# Patient Record
Sex: MALE | Race: WHITE | NOT HISPANIC OR LATINO | Employment: OTHER | URBAN - METROPOLITAN AREA
[De-identification: names, ages, dates, MRNs, and addresses within clinical notes are randomized per-mention and may not be internally consistent; named-entity substitution may affect disease eponyms.]

---

## 2019-02-26 ENCOUNTER — OFFICE VISIT (OUTPATIENT)
Dept: FAMILY MEDICINE CLINIC | Facility: CLINIC | Age: 69
End: 2019-02-26
Payer: MEDICARE

## 2019-02-26 VITALS
DIASTOLIC BLOOD PRESSURE: 82 MMHG | BODY MASS INDEX: 32.35 KG/M2 | HEIGHT: 70 IN | WEIGHT: 226 LBS | HEART RATE: 87 BPM | TEMPERATURE: 98 F | SYSTOLIC BLOOD PRESSURE: 130 MMHG | OXYGEN SATURATION: 97 %

## 2019-02-26 DIAGNOSIS — Z12.5 SCREENING FOR PROSTATE CANCER: ICD-10-CM

## 2019-02-26 DIAGNOSIS — Z12.11 SCREENING FOR COLON CANCER: ICD-10-CM

## 2019-02-26 DIAGNOSIS — Z00.00 WELL ADULT EXAM: Primary | ICD-10-CM

## 2019-02-26 DIAGNOSIS — Z13.220 ENCOUNTER FOR SCREENING FOR LIPID DISORDER: ICD-10-CM

## 2019-02-26 PROCEDURE — 99203 OFFICE O/P NEW LOW 30 MIN: CPT | Performed by: FAMILY MEDICINE

## 2019-02-26 NOTE — PROGRESS NOTES
Assessment/Plan:         Diagnoses and all orders for this visit:    Well adult exam  -     CBC and differential; Future  -     Comprehensive metabolic panel; Future    Screening for prostate cancer  -     PSA, Total Screen; Future    Screening for colon cancer  -     Cologuard; Future    Encounter for screening for lipid disorder  -     Lipid panel; Future      Wellness Visit for Adults   AMBULATORY CARE:   A wellness visit  is when you see your healthcare provider to get screened for health problems  You can also get advice on how to stay healthy  Write down your questions so you remember to ask them  Ask your healthcare provider how often you should have a wellness visit  What happens at a wellness visit:  Your healthcare provider will ask about your health, and your family history of health problems  This includes high blood pressure, heart disease, and cancer  He or she will ask if you have symptoms that concern you, if you smoke, and about your mood  You may also be asked about your intake of medicines, supplements, food, and alcohol  Any of the following may be done:  · Your weight  will be checked  Your height may also be checked so your body mass index (BMI) can be calculated  Your BMI shows if you are at a healthy weight  · Your blood pressure  and heart rate will be checked  Your temperature may also be checked  · Blood and urine tests  may be done  Blood tests may be done to check your cholesterol levels  Abnormal cholesterol levels increase your risk for heart disease and stroke  You may also need a blood or urine test to check for diabetes if you are at increased risk  Urine tests may be done to look for signs of an infection or kidney disease  · A physical exam  includes checking your heartbeat and lungs with a stethoscope  Your healthcare provider may also check your skin to look for sun damage  · Screening tests  may be recommended   A screening test is done to check for diseases that may not cause symptoms  The screening tests you may need depend on your age, gender, family history, and lifestyle habits  For example, colorectal screening may be recommended if you are 48years old or older  Vaccines you may need:   · Get an influenza vaccine  every year  The influenza vaccine protects you from the flu  Several types of viruses cause the flu  The viruses change over time, so new vaccines are made each year  · Get a tetanus-diphtheria (Td) booster vaccine  every 10 years  This vaccine protects you against tetanus and diphtheria  Tetanus is a severe infection that may cause painful muscle spasms and lockjaw  Diphtheria is a severe bacterial infection that causes a thick covering in the back of your mouth and throat  · Get a human papillomavirus (HPV) vaccine  if you are female and aged 23 to 32 or male 23 to 24 and never received it  This vaccine protects you from HPV infection  HPV is the most common infection spread by sexual contact  HPV may also cause vaginal, penile, and anal cancers  · Get a pneumococcal vaccine  if you are aged 72 years or older  The pneumococcal vaccine is an injection given to protect you from pneumococcal disease  Pneumococcal disease is an infection caused by pneumococcal bacteria  The infection may cause pneumonia, meningitis, or an ear infection  · Get a shingles vaccine  if you are aged 61 or older, even if you have had shingles before  The shingles vaccine is an injection to protect you from the varicella-zoster virus  This is the same virus that causes chickenpox  Shingles is a painful rash that develops in people who had chickenpox or have been exposed to the virus  How to eat healthy:  My Plate is a model for planning healthy meals  It shows the types and amounts of foods that should go on your plate  Fruits and vegetables make up about half of your plate, and grains and protein make up the other half   A serving of dairy is included on the side of your plate  The amount of calories and serving sizes you need depends on your age, gender, weight, and height  Examples of healthy foods are listed below:  · Eat a variety of vegetables  such as dark green, red, and orange vegetables  You can also include canned vegetables low in sodium (salt) and frozen vegetables without added butter or sauces  · Eat a variety of fresh fruits , canned fruit in 100% juice, frozen fruit, and dried fruit  · Include whole grains  At least half of the grains you eat should be whole grains  Examples include whole-wheat bread, wheat pasta, brown rice, and whole-grain cereals such as oatmeal     · Eat a variety of protein foods such as seafood (fish and shellfish), lean meat, and poultry without skin (turkey and chicken)  Examples of lean meats include pork leg, shoulder, or tenderloin, and beef round, sirloin, tenderloin, and extra lean ground beef  Other protein foods include eggs and egg substitutes, beans, peas, soy products, nuts, and seeds  · Choose low-fat dairy products such as skim or 1% milk or low-fat yogurt, cheese, and cottage cheese  · Limit unhealthy fats  such as butter, hard margarine, and shortening  Exercise:  Exercise at least 30 minutes per day on most days of the week  Some examples of exercise include walking, biking, dancing, and swimming  You can also fit in more physical activity by taking the stairs instead of the elevator or parking farther away from stores  Include muscle strengthening activities 2 days each week  Regular exercise provides many health benefits  It helps you manage your weight, and decreases your risk for type 2 diabetes, heart disease, stroke, and high blood pressure  Exercise can also help improve your mood  Ask your healthcare provider about the best exercise plan for you  General health and safety guidelines:   · Do not smoke  Nicotine and other chemicals in cigarettes and cigars can cause lung damage   Ask your healthcare provider for information if you currently smoke and need help to quit  E-cigarettes or smokeless tobacco still contain nicotine  Talk to your healthcare provider before you use these products  ·   · Lose weight, if needed  Being overweight increases your risk of certain health conditions  These include heart disease, high blood pressure, type 2 diabetes, and certain types of cancer  · Protect your skin  Do not sunbathe or use tanning beds  Use sunscreen with a SPF 15 or higher  Apply sunscreen at least 15 minutes before you go outside  Reapply sunscreen every 2 hours  Wear protective clothing, hats, and sunglasses when you are outside  Subjective:      Patient ID: Luana Bowman is a 80 y o  male  Establish   Has not had any primary , ever   Belongs to [employer] , [employer] ,   Retired school ,  , x 2yr   Last pe about 15 yrs   Has not had anything done    would have been [id number] [facility] , general , garden   Neg smoker   Neg etoh   Negative chest pain palpitations shortness of breath difficulty breathing cough, was told in past that had a murmur sometimes heart rate gives the buttocks had that for a good 30 years, has had various tests in the past all told normal, negative murmur lesions rash         The following portions of the patient's history were reviewed and updated as appropriate:   He has no past medical history on file  ,  does not have a problem list on file  ,   has no past surgical history on file  ,  family history is not on file  ,   reports that he quit smoking about 14 years ago  He has never used smokeless tobacco  His alcohol and drug histories are not on file  ,  is allergic to penicillin v       Review of Systems   Constitutional: Negative for appetite change, chills, fever and unexpected weight change     HENT: Negative for congestion, dental problem, ear pain, hearing loss, postnasal drip, rhinorrhea, sinus pressure, sinus pain, sneezing, sore throat, tinnitus and voice change  Eyes: Negative for visual disturbance  Respiratory: Negative for apnea, cough, chest tightness and shortness of breath  Cardiovascular: Negative for chest pain, palpitations and leg swelling  Gastrointestinal: Negative for abdominal pain, blood in stool, constipation, diarrhea, nausea and vomiting  Endocrine: Negative for cold intolerance, heat intolerance, polydipsia, polyphagia and polyuria  Genitourinary: Negative for decreased urine volume, difficulty urinating, dysuria, frequency and hematuria  Musculoskeletal: Negative for arthralgias, back pain, gait problem, joint swelling and myalgias  Skin: Negative for color change, rash and wound  Allergic/Immunologic: Negative for environmental allergies and food allergies  Neurological: Negative for dizziness, syncope, weakness, light-headedness, numbness and headaches  Hematological: Negative for adenopathy  Does not bruise/bleed easily  Psychiatric/Behavioral: Negative for sleep disturbance and suicidal ideas  The patient is not nervous/anxious  Objective:  Vitals:    02/26/19 1456   BP: 130/82   Pulse: 87   Temp: 98 °F (36 7 °C)   SpO2: 97%      Physical Exam   Constitutional: He is oriented to person, place, and time  He appears well-developed and well-nourished  No distress  HENT:   Head: Normocephalic and atraumatic  Right Ear: Tympanic membrane and external ear normal    Left Ear: Tympanic membrane and external ear normal    Nose: No mucosal edema or rhinorrhea  Right sinus exhibits no maxillary sinus tenderness  Left sinus exhibits no maxillary sinus tenderness and no frontal sinus tenderness  Mouth/Throat: Uvula is midline, oropharynx is clear and moist and mucous membranes are normal  No oropharyngeal exudate (Slightly erythematous with heavy postnasal drip)  Eyes: Conjunctivae and EOM are normal  No scleral icterus  Neck: Normal range of motion  Neck supple  Cardiovascular: Normal rate, regular rhythm and normal heart sounds  Pulmonary/Chest: Effort normal and breath sounds normal  He has no wheezes ( negative forced expiratory wheeze)  Abdominal: Soft  Bowel sounds are normal    Musculoskeletal: Normal range of motion  Lymphadenopathy:     He has no cervical adenopathy  Neurological: He is alert and oriented to person, place, and time  He has normal reflexes  Skin: Skin is warm and dry  No rash noted  Psychiatric: He has a normal mood and affect  His behavior is normal  Judgment and thought content normal          BMI Counseling: Body mass index is 32 43 kg/m²  Discussed the patient's BMI with him  The BMI is above average  BMI counseling and education was provided to the patient  Nutrition recommendations include reducing portion sizes, decreasing overall calorie intake, 3-5 servings of fruits/vegetables daily, reducing fast food intake, consuming healthier snacks, decreasing soda and/or juice intake, moderation in carbohydrate intake, increasing intake of lean protein, reducing intake of saturated fat and trans fat and reducing intake of cholesterol

## 2019-02-26 NOTE — PATIENT INSTRUCTIONS
Weight Management   AMBULATORY CARE:   Why it is important to manage your weight:  Being overweight increases your risk of health conditions such as heart disease, high blood pressure, type 2 diabetes, and certain types of cancer  It can also increase your risk for osteoarthritis, sleep apnea, and other respiratory problems  Aim for a slow, steady weight loss  Even a small amount of weight loss can lower your risk of health problems  How to lose weight safely:  A safe and healthy way to lose weight is to eat fewer calories and get regular exercise  You can lose up about 1 pound a week by decreasing the number of calories you eat by 500 calories each day  You can decrease calories by eating smaller portion sizes or by cutting out high-calorie foods  Read labels to find out how many calories are in the foods you eat  You can also burn calories with exercise such as walking, swimming, or biking  You will be more likely to keep weight off if you make these changes part of your lifestyle  Healthy meal plan for weight management:  A healthy meal plan includes a variety of foods, contains fewer calories, and helps you stay healthy  A healthy meal plan includes the following:  · Eat whole-grain foods more often  A healthy meal plan should contain fiber  Fiber is the part of grains, fruits, and vegetables that is not broken down by your body  Whole-grain foods are healthy and provide extra fiber in your diet  Some examples of whole-grain foods are whole-wheat breads and pastas, oatmeal, brown rice, and bulgur  · Eat a variety of vegetables every day  Include dark, leafy greens such as spinach, kale, verna greens, and mustard greens  Eat yellow and orange vegetables such as carrots, sweet potatoes, and winter squash  · Eat a variety of fruits every day  Choose fresh or canned fruit (canned in its own juice or light syrup) instead of juice  Fruit juice has very little or no fiber  · Eat low-fat dairy foods  Drink fat-free (skim) milk or 1% milk  Eat fat-free yogurt and low-fat cottage cheese  Try low-fat cheeses such as mozzarella and other reduced-fat cheeses  · Choose meat and other protein foods that are low in fat  Choose beans or other legumes such as split peas or lentils  Choose fish, skinless poultry (chicken or turkey), or lean cuts of red meat (beef or pork)  Before you cook meat or poultry, cut off any visible fat  · Use less fat and oil  Try baking foods instead of frying them  Add less fat, such as margarine, sour cream, regular salad dressing and mayonnaise to foods  Eat fewer high-fat foods  Some examples of high-fat foods include french fries, doughnuts, ice cream, and cakes  · Eat fewer sweets  Limit foods and drinks that are high in sugar  This includes candy, cookies, regular soda, and sweetened drinks  Ways to decrease calories:   · Eat smaller portions  ¨ Use a small plate with smaller servings  ¨ Do not eat second helpings  ¨ When you eat at a restaurant, ask for a box and place half of your meal in the box before you eat  ¨ Share an entrée with someone else  · Replace high-calorie snacks with healthy, low-calorie snacks  ¨ Choose fresh fruit, vegetables, fat-free rice cakes, or air-popped popcorn instead of potato chips, nuts, or chocolate  ¨ Choose water or calorie-free drinks instead of soda or sweetened drinks  · Eat regular meals  Skipping meals can lead to overeating later in the day  Eat a healthy snack in place of a meal if you do not have time to eat a regular meal      · Do not shop for groceries when you are hungry  You may be more likely to make unhealthy food choices  Take a grocery list of healthy foods and shop after you have eaten  Exercise:  Exercise at least 30 minutes per day on most days of the week  Some examples of exercise include walking, biking, dancing, and swimming   You can also fit in more physical activity by taking the stairs instead of the elevator or parking farther away from stores  Ask your healthcare provider about the best exercise plan for you  Other things to consider as you try to lose weight:   · Be aware of situations that may give you the urge to overeat, such as eating while watching television  Find ways to avoid these situations  For example, read a book, go for a walk, or do crafts  · Meet with a weight loss support group or friends who are also trying to lose weight  This may help you stay motivated to continue working on your weight loss goals  © 2017 [facility] [facility] Information is for End User's use only and may not be sold, redistributed or otherwise used for commercial purposes  All illustrations and images included in CareNotes® are the copyrighted property of [employer] , [employer]  or [employer]  The above information is an  only  It is not intended as medical advice for individual conditions or treatments  Talk to your doctor, nurse or pharmacist before following any medical regimen to see if it is safe and effective for you  Low Fat Diet   AMBULATORY CARE:   A low-fat diet  is an eating plan that is low in total fat, unhealthy fat, and cholesterol  You may need to follow a low-fat diet if you have trouble digesting or absorbing fat  You may also need to follow this diet if you have high cholesterol  You can also lower your cholesterol by increasing the amount of fiber in your diet  Soluble fiber is a type of fiber that helps to decrease cholesterol levels  Different types of fat in food:   · Limit unhealthy fats  A diet that is high in cholesterol, saturated fat, and trans fat may cause unhealthy cholesterol levels  Unhealthy cholesterol levels increase your risk of heart disease  ¨ Cholesterol:  Limit intake of cholesterol to less than 200 mg per day  Cholesterol is found in meat, eggs, and dairy      ¨ Saturated fat:  Limit saturated fat to less than 7% of your total daily calories  Ask your dietitian how many calories you need each day  Saturated fat is found in butter, cheese, ice cream, whole milk, and palm oil  Saturated fat is also found in meat, such as beef, pork, chicken skin, and processed meats  Processed meats include sausage, hot dogs, and bologna  ¨ Trans fat:  Avoid trans fat as much as possible  Trans fat is used in fried and baked foods  Foods that say trans fat free on the label may still have up to 0 5 grams of trans fat per serving  · Include healthy fats  Replace foods that are high in saturated and trans fat with foods high in healthy fats  This may help to decrease high cholesterol levels  ¨ Monounsaturated fats: These are found in avocados, nuts, and vegetable oils, such as olive, canola, and sunflower oil  ¨ Polyunsaturated fats: These can be found in vegetable oils, such as soybean or corn oil  Omega-3 fats can help to decrease the risk of heart disease  Omega-3 fats are found in fish, such as salmon, herring, trout, and tuna  Omega-3 fats can also be found in plant foods, such as walnuts, flaxseed, soybeans, and canola oil    Foods to limit or avoid:   · Grains:      ¨ Snacks that are made with partially hydrogenated oils, such as chips, regular crackers, and butter-flavored popcorn    ¨ High-fat baked goods, such as biscuits, croissants, doughnuts, pies, cookies, and pastries    · Dairy:      ¨ Whole milk, 2% milk, and yogurt and ice cream made with whole milk    ¨ Half and half creamer, heavy cream, and whipping cream    ¨ Cheese, cream cheese, and sour cream    · Meats and proteins:      ¨ High-fat cuts of meat (T-bone steak, regular hamburger, and ribs)    ¨ Fried meat, poultry (turkey and chicken), and fish    ¨ Poultry (chicken and turkey) with skin    ¨ Cold cuts (salami or bologna), hot dogs, oh, and sausage    ¨ Whole eggs and egg yolks    · Vegetables and fruits with added fat:      ¨ Fried vegetables or vegetables in butter or high-fat sauces, such as cream or cheese sauces    ¨ Fried fruit or fruit served with butter or cream    · Fats:      ¨ Butter, stick margarine, and shortening    ¨ Coconut, palm oil, and palm kernel oil  Foods to include:   · Grains:      ¨ Whole-grain breads, cereals, pasta, and brown rice    ¨ Low-fat crackers and pretzels    · Vegetables and fruits:      ¨ Fresh, frozen, or canned vegetables (no salt or low-sodium)    ¨ Fresh, frozen, dried, or canned fruit (canned in light syrup or fruit juice)    ¨ Avocado    · Low-fat dairy products:      ¨ Nonfat (skim) or 1% milk    ¨ Nonfat or low-fat cheese, yogurt, and cottage cheese    · Meats and proteins:      ¨ Chicken or turkey with no skin    ¨ Baked or broiled fish    ¨ Lean beef and pork (loin, round, extra lean hamburger)    ¨ Beans and peas, unsalted nuts, soy products    ¨ Egg whites and substitutes    ¨ Seeds and nuts    · Fats:      ¨ Unsaturated oil, such as canola, olive, peanut, soybean, or sunflower oil    ¨ Soft or liquid margarine and vegetable oil spread    ¨ Low-fat salad dressing  Other ways to decrease fat:   · Read food labels before you buy foods  Choose foods that have less than 30% of calories from fat  Choose low-fat or fat-free dairy products  Remember that fat free does not mean calorie free  These foods still contain calories, and too many calories can lead to weight gain  · Trim fat from meat and avoid fried food  Trim all visible fat from meat before you cook it  Remove the skin from poultry  Do not calloawy meat, fish, or poultry  Bake, roast, boil, or broil these foods instead  Avoid fried foods  Eat a baked potato instead of [place] fries  Steam vegetables instead of sautéing them in butter  · Add less fat to foods  Use imitation oh bits on salads and baked potatoes instead of regular oh bits  Use fat-free or low-fat salad dressings instead of regular dressings   Use low-fat or nonfat butter-flavored topping instead of regular butter or margarine on popcorn and other foods  Ways to decrease fat in recipes:  Replace high-fat ingredients with low-fat or nonfat ones  This may cause baked goods to be drier than usual  You may need to use nonfat cooking spray on pans to prevent food from sticking  You also may need to change the amount of other ingredients, such as water, in the recipe  Try the following:  · Use low-fat or light margarine instead of regular margarine or shortening  · Use lean ground turkey breast or chicken, or lean ground beef (less than 5% fat) instead of hamburger  · Add 1 teaspoon of canola oil to 8 ounces of skim milk instead of using cream or half and half  · Use grated zucchini, carrots, or apples in breads instead of coconut  · Use blenderized, low-fat cottage cheese, plain tofu, or low-fat ricotta cheese instead of cream cheese  · Use 1 egg white and 1 teaspoon of canola oil, or use ¼ cup (2 ounces) of fat-free egg substitute instead of a whole egg  · Replace half of the oil that is called for in a recipe with applesauce when you bake  Use 3 tablespoons of cocoa powder and 1 tablespoon of canola oil instead of a square of baking chocolate  How to increase fiber:  Eat enough high-fiber foods to get 20 to 30 grams of fiber every day  Slowly increase your fiber intake to avoid stomach cramps, gas, and other problems  · Eat 3 ounces of whole-grain foods each day  An ounce is about 1 slice of bread  Eat whole-grain breads, such as whole-wheat bread  Whole wheat, whole-wheat flour, or other whole grains should be listed as the first ingredient on the food label  Replace white flour with whole-grain flour or use half of each in recipes  Whole-grain flour is heavier than white flour, so you may have to add more yeast or baking powder  · Eat a high-fiber cereal for breakfast   Oatmeal is a good source of soluble fiber  Look for cereals that have bran or fiber in the name   Choose whole-grain products, such as brown rice, barley, and whole-wheat pasta  · Eat more beans, peas, and lentils  For example, add beans to soups or salads  Eat at least 5 cups of fruits and vegetables each day  Eat fruits and vegetables with the peel because the peel is high in fiber  © 2017 [address] [place]  Information is for End User's use only and may not be sold, redistributed or otherwise used for commercial purposes  All illustrations and images included in CareNotes® are the copyrighted property of [employer]  or [employer]  The above information is an  only  It is not intended as medical advice for individual conditions or treatments  Talk to your doctor, nurse or pharmacist before following any medical regimen to see if it is safe and effective for you  Heart Healthy Diet   AMBULATORY CARE:   A heart healthy diet  is an eating plan low in total fat, unhealthy fats, and sodium (salt)  A heart healthy diet helps decrease your risk for heart disease and stroke  Limit the amount of fat you eat to 25% to 35% of your total daily calories  Limit sodium to less than 2,300 mg each day  Healthy fats:  Healthy fats can help improve cholesterol levels  The risk for heart disease is decreased when cholesterol levels are normal  Choose healthy fats, such as the following:  · Unsaturated fat  is found in foods such as soybean, canola, olive, corn, and safflower oils  It is also found in soft tub margarine that is made with liquid vegetable oil  · Omega-3 fat  is found in certain fish, such as salmon, tuna, and trout, and in walnuts and flaxseed  Unhealthy fats:  Unhealthy fats can cause unhealthy cholesterol levels in your blood and increase your risk of heart disease  Limit unhealthy fats, such as the following:  · Cholesterol  is found in animal foods, such as eggs and lobster, and in dairy products made from whole milk   Limit cholesterol to less than 300 milligrams (mg) each day  You may need to limit cholesterol to 200 mg each day if you have heart disease  · Saturated fat  is found in meats, such as oh and hamburger  It is also found in chicken or turkey skin, whole milk, and butter  Limit saturated fat to less than 7% of your total daily calories  Limit saturated fat to less than 6% if you have heart disease or are at increased risk for it  · Trans fat  is found in packaged foods, such as potato chips and cookies  It is also in hard margarine, some fried foods, and shortening  Avoid trans fats as much as possible    Heart healthy foods and drinks to include:  Ask your dietitian or healthcare provider how many servings to have from each of the following food groups:  · Grains:      ¨ Whole-wheat breads, cereals, and pastas, and brown rice    ¨ Low-fat, low-sodium crackers and chips    · Vegetables:      ¨ Broccoli, green beans, green peas, and spinach    ¨ Collards, kale, and lima beans    ¨ Carrots, sweet potatoes, tomatoes, and peppers    ¨ Canned vegetables with no salt added    · Fruits:      ¨ Bananas, peaches, pears, and pineapple    ¨ Grapes, raisins, and dates    ¨ Oranges, tangerines, grapefruit, orange juice, and grapefruit juice    ¨ Apricots, mangoes, melons, and papaya    ¨ Raspberries and strawberries    ¨ Canned fruit with no added sugar    · Low-fat dairy products:      ¨ Nonfat (skim) milk, 1% milk, and low-fat almond, cashew, or soy milks fortified with calcium    ¨ Low-fat cheese, regular or frozen yogurt, and cottage cheese    · Meats and proteins , such as lean cuts of beef and pork (loin, leg, round), skinless chicken and turkey, legumes, soy products, egg whites, and nuts  Foods and drinks to limit or avoid:  Ask your dietitian or healthcare provider about these and other foods that are high in unhealthy fat, sodium, and sugar:  · Snack or packaged foods , such as frozen dinners, cookies, macaroni and cheese, and cereals with more than 300 mg of sodium per serving    · Canned or dry mixes  for cakes, soups, sauces, or gravies    · Vegetables with added sodium , such as instant potatoes, vegetables with added sauces, or regular canned vegetables    · Other foods high in sodium , such as ketchup, barbecue sauce, salad dressing, pickles, olives, soy sauce, and miso    · High-fat dairy foods  such as whole or 2% milk, cream cheese, or sour cream, and cheeses     · High-fat protein foods  such as high-fat cuts of beef (T-bone steaks, ribs), chicken or turkey with skin, and organ meats, such as liver    · Cured or smoked meats , such as hot dogs, oh, and sausage    · Unhealthy fats and oils , such as butter, stick margarine, shortening, and cooking oils such as coconut or palm oil    · Food and drinks high in sugar , such as soft drinks (soda), sports drinks, sweetened tea, candy, cake, cookies, pies, and doughnuts  Other diet guidelines to follow:   · Eat more foods containing omega-3 fats  Eat fish high in omega-3 fats at least 2 times a week  · Limit alcohol  Too much alcohol can damage your heart and raise your blood pressure  Women should limit alcohol to 1 drink a day  Men should limit alcohol to 2 drinks a day  A drink of alcohol is 12 ounces of beer, 5 ounces of wine, or 1½ ounces of liquor  · Choose low-sodium foods  High-sodium foods can lead to high blood pressure  Add little or no salt to food you prepare  Use herbs and spices in place of salt  · Eat more fiber  to help lower cholesterol levels  Eat at least 5 servings of fruits and vegetables each day  Eat 3 ounces of whole-grain foods each day  Legumes (beans) are also a good source of fiber  Lifestyle guidelines:   · Do not smoke  Nicotine and other chemicals in cigarettes and cigars can cause lung and heart damage  Ask your healthcare provider for information if you currently smoke and need help to quit  E-cigarettes or smokeless tobacco still contain nicotine  Talk to your healthcare provider before you use these products  · Exercise regularly  to help you maintain a healthy weight and improve your blood pressure and cholesterol levels  Ask your healthcare provider about the best exercise plan for you  Do not start an exercise program without asking your healthcare provider  Follow up with your healthcare provider as directed:  Write down your questions so you remember to ask them during your visits  © 2017 [address] [employer]  Information is for End User's use only and may not be sold, redistributed or otherwise used for commercial purposes  All illustrations and images included in CareNotes® are the copyrighted property of [employer]  or [employer]  The above information is an  only  It is not intended as medical advice for individual conditions or treatments  Talk to your doctor, nurse or pharmacist before following any medical regimen to see if it is safe and effective for you

## 2019-02-27 ENCOUNTER — APPOINTMENT (OUTPATIENT)
Dept: LAB | Facility: HOSPITAL | Age: 69
End: 2019-02-27
Payer: MEDICARE

## 2019-02-27 DIAGNOSIS — Z13.220 ENCOUNTER FOR SCREENING FOR LIPID DISORDER: ICD-10-CM

## 2019-02-27 DIAGNOSIS — Z00.00 WELL ADULT EXAM: ICD-10-CM

## 2019-02-27 DIAGNOSIS — Z12.5 SCREENING FOR PROSTATE CANCER: ICD-10-CM

## 2019-02-27 DIAGNOSIS — Z12.11 SCREENING FOR COLON CANCER: ICD-10-CM

## 2019-02-27 LAB
ALBUMIN SERPL BCP-MCNC: 3.8 G/DL (ref 3.5–5)
ALP SERPL-CCNC: 104 U/L (ref 46–116)
ALT SERPL W P-5'-P-CCNC: 47 U/L (ref 12–78)
ANION GAP SERPL CALCULATED.3IONS-SCNC: 11 MMOL/L (ref 4–13)
AST SERPL W P-5'-P-CCNC: 23 U/L (ref 5–45)
BASOPHILS # BLD MANUAL: 0.15 THOUSAND/UL (ref 0–0.1)
BASOPHILS NFR MAR MANUAL: 2 % (ref 0–1)
BILIRUB SERPL-MCNC: 0.5 MG/DL (ref 0.2–1)
BUN SERPL-MCNC: 22 MG/DL (ref 5–25)
CALCIUM SERPL-MCNC: 9.3 MG/DL (ref 8.3–10.1)
CHLORIDE SERPL-SCNC: 101 MMOL/L (ref 100–108)
CHOLEST SERPL-MCNC: 303 MG/DL (ref 50–200)
CO2 SERPL-SCNC: 25 MMOL/L (ref 21–32)
CREAT SERPL-MCNC: 0.99 MG/DL (ref 0.6–1.3)
EOSINOPHIL # BLD MANUAL: 0.23 THOUSAND/UL (ref 0–0.4)
EOSINOPHIL NFR BLD MANUAL: 3 % (ref 0–6)
ERYTHROCYTE [DISTWIDTH] IN BLOOD BY AUTOMATED COUNT: 12.7 % (ref 11.6–15.1)
GFR SERPL CREATININE-BSD FRML MDRD: 78 ML/MIN/1.73SQ M
GLUCOSE P FAST SERPL-MCNC: 158 MG/DL (ref 65–99)
HCT VFR BLD AUTO: 43.4 % (ref 36.5–49.3)
HDLC SERPL-MCNC: 37 MG/DL (ref 40–60)
HGB BLD-MCNC: 14.5 G/DL (ref 12–17)
LDLC SERPL CALC-MCNC: 223 MG/DL (ref 0–100)
LYMPHOCYTES # BLD AUTO: 2.95 THOUSAND/UL (ref 0.6–4.47)
LYMPHOCYTES # BLD AUTO: 39 % (ref 14–44)
MCH RBC QN AUTO: 28.8 PG (ref 26.8–34.3)
MCHC RBC AUTO-ENTMCNC: 33.4 G/DL (ref 31.4–37.4)
MCV RBC AUTO: 86 FL (ref 82–98)
MONOCYTES # BLD AUTO: 0.15 THOUSAND/UL (ref 0–1.22)
MONOCYTES NFR BLD: 2 % (ref 4–12)
MYELOCYTES NFR BLD MANUAL: 2 % (ref 0–1)
NEUTROPHILS # BLD MANUAL: 3.7 THOUSAND/UL (ref 1.85–7.62)
NEUTS BAND NFR BLD MANUAL: 1 % (ref 0–8)
NEUTS SEG NFR BLD AUTO: 48 % (ref 43–75)
NONHDLC SERPL-MCNC: 266 MG/DL
NRBC BLD AUTO-RTO: 0 /100 WBCS
NRBC BLD AUTO-RTO: 1 /100 WBC (ref 0–2)
PLATELET # BLD AUTO: 234 THOUSANDS/UL (ref 149–390)
PLATELET BLD QL SMEAR: ADEQUATE
PMV BLD AUTO: 9.8 FL (ref 8.9–12.7)
POTASSIUM SERPL-SCNC: 4.2 MMOL/L (ref 3.5–5.3)
PROT SERPL-MCNC: 7.9 G/DL (ref 6.4–8.2)
PSA SERPL-MCNC: 16.9 NG/ML (ref 0–4)
RBC # BLD AUTO: 5.04 MILLION/UL (ref 3.88–5.62)
SODIUM SERPL-SCNC: 137 MMOL/L (ref 136–145)
TOTAL CELLS COUNTED SPEC: 100
TRIGL SERPL-MCNC: 216 MG/DL
VARIANT LYMPHS # BLD AUTO: 3 %
WBC # BLD AUTO: 7.56 THOUSAND/UL (ref 4.31–10.16)

## 2019-02-27 PROCEDURE — 80061 LIPID PANEL: CPT

## 2019-02-27 PROCEDURE — 85007 BL SMEAR W/DIFF WBC COUNT: CPT

## 2019-02-27 PROCEDURE — G0103 PSA SCREENING: HCPCS

## 2019-02-27 PROCEDURE — 85027 COMPLETE CBC AUTOMATED: CPT

## 2019-02-27 PROCEDURE — 80053 COMPREHEN METABOLIC PANEL: CPT

## 2019-02-27 PROCEDURE — 36415 COLL VENOUS BLD VENIPUNCTURE: CPT

## 2019-03-12 ENCOUNTER — OFFICE VISIT (OUTPATIENT)
Dept: FAMILY MEDICINE CLINIC | Facility: CLINIC | Age: 69
End: 2019-03-12
Payer: MEDICARE

## 2019-03-12 VITALS
DIASTOLIC BLOOD PRESSURE: 82 MMHG | HEIGHT: 70 IN | OXYGEN SATURATION: 96 % | WEIGHT: 227 LBS | SYSTOLIC BLOOD PRESSURE: 140 MMHG | HEART RATE: 83 BPM | BODY MASS INDEX: 32.5 KG/M2 | TEMPERATURE: 98.6 F

## 2019-03-12 DIAGNOSIS — J06.9 UPPER RESPIRATORY TRACT INFECTION, UNSPECIFIED TYPE: ICD-10-CM

## 2019-03-12 DIAGNOSIS — R73.01 IFG (IMPAIRED FASTING GLUCOSE): ICD-10-CM

## 2019-03-12 DIAGNOSIS — R97.20 ELEVATED PSA: ICD-10-CM

## 2019-03-12 DIAGNOSIS — E78.2 MIXED HYPERLIPIDEMIA: Primary | ICD-10-CM

## 2019-03-12 DIAGNOSIS — Z00.00 MEDICARE ANNUAL WELLNESS VISIT, SUBSEQUENT: ICD-10-CM

## 2019-03-12 PROCEDURE — 93000 ELECTROCARDIOGRAM COMPLETE: CPT | Performed by: FAMILY MEDICINE

## 2019-03-12 PROCEDURE — 90732 PPSV23 VACC 2 YRS+ SUBQ/IM: CPT | Performed by: FAMILY MEDICINE

## 2019-03-12 PROCEDURE — G0438 PPPS, INITIAL VISIT: HCPCS | Performed by: FAMILY MEDICINE

## 2019-03-12 PROCEDURE — 99214 OFFICE O/P EST MOD 30 MIN: CPT | Performed by: FAMILY MEDICINE

## 2019-03-12 PROCEDURE — G0009 ADMIN PNEUMOCOCCAL VACCINE: HCPCS | Performed by: FAMILY MEDICINE

## 2019-03-12 RX ORDER — AZITHROMYCIN 250 MG/1
TABLET, FILM COATED ORAL
Qty: 6 TABLET | Refills: 0 | Status: SHIPPED | OUTPATIENT
Start: 2019-03-12 | End: 2019-03-16

## 2019-03-12 RX ORDER — ROSUVASTATIN CALCIUM 5 MG/1
5 TABLET, COATED ORAL DAILY
Qty: 30 TABLET | Refills: 5 | Status: SHIPPED | OUTPATIENT
Start: 2019-03-12 | End: 2019-07-15 | Stop reason: SDUPTHER

## 2019-03-12 NOTE — PROGRESS NOTES
Assessment and Plan:    Problem List Items Addressed This Visit     None        Health Maintenance Due   Topic Date Due    Hepatitis C Screening  1950    Medicare Annual Wellness Visit (AWV)  1950    DTaP,Tdap,and Td Vaccines (1 - Tdap) 1972    CRC Screening: Cologuard  2001    Pneumococcal PPSV23/PCV13 65+ Years / Low and Medium Risk (1 of 2 - PCV13) 2016         HPI:  Karlo Mares is a 80 y o  male here for his Subsequent Wellness Visit  There is no problem list on file for this patient  No past medical history on file  No past surgical history on file  No family history on file  Social History     Tobacco Use   Smoking Status Former Smoker    Last attempt to quit: 2005    Years since quittin 0   Smokeless Tobacco Never Used     Social History     Substance and Sexual Activity   Alcohol Use Not on file      Social History     Substance and Sexual Activity   Drug Use Not on file       No current outpatient medications on file  No current facility-administered medications for this visit  Allergies   Allergen Reactions    Penicillin V      Immunization History   Administered Date(s) Administered    INFLUENZA 10/01/2018       Patient Care Team:  Vivian Montero as PCP - General (Family Medicine)    Medicare Screening Tests and Risk Assessments:  Gonzalo Nunez is here for his Subsequent Wellness visit  Health Risk Assessment:  Patient rates overall health as very good  Patient feels that their physical health rating is Same  Eyesight was rated as Same  Hearing was rated as Same  Patient feels that their emotional and mental health rating is Same  Pain experienced by patient in the last 7 days has been None  Patient states that he has experienced no weight loss or gain in last 6 months  Emotional/Mental Health:  Patient has been feeling nervous/anxious      PHQ-9 Depression Screening:    Frequency of the following problems over the past two weeks: 1  Little interest or pleasure in doing things: 0 - not at all      2  Feeling down, depressed, or hopeless: 0 - not at all  PHQ-2 Score: 0          Broken Bones/Falls: Fall Risk Assessment:    In the past year, patient has experienced: No history of falling in past year          Bladder/Bowel:  Patient has not leaked urine accidently in the last six months  Patient reports no loss of bowel control  Immunizations:  Patient has not had a flu vaccination within the last year  Patient has not received a pneumonia shot  Patient has received a shingles shot  Patient has not received tetanus/diphtheria shot  Home Safety:  Patient does not have trouble with stairs inside or outside of their home  Patient currently reports that there are no safety hazards present in home, working smoke alarms, no working carbon monoxide detectors  Preventative Screenings:   prostate cancer screen performed, colon cancer screen completed, cholesterol screen completed, glaucoma eye exam completed,     Nutrition:  Current diet: Regular with servings of the following:    Medications:  Patient is currently taking over-the-counter supplements  Patient is not able to manage medications  Lifestyle Choices:  Patient reports no tobacco use  Patient has smoked or used tobacco in the past   Patient has stopped his tobacco use  Tobacco use quit date: 14 years ago  Patient reports no alcohol use  Patient drives a vehicle  Patient wears seat belt  Activities of Daily Living:  Can get out of bed by his or her self, able to dress self, able to make own meals, able to do own shopping, able to bathe self, can do own laundry/housekeeping, can manage own money, pay bills and track expenses    Previous Hospitalizations:  No hospitalization or ED visit in past 12 months        Advanced Directives:  Patient has decided on a power of   Patient has spoken to designated power of     Patient has completed advanced directive  Preventative Screening/Counseling:      Cardiovascular:      General: Risks and Benefits Discussed and Screening Current     Due for Labs/Analytes/Optional EKG: echocardiogram          Diabetes:      General: Risks and Benefits Discussed and Screening Current          Colorectal Cancer:      General: Risks and Benefits Discussed and Screening Current          Prostate Cancer:      General: Risks and Benefits Discussed and Screening Current          Osteoporosis:      General: Screening Not Indicated          AAA:      General: Screening Not Indicated          Glaucoma:      General: Risks and Benefits Discussed and Screening Current      Comments: md mathur        HIV:      General: Screening Not Indicated          Hepatitis C:      General: Screening Not Indicated        Advanced Directives:   Patient has living will for healthcare, has durable POA for healthcare, patient has an advanced directive  Information on ACP and/or AD provided  5 wishes given  End of life assessment reviewed with patient  Provider agrees with end of life decisions

## 2019-03-12 NOTE — PROGRESS NOTES
Assessment/Plan:         Diagnoses and all orders for this visit:    Mixed hyperlipidemia  -     Comprehensive metabolic panel; Future  -     TSH, 3rd generation; Future  -     rosuvastatin (CRESTOR) 5 mg tablet; Take 1 tablet (5 mg total) by mouth daily    Elevated PSA  -     PSA, total and free; Future    IFG (impaired fasting glucose)  -     Hemoglobin A1C; Future  -     TSH, 3rd generation; Future  -     PNEUMOCOCCAL POLYSACCHARIDE VACCINE 23-VALENT =>3YO SQ IM    Upper respiratory tract infection, unspecified type  -     azithromycin (ZITHROMAX) 250 mg tablet; Take 2 tablets today then 1 tablet daily x 4 days    Medicare annual wellness visit, subsequent        Hyperlipidemia  Will initiate care Crestor 5 mg p o  Daily stressed importance of diet and weight loss program, discussed side effect profile medications recheck CMP lipid profile  Elevated PSA  Denies any current neurological issues denies any change in urinary pattern flow no previous PSA to evaluate with will recheck along with, did discuss significance of levels with patient will most likely need evaluation with Urology patient understands  URi  discussed plan of care , rec preventative measures , if past issues with seasonal allergens rec aggressive treatment , tony pot , nasal rinse , if no history of htn / cad  consider otc  decongestant for short term treatment , muccinex otc  consider the use of nasal steroid short term  Impaired fasting glucose  No previous history of elevations in glucose, no previous history of diabetes, discussed diagnosis diabetes will recheck fasting blood sugar along with hemoglobin A1c      Subjective:      Patient ID: Anitha Melgoza is a 80 y o  male  Here to f/u on the labs   Has a acold , started approx 4 days ago ,   Has been takign otc meds therflu, nywuil   Cough , non productive , fatigue , neg fever , chill , flu-like symptoms has been improving over the past 2 days   elevated psa ?  Never had any issue with urinary pattern or flow , neg nocturia , neg split stream , dysuria   Negative chest pain palpitations shortness of breath difficulty breathing cough lesions rash    Denies any change in weight appetite good, denies any change in bowel or bladder pattern, denies any abdominal pain pressure      The following portions of the patient's history were reviewed and updated as appropriate:   He has no past medical history on file  ,  does not have any pertinent problems on file  ,   has no past surgical history on file  ,  family history is not on file  ,   reports that he quit smoking about 14 years ago  He has never used smokeless tobacco  His alcohol and drug histories are not on file  ,  is allergic to penicillin v       Review of Systems   Constitutional: Negative for appetite change, chills, fever and unexpected weight change  HENT: Positive for congestion  Negative for dental problem, ear pain, hearing loss, postnasal drip, rhinorrhea, sinus pressure, sinus pain, sneezing, sore throat, tinnitus and voice change  Eyes: Negative for visual disturbance  Respiratory: Positive for cough  Negative for chest tightness and wheezing  Cardiovascular: Negative for chest pain, palpitations and leg swelling  Gastrointestinal: Negative for abdominal pain, blood in stool, constipation, diarrhea, nausea and vomiting  Endocrine: Negative for cold intolerance, heat intolerance, polydipsia, polyphagia and polyuria  Genitourinary: Negative for decreased urine volume, difficulty urinating, dysuria, enuresis, flank pain, frequency and hematuria  Musculoskeletal: Negative for arthralgias, back pain, gait problem, joint swelling and myalgias  Skin: Negative for color change, rash and wound  Allergic/Immunologic: Negative for environmental allergies and food allergies  Neurological: Negative for dizziness, syncope, weakness, light-headedness, numbness and headaches  Hematological: Negative for adenopathy   Does not bruise/bleed easily  Psychiatric/Behavioral: Negative for sleep disturbance and suicidal ideas  The patient is not nervous/anxious  Objective:  Vitals:    03/12/19 0858   BP: 140/82   Pulse: 83   Temp: 98 6 °F (37 °C)   SpO2: 96%      Physical Exam   Constitutional: He is oriented to person, place, and time  He appears well-developed and well-nourished  No distress  HENT:   Head: Normocephalic and atraumatic  Right Ear: External ear normal    Left Ear: External ear normal    Nose: Nose normal    Mouth/Throat: Oropharynx is clear and moist  No oropharyngeal exudate  Eyes: Conjunctivae are normal  No scleral icterus  Neck: Normal range of motion  Neck supple  Cardiovascular: Normal rate, regular rhythm and normal heart sounds  Pulmonary/Chest: Effort normal and breath sounds normal  He has no wheezes  Musculoskeletal: Normal range of motion  Lymphadenopathy:     He has no cervical adenopathy  Neurological: He is alert and oriented to person, place, and time  He has normal reflexes  Skin: Skin is warm and dry  Psychiatric: He has a normal mood and affect   His behavior is normal  Judgment and thought content normal

## 2019-03-14 ENCOUNTER — TELEPHONE (OUTPATIENT)
Dept: FAMILY MEDICINE CLINIC | Facility: CLINIC | Age: 69
End: 2019-03-14

## 2019-03-18 ENCOUNTER — TELEPHONE (OUTPATIENT)
Dept: GASTROENTEROLOGY | Facility: CLINIC | Age: 69
End: 2019-03-18

## 2019-03-18 DIAGNOSIS — Z12.11 SCREENING FOR COLON CANCER: Primary | ICD-10-CM

## 2019-03-18 PROBLEM — R19.5 POSITIVE COLORECTAL CANCER SCREENING USING COLOGUARD TEST: Status: ACTIVE | Noted: 2019-03-18

## 2019-03-18 NOTE — TELEPHONE ENCOUNTER
Dr Chas Jennings - Patient had positive Cologuard per Andreia Vasquez  Needs to schedule colonoscopy    Please call [phone] ty

## 2019-03-26 ENCOUNTER — APPOINTMENT (OUTPATIENT)
Dept: LAB | Facility: HOSPITAL | Age: 69
End: 2019-03-26
Payer: MEDICARE

## 2019-03-26 DIAGNOSIS — R97.20 ELEVATED PSA: ICD-10-CM

## 2019-03-26 DIAGNOSIS — R73.01 IFG (IMPAIRED FASTING GLUCOSE): ICD-10-CM

## 2019-03-26 DIAGNOSIS — E78.2 MIXED HYPERLIPIDEMIA: ICD-10-CM

## 2019-03-26 LAB
ALBUMIN SERPL BCP-MCNC: 3.6 G/DL (ref 3.5–5)
ALP SERPL-CCNC: 106 U/L (ref 46–116)
ALT SERPL W P-5'-P-CCNC: 45 U/L (ref 12–78)
ANION GAP SERPL CALCULATED.3IONS-SCNC: 11 MMOL/L (ref 4–13)
AST SERPL W P-5'-P-CCNC: 22 U/L (ref 5–45)
BILIRUB SERPL-MCNC: 0.6 MG/DL (ref 0.2–1)
BUN SERPL-MCNC: 14 MG/DL (ref 5–25)
CALCIUM SERPL-MCNC: 9.3 MG/DL (ref 8.3–10.1)
CHLORIDE SERPL-SCNC: 103 MMOL/L (ref 100–108)
CO2 SERPL-SCNC: 26 MMOL/L (ref 21–32)
CREAT SERPL-MCNC: 0.93 MG/DL (ref 0.6–1.3)
EST. AVERAGE GLUCOSE BLD GHB EST-MCNC: 183 MG/DL
GFR SERPL CREATININE-BSD FRML MDRD: 85 ML/MIN/1.73SQ M
GLUCOSE P FAST SERPL-MCNC: 147 MG/DL (ref 65–99)
HBA1C MFR BLD: 8 % (ref 4.2–6.3)
POTASSIUM SERPL-SCNC: 3.9 MMOL/L (ref 3.5–5.3)
PROT SERPL-MCNC: 7.8 G/DL (ref 6.4–8.2)
SODIUM SERPL-SCNC: 140 MMOL/L (ref 136–145)
TSH SERPL DL<=0.05 MIU/L-ACNC: 2.31 UIU/ML (ref 0.36–3.74)

## 2019-03-26 PROCEDURE — 80053 COMPREHEN METABOLIC PANEL: CPT

## 2019-03-26 PROCEDURE — 84153 ASSAY OF PSA TOTAL: CPT

## 2019-03-26 PROCEDURE — 84154 ASSAY OF PSA FREE: CPT

## 2019-03-26 PROCEDURE — 36415 COLL VENOUS BLD VENIPUNCTURE: CPT

## 2019-03-26 PROCEDURE — 84443 ASSAY THYROID STIM HORMONE: CPT

## 2019-03-26 PROCEDURE — 83036 HEMOGLOBIN GLYCOSYLATED A1C: CPT

## 2019-03-27 LAB
PSA FREE MFR SERPL: 13.8 %
PSA FREE SERPL-MCNC: 3.08 NG/ML
PSA SERPL-MCNC: 22.4 NG/ML (ref 0–4)

## 2019-04-02 ENCOUNTER — OFFICE VISIT (OUTPATIENT)
Dept: FAMILY MEDICINE CLINIC | Facility: CLINIC | Age: 69
End: 2019-04-02
Payer: MEDICARE

## 2019-04-02 VITALS
SYSTOLIC BLOOD PRESSURE: 130 MMHG | TEMPERATURE: 98.5 F | OXYGEN SATURATION: 94 % | DIASTOLIC BLOOD PRESSURE: 80 MMHG | RESPIRATION RATE: 16 BRPM | HEART RATE: 96 BPM | WEIGHT: 226 LBS | HEIGHT: 70 IN | BODY MASS INDEX: 32.35 KG/M2

## 2019-04-02 DIAGNOSIS — Z00.00 MEDICARE ANNUAL WELLNESS VISIT, SUBSEQUENT: ICD-10-CM

## 2019-04-02 DIAGNOSIS — E78.2 MIXED HYPERLIPIDEMIA: ICD-10-CM

## 2019-04-02 DIAGNOSIS — E11.8 TYPE 2 DIABETES MELLITUS WITH COMPLICATION, WITHOUT LONG-TERM CURRENT USE OF INSULIN (HCC): Primary | ICD-10-CM

## 2019-04-02 DIAGNOSIS — R97.20 ELEVATED PSA: ICD-10-CM

## 2019-04-02 PROCEDURE — G0439 PPPS, SUBSEQ VISIT: HCPCS | Performed by: FAMILY MEDICINE

## 2019-04-02 PROCEDURE — 99214 OFFICE O/P EST MOD 30 MIN: CPT | Performed by: FAMILY MEDICINE

## 2019-04-02 RX ORDER — BLOOD-GLUCOSE METER
EACH MISCELLANEOUS ONCE
Qty: 1 KIT | Refills: 0 | Status: SHIPPED | OUTPATIENT
Start: 2019-04-02 | End: 2020-08-07

## 2019-04-26 ENCOUNTER — OFFICE VISIT (OUTPATIENT)
Dept: UROLOGY | Facility: CLINIC | Age: 69
End: 2019-04-26
Payer: MEDICARE

## 2019-04-26 VITALS
BODY MASS INDEX: 31.78 KG/M2 | WEIGHT: 222 LBS | HEART RATE: 65 BPM | DIASTOLIC BLOOD PRESSURE: 90 MMHG | HEIGHT: 70 IN | SYSTOLIC BLOOD PRESSURE: 158 MMHG

## 2019-04-26 DIAGNOSIS — R97.20 ELEVATED PSA: Primary | ICD-10-CM

## 2019-04-26 LAB — POST-VOID RESIDUAL VOLUME, ML POC: 54 ML

## 2019-04-26 PROCEDURE — 51798 US URINE CAPACITY MEASURE: CPT | Performed by: PHYSICIAN ASSISTANT

## 2019-04-26 PROCEDURE — 99204 OFFICE O/P NEW MOD 45 MIN: CPT | Performed by: PHYSICIAN ASSISTANT

## 2019-04-26 RX ORDER — CIPROFLOXACIN 500 MG/1
500 TABLET, FILM COATED ORAL EVERY 12 HOURS SCHEDULED
Qty: 6 TABLET | Refills: 0 | Status: SHIPPED | OUTPATIENT
Start: 2019-04-26 | End: 2019-04-29

## 2019-04-26 RX ORDER — MAGNESIUM CARB/ALUMINUM HYDROX 105-160MG
296 TABLET,CHEWABLE ORAL ONCE
Qty: 296 ML | Refills: 0 | Status: ON HOLD | OUTPATIENT
Start: 2019-04-26 | End: 2019-05-06 | Stop reason: ALTCHOICE

## 2019-05-05 ENCOUNTER — ANESTHESIA EVENT (OUTPATIENT)
Dept: PERIOP | Facility: HOSPITAL | Age: 69
End: 2019-05-05
Payer: MEDICARE

## 2019-05-06 ENCOUNTER — ANESTHESIA (OUTPATIENT)
Dept: PERIOP | Facility: HOSPITAL | Age: 69
End: 2019-05-06
Payer: MEDICARE

## 2019-05-06 ENCOUNTER — HOSPITAL ENCOUNTER (OUTPATIENT)
Facility: HOSPITAL | Age: 69
Setting detail: OUTPATIENT SURGERY
Discharge: HOME | End: 2019-05-06
Attending: INTERNAL MEDICINE | Admitting: INTERNAL MEDICINE
Payer: MEDICARE

## 2019-05-06 VITALS
OXYGEN SATURATION: 92 % | WEIGHT: 214.51 LBS | DIASTOLIC BLOOD PRESSURE: 73 MMHG | HEIGHT: 71 IN | HEART RATE: 64 BPM | SYSTOLIC BLOOD PRESSURE: 153 MMHG | RESPIRATION RATE: 22 BRPM | TEMPERATURE: 97.4 F | BODY MASS INDEX: 30.03 KG/M2

## 2019-05-06 DIAGNOSIS — R19.5 POSITIVE COLORECTAL CANCER SCREENING USING COLOGUARD TEST: ICD-10-CM

## 2019-05-06 LAB — GLUCOSE SERPL-MCNC: 130 MG/DL (ref 65–140)

## 2019-05-06 PROCEDURE — 82948 REAGENT STRIP/BLOOD GLUCOSE: CPT

## 2019-05-06 PROCEDURE — 88305 TISSUE EXAM BY PATHOLOGIST: CPT | Performed by: PATHOLOGY

## 2019-05-06 PROCEDURE — 45380 COLONOSCOPY AND BIOPSY: CPT | Performed by: INTERNAL MEDICINE

## 2019-05-06 RX ORDER — SODIUM CHLORIDE, SODIUM LACTATE, POTASSIUM CHLORIDE, CALCIUM CHLORIDE 600; 310; 30; 20 MG/100ML; MG/100ML; MG/100ML; MG/100ML
125 INJECTION, SOLUTION INTRAVENOUS CONTINUOUS
Status: DISCONTINUED | OUTPATIENT
Start: 2019-05-06 | End: 2019-05-06 | Stop reason: HOSPADM

## 2019-05-06 RX ORDER — PROPOFOL 10 MG/ML
INJECTION, EMULSION INTRAVENOUS AS NEEDED
Status: DISCONTINUED | OUTPATIENT
Start: 2019-05-06 | End: 2019-05-06 | Stop reason: SURG

## 2019-05-06 RX ADMIN — PROPOFOL 20 MG: 10 INJECTION, EMULSION INTRAVENOUS at 08:04

## 2019-05-06 RX ADMIN — PROPOFOL 20 MG: 10 INJECTION, EMULSION INTRAVENOUS at 08:01

## 2019-05-06 RX ADMIN — SODIUM CHLORIDE, SODIUM LACTATE, POTASSIUM CHLORIDE, AND CALCIUM CHLORIDE 125 ML/HR: .6; .31; .03; .02 INJECTION, SOLUTION INTRAVENOUS at 07:23

## 2019-05-06 RX ADMIN — PROPOFOL 80 MG: 10 INJECTION, EMULSION INTRAVENOUS at 07:56

## 2019-05-06 RX ADMIN — PROPOFOL 50 MG: 10 INJECTION, EMULSION INTRAVENOUS at 07:58

## 2019-05-06 RX ADMIN — PROPOFOL 20 MG: 10 INJECTION, EMULSION INTRAVENOUS at 08:06

## 2019-05-06 RX ADMIN — PROPOFOL 20 MG: 10 INJECTION, EMULSION INTRAVENOUS at 08:10

## 2019-05-06 RX ADMIN — PROPOFOL 20 MG: 10 INJECTION, EMULSION INTRAVENOUS at 08:08

## 2019-05-07 ENCOUNTER — TELEPHONE (OUTPATIENT)
Dept: UROLOGY | Facility: CLINIC | Age: 69
End: 2019-05-07

## 2019-05-08 ENCOUNTER — TELEPHONE (OUTPATIENT)
Dept: UROLOGY | Facility: CLINIC | Age: 69
End: 2019-05-08

## 2019-05-08 ENCOUNTER — TELEPHONE (OUTPATIENT)
Dept: GASTROENTEROLOGY | Facility: CLINIC | Age: 69
End: 2019-05-08

## 2019-05-08 DIAGNOSIS — R97.20 ELEVATED PSA: Primary | ICD-10-CM

## 2019-05-08 RX ORDER — MAGNESIUM CARB/ALUMINUM HYDROX 105-160MG
296 TABLET,CHEWABLE ORAL ONCE
Qty: 296 ML | Refills: 0 | Status: SHIPPED | OUTPATIENT
Start: 2019-05-08 | End: 2019-06-06

## 2019-05-15 ENCOUNTER — PROCEDURE VISIT (OUTPATIENT)
Dept: UROLOGY | Facility: CLINIC | Age: 69
End: 2019-05-15
Payer: MEDICARE

## 2019-05-15 VITALS
HEART RATE: 92 BPM | BODY MASS INDEX: 30.66 KG/M2 | SYSTOLIC BLOOD PRESSURE: 138 MMHG | DIASTOLIC BLOOD PRESSURE: 82 MMHG | HEIGHT: 71 IN | WEIGHT: 219 LBS

## 2019-05-15 DIAGNOSIS — R97.20 ELEVATED PSA: Primary | ICD-10-CM

## 2019-05-15 PROCEDURE — 1160F RVW MEDS BY RX/DR IN RCRD: CPT | Performed by: UROLOGY

## 2019-05-15 PROCEDURE — 76942 ECHO GUIDE FOR BIOPSY: CPT | Performed by: UROLOGY

## 2019-05-15 PROCEDURE — 3045F PR MOST RECENT HEMOGLOBIN A1C LEVEL 7.0-9.0%: CPT | Performed by: UROLOGY

## 2019-05-15 PROCEDURE — 76872 US TRANSRECTAL: CPT | Performed by: UROLOGY

## 2019-05-15 PROCEDURE — 55700 PR BIOPSY OF PROSTATE,NEEDLE/PUNCH: CPT | Performed by: UROLOGY

## 2019-05-15 PROCEDURE — G0416 PROSTATE BIOPSY, ANY MTHD: HCPCS | Performed by: PATHOLOGY

## 2019-06-06 ENCOUNTER — OFFICE VISIT (OUTPATIENT)
Dept: UROLOGY | Facility: CLINIC | Age: 69
End: 2019-06-06
Payer: MEDICARE

## 2019-06-06 VITALS
BODY MASS INDEX: 30.32 KG/M2 | WEIGHT: 216.6 LBS | DIASTOLIC BLOOD PRESSURE: 78 MMHG | SYSTOLIC BLOOD PRESSURE: 140 MMHG | HEART RATE: 64 BPM | HEIGHT: 71 IN

## 2019-06-06 DIAGNOSIS — C61 PROSTATE CANCER (HCC): ICD-10-CM

## 2019-06-06 DIAGNOSIS — R97.20 ELEVATED PSA: Primary | ICD-10-CM

## 2019-06-06 PROCEDURE — 99215 OFFICE O/P EST HI 40 MIN: CPT | Performed by: UROLOGY

## 2019-06-07 ENCOUNTER — APPOINTMENT (OUTPATIENT)
Dept: LAB | Facility: HOSPITAL | Age: 69
End: 2019-06-07
Payer: MEDICARE

## 2019-06-07 ENCOUNTER — TELEPHONE (OUTPATIENT)
Dept: UROLOGY | Facility: CLINIC | Age: 69
End: 2019-06-07

## 2019-06-07 ENCOUNTER — HOSPITAL ENCOUNTER (OUTPATIENT)
Dept: RADIOLOGY | Facility: HOSPITAL | Age: 69
Discharge: HOME | End: 2019-06-07
Attending: UROLOGY
Payer: MEDICARE

## 2019-06-07 DIAGNOSIS — E11.8 TYPE 2 DIABETES MELLITUS WITH COMPLICATION, WITHOUT LONG-TERM CURRENT USE OF INSULIN (HCC): ICD-10-CM

## 2019-06-07 DIAGNOSIS — E78.2 MIXED HYPERLIPIDEMIA: ICD-10-CM

## 2019-06-07 DIAGNOSIS — C61 PROSTATE CANCER (HCC): ICD-10-CM

## 2019-06-07 LAB
ALBUMIN SERPL BCP-MCNC: 4 G/DL (ref 3.5–5)
ALP SERPL-CCNC: 90 U/L (ref 46–116)
ALT SERPL W P-5'-P-CCNC: 33 U/L (ref 12–78)
ANION GAP SERPL CALCULATED.3IONS-SCNC: 11 MMOL/L (ref 4–13)
AST SERPL W P-5'-P-CCNC: 18 U/L (ref 5–45)
BASOPHILS # BLD AUTO: 0.06 THOUSANDS/ΜL (ref 0–0.1)
BASOPHILS NFR BLD AUTO: 1 % (ref 0–1)
BILIRUB SERPL-MCNC: 0.5 MG/DL (ref 0.2–1)
BUN SERPL-MCNC: 25 MG/DL (ref 5–25)
CALCIUM SERPL-MCNC: 9.6 MG/DL (ref 8.3–10.1)
CHLORIDE SERPL-SCNC: 104 MMOL/L (ref 100–108)
CHOLEST SERPL-MCNC: 186 MG/DL (ref 50–200)
CO2 SERPL-SCNC: 26 MMOL/L (ref 21–32)
CREAT SERPL-MCNC: 0.94 MG/DL (ref 0.6–1.3)
CREAT UR-MCNC: 73.5 MG/DL
EOSINOPHIL # BLD AUTO: 0.12 THOUSAND/ΜL (ref 0–0.61)
EOSINOPHIL NFR BLD AUTO: 2 % (ref 0–6)
ERYTHROCYTE [DISTWIDTH] IN BLOOD BY AUTOMATED COUNT: 12.5 % (ref 11.6–15.1)
EST. AVERAGE GLUCOSE BLD GHB EST-MCNC: 140 MG/DL
GFR SERPL CREATININE-BSD FRML MDRD: 84 ML/MIN/1.73SQ M
GLUCOSE P FAST SERPL-MCNC: 123 MG/DL (ref 65–99)
HBA1C MFR BLD: 6.5 % (ref 4.2–6.3)
HCT VFR BLD AUTO: 42.7 % (ref 36.5–49.3)
HDLC SERPL-MCNC: 42 MG/DL (ref 40–60)
HGB BLD-MCNC: 14.2 G/DL (ref 12–17)
IMM GRANULOCYTES # BLD AUTO: 0.08 THOUSAND/UL (ref 0–0.2)
IMM GRANULOCYTES NFR BLD AUTO: 1 % (ref 0–2)
LDLC SERPL CALC-MCNC: 113 MG/DL (ref 0–100)
LYMPHOCYTES # BLD AUTO: 2.19 THOUSANDS/ΜL (ref 0.6–4.47)
LYMPHOCYTES NFR BLD AUTO: 32 % (ref 14–44)
MCH RBC QN AUTO: 28.8 PG (ref 26.8–34.3)
MCHC RBC AUTO-ENTMCNC: 33.3 G/DL (ref 31.4–37.4)
MCV RBC AUTO: 87 FL (ref 82–98)
MICROALBUMIN UR-MCNC: 46.4 MG/L (ref 0–20)
MICROALBUMIN/CREAT 24H UR: 63 MG/G CREATININE (ref 0–30)
MONOCYTES # BLD AUTO: 0.56 THOUSAND/ΜL (ref 0.17–1.22)
MONOCYTES NFR BLD AUTO: 8 % (ref 4–12)
NEUTROPHILS # BLD AUTO: 3.8 THOUSANDS/ΜL (ref 1.85–7.62)
NEUTS SEG NFR BLD AUTO: 56 % (ref 43–75)
NONHDLC SERPL-MCNC: 144 MG/DL
NRBC BLD AUTO-RTO: 0 /100 WBCS
PLATELET # BLD AUTO: 249 THOUSANDS/UL (ref 149–390)
PMV BLD AUTO: 9.8 FL (ref 8.9–12.7)
POTASSIUM SERPL-SCNC: 4.2 MMOL/L (ref 3.5–5.3)
PROT SERPL-MCNC: 8.1 G/DL (ref 6.4–8.2)
RBC # BLD AUTO: 4.93 MILLION/UL (ref 3.88–5.62)
SODIUM SERPL-SCNC: 141 MMOL/L (ref 136–145)
TRIGL SERPL-MCNC: 153 MG/DL
WBC # BLD AUTO: 6.81 THOUSAND/UL (ref 4.31–10.16)

## 2019-06-07 PROCEDURE — 82570 ASSAY OF URINE CREATININE: CPT

## 2019-06-07 PROCEDURE — 36415 COLL VENOUS BLD VENIPUNCTURE: CPT

## 2019-06-07 PROCEDURE — 80053 COMPREHEN METABOLIC PANEL: CPT

## 2019-06-07 PROCEDURE — 83036 HEMOGLOBIN GLYCOSYLATED A1C: CPT

## 2019-06-07 PROCEDURE — 71046 X-RAY EXAM CHEST 2 VIEWS: CPT

## 2019-06-07 PROCEDURE — 80061 LIPID PANEL: CPT

## 2019-06-07 PROCEDURE — 85025 COMPLETE CBC W/AUTO DIFF WBC: CPT

## 2019-06-07 PROCEDURE — 82043 UR ALBUMIN QUANTITATIVE: CPT

## 2019-06-13 ENCOUNTER — HOSPITAL ENCOUNTER (OUTPATIENT)
Dept: CT IMAGING | Facility: HOSPITAL | Age: 69
Discharge: HOME | End: 2019-06-13
Attending: UROLOGY
Payer: MEDICARE

## 2019-06-13 DIAGNOSIS — C61 PROSTATE CANCER (HCC): ICD-10-CM

## 2019-06-13 PROCEDURE — 74177 CT ABD & PELVIS W/CONTRAST: CPT

## 2019-06-13 RX ADMIN — IOHEXOL 100 ML: 350 INJECTION, SOLUTION INTRAVENOUS at 07:53

## 2019-06-18 ENCOUNTER — DOCUMENTATION (OUTPATIENT)
Dept: UROLOGY | Facility: AMBULATORY SURGERY CENTER | Age: 69
End: 2019-06-18

## 2019-06-19 ENCOUNTER — HOSPITAL ENCOUNTER (OUTPATIENT)
Dept: NUCLEAR MEDICINE | Facility: HOSPITAL | Age: 69
Discharge: HOME | End: 2019-06-19
Attending: UROLOGY
Payer: MEDICARE

## 2019-06-19 DIAGNOSIS — C61 PROSTATE CANCER (HCC): ICD-10-CM

## 2019-06-19 PROCEDURE — 78306 BONE IMAGING WHOLE BODY: CPT

## 2019-06-19 PROCEDURE — A9503 TC99M MEDRONATE: HCPCS

## 2019-06-20 ENCOUNTER — RADIATION ONCOLOGY CONSULT (OUTPATIENT)
Dept: RADIATION ONCOLOGY | Facility: CLINIC | Age: 69
End: 2019-06-20
Attending: RADIOLOGY
Payer: MEDICARE

## 2019-06-20 ENCOUNTER — TELEPHONE (OUTPATIENT)
Dept: UROLOGY | Facility: CLINIC | Age: 69
End: 2019-06-20

## 2019-06-20 VITALS
HEIGHT: 71 IN | WEIGHT: 216 LBS | RESPIRATION RATE: 14 BRPM | DIASTOLIC BLOOD PRESSURE: 80 MMHG | HEART RATE: 63 BPM | SYSTOLIC BLOOD PRESSURE: 140 MMHG | BODY MASS INDEX: 30.24 KG/M2

## 2019-06-20 DIAGNOSIS — C61 PROSTATE CANCER (HCC): Primary | ICD-10-CM

## 2019-06-20 PROCEDURE — 99211 OFF/OP EST MAY X REQ PHY/QHP: CPT | Performed by: RADIOLOGY

## 2019-06-20 RX ORDER — CIPROFLOXACIN 500 MG/1
500 TABLET, FILM COATED ORAL EVERY 12 HOURS SCHEDULED
Qty: 2 TABLET | Refills: 0 | Status: SHIPPED | OUTPATIENT
Start: 2019-06-20 | End: 2019-06-21

## 2019-06-28 ENCOUNTER — OFFICE VISIT (OUTPATIENT)
Dept: UROLOGY | Facility: CLINIC | Age: 69
End: 2019-06-28
Payer: MEDICARE

## 2019-06-28 VITALS
HEIGHT: 71 IN | HEART RATE: 64 BPM | BODY MASS INDEX: 29.96 KG/M2 | DIASTOLIC BLOOD PRESSURE: 82 MMHG | SYSTOLIC BLOOD PRESSURE: 144 MMHG | WEIGHT: 214 LBS

## 2019-06-28 DIAGNOSIS — R97.20 ELEVATED PSA: ICD-10-CM

## 2019-06-28 DIAGNOSIS — C61 PROSTATE CANCER (HCC): Primary | ICD-10-CM

## 2019-06-28 DIAGNOSIS — Z79.818 ANDROGEN DEPRIVATION THERAPY: ICD-10-CM

## 2019-06-28 PROCEDURE — 96402 CHEMO HORMON ANTINEOPL SQ/IM: CPT

## 2019-06-28 PROCEDURE — 99214 OFFICE O/P EST MOD 30 MIN: CPT | Performed by: UROLOGY

## 2019-06-28 RX ORDER — B-COMPLEX WITH VITAMIN C
1 TABLET ORAL 2 TIMES DAILY WITH MEALS
Qty: 180 TABLET | Refills: 3 | Status: SHIPPED | OUTPATIENT
Start: 2019-06-28 | End: 2022-04-20 | Stop reason: ALTCHOICE

## 2019-07-15 ENCOUNTER — OFFICE VISIT (OUTPATIENT)
Dept: FAMILY MEDICINE CLINIC | Facility: CLINIC | Age: 69
End: 2019-07-15
Payer: MEDICARE

## 2019-07-15 VITALS
DIASTOLIC BLOOD PRESSURE: 70 MMHG | SYSTOLIC BLOOD PRESSURE: 128 MMHG | TEMPERATURE: 98.9 F | OXYGEN SATURATION: 95 % | BODY MASS INDEX: 29.68 KG/M2 | WEIGHT: 212 LBS | HEART RATE: 60 BPM | HEIGHT: 71 IN

## 2019-07-15 DIAGNOSIS — R73.01 IFG (IMPAIRED FASTING GLUCOSE): ICD-10-CM

## 2019-07-15 DIAGNOSIS — Z11.59 ENCOUNTER FOR HEPATITIS C SCREENING TEST FOR LOW RISK PATIENT: ICD-10-CM

## 2019-07-15 DIAGNOSIS — E78.2 MIXED HYPERLIPIDEMIA: ICD-10-CM

## 2019-07-15 DIAGNOSIS — I10 ESSENTIAL HYPERTENSION: Primary | ICD-10-CM

## 2019-07-15 DIAGNOSIS — E11.8 TYPE 2 DIABETES MELLITUS WITH COMPLICATION, WITHOUT LONG-TERM CURRENT USE OF INSULIN (HCC): ICD-10-CM

## 2019-07-15 DIAGNOSIS — C61 PROSTATE CANCER (HCC): ICD-10-CM

## 2019-07-15 PROCEDURE — 99214 OFFICE O/P EST MOD 30 MIN: CPT | Performed by: FAMILY MEDICINE

## 2019-07-15 RX ORDER — LISINOPRIL 2.5 MG/1
2.5 TABLET ORAL DAILY
Qty: 90 TABLET | Refills: 3 | Status: SHIPPED | OUTPATIENT
Start: 2019-07-15 | End: 2020-03-16 | Stop reason: SDUPTHER

## 2019-07-15 RX ORDER — ROSUVASTATIN CALCIUM 5 MG/1
5 TABLET, COATED ORAL DAILY
Qty: 90 TABLET | Refills: 2 | Status: SHIPPED | OUTPATIENT
Start: 2019-07-15 | End: 2020-03-17 | Stop reason: SDUPTHER

## 2019-07-15 NOTE — PROGRESS NOTES
Assessment/Plan:       Diagnoses and all orders for this visit:    Essential hypertension  -     lisinopril (ZESTRIL) 2 5 mg tablet; Take 1 tablet (2 5 mg total) by mouth daily  -     Basic metabolic panel; Future    Mixed hyperlipidemia  -     rosuvastatin (CRESTOR) 5 mg tablet; Take 1 tablet (5 mg total) by mouth daily    [facility] ([facility] )    Encounter for hepatitis C screening test for low risk patient  -     Hepatitis C antibody; Future    Type 2 diabetes mellitus with complication, without long-term current use of insulin (HCC)  -     metFORMIN (GLUCOPHAGE) 500 mg tablet; Take 1 tablet (500 mg total) by mouth 2 (two) times a day with meals    IFG (impaired fasting glucose)          htn  Discussed plan of care will initiate with lisinopril 2 5 mg p o  Q day recheck BMP 1 week  Impaired fasting glucose  Stable, hemoglobin A1c 6 4 to continue metformin as directed encouraged diet modifications to continued  Hyperlipidemia  Stable, dramatic decrease in cholesterol values after start Crestor to continue same  Prostate cancer  Stable, currently under care Urology      Subjective:      Patient ID: Scarlet Hess is a 77 y o  male  here to f/u   Seen by urology , md an , very thorough , has scheduled follow-up appointments in regard to injections and scheduled radiation therapies  bp , has been running a bit high at the appts ,   ifg , working at the diet , checking the bs ,  120's , medication metformin 1 tablet twice a day tolerating well  Cholesterol working on diet continues with Crestor, very epi with results seen on line not happy with the computer system  Negative chest pain palpitations shortness of breath difficulty breathing cough lesions rash          The following portions of the patient's history were reviewed and updated as appropriate:   He has a past medical history of Diabetes mellitus ([facility] ) and Hyperlipidemia ,  does not have any pertinent problems on file  ,   has a past surgical history that includes Hernia repair; EAR SURGERY; and pr colonoscopy flx dx w/collj spec when pfrmd (N/A, 5/6/2019)  ,  family history includes Prostate cancer in his brother  ,   reports that he quit smoking about 14 years ago  He has never used smokeless tobacco  He reports that he drank alcohol  He reports that he has current or past drug history  ,  is allergic to penicillin v       Review of Systems   Constitutional: Negative for appetite change, chills, fever and unexpected weight change  HENT: Negative for congestion, dental problem, ear pain, hearing loss, postnasal drip, rhinorrhea, sinus pressure, sinus pain, sneezing, sore throat, tinnitus and voice change  Eyes: Negative for visual disturbance  Respiratory: Negative for apnea, cough, chest tightness and shortness of breath  Cardiovascular: Negative for chest pain, palpitations and leg swelling  Gastrointestinal: Negative for abdominal pain, blood in stool, constipation, diarrhea, nausea and vomiting  Endocrine: Negative for cold intolerance, heat intolerance, polydipsia, polyphagia and polyuria  Genitourinary: Negative for decreased urine volume, difficulty urinating, dysuria, frequency and hematuria  Musculoskeletal: Negative for arthralgias, back pain, gait problem, joint swelling and myalgias  Skin: Negative for color change, rash and wound  Allergic/Immunologic: Negative for environmental allergies and food allergies  Neurological: Negative for dizziness, syncope, weakness, light-headedness, numbness and headaches  Hematological: Negative for adenopathy  Does not bruise/bleed easily  Psychiatric/Behavioral: Negative for sleep disturbance and suicidal ideas  The patient is not nervous/anxious  Objective:  Vitals:    07/15/19 1137   BP: 128/70   Pulse: 60   Temp:    SpO2:       Physical Exam   Constitutional: He is oriented to person, place, and time  He appears well-developed and well-nourished  No distress     HENT:   Head: Normocephalic and atraumatic  Right Ear: External ear normal    Left Ear: External ear normal    Eyes: Conjunctivae are normal  No scleral icterus  Neck: Normal range of motion  Neck supple  Cardiovascular: Normal rate, regular rhythm and normal heart sounds  Pulmonary/Chest: Effort normal and breath sounds normal    Abdominal: Soft  Bowel sounds are normal    Musculoskeletal: Normal range of motion  He exhibits no edema  Neurological: He is alert and oriented to person, place, and time  He has normal reflexes  Skin: Skin is warm and dry  Psychiatric: He has a normal mood and affect   His behavior is normal  Judgment and thought content normal

## 2019-07-29 NOTE — PROGRESS NOTES
Problem List Items Addressed This Visit        Cardiovascular and Mediastinum    Hot flashes     Explained to the patient the pathophysiology of hot flashes due to Vasa motor instability caused by androgen deprivation therapy  Told him that these sometimes resolve with the passing of time  If these do not resolve and the patient calls in for therapy for this in the future or if he still has these to a significant degree at his next visit, we will initiate venlafaxine 37 5 milligrams daily  We will also have the option of adding Megace 20 milligrams p o  B i d  If necessary if hot flashes remain bothersome going forward            Genitourinary    Prostate cancer [facility]) - Primary     Patient with high risk prostate cancer, currently on androgen deprivation therapy, for definitive radiation therapy upcoming  Status post fiducial marker placement today as well as Lupron injection  Plan:  Continue hormone deprivation therapy at this time, plan for definitive radiation therapy  Consider weaning off androgen deprivation therapy after 2 years         Relevant Medications    leuprolide (ELIGARD 6 MONTH KIT) subcutaneous injection 45 mg    gentamicin (GARAMYCIN) 40 mg/mL injection 80 mg    Other Relevant Orders    PSA Total, Diagnostic    PSA Total, Diagnostic       Other    Elevated PSA     Due to high risk prostate cancer         Encounter for monitoring androgen deprivation therapy     Patient has noticed some mood changes and being slightly more irritable since being on Firmagon, also has noticed some hot flashes which are bothersome to him  He is status post Lupron administration today, next due for Lupron in 6 months  We will plan for a total of 2 years of androgen deprivation therapy after which we will consider discontinuing this with a plan to follow his PSA response after androgen deprivation therapy discontinuance      He is taking calcium and vitamin-D, he has been doing some physical activity although no true resistance training with weights  I again encouraged him to do so                       Assessment and plan:       Please see problem oriented charting for the assessment plan of today's urological complaints    Skylar Ch MD      Chief Complaint     Chief Complaint   Patient presents with    Prostate Cancer    Elevated PSA         History of Present Illness     Lore Ravi is a 77 y o  gentleman that I have seen previously for high risk prostate cancer  He was given [place] 1 month ago, he is here today for fiducial marker placement as well as Lupron administration  Today his ECOG performance status is 0    In terms of new complaints today he hot flashes, no aggravating or alleviating factors, these are bothersome to him  Otherwise voiding well, eating well, good mood, accomplishing all activities of daily living  The following portions of the patient's history were reviewed and updated as appropriate: allergies, current medications, past family history, past medical history, past social history, past surgical history and problem list     Detailed Urologic History     - please refer to HPI    Review of Systems     Review of Systems   Constitutional: Negative  HENT: Negative  Eyes: Negative  Respiratory: Negative  Cardiovascular: Negative  Gastrointestinal: Negative  Endocrine: Negative  Genitourinary:        As per HPI   Musculoskeletal: Negative  Skin: Negative  Allergic/Immunologic: Negative  Neurological: Negative  Hematological: Negative  Psychiatric/Behavioral: Negative  Allergies     Allergies   Allergen Reactions    Penicillin V        Physical Exam     Physical Exam   Constitutional: He is oriented to person, place, and time  He appears well-developed and well-nourished  No distress  HENT:   Head: Normocephalic and atraumatic     Right Ear: External ear normal    Left Ear: External ear normal    Nose: Nose normal    Eyes: Conjunctivae are normal  Right eye exhibits no discharge  Left eye exhibits no discharge  No scleral icterus  Neck: No tracheal deviation present  Cardiovascular: Intact distal pulses  Pulmonary/Chest: Effort normal  No stridor  No respiratory distress  Abdominal: Soft  He exhibits no distension  There is no tenderness  Genitourinary:   Genitourinary Comments: Normal sphincter tone   Musculoskeletal: He exhibits no edema, tenderness or deformity  Neurological: He is alert and oriented to person, place, and time  No cranial nerve deficit  Coordination normal    Skin: Skin is warm and dry  No rash noted  He is not diaphoretic  No erythema  No pallor  Psychiatric: He has a normal mood and affect  His behavior is normal  Judgment and thought content normal    Nursing note and vitals reviewed            Vital Signs  Vitals:    08/01/19 1344   BP: 120/78   Pulse: 73   Weight: 96 9 kg (213 lb 9 6 oz)   Height: 5' 11" (1 803 m)         Current Medications       Current Outpatient Medications:     calcium carbonate-vitamin D (OSCAL-D) 500 mg-200 units per tablet, Take 1 tablet by mouth 2 (two) times a day with meals for 360 days, Disp: 180 tablet, Rfl: 3    lisinopril (ZESTRIL) 2 5 mg tablet, Take 1 tablet (2 5 mg total) by mouth daily, Disp: 90 tablet, Rfl: 3    metFORMIN (GLUCOPHAGE) 500 mg tablet, Take 1 tablet (500 mg total) by mouth 2 (two) times a day with meals, Disp: 180 tablet, Rfl: 2    rosuvastatin (CRESTOR) 5 mg tablet, Take 1 tablet (5 mg total) by mouth daily, Disp: 90 tablet, Rfl: 2    bisacodyl (FLEET) 10 MG/30ML ENEM, Insert 30 mL (10 mg total) into the rectum once for 1 dose Use this enema the morning of your marker placement, Disp: 1 enema, Rfl: 0    Blood Glucose Monitoring Suppl (ONETOUCH VERIO) w/Device KIT, by Does not apply route once for 1 dose Test sugar in the morning, Disp: 1 kit, Rfl: 0    Current Facility-Administered Medications:     gentamicin (GARAMYCIN) 40 mg/mL injection 80 mg, 80 mg, Intramuscular, Once, Lynne Brasher MD    leuprolide (ELIGARD 6 MONTH KIT) subcutaneous injection 45 mg, 45 mg, Subcutaneous, Once, Lynne Brasher MD      Active Problems     Patient Active Problem List   Diagnosis    Mixed hyperlipidemia    Elevated PSA    IFG (impaired fasting glucose)    Upper respiratory tract infection    Positive colorectal cancer screening using Cologuard test    Prostate cancer ([facility] )    Encounter for monitoring androgen deprivation therapy    Hot flashes         Past Medical History     Past Medical History:   Diagnosis Date    Diabetes mellitus ([facility] )     Hyperlipidemia          Surgical History     Past Surgical History:   Procedure Laterality Date    EAR SURGERY      HERNIA REPAIR      ID COLONOSCOPY FLX DX W/COLLJ SPEC WHEN PFRMD N/A 2019    Procedure: COLONOSCOPY;  Surgeon: Cally Salcedo MD;  Location: [facility];   Service: Gastroenterology         Family History     Family History   Problem Relation Age of Onset    Prostate cancer Brother          Social History     Social History     Social History     Tobacco Use   Smoking Status Former Smoker    Last attempt to quit: 2005    Years since quittin 4   Smokeless Tobacco Never Used         Pertinent Lab Values     Lab Results   Component Value Date    CREATININE 0 94 2019       Lab Results   Component Value Date    PSA 22 4 (H) 2019    PSA 16 9 (H) 2019             Pertinent Imaging      No new imaging for my review

## 2019-07-29 NOTE — PATIENT INSTRUCTIONS
Leuprolide (By injection)   Leuprolide ([id number])  Treats endometriosis, uterine fibroids, and premature puberty  Also treats symptoms of prostate cancer  Brand Name(s): Eligard, Lupaneta Pack, Lupron Depot, Lupron Depot-Ped   There may be other brand names for this medicine  When This Medicine Should Not Be Used: This medicine is not right for everyone  You should not receive it if you had an allergic reaction to leuprolide or similar medicines, or if you are pregnant or breastfeeding  How to Use This Medicine:   Injectable  · Your doctor will prescribe your exact dose and schedule  This medicine is given as a shot into a muscle or under the skin  Leuprolide injection is given on different schedules for different conditions  It might be given every day, once a month, or every few months  · A nurse or other health provider will give you this medicine  · You may be taught how to give your medicine at home  Make sure you understand all instructions before giving yourself an injection  Do not use more medicine or use it more often than your doctor tells you to  · You will be shown the body areas where this shot can be given  Use a different body area each time you give yourself a shot  Keep track of where you give each shot to make sure you rotate body areas  · Use a new needle and syringe each time you inject your medicine  · Read and follow the patient instructions that come with this medicine  Talk to your doctor or pharmacist if you have any questions  · Missed dose: This medicine needs to be given on a fixed schedule  If you miss a dose, call your doctor, home health caregiver, or treatment clinic for instructions  · If you store this medicine at home, keep it at room temperature, away from heat, moisture, and direct light  Drugs and Foods to Avoid:   Ask your doctor or pharmacist before using any other medicine, including over-the-counter medicines, vitamins, and herbal products    · Some foods and medicines can affect how leuprolide works  Tell your doctor if you are using the following:  ¨ Medicine to treat heart rhythm problems  ¨ Medicine to treat depression (such as bupropion)  ¨ Medicine to treat seizures  ¨ Steroid medicine (such as hydrocortisone, methylprednisolone, prednisone, prednisolone, or dexamethasone)  Warnings While Using This Medicine:   · It is not safe to take this medicine during pregnancy  It could harm an unborn baby  Tell your doctor right away if you become pregnant  Women who are using this medicine should use birth control that does not contain hormones  · Tell your doctor if you have kidney disease, heart failure, diabetes, heart or blood vessel disease, heart rhythm problems (such as long QT syndrome), problems with your nervous system, or a history of depression, seizures, or stroke  · Women: Your menstrual periods should stop, but you might have light bleeding or spotting  If you continue to have heavy bleeding or regular periods, call your doctor  · This medicine may cause the following problems:  ¨ Heart rhythm changes  ¨ Weaker bones, which may lead to osteoporosis  ¨ Problems with the urinary tract or spinal cord (in men)  ¨ Changes in blood sugar levels (in men)  ¨ Higher risk of heart attack or stroke (in men)  · Your symptoms might get worse when you first start using this medicine, but then they should get better as the medicine starts to work  If your condition does not begin to improve after 2 weeks, call your doctor  · Tell any doctor or dentist who treats you that you are using this medicine  This medicine may affect certain medical test results  · Your doctor will check your progress and the effects of this medicine at regular visits  Keep all appointments  · Keep all medicine out of the reach of children  Never share your medicine with anyone    Possible Side Effects While Using This Medicine:   Call your doctor right away if you notice any of these side effects:  · Allergic reaction: Itching or hives, swelling in your face or hands, swelling or tingling in your mouth or throat, chest tightness, trouble breathing  · Change in how much or how often you urinate  · Depression or severe moodiness or emotional problems  · Fast, pounding, or uneven heart beat  · Heavy vaginal bleeding  · Seizures  · Unusual or severe bone or back pain  If you notice these less serious side effects, talk with your doctor:   · Headache  · Hot flashes and sweating, warmth or redness in your face, neck, arms, or upper chest  · Loss of interest in sex, sexual problems  · Moodiness  · Pain, itching, burning, bruises, or swelling where the shot was given  If you notice other side effects that you think are caused by this medicine, tell your doctor  Call your doctor for medical advice about side effects  You may report side effects to FDA at 1-988-FDA-1796  © 2017 [employer] Information is for End User's use only and may not be sold, redistributed or otherwise used for commercial purposes  The above information is an  only  It is not intended as medical advice for individual conditions or treatments  Talk to your doctor, nurse or pharmacist before following any medical regimen to see if it is safe and effective for you

## 2019-08-01 ENCOUNTER — CLINICAL SUPPORT (OUTPATIENT)
Dept: FAMILY MEDICINE CLINIC | Facility: CLINIC | Age: 69
End: 2019-08-01
Payer: MEDICARE

## 2019-08-01 ENCOUNTER — PROCEDURE VISIT (OUTPATIENT)
Dept: UROLOGY | Facility: CLINIC | Age: 69
End: 2019-08-01
Payer: MEDICARE

## 2019-08-01 VITALS — SYSTOLIC BLOOD PRESSURE: 138 MMHG | DIASTOLIC BLOOD PRESSURE: 74 MMHG

## 2019-08-01 VITALS
HEART RATE: 73 BPM | SYSTOLIC BLOOD PRESSURE: 120 MMHG | BODY MASS INDEX: 29.9 KG/M2 | WEIGHT: 213.6 LBS | HEIGHT: 71 IN | DIASTOLIC BLOOD PRESSURE: 78 MMHG

## 2019-08-01 DIAGNOSIS — Z01.30 BLOOD PRESSURE CHECK: Primary | ICD-10-CM

## 2019-08-01 DIAGNOSIS — C61 PROSTATE CANCER (HCC): Primary | ICD-10-CM

## 2019-08-01 DIAGNOSIS — R97.20 ELEVATED PSA: ICD-10-CM

## 2019-08-01 DIAGNOSIS — Z79.818 ENCOUNTER FOR MONITORING ANDROGEN DEPRIVATION THERAPY: ICD-10-CM

## 2019-08-01 DIAGNOSIS — R23.2 HOT FLASHES: ICD-10-CM

## 2019-08-01 PROCEDURE — 96402 CHEMO HORMON ANTINEOPL SQ/IM: CPT | Performed by: UROLOGY

## 2019-08-01 PROCEDURE — 99211 OFF/OP EST MAY X REQ PHY/QHP: CPT

## 2019-08-01 PROCEDURE — 76872 US TRANSRECTAL: CPT | Performed by: UROLOGY

## 2019-08-01 PROCEDURE — A4648 IMPLANTABLE TISSUE MARKER: HCPCS | Performed by: UROLOGY

## 2019-08-01 PROCEDURE — 96372 THER/PROPH/DIAG INJ SC/IM: CPT | Performed by: UROLOGY

## 2019-08-01 PROCEDURE — 55876 PLACE RT DEVICE/MARKER PROS: CPT | Performed by: UROLOGY

## 2019-08-01 PROCEDURE — 99214 OFFICE O/P EST MOD 30 MIN: CPT | Performed by: UROLOGY

## 2019-08-01 RX ORDER — GENTAMICIN SULFATE 40 MG/ML
80 INJECTION, SOLUTION INTRAMUSCULAR; INTRAVENOUS ONCE
Status: COMPLETED | OUTPATIENT
Start: 2019-08-01 | End: 2019-08-01

## 2019-08-01 RX ADMIN — GENTAMICIN SULFATE 80 MG: 40 INJECTION, SOLUTION INTRAMUSCULAR; INTRAVENOUS at 14:17

## 2019-08-01 NOTE — ASSESSMENT & PLAN NOTE
Explained to the patient the pathophysiology of hot flashes due to Vasa motor instability caused by androgen deprivation therapy  Told him that these sometimes resolve with the passing of time  If these do not resolve and the patient calls in for therapy for this in the future or if he still has these to a significant degree at his next visit, we will initiate venlafaxine 37 5 milligrams daily  We will also have the option of adding Megace 20 milligrams p o  B i d   If necessary if hot flashes remain bothersome going forward

## 2019-08-01 NOTE — PROGRESS NOTES
Pt came in for a bp check  Pt came in during lunch was a little upset because of the wait  He has another appt at 1;30 elsewhere and does want to miss it or be late  I expressed I was sorry for the wait I would check his bp and have him on his way   First BP reading was 142/80      Rechecked at 138/74

## 2019-08-01 NOTE — ASSESSMENT & PLAN NOTE
Patient has noticed some mood changes and being slightly more irritable since being on Firmagon, also has noticed some hot flashes which are bothersome to him  He is status post Lupron administration today, next due for Lupron in 6 months  We will plan for a total of 2 years of androgen deprivation therapy after which we will consider discontinuing this with a plan to follow his PSA response after androgen deprivation therapy discontinuance  He is taking calcium and vitamin-D, he has been doing some physical activity although no true resistance training with weights    I again encouraged him to do so

## 2019-08-01 NOTE — PROGRESS NOTES
Office TRUS-guided fiducial marker placement    Indication    Prostate cancer    Informed consent   The risks, benefits and alternatives to TRUS-guided fiducial marker placement were discussed with the patient  A discussion of he risks of the procedure included, but was not limited to: pain, hematuria, hematochezia, hematospermia, infection, and the possibility of a non-diagnostic biopsy  The patient was given the opportunity to have his questions answered and there was no perceived barrier to education  Antibiotic prophylaxis   The patient received the following antibiotics at least 30 minutes prior to undergoing biopsy: Cipro and gentamicin  The patient was instructed to continue taking the antibiotics as prescribed for a total of 3 days, including the day of biopsy  Rectal cleansing  The patient was instructed to perform an evacuating rectal enema 1-2 hours prior to biopsy  Local anesthesia  Topical 2% lidocaine jelly was applied liberally to the anus and rectum and allowed to dwell for at least 5 min prior to starting the procedure  After insertion of the TRUS probe, 10 mL of 2% lidocaine solution was injected with ultrasound guidance at the  junction of the prostate and seminal vesicles  The anesthetic was allowed to dwell for at least 2 minutes prior to biopsy  Transrectal ultrasonography  The patient was placed in the left lateral decubitus position  After an attentive digital rectal examination, a 7 5 mHz sidefire ultrasound probe was gently inserted into the rectum and biplanar imaging of the prostate was done with the findings noted below  Images were taken of any abnormal findings and also to document prostate size      Bladder  The bladder base appeared normal     Prostate      Ultrasound size measurements:  -Volume:  28 9 cm3    Ultrasound findings:  -Cysts: None  -Masses: None  -Median lobe: absent    Fiducial marker placement  Three, gold fiducial markers were placed, 1 at the right lateral base of the prostate, 1 at the left lateral mid gland of the prostate, and 1 at the right lateral apex of prostate  Ultrasound photos were taken of each of these locations and the deployment device function well and without issue deploying the marker entirely  Complications  There were no procedural complications  Disposition  The patient was dismissed to home  Post-procedure instructions: Today he underwent an uncomplicated transrectal ultrasound-guided fiducial marker placement, following a periprosthetic nerve block  I reviewed the normal postprocedure a course including bleeding per rectum, hematuria, and hematospermia  I instructed him to complete his course of antibiotics as prescribed  Instructed him to call with fever greater than 101, chills, nausea, vomiting, and poorly controlled pain  Placement of interstitial device     Date/Time 8/1/2019 2:17 PM     Performed by  Reema Topete MD     Authorized by Reema Topete MD      [place] Protocol Consent: Verbal consent obtained  Written consent obtained  Risks and benefits: risks, benefits and alternatives were discussed  Consent given by: patient  Patient understanding: patient states understanding of the procedure being performed  Patient consent: the patient's understanding of the procedure matches consent given  Procedure consent: procedure consent matches procedure scheduled  Relevant documents: relevant documents present and verified  Test results: test results available and properly labeled  Site marked: the operative site was not marked  Radiology Images displayed and confirmed   If images not available, report reviewed: imaging studies available  Required items: required blood products, implants, devices, and special equipment available  Patient identity confirmed: verbally with patient and provided demographic data        Local anesthesia used: yes     Anesthesia   Local anesthesia used: yes  Local Anesthetic: lidocaine 2% without epinephrine     Sedation   Patient sedated: no        Specimen: no    Culture: no   Procedure Details   Procedure Notes: Uncomplicated placement of fiducial markers, prostate volume measures at 28 95 grams, a marker was placed at the right lateral base the prostate, the left lateral mid gland of the prostate, and the right lateral apex the prostate

## 2019-08-01 NOTE — ASSESSMENT & PLAN NOTE
Patient with high risk prostate cancer, currently on androgen deprivation therapy, for definitive radiation therapy upcoming  Status post fiducial marker placement today as well as Lupron injection  Plan:  Continue hormone deprivation therapy at this time, plan for definitive radiation therapy    Consider weaning off androgen deprivation therapy after 2 years

## 2019-08-02 ENCOUNTER — APPOINTMENT (OUTPATIENT)
Dept: LAB | Facility: HOSPITAL | Age: 69
End: 2019-08-02
Payer: MEDICARE

## 2019-08-02 DIAGNOSIS — C61 PROSTATE CANCER (HCC): ICD-10-CM

## 2019-08-02 DIAGNOSIS — I10 ESSENTIAL HYPERTENSION: ICD-10-CM

## 2019-08-02 DIAGNOSIS — Z11.59 ENCOUNTER FOR HEPATITIS C SCREENING TEST FOR LOW RISK PATIENT: ICD-10-CM

## 2019-08-02 LAB
ANION GAP SERPL CALCULATED.3IONS-SCNC: 13 MMOL/L (ref 4–13)
BUN SERPL-MCNC: 27 MG/DL (ref 5–25)
CALCIUM SERPL-MCNC: 9.9 MG/DL (ref 8.3–10.1)
CHLORIDE SERPL-SCNC: 103 MMOL/L (ref 100–108)
CO2 SERPL-SCNC: 24 MMOL/L (ref 21–32)
CREAT SERPL-MCNC: 0.9 MG/DL (ref 0.6–1.3)
GFR SERPL CREATININE-BSD FRML MDRD: 87 ML/MIN/1.73SQ M
GLUCOSE P FAST SERPL-MCNC: 119 MG/DL (ref 65–99)
POTASSIUM SERPL-SCNC: 5 MMOL/L (ref 3.5–5.3)
PSA SERPL-MCNC: 5.3 NG/ML (ref 0–4)
SODIUM SERPL-SCNC: 140 MMOL/L (ref 136–145)

## 2019-08-02 PROCEDURE — 84153 ASSAY OF PSA TOTAL: CPT

## 2019-08-02 PROCEDURE — 86803 HEPATITIS C AB TEST: CPT

## 2019-08-02 PROCEDURE — 36415 COLL VENOUS BLD VENIPUNCTURE: CPT

## 2019-08-02 PROCEDURE — 80048 BASIC METABOLIC PNL TOTAL CA: CPT

## 2019-08-03 LAB — HCV AB SER QL: NORMAL

## 2019-08-08 ENCOUNTER — APPOINTMENT (OUTPATIENT)
Dept: RADIATION ONCOLOGY | Facility: CLINIC | Age: 69
End: 2019-08-08
Attending: RADIOLOGY
Payer: MEDICARE

## 2019-08-08 PROCEDURE — 77334 RADIATION TREATMENT AID(S): CPT | Performed by: RADIOLOGY

## 2019-08-13 ENCOUNTER — DOCUMENTATION (OUTPATIENT)
Dept: INFUSION CENTER | Facility: CLINIC | Age: 69
End: 2019-08-13

## 2019-08-13 NOTE — SOCIAL WORK
MSW reviewed pt's DT, completed in [address] [place] [address], which he self scored at a 0  He answered "no" to all practical, family, emotional, spiritual, and physical problems  Due to pt's low self assessment, no MSW interventions are warranted at this time  MSW will remain available to pt should his needs change in the future  No concerns at this time

## 2019-08-15 PROCEDURE — 77300 RADIATION THERAPY DOSE PLAN: CPT | Performed by: RADIOLOGY

## 2019-08-15 PROCEDURE — 77301 RADIOTHERAPY DOSE PLAN IMRT: CPT | Performed by: RADIOLOGY

## 2019-08-15 PROCEDURE — 77338 DESIGN MLC DEVICE FOR IMRT: CPT | Performed by: RADIOLOGY

## 2019-08-16 ENCOUNTER — DOCUMENTATION (OUTPATIENT)
Dept: INFUSION CENTER | Facility: CLINIC | Age: 69
End: 2019-08-16

## 2019-08-16 NOTE — SOCIAL WORK
Pt had left a message in the Rad onc office asking MSW to give him a call  MSW called pt today and he was asking about being reimbursed for gas with gift cards through the [employer]  He says that he has friends who have been treated at [facility] and they have been given gas gift cards  His friend was there with him during the call  MSW explained that this program is for patients on Medicaid, and pt's friend confirmed that he is on Medicaid  Pt initally said "ok, that's fine, just thought I would ask " but then followed it up with "let me give you something to ponder  If were a drug addict or an alcoholic I would get help, but because I have cancer I'm screwed "  MSW apologized to pt that he may feel that way, and offered to meet with him when he is here next to discuss what he may be eligible for  He declined and stated "I'll handle it on my own don't worry about it "  MSW will attempt to meet pt in person anyway to discuss  No other needs at this time

## 2019-08-19 ENCOUNTER — APPOINTMENT (OUTPATIENT)
Dept: RADIATION ONCOLOGY | Facility: CLINIC | Age: 69
End: 2019-08-19
Attending: RADIOLOGY
Payer: MEDICARE

## 2019-08-19 PROCEDURE — 77385 HB NTSTY MODUL RAD TX DLVR SMPL: CPT | Performed by: RADIOLOGY

## 2019-08-20 ENCOUNTER — APPOINTMENT (OUTPATIENT)
Dept: RADIATION ONCOLOGY | Facility: CLINIC | Age: 69
End: 2019-08-20
Attending: RADIOLOGY
Payer: MEDICARE

## 2019-08-20 DIAGNOSIS — C61 PROSTATE CANCER (HCC): Primary | ICD-10-CM

## 2019-08-20 PROCEDURE — 77385 HB NTSTY MODUL RAD TX DLVR SMPL: CPT | Performed by: RADIOLOGY

## 2019-08-21 ENCOUNTER — APPOINTMENT (OUTPATIENT)
Dept: RADIATION ONCOLOGY | Facility: CLINIC | Age: 69
End: 2019-08-21
Attending: RADIOLOGY
Payer: MEDICARE

## 2019-08-21 PROCEDURE — 77385 HB NTSTY MODUL RAD TX DLVR SMPL: CPT | Performed by: RADIOLOGY

## 2019-08-22 ENCOUNTER — APPOINTMENT (OUTPATIENT)
Dept: RADIATION ONCOLOGY | Facility: CLINIC | Age: 69
End: 2019-08-22
Attending: RADIOLOGY
Payer: MEDICARE

## 2019-08-22 PROCEDURE — 77385 HB NTSTY MODUL RAD TX DLVR SMPL: CPT | Performed by: RADIOLOGY

## 2019-08-23 ENCOUNTER — APPOINTMENT (OUTPATIENT)
Dept: RADIATION ONCOLOGY | Facility: CLINIC | Age: 69
End: 2019-08-23
Attending: RADIOLOGY
Payer: MEDICARE

## 2019-08-23 PROCEDURE — 77385 HB NTSTY MODUL RAD TX DLVR SMPL: CPT | Performed by: RADIOLOGY

## 2019-08-23 PROCEDURE — 77336 RADIATION PHYSICS CONSULT: CPT | Performed by: RADIOLOGY

## 2019-08-26 ENCOUNTER — APPOINTMENT (OUTPATIENT)
Dept: RADIATION ONCOLOGY | Facility: CLINIC | Age: 69
End: 2019-08-26
Attending: RADIOLOGY
Payer: MEDICARE

## 2019-08-26 PROCEDURE — 77385 HB NTSTY MODUL RAD TX DLVR SMPL: CPT | Performed by: RADIOLOGY

## 2019-08-27 ENCOUNTER — APPOINTMENT (OUTPATIENT)
Dept: RADIATION ONCOLOGY | Facility: CLINIC | Age: 69
End: 2019-08-27
Attending: RADIOLOGY
Payer: MEDICARE

## 2019-08-27 ENCOUNTER — APPOINTMENT (OUTPATIENT)
Dept: LAB | Facility: HOSPITAL | Age: 69
End: 2019-08-27
Attending: RADIOLOGY
Payer: MEDICARE

## 2019-08-27 LAB
BASOPHILS # BLD AUTO: 0.03 THOUSANDS/ΜL (ref 0–0.1)
BASOPHILS NFR BLD AUTO: 1 % (ref 0–1)
EOSINOPHIL # BLD AUTO: 0.15 THOUSAND/ΜL (ref 0–0.61)
EOSINOPHIL NFR BLD AUTO: 3 % (ref 0–6)
ERYTHROCYTE [DISTWIDTH] IN BLOOD BY AUTOMATED COUNT: 12.4 % (ref 11.6–15.1)
HCT VFR BLD AUTO: 37.3 % (ref 36.5–49.3)
HGB BLD-MCNC: 12.6 G/DL (ref 12–17)
IMM GRANULOCYTES # BLD AUTO: 0.06 THOUSAND/UL (ref 0–0.2)
IMM GRANULOCYTES NFR BLD AUTO: 1 % (ref 0–2)
LYMPHOCYTES # BLD AUTO: 1.33 THOUSANDS/ΜL (ref 0.6–4.47)
LYMPHOCYTES NFR BLD AUTO: 27 % (ref 14–44)
MCH RBC QN AUTO: 29.1 PG (ref 26.8–34.3)
MCHC RBC AUTO-ENTMCNC: 33.8 G/DL (ref 31.4–37.4)
MCV RBC AUTO: 86 FL (ref 82–98)
MONOCYTES # BLD AUTO: 0.49 THOUSAND/ΜL (ref 0.17–1.22)
MONOCYTES NFR BLD AUTO: 10 % (ref 4–12)
NEUTROPHILS # BLD AUTO: 2.96 THOUSANDS/ΜL (ref 1.85–7.62)
NEUTS SEG NFR BLD AUTO: 58 % (ref 43–75)
NRBC BLD AUTO-RTO: 0 /100 WBCS
PLATELET # BLD AUTO: 210 THOUSANDS/UL (ref 149–390)
PMV BLD AUTO: 9.4 FL (ref 8.9–12.7)
RBC # BLD AUTO: 4.33 MILLION/UL (ref 3.88–5.62)
WBC # BLD AUTO: 5.02 THOUSAND/UL (ref 4.31–10.16)

## 2019-08-27 PROCEDURE — 85025 COMPLETE CBC W/AUTO DIFF WBC: CPT

## 2019-08-27 PROCEDURE — 77385 HB NTSTY MODUL RAD TX DLVR SMPL: CPT | Performed by: RADIOLOGY

## 2019-08-27 PROCEDURE — 36415 COLL VENOUS BLD VENIPUNCTURE: CPT

## 2019-08-28 ENCOUNTER — APPOINTMENT (OUTPATIENT)
Dept: RADIATION ONCOLOGY | Facility: CLINIC | Age: 69
End: 2019-08-28
Attending: RADIOLOGY
Payer: MEDICARE

## 2019-08-28 PROCEDURE — 77385 HB NTSTY MODUL RAD TX DLVR SMPL: CPT | Performed by: RADIOLOGY

## 2019-08-29 ENCOUNTER — APPOINTMENT (OUTPATIENT)
Dept: RADIATION ONCOLOGY | Facility: CLINIC | Age: 69
End: 2019-08-29
Attending: RADIOLOGY
Payer: MEDICARE

## 2019-08-29 PROCEDURE — 77385 HB NTSTY MODUL RAD TX DLVR SMPL: CPT | Performed by: RADIOLOGY

## 2019-08-30 ENCOUNTER — APPOINTMENT (OUTPATIENT)
Dept: RADIATION ONCOLOGY | Facility: CLINIC | Age: 69
End: 2019-08-30
Attending: RADIOLOGY
Payer: MEDICARE

## 2019-08-30 PROCEDURE — 77336 RADIATION PHYSICS CONSULT: CPT | Performed by: RADIOLOGY

## 2019-08-30 PROCEDURE — 77385 HB NTSTY MODUL RAD TX DLVR SMPL: CPT | Performed by: RADIOLOGY

## 2019-09-03 ENCOUNTER — APPOINTMENT (OUTPATIENT)
Dept: RADIATION ONCOLOGY | Facility: CLINIC | Age: 69
End: 2019-09-03
Attending: RADIOLOGY
Payer: MEDICARE

## 2019-09-03 PROCEDURE — 77385 HB NTSTY MODUL RAD TX DLVR SMPL: CPT | Performed by: RADIOLOGY

## 2019-09-04 ENCOUNTER — APPOINTMENT (OUTPATIENT)
Dept: RADIATION ONCOLOGY | Facility: CLINIC | Age: 69
End: 2019-09-04
Attending: RADIOLOGY
Payer: MEDICARE

## 2019-09-04 PROCEDURE — 77385 HB NTSTY MODUL RAD TX DLVR SMPL: CPT | Performed by: RADIOLOGY

## 2019-09-05 ENCOUNTER — APPOINTMENT (OUTPATIENT)
Dept: RADIATION ONCOLOGY | Facility: CLINIC | Age: 69
End: 2019-09-05
Attending: RADIOLOGY
Payer: MEDICARE

## 2019-09-05 PROCEDURE — 77385 HB NTSTY MODUL RAD TX DLVR SMPL: CPT | Performed by: RADIOLOGY

## 2019-09-06 ENCOUNTER — APPOINTMENT (OUTPATIENT)
Dept: RADIATION ONCOLOGY | Facility: CLINIC | Age: 69
End: 2019-09-06
Attending: RADIOLOGY
Payer: MEDICARE

## 2019-09-06 PROCEDURE — 77385 HB NTSTY MODUL RAD TX DLVR SMPL: CPT | Performed by: RADIOLOGY

## 2019-09-09 ENCOUNTER — APPOINTMENT (OUTPATIENT)
Dept: RADIATION ONCOLOGY | Facility: CLINIC | Age: 69
End: 2019-09-09
Attending: RADIOLOGY
Payer: MEDICARE

## 2019-09-09 PROCEDURE — 77336 RADIATION PHYSICS CONSULT: CPT | Performed by: RADIOLOGY

## 2019-09-09 PROCEDURE — 77385 HB NTSTY MODUL RAD TX DLVR SMPL: CPT | Performed by: RADIOLOGY

## 2019-09-10 ENCOUNTER — APPOINTMENT (OUTPATIENT)
Dept: RADIATION ONCOLOGY | Facility: CLINIC | Age: 69
End: 2019-09-10
Attending: RADIOLOGY
Payer: MEDICARE

## 2019-09-10 ENCOUNTER — APPOINTMENT (OUTPATIENT)
Dept: LAB | Facility: HOSPITAL | Age: 69
End: 2019-09-10
Attending: RADIOLOGY
Payer: MEDICARE

## 2019-09-10 PROCEDURE — 77385 HB NTSTY MODUL RAD TX DLVR SMPL: CPT | Performed by: RADIOLOGY

## 2019-09-11 ENCOUNTER — APPOINTMENT (OUTPATIENT)
Dept: RADIATION ONCOLOGY | Facility: CLINIC | Age: 69
End: 2019-09-11
Attending: RADIOLOGY
Payer: MEDICARE

## 2019-09-11 PROCEDURE — 77385 HB NTSTY MODUL RAD TX DLVR SMPL: CPT | Performed by: RADIOLOGY

## 2019-09-12 ENCOUNTER — APPOINTMENT (OUTPATIENT)
Dept: RADIATION ONCOLOGY | Facility: CLINIC | Age: 69
End: 2019-09-12
Attending: RADIOLOGY
Payer: MEDICARE

## 2019-09-12 PROCEDURE — 77385 HB NTSTY MODUL RAD TX DLVR SMPL: CPT | Performed by: RADIOLOGY

## 2019-09-13 ENCOUNTER — APPOINTMENT (OUTPATIENT)
Dept: RADIATION ONCOLOGY | Facility: CLINIC | Age: 69
End: 2019-09-13
Attending: RADIOLOGY
Payer: MEDICARE

## 2019-09-13 PROCEDURE — 77385 HB NTSTY MODUL RAD TX DLVR SMPL: CPT | Performed by: RADIOLOGY

## 2019-09-13 PROCEDURE — 77338 DESIGN MLC DEVICE FOR IMRT: CPT | Performed by: RADIOLOGY

## 2019-09-13 PROCEDURE — 77300 RADIATION THERAPY DOSE PLAN: CPT | Performed by: RADIOLOGY

## 2019-09-16 ENCOUNTER — APPOINTMENT (OUTPATIENT)
Dept: RADIATION ONCOLOGY | Facility: CLINIC | Age: 69
End: 2019-09-16
Attending: RADIOLOGY
Payer: MEDICARE

## 2019-09-16 PROCEDURE — 77336 RADIATION PHYSICS CONSULT: CPT | Performed by: RADIOLOGY

## 2019-09-16 PROCEDURE — 77385 HB NTSTY MODUL RAD TX DLVR SMPL: CPT | Performed by: RADIOLOGY

## 2019-09-17 ENCOUNTER — APPOINTMENT (OUTPATIENT)
Dept: RADIATION ONCOLOGY | Facility: CLINIC | Age: 69
End: 2019-09-17
Attending: RADIOLOGY
Payer: MEDICARE

## 2019-09-17 PROCEDURE — 77385 HB NTSTY MODUL RAD TX DLVR SMPL: CPT | Performed by: RADIOLOGY

## 2019-09-18 ENCOUNTER — APPOINTMENT (OUTPATIENT)
Dept: RADIATION ONCOLOGY | Facility: CLINIC | Age: 69
End: 2019-09-18
Attending: RADIOLOGY
Payer: MEDICARE

## 2019-09-18 PROCEDURE — 77385 HB NTSTY MODUL RAD TX DLVR SMPL: CPT | Performed by: RADIOLOGY

## 2019-09-19 ENCOUNTER — APPOINTMENT (OUTPATIENT)
Dept: RADIATION ONCOLOGY | Facility: CLINIC | Age: 69
End: 2019-09-19
Attending: RADIOLOGY
Payer: MEDICARE

## 2019-09-19 PROCEDURE — 77385 HB NTSTY MODUL RAD TX DLVR SMPL: CPT | Performed by: RADIOLOGY

## 2019-09-20 ENCOUNTER — APPOINTMENT (OUTPATIENT)
Dept: RADIATION ONCOLOGY | Facility: CLINIC | Age: 69
End: 2019-09-20
Attending: RADIOLOGY
Payer: MEDICARE

## 2019-09-20 PROCEDURE — 77385 HB NTSTY MODUL RAD TX DLVR SMPL: CPT | Performed by: RADIOLOGY

## 2019-09-23 ENCOUNTER — APPOINTMENT (OUTPATIENT)
Dept: RADIATION ONCOLOGY | Facility: CLINIC | Age: 69
End: 2019-09-23
Attending: RADIOLOGY
Payer: MEDICARE

## 2019-09-23 PROCEDURE — 77385 HB NTSTY MODUL RAD TX DLVR SMPL: CPT | Performed by: RADIOLOGY

## 2019-09-23 PROCEDURE — 77336 RADIATION PHYSICS CONSULT: CPT | Performed by: RADIOLOGY

## 2019-09-24 ENCOUNTER — APPOINTMENT (OUTPATIENT)
Dept: LAB | Facility: HOSPITAL | Age: 69
End: 2019-09-24
Attending: RADIOLOGY
Payer: MEDICARE

## 2019-09-24 ENCOUNTER — APPOINTMENT (OUTPATIENT)
Dept: RADIATION ONCOLOGY | Facility: CLINIC | Age: 69
End: 2019-09-24
Attending: RADIOLOGY
Payer: MEDICARE

## 2019-09-24 PROCEDURE — 77385 HB NTSTY MODUL RAD TX DLVR SMPL: CPT | Performed by: RADIOLOGY

## 2019-09-25 ENCOUNTER — APPOINTMENT (OUTPATIENT)
Dept: RADIATION ONCOLOGY | Facility: CLINIC | Age: 69
End: 2019-09-25
Attending: RADIOLOGY
Payer: MEDICARE

## 2019-09-25 PROCEDURE — 77385 HB NTSTY MODUL RAD TX DLVR SMPL: CPT | Performed by: RADIOLOGY

## 2019-09-26 ENCOUNTER — APPOINTMENT (OUTPATIENT)
Dept: RADIATION ONCOLOGY | Facility: CLINIC | Age: 69
End: 2019-09-26
Attending: RADIOLOGY
Payer: MEDICARE

## 2019-09-26 PROCEDURE — 77385 HB NTSTY MODUL RAD TX DLVR SMPL: CPT | Performed by: RADIOLOGY

## 2019-09-27 ENCOUNTER — APPOINTMENT (OUTPATIENT)
Dept: RADIATION ONCOLOGY | Facility: CLINIC | Age: 69
End: 2019-09-27
Attending: RADIOLOGY
Payer: MEDICARE

## 2019-09-27 PROCEDURE — 77385 HB NTSTY MODUL RAD TX DLVR SMPL: CPT | Performed by: RADIOLOGY

## 2019-09-30 ENCOUNTER — APPOINTMENT (OUTPATIENT)
Dept: RADIATION ONCOLOGY | Facility: CLINIC | Age: 69
End: 2019-09-30
Attending: RADIOLOGY
Payer: MEDICARE

## 2019-09-30 PROCEDURE — 77385 HB NTSTY MODUL RAD TX DLVR SMPL: CPT | Performed by: RADIOLOGY

## 2019-09-30 PROCEDURE — 77336 RADIATION PHYSICS CONSULT: CPT | Performed by: RADIOLOGY

## 2019-10-01 ENCOUNTER — APPOINTMENT (OUTPATIENT)
Dept: RADIATION ONCOLOGY | Facility: CLINIC | Age: 69
End: 2019-10-01
Attending: RADIOLOGY
Payer: MEDICARE

## 2019-10-01 PROCEDURE — 77300 RADIATION THERAPY DOSE PLAN: CPT | Performed by: RADIOLOGY

## 2019-10-01 PROCEDURE — 77338 DESIGN MLC DEVICE FOR IMRT: CPT | Performed by: RADIOLOGY

## 2019-10-01 PROCEDURE — 77385 HB NTSTY MODUL RAD TX DLVR SMPL: CPT | Performed by: RADIOLOGY

## 2019-10-02 ENCOUNTER — APPOINTMENT (OUTPATIENT)
Dept: RADIATION ONCOLOGY | Facility: CLINIC | Age: 69
End: 2019-10-02
Attending: RADIOLOGY
Payer: MEDICARE

## 2019-10-02 PROCEDURE — 77385 HB NTSTY MODUL RAD TX DLVR SMPL: CPT | Performed by: RADIOLOGY

## 2019-10-03 ENCOUNTER — APPOINTMENT (OUTPATIENT)
Dept: RADIATION ONCOLOGY | Facility: CLINIC | Age: 69
End: 2019-10-03
Attending: RADIOLOGY
Payer: MEDICARE

## 2019-10-03 PROCEDURE — 77385 HB NTSTY MODUL RAD TX DLVR SMPL: CPT | Performed by: RADIOLOGY

## 2019-10-04 ENCOUNTER — APPOINTMENT (OUTPATIENT)
Dept: RADIATION ONCOLOGY | Facility: CLINIC | Age: 69
End: 2019-10-04
Attending: RADIOLOGY
Payer: MEDICARE

## 2019-10-04 PROCEDURE — 77385 HB NTSTY MODUL RAD TX DLVR SMPL: CPT | Performed by: RADIOLOGY

## 2019-10-07 ENCOUNTER — APPOINTMENT (OUTPATIENT)
Dept: RADIATION ONCOLOGY | Facility: CLINIC | Age: 69
End: 2019-10-07
Attending: RADIOLOGY
Payer: MEDICARE

## 2019-10-07 PROCEDURE — 77385 HB NTSTY MODUL RAD TX DLVR SMPL: CPT | Performed by: RADIOLOGY

## 2019-10-07 PROCEDURE — 77336 RADIATION PHYSICS CONSULT: CPT | Performed by: RADIOLOGY

## 2019-10-08 ENCOUNTER — APPOINTMENT (OUTPATIENT)
Dept: RADIATION ONCOLOGY | Facility: CLINIC | Age: 69
End: 2019-10-08
Attending: RADIOLOGY
Payer: MEDICARE

## 2019-10-08 ENCOUNTER — APPOINTMENT (OUTPATIENT)
Dept: LAB | Facility: HOSPITAL | Age: 69
End: 2019-10-08
Attending: RADIOLOGY
Payer: MEDICARE

## 2019-10-08 PROCEDURE — 77385 HB NTSTY MODUL RAD TX DLVR SMPL: CPT | Performed by: RADIOLOGY

## 2019-10-09 ENCOUNTER — APPOINTMENT (OUTPATIENT)
Dept: RADIATION ONCOLOGY | Facility: CLINIC | Age: 69
End: 2019-10-09
Attending: RADIOLOGY
Payer: MEDICARE

## 2019-10-09 PROCEDURE — 77385 HB NTSTY MODUL RAD TX DLVR SMPL: CPT | Performed by: RADIOLOGY

## 2019-10-10 ENCOUNTER — APPOINTMENT (OUTPATIENT)
Dept: RADIATION ONCOLOGY | Facility: CLINIC | Age: 69
End: 2019-10-10
Attending: RADIOLOGY
Payer: MEDICARE

## 2019-10-10 PROCEDURE — 77385 HB NTSTY MODUL RAD TX DLVR SMPL: CPT | Performed by: RADIOLOGY

## 2019-10-11 ENCOUNTER — APPOINTMENT (OUTPATIENT)
Dept: RADIATION ONCOLOGY | Facility: CLINIC | Age: 69
End: 2019-10-11
Attending: RADIOLOGY
Payer: MEDICARE

## 2019-10-11 PROCEDURE — 77385 HB NTSTY MODUL RAD TX DLVR SMPL: CPT | Performed by: RADIOLOGY

## 2019-10-14 ENCOUNTER — APPOINTMENT (OUTPATIENT)
Dept: RADIATION ONCOLOGY | Facility: CLINIC | Age: 69
End: 2019-10-14
Attending: RADIOLOGY
Payer: MEDICARE

## 2019-10-14 PROCEDURE — 77336 RADIATION PHYSICS CONSULT: CPT | Performed by: RADIOLOGY

## 2019-10-14 PROCEDURE — 77385 HB NTSTY MODUL RAD TX DLVR SMPL: CPT | Performed by: RADIOLOGY

## 2019-10-15 ENCOUNTER — APPOINTMENT (OUTPATIENT)
Dept: RADIATION ONCOLOGY | Facility: CLINIC | Age: 69
End: 2019-10-15
Attending: RADIOLOGY
Payer: MEDICARE

## 2019-10-15 PROCEDURE — 77385 HB NTSTY MODUL RAD TX DLVR SMPL: CPT | Performed by: RADIOLOGY

## 2019-10-16 ENCOUNTER — APPOINTMENT (OUTPATIENT)
Dept: RADIATION ONCOLOGY | Facility: CLINIC | Age: 69
End: 2019-10-16
Attending: RADIOLOGY
Payer: MEDICARE

## 2019-10-16 PROCEDURE — 77385 HB NTSTY MODUL RAD TX DLVR SMPL: CPT | Performed by: RADIOLOGY

## 2019-10-17 ENCOUNTER — APPOINTMENT (OUTPATIENT)
Dept: RADIATION ONCOLOGY | Facility: CLINIC | Age: 69
End: 2019-10-17
Attending: RADIOLOGY
Payer: MEDICARE

## 2019-10-17 PROCEDURE — 77385 HB NTSTY MODUL RAD TX DLVR SMPL: CPT | Performed by: RADIOLOGY

## 2019-10-18 ENCOUNTER — APPOINTMENT (OUTPATIENT)
Dept: RADIATION ONCOLOGY | Facility: CLINIC | Age: 69
End: 2019-10-18
Attending: RADIOLOGY
Payer: MEDICARE

## 2019-10-18 DIAGNOSIS — C61 PROSTATE CANCER (HCC): Primary | ICD-10-CM

## 2019-10-18 PROCEDURE — 77336 RADIATION PHYSICS CONSULT: CPT | Performed by: RADIOLOGY

## 2019-10-18 PROCEDURE — 77385 HB NTSTY MODUL RAD TX DLVR SMPL: CPT | Performed by: RADIOLOGY

## 2019-11-14 ENCOUNTER — APPOINTMENT (OUTPATIENT)
Dept: LAB | Facility: HOSPITAL | Age: 69
End: 2019-11-14
Attending: RADIOLOGY
Payer: MEDICARE

## 2019-11-14 DIAGNOSIS — C61 PROSTATE CANCER (HCC): ICD-10-CM

## 2019-11-14 LAB — PSA SERPL-MCNC: 1.5 NG/ML (ref 0–4)

## 2019-11-14 PROCEDURE — 84153 ASSAY OF PSA TOTAL: CPT

## 2019-11-21 ENCOUNTER — RADIATION ONCOLOGY FOLLOW-UP (OUTPATIENT)
Dept: RADIATION ONCOLOGY | Facility: CLINIC | Age: 69
End: 2019-11-21
Attending: RADIOLOGY
Payer: MEDICARE

## 2019-11-21 VITALS
BODY MASS INDEX: 30.27 KG/M2 | HEART RATE: 56 BPM | RESPIRATION RATE: 14 BRPM | WEIGHT: 217 LBS | TEMPERATURE: 98.1 F | DIASTOLIC BLOOD PRESSURE: 85 MMHG | SYSTOLIC BLOOD PRESSURE: 148 MMHG

## 2019-11-21 DIAGNOSIS — C61 PROSTATE CANCER (HCC): Primary | ICD-10-CM

## 2019-11-21 PROCEDURE — 99211 OFF/OP EST MAY X REQ PHY/QHP: CPT | Performed by: RADIOLOGY

## 2019-11-21 NOTE — PROGRESS NOTES
Vilma Rene 1950 is a 77 y o  male       Follow up visit   Presents for first follow up after completing prostate and pelvic radiation on 10/18/19  PSA 11/14/19:   Component      Latest Ref Rng & Units 2/27/2019 3/26/2019 8/2/2019 11/14/2019   PSA, Total      0 0 - 4 0 ng/mL 16 9 (H) 22 4 (H) 5 3 (H) 1 5       2/7/20 F/U Urology            Oncology History    Presents for first follow up after completing prostate and pelvic radiation on 10/18/19  PSA 11/14/19:   Component      Latest Ref Rng & Units 2/27/2019 3/26/2019 8/2/2019 11/14/2019   PSA, Total      0 0 - 4 0 ng/mL 16 9 (H) 22 4 (H) 5 3 (H) 1 5       2/7/20 F/U Urology                Prostate cancer [facility])    5/15/2019 Biopsy     Final Diagnosis  A  Prostate, right lateral base, core needle biopsy:             - Benign prostatic stroma  - No malignancy is identified      B  Prostate, right medial base, core needle biopsy:             - Prostatic adenocarcinoma, [place] score 4 + 5 = 9, Prognostic Grade Group V, involving nearly 100% of one core biopsy      C  Prostate, right lateral mid, core needle biopsy:             - Prostatic adenocarcinoma, [place] score 5 + 4 = 9, Prognostic Grade Group V, involving nearly 100% one core biopsy      D  Prostate, right medial mid, core needle biopsy:             - Prostatic adenocarcinoma, [place] score 4 + 5 = 9, Prognostic Grade Group V, involving nearly 100% of one core biopsy      E  Prostate, right lateral apex, core needle biopsy:             - Benign prostatic stroma  - No malignancy is identified      F  Prostate, right medial apex, core needle biopsy:             - Prostatic adenocarcinoma, [place] score 5 + 4 = 9, Prognostic Grade Group V, involving nearly 100% of one core biopsy      G  Prostate, left lateral base, core needle biopsy:             - Atypical prostate glands, indeterminate for prostatic adenocarcinoma      H   Prostate, left medial base, core needle biopsy: - Prostatic adenocarcinoma, [place] score 5 + 4 =9, Prognostic Grade Group V, involving 15% of one core biopsy      I  Prostate, left lateral mid, core needle biopsy:             - Benign prostate glands  - No malignancy is identified      J  Prostate, left medial mid, core needle biopsy:             - Benign prostate glands  - No malignancy is identified      K  Prostate, left lateral apex, core needle biopsy:             - Prostatic adenocarcinoma, [place] score 5 + 4 = 9, Prognostic Grade Group V, involving 30% of one of 2 cores      L  Prostate, left medial apex, core needle biopsy:             - Prostatic adenocarcinoma, [place] score 5 + 4 =  9, Prognostic Grade Group V, discontinuously involving approximately 50% of 1 core biopsy            6/6/2019 Initial Diagnosis     Prostate cancer ([facility] [address] )      6/20/2019 -  Cancer Staged     Staging form: Prostate, AJCC 8th Edition  - Clinical: Stage JAIRO (cT2b, cN1, cM0, PSA: 22, Grade Group: 5) - Signed by Jaylene Barkley MD on 6/20/2019  Prostate specific antigen (PSA) range: 20 or greater  Histologic grading system: 5 grade system        8/19/2019 - 10/18/2019 Radiation     Course: C1    Plan ID Energy Fractions Dose per Fraction (cGy) Dose Correction (cGy) Total Dose Delivered (cGy) Elapsed Days   Prost_PSV CD2 10X 11 / 11 180 0 1,980 14   Prost_SV CD1 10X 8 / 8 180 0 1,440 9   Whole Pelvis 10X 25 / 25 180 0 4,500 35      Treatment dates:  C1: 8/19/2019 - 10/18/2019           Clinical Trial: no      Imaging    Health Maintenance   Topic Date Due    Hepatitis B Vaccine (1 of 3 - Risk 3-dose series) [birth year]1970    Influenza Vaccine  07/01/2019    DTaP,Tdap,and Td Vaccines (1 - Tdap) 07/15/2020 (Originally 7/7/1961)    Pneumococcal Vaccine: 65+ Years (2 of 2 - PCV13) 03/12/2020    Fall Risk  04/02/2020    Medicare Annual Wellness Visit (AWV)  04/02/2020    BMI: Followup Plan  04/02/2020    Depression Screening PHQ  06/20/2020    BMI: Adult  2020    CRC Screening: Cologuard  2022    CRC Screening: Colonoscopy  2024    Hepatitis C Screening  Completed    Pneumococcal Vaccine: Pediatrics (0 to 5 Years) and At-Risk Patients (6 to 59 Years)  Aged Out    HIB Vaccine  Aged Out    IPV Vaccine  Aged Out    Hepatitis A Vaccine  Aged Out    Meningococcal ACWY Vaccine  Aged Out    HPV Vaccine  Aged Out       Patient Active Problem List   Diagnosis    Mixed hyperlipidemia    Elevated PSA    IFG (impaired fasting glucose)    Upper respiratory tract infection    Positive colorectal cancer screening using Cologuard test    Prostate cancer ([facility] )    Encounter for monitoring androgen deprivation therapy    Hot flashes     Past Medical History:   Diagnosis Date    Diabetes mellitus ([facility] [address] )     Hyperlipidemia      Past Surgical History:   Procedure Laterality Date    EAR SURGERY      HERNIA REPAIR      [place] COLONOSCOPY FLX DX W/COLLJ SPEC WHEN PFRMD N/A 2019    Procedure: COLONOSCOPY;  Surgeon: Wesley Aranda MD;  Location: [facility]; Service: Gastroenterology     Family History   Problem Relation Age of Onset    Prostate cancer Brother      Social History     Socioeconomic History    Marital status:       Spouse name: Not on file    Number of children: Not on file    Years of education: Not on file    Highest education level: Not on file   Occupational History    Not on file   Social Needs    Financial resource strain: Not on file    Food insecurity:     Worry: Not on file     Inability: Not on file    Transportation needs:     Medical: Not on file     Non-medical: Not on file   Tobacco Use    Smoking status: Former Smoker     Last attempt to quit: 2005     Years since quittin 7    Smokeless tobacco: Never Used   Substance and Sexual Activity    Alcohol use: Not Currently    Drug use: Not Currently    Sexual activity: Not on file   Lifestyle    Physical activity:     Days per week: Not on file     Minutes per session: Not on file    Stress: Not on file   Relationships    Social connections:     Talks on phone: Not on file     Gets together: Not on file     Attends [employer] service: Not on file     Active member of club or organization: Not on file     Attends meetings of clubs or organizations: Not on file     Relationship status: Not on file    Intimate partner violence:     Fear of current or ex partner: Not on file     Emotionally abused: Not on file     Physically abused: Not on file     Forced sexual activity: Not on file   Other Topics Concern    Not on file   Social History Narrative    Not on file       Current Outpatient Medications:     calcium carbonate-vitamin D (OSCAL-D) 500 mg-200 units per tablet, Take 1 tablet by mouth 2 (two) times a day with meals for 360 days, Disp: 180 tablet, Rfl: 3    lisinopril (ZESTRIL) 2 5 mg tablet, Take 1 tablet (2 5 mg total) by mouth daily, Disp: 90 tablet, Rfl: 3    metFORMIN (GLUCOPHAGE) 500 mg tablet, Take 1 tablet (500 mg total) by mouth 2 (two) times a day with meals, Disp: 180 tablet, Rfl: 2    rosuvastatin (CRESTOR) 5 mg tablet, Take 1 tablet (5 mg total) by mouth daily, Disp: 90 tablet, Rfl: 2    bisacodyl (FLEET) 10 MG/30ML ENEM, Insert 30 mL (10 mg total) into the rectum once for 1 dose Use this enema the morning of your marker placement, Disp: 1 enema, Rfl: 0    Blood Glucose Monitoring Suppl (Melvin Vladimir) w/Device KIT, by Does not apply route once for 1 dose Test sugar in the morning, Disp: 1 kit, Rfl: 0  Allergies   Allergen Reactions    Penicillin V        Review of Systems:  Review of Systems   Constitutional: Positive for diaphoresis (hot flashes multiple times daily)  HENT: Negative  Eyes: Negative  Respiratory: Positive for cough  Cardiovascular: Negative  Gastrointestinal: Negative  Endocrine: Negative  Genitourinary:        Nocturia x3   Stream is "medium" per patient   Musculoskeletal: Positive for arthralgias and neck stiffness  Skin: Negative  Allergic/Immunologic: Negative  Neurological: Negative  Hematological: Negative  Psychiatric/Behavioral: Negative  Vitals:    11/21/19 0751   BP: 148/85   Pulse: 56   Resp: 14   Temp: 98 1 °F (36 7 °C)   Weight: 98 4 kg (217 lb)        Pain assessment:0    Pain Score: 0-No pain      Imaging:No results found      Teaching

## 2019-11-21 NOTE — PROGRESS NOTES
Follow-up - Radiation Oncology   Venus Dover 1950 77 y o  male [id number]      History of Present Illness   Cancer Staging  Prostate cancer [facility])  Staging form: Prostate, AJCC 8th Edition  - Clinical: Stage JAIRO (cT2b, cN1, cM0, PSA: 22, Grade Group: 5) - Signed by Louie Madsen MD on 6/20/2019  Prostate specific antigen (PSA) range: 20 or greater  Histologic grading system: 5 grade system      Venus Dover returns today for routine scheduled follow-up visit approximately 1 month status post completion tentative radiation therapy  Overall he feels well  His nocturia is at baseline  He continues have daily flashes  He denies any dysuria hematuria diarrhea or rectal bleeding  Presents for first follow up after completing prostate and pelvic radiation on 10/18/19  PSA 11/14/19:   Component      Latest Ref Rng & Units 2/27/2019 3/26/2019 8/2/2019 11/14/2019   PSA, Total      0 0 - 4 0 ng/mL 16 9 (H) 22 4 (H) 5 3 (H) 1 5       2/7/20 F/U Urology        Historical Information      Prostate cancer ([facility] [address] )    5/15/2019 Biopsy     Final Diagnosis  A  Prostate, right lateral base, core needle biopsy:             - Benign prostatic stroma  - No malignancy is identified      B  Prostate, right medial base, core needle biopsy:             - Prostatic adenocarcinoma, [place] score 4 + 5 = 9, Prognostic Grade Group V, involving nearly 100% of one core biopsy      C  Prostate, right lateral mid, core needle biopsy:             - Prostatic adenocarcinoma, [place] score 5 + 4 = 9, Prognostic Grade Group V, involving nearly 100% one core biopsy      D  Prostate, right medial mid, core needle biopsy:             - Prostatic adenocarcinoma, [place] score 4 + 5 = 9, Prognostic Grade Group V, involving nearly 100% of one core biopsy      E  Prostate, right lateral apex, core needle biopsy:             - Benign prostatic stroma  - No malignancy is identified      F   Prostate, right medial apex, core needle biopsy:             - Prostatic adenocarcinoma, [place] score 5 + 4 = 9, Prognostic Grade Group V, involving nearly 100% of one core biopsy      G  Prostate, left lateral base, core needle biopsy:             - Atypical prostate glands, indeterminate for prostatic adenocarcinoma      H  Prostate, left medial base, core needle biopsy:             - Prostatic adenocarcinoma, [place] score 5 + 4 =9, Prognostic Grade Group V, involving 15% of one core biopsy      I  Prostate, left lateral mid, core needle biopsy:             - Benign prostate glands  - No malignancy is identified      J  Prostate, left medial mid, core needle biopsy:             - Benign prostate glands  - No malignancy is identified      K  Prostate, left lateral apex, core needle biopsy:             - Prostatic adenocarcinoma, [place] score 5 + 4 = 9, Prognostic Grade Group V, involving 30% of one of 2 cores      L  Prostate, left medial apex, core needle biopsy:             - Prostatic adenocarcinoma, [place] score 5 + 4 =  9, Prognostic Grade Group V, discontinuously involving approximately 50% of 1 core biopsy            6/6/2019 Initial Diagnosis     [facility] )      6/20/2019 -  Cancer Staged     Staging form: Prostate, AJCC 8th Edition  - Clinical: Stage JAIRO (cT2b, cN1, cM0, PSA: 22, Grade Group: 5) - Signed by Gloria Glasgow MD on 6/20/2019  Prostate specific antigen (PSA) range: 20 or greater  Histologic grading system: 5 grade system        8/19/2019 - 10/18/2019 Radiation     Course: C1    Plan ID Energy Fractions Dose per Fraction (cGy) Dose Correction (cGy) Total Dose Delivered (cGy) Elapsed Days   Prost_PSV CD2 10X 11 / 11 180 0 1,980 14   Prost_SV CD1 10X 8 / 8 180 0 1,440 9   Whole Pelvis 10X 25 / 25 180 0 4,500 35      Treatment dates:  C1: 8/19/2019 - 10/18/2019           Past Medical History:   Diagnosis Date    Diabetes mellitus ([facility] [address] )     Hyperlipidemia      Past Surgical History: Procedure Laterality Date    EAR SURGERY      HERNIA REPAIR      [place] COLONOSCOPY FLX DX W/COLLJ SPEC WHEN PFRMD N/A 2019    Procedure: COLONOSCOPY;  Surgeon: Doron Argueta MD;  Location: [facility]; Service: Gastroenterology       Social History   Social History     Substance and Sexual Activity   Alcohol Use Not Currently     Social History     Substance and Sexual Activity   Drug Use Not Currently     Social History     Tobacco Use   Smoking Status Former Smoker    Last attempt to quit: 2005    Years since quittin 7   Smokeless Tobacco Never Used         Meds/Allergies     Current Outpatient Medications:     calcium carbonate-vitamin D (OSCAL-D) 500 mg-200 units per tablet, Take 1 tablet by mouth 2 (two) times a day with meals for 360 days, Disp: 180 tablet, Rfl: 3    lisinopril (ZESTRIL) 2 5 mg tablet, Take 1 tablet (2 5 mg total) by mouth daily, Disp: 90 tablet, Rfl: 3    metFORMIN (GLUCOPHAGE) 500 mg tablet, Take 1 tablet (500 mg total) by mouth 2 (two) times a day with meals, Disp: 180 tablet, Rfl: 2    rosuvastatin (CRESTOR) 5 mg tablet, Take 1 tablet (5 mg total) by mouth daily, Disp: 90 tablet, Rfl: 2    bisacodyl (FLEET) 10 MG/30ML ENEM, Insert 30 mL (10 mg total) into the rectum once for 1 dose Use this enema the morning of your marker placement, Disp: 1 enema, Rfl: 0    Blood Glucose Monitoring Suppl (ONETOUCH VERIO) w/Device KIT, by Does not apply route once for 1 dose Test sugar in the morning, Disp: 1 kit, Rfl: 0  Allergies   Allergen Reactions    Penicillin V          Review of Systems   Review of Systems   Constitutional: Positive for diaphoresis (hot flashes multiple times daily)  HENT: Negative  Eyes: Negative  Respiratory: Positive for cough  Cardiovascular: Negative  Gastrointestinal: Negative  Endocrine: Negative  Genitourinary:        Nocturia x3   Stream is "medium" per patient   Musculoskeletal: Positive for arthralgias and neck stiffness  Skin: Negative  Allergic/Immunologic: Negative  Neurological: Negative  Hematological: Negative  Psychiatric/Behavioral: Negative  OBJECTIVE:   /85   Pulse 56   Temp 98 1 °F (36 7 °C)   Resp 14   Wt 98 4 kg (217 lb)   BMI 30 27 kg/m²   Pain Assessment:  0  Karnofsky: 90 - Able to carry on normal activity; minor signs or symptoms of disease     Physical Exam   Patient presents today no apparent distress  Sclera anicteric  Normal respiratory effort  Normal speech  Normal affect  Normal gait  RESULTS    Lab Results:   Recent Results (from the past 672 hour(s))   PSA Total, Diagnostic    Collection Time: 11/14/19  2:28 PM   Result Value Ref Range    PSA, Diagnostic 1 5 0 0 - 4 0 ng/mL       Imaging Studies:No results found  Assessment/Plan:  Orders Placed This Encounter   Procedures    PSA Total, Diagnostic        Ksenia Pavon has done well in follow-up  His PSA continues to decline currently measuring 1 5, down from an initial reading of 22 4 and a pre radiation rating of 5 3  He will follow up with Urology in February for additional androgen deprivation  He will return to our department in 6 months with a PSA prior  Nahum Brewer MD  11/21/2019,8:54 AM    Portions of the record may have been created with voice recognition software   Occasional wrong word or "sound a like" substitutions may have occurred due to the inherent limitations of voice recognition software   Read the chart carefully and recognize, using context, where substitutions have occurred

## 2020-01-31 ENCOUNTER — APPOINTMENT (OUTPATIENT)
Dept: LAB | Facility: HOSPITAL | Age: 70
End: 2020-01-31
Attending: RADIOLOGY
Payer: MEDICARE

## 2020-01-31 DIAGNOSIS — C61 PROSTATE CANCER (HCC): ICD-10-CM

## 2020-01-31 LAB — PSA SERPL-MCNC: 0.9 NG/ML (ref 0–4)

## 2020-01-31 PROCEDURE — 84153 ASSAY OF PSA TOTAL: CPT

## 2020-02-03 NOTE — PROGRESS NOTES
Assessment and plan:       1  [place] [facility] prostate cancer status post radiation therapy  - Pretreatment PSA was 5 3   - Recent PSA 0 9  - Lupron 2 of 4 administered today by nursing staff without difficulty  - 1st Lupron injection was administered on 08/01/2019 with a plan of 2 years of androgen deprivation therapy  - He is taking calcium and vitamin-D supplementation   - He will return in 6 months with PSA prior to visit  Connie Garza PA-C      Chief Complaint     Chief Complaint   Patient presents with    Prostate Cancer     follow up; lupron injection         History of Present Illness     Benjy Mayers is a 77 y o  male patient known to Dr Kandace Velasco for high risk prostate cancer  He has been treated with radiation therapy  Patient underwent Firmagon injection on 06/28/2019 and returned for fiducial marker placement and Lupron injection on 08/01/2019  Radiation therapy was completed in October 2019  PSA drawn 01/31/2020 is 0 9  Pretreatment PSA was 22 4  Plan is to continue androgen deprivation therapy for a total of 2 years  Patient does report some hot flashes  He also has intermittent bothersome nocturia up to 5 times nightly  He reports other nights he is able to sleep through the night  He is not interested in treatment for erectile dysfunction at this time as he does not have a partner  Laboratory     Lab Results   Component Value Date    CREATININE 0 90 08/02/2019       Lab Results   Component Value Date    PSA 0 9 01/31/2020    PSA 1 5 11/14/2019    PSA 5 3 (H) 08/02/2019       No results found for this or any previous visit (from the past 1 hour(s))  Review of Systems     Review of Systems   Constitutional: Negative for chills and fever  HENT: Negative  Respiratory: Negative for shortness of breath  Cardiovascular: Negative for chest pain  Gastrointestinal: Negative for constipation, diarrhea, nausea and vomiting     Endocrine:        Hot flashes Genitourinary: Positive for frequency  Negative for difficulty urinating, dysuria, enuresis, flank pain, hematuria and urgency  Musculoskeletal: Negative  Allergies     Allergies   Allergen Reactions    Penicillin V        Physical Exam     Physical Exam   Constitutional: He is oriented to person, place, and time  He appears well-developed and well-nourished  No distress  HENT:   Head: Normocephalic and atraumatic  Right Ear: External ear normal    Left Ear: External ear normal    Nose: Nose normal    Eyes: Right eye exhibits no discharge  Left eye exhibits no discharge  No scleral icterus  Cardiovascular: Normal rate  Pulmonary/Chest: Effort normal    Musculoskeletal:   Ambulates independently   Neurological: He is alert and oriented to person, place, and time  Skin: Skin is warm and dry  He is not diaphoretic  Psychiatric: He has a normal mood and affect  His behavior is normal  Judgment and thought content normal    Nursing note and vitals reviewed          Vital Signs     Vitals:    02/07/20 1038   BP: 130/72   BP Location: Left arm   Patient Position: Sitting   Cuff Size: Adult   Pulse: 80   Weight: 103 kg (227 lb)   Height: 5' 10" (1 778 m)         Current Medications       Current Outpatient Medications:     calcium carbonate-vitamin D (OSCAL-D) 500 mg-200 units per tablet, Take 1 tablet by mouth 2 (two) times a day with meals for 360 days, Disp: 180 tablet, Rfl: 3    lisinopril (ZESTRIL) 2 5 mg tablet, Take 1 tablet (2 5 mg total) by mouth daily, Disp: 90 tablet, Rfl: 3    metFORMIN (GLUCOPHAGE) 500 mg tablet, Take 1 tablet (500 mg total) by mouth 2 (two) times a day with meals, Disp: 180 tablet, Rfl: 2    multivitamin (THERAGRAN) TABS, Take 1 tablet by mouth daily, Disp: , Rfl:     rosuvastatin (CRESTOR) 5 mg tablet, Take 1 tablet (5 mg total) by mouth daily, Disp: 90 tablet, Rfl: 2    bisacodyl (FLEET) 10 MG/30ML ENEM, Insert 30 mL (10 mg total) into the rectum once for 1 dose Use this enema the morning of your marker placement, Disp: 1 enema, Rfl: 0    Blood Glucose Monitoring Suppl (Gerri Mathews) w/Device KIT, by Does not apply route once for 1 dose Test sugar in the morning, Disp: 1 kit, Rfl: 0    Current Facility-Administered Medications:     leuprolide (LUPRON DEPOT 6 MONTH KIT) IM injection kit 45 mg, 45 mg, Intramuscular, Once, Eder Neumann PA-C      Active Problems     Patient Active Problem List   Diagnosis    Mixed hyperlipidemia    Elevated PSA    IFG (impaired fasting glucose)    Upper respiratory tract infection    Positive colorectal cancer screening using Cologuard test    [facility]    Encounter for monitoring androgen deprivation therapy    Hot flashes         Past Medical History     Past Medical History:   Diagnosis Date    Diabetes mellitus ([facility] )     Hyperlipidemia          Surgical History     Past Surgical History:   Procedure Laterality Date    EAR SURGERY      HERNIA REPAIR      TX COLONOSCOPY FLX DX W/COLLJ SPEC WHEN PFRMD N/A 5/6/2019    Procedure: COLONOSCOPY;  Surgeon: August Baltazar MD;  Location: [facility];   Service: Gastroenterology         Family History     Family History   Problem Relation Age of Onset    Prostate cancer Brother          Social History     Social History       Radiology

## 2020-02-07 ENCOUNTER — OFFICE VISIT (OUTPATIENT)
Dept: UROLOGY | Facility: CLINIC | Age: 70
End: 2020-02-07
Payer: MEDICARE

## 2020-02-07 VITALS
SYSTOLIC BLOOD PRESSURE: 130 MMHG | BODY MASS INDEX: 32.5 KG/M2 | DIASTOLIC BLOOD PRESSURE: 72 MMHG | WEIGHT: 227 LBS | HEIGHT: 70 IN | HEART RATE: 80 BPM

## 2020-02-07 DIAGNOSIS — C61 PROSTATE CANCER (HCC): Primary | ICD-10-CM

## 2020-02-07 PROCEDURE — 3008F BODY MASS INDEX DOCD: CPT | Performed by: PHYSICIAN ASSISTANT

## 2020-02-07 PROCEDURE — 1160F RVW MEDS BY RX/DR IN RCRD: CPT | Performed by: PHYSICIAN ASSISTANT

## 2020-02-07 PROCEDURE — 99213 OFFICE O/P EST LOW 20 MIN: CPT | Performed by: PHYSICIAN ASSISTANT

## 2020-02-07 PROCEDURE — 96402 CHEMO HORMON ANTINEOPL SQ/IM: CPT

## 2020-02-07 PROCEDURE — 3078F DIAST BP <80 MM HG: CPT | Performed by: PHYSICIAN ASSISTANT

## 2020-02-07 PROCEDURE — 4040F PNEUMOC VAC/ADMIN/RCVD: CPT | Performed by: PHYSICIAN ASSISTANT

## 2020-02-07 PROCEDURE — 1036F TOBACCO NON-USER: CPT | Performed by: PHYSICIAN ASSISTANT

## 2020-02-07 PROCEDURE — 3075F SYST BP GE 130 - 139MM HG: CPT | Performed by: PHYSICIAN ASSISTANT

## 2020-02-07 RX ORDER — DIPHENOXYLATE HYDROCHLORIDE AND ATROPINE SULFATE 2.5; .025 MG/1; MG/1
1 TABLET ORAL DAILY
COMMUNITY

## 2020-03-16 DIAGNOSIS — E11.8 TYPE 2 DIABETES MELLITUS WITH COMPLICATION, WITHOUT LONG-TERM CURRENT USE OF INSULIN (HCC): ICD-10-CM

## 2020-03-16 DIAGNOSIS — E78.2 MIXED HYPERLIPIDEMIA: ICD-10-CM

## 2020-03-16 DIAGNOSIS — I10 ESSENTIAL HYPERTENSION: ICD-10-CM

## 2020-03-17 DIAGNOSIS — E78.2 MIXED HYPERLIPIDEMIA: ICD-10-CM

## 2020-03-17 DIAGNOSIS — E11.8 TYPE 2 DIABETES MELLITUS WITH COMPLICATION, WITHOUT LONG-TERM CURRENT USE OF INSULIN (HCC): ICD-10-CM

## 2020-03-17 DIAGNOSIS — I10 ESSENTIAL HYPERTENSION: ICD-10-CM

## 2020-03-17 RX ORDER — ROSUVASTATIN CALCIUM 5 MG/1
TABLET, COATED ORAL
Qty: 90 TABLET | Refills: 2 | Status: SHIPPED | OUTPATIENT
Start: 2020-03-17 | End: 2020-12-29 | Stop reason: SDUPTHER

## 2020-03-17 RX ORDER — ROSUVASTATIN CALCIUM 5 MG/1
5 TABLET, COATED ORAL DAILY
Qty: 90 TABLET | Refills: 0 | Status: SHIPPED | OUTPATIENT
Start: 2020-03-17 | End: 2020-05-26 | Stop reason: SDUPTHER

## 2020-03-18 DIAGNOSIS — I10 ESSENTIAL HYPERTENSION: ICD-10-CM

## 2020-03-18 DIAGNOSIS — R73.01 IFG (IMPAIRED FASTING GLUCOSE): Primary | ICD-10-CM

## 2020-03-18 DIAGNOSIS — E78.2 MIXED HYPERLIPIDEMIA: ICD-10-CM

## 2020-03-18 DIAGNOSIS — E11.8 TYPE 2 DIABETES MELLITUS WITH COMPLICATION, WITHOUT LONG-TERM CURRENT USE OF INSULIN (HCC): ICD-10-CM

## 2020-03-18 RX ORDER — LISINOPRIL 2.5 MG/1
2.5 TABLET ORAL DAILY
Qty: 90 TABLET | Refills: 0 | Status: SHIPPED | OUTPATIENT
Start: 2020-03-18 | End: 2020-12-30

## 2020-03-18 RX ORDER — LISINOPRIL 2.5 MG/1
2.5 TABLET ORAL DAILY
Qty: 90 TABLET | Refills: 0 | Status: SHIPPED | OUTPATIENT
Start: 2020-03-18 | End: 2020-05-26 | Stop reason: SDUPTHER

## 2020-05-20 ENCOUNTER — TRANSCRIBE ORDERS (OUTPATIENT)
Dept: ADMINISTRATIVE | Facility: HOSPITAL | Age: 70
End: 2020-05-20

## 2020-05-20 ENCOUNTER — APPOINTMENT (OUTPATIENT)
Dept: LAB | Facility: HOSPITAL | Age: 70
End: 2020-05-20
Payer: MEDICARE

## 2020-05-20 DIAGNOSIS — C61 MALIGNANT NEOPLASM OF PROSTATE (HCC): Primary | ICD-10-CM

## 2020-05-20 DIAGNOSIS — E11.8 TYPE 2 DIABETES MELLITUS WITH COMPLICATION, WITHOUT LONG-TERM CURRENT USE OF INSULIN (HCC): ICD-10-CM

## 2020-05-20 DIAGNOSIS — E78.2 MIXED HYPERLIPIDEMIA: ICD-10-CM

## 2020-05-20 DIAGNOSIS — I10 ESSENTIAL HYPERTENSION: ICD-10-CM

## 2020-05-20 DIAGNOSIS — C61 MALIGNANT NEOPLASM OF PROSTATE (HCC): ICD-10-CM

## 2020-05-20 DIAGNOSIS — R73.01 IFG (IMPAIRED FASTING GLUCOSE): ICD-10-CM

## 2020-05-20 LAB
ALBUMIN SERPL BCP-MCNC: 4 G/DL (ref 3.5–5)
ALP SERPL-CCNC: 99 U/L (ref 46–116)
ALT SERPL W P-5'-P-CCNC: 56 U/L (ref 12–78)
ANION GAP SERPL CALCULATED.3IONS-SCNC: 7 MMOL/L (ref 4–13)
AST SERPL W P-5'-P-CCNC: 31 U/L (ref 5–45)
BASOPHILS # BLD AUTO: 0.03 THOUSANDS/ΜL (ref 0–0.1)
BASOPHILS NFR BLD AUTO: 1 % (ref 0–1)
BILIRUB SERPL-MCNC: 0.4 MG/DL (ref 0.2–1)
BUN SERPL-MCNC: 22 MG/DL (ref 5–25)
CALCIUM SERPL-MCNC: 9.6 MG/DL (ref 8.3–10.1)
CHLORIDE SERPL-SCNC: 103 MMOL/L (ref 100–108)
CHOLEST SERPL-MCNC: 213 MG/DL (ref 50–200)
CO2 SERPL-SCNC: 29 MMOL/L (ref 21–32)
CREAT SERPL-MCNC: 0.92 MG/DL (ref 0.6–1.3)
CREAT UR-MCNC: 35.4 MG/DL
EOSINOPHIL # BLD AUTO: 0.23 THOUSAND/ΜL (ref 0–0.61)
EOSINOPHIL NFR BLD AUTO: 4 % (ref 0–6)
ERYTHROCYTE [DISTWIDTH] IN BLOOD BY AUTOMATED COUNT: 12.4 % (ref 11.6–15.1)
EST. AVERAGE GLUCOSE BLD GHB EST-MCNC: 140 MG/DL
GFR SERPL CREATININE-BSD FRML MDRD: 85 ML/MIN/1.73SQ M
GLUCOSE P FAST SERPL-MCNC: 116 MG/DL (ref 65–99)
HBA1C MFR BLD: 6.5 %
HCT VFR BLD AUTO: 36.2 % (ref 36.5–49.3)
HDLC SERPL-MCNC: 48 MG/DL
HGB BLD-MCNC: 12 G/DL (ref 12–17)
IMM GRANULOCYTES # BLD AUTO: 0.1 THOUSAND/UL (ref 0–0.2)
IMM GRANULOCYTES NFR BLD AUTO: 2 % (ref 0–2)
LDLC SERPL CALC-MCNC: 118 MG/DL (ref 0–100)
LYMPHOCYTES # BLD AUTO: 1.23 THOUSANDS/ΜL (ref 0.6–4.47)
LYMPHOCYTES NFR BLD AUTO: 23 % (ref 14–44)
MCH RBC QN AUTO: 29.8 PG (ref 26.8–34.3)
MCHC RBC AUTO-ENTMCNC: 33.1 G/DL (ref 31.4–37.4)
MCV RBC AUTO: 90 FL (ref 82–98)
MICROALBUMIN UR-MCNC: 28.8 MG/L (ref 0–20)
MICROALBUMIN/CREAT 24H UR: 81 MG/G CREATININE (ref 0–30)
MONOCYTES # BLD AUTO: 0.53 THOUSAND/ΜL (ref 0.17–1.22)
MONOCYTES NFR BLD AUTO: 10 % (ref 4–12)
NEUTROPHILS # BLD AUTO: 3.16 THOUSANDS/ΜL (ref 1.85–7.62)
NEUTS SEG NFR BLD AUTO: 60 % (ref 43–75)
NONHDLC SERPL-MCNC: 165 MG/DL
NRBC BLD AUTO-RTO: 0 /100 WBCS
PLATELET # BLD AUTO: 198 THOUSANDS/UL (ref 149–390)
PMV BLD AUTO: 9.3 FL (ref 8.9–12.7)
POTASSIUM SERPL-SCNC: 4.8 MMOL/L (ref 3.5–5.3)
PROT SERPL-MCNC: 7.9 G/DL (ref 6.4–8.2)
PSA SERPL-MCNC: 0.5 NG/ML (ref 0–4)
RBC # BLD AUTO: 4.03 MILLION/UL (ref 3.88–5.62)
SODIUM SERPL-SCNC: 139 MMOL/L (ref 136–145)
TRIGL SERPL-MCNC: 235 MG/DL
WBC # BLD AUTO: 5.28 THOUSAND/UL (ref 4.31–10.16)

## 2020-05-20 PROCEDURE — 36415 COLL VENOUS BLD VENIPUNCTURE: CPT

## 2020-05-20 PROCEDURE — 83036 HEMOGLOBIN GLYCOSYLATED A1C: CPT

## 2020-05-20 PROCEDURE — 82043 UR ALBUMIN QUANTITATIVE: CPT

## 2020-05-20 PROCEDURE — 82570 ASSAY OF URINE CREATININE: CPT

## 2020-05-20 PROCEDURE — 85025 COMPLETE CBC W/AUTO DIFF WBC: CPT

## 2020-05-20 PROCEDURE — 80061 LIPID PANEL: CPT

## 2020-05-20 PROCEDURE — 84153 ASSAY OF PSA TOTAL: CPT

## 2020-05-20 PROCEDURE — 80053 COMPREHEN METABOLIC PANEL: CPT

## 2020-05-26 ENCOUNTER — TELEMEDICINE (OUTPATIENT)
Dept: RADIATION ONCOLOGY | Facility: CLINIC | Age: 70
End: 2020-05-26
Attending: RADIOLOGY
Payer: MEDICARE

## 2020-05-26 VITALS
DIASTOLIC BLOOD PRESSURE: 64 MMHG | TEMPERATURE: 97.2 F | WEIGHT: 226.5 LBS | RESPIRATION RATE: 16 BRPM | BODY MASS INDEX: 32.43 KG/M2 | SYSTOLIC BLOOD PRESSURE: 138 MMHG | HEIGHT: 70 IN | HEART RATE: 77 BPM

## 2020-05-26 DIAGNOSIS — C61 PROSTATE CANCER (HCC): Primary | ICD-10-CM

## 2020-05-26 PROCEDURE — 99211 OFF/OP EST MAY X REQ PHY/QHP: CPT | Performed by: RADIOLOGY

## 2020-07-29 ENCOUNTER — TELEPHONE (OUTPATIENT)
Dept: UROLOGY | Facility: CLINIC | Age: 70
End: 2020-07-29

## 2020-07-29 NOTE — TELEPHONE ENCOUNTER
Would recommend Lupron injection around the time of his previously scheduled appointment, this could be a nursing visit  Then follow-up appointment with a provider on 09/01 would be appropriate, if patient is willing to have to appointments

## 2020-07-29 NOTE — TELEPHONE ENCOUNTER
Called and left message for patient to call the office back  Office number was left in the message  Patient is scheduled for Lupron with RN on 8/7/20 at 11:30 and should follow up as scheduled on 9/1/20 with AP  When patient calls the office back please confirm all appointments as scheduled  Thank you

## 2020-07-29 NOTE — TELEPHONE ENCOUNTER
I called patient to inform him that his appointment on 8/7 needed to be rescheduled do our provider being out of the office  Reviewing the schedule the next opening would be 9/1 at 2pm is this appointment appropriate? I informed him that if he is to come in sooner I will call him back but if this appointment is okay we will keep it as is  Patient verbalized understanding

## 2020-07-30 ENCOUNTER — APPOINTMENT (OUTPATIENT)
Dept: LAB | Facility: HOSPITAL | Age: 70
End: 2020-07-30
Payer: MEDICARE

## 2020-07-30 DIAGNOSIS — C61 PROSTATE CANCER (HCC): ICD-10-CM

## 2020-07-30 LAB — PSA SERPL-MCNC: 0.3 NG/ML (ref 0–4)

## 2020-07-30 PROCEDURE — 84153 ASSAY OF PSA TOTAL: CPT

## 2020-08-07 ENCOUNTER — PROCEDURE VISIT (OUTPATIENT)
Dept: UROLOGY | Facility: CLINIC | Age: 70
End: 2020-08-07
Payer: MEDICARE

## 2020-08-07 VITALS
TEMPERATURE: 97.8 F | HEART RATE: 80 BPM | HEIGHT: 70 IN | BODY MASS INDEX: 31.41 KG/M2 | DIASTOLIC BLOOD PRESSURE: 68 MMHG | SYSTOLIC BLOOD PRESSURE: 118 MMHG | WEIGHT: 219.4 LBS

## 2020-08-07 DIAGNOSIS — Z79.818 ENCOUNTER FOR MONITORING ANDROGEN DEPRIVATION THERAPY: ICD-10-CM

## 2020-08-07 DIAGNOSIS — C61 PROSTATE CANCER (HCC): Primary | ICD-10-CM

## 2020-08-07 PROCEDURE — 96402 CHEMO HORMON ANTINEOPL SQ/IM: CPT

## 2020-08-07 RX ORDER — MULTIVIT WITH MINERALS/LUTEIN
1000 TABLET ORAL DAILY
COMMUNITY

## 2020-08-07 NOTE — PROGRESS NOTES
8/7/2020  Blanca Shaikh is a 72 y o  male  [id number]    Diagnosis:  Chief Complaint     Prostate Cancer          Patient presents for lupron injection managed by Dr Jennie Montiel:  · PSA reviewed today, 0 3 (7/2020) and trending down from 0 5 (5/2020) 0 9 (1/2020)-per Dr Forest Pradhan  · Patient denies any other urological concerns at this time  · Per Dr Forest Pradhan ok to skip September FU with provider and FU in 6 months with another PSA when due for 4th lupron injection of his two years planned ADT  · Patient agrees to this plan  Medication administration:    Last lupron injection 2/7/2020   6 month lupron depot injection provided today without incident  Patient denies questions or concerns as he has had this injection in the past      He denies any other urological concerns at this time  He is still bothered by the hot flashes from ADT but is able to tolerate them  He is hopeful to only need one more injection and then be observed off ADT  He is still following with radiation oncology as well       Administrations This Visit     leuprolide (LUPRON DEPOT 6 MONTH KIT) IM injection kit 45 mg     Admin Date  08/07/2020 Action  Given Dose  45 mg Route  Intramuscular Administered By  Gianna Biggs RN                Vitals:    08/07/20 1145   BP: 118/68   BP Location: Left arm   Patient Position: Sitting   Cuff Size: Standard   Pulse: 80   Temp: 97 8 °F (36 6 °C)   TempSrc: Temporal   Weight: 99 5 kg (219 lb 6 4 oz)   Height: 5' 10" (1 778 m)         EDNA Cruz BSN

## 2020-10-15 ENCOUNTER — OFFICE VISIT (OUTPATIENT)
Dept: FAMILY MEDICINE CLINIC | Facility: CLINIC | Age: 70
End: 2020-10-15
Payer: MEDICARE

## 2020-10-15 VITALS
WEIGHT: 223.4 LBS | BODY MASS INDEX: 31.98 KG/M2 | HEART RATE: 90 BPM | HEIGHT: 70 IN | OXYGEN SATURATION: 95 % | RESPIRATION RATE: 14 BRPM | SYSTOLIC BLOOD PRESSURE: 118 MMHG | TEMPERATURE: 99.3 F | DIASTOLIC BLOOD PRESSURE: 70 MMHG

## 2020-10-15 DIAGNOSIS — C61 PROSTATE CANCER (HCC): ICD-10-CM

## 2020-10-15 DIAGNOSIS — E11.8 TYPE 2 DIABETES MELLITUS WITH COMPLICATION, WITHOUT LONG-TERM CURRENT USE OF INSULIN (HCC): ICD-10-CM

## 2020-10-15 DIAGNOSIS — I10 ESSENTIAL HYPERTENSION: ICD-10-CM

## 2020-10-15 DIAGNOSIS — Z23 NEED FOR VACCINATION: Primary | ICD-10-CM

## 2020-10-15 DIAGNOSIS — E78.2 MIXED HYPERLIPIDEMIA: ICD-10-CM

## 2020-10-15 DIAGNOSIS — Z00.00 MEDICARE ANNUAL WELLNESS VISIT, SUBSEQUENT: ICD-10-CM

## 2020-10-15 LAB — SL AMB POCT HEMOGLOBIN AIC: 6 (ref ?–6.5)

## 2020-10-15 PROCEDURE — 83036 HEMOGLOBIN GLYCOSYLATED A1C: CPT | Performed by: FAMILY MEDICINE

## 2020-10-15 PROCEDURE — 90662 IIV NO PRSV INCREASED AG IM: CPT | Performed by: FAMILY MEDICINE

## 2020-10-15 PROCEDURE — G0439 PPPS, SUBSEQ VISIT: HCPCS | Performed by: FAMILY MEDICINE

## 2020-10-15 PROCEDURE — 99214 OFFICE O/P EST MOD 30 MIN: CPT | Performed by: FAMILY MEDICINE

## 2020-10-15 PROCEDURE — G0008 ADMIN INFLUENZA VIRUS VAC: HCPCS | Performed by: FAMILY MEDICINE

## 2020-11-10 ENCOUNTER — LAB (OUTPATIENT)
Dept: LAB | Facility: HOSPITAL | Age: 70
End: 2020-11-10
Attending: UROLOGY
Payer: MEDICARE

## 2020-11-10 DIAGNOSIS — C61 PROSTATE CANCER (HCC): ICD-10-CM

## 2020-11-10 DIAGNOSIS — E78.2 MIXED HYPERLIPIDEMIA: ICD-10-CM

## 2020-11-10 DIAGNOSIS — E11.8 TYPE 2 DIABETES MELLITUS WITH COMPLICATION, WITHOUT LONG-TERM CURRENT USE OF INSULIN (HCC): ICD-10-CM

## 2020-11-10 LAB
ALT SERPL W P-5'-P-CCNC: 40 U/L (ref 12–78)
AST SERPL W P-5'-P-CCNC: 25 U/L (ref 5–45)
CHOLEST SERPL-MCNC: 221 MG/DL (ref 50–200)
HDLC SERPL-MCNC: 42 MG/DL
LDLC SERPL CALC-MCNC: 128 MG/DL (ref 0–100)
NONHDLC SERPL-MCNC: 179 MG/DL
PSA SERPL-MCNC: 0.2 NG/ML (ref 0–4)
TRIGL SERPL-MCNC: 253 MG/DL

## 2020-11-10 PROCEDURE — 80061 LIPID PANEL: CPT

## 2020-11-10 PROCEDURE — 84460 ALANINE AMINO (ALT) (SGPT): CPT

## 2020-11-10 PROCEDURE — 36415 COLL VENOUS BLD VENIPUNCTURE: CPT

## 2020-11-10 PROCEDURE — 84153 ASSAY OF PSA TOTAL: CPT

## 2020-11-10 PROCEDURE — 84450 TRANSFERASE (AST) (SGOT): CPT

## 2020-11-19 ENCOUNTER — CLINICAL SUPPORT (OUTPATIENT)
Dept: RADIATION ONCOLOGY | Facility: CLINIC | Age: 70
End: 2020-11-19
Attending: RADIOLOGY
Payer: MEDICARE

## 2020-11-19 VITALS
RESPIRATION RATE: 16 BRPM | WEIGHT: 228.5 LBS | DIASTOLIC BLOOD PRESSURE: 70 MMHG | TEMPERATURE: 96.8 F | HEART RATE: 50 BPM | HEIGHT: 70 IN | OXYGEN SATURATION: 96 % | SYSTOLIC BLOOD PRESSURE: 140 MMHG | BODY MASS INDEX: 32.71 KG/M2

## 2020-11-19 DIAGNOSIS — C61 PROSTATE CANCER (HCC): Primary | ICD-10-CM

## 2020-11-19 PROCEDURE — 99211 OFF/OP EST MAY X REQ PHY/QHP: CPT | Performed by: RADIOLOGY

## 2020-11-19 RX ORDER — VITAMIN E 268 MG
400 CAPSULE ORAL DAILY
COMMUNITY

## 2020-12-11 ENCOUNTER — TELEPHONE (OUTPATIENT)
Dept: UROLOGY | Facility: CLINIC | Age: 70
End: 2020-12-11

## 2020-12-29 DIAGNOSIS — I10 ESSENTIAL HYPERTENSION: ICD-10-CM

## 2020-12-29 DIAGNOSIS — E11.8 TYPE 2 DIABETES MELLITUS WITH COMPLICATION, WITHOUT LONG-TERM CURRENT USE OF INSULIN (HCC): ICD-10-CM

## 2020-12-29 DIAGNOSIS — E78.2 MIXED HYPERLIPIDEMIA: ICD-10-CM

## 2020-12-30 DIAGNOSIS — I10 ESSENTIAL HYPERTENSION: ICD-10-CM

## 2020-12-30 RX ORDER — LISINOPRIL 2.5 MG/1
TABLET ORAL
Qty: 90 TABLET | Refills: 0 | Status: SHIPPED | OUTPATIENT
Start: 2020-12-30 | End: 2021-10-18

## 2020-12-31 RX ORDER — ROSUVASTATIN CALCIUM 5 MG/1
5 TABLET, COATED ORAL DAILY
Qty: 90 TABLET | Refills: 0 | Status: SHIPPED | OUTPATIENT
Start: 2020-12-31 | End: 2021-03-29 | Stop reason: SDUPTHER

## 2020-12-31 RX ORDER — LISINOPRIL 2.5 MG/1
2.5 TABLET ORAL DAILY
Qty: 90 TABLET | Refills: 0 | Status: SHIPPED | OUTPATIENT
Start: 2020-12-31 | End: 2021-03-29 | Stop reason: SDUPTHER

## 2021-02-03 ENCOUNTER — LAB (OUTPATIENT)
Dept: LAB | Facility: HOSPITAL | Age: 71
End: 2021-02-03
Attending: UROLOGY
Payer: MEDICARE

## 2021-02-03 DIAGNOSIS — C61 PROSTATE CANCER (HCC): ICD-10-CM

## 2021-02-03 LAB — PSA SERPL-MCNC: 0.2 NG/ML (ref 0–4)

## 2021-02-03 PROCEDURE — 84153 ASSAY OF PSA TOTAL: CPT

## 2021-02-09 NOTE — PATIENT INSTRUCTIONS
Prostate Cancer   WHAT YOU NEED TO KNOW:   What do I need to know about prostate cancer? The prostate is the male sex gland that helps make semen  It is about the size of a walnut and wraps around the urethra  The urethra is the tube that carries urine from the bladder to the end of the penis  In most cases, prostate cancer is slow growing  What increases my risk for prostate cancer? · Age older than 48 years    · Father or brother with prostate cancer    · Regularly eating fried or high-fat foods    · Eating few fruits and vegetables    · Exposure to high amounts of certain chemicals, such as in cigarette smoke or alkaline batteries    · A sexually transmitted infection (STI)    What are the signs and symptoms of prostate cancer? You may have no symptoms during the early stages  In the later stages, you may have any of the following:  · Trouble starting or stopping the flow of urine    · Feeling the need to urinate often, especially at night    · Pain or a burning feeling when you urinate or ejaculate semen    · Trouble having an erection    · Blood in your urine or semen    · Not being able to urinate at all    · Pain or stiffness in your lower back, hips, or upper thighs    How is prostate cancer diagnosed? · Digital rectal examination (SHARA)  is a test to check the size and shape of your prostate  Your healthcare provider will insert a gloved finger into your rectum to feel if your prostate is large, firm, or has lumps  · Prostate-specific antigen (PSA)  is a blood test to check PSA levels  These levels may be increased if you have prostate cancer  · A biopsy  is used to take a sample of your prostate gland to be tested for cancer  The sample may also help healthcare providers determine the stage of your cancer  How is prostate cancer treated? If you have early stage cancer, your healthcare provider may recommend that you have frequent tests and regular follow-up visits to watch for changes  You may also need any of the following:  · Hormone therapy  is medicine used to decrease testosterone (male hormone) levels  · Radiation therapy  is used to kill cancer cells with high-energy x-ray beams  You may receive radiation therapy from outside your body or from small beads or rods placed inside your prostate  · Surgery  may be needed, depending on the stage of the cancer  Part or all of your prostate may be removed  You may also need to have some lymph nodes taken out  This may help keep the cancer from spreading to other parts of your body  Your healthcare provider may recommend a combination of radiation therapy and surgery  What can I do to manage my prostate cancer? · Do not smoke  Nicotine can damage blood vessels and make it more difficult to manage your prostate cancer  Smoking also increases your risk for new or returning cancer and delays healing after treatment  Do not use e-cigarettes or smokeless tobacco in place of cigarettes or to help you quit  They still contain nicotine  Ask your healthcare provider for information if you currently smoke and need help quitting  · Limit or do not drink alcohol as directed  A drink is 12 ounces of beer, 1½ ounces of liquor, or 5 ounces of wine  · Eat a variety of healthy foods  Healthy foods include fruits, vegetables, whole-grain breads, low-fat dairy products, beans, lean meats, and fish  Your healthcare provider may also recommend changes to the amounts of calcium and vitamin D you have each day  · Manage your weight  Obesity may increase your risk for problems from prostate cancer  Limit or do not have high-calorie foods or drinks  · Exercise as directed  Exercise may help you recover after treatment and may help prevent your prostate cancer from returning  Exercise can also help you manage your weight  Try to get at least 30 minutes of exercise 5 days a week, such as walking  · Ask about sexual activity    Ask your healthcare provider when it is safe for you to start having sex after your treatment  Medicines may be given if you have trouble getting or maintaining an erection  · Manage incontinence  You may have incontinence (trouble controlling when you urinate) after treatment  Ask your healthcare provider for information on managing urinary incontinence  You may be able to gain control over your urination with techniques or medicines  · Drink liquids as directed  Ask how much liquid to drink each day and which liquids are best for you  Drink extra liquids to prevent dehydration  You will also need to replace fluid if you are vomiting or have diarrhea from cancer treatments  Call your local emergency number (911 in the [address]) if:   · Your leg feels warm, tender, and painful  It may look swollen and red  · You suddenly feel lightheaded and short of breath  · You have chest pain when you take a deep breath or cough  · You cough up blood  When should I call my doctor? · You have a fever  · You feel you cannot cope with your illness  · You have blood in your urine or have trouble urinating  · You have pain that does not get better after you take your medicine  · You have questions or concerns about your condition or care  CARE AGREEMENT:   You have the right to help plan your care  Learn about your health condition and how it may be treated  Discuss treatment options with your healthcare providers to decide what care you want to receive  You always have the right to refuse treatment  The above information is an  only  It is not intended as medical advice for individual conditions or treatments  Talk to your doctor, nurse or pharmacist before following any medical regimen to see if it is safe and effective for you  © Copyright [address] Information is for End User's use only and may not be sold, redistributed or otherwise used for commercial purposes   All illustrations and images included in CareNotes® are the copyrighted property of A D A M , Inc  or [employer] In[employer]

## 2021-02-09 NOTE — PROGRESS NOTES
Problem List Items Addressed This Visit        Cardiovascular and Mediastinum    Hot flashes       Genitourinary    Prostate cancer ([facility] [address] ) - Primary    Relevant Medications    leuprolide (LUPRON DEPOT 6 MONTH KIT) IM injection kit 45 mg    Other Relevant Orders    PSA Total, Diagnostic       Other    Encounter for monitoring androgen deprivation therapy            Discontinue:    Mingo Keller Is doing very well  ECOG performance status of 0, accomplishing all activities of daily living, does have some hot flashes, has had some central obesity gains, as well as some gynecomastia, he is for his last Lupron shot today  I did tell him we can expect his PSA to go up slightly as his testosterone returns to normal   He will return in 6 months with a PSA prior  Assessment and plan:       Please see problem oriented charting for the assessment plan of today's urological complaints    Lisette Anderson MD      Chief Complaint     Chief Complaint   Patient presents with    Prostate Cancer         History of Present Illness     Nadia Foster is a 72 y o  [place] with a history of high risk prostate cancer, status post radiation therapy, he is for his last Lupron injection today  His ECOG performance status is currently 0, he is accomplishing all activities of daily living  In terms of his hot flashes he states that these have stayed the same, does not want medicine for these  The following portions of the patient's history were reviewed and updated as appropriate: allergies, current medications, past family history, past medical history, past social history, past surgical history and problem list     Detailed Urologic History     - please refer to HPI    Review of Systems     Review of Systems   Constitutional: Positive for unexpected weight change (weight gain due to lupron)  HENT: Negative  Eyes: Negative  Respiratory: Negative  Cardiovascular: Negative  Gastrointestinal: Negative  Endocrine: Negative  Genitourinary: Negative  Musculoskeletal: Negative  Skin: Negative  Allergic/Immunologic: Negative  Neurological: Negative  Hematological: Negative  Psychiatric/Behavioral: Negative  Allergies     Allergies   Allergen Reactions    Penicillin V        Physical Exam     Physical Exam  Vitals signs reviewed  Constitutional:       General: He is not in acute distress  Appearance: He is obese  He is not ill-appearing, toxic-appearing or diaphoretic  HENT:      Head: Normocephalic and atraumatic  Eyes:      General: No scleral icterus  Right eye: No discharge  Left eye: No discharge  Cardiovascular:      Pulses: Normal pulses  Pulmonary:      Effort: Pulmonary effort is normal    Abdominal:      General: There is no distension  Palpations: There is no mass  Tenderness: There is no abdominal tenderness  There is no right CVA tenderness, left CVA tenderness, guarding or rebound  Hernia: No hernia is present  Musculoskeletal:         General: No swelling or tenderness  Skin:     General: Skin is warm  Neurological:      General: No focal deficit present  Mental Status: He is alert and oriented to person, place, and time  Cranial Nerves: No cranial nerve deficit  Sensory: No sensory deficit  Motor: No weakness  Coordination: Coordination normal       Gait: Gait normal       Deep Tendon Reflexes: Reflexes normal    Psychiatric:         Mood and Affect: Mood normal          Behavior: Behavior normal          Thought Content:  Thought content normal          Judgment: Judgment normal              Vital Signs  Vitals:    02/10/21 0907   BP: 130/70   Pulse: 81   Weight: 102 kg (224 lb)   Height: 5' 10" (1 778 m)         Current Medications       Current Outpatient Medications:     Ascorbic Acid (vitamin C) 1000 MG tablet, Take 1,000 mg by mouth daily, Disp: , Rfl:     lisinopril (ZESTRIL) 2 5 mg tablet, Take 1 tablet (2 5 mg total) by mouth daily, Disp: 90 tablet, Rfl: 0    lisinopril (ZESTRIL) 2 5 mg tablet, TAKE 1 TABLET BY MOUTH DAILY, Disp: 90 tablet, Rfl: 0    metFORMIN (GLUCOPHAGE) 500 mg tablet, Take 1 tablet (500 mg total) by mouth 2 (two) times a day with meals, Disp: 180 tablet, Rfl: 0    multivitamin (THERAGRAN) TABS, Take 1 tablet by mouth daily, Disp: , Rfl:     rosuvastatin (CRESTOR) 5 mg tablet, Take 1 tablet (5 mg total) by mouth daily, Disp: 90 tablet, Rfl: 0    VITAMIN D, ERGOCALCIFEROL, PO, Take by mouth, Disp: , Rfl:     vitamin E, tocopherol, 400 units capsule, Take 400 Units by mouth daily, Disp: , Rfl:     Blood Glucose Monitoring Suppl (ONETOUCH VERIO) w/Device KIT, by Does not apply route once for 1 dose Test sugar in the morning, Disp: 1 kit, Rfl: 0    calcium carbonate-vitamin D (OSCAL-D) 500 mg-200 units per tablet, Take 1 tablet by mouth 2 (two) times a day with meals for 360 days, Disp: 180 tablet, Rfl: 3    Current Facility-Administered Medications:     leuprolide (LUPRON DEPOT 6 MONTH KIT) IM injection kit 45 mg, 45 mg, Intramuscular, Once, Lizette Rascon MD      Active Problems     Patient Active Problem List   Diagnosis    Mixed hyperlipidemia    IFG (impaired fasting glucose)    Upper respiratory tract infection    Positive colorectal cancer screening using Cologuard test    [facility]    Encounter for monitoring androgen deprivation therapy    Hot flashes         Past Medical History     Past Medical History:   Diagnosis Date    [facility] [address] [facility]     Hyperlipidemia     Prostate cancer [facility])          Surgical History     Past Surgical History:   Procedure Laterality Date    EAR SURGERY      HERNIA REPAIR      [place] COLONOSCOPY FLX DX W/COLLJ SPEC WHEN PFRMD N/A 5/6/2019    Procedure: COLONOSCOPY;  Surgeon: Yaquelin Hunt MD;  Location: [facility];   Service: Gastroenterology         Family History     Family History   Problem Relation Age of Onset    Prostate cancer Brother          Social History     Social History     Social History     Tobacco Use   Smoking Status Former Smoker    Quit date: 2/26/2005    Years since quitting: 15 9   Smokeless Tobacco Never Used         Pertinent Lab Values     Lab Results   Component Value Date    CREATININE 0 92 05/20/2020       Lab Results   Component Value Date    PSA 0 2 02/03/2021    PSA 0 2 11/10/2020    PSA 0 3 07/30/2020             Pertinent Imaging      No new imaging for my review

## 2021-02-10 ENCOUNTER — OFFICE VISIT (OUTPATIENT)
Dept: UROLOGY | Facility: CLINIC | Age: 71
End: 2021-02-10
Payer: MEDICARE

## 2021-02-10 VITALS
WEIGHT: 224 LBS | DIASTOLIC BLOOD PRESSURE: 70 MMHG | HEIGHT: 70 IN | HEART RATE: 81 BPM | BODY MASS INDEX: 32.07 KG/M2 | SYSTOLIC BLOOD PRESSURE: 130 MMHG

## 2021-02-10 DIAGNOSIS — R23.2 HOT FLASHES: ICD-10-CM

## 2021-02-10 DIAGNOSIS — C61 PROSTATE CANCER (HCC): Primary | ICD-10-CM

## 2021-02-10 DIAGNOSIS — Z79.818 ENCOUNTER FOR MONITORING ANDROGEN DEPRIVATION THERAPY: ICD-10-CM

## 2021-02-10 PROCEDURE — 99213 OFFICE O/P EST LOW 20 MIN: CPT | Performed by: UROLOGY

## 2021-02-10 PROCEDURE — 96402 CHEMO HORMON ANTINEOPL SQ/IM: CPT

## 2021-02-11 ENCOUNTER — TELEPHONE (OUTPATIENT)
Dept: UROLOGY | Facility: CLINIC | Age: 71
End: 2021-02-11

## 2021-03-29 DIAGNOSIS — E11.8 TYPE 2 DIABETES MELLITUS WITH COMPLICATION, WITHOUT LONG-TERM CURRENT USE OF INSULIN (HCC): ICD-10-CM

## 2021-03-29 DIAGNOSIS — I10 ESSENTIAL HYPERTENSION: ICD-10-CM

## 2021-03-29 DIAGNOSIS — E78.2 MIXED HYPERLIPIDEMIA: ICD-10-CM

## 2021-03-29 RX ORDER — LISINOPRIL 2.5 MG/1
2.5 TABLET ORAL DAILY
Qty: 90 TABLET | Refills: 0 | Status: SHIPPED | OUTPATIENT
Start: 2021-03-29 | End: 2021-06-29 | Stop reason: SDUPTHER

## 2021-03-29 RX ORDER — ROSUVASTATIN CALCIUM 5 MG/1
5 TABLET, COATED ORAL DAILY
Qty: 90 TABLET | Refills: 0 | Status: SHIPPED | OUTPATIENT
Start: 2021-03-29 | End: 2021-06-29 | Stop reason: SDUPTHER

## 2021-03-30 DIAGNOSIS — Z23 ENCOUNTER FOR IMMUNIZATION: ICD-10-CM

## 2021-04-15 ENCOUNTER — OFFICE VISIT (OUTPATIENT)
Dept: FAMILY MEDICINE CLINIC | Facility: CLINIC | Age: 71
End: 2021-04-15
Payer: MEDICARE

## 2021-04-15 VITALS
OXYGEN SATURATION: 97 % | WEIGHT: 234.2 LBS | HEIGHT: 70 IN | BODY MASS INDEX: 33.53 KG/M2 | HEART RATE: 95 BPM | TEMPERATURE: 98.5 F | RESPIRATION RATE: 19 BRPM | DIASTOLIC BLOOD PRESSURE: 76 MMHG | SYSTOLIC BLOOD PRESSURE: 128 MMHG

## 2021-04-15 DIAGNOSIS — I10 ESSENTIAL HYPERTENSION: ICD-10-CM

## 2021-04-15 DIAGNOSIS — E78.2 MIXED HYPERLIPIDEMIA: ICD-10-CM

## 2021-04-15 DIAGNOSIS — C61 PROSTATE CANCER (HCC): ICD-10-CM

## 2021-04-15 DIAGNOSIS — R73.01 IFG (IMPAIRED FASTING GLUCOSE): Primary | ICD-10-CM

## 2021-04-15 PROCEDURE — 99214 OFFICE O/P EST MOD 30 MIN: CPT | Performed by: FAMILY MEDICINE

## 2021-04-15 NOTE — PROGRESS NOTES
BMI Counseling: Body mass index is 33 6 kg/m²  The BMI is above normal  Nutrition recommendations include decreasing portion sizes, decreasing fast food intake, limiting drinks that contain sugar, moderation in carbohydrate intake, increasing intake of lean protein, reducing intake of saturated and trans fat and reducing intake of cholesterol  Exercise recommendations include moderate physical activity 150 minutes/week and exercising 3-5 times per week  No pharmacotherapy was ordered  Assessment/Plan:     Chronic Problems:  No problem-specific Assessment & Plan notes found for this encounter  Visit Diagnosis:  Diagnoses and all orders for this visit:    IFG (impaired fasting glucose)    Prostate cancer ([facility] [address] )    Mixed hyperlipidemia    Essential hypertension     impaired fasting glucose   stable, last hemoglobin A1c 6 0, will continue current metformin encourage dietary modifications, exercise program   hyperlipidemia   stable, tolerating statin to be continued  htn   continue current lisinopril 2 5 mg p o  q day Discussed goals of therapy main so blood pressure numbers in the 120s over 70s, reviewed medications indications and side effect profiles, discussed dosing, recommended diet modification such as salt and sodium restriction with weight loss program   Recommended regular routine exercise and stretching program   prostate cancer   stable currently under care Urology receiving treatment tolerating      Subjective:    Patient ID: Tesha Staley is a 72 y o  male      Prostate cancer initial dx Hormone shot 1 more dose tolerating , md an   ifg , metformin tolerating , diet working at it , portion control   htn  Zestril 2 5 mg negative issues taking daily  Negative chest pain palpitations shortness of breath difficulty breathing cough lesions rash  Chol  Crestor 5 mg, negative myalgias fatigue taking daily negative missed dosing working on diet at this point, increasing exercise and activities        The following portions of the patient's history were reviewed and updated as appropriate: allergies, current medications, past family history, past medical history, past social history, past surgical history and problem list     Review of Systems   Constitutional: Negative for appetite change, chills, fever and unexpected weight change  HENT: Negative for congestion, dental problem, ear pain, hearing loss, postnasal drip, rhinorrhea, sinus pressure, sinus pain, sneezing, sore throat, tinnitus and voice change  Eyes: Negative for visual disturbance  Respiratory: Negative for apnea, cough, chest tightness and shortness of breath  Cardiovascular: Negative for chest pain, palpitations and leg swelling  Gastrointestinal: Negative for abdominal pain, blood in stool, constipation, diarrhea, nausea and vomiting  Endocrine: Negative for cold intolerance, heat intolerance, polydipsia, polyphagia and polyuria  Genitourinary: Negative for decreased urine volume, difficulty urinating, dysuria, frequency and hematuria  Musculoskeletal: Negative for arthralgias, back pain, gait problem, joint swelling and myalgias  Skin: Negative for color change, rash and wound  Allergic/Immunologic: Negative for environmental allergies and food allergies  Neurological: Negative for dizziness, syncope, weakness, light-headedness, numbness and headaches  Hematological: Negative for adenopathy  Does not bruise/bleed easily  Psychiatric/Behavioral: Negative for sleep disturbance and suicidal ideas  The patient is not nervous/anxious  /77 (BP Location: Left arm, Patient Position: Sitting, Cuff Size: Adult)   Pulse 95   Temp 98 5 °F (36 9 °C)   Resp 19   Ht 5' 10" (1 778 m)   Wt 106 kg (234 lb 3 2 oz)   SpO2 97%   BMI 33 60 kg/m²   Social History     Socioeconomic History    Marital status:       Spouse name: Not on file    Number of children: Not on file    Years of education: Not on file    Highest education level: Not on file   Occupational History    Not on file   Social Needs    Financial resource strain: Not on file    Food insecurity     Worry: Not on file     Inability: Not on file    Transportation needs     Medical: Not on file     Non-medical: Not on file   Tobacco Use    Smoking status: Former Smoker     Quit date: 2005     Years since quittin 1    Smokeless tobacco: Never Used   Substance and Sexual Activity    Alcohol use: Not Currently    Drug use: Not Currently    Sexual activity: Not on file   Lifestyle    Physical activity     Days per week: Not on file     Minutes per session: Not on file    Stress: Not on file   Relationships    Social connections     Talks on phone: Not on file     Gets together: Not on file     Attends Restoration service: Not on file     Active member of club or organization: Not on file     Attends meetings of clubs or organizations: Not on file     Relationship status: Not on file    Intimate partner violence     Fear of current or ex partner: Not on file     Emotionally abused: Not on file     Physically abused: Not on file     Forced sexual activity: Not on file   Other Topics Concern    Not on file   Social History Narrative    Not on file     Past Medical History:   Diagnosis Date    Diabetes mellitus ([facility] )     Hyperlipidemia     Prostate cancer ([facility] )      Family History   Problem Relation Age of Onset    Prostate cancer Brother      Past Surgical History:   Procedure Laterality Date    EAR SURGERY      HERNIA REPAIR      [place] COLONOSCOPY FLX DX W/COLLJ SPEC WHEN PFRMD N/A 2019    Procedure: COLONOSCOPY;  Surgeon: Ham Wall MD;  Location: [facility];   Service: Gastroenterology       Current Outpatient Medications:     Ascorbic Acid (vitamin C) 1000 MG tablet, Take 1,000 mg by mouth daily, Disp: , Rfl:     calcium carbonate-vitamin D (OSCAL-D) 500 mg-200 units per tablet, Take 1 tablet by mouth 2 (two) times a day with meals for 360 days, Disp: 180 tablet, Rfl: 3    lisinopril (ZESTRIL) 2 5 mg tablet, TAKE 1 TABLET BY MOUTH DAILY, Disp: 90 tablet, Rfl: 0    metFORMIN (GLUCOPHAGE) 500 mg tablet, Take 1 tablet (500 mg total) by mouth 2 (two) times a day with meals, Disp: 180 tablet, Rfl: 0    multivitamin (THERAGRAN) TABS, Take 1 tablet by mouth daily, Disp: , Rfl:     rosuvastatin (CRESTOR) 5 mg tablet, Take 1 tablet (5 mg total) by mouth daily, Disp: 90 tablet, Rfl: 0    VITAMIN D, ERGOCALCIFEROL, PO, Take by mouth, Disp: , Rfl:     vitamin E, tocopherol, 400 units capsule, Take 400 Units by mouth daily, Disp: , Rfl:     Blood Glucose Monitoring Suppl (ONETOUCH VERIO) w/Device KIT, by Does not apply route once for 1 dose Test sugar in the morning, Disp: 1 kit, Rfl: 0    lisinopril (ZESTRIL) 2 5 mg tablet, Take 1 tablet (2 5 mg total) by mouth daily, Disp: 90 tablet, Rfl: 0    Allergies   Allergen Reactions    Penicillin V           Lab Review   Lab on 02/03/2021   Component Date Value    PSA, Diagnostic 02/03/2021 0 2    Lab on 11/10/2020   Component Date Value    PSA, Diagnostic 11/10/2020 0 2     Cholesterol 11/10/2020 221*    Triglycerides 11/10/2020 253*    HDL, Direct 11/10/2020 42     LDL Calculated 11/10/2020 128*    Non-HDL-Chol (CHOL-HDL) 11/10/2020 179     ALT 11/10/2020 40     AST 11/10/2020 25         Imaging: No results found  Objective:     Physical Exam  Constitutional:       General: He is not in acute distress  Appearance: He is well-developed  He is not ill-appearing or toxic-appearing  HENT:      Head: Normocephalic and atraumatic  Neck:      Musculoskeletal: Normal range of motion and neck supple  Cardiovascular:      Rate and Rhythm: Normal rate and regular rhythm  Heart sounds: Normal heart sounds  Pulmonary:      Effort: Pulmonary effort is normal       Breath sounds: Normal breath sounds  Musculoskeletal: Normal range of motion  Right lower leg: No edema  Left lower leg: No edema  Skin:     General: Skin is warm and dry  Neurological:      Mental Status: He is alert and oriented to person, place, and time  Deep Tendon Reflexes: Reflexes are normal and symmetric  Psychiatric:         Behavior: Behavior normal          Thought Content: Thought content normal          Judgment: Judgment normal            There are no Patient Instructions on file for this visit  DANITA Jimenez    Portions of the record may have been created with voice recognition software  Occasional wrong word or "sound a like" substitutions may have occurred due to the inherent limitations of voice recognition software  Read the chart carefully and recognize, using context, where substitutions have occurred

## 2021-04-29 ENCOUNTER — IMMUNIZATIONS (OUTPATIENT)
Dept: FAMILY MEDICINE CLINIC | Facility: HOSPITAL | Age: 71
End: 2021-04-29

## 2021-04-29 DIAGNOSIS — Z23 ENCOUNTER FOR IMMUNIZATION: Primary | ICD-10-CM

## 2021-04-29 PROCEDURE — 0001A SARS-COV-2 / COVID-19 MRNA VACCINE (PFIZER-BIONTECH) 30 MCG: CPT

## 2021-04-29 PROCEDURE — 91300 SARS-COV-2 / COVID-19 MRNA VACCINE (PFIZER-BIONTECH) 30 MCG: CPT

## 2021-05-04 ENCOUNTER — APPOINTMENT (OUTPATIENT)
Dept: LAB | Facility: HOSPITAL | Age: 71
End: 2021-05-04
Attending: RADIOLOGY
Payer: MEDICARE

## 2021-05-04 DIAGNOSIS — E78.2 MIXED HYPERLIPIDEMIA: ICD-10-CM

## 2021-05-04 DIAGNOSIS — R73.01 IFG (IMPAIRED FASTING GLUCOSE): ICD-10-CM

## 2021-05-04 DIAGNOSIS — I10 ESSENTIAL HYPERTENSION: ICD-10-CM

## 2021-05-04 DIAGNOSIS — C61 PROSTATE CANCER (HCC): ICD-10-CM

## 2021-05-04 LAB
ALBUMIN SERPL BCP-MCNC: 3.9 G/DL (ref 3.5–5)
ALP SERPL-CCNC: 96 U/L (ref 46–116)
ALT SERPL W P-5'-P-CCNC: 58 U/L (ref 12–78)
ANION GAP SERPL CALCULATED.3IONS-SCNC: 11 MMOL/L (ref 4–13)
AST SERPL W P-5'-P-CCNC: 31 U/L (ref 5–45)
BILIRUB SERPL-MCNC: 0.38 MG/DL (ref 0.2–1)
BUN SERPL-MCNC: 28 MG/DL (ref 5–25)
CALCIUM SERPL-MCNC: 9.4 MG/DL (ref 8.3–10.1)
CHLORIDE SERPL-SCNC: 105 MMOL/L (ref 100–108)
CHOLEST SERPL-MCNC: 229 MG/DL (ref 50–200)
CO2 SERPL-SCNC: 25 MMOL/L (ref 21–32)
CREAT SERPL-MCNC: 0.94 MG/DL (ref 0.6–1.3)
ERYTHROCYTE [DISTWIDTH] IN BLOOD BY AUTOMATED COUNT: 12.7 % (ref 11.6–15.1)
EST. AVERAGE GLUCOSE BLD GHB EST-MCNC: 151 MG/DL
GFR SERPL CREATININE-BSD FRML MDRD: 82 ML/MIN/1.73SQ M
GLUCOSE P FAST SERPL-MCNC: 128 MG/DL (ref 65–99)
HBA1C MFR BLD: 6.9 %
HCT VFR BLD AUTO: 36.3 % (ref 36.5–49.3)
HDLC SERPL-MCNC: 41 MG/DL
HGB BLD-MCNC: 12 G/DL (ref 12–17)
LDLC SERPL CALC-MCNC: 124 MG/DL (ref 0–100)
MCH RBC QN AUTO: 29.5 PG (ref 26.8–34.3)
MCHC RBC AUTO-ENTMCNC: 33.1 G/DL (ref 31.4–37.4)
MCV RBC AUTO: 89 FL (ref 82–98)
NONHDLC SERPL-MCNC: 188 MG/DL
PLATELET # BLD AUTO: 206 THOUSANDS/UL (ref 149–390)
PMV BLD AUTO: 9.5 FL (ref 8.9–12.7)
POTASSIUM SERPL-SCNC: 5.4 MMOL/L (ref 3.5–5.3)
PROT SERPL-MCNC: 7.7 G/DL (ref 6.4–8.2)
PSA SERPL-MCNC: 0.2 NG/ML (ref 0–4)
RBC # BLD AUTO: 4.07 MILLION/UL (ref 3.88–5.62)
SODIUM SERPL-SCNC: 141 MMOL/L (ref 136–145)
TRIGL SERPL-MCNC: 322 MG/DL
TSH SERPL DL<=0.05 MIU/L-ACNC: 1.82 UIU/ML (ref 0.36–3.74)
WBC # BLD AUTO: 5.53 THOUSAND/UL (ref 4.31–10.16)

## 2021-05-04 PROCEDURE — 84443 ASSAY THYROID STIM HORMONE: CPT

## 2021-05-04 PROCEDURE — 36415 COLL VENOUS BLD VENIPUNCTURE: CPT

## 2021-05-04 PROCEDURE — 83036 HEMOGLOBIN GLYCOSYLATED A1C: CPT

## 2021-05-04 PROCEDURE — 85027 COMPLETE CBC AUTOMATED: CPT

## 2021-05-04 PROCEDURE — 80053 COMPREHEN METABOLIC PANEL: CPT

## 2021-05-04 PROCEDURE — 80061 LIPID PANEL: CPT

## 2021-05-04 PROCEDURE — 84153 ASSAY OF PSA TOTAL: CPT

## 2021-05-10 ENCOUNTER — PROCEDURE VISIT (OUTPATIENT)
Dept: UROLOGY | Facility: CLINIC | Age: 71
End: 2021-05-10
Payer: MEDICARE

## 2021-05-10 VITALS
HEART RATE: 88 BPM | WEIGHT: 233 LBS | HEIGHT: 70 IN | SYSTOLIC BLOOD PRESSURE: 126 MMHG | BODY MASS INDEX: 33.36 KG/M2 | DIASTOLIC BLOOD PRESSURE: 82 MMHG

## 2021-05-10 DIAGNOSIS — C61 PROSTATE CANCER (HCC): Primary | ICD-10-CM

## 2021-05-10 PROCEDURE — 96402 CHEMO HORMON ANTINEOPL SQ/IM: CPT

## 2021-05-10 NOTE — PROGRESS NOTES
5/10/2021  Faviola Benjamin is a 72 y o  male  [id number]    Diagnosis:  Chief Complaint     Prostate Cancer          Patient presents for Lupron 22 5 mg injection managed by Dr Candy Litten:  Patient will follow up with Dr Dhaval Bass as scheduled in August        Medication administration:    Lupron 22 5 mg injection provided today  Patient tolerated well, no complications  Patient knows to call office with any concerns       Administrations This Visit     leuprolide (LUPRON DEPOT 3 MONTH KIT) IM injection 22 5 mg     Admin Date  05/10/2021 Action  Given Dose  22 5 mg Route  Intramuscular Administered By  Azeem Zheng RN                Vitals:    05/10/21 0920   BP: 126/82   BP Location: Left arm   Patient Position: Sitting   Cuff Size: Adult   Pulse: 88   Weight: 106 kg (233 lb)   Height: 5' 10" (1 778 m)         Samira Leblanc RN

## 2021-05-18 ENCOUNTER — RADIATION ONCOLOGY FOLLOW-UP (OUTPATIENT)
Dept: RADIATION ONCOLOGY | Facility: CLINIC | Age: 71
End: 2021-05-18
Attending: RADIOLOGY
Payer: MEDICARE

## 2021-05-18 VITALS
HEIGHT: 70 IN | WEIGHT: 231.5 LBS | DIASTOLIC BLOOD PRESSURE: 80 MMHG | SYSTOLIC BLOOD PRESSURE: 130 MMHG | HEART RATE: 83 BPM | RESPIRATION RATE: 20 BRPM | BODY MASS INDEX: 33.14 KG/M2

## 2021-05-18 DIAGNOSIS — C61 PROSTATE CANCER (HCC): Primary | ICD-10-CM

## 2021-05-18 PROCEDURE — 99211 OFF/OP EST MAY X REQ PHY/QHP: CPT | Performed by: RADIOLOGY

## 2021-05-18 NOTE — PROGRESS NOTES
Do Humphrey 1950 is a 72 y o  male     Follow up visit     Oncology History Overview Note   Mr Scarlet Garcia Is a 78-year-old male  diagnosed  with high volume [place] 9 adenocarcinoma of the prostate, stage JAIRO T2b N1 M0 PSA 22 grade group 5 with a very suspicious right pelvic sidewall lymph node on CT but no other evidence of distant metastatic disease  He  completed a course of definitive radiation therapy with neoadjuvant and concurrent androgen deprivation  Radiation completed on 10/18/19  He was last seen in follow up on 11/19/20    Lab Results  Component Value Date   PSA 0 2 05/04/2021   PSA 0 2 02/03/2021   PSA 0 2 11/10/2020     2/10/21 Mer Alexander MD  some hot flashes,  some central obesity gains,  some gynecomastia  His last Lupron shot is today  "I did tell him we can expect his PSA to go up slightly as his testosterone returns to normal   He will return in 6 months with a PSA prior   "    5/10/10 Lupron 22 5 mg injection   5/20/21 Second COVID vaccination  8/18/21 Mer Alexander MD     Prostate cancer [facility])   5/15/2019 Initial Diagnosis    Prostate cancer ([facility] [address] )     5/15/2019 Biopsy    Final Diagnosis  A  Prostate, right lateral base, core needle biopsy: Benign prostatic stroma  No malignancy is identified      B  Prostate, right medial base, core needle biopsy:             - Prostatic adenocarcinoma, [place] score 4 + 5 = 9, Prognostic Grade Group V, involving nearly 100% of one core biopsy      C  Prostate, right lateral mid, core needle biopsy:             - Prostatic adenocarcinoma, Elder score 5 + 4 = 9, Prognostic Grade Group V, involving nearly 100% one core biopsy      D  Prostate, right medial mid, core needle biopsy:             - Prostatic adenocarcinoma, [place] score 4 + 5 = 9, Prognostic Grade Group V, involving nearly 100% of one core biopsy      E  Prostate, right lateral apex, core needle biopsy:No malignancy is identified      F   Prostate, right medial apex, core needle biopsy:             - Prostatic adenocarcinoma, Elder score 5 + 4 = 9, Prognostic Grade Group V, involving nearly 100% of one core biopsy      G  Prostate, left lateral base, core needle biopsy:- Atypical prostate glands, indeterminate for prostatic adenocarcinoma      H  Prostate, left medial base, core needle biopsy:             - Prostatic adenocarcinoma, [place] score 5 + 4 =9, Prognostic Grade Group V, involving 15% of one core biopsy      I  Prostate, left lateral mid, core needle biopsy: Benign prostate glands  No malignancy is identified  J  Prostate, left medial mid, core needle biopsy: Benign prostate glands  No malignancy is identified      K  Prostate, left lateral apex, core needle biopsy:             - Prostatic adenocarcinoma, Elder score 5 + 4 = 9, Prognostic Grade Group V, involving 30% of one of 2 cores      L  Prostate, left medial apex, core needle biopsy:             - Prostatic adenocarcinoma, Elder score 5 + 4 =  9, Prognostic Grade Group V, discontinuously involving approximately 50% of 1 core biopsy  6/20/2019 -  Cancer Staged    Staging form: Prostate, AJCC 8th Edition  - Clinical: Stage JAIRO (cT2b, cN1, cM0, PSA: 22, Grade Group: 5) - Signed by Tiburcio Morris MD on 6/20/2019  Prostate specific antigen (PSA) range: 20 or greater  Histologic grading system: 5 grade system       6/28/2019 - 6/28/2019 Hormone Therapy    Firmagon     8/1/2019 - 5/10/2021 Hormone Therapy    Lupron for 2 years     8/19/2019 - 10/18/2019 Radiation    4500 centigray in 25 fractions to the prostate, seminal vesicles and regional pelvic lymphatics followed by an additional 1440 centigray in 8 fractions to the prostate, seminal vesicles, and gross lymphadenopathy with the final 11 fractions of an additional 1980 centigray to the prostate and proximal seminal vesicles for a total dose of 7920 centigray in 44 fractions            Clinical Trial: no    Health Maintenance   Topic Date Due    DTaP,Tdap,and Td Vaccines (1 - Tdap) Never done    COVID-19 Vaccine (2 - Pfizer 2-dose series) 05/20/2021    Fall Risk  10/15/2021    Medicare Annual Wellness Visit (AWV)  10/15/2021    Depression Screening PHQ  04/15/2022    BMI: Followup Plan  04/15/2022    BMI: Adult  05/10/2022    Colonoscopy Surveillance  05/06/2024    Colorectal Cancer Screening  05/06/2029    Hepatitis C Screening  Completed    Pneumococcal Vaccine: 65+ Years  Completed    Influenza Vaccine  Completed    HIB Vaccine  Aged Out    Hepatitis B Vaccine  Aged Out    IPV Vaccine  Aged Out    Hepatitis A Vaccine  Aged Out    Meningococcal ACWY Vaccine  Aged Out    HPV Vaccine  Aged Out       Patient Active Problem List   Diagnosis    Mixed hyperlipidemia    IFG (impaired fasting glucose)    Upper respiratory tract infection    Positive colorectal cancer screening using Cologuard test    Prostate cancer ([facility] [address] )    Encounter for monitoring androgen deprivation therapy    Hot flashes     Past Medical History:   Diagnosis Date    Diabetes mellitus ([facility] [address] )     Hyperlipidemia     Prostate cancer ([facility] [address] )      Past Surgical History:   Procedure Laterality Date    EAR SURGERY      HERNIA REPAIR      [place] COLONOSCOPY FLX DX W/COLLJ SPEC WHEN PFRMD N/A 5/6/2019    Procedure: COLONOSCOPY;  Surgeon: Yissel Seo MD;  Location: [facility]; Service: Gastroenterology     Family History   Problem Relation Age of Onset    Prostate cancer Brother      Social History     Socioeconomic History    Marital status:       Spouse name: Not on file    Number of children: Not on file    Years of education: Not on file    Highest education level: Not on file   Occupational History    Not on file   Social Needs    Financial resource strain: Not on file    Food insecurity     Worry: Not on file     Inability: Not on file    Transportation needs     Medical: Not on file     Non-medical: Not on file   Tobacco Use    Smoking status: Former Smoker Quit date: 2005     Years since quittin 2    Smokeless tobacco: Never Used   Substance and Sexual Activity    Alcohol use: Not Currently    Drug use: Not Currently    Sexual activity: Not on file   Lifestyle    Physical activity     Days per week: Not on file     Minutes per session: Not on file    Stress: Not on file   Relationships    Social connections     Talks on phone: Not on file     Gets together: Not on file     Attends Orthodox service: Not on file     Active member of club or organization: Not on file     Attends meetings of clubs or organizations: Not on file     Relationship status: Not on file    Intimate partner violence     Fear of current or ex partner: Not on file     Emotionally abused: Not on file     Physically abused: Not on file     Forced sexual activity: Not on file   Other Topics Concern    Not on file   Social History Narrative    Not on file       Current Outpatient Medications:     Ascorbic Acid (vitamin C) 1000 MG tablet, Take 1,000 mg by mouth daily, Disp: , Rfl:     calcium carbonate-vitamin D (OSCAL-D) 500 mg-200 units per tablet, Take 1 tablet by mouth 2 (two) times a day with meals for 360 days, Disp: 180 tablet, Rfl: 3    lisinopril (ZESTRIL) 2 5 mg tablet, Take 1 tablet (2 5 mg total) by mouth daily, Disp: 90 tablet, Rfl: 0    metFORMIN (GLUCOPHAGE) 500 mg tablet, Take 1 tablet (500 mg total) by mouth 2 (two) times a day with meals, Disp: 180 tablet, Rfl: 0    multivitamin (THERAGRAN) TABS, Take 1 tablet by mouth daily, Disp: , Rfl:     rosuvastatin (CRESTOR) 5 mg tablet, Take 1 tablet (5 mg total) by mouth daily, Disp: 90 tablet, Rfl: 0    VITAMIN D, ERGOCALCIFEROL, PO, Take by mouth, Disp: , Rfl:     vitamin E, tocopherol, 400 units capsule, Take 400 Units by mouth daily, Disp: , Rfl:     Blood Glucose Monitoring Suppl Terry Guzman) w/Device KIT, by Does not apply route once for 1 dose Test sugar in the morning, Disp: 1 kit, Rfl: 0   lisinopril (ZESTRIL) 2 5 mg tablet, TAKE 1 TABLET BY MOUTH DAILY, Disp: 90 tablet, Rfl: 0  Allergies   Allergen Reactions    Penicillin V        Review of Systems:  Review of Systems   Constitutional: Negative  HENT: Negative  Eyes: Negative  Respiratory: Negative  Cardiovascular: Negative  Gastrointestinal: Negative  Genitourinary:        Nocturia x1  Hot flashes/night sweats   Musculoskeletal: Positive for arthralgias  Skin: Negative  Allergic/Immunologic: Negative  Neurological: Negative  Hematological: Negative  Psychiatric/Behavioral: Negative  Vitals:    05/18/21 0752   BP: 130/80   Pulse: 83   Resp: 20   Weight: 105 kg (231 lb 8 oz)   Height: 5' 10" (1 778 m)    Oxygen 97%    Pain Score: 0-No pain      Imaging:No results found

## 2021-05-18 NOTE — PROGRESS NOTES
Follow-up - Radiation Oncology   Jennifer Ortiz 1950 72 y o  male [id number]      History of Present Illness   Cancer Staging  Prostate cancer [facility])  Staging form: Prostate, AJCC 8th Edition  - Clinical: Stage JAIRO (cT2b, cN1, cM0, PSA: 22, Grade Group: 5) - Signed by Munir Adair MD on 6/20/2019  Prostate specific antigen (PSA) range: 20 or greater  Histologic grading system: 5 grade system      [address] today for routine scheduled follow-up approximately 1 and years status post completion of definitive radiation for his node positive Elder 9 adenocarcinoma of the prostate  He received his last three-month Lupron injection earlier this month  He continues to struggle with hot flashes and weight gain but otherwise is without complaints  No hematuria, dysuria, diarrhea, or rectal bleeding  His PSA remains 0 2  Interval History:  Mr Arie Crenshaw Is a 27-year-old male  diagnosed  with high volume Edler 9 adenocarcinoma of the prostate, stage JAIRO T2b N1 M0 PSA 22 grade group 5 with a very suspicious right pelvic sidewall lymph node on CT but no other evidence of distant metastatic disease  He  completed a course of definitive radiation therapy with neoadjuvant and concurrent androgen deprivation  Radiation completed on 10/18/19  He was last seen in follow up on 11/19/20    Lab Results  Component Value Date   PSA 0 2 05/04/2021   PSA 0 2 02/03/2021   PSA 0 2 11/10/2020     2/10/21 Joelle Calloway MD  some hot flashes,  some central obesity gains,  some gynecomastia  His last Lupron shot is today  "I did tell him we can expect his PSA to go up slightly as his testosterone returns to normal   He will return in 6 months with a PSA prior   "    5/10/10 Lupron 22 5 mg injection   5/20/21 Second COVID vaccination  8/18/21 Joelle Calloway MD          Historical Information   Oncology History Overview Note   Mr Arie Crenshaw Is a 27-year-old male  diagnosed  with high volume Elder 9 adenocarcinoma of the prostate, stage JAIRO T2b N1 M0 PSA 22 grade group 5 with a very suspicious right pelvic sidewall lymph node on CT but no other evidence of distant metastatic disease  He  completed a course of definitive radiation therapy with neoadjuvant and concurrent androgen deprivation  Radiation completed on 10/18/19  He was last seen in follow up on 11/19/20    Lab Results  Component Value Date   PSA 0 2 05/04/2021   PSA 0 2 02/03/2021   PSA 0 2 11/10/2020     2/10/21 Jalil Willis MD  some hot flashes,  some central obesity gains,  some gynecomastia  His last Lupron shot is today  "I did tell him we can expect his PSA to go up slightly as his testosterone returns to normal   He will return in 6 months with a PSA prior   "    5/10/10 Lupron 22 5 mg injection   5/20/21 Second COVID vaccination  8/18/21 Jalil Willis MD     Prostate cancer [facility])   5/15/2019 Initial Diagnosis    Prostate cancer ([facility] [address] )     5/15/2019 Biopsy    Final Diagnosis  A  Prostate, right lateral base, core needle biopsy: Benign prostatic stroma  No malignancy is identified      B  Prostate, right medial base, core needle biopsy:             - Prostatic adenocarcinoma, [place] score 4 + 5 = 9, Prognostic Grade Group V, involving nearly 100% of one core biopsy      C  Prostate, right lateral mid, core needle biopsy:             - Prostatic adenocarcinoma, Elder score 5 + 4 = 9, Prognostic Grade Group V, involving nearly 100% one core biopsy      D  Prostate, right medial mid, core needle biopsy:             - Prostatic adenocarcinoma, [place] score 4 + 5 = 9, Prognostic Grade Group V, involving nearly 100% of one core biopsy      E  Prostate, right lateral apex, core needle biopsy:No malignancy is identified      F  Prostate, right medial apex, core needle biopsy:             - Prostatic adenocarcinoma, [place] score 5 + 4 = 9, Prognostic Grade Group V, involving nearly 100% of one core biopsy      G   Prostate, left lateral base, core needle biopsy:- Atypical prostate glands, indeterminate for prostatic adenocarcinoma      H  Prostate, left medial base, core needle biopsy:             - Prostatic adenocarcinoma, [place] score 5 + 4 =9, Prognostic Grade Group V, involving 15% of one core biopsy      I  Prostate, left lateral mid, core needle biopsy: Benign prostate glands  No malignancy is identified  J  Prostate, left medial mid, core needle biopsy: Benign prostate glands  No malignancy is identified      K  Prostate, left lateral apex, core needle biopsy:             - Prostatic adenocarcinoma, Elder score 5 + 4 = 9, Prognostic Grade Group V, involving 30% of one of 2 cores      L  Prostate, left medial apex, core needle biopsy:             - Prostatic adenocarcinoma, Elder score 5 + 4 =  9, Prognostic Grade Group V, discontinuously involving approximately 50% of 1 core biopsy  6/20/2019 -  Cancer Staged    Staging form: Prostate, AJCC 8th Edition  - Clinical: Stage JAIRO (cT2b, cN1, cM0, PSA: 22, Grade Group: 5) - Signed by Jadyn Arzate MD on 6/20/2019  Prostate specific antigen (PSA) range: 20 or greater  Histologic grading system: 5 grade system       6/28/2019 - 6/28/2019 Hormone Therapy    Firmagon     8/1/2019 - 5/10/2021 Hormone Therapy    Lupron for 2 years     8/19/2019 - 10/18/2019 Radiation    4500 centigray in 25 fractions to the prostate, seminal vesicles and regional pelvic lymphatics followed by an additional 1440 centigray in 8 fractions to the prostate, seminal vesicles, and gross lymphadenopathy with the final 11 fractions of an additional 1980 centigray to the prostate and proximal seminal vesicles for a total dose of 7920 centigray in 44 fractions            Past Medical History:   Diagnosis Date    Diabetes mellitus ([facility] [address] )     Hyperlipidemia     Prostate cancer ([facility] [address] )      Past Surgical History:   Procedure Laterality Date    EAR SURGERY      HERNIA REPAIR      HI COLONOSCOPY FLX DX W/COLLJ SPEC WHEN PFRMD N/A 2019    Procedure: COLONOSCOPY;  Surgeon: Everardo Sullivan MD;  Location: [facility]; Service: Gastroenterology       Social History   Social History     Substance and Sexual Activity   Alcohol Use Not Currently     Social History     Substance and Sexual Activity   Drug Use Not Currently     Social History     Tobacco Use   Smoking Status Former Smoker    Quit date: 2005    Years since quittin 2   Smokeless Tobacco Never Used         Meds/Allergies     Current Outpatient Medications:     Ascorbic Acid (vitamin C) 1000 MG tablet, Take 1,000 mg by mouth daily, Disp: , Rfl:     calcium carbonate-vitamin D (OSCAL-D) 500 mg-200 units per tablet, Take 1 tablet by mouth 2 (two) times a day with meals for 360 days, Disp: 180 tablet, Rfl: 3    lisinopril (ZESTRIL) 2 5 mg tablet, Take 1 tablet (2 5 mg total) by mouth daily, Disp: 90 tablet, Rfl: 0    metFORMIN (GLUCOPHAGE) 500 mg tablet, Take 1 tablet (500 mg total) by mouth 2 (two) times a day with meals, Disp: 180 tablet, Rfl: 0    multivitamin (THERAGRAN) TABS, Take 1 tablet by mouth daily, Disp: , Rfl:     rosuvastatin (CRESTOR) 5 mg tablet, Take 1 tablet (5 mg total) by mouth daily, Disp: 90 tablet, Rfl: 0    VITAMIN D, ERGOCALCIFEROL, PO, Take by mouth, Disp: , Rfl:     vitamin E, tocopherol, 400 units capsule, Take 400 Units by mouth daily, Disp: , Rfl:     Blood Glucose Monitoring Suppl (ONETOUCH VERIO) w/Device KIT, by Does not apply route once for 1 dose Test sugar in the morning, Disp: 1 kit, Rfl: 0    lisinopril (ZESTRIL) 2 5 mg tablet, TAKE 1 TABLET BY MOUTH DAILY, Disp: 90 tablet, Rfl: 0  Allergies   Allergen Reactions    Penicillin V          Review of Systems   Review of Systems   Constitutional: Negative  HENT: Negative  Eyes: Negative  Respiratory: Negative  Cardiovascular: Negative  Gastrointestinal: Negative      Genitourinary:        Nocturia x1  Hot flashes/night sweats   Musculoskeletal: Positive for arthralgias  Skin: Negative  Allergic/Immunologic: Negative  Neurological: Negative  Hematological: Negative  Psychiatric/Behavioral: Negative  OBJECTIVE:   /80   Pulse 83   Resp 20   Ht 5' 10" (1 778 m)   Wt 105 kg (231 lb 8 oz)   BMI 33 22 kg/m²   Pain Assessment:  0  Karnofsky: 90 - Able to carry on normal activity; minor signs or symptoms of disease     Physical Exam     Patient presents today no apparent distress  Sclera anicteric  Normal respiratory effort  Normal speech  Normal affect  RESULTS    Lab Results:   Recent Results (from the past 672 hour(s))   PSA Total, Diagnostic    Collection Time: 05/04/21 10:26 AM   Result Value Ref Range    PSA, Diagnostic 0 2 0 0 - 4 0 ng/mL   CBC    Collection Time: 05/04/21 10:26 AM   Result Value Ref Range    WBC 5 53 4 31 - 10 16 Thousand/uL    RBC 4 07 3 88 - 5 62 Million/uL    Hemoglobin 12 0 12 0 - 17 0 g/dL    Hematocrit 36 3 (L) 36 5 - 49 3 %    MCV 89 82 - 98 fL    MCH 29 5 26 8 - 34 3 pg    MCHC 33 1 31 4 - 37 4 g/dL    RDW 12 7 11 6 - 15 1 %    Platelets 456 682 - 165 Thousands/uL    MPV 9 5 8 9 - 12 7 fL   Comprehensive metabolic panel    Collection Time: 05/04/21 10:26 AM   Result Value Ref Range    Sodium 141 136 - 145 mmol/L    Potassium 5 4 (H) 3 5 - 5 3 mmol/L    Chloride 105 100 - 108 mmol/L    CO2 25 21 - 32 mmol/L    ANION GAP 11 4 - 13 mmol/L    BUN 28 (H) 5 - 25 mg/dL    Creatinine 0 94 0 60 - 1 30 mg/dL    Glucose, Fasting 128 (H) 65 - 99 mg/dL    Calcium 9 4 8 3 - 10 1 mg/dL    AST 31 5 - 45 U/L    ALT 58 12 - 78 U/L    Alkaline Phosphatase 96 46 - 116 U/L    Total Protein 7 7 6 4 - 8 2 g/dL    Albumin 3 9 3 5 - 5 0 g/dL    Total Bilirubin 0 38 0 20 - 1 00 mg/dL    eGFR 82 ml/min/1 73sq m   Hemoglobin A1C    Collection Time: 05/04/21 10:26 AM   Result Value Ref Range    Hemoglobin A1C 6 9 (H) Normal 3 8-5 6%; PreDiabetic 5 7-6 4%;  Diabetic >=6 5%; Glycemic control for adults with diabetes <7 0% %    EAG 151 mg/dl   Lipid panel    Collection Time: 05/04/21 10:26 AM   Result Value Ref Range    Cholesterol 229 (H) 50 - 200 mg/dL    Triglycerides 322 (H) <=150 mg/dL    HDL, Direct 41 >=40 mg/dL    LDL Calculated 124 (H) 0 - 100 mg/dL    Non-HDL-Chol (CHOL-HDL) 188 mg/dl   TSH, 3rd generation    Collection Time: 05/04/21 10:26 AM   Result Value Ref Range    TSH 3RD GENERATON 1 819 0 358 - 3 740 uIU/mL       Imaging Studies:No results found  Assessment/Plan:  No orders of the defined types were placed in this encounter  Yamilka Horner   Has done well in follow-up  His PSA remains 0 2  He has now completed androgen deprivation therapy and will move forward with surveillance  He will continue to follow closely with urology and will return to our department in 1 year with a PSA prior  Terese Pugh MD  5/18/2021,8:32 AM    Portions of the record may have been created with voice recognition software   Occasional wrong word or "sound a like" substitutions may have occurred due to the inherent limitations of voice recognition software   Read the chart carefully and recognize, using context, where substitutions have occurred

## 2021-05-20 ENCOUNTER — APPOINTMENT (OUTPATIENT)
Dept: RADIATION ONCOLOGY | Facility: CLINIC | Age: 71
End: 2021-05-20
Attending: RADIOLOGY
Payer: MEDICARE

## 2021-05-20 ENCOUNTER — IMMUNIZATIONS (OUTPATIENT)
Dept: FAMILY MEDICINE CLINIC | Facility: HOSPITAL | Age: 71
End: 2021-05-20

## 2021-05-20 DIAGNOSIS — Z23 ENCOUNTER FOR IMMUNIZATION: Primary | ICD-10-CM

## 2021-05-20 PROCEDURE — 91300 SARS-COV-2 / COVID-19 MRNA VACCINE (PFIZER-BIONTECH) 30 MCG: CPT

## 2021-05-20 PROCEDURE — 0002A SARS-COV-2 / COVID-19 MRNA VACCINE (PFIZER-BIONTECH) 30 MCG: CPT

## 2021-06-28 ENCOUNTER — TELEPHONE (OUTPATIENT)
Dept: UROLOGY | Facility: CLINIC | Age: 71
End: 2021-06-28

## 2021-06-29 DIAGNOSIS — E11.8 TYPE 2 DIABETES MELLITUS WITH COMPLICATION, WITHOUT LONG-TERM CURRENT USE OF INSULIN (HCC): ICD-10-CM

## 2021-06-29 DIAGNOSIS — E78.2 MIXED HYPERLIPIDEMIA: ICD-10-CM

## 2021-06-29 DIAGNOSIS — I10 ESSENTIAL HYPERTENSION: ICD-10-CM

## 2021-06-29 RX ORDER — LISINOPRIL 2.5 MG/1
2.5 TABLET ORAL DAILY
Qty: 90 TABLET | Refills: 0 | Status: SHIPPED | OUTPATIENT
Start: 2021-06-29 | End: 2021-09-28 | Stop reason: SDUPTHER

## 2021-06-29 RX ORDER — ROSUVASTATIN CALCIUM 5 MG/1
5 TABLET, COATED ORAL DAILY
Qty: 90 TABLET | Refills: 0 | Status: SHIPPED | OUTPATIENT
Start: 2021-06-29 | End: 2021-09-28 | Stop reason: SDUPTHER

## 2021-08-09 ENCOUNTER — APPOINTMENT (OUTPATIENT)
Dept: LAB | Facility: HOSPITAL | Age: 71
End: 2021-08-09
Attending: UROLOGY
Payer: MEDICARE

## 2021-08-09 DIAGNOSIS — C61 PROSTATE CANCER (HCC): ICD-10-CM

## 2021-08-09 LAB — PSA SERPL-MCNC: 0.2 NG/ML (ref 0–4)

## 2021-08-09 PROCEDURE — 84153 ASSAY OF PSA TOTAL: CPT

## 2021-08-18 NOTE — PROGRESS NOTES
8/19/2021      Chief Complaint   Patient presents with    Prostate Cancer         Assessment and Plan    80 y o  male -- Dr Toni Isbell    1  Prostate cancer, high risk s/p radiation therapy and ADT  - Received his final Lupron (2/10/21)  - PSA (8/9/21) 0 2, previously 0 2  - Doing well overall  - Will follow up in 6 months with PSA prior        History of Present Illness  Teofilo Mandel is a 80 y o  male patient of Dr Toni Isbell with history of high risk prostate cancer s/p radiation therapy here for follow up  Patient had his final Lupron injection at his last visit  States he does still have hot flashes, however, does not wish to have medication for this  Denies any other symptoms  Denies frequency, urgency, dysuria, gross hematuria, flank pain, suprapubic pain, fevers, chills  Most recent PSA 0 2, previously 0 2  Review of Systems   Constitutional: Negative for activity change, appetite change, chills and fever  Hot flashes   HENT: Negative for congestion and trouble swallowing  Respiratory: Negative for cough and shortness of breath  Cardiovascular: Negative for chest pain, palpitations and leg swelling  Gastrointestinal: Negative for abdominal pain, constipation, diarrhea, nausea and vomiting  Genitourinary: Negative for difficulty urinating, dysuria, flank pain, frequency, hematuria and urgency  Musculoskeletal: Negative for back pain and gait problem  Skin: Negative for wound  Allergic/Immunologic: Negative for immunocompromised state  Neurological: Negative for dizziness and syncope  Hematological: Does not bruise/bleed easily  Psychiatric/Behavioral: Negative for confusion  All other systems reviewed and are negative  Vitals  Vitals:    08/19/21 0933   BP: 144/82   Pulse: (!) 53   Weight: 104 kg (229 lb 3 2 oz)   Height: 5' 10" (1 778 m)       Physical Exam  Constitutional:       Appearance: Normal appearance  HENT:      Head: Normocephalic     Pulmonary:      Effort: Pulmonary effort is normal    Musculoskeletal:      Cervical back: Normal range of motion  Skin:     General: Skin is warm and dry  Neurological:      General: No focal deficit present  Mental Status: He is alert and oriented to person, place, and time  Psychiatric:         Mood and Affect: Mood normal          Behavior: Behavior normal          Thought Content: Thought content normal          Judgment: Judgment normal            Past History  Past Medical History:   Diagnosis Date    Diabetes mellitus ([facility] )     Hyperlipidemia     Prostate cancer ([facility] )      Social History     Socioeconomic History    Marital status:      Spouse name: None    Number of children: None    Years of education: None    Highest education level: None   Occupational History    None   Tobacco Use    Smoking status: Former Smoker     Quit date: 2005     Years since quittin 4    Smokeless tobacco: Never Used   Vaping Use    Vaping Use: Never used   Substance and Sexual Activity    Alcohol use: Not Currently    Drug use: Not Currently    Sexual activity: None   Other Topics Concern    None   Social History Narrative    None     Social Determinants of Health     Financial Resource Strain:     Difficulty of Paying Living Expenses:    Food Insecurity:     Worried About Running Out of Food in the Last Year:     Ran Out of Food in the Last Year:    Transportation Needs:     Lack of Transportation (Medical):      Lack of Transportation (Non-Medical):    Physical Activity:     Days of Exercise per Week:     Minutes of Exercise per Session:    Stress:     Feeling of Stress :    Social Connections:     Frequency of Communication with Friends and Family:     Frequency of Social Gatherings with Friends and Family:     Attends [employer] Services:     Active Member of Clubs or Organizations:     Attends Club or Organization Meetings:     Marital Status:    Intimate Partner Violence:     Fear of Current or Ex-Partner:     Emotionally Abused:     Physically Abused:     Sexually Abused:      Social History     Tobacco Use   Smoking Status Former Smoker    Quit date: 2005    Years since quittin 4   Smokeless Tobacco Never Used     Family History   Problem Relation Age of Onset    Prostate cancer Brother        The following portions of the patient's history were reviewed and updated as appropriate: allergies, current medications, past medical history, past social history, past surgical history and problem list     Results  No results found for this or any previous visit (from the past 1 hour(s)) ]  Lab Results   Component Value Date    PSA 0 2 2021    PSA 0 2 2021    PSA 0 2 2021    PSA 0 2 11/10/2020     Lab Results   Component Value Date    CALCIUM 9 4 2021    K 5 4 (H) 2021    CO2 25 2021     2021    BUN 28 (H) 2021    CREATININE 0 94 2021     Lab Results   Component Value Date    WBC 5 53 2021    HGB 12 0 2021    HCT 36 3 (L) 2021    MCV 89 2021     2021       Gretchen Zamora PA-C

## 2021-08-19 ENCOUNTER — OFFICE VISIT (OUTPATIENT)
Dept: UROLOGY | Facility: CLINIC | Age: 71
End: 2021-08-19
Payer: MEDICARE

## 2021-08-19 VITALS
BODY MASS INDEX: 32.81 KG/M2 | HEIGHT: 70 IN | DIASTOLIC BLOOD PRESSURE: 82 MMHG | SYSTOLIC BLOOD PRESSURE: 144 MMHG | WEIGHT: 229.2 LBS | HEART RATE: 53 BPM

## 2021-08-19 DIAGNOSIS — C61 PROSTATE CANCER (HCC): Primary | ICD-10-CM

## 2021-08-19 PROCEDURE — 99213 OFFICE O/P EST LOW 20 MIN: CPT | Performed by: PHYSICIAN ASSISTANT

## 2021-09-28 DIAGNOSIS — I10 ESSENTIAL HYPERTENSION: ICD-10-CM

## 2021-09-28 DIAGNOSIS — E11.8 TYPE 2 DIABETES MELLITUS WITH COMPLICATION, WITHOUT LONG-TERM CURRENT USE OF INSULIN (HCC): ICD-10-CM

## 2021-09-28 DIAGNOSIS — E78.2 MIXED HYPERLIPIDEMIA: ICD-10-CM

## 2021-09-28 RX ORDER — LISINOPRIL 2.5 MG/1
2.5 TABLET ORAL DAILY
Qty: 90 TABLET | Refills: 0 | Status: SHIPPED | OUTPATIENT
Start: 2021-09-28 | End: 2021-12-27 | Stop reason: SDUPTHER

## 2021-09-28 RX ORDER — ROSUVASTATIN CALCIUM 5 MG/1
5 TABLET, COATED ORAL DAILY
Qty: 90 TABLET | Refills: 0 | Status: SHIPPED | OUTPATIENT
Start: 2021-09-28 | End: 2021-12-27 | Stop reason: SDUPTHER

## 2021-10-18 ENCOUNTER — OFFICE VISIT (OUTPATIENT)
Dept: FAMILY MEDICINE CLINIC | Facility: CLINIC | Age: 71
End: 2021-10-18
Payer: MEDICARE

## 2021-10-18 VITALS
OXYGEN SATURATION: 97 % | BODY MASS INDEX: 32.81 KG/M2 | WEIGHT: 229.2 LBS | SYSTOLIC BLOOD PRESSURE: 132 MMHG | DIASTOLIC BLOOD PRESSURE: 78 MMHG | HEART RATE: 88 BPM | HEIGHT: 70 IN | TEMPERATURE: 97.6 F

## 2021-10-18 DIAGNOSIS — Z00.00 MEDICARE ANNUAL WELLNESS VISIT, SUBSEQUENT: ICD-10-CM

## 2021-10-18 DIAGNOSIS — E11.8 TYPE 2 DIABETES MELLITUS WITH COMPLICATION, WITHOUT LONG-TERM CURRENT USE OF INSULIN (HCC): ICD-10-CM

## 2021-10-18 DIAGNOSIS — C61 PROSTATE CANCER (HCC): Primary | ICD-10-CM

## 2021-10-18 DIAGNOSIS — R73.01 IFG (IMPAIRED FASTING GLUCOSE): ICD-10-CM

## 2021-10-18 DIAGNOSIS — J11.1 INFLUENZA: ICD-10-CM

## 2021-10-18 DIAGNOSIS — E78.2 MIXED HYPERLIPIDEMIA: ICD-10-CM

## 2021-10-18 DIAGNOSIS — Z23 NEED FOR INFLUENZA VACCINATION: ICD-10-CM

## 2021-10-18 LAB — SL AMB POCT HEMOGLOBIN AIC: 6.3 (ref ?–6.5)

## 2021-10-18 PROCEDURE — G0008 ADMIN INFLUENZA VIRUS VAC: HCPCS | Performed by: FAMILY MEDICINE

## 2021-10-18 PROCEDURE — 90662 IIV NO PRSV INCREASED AG IM: CPT | Performed by: FAMILY MEDICINE

## 2021-10-18 PROCEDURE — 99214 OFFICE O/P EST MOD 30 MIN: CPT | Performed by: FAMILY MEDICINE

## 2021-10-18 PROCEDURE — G0439 PPPS, SUBSEQ VISIT: HCPCS | Performed by: FAMILY MEDICINE

## 2021-10-18 PROCEDURE — 83036 HEMOGLOBIN GLYCOSYLATED A1C: CPT | Performed by: FAMILY MEDICINE

## 2021-10-18 PROCEDURE — 1123F ACP DISCUSS/DSCN MKR DOCD: CPT | Performed by: FAMILY MEDICINE

## 2021-10-28 PROCEDURE — 93000 ELECTROCARDIOGRAM COMPLETE: CPT | Performed by: FAMILY MEDICINE

## 2021-12-21 ENCOUNTER — APPOINTMENT (OUTPATIENT)
Dept: LAB | Facility: HOSPITAL | Age: 71
End: 2021-12-21
Payer: MEDICARE

## 2021-12-21 DIAGNOSIS — E78.2 MIXED HYPERLIPIDEMIA: ICD-10-CM

## 2021-12-21 DIAGNOSIS — R73.01 IFG (IMPAIRED FASTING GLUCOSE): ICD-10-CM

## 2021-12-21 DIAGNOSIS — E11.8 TYPE 2 DIABETES MELLITUS WITH COMPLICATION, WITHOUT LONG-TERM CURRENT USE OF INSULIN (HCC): ICD-10-CM

## 2021-12-21 DIAGNOSIS — C61 PROSTATE CANCER (HCC): ICD-10-CM

## 2021-12-21 LAB
ALBUMIN SERPL BCP-MCNC: 4 G/DL (ref 3.5–5)
ALP SERPL-CCNC: 91 U/L (ref 46–116)
ALT SERPL W P-5'-P-CCNC: 46 U/L (ref 12–78)
ANION GAP SERPL CALCULATED.3IONS-SCNC: 13 MMOL/L (ref 4–13)
AST SERPL W P-5'-P-CCNC: 24 U/L (ref 5–45)
BASOPHILS # BLD AUTO: 0.04 THOUSANDS/ΜL (ref 0–0.1)
BASOPHILS NFR BLD AUTO: 1 % (ref 0–1)
BILIRUB SERPL-MCNC: 0.34 MG/DL (ref 0.2–1)
BILIRUB UR QL STRIP: NEGATIVE
BUN SERPL-MCNC: 36 MG/DL (ref 5–25)
CALCIUM SERPL-MCNC: 9.7 MG/DL (ref 8.3–10.1)
CHLORIDE SERPL-SCNC: 105 MMOL/L (ref 100–108)
CHOLEST SERPL-MCNC: 237 MG/DL
CLARITY UR: CLEAR
CO2 SERPL-SCNC: 26 MMOL/L (ref 21–32)
COLOR UR: YELLOW
CREAT SERPL-MCNC: 1.05 MG/DL (ref 0.6–1.3)
EOSINOPHIL # BLD AUTO: 0.12 THOUSAND/ΜL (ref 0–0.61)
EOSINOPHIL NFR BLD AUTO: 2 % (ref 0–6)
ERYTHROCYTE [DISTWIDTH] IN BLOOD BY AUTOMATED COUNT: 12.3 % (ref 11.6–15.1)
GFR SERPL CREATININE-BSD FRML MDRD: 71 ML/MIN/1.73SQ M
GLUCOSE P FAST SERPL-MCNC: 125 MG/DL (ref 65–99)
GLUCOSE UR STRIP-MCNC: NEGATIVE MG/DL
HCT VFR BLD AUTO: 36.9 % (ref 36.5–49.3)
HDLC SERPL-MCNC: 44 MG/DL
HGB BLD-MCNC: 12.1 G/DL (ref 12–17)
HGB UR QL STRIP.AUTO: NEGATIVE
IMM GRANULOCYTES # BLD AUTO: 0.12 THOUSAND/UL (ref 0–0.2)
IMM GRANULOCYTES NFR BLD AUTO: 2 % (ref 0–2)
KETONES UR STRIP-MCNC: NEGATIVE MG/DL
LDLC SERPL CALC-MCNC: 123 MG/DL (ref 0–100)
LEUKOCYTE ESTERASE UR QL STRIP: NEGATIVE
LYMPHOCYTES # BLD AUTO: 1.31 THOUSANDS/ΜL (ref 0.6–4.47)
LYMPHOCYTES NFR BLD AUTO: 24 % (ref 14–44)
MCH RBC QN AUTO: 29.4 PG (ref 26.8–34.3)
MCHC RBC AUTO-ENTMCNC: 32.8 G/DL (ref 31.4–37.4)
MCV RBC AUTO: 90 FL (ref 82–98)
MONOCYTES # BLD AUTO: 0.58 THOUSAND/ΜL (ref 0.17–1.22)
MONOCYTES NFR BLD AUTO: 11 % (ref 4–12)
NEUTROPHILS # BLD AUTO: 3.38 THOUSANDS/ΜL (ref 1.85–7.62)
NEUTS SEG NFR BLD AUTO: 60 % (ref 43–75)
NITRITE UR QL STRIP: NEGATIVE
NONHDLC SERPL-MCNC: 193 MG/DL
NRBC BLD AUTO-RTO: 0 /100 WBCS
PH UR STRIP.AUTO: 5.5 [PH]
PLATELET # BLD AUTO: 202 THOUSANDS/UL (ref 149–390)
PMV BLD AUTO: 9.4 FL (ref 8.9–12.7)
POTASSIUM SERPL-SCNC: 5.7 MMOL/L (ref 3.5–5.3)
PROT SERPL-MCNC: 7.8 G/DL (ref 6.4–8.2)
PROT UR STRIP-MCNC: NEGATIVE MG/DL
RBC # BLD AUTO: 4.12 MILLION/UL (ref 3.88–5.62)
SODIUM SERPL-SCNC: 144 MMOL/L (ref 136–145)
SP GR UR STRIP.AUTO: 1.02 (ref 1–1.03)
TRIGL SERPL-MCNC: 349 MG/DL
TSH SERPL DL<=0.05 MIU/L-ACNC: 2.32 UIU/ML (ref 0.36–3.74)
UROBILINOGEN UR QL STRIP.AUTO: 0.2 E.U./DL
WBC # BLD AUTO: 5.55 THOUSAND/UL (ref 4.31–10.16)

## 2021-12-21 PROCEDURE — 81003 URINALYSIS AUTO W/O SCOPE: CPT

## 2021-12-21 PROCEDURE — 80053 COMPREHEN METABOLIC PANEL: CPT

## 2021-12-21 PROCEDURE — 36415 COLL VENOUS BLD VENIPUNCTURE: CPT

## 2021-12-21 PROCEDURE — 83036 HEMOGLOBIN GLYCOSYLATED A1C: CPT

## 2021-12-21 PROCEDURE — 80061 LIPID PANEL: CPT

## 2021-12-21 PROCEDURE — 84443 ASSAY THYROID STIM HORMONE: CPT

## 2021-12-21 PROCEDURE — 85025 COMPLETE CBC W/AUTO DIFF WBC: CPT

## 2021-12-22 LAB
EST. AVERAGE GLUCOSE BLD GHB EST-MCNC: 151 MG/DL
HBA1C MFR BLD: 6.9 %

## 2021-12-27 DIAGNOSIS — E11.8 TYPE 2 DIABETES MELLITUS WITH COMPLICATION, WITHOUT LONG-TERM CURRENT USE OF INSULIN (HCC): ICD-10-CM

## 2021-12-27 DIAGNOSIS — E78.2 MIXED HYPERLIPIDEMIA: ICD-10-CM

## 2021-12-27 DIAGNOSIS — I10 ESSENTIAL HYPERTENSION: ICD-10-CM

## 2021-12-28 RX ORDER — LISINOPRIL 2.5 MG/1
2.5 TABLET ORAL DAILY
Qty: 90 TABLET | Refills: 0 | Status: SHIPPED | OUTPATIENT
Start: 2021-12-28 | End: 2022-03-29 | Stop reason: SDUPTHER

## 2021-12-28 RX ORDER — ROSUVASTATIN CALCIUM 5 MG/1
5 TABLET, COATED ORAL DAILY
Qty: 90 TABLET | Refills: 0 | Status: SHIPPED | OUTPATIENT
Start: 2021-12-28 | End: 2022-02-18 | Stop reason: SDUPTHER

## 2022-02-18 DIAGNOSIS — E78.2 MIXED HYPERLIPIDEMIA: ICD-10-CM

## 2022-02-18 RX ORDER — ROSUVASTATIN CALCIUM 5 MG/1
5 TABLET, COATED ORAL DAILY
Qty: 90 TABLET | Refills: 0 | Status: SHIPPED | OUTPATIENT
Start: 2022-02-18 | End: 2022-03-29 | Stop reason: SDUPTHER

## 2022-02-22 ENCOUNTER — APPOINTMENT (OUTPATIENT)
Dept: LAB | Facility: HOSPITAL | Age: 72
End: 2022-02-22
Payer: MEDICARE

## 2022-02-22 DIAGNOSIS — C61 PROSTATE CANCER (HCC): ICD-10-CM

## 2022-02-22 LAB — PSA SERPL-MCNC: 0.4 NG/ML (ref 0–4)

## 2022-02-22 PROCEDURE — 84153 ASSAY OF PSA TOTAL: CPT

## 2022-02-23 ENCOUNTER — TELEPHONE (OUTPATIENT)
Dept: UROLOGY | Facility: CLINIC | Age: 72
End: 2022-02-23

## 2022-02-28 ENCOUNTER — OFFICE VISIT (OUTPATIENT)
Dept: UROLOGY | Facility: CLINIC | Age: 72
End: 2022-02-28
Payer: MEDICARE

## 2022-02-28 VITALS
BODY MASS INDEX: 32.93 KG/M2 | OXYGEN SATURATION: 94 % | HEIGHT: 70 IN | DIASTOLIC BLOOD PRESSURE: 78 MMHG | SYSTOLIC BLOOD PRESSURE: 122 MMHG | WEIGHT: 230 LBS | HEART RATE: 91 BPM

## 2022-02-28 DIAGNOSIS — C61 PROSTATE CANCER (HCC): Primary | ICD-10-CM

## 2022-02-28 DIAGNOSIS — E11.8 TYPE 2 DIABETES MELLITUS WITH COMPLICATION, WITHOUT LONG-TERM CURRENT USE OF INSULIN (HCC): ICD-10-CM

## 2022-02-28 PROCEDURE — 99213 OFFICE O/P EST LOW 20 MIN: CPT | Performed by: PHYSICIAN ASSISTANT

## 2022-02-28 NOTE — PROGRESS NOTES
2/28/2022      Chief Complaint   Patient presents with    Prostate Cancer         Assessment and Plan    80 y o  male -- Dr Jalaine Hodgkins    1  Prostate cancer, high risk status post radiation therapy and ADT  - final Lupron was given to 1021  - most recent PSA 0 4, previously 0 2  - we did discuss the increase of PSA is expected after discontinuation of ADT  - patient is doing well overall, without any complaints today  - will follow-up in 6 months with PSA prior to visit  - call with any questions or concerns in the meantime  - All questions answered; patient understands and agrees with plan        History of Present Illness  Tesha Staley is a 80 y o  male patient of Dr Jalaine Hodgkins with history of high risk prostate cancer status post radiation therapy and ADT here for follow up  Last Lupron injection was given to/10/21  Patient states he does continue to have mild hot flashes, however does not wish to have any medication for this  Patient denies any new or worsening symptoms today  Denies frequency, urgency, dysuria, gross hematuria, flank pain, suprapubic pressure, fever, chills, nausea, vomiting, weight loss, bone pain  Most recent PSA 0 4, previously 0 2  Hot flashes    Review of Systems   Constitutional: Negative for activity change, appetite change, chills and fever  HENT: Negative for congestion and trouble swallowing  Respiratory: Negative for cough and shortness of breath  Cardiovascular: Negative for chest pain, palpitations and leg swelling  Gastrointestinal: Negative for abdominal pain, constipation, diarrhea, nausea and vomiting  Genitourinary: Negative for difficulty urinating, dysuria, flank pain, frequency, hematuria and urgency  Musculoskeletal: Negative for back pain and gait problem  Skin: Negative for wound  Allergic/Immunologic: Negative for immunocompromised state  Neurological: Negative for dizziness and syncope  Hematological: Does not bruise/bleed easily  Psychiatric/Behavioral: Negative for confusion  All other systems reviewed and are negative  AUA SYMPTOM SCORE      Most Recent Value   AUA SYMPTOM SCORE    How often have you had a sensation of not emptying your bladder completely after you finished urinating? 0 (P)     How often have you had to urinate again less than two hours after you finished urinating? 0 (P)     How often have you found you stopped and started again several times when you urinate? 0 (P)     How often have you found it difficult to postpone urination? 0 (P)     How often have you had a weak urinary stream? 0 (P)     How often have you had to push or strain to begin urination? 0 (P)     How many times did you most typically get up to urinate from the time you went to bed at night until the time you got up in the morning? 3 (P)     Quality of Life: If you were to spend the rest of your life with your urinary condition just the way it is now, how would you feel about that? 1 (P)     AUA SYMPTOM SCORE 3 (P)            Vitals  Vitals:    02/28/22 1042   BP: 122/78   Pulse: 91   SpO2: 94%   Weight: 104 kg (230 lb)   Height: 5' 10" (1 778 m)       Physical Exam  Constitutional:       General: He is not in acute distress  Appearance: Normal appearance  He is not ill-appearing, toxic-appearing or diaphoretic  HENT:      Head: Normocephalic  Nose: No congestion  Eyes:      General: No scleral icterus  Right eye: No discharge  Left eye: No discharge  Conjunctiva/sclera: Conjunctivae normal       Pupils: Pupils are equal, round, and reactive to light  Pulmonary:      Effort: Pulmonary effort is normal    Musculoskeletal:      Cervical back: Normal range of motion  Skin:     General: Skin is warm and dry  Coloration: Skin is not jaundiced or pale  Findings: No bruising, erythema, lesion or rash  Neurological:      General: No focal deficit present        Mental Status: He is alert and oriented to person, place, and time  Mental status is at baseline  Gait: Gait normal    Psychiatric:         Mood and Affect: Mood normal          Behavior: Behavior normal          Thought Content: Thought content normal          Judgment: Judgment normal            Past History  Past Medical History:   Diagnosis Date    Diabetes mellitus ([facility] )     Hyperlipidemia     Prostate cancer ([facility] )      Social History     Socioeconomic History    Marital status:       Spouse name: None    Number of children: None    Years of education: None    Highest education level: None   Occupational History    None   Tobacco Use    Smoking status: Former Smoker     Quit date: 2005     Years since quittin 0    Smokeless tobacco: Never Used   Vaping Use    Vaping Use: Never used   Substance and Sexual Activity    Alcohol use: Not Currently    Drug use: Not Currently    Sexual activity: None   Other Topics Concern    None   Social History Narrative    None     Social Determinants of Health     Financial Resource Strain: Not on file   Food Insecurity: Not on file   Transportation Needs: Not on file   Physical Activity: Not on file   Stress: Not on file   Social Connections: Not on file   Intimate Partner Violence: Not on file   Housing Stability: Not on file     Social History     Tobacco Use   Smoking Status Former Smoker    Quit date: 2005    Years since quittin 0   Smokeless Tobacco Never Used     Family History   Problem Relation Age of Onset    Prostate cancer Brother        The following portions of the patient's history were reviewed and updated as appropriate: allergies, current medications, past medical history, past social history, past surgical history and problem list     Results  No results found for this or any previous visit (from the past 1 hour(s)) ]  Lab Results   Component Value Date    PSA 0 4 2022    PSA 0 2 2021    PSA 0 2 2021    PSA 0 2 2021     Lab Results   Component Value Date    CALCIUM 9 7 12/21/2021    K 5 7 (H) 12/21/2021    CO2 26 12/21/2021     12/21/2021    BUN 36 (H) 12/21/2021    CREATININE 1 05 12/21/2021     Lab Results   Component Value Date    WBC 5 55 12/21/2021    HGB 12 1 12/21/2021    HCT 36 9 12/21/2021    MCV 90 12/21/2021     12/21/2021       Bj Oconnor PA-C

## 2022-03-17 ENCOUNTER — HOSPITAL ENCOUNTER (OUTPATIENT)
Dept: VASCULAR ULTRASOUND | Facility: HOSPITAL | Age: 72
Discharge: HOME | End: 2022-03-17
Payer: MEDICARE

## 2022-03-17 DIAGNOSIS — E87.5 HYPERKALEMIA: ICD-10-CM

## 2022-03-17 PROCEDURE — 93975 VASCULAR STUDY: CPT | Performed by: SURGERY

## 2022-03-17 PROCEDURE — 93975 VASCULAR STUDY: CPT

## 2022-03-29 DIAGNOSIS — I10 ESSENTIAL HYPERTENSION: ICD-10-CM

## 2022-03-29 DIAGNOSIS — E11.8 TYPE 2 DIABETES MELLITUS WITH COMPLICATION, WITHOUT LONG-TERM CURRENT USE OF INSULIN (HCC): ICD-10-CM

## 2022-03-29 DIAGNOSIS — E78.2 MIXED HYPERLIPIDEMIA: ICD-10-CM

## 2022-03-29 RX ORDER — ROSUVASTATIN CALCIUM 5 MG/1
5 TABLET, COATED ORAL DAILY
Qty: 90 TABLET | Refills: 0 | Status: SHIPPED | OUTPATIENT
Start: 2022-03-29 | End: 2022-04-20

## 2022-03-29 RX ORDER — LISINOPRIL 2.5 MG/1
2.5 TABLET ORAL DAILY
Qty: 90 TABLET | Refills: 0 | Status: SHIPPED | OUTPATIENT
Start: 2022-03-29 | End: 2022-06-22 | Stop reason: SDUPTHER

## 2022-04-03 ENCOUNTER — RA CDI HCC (OUTPATIENT)
Dept: OTHER | Facility: HOSPITAL | Age: 72
End: 2022-04-03

## 2022-04-03 NOTE — PROGRESS NOTES
[facility] [address]  coding opportunities       Chart reviewed, no opportunity found: CHART REVIEWED, NO OPPORTUNITY FOUND        Patients Insurance     Medicare Insurance: Medicare

## 2022-04-20 ENCOUNTER — OFFICE VISIT (OUTPATIENT)
Dept: FAMILY MEDICINE CLINIC | Facility: CLINIC | Age: 72
End: 2022-04-20
Payer: MEDICARE

## 2022-04-20 VITALS
WEIGHT: 236 LBS | HEART RATE: 79 BPM | SYSTOLIC BLOOD PRESSURE: 128 MMHG | DIASTOLIC BLOOD PRESSURE: 74 MMHG | HEIGHT: 70 IN | BODY MASS INDEX: 33.79 KG/M2 | OXYGEN SATURATION: 94 % | TEMPERATURE: 97.3 F

## 2022-04-20 DIAGNOSIS — E78.2 MIXED HYPERLIPIDEMIA: ICD-10-CM

## 2022-04-20 DIAGNOSIS — C61 PROSTATE CANCER (HCC): ICD-10-CM

## 2022-04-20 DIAGNOSIS — I10 ESSENTIAL HYPERTENSION: ICD-10-CM

## 2022-04-20 DIAGNOSIS — E11.8 TYPE 2 DIABETES MELLITUS WITH COMPLICATION, WITHOUT LONG-TERM CURRENT USE OF INSULIN (HCC): Primary | ICD-10-CM

## 2022-04-20 PROCEDURE — 99214 OFFICE O/P EST MOD 30 MIN: CPT | Performed by: FAMILY MEDICINE

## 2022-04-20 RX ORDER — ROSUVASTATIN CALCIUM 10 MG/1
10 TABLET, COATED ORAL DAILY
Qty: 90 TABLET | Refills: 1 | Status: SHIPPED | OUTPATIENT
Start: 2022-04-20

## 2022-04-20 NOTE — PROGRESS NOTES
BMI Counseling: Body mass index is 33 86 kg/m²  The BMI is above normal  Nutrition recommendations include decreasing portion sizes, encouraging healthy choices of fruits and vegetables, decreasing fast food intake, consuming healthier snacks, limiting drinks that contain sugar, moderation in carbohydrate intake, increasing intake of lean protein, reducing intake of saturated and trans fat and reducing intake of cholesterol  Exercise recommendations include moderate physical activity 150 minutes/week and exercising 3-5 times per week  No pharmacotherapy was ordered  Rationale for BMI follow-up plan is due to patient being overweight or obese  Depression Screening and Follow-up Plan: Patient was screened for depression during today's encounter  They screened negative with a PHQ-2 score of 0  Assessment/Plan:     Chronic Problems:  No problem-specific Assessment & Plan notes found for this encounter  Visit Diagnosis:  Diagnoses and all orders for this visit:    Type 2 diabetes mellitus with complication, without long-term current use of insulin ([facility] )  Comments:  Stable, no reported hypoglycemic episodes, continue metformin obtain updated A1c/chemistries  Orders:  -     Comprehensive metabolic panel; Future  -     CBC and differential; Future  -     TSH, 3rd generation; Future  -     Lipid panel; Future  -     Hemoglobin A1C; Future    Mixed hyperlipidemia  Comments:  Stable, tolerating statin to be continued obtain updated lipid  Orders:  -     rosuvastatin (CRESTOR) 10 MG tablet; Take 1 tablet (10 mg total) by mouth daily  -     Comprehensive metabolic panel; Future  -     CBC and differential; Future  -     TSH, 3rd generation; Future  -     Lipid panel; Future  -     Hemoglobin A1C; Future    Essential hypertension  -     Comprehensive metabolic panel; Future  -     CBC and differential; Future  -     TSH, 3rd generation; Future  -     Lipid panel;  Future  -     Hemoglobin A1C; Future    Prostate cancer [facility])  Comments:  Continue follow-up with Urology        Patient's shoes and socks removed  Right Foot/Ankle   Right Foot Inspection  Skin Exam: skin normal  Skin not intact, no dry skin, no warmth, no callus, no erythema, no maceration, no abnormal color, no pre-ulcer, no ulcer and no callus  Toe Exam: ROM and strength within normal limits  Vascular  The right DP pulse is 2+  The right PT pulse is 2+  Left Foot/Ankle  Left Foot Inspection  Skin Exam: skin normal  Skin not intact, no dry skin, no warmth, no erythema, no maceration, normal color, no pre-ulcer, no ulcer and no callus  Toe Exam: ROM and strength within normal limits  Vascular  The left DP pulse is 2+  The left PT pulse is 2+  Assign Risk Category  No deformity present  No loss of protective sensation  No weak pulses  Risk: 0      Subjective:    Patient ID: Umberto Cole is a 80 y o  male  F/u   Has not gone for updated chemistries lab work will need updated prescription  Generally doing well staying active, no specific issues or complaints  diab , no issue or concerns ,   continues with the metformin   Eye exam within the past yr , will have report sent   Diet has made some changes , more toward the healthy range   htn   Negative chest pain palpitations shortness of breath difficulty breathing cough lesions rash    Chol = crestor 10m g  Prostate ca doing well , has f/u with urology along with labs in the next month       Son lives in [facility], will be relocating to [facility]       The following portions of the patient's history were reviewed and updated as appropriate: allergies, current medications, past family history, past medical history, past social history, past surgical history and problem list     Review of Systems   Constitutional: Negative for appetite change, chills, fever and unexpected weight change     HENT: Negative for congestion, dental problem, ear pain, hearing loss, postnasal drip, rhinorrhea, sinus pressure, sinus pain, sneezing, sore throat, tinnitus and voice change  Eyes: Negative for visual disturbance  Respiratory: Negative for apnea, cough, chest tightness and shortness of breath  Cardiovascular: Negative for chest pain, palpitations and leg swelling  Gastrointestinal: Negative for abdominal pain, blood in stool, constipation, diarrhea, nausea and vomiting  Endocrine: Negative for cold intolerance, heat intolerance, polydipsia, polyphagia and polyuria  Genitourinary: Negative for decreased urine volume, difficulty urinating, dysuria, frequency and hematuria  Musculoskeletal: Negative for arthralgias, back pain, gait problem, joint swelling and myalgias  Skin: Negative for color change, rash and wound  Allergic/Immunologic: Negative for environmental allergies and food allergies  Neurological: Negative for dizziness, syncope, weakness, light-headedness, numbness and headaches  Hematological: Negative for adenopathy  Does not bruise/bleed easily  Psychiatric/Behavioral: Negative for sleep disturbance and suicidal ideas  The patient is not nervous/anxious  /74   Pulse 79   Temp (!) 97 3 °F (36 3 °C)   Ht 5' 10" (1 778 m)   Wt 107 kg (236 lb)   SpO2 94%   BMI 33 86 kg/m²   Social History     Socioeconomic History    Marital status:       Spouse name: Not on file    Number of children: Not on file    Years of education: Not on file    Highest education level: Not on file   Occupational History    Not on file   Tobacco Use    Smoking status: Former Smoker     Quit date: 2005     Years since quittin 1    Smokeless tobacco: Never Used   Vaping Use    Vaping Use: Never used   Substance and Sexual Activity    Alcohol use: Not Currently    Drug use: Not Currently    Sexual activity: Not on file   Other Topics Concern    Not on file   Social History Narrative    Not on file     Social Determinants of Health     Financial Resource Strain: Not on file   Food Insecurity: Not on file   Transportation Needs: Not on file   Physical Activity: Not on file   Stress: Not on file   Social Connections: Not on file   Intimate Partner Violence: Not on file   Housing Stability: Not on file     Past Medical History:   Diagnosis Date    [facility] mellitus [facility] )     Hyperlipidemia     Prostate cancer ([facility] )      Family History   Problem Relation Age of Onset    Prostate cancer Brother      Past Surgical History:   Procedure Laterality Date    EAR SURGERY      HERNIA REPAIR      RI COLONOSCOPY FLX DX W/COLLJ SPEC WHEN PFRMD N/A 5/6/2019    Procedure: COLONOSCOPY;  Surgeon: Gay Ramos MD;  Location: [facility];   Service: Gastroenterology       Current Outpatient Medications:     Ascorbic Acid (vitamin C) 1000 MG tablet, Take 1,000 mg by mouth daily, Disp: , Rfl:     Calcium Carbonate (CALCIUM 600 PO), Take by mouth daily, Disp: , Rfl:     lisinopril (ZESTRIL) 2 5 mg tablet, Take 1 tablet (2 5 mg total) by mouth daily, Disp: 90 tablet, Rfl: 0    metFORMIN (GLUCOPHAGE) 500 mg tablet, Take 1 tablet (500 mg total) by mouth 2 (two) times a day with meals, Disp: 180 tablet, Rfl: 0    multivitamin (THERAGRAN) TABS, Take 1 tablet by mouth daily, Disp: , Rfl:     rosuvastatin (CRESTOR) 10 MG tablet, Take 1 tablet (10 mg total) by mouth daily, Disp: 90 tablet, Rfl: 1    VITAMIN D, ERGOCALCIFEROL, PO, Take by mouth, Disp: , Rfl:     vitamin E, tocopherol, 400 units capsule, Take 400 Units by mouth daily, Disp: , Rfl:     Blood Glucose Monitoring Suppl (Laquita Waterford) w/Device KIT, by Does not apply route once for 1 dose Test sugar in the morning, Disp: 1 kit, Rfl: 0    Allergies   Allergen Reactions    Penicillin V           Lab Review   Appointment on 02/22/2022   Component Date Value    PSA, Diagnostic 02/22/2022 0 4    Appointment on 12/21/2021   Component Date Value    WBC 12/21/2021 5 55     RBC 12/21/2021 4 12     Hemoglobin 12/21/2021 12 1     Hematocrit 12/21/2021 36 9     MCV 12/21/2021 90     MCH 12/21/2021 29 4     MCHC 12/21/2021 32 8     RDW 12/21/2021 12 3     MPV 12/21/2021 9 4     Platelets 12/45/2319 202     nRBC 12/21/2021 0     Neutrophils Relative 12/21/2021 60     Immat GRANS % 12/21/2021 2     Lymphocytes Relative 12/21/2021 24     Monocytes Relative 12/21/2021 11     Eosinophils Relative 12/21/2021 2     Basophils Relative 12/21/2021 1     Neutrophils Absolute 12/21/2021 3 38     Immature Grans Absolute 12/21/2021 0 12     Lymphocytes Absolute 12/21/2021 1 31     Monocytes Absolute 12/21/2021 0 58     Eosinophils Absolute 12/21/2021 0 12     Basophils Absolute 12/21/2021 0 04     Sodium 12/21/2021 144     Potassium 12/21/2021 5 7*    Chloride 12/21/2021 105     CO2 12/21/2021 26     ANION GAP 12/21/2021 13     BUN 12/21/2021 36*    Creatinine 12/21/2021 1 05     Glucose, Fasting 12/21/2021 125*    Calcium 12/21/2021 9 7     AST 12/21/2021 24     ALT 12/21/2021 46     Alkaline Phosphatase 12/21/2021 91     Total Protein 12/21/2021 7 8     Albumin 12/21/2021 4 0     Total Bilirubin 12/21/2021 0 34     eGFR 12/21/2021 71     TSH 3RD GENERATON 12/21/2021 2 316     Color, UA 12/21/2021 Yellow     Clarity, UA 12/21/2021 Clear     Specific Gravity, UA 12/21/2021 1 020     pH, UA 12/21/2021 5 5     Leukocytes, UA 12/21/2021 Negative     Nitrite, UA 12/21/2021 Negative     Protein, UA 12/21/2021 Negative     Glucose, UA 12/21/2021 Negative     Ketones, UA 12/21/2021 Negative     Urobilinogen, UA 12/21/2021 0 2     Bilirubin, UA 12/21/2021 Negative     Blood, UA 12/21/2021 Negative     Cholesterol 12/21/2021 237*    Triglycerides 12/21/2021 349*    HDL, Direct 12/21/2021 44     LDL Calculated 12/21/2021 123*    Non-HDL-Chol (CHOL-HDL) 12/21/2021 193     Hemoglobin A1C 12/21/2021 6 9*    EAG 12/21/2021 151         Imaging: No results found      Objective:     Physical Exam  Constitutional:       General: He is not in acute distress  Appearance: He is well-developed  He is not ill-appearing or toxic-appearing  HENT:      Head: Normocephalic and atraumatic  Neck:      Vascular: No carotid bruit  Cardiovascular:      Rate and Rhythm: Normal rate and regular rhythm  Pulses: no weak pulses          Dorsalis pedis pulses are 2+ on the right side and 2+ on the left side  Posterior tibial pulses are 2+ on the right side and 2+ on the left side  Heart sounds: Normal heart sounds  Pulmonary:      Effort: Pulmonary effort is normal       Breath sounds: Normal breath sounds  Musculoskeletal:         General: Normal range of motion  Cervical back: Normal range of motion and neck supple  Right lower leg: No edema  Left lower leg: No edema  Feet:      Right foot:      Skin integrity: No ulcer, skin breakdown, erythema, warmth, callus or dry skin  Left foot:      Skin integrity: No ulcer, skin breakdown, erythema, warmth, callus or dry skin  Lymphadenopathy:      Cervical: No cervical adenopathy  Skin:     General: Skin is warm and dry  Neurological:      Mental Status: He is alert and oriented to person, place, and time  Deep Tendon Reflexes: Reflexes are normal and symmetric  Psychiatric:         Behavior: Behavior normal          Thought Content: Thought content normal          Judgment: Judgment normal            There are no Patient Instructions on file for this visit  DANITA Rivero    Portions of the record may have been created with voice recognition software  Occasional wrong word or "sound a like" substitutions may have occurred due to the inherent limitations of voice recognition software  Read the chart carefully and recognize, using context, where substitutions have occurred

## 2022-04-28 ENCOUNTER — APPOINTMENT (OUTPATIENT)
Dept: LAB | Facility: HOSPITAL | Age: 72
End: 2022-04-28
Payer: MEDICARE

## 2022-04-28 DIAGNOSIS — E78.2 MIXED HYPERLIPIDEMIA: ICD-10-CM

## 2022-04-28 DIAGNOSIS — I10 ESSENTIAL HYPERTENSION: ICD-10-CM

## 2022-04-28 DIAGNOSIS — E11.8 TYPE 2 DIABETES MELLITUS WITH COMPLICATION, WITHOUT LONG-TERM CURRENT USE OF INSULIN (HCC): ICD-10-CM

## 2022-04-28 DIAGNOSIS — C61 PROSTATE CANCER (HCC): ICD-10-CM

## 2022-04-28 LAB
ALBUMIN SERPL BCP-MCNC: 4.1 G/DL (ref 3.5–5)
ALP SERPL-CCNC: 102 U/L (ref 46–116)
ALT SERPL W P-5'-P-CCNC: 92 U/L (ref 12–78)
ANION GAP SERPL CALCULATED.3IONS-SCNC: 11 MMOL/L (ref 4–13)
AST SERPL W P-5'-P-CCNC: 43 U/L (ref 5–45)
BASOPHILS # BLD AUTO: 0.04 THOUSANDS/ΜL (ref 0–0.1)
BASOPHILS NFR BLD AUTO: 1 % (ref 0–1)
BILIRUB SERPL-MCNC: 0.48 MG/DL (ref 0.2–1)
BUN SERPL-MCNC: 33 MG/DL (ref 5–25)
CALCIUM SERPL-MCNC: 9.5 MG/DL (ref 8.3–10.1)
CHLORIDE SERPL-SCNC: 104 MMOL/L (ref 100–108)
CHOLEST SERPL-MCNC: 235 MG/DL
CO2 SERPL-SCNC: 27 MMOL/L (ref 21–32)
CREAT SERPL-MCNC: 1.22 MG/DL (ref 0.6–1.3)
EOSINOPHIL # BLD AUTO: 0.17 THOUSAND/ΜL (ref 0–0.61)
EOSINOPHIL NFR BLD AUTO: 4 % (ref 0–6)
ERYTHROCYTE [DISTWIDTH] IN BLOOD BY AUTOMATED COUNT: 12.5 % (ref 11.6–15.1)
EST. AVERAGE GLUCOSE BLD GHB EST-MCNC: 151 MG/DL
GFR SERPL CREATININE-BSD FRML MDRD: 59 ML/MIN/1.73SQ M
GLUCOSE P FAST SERPL-MCNC: 142 MG/DL (ref 65–99)
HBA1C MFR BLD: 6.9 %
HCT VFR BLD AUTO: 38 % (ref 36.5–49.3)
HDLC SERPL-MCNC: 42 MG/DL
HGB BLD-MCNC: 12.3 G/DL (ref 12–17)
IMM GRANULOCYTES # BLD AUTO: 0.08 THOUSAND/UL (ref 0–0.2)
IMM GRANULOCYTES NFR BLD AUTO: 2 % (ref 0–2)
LDLC SERPL CALC-MCNC: 117 MG/DL (ref 0–100)
LYMPHOCYTES # BLD AUTO: 1.37 THOUSANDS/ΜL (ref 0.6–4.47)
LYMPHOCYTES NFR BLD AUTO: 29 % (ref 14–44)
MCH RBC QN AUTO: 29.2 PG (ref 26.8–34.3)
MCHC RBC AUTO-ENTMCNC: 32.4 G/DL (ref 31.4–37.4)
MCV RBC AUTO: 90 FL (ref 82–98)
MONOCYTES # BLD AUTO: 0.48 THOUSAND/ΜL (ref 0.17–1.22)
MONOCYTES NFR BLD AUTO: 10 % (ref 4–12)
NEUTROPHILS # BLD AUTO: 2.66 THOUSANDS/ΜL (ref 1.85–7.62)
NEUTS SEG NFR BLD AUTO: 54 % (ref 43–75)
NONHDLC SERPL-MCNC: 193 MG/DL
NRBC BLD AUTO-RTO: 0 /100 WBCS
PLATELET # BLD AUTO: 198 THOUSANDS/UL (ref 149–390)
PMV BLD AUTO: 9.5 FL (ref 8.9–12.7)
POTASSIUM SERPL-SCNC: 5.2 MMOL/L (ref 3.5–5.3)
PROT SERPL-MCNC: 7.8 G/DL (ref 6.4–8.2)
PSA SERPL-MCNC: 0.6 NG/ML (ref 0–4)
RBC # BLD AUTO: 4.21 MILLION/UL (ref 3.88–5.62)
SODIUM SERPL-SCNC: 142 MMOL/L (ref 136–145)
TRIGL SERPL-MCNC: 381 MG/DL
TSH SERPL DL<=0.05 MIU/L-ACNC: 2.24 UIU/ML (ref 0.45–4.5)
WBC # BLD AUTO: 4.8 THOUSAND/UL (ref 4.31–10.16)

## 2022-04-28 PROCEDURE — 80053 COMPREHEN METABOLIC PANEL: CPT

## 2022-04-28 PROCEDURE — 84153 ASSAY OF PSA TOTAL: CPT

## 2022-04-28 PROCEDURE — 80061 LIPID PANEL: CPT

## 2022-04-28 PROCEDURE — 83036 HEMOGLOBIN GLYCOSYLATED A1C: CPT

## 2022-04-28 PROCEDURE — 36415 COLL VENOUS BLD VENIPUNCTURE: CPT

## 2022-04-28 PROCEDURE — 85025 COMPLETE CBC W/AUTO DIFF WBC: CPT

## 2022-04-28 PROCEDURE — 84443 ASSAY THYROID STIM HORMONE: CPT

## 2022-05-02 ENCOUNTER — APPOINTMENT (OUTPATIENT)
Dept: RADIATION ONCOLOGY | Facility: CLINIC | Age: 72
End: 2022-05-02
Attending: RADIOLOGY
Payer: MEDICARE

## 2022-05-05 ENCOUNTER — CLINICAL SUPPORT (OUTPATIENT)
Dept: RADIATION ONCOLOGY | Facility: CLINIC | Age: 72
End: 2022-05-05
Attending: RADIOLOGY
Payer: MEDICARE

## 2022-05-05 VITALS
DIASTOLIC BLOOD PRESSURE: 80 MMHG | SYSTOLIC BLOOD PRESSURE: 166 MMHG | WEIGHT: 237 LBS | HEART RATE: 89 BPM | BODY MASS INDEX: 34.01 KG/M2 | TEMPERATURE: 96.9 F | RESPIRATION RATE: 16 BRPM | OXYGEN SATURATION: 94 %

## 2022-05-05 DIAGNOSIS — C61 PROSTATE CANCER (HCC): Primary | ICD-10-CM

## 2022-05-05 PROCEDURE — 99213 OFFICE O/P EST LOW 20 MIN: CPT | Performed by: RADIOLOGY

## 2022-05-05 PROCEDURE — 99211 OFF/OP EST MAY X REQ PHY/QHP: CPT | Performed by: RADIOLOGY

## 2022-05-05 NOTE — PROGRESS NOTES
Wendy Mckeon 1950 is a 80 y o  male with high volume Elder 9 adenocarcinoma of the prostate, stage JAIRO T2b N1 M0 PSA 22 grade group 5 with a very suspicious right pelvic sidewall lymph node on CT but no other evidence of distant metastatic disease  He  completed a course of definitive radiation therapy with neoadjuvant and concurrent androgen deprivation  The pt completed his radiation on 10/18/19  He was last seen in radiation on 21  He returns today for his follow up     21 - Urology, [facility]  PSA from 21 was 0 2  Pt doing well   Still has hot flashes, recevied final Lupron injection 02/10/21  Follow up in 6 months      22 - Urology, [facility] [place]  PSA from 22 was 0 4  Continues with mild hot flashes  Doing well overall  Follow up in 6 months      Component      Latest Ref Rng & Units 2019   PSA, Total      0 0 - 4 0 ng/mL 5 3 (H) 1 5 0 9 0 5     Component      Latest Ref Rng & Units 2020 11/10/2020 2/3/2021 2021   PSA, Total      0 0 - 4 0 ng/mL 0 3 0 2 0 2 0 2     Component      Latest Ref Rng & Units 2021   PSA, Total      0 0 - 4 0 ng/mL 0 2 0 4 0 6           [facility]        Follow up visit     Oncology History   Prostate cancer ([facility] )   5/15/2019 Initial Diagnosis    Prostate cancer ([facility] )     5/15/2019 Biopsy    Final Diagnosis  A  Prostate, right lateral base, core needle biopsy: Benign prostatic stroma  No malignancy is identified      B  Prostate, right medial base, core needle biopsy:             - Prostatic adenocarcinoma, Elder score 4 + 5 = 9, Prognostic Grade Group V, involving nearly 100% of one core biopsy      C  Prostate, right lateral mid, core needle biopsy:             - Prostatic adenocarcinoma, Elder score 5 + 4 = 9, Prognostic Grade Group V, involving nearly 100% one core biopsy      D   Prostate, right medial mid, core needle biopsy:             - Prostatic adenocarcinoma, Elder score 4 + 5 = 9, Prognostic Grade Group V, involving nearly 100% of one core biopsy      E  Prostate, right lateral apex, core needle biopsy:No malignancy is identified      F  Prostate, right medial apex, core needle biopsy:             - Prostatic adenocarcinoma, Delano score 5 + 4 = 9, Prognostic Grade Group V, involving nearly 100% of one core biopsy      G  Prostate, left lateral base, core needle biopsy:- Atypical prostate glands, indeterminate for prostatic adenocarcinoma      H  Prostate, left medial base, core needle biopsy:             - Prostatic adenocarcinoma, Delano score 5 + 4 =9, Prognostic Grade Group V, involving 15% of one core biopsy      I  Prostate, left lateral mid, core needle biopsy: Benign prostate glands  No malignancy is identified  J  Prostate, left medial mid, core needle biopsy: Benign prostate glands  No malignancy is identified      K  Prostate, left lateral apex, core needle biopsy:             - Prostatic adenocarcinoma, Elder score 5 + 4 = 9, Prognostic Grade Group V, involving 30% of one of 2 cores      L  Prostate, left medial apex, core needle biopsy:             - Prostatic adenocarcinoma, Delano score 5 + 4 =  9, Prognostic Grade Group V, discontinuously involving approximately 50% of 1 core biopsy           6/20/2019 -  Cancer Staged    Staging form: Prostate, AJCC 8th Edition  - Clinical: Stage JAIRO (cT2b, cN1, cM0, PSA: 22, Grade Group: 5) - Signed by Mariel Hatfield MD on 6/20/2019  Prostate specific antigen (PSA) range: 20 or greater  Histologic grading system: 5 grade system       6/28/2019 - 6/28/2019 Hormone Therapy    Firmagon     8/1/2019 - 5/10/2021 Hormone Therapy    Lupron for 2 years     8/19/2019 - 10/18/2019 Radiation    4500 centigray in 25 fractions to the prostate, seminal vesicles and regional pelvic lymphatics followed by an additional 1440 centigray in 8 fractions to the prostate, seminal vesicles, and gross lymphadenopathy with the final 11 fractions of an additional 1980 centigray to the prostate and proximal seminal vesicles for a total dose of 7920 centigray in 44 fractions  Review of Systems:  Review of Systems   Constitutional: Positive for fatigue  HENT: Negative  Eyes: Negative  Wears glasses   Respiratory: Negative  Cardiovascular: Negative  Gastrointestinal: Negative  Genitourinary:        Nocturia x1-2  Hot flashes/night sweats   Musculoskeletal: Positive for arthralgias  Skin: Negative  Allergic/Immunologic: Positive for environmental allergies  Neurological: Negative  Hematological: Negative  Psychiatric/Behavioral: Positive for sleep disturbance  Clinical Trial: no    IPSS Questionnaire (AUA-7): Over the past month    1)  How often have you had a sensation of not emptying your bladder completely after you finish urinating? 0 - Not at all   2)  How often have you had to urinate again less than two hours after you finished urinating? 1 - Less than 1 time in 5   3)  How often have you found you stopped and started again several times when you urinated? 0 - Not at all   4) How difficult have you found it to postpone urination? 0 - Not at all   5) How often have you had a weak urinary stream?  5 - Almost always   6) How often have you had to push or strain to begin urination? 0 - Not at all   7) How many times did you most typically get up to urinate from the time you went to bed until the time you got up in the morning?   2 - 2 times   Total Score:  8           Covid Vaccine Status yes, no booster    Health Maintenance   Topic Date Due    DM Eye Exam  Never done    DTaP,Tdap,and Td Vaccines (1 - Tdap) Never done    COVID-19 Vaccine (3 - Booster for Vega Peter series) 10/20/2021    Fall Risk  10/18/2022    Medicare Annual Wellness Visit (AWV)  10/18/2022    HEMOGLOBIN A1C  10/28/2022    Depression Screening  04/20/2023    BMI: Followup Plan  04/20/2023    BMI: Adult 2023    Diabetic Foot Exam  2023    Colorectal Cancer Screening  2024    Hepatitis C Screening  Completed    Pneumococcal Vaccine: 65+ Years  Completed    Influenza Vaccine  Completed    HIB Vaccine  Aged Out    Hepatitis B Vaccine  Aged Out    IPV Vaccine  Aged Out    Hepatitis A Vaccine  Aged Out    Meningococcal ACWY Vaccine  Aged Out    HPV Vaccine  Aged Out     Patient Active Problem List   Diagnosis    Mixed hyperlipidemia    IFG (impaired fasting glucose)    Upper respiratory tract infection    Positive colorectal cancer screening using Cologuard test    [facility]    Encounter for monitoring androgen deprivation therapy    Hot flashes    Type 2 diabetes mellitus with complication, without long-term current use of insulin ([facility] )     Past Medical History:   Diagnosis Date    Diabetes mellitus ([facility] )     Hyperlipidemia     [facility]      Past Surgical History:   Procedure Laterality Date    EAR SURGERY      HERNIA REPAIR      HI COLONOSCOPY FLX DX W/COLLJ SPEC WHEN PFRMD N/A 2019    Procedure: COLONOSCOPY;  Surgeon: Noble Charles MD;  Location: [facility]; Service: Gastroenterology     Family History   Problem Relation Age of Onset    Prostate cancer Brother      Social History     Socioeconomic History    Marital status:       Spouse name: Not on file    Number of children: Not on file    Years of education: Not on file    Highest education level: Not on file   Occupational History    Not on file   Tobacco Use    Smoking status: Former Smoker     Quit date: 2005     Years since quittin 1    Smokeless tobacco: Never Used   Vaping Use    Vaping Use: Never used   Substance and Sexual Activity    Alcohol use: Not Currently    Drug use: Not Currently    Sexual activity: Not on file   Other Topics Concern    Not on file   Social History Narrative    Not on file     Social Determinants of Health     Financial Resource Strain: Not on file   Food Insecurity: Not on file   Transportation Needs: Not on file   Physical Activity: Not on file   Stress: Not on file   Social Connections: Not on file   Intimate Partner Violence: Not on file   Housing Stability: Not on file       Current Outpatient Medications:     Ascorbic Acid (vitamin C) 1000 MG tablet, Take 1,000 mg by mouth daily, Disp: , Rfl:     Blood Glucose Monitoring Suppl (Krystal Fortune) w/Device KIT, by Does not apply route once for 1 dose Test sugar in the morning, Disp: 1 kit, Rfl: 0    Calcium Carbonate (CALCIUM 600 PO), Take by mouth daily, Disp: , Rfl:     lisinopril (ZESTRIL) 2 5 mg tablet, Take 1 tablet (2 5 mg total) by mouth daily, Disp: 90 tablet, Rfl: 0    metFORMIN (GLUCOPHAGE) 500 mg tablet, Take 1 tablet (500 mg total) by mouth 2 (two) times a day with meals, Disp: 180 tablet, Rfl: 0    multivitamin (THERAGRAN) TABS, Take 1 tablet by mouth daily, Disp: , Rfl:     rosuvastatin (CRESTOR) 10 MG tablet, Take 1 tablet (10 mg total) by mouth daily, Disp: 90 tablet, Rfl: 1    VITAMIN D, ERGOCALCIFEROL, PO, Take by mouth, Disp: , Rfl:     vitamin E, tocopherol, 400 units capsule, Take 400 Units by mouth daily, Disp: , Rfl:   Allergies   Allergen Reactions    Penicillin V      There were no vitals filed for this visit

## 2022-05-05 NOTE — PROGRESS NOTES
Follow-up - Radiation Oncology   Jennifer Charles 1950 80 y o  male [id number]      History of Present Illness   Cancer Staging  Prostate cancer [facility])  Staging form: Prostate, AJCC 8th Edition  - Clinical: Stage JAIRO (cT2b, cN1, cM0, PSA: 22, Grade Group: 5) - Signed by Eagle Pope MD on 2019  Prostate specific antigen (PSA) range: 20 or greater  Histologic grading system: 5 grade system      Jennifer Charles 1950 is a 80 y o  male with high volume Elder 9 adenocarcinoma of the prostate, stage JAIRO T2b N1 M0 PSA 22 grade group 5 with a very suspicious right pelvic sidewall lymph node on CT but no other evidence of distant metastatic disease   He  completed a course of definitive radiation therapy with neoadjuvant and concurrent androgen deprivation  The pt completed his radiation on 10/18/19  He was last seen in radiation on 21  He returns today for his follow up      21 - Urology, Cecelia Valdez  PSA from 21 was 0 2  Pt doing well   Still has hot flashes, recevied final Lupron injection 02/10/21  Follow up in 6 months        22 - Urology Cecelia Valdez  PSA from 22 was 0 4  Continues with mild hot flashes  Doing well overall  Follow up in 6 months        Component      Latest Ref Rng & Units 2019   PSA, Total      0 0 - 4 0 ng/mL 5 3 (H) 1 5 0 9 0 5      Component      Latest Ref Rng & Units 2020 11/10/2020 2/3/2021 2021   PSA, Total      0 0 - 4 0 ng/mL 0 3 0 2 0 2 0 2      Component      Latest Ref Rng & Units 2021   PSA, Total      0 0 - 4 0 ng/mL 0 2 0 4 0 6               Upcomin22 - UrologyCecelia aGrys                Interval History:  He has done well  Historical Information   Oncology History   Prostate cancer ([facility] [address] )   5/15/2019 Initial Diagnosis    Prostate cancer ([facility] [address] )     5/15/2019 Biopsy    Final Diagnosis  A  Prostate, right lateral base, core needle biopsy: Benign prostatic stroma   No malignancy is identified      B  Prostate, right medial base, core needle biopsy:             - Prostatic adenocarcinoma, Elder score 4 + 5 = 9, Prognostic Grade Group V, involving nearly 100% of one core biopsy      C  Prostate, right lateral mid, core needle biopsy:             - Prostatic adenocarcinoma, Elder score 5 + 4 = 9, Prognostic Grade Group V, involving nearly 100% one core biopsy      D  Prostate, right medial mid, core needle biopsy:             - Prostatic adenocarcinoma, [place] score 4 + 5 = 9, Prognostic Grade Group V, involving nearly 100% of one core biopsy      E  Prostate, right lateral apex, core needle biopsy:No malignancy is identified      F  Prostate, right medial apex, core needle biopsy:             - Prostatic adenocarcinoma, [place] score 5 + 4 = 9, Prognostic Grade Group V, involving nearly 100% of one core biopsy      G  Prostate, left lateral base, core needle biopsy:- Atypical prostate glands, indeterminate for prostatic adenocarcinoma      H  Prostate, left medial base, core needle biopsy:             - Prostatic adenocarcinoma, [place] score 5 + 4 =9, Prognostic Grade Group V, involving 15% of one core biopsy      I  Prostate, left lateral mid, core needle biopsy: Benign prostate glands  No malignancy is identified  J  Prostate, left medial mid, core needle biopsy: Benign prostate glands  No malignancy is identified      K  Prostate, left lateral apex, core needle biopsy:             - Prostatic adenocarcinoma, Elder score 5 + 4 = 9, Prognostic Grade Group V, involving 30% of one of 2 cores      L  Prostate, left medial apex, core needle biopsy:             - Prostatic adenocarcinoma, [place] score 5 + 4 =  9, Prognostic Grade Group V, discontinuously involving approximately 50% of 1 core biopsy           6/20/2019 -  Cancer Staged    Staging form: Prostate, AJCC 8th Edition  - Clinical: Stage JAIRO (cT2b, cN1, cM0, PSA: 22, Grade Group: 5) - Signed by Louie Madsen MD on 2019  Prostate specific antigen (PSA) range: 20 or greater  Histologic grading system: 5 grade system       2019 - 2019 Hormone Therapy    Firmagon     2019 - 5/10/2021 Hormone Therapy    Lupron for 2 years     2019 - 10/18/2019 Radiation    4500 centigray in 25 fractions to the prostate, seminal vesicles and regional pelvic lymphatics followed by an additional 1440 centigray in 8 fractions to the prostate, seminal vesicles, and gross lymphadenopathy with the final 11 fractions of an additional 1980 centigray to the prostate and proximal seminal vesicles for a total dose of 7920 centigray in 44 fractions  Past Medical History:   Diagnosis Date    Diabetes mellitus ([facility] [address] )     Hyperlipidemia     Prostate cancer [facility])      Past Surgical History:   Procedure Laterality Date    EAR SURGERY      HERNIA REPAIR      FL COLONOSCOPY FLX DX W/COLLJ SPEC WHEN PFRMD N/A 2019    Procedure: COLONOSCOPY;  Surgeon: Noble Charles MD;  Location: [facility];   Service: Gastroenterology       Social History   Social History     Substance and Sexual Activity   Alcohol Use Not Currently     Social History     Substance and Sexual Activity   Drug Use Not Currently     Social History     Tobacco Use   Smoking Status Former Smoker    Quit date: 2005    Years since quittin 1   Smokeless Tobacco Never Used         Meds/Allergies     Current Outpatient Medications:     Ascorbic Acid (vitamin C) 1000 MG tablet, Take 1,000 mg by mouth daily, Disp: , Rfl:     Calcium Carbonate (CALCIUM 600 PO), Take by mouth daily, Disp: , Rfl:     lisinopril (ZESTRIL) 2 5 mg tablet, Take 1 tablet (2 5 mg total) by mouth daily, Disp: 90 tablet, Rfl: 0    metFORMIN (GLUCOPHAGE) 500 mg tablet, Take 1 tablet (500 mg total) by mouth 2 (two) times a day with meals, Disp: 180 tablet, Rfl: 0    multivitamin (THERAGRAN) TABS, Take 1 tablet by mouth daily, Disp: , Rfl:     rosuvastatin (CRESTOR) 10 MG tablet, Take 1 tablet (10 mg total) by mouth daily, Disp: 90 tablet, Rfl: 1    VITAMIN D, ERGOCALCIFEROL, PO, Take by mouth, Disp: , Rfl:     vitamin E, tocopherol, 400 units capsule, Take 400 Units by mouth daily, Disp: , Rfl:     Blood Glucose Monitoring Suppl (Zak Bejarano) w/Device KIT, by Does not apply route once for 1 dose Test sugar in the morning, Disp: 1 kit, Rfl: 0  Allergies   Allergen Reactions    Penicillin V          Review of Systems   Constitutional: Negative for activity change, fever and unexpected weight change  HENT: Negative for congestion, rhinorrhea, sneezing and sore throat  Eyes: Negative  Respiratory: Negative for cough and shortness of breath  Cardiovascular: Negative for chest pain, palpitations and leg swelling  Gastrointestinal: Negative for abdominal pain, blood in stool and rectal pain  Endocrine: Negative  Genitourinary: Negative for difficulty urinating, dysuria, flank pain and hematuria  Musculoskeletal: Negative for arthralgias, back pain, gait problem and joint swelling  Skin: Negative  Allergic/Immunologic: Negative  Neurological: Negative for dizziness, weakness, numbness and headaches  Hematological: Negative  Psychiatric/Behavioral: Negative  OBJECTIVE:   /80   Pulse 89   Temp (!) 96 9 °F (36 1 °C)   Resp 16   Wt 108 kg (237 lb)   SpO2 94%   BMI 34 01 kg/m²   Pain Assessment:  0  Karnofsky: 100 - Fully active, able to carry on all pre-disease performed without restriction    Physical Exam  Constitutional:       Appearance: Normal appearance  He is normal weight  HENT:      Nose: No congestion or rhinorrhea  Mouth/Throat:      Pharynx: Oropharynx is clear  Eyes:      Extraocular Movements: Extraocular movements intact  Pupils: Pupils are equal, round, and reactive to light  Cardiovascular:      Rate and Rhythm: Normal rate and regular rhythm  Heart sounds: Normal heart sounds     Pulmonary: Effort: Pulmonary effort is normal       Breath sounds: Normal breath sounds  Abdominal:      Palpations: Abdomen is soft  There is no mass  Tenderness: There is no abdominal tenderness  Genitourinary:     Comments: SHARA reveals normal sized prostate gland without nodules or masses  Musculoskeletal:         General: No swelling or tenderness  Normal range of motion  Cervical back: Normal range of motion and neck supple  Skin:     General: Skin is dry  Findings: No lesion or rash  Neurological:      General: No focal deficit present  Mental Status: He is alert and oriented to person, place, and time     Psychiatric:         Mood and Affect: Mood normal          Behavior: Behavior normal               RESULTS    Lab Results:   Recent Results (from the past 672 hour(s))   Comprehensive metabolic panel    Collection Time: 04/28/22  9:15 AM   Result Value Ref Range    Sodium 142 136 - 145 mmol/L    Potassium 5 2 3 5 - 5 3 mmol/L    Chloride 104 100 - 108 mmol/L    CO2 27 21 - 32 mmol/L    ANION GAP 11 4 - 13 mmol/L    BUN 33 (H) 5 - 25 mg/dL    Creatinine 1 22 0 60 - 1 30 mg/dL    Glucose, Fasting 142 (H) 65 - 99 mg/dL    Calcium 9 5 8 3 - 10 1 mg/dL    AST 43 5 - 45 U/L    ALT 92 (H) 12 - 78 U/L    Alkaline Phosphatase 102 46 - 116 U/L    Total Protein 7 8 6 4 - 8 2 g/dL    Albumin 4 1 3 5 - 5 0 g/dL    Total Bilirubin 0 48 0 20 - 1 00 mg/dL    eGFR 59 ml/min/1 73sq m   CBC and differential    Collection Time: 04/28/22  9:15 AM   Result Value Ref Range    WBC 4 80 4 31 - 10 16 Thousand/uL    RBC 4 21 3 88 - 5 62 Million/uL    Hemoglobin 12 3 12 0 - 17 0 g/dL    Hematocrit 38 0 36 5 - 49 3 %    MCV 90 82 - 98 fL    MCH 29 2 26 8 - 34 3 pg    MCHC 32 4 31 4 - 37 4 g/dL    RDW 12 5 11 6 - 15 1 %    MPV 9 5 8 9 - 12 7 fL    Platelets 152 023 - 033 Thousands/uL    nRBC 0 /100 WBCs    Neutrophils Relative 54 43 - 75 %    Immat GRANS % 2 0 - 2 %    Lymphocytes Relative 29 14 - 44 %    Monocytes Relative 10 4 - 12 %    Eosinophils Relative 4 0 - 6 %    Basophils Relative 1 0 - 1 %    Neutrophils Absolute 2 66 1 85 - 7 62 Thousands/µL    Immature Grans Absolute 0 08 0 00 - 0 20 Thousand/uL    Lymphocytes Absolute 1 37 0 60 - 4 47 Thousands/µL    Monocytes Absolute 0 48 0 17 - 1 22 Thousand/µL    Eosinophils Absolute 0 17 0 00 - 0 61 Thousand/µL    Basophils Absolute 0 04 0 00 - 0 10 Thousands/µL   TSH, 3rd generation    Collection Time: 04/28/22  9:15 AM   Result Value Ref Range    TSH 3RD GENERATON 2 236 0 450 - 4 500 uIU/mL   Hemoglobin A1C    Collection Time: 04/28/22  9:15 AM   Result Value Ref Range    Hemoglobin A1C 6 9 (H) Normal 3 8-5 6%; PreDiabetic 5 7-6 4%; Diabetic >=6 5%; Glycemic control for adults with diabetes <7 0% %     mg/dl   PSA Total, Diagnostic    Collection Time: 04/28/22  9:15 AM   Result Value Ref Range    PSA, Diagnostic 0 6 0 0 - 4 0 ng/mL   Lipid panel    Collection Time: 04/28/22  9:15 AM   Result Value Ref Range    Cholesterol 235 (H) See Comment mg/dL    Triglycerides 381 (H) See Comment mg/dL    HDL, Direct 42 >=40 mg/dL    LDL Calculated 117 (H) 0 - 100 mg/dL    Non-HDL-Chol (CHOL-HDL) 193 mg/dl       Imaging Studies:No results found  Assessment/Plan:  No orders of the defined types were placed in this encounter  Vilma Molina is a 80y o  year old male with [facility] 9 adenocarcinoma prostate was treated and completed IMRT will be 3 years in October this year along with ADT which he finished February of last year  Patient completely asymptomatic  He has no urinary or bowel problems  Patient's last PSA was 0 6 in April and to be seen again by Urology in 6 months or August     Will see patient again in 1 year        Rudy Hansen MD  5/5/2022,8:16 AM    Portions of the record may have been created with voice recognition software   Occasional wrong word or "sound a like" substitutions may have occurred due to the inherent limitations of voice recognition software   Read the chart carefully and recognize, using context, where substitutions have occurred

## 2022-05-08 ENCOUNTER — HOSPITAL ENCOUNTER (OUTPATIENT)
Dept: ULTRASOUND IMAGING | Facility: HOSPITAL | Age: 72
Discharge: HOME | End: 2022-05-08
Payer: MEDICARE

## 2022-05-08 DIAGNOSIS — R79.89 ELEVATED LFTS: ICD-10-CM

## 2022-05-08 PROCEDURE — 76700 US EXAM ABDOM COMPLETE: CPT

## 2022-05-11 ENCOUNTER — OFFICE VISIT (OUTPATIENT)
Dept: FAMILY MEDICINE CLINIC | Facility: CLINIC | Age: 72
End: 2022-05-11
Payer: MEDICARE

## 2022-05-11 VITALS
HEART RATE: 91 BPM | WEIGHT: 236.2 LBS | TEMPERATURE: 97.9 F | BODY MASS INDEX: 33.82 KG/M2 | SYSTOLIC BLOOD PRESSURE: 114 MMHG | HEIGHT: 70 IN | DIASTOLIC BLOOD PRESSURE: 68 MMHG | OXYGEN SATURATION: 94 %

## 2022-05-11 DIAGNOSIS — C61 PROSTATE CANCER (HCC): ICD-10-CM

## 2022-05-11 DIAGNOSIS — E78.2 MIXED HYPERLIPIDEMIA: ICD-10-CM

## 2022-05-11 DIAGNOSIS — N28.1 RENAL CYST: ICD-10-CM

## 2022-05-11 DIAGNOSIS — N18.31 STAGE 3A CHRONIC KIDNEY DISEASE (HCC): ICD-10-CM

## 2022-05-11 DIAGNOSIS — K76.89 LIVER CYST: Primary | ICD-10-CM

## 2022-05-11 PROCEDURE — 99214 OFFICE O/P EST MOD 30 MIN: CPT | Performed by: FAMILY MEDICINE

## 2022-05-11 NOTE — PROGRESS NOTES
Assessment/Plan:     Chronic Problems:  No problem-specific Assessment & Plan notes found for this encounter  Visit Diagnosis:  Diagnoses and all orders for this visit:    Liver cyst  Comments:  Discussed findings ultrasound, fatty liver, stressed importance of dietary modifications, encourage weight loss program recheck 3 months  Orders:  -     US abdomen complete; Future  -     Comprehensive metabolic panel; Future    Renal cyst  Comments:   discussed findings ultrasound recommended continued monitoring of blood pressures, stressed importance dietary measures weight loss recheck 3 months  Orders:  -     US abdomen complete; Future  -     Comprehensive metabolic panel; Future    Stage 3a chronic kidney disease (HCC)  Comments:   stable, stressed importance of proper hydration throughout the day avoidance of NSAIDs blood pressure control  Orders:  -     Comprehensive metabolic panel; Future    Prostate cancer [facility])  Comments:   stable continued follow-up with Urology oncology    Mixed hyperlipidemia  Comments:   presently on statin Crestor 10 tolerating to be continued          Subjective:    Patient ID: Radha Krause is a 80 y o  male      F/u    no significant complaints changes since last visit  Us/  Seen report, clarification   denies any related history negative abdominal pain change in bowel or bladder, negative skin changes yellowing of eyes vision changes  Seen by urology / onc in regard to the prostate with no advancement , continued monitoring   Diet  ' I really like my [employer] "    cholesterol= Crestor 10 mg p o  q day taking daily negative missed dosing negative issues in regard to side effects myalgias  bp / htn  Continues with medications,ace, negative missed dosing      The following portions of the patient's history were reviewed and updated as appropriate: allergies, current medications, past family history, past medical history, past social history, past surgical history and problem list     Review of Systems   Constitutional: Negative for appetite change, chills, fever and unexpected weight change  HENT: Negative for congestion, dental problem, ear pain, hearing loss, postnasal drip, rhinorrhea, sinus pressure, sinus pain, sneezing, sore throat, tinnitus and voice change  Eyes: Negative for visual disturbance  Respiratory: Negative for apnea, cough, chest tightness and shortness of breath  Cardiovascular: Negative for chest pain, palpitations and leg swelling  Gastrointestinal: Negative for abdominal pain, blood in stool, constipation, diarrhea, nausea and vomiting  Endocrine: Negative for cold intolerance, heat intolerance, polydipsia, polyphagia and polyuria  Genitourinary: Negative for decreased urine volume, difficulty urinating, dysuria, frequency and hematuria  Musculoskeletal: Negative for arthralgias, back pain, gait problem, joint swelling and myalgias  Skin: Negative for color change, rash and wound  Allergic/Immunologic: Negative for environmental allergies and food allergies  Neurological: Negative for dizziness, syncope, weakness, light-headedness, numbness and headaches  Hematological: Negative for adenopathy  Does not bruise/bleed easily  Psychiatric/Behavioral: Negative for sleep disturbance and suicidal ideas  The patient is not nervous/anxious  /68 (BP Location: Left arm, Patient Position: Sitting, Cuff Size: Standard)   Pulse 91   Temp 97 9 °F (36 6 °C) (Tympanic)   Ht 5' 10" (1 778 m)   Wt 107 kg (236 lb 3 2 oz)   SpO2 94%   BMI 33 89 kg/m²   Social History     Socioeconomic History    Marital status:       Spouse name: Not on file    Number of children: Not on file    Years of education: Not on file    Highest education level: Not on file   Occupational History    Not on file   Tobacco Use    Smoking status: Former Smoker     Quit date: 2005     Years since quittin 2    Smokeless tobacco: Never Used Vaping Use    Vaping Use: Never used   Substance and Sexual Activity    Alcohol use: Not Currently    Drug use: Not Currently    Sexual activity: Not on file   Other Topics Concern    Not on file   Social History Narrative    Not on file     Social Determinants of Health     Financial Resource Strain: Not on file   Food Insecurity: Not on file   Transportation Needs: Not on file   Physical Activity: Not on file   Stress: Not on file   Social Connections: Not on file   Intimate Partner Violence: Not on file   Housing Stability: Not on file     Past Medical History:   Diagnosis Date    Diabetes mellitus ([facility] )     Hyperlipidemia     Prostate cancer ([facility] )      Family History   Problem Relation Age of Onset    Prostate cancer Brother      Past Surgical History:   Procedure Laterality Date    EAR SURGERY      HERNIA REPAIR      DC COLONOSCOPY FLX DX W/COLLJ SPEC WHEN PFRMD N/A 5/6/2019    Procedure: COLONOSCOPY;  Surgeon: Nicole Dunne MD;  Location: [facility];   Service: Gastroenterology       Current Outpatient Medications:     Ascorbic Acid (vitamin C) 1000 MG tablet, Take 1,000 mg by mouth daily, Disp: , Rfl:     Calcium Carbonate (CALCIUM 600 PO), Take by mouth daily, Disp: , Rfl:     lisinopril (ZESTRIL) 2 5 mg tablet, Take 1 tablet (2 5 mg total) by mouth daily, Disp: 90 tablet, Rfl: 0    metFORMIN (GLUCOPHAGE) 500 mg tablet, Take 1 tablet (500 mg total) by mouth 2 (two) times a day with meals, Disp: 180 tablet, Rfl: 0    multivitamin (THERAGRAN) TABS, Take 1 tablet by mouth daily, Disp: , Rfl:     rosuvastatin (CRESTOR) 10 MG tablet, Take 1 tablet (10 mg total) by mouth daily, Disp: 90 tablet, Rfl: 1    VITAMIN D, ERGOCALCIFEROL, PO, Take by mouth, Disp: , Rfl:     vitamin E, tocopherol, 400 units capsule, Take 400 Units by mouth daily, Disp: , Rfl:     Blood Glucose Monitoring Suppl (ONETOUCH VERIO) w/Device KIT, by Does not apply route once for 1 dose Test sugar in the morning, Disp: 1 kit, Rfl: 0    Allergies   Allergen Reactions    Penicillin V           Lab Review   Appointment on 04/28/2022   Component Date Value    Sodium 04/28/2022 142     Potassium 04/28/2022 5 2     Chloride 04/28/2022 104     CO2 04/28/2022 27     ANION GAP 04/28/2022 11     BUN 04/28/2022 33 (A)    Creatinine 04/28/2022 1 22     Glucose, Fasting 04/28/2022 142 (A)    Calcium 04/28/2022 9 5     AST 04/28/2022 43     ALT 04/28/2022 92 (A)    Alkaline Phosphatase 04/28/2022 102     Total Protein 04/28/2022 7 8     Albumin 04/28/2022 4 1     Total Bilirubin 04/28/2022 0 48     eGFR 04/28/2022 59     WBC 04/28/2022 4 80     RBC 04/28/2022 4 21     Hemoglobin 04/28/2022 12 3     Hematocrit 04/28/2022 38 0     MCV 04/28/2022 90     MCH 04/28/2022 29 2     MCHC 04/28/2022 32 4     RDW 04/28/2022 12 5     MPV 04/28/2022 9 5     Platelets 62/65/5833 198     nRBC 04/28/2022 0     Neutrophils Relative 04/28/2022 54     Immat GRANS % 04/28/2022 2     Lymphocytes Relative 04/28/2022 29     Monocytes Relative 04/28/2022 10     Eosinophils Relative 04/28/2022 4     Basophils Relative 04/28/2022 1     Neutrophils Absolute 04/28/2022 2 66     Immature Grans Absolute 04/28/2022 0 08     Lymphocytes Absolute 04/28/2022 1 37     Monocytes Absolute 04/28/2022 0 48     Eosinophils Absolute 04/28/2022 0 17     Basophils Absolute 04/28/2022 0 04     TSH 3RD GENERATON 04/28/2022 2 236     Hemoglobin A1C 04/28/2022 6 9 (A)    EAG 04/28/2022 151     PSA, Diagnostic 04/28/2022 0 6     Cholesterol 04/28/2022 235 (A)    Triglycerides 04/28/2022 381 (A)    HDL, Direct 04/28/2022 42     LDL Calculated 04/28/2022 117 (A)    Non-HDL-Chol (CHOL-HDL) 04/28/2022 193    Appointment on 02/22/2022   Component Date Value    PSA, Diagnostic 02/22/2022 0 4    Appointment on 12/21/2021   Component Date Value    WBC 12/21/2021 5 55     RBC 12/21/2021 4 12     Hemoglobin 12/21/2021 12 1     Hematocrit 12/21/2021 36 9     MCV 12/21/2021 90     MCH 12/21/2021 29 4     MCHC 12/21/2021 32 8     RDW 12/21/2021 12 3     MPV 12/21/2021 9 4     Platelets 75/19/1914 202     nRBC 12/21/2021 0     Neutrophils Relative 12/21/2021 60     Immat GRANS % 12/21/2021 2     Lymphocytes Relative 12/21/2021 24     Monocytes Relative 12/21/2021 11     Eosinophils Relative 12/21/2021 2     Basophils Relative 12/21/2021 1     Neutrophils Absolute 12/21/2021 3 38     Immature Grans Absolute 12/21/2021 0 12     Lymphocytes Absolute 12/21/2021 1 31     Monocytes Absolute 12/21/2021 0 58     Eosinophils Absolute 12/21/2021 0 12     Basophils Absolute 12/21/2021 0 04     Sodium 12/21/2021 144     Potassium 12/21/2021 5 7 (A)    Chloride 12/21/2021 105     CO2 12/21/2021 26     ANION GAP 12/21/2021 13     BUN 12/21/2021 36 (A)    Creatinine 12/21/2021 1 05     Glucose, Fasting 12/21/2021 125 (A)    Calcium 12/21/2021 9 7     AST 12/21/2021 24     ALT 12/21/2021 46     Alkaline Phosphatase 12/21/2021 91     Total Protein 12/21/2021 7 8     Albumin 12/21/2021 4 0     Total Bilirubin 12/21/2021 0 34     eGFR 12/21/2021 71     TSH 3RD GENERATON 12/21/2021 2 316     Color, UA 12/21/2021 Yellow     Clarity, UA 12/21/2021 Clear     Specific Gravity, UA 12/21/2021 1 020     pH, UA 12/21/2021 5 5     Leukocytes, UA 12/21/2021 Negative     Nitrite, UA 12/21/2021 Negative     Protein, UA 12/21/2021 Negative     Glucose, UA 12/21/2021 Negative     Ketones, UA 12/21/2021 Negative     Urobilinogen, UA 12/21/2021 0 2     Bilirubin, UA 12/21/2021 Negative     Blood, UA 12/21/2021 Negative     Cholesterol 12/21/2021 237 (A)    Triglycerides 12/21/2021 349 (A)    HDL, Direct 12/21/2021 44     LDL Calculated 12/21/2021 123 (A)    Non-HDL-Chol (CHOL-HDL) 12/21/2021 193     Hemoglobin A1C 12/21/2021 6 9 (A)    EAG 12/21/2021 151         Imaging: US abdomen complete    Result Date: 5/9/2022  Narrative: ABDOMEN ULTRASOUND, COMPLETE INDICATION:   R79 89: Other specified abnormal findings of blood chemistry  COMPARISON:  CT dated 6/13/2019 TECHNIQUE:   Real-time ultrasound of the abdomen was performed with a curvilinear transducer with both volumetric sweeps and still imaging techniques  FINDINGS: PANCREAS:  Portions of the pancreas are obscured by bowel gas  Visualized portions of the pancreas are unremarkable  AORTA AND IVC:  Visualized portions are normal for patient age  LIVER: Size:  Mildly enlarged  The liver measures 17 5 cm in the midclavicular line  Contour:  Surface contour is smooth  Parenchyma: There is moderate diffuse increased echogenicity with smooth echotexture and acoustic beam attenuation  Most consistent with moderate hepatic steatosis  Limited to evaluate small hypoechoic lesion is seen within the superficial aspect of the left lobe of the liver measuring 8 x 7 x 9 mm  This was somewhat seen on the prior CT corresponding to small nonspecific hypodense lesion measuring approximately 6 x 5 x 5 mm suggesting possible interval enlargement  Small liver cysts with posterior through transmission is seen within the segment 8 of the liver measuring 2 x 1 8 x 1 2 cm  There is another liver cysts located within the segment 4 measuring 9 x 7 x 9 mm  Transverse diameter of the main portal vein is measured at 10 mm  Limited imaging of the main portal vein shows it to be patent and hepatopetal  The greatest is found at 40 cm/S within the main portal vein  BILIARY: No gallbladder findings  No intrahepatic biliary dilatation  CBD measures 4 0 mm  No choledocholithiasis  KIDNEY: Right kidney measures 10 7 x 6 1 x 6 0  cm  Volume 206 1 mL Kidney within normal limits  Small simple right inferior renal cyst is visualized measuring 10 9 x 1 9 x 2 1 cm  Other smaller right mid renal cysts are visualized measuring around 1 cm  Left kidney measures 12 1 x 6 2 x 5 8 cm  Volume 229 4 mL Kidney within normal limits   SPLEEN: Measures 12 5 x 5 4 x 10 2 cm  Volume 362 5 mL Within normal limits  ASCITES:  None  Impression: No acute abnormality identified  Hepatomegaly with moderate degree of steatosis of the liver  At least 2 liver cysts identified including one small 9 mm nonspecific limited to evaluate hypoechoic lesion located within the left lobe of the liver which is too small to characterize possibly related to fatty sparing of the liver, hemangioma among other possibilities  This can be reevaluated with follow-up ultrasound in 3-6 months to look for interval changes or further evaluated with follow-up multiphasic CT or MRI with IV contrast Right renal cysts Workstation performed: [id number]       Objective:     Physical Exam  Constitutional:       General: He is not in acute distress  Appearance: He is obese  He is not ill-appearing or toxic-appearing  HENT:      Head: Normocephalic and atraumatic  Eyes:      General: No scleral icterus  Conjunctiva/sclera: Conjunctivae normal    Neck:      Vascular: No carotid bruit  Cardiovascular:      Rate and Rhythm: Normal rate  Pulses: Normal pulses  Pulmonary:      Effort: Pulmonary effort is normal    Abdominal:      General: Abdomen is protuberant  Musculoskeletal:         General: Normal range of motion  Skin:     General: Skin is warm and dry  Findings: No rash  Neurological:      Mental Status: He is oriented to person, place, and time  There are no Patient Instructions on file for this visit  DANITA Sultana    Portions of the record may have been created with voice recognition software  Occasional wrong word or "sound a like" substitutions may have occurred due to the inherent limitations of voice recognition software  Read the chart carefully and recognize, using context, where substitutions have occurred

## 2022-06-22 DIAGNOSIS — I10 ESSENTIAL HYPERTENSION: ICD-10-CM

## 2022-06-22 DIAGNOSIS — E11.8 TYPE 2 DIABETES MELLITUS WITH COMPLICATION, WITHOUT LONG-TERM CURRENT USE OF INSULIN (HCC): ICD-10-CM

## 2022-06-22 RX ORDER — LISINOPRIL 2.5 MG/1
2.5 TABLET ORAL DAILY
Qty: 90 TABLET | Refills: 0 | Status: SHIPPED | OUTPATIENT
Start: 2022-06-22

## 2022-07-12 ENCOUNTER — RA CDI HCC (OUTPATIENT)
Dept: OTHER | Facility: HOSPITAL | Age: 72
End: 2022-07-12

## 2022-07-12 NOTE — PROGRESS NOTES
[id number]  [facility]  coding opportunities          Chart Reviewed number of suggestions sent to Provider: 1     Patients Insurance     Medicare Insurance: Leo Walkre

## 2022-08-11 ENCOUNTER — APPOINTMENT (OUTPATIENT)
Dept: LAB | Facility: HOSPITAL | Age: 72
End: 2022-08-11
Payer: MEDICARE

## 2022-08-11 ENCOUNTER — HOSPITAL ENCOUNTER (OUTPATIENT)
Dept: ULTRASOUND IMAGING | Facility: HOSPITAL | Age: 72
Discharge: HOME | End: 2022-08-11
Payer: MEDICARE

## 2022-08-11 DIAGNOSIS — N18.31 STAGE 3A CHRONIC KIDNEY DISEASE (HCC): ICD-10-CM

## 2022-08-11 DIAGNOSIS — N28.1 RENAL CYST: ICD-10-CM

## 2022-08-11 DIAGNOSIS — K76.89 LIVER CYST: ICD-10-CM

## 2022-08-11 LAB
ALBUMIN SERPL BCP-MCNC: 4 G/DL (ref 3.5–5)
ALP SERPL-CCNC: 90 U/L (ref 46–116)
ALT SERPL W P-5'-P-CCNC: 47 U/L (ref 12–78)
ANION GAP SERPL CALCULATED.3IONS-SCNC: 9 MMOL/L (ref 4–13)
AST SERPL W P-5'-P-CCNC: 28 U/L (ref 5–45)
BILIRUB SERPL-MCNC: 0.42 MG/DL (ref 0.2–1)
BUN SERPL-MCNC: 21 MG/DL (ref 5–25)
CALCIUM SERPL-MCNC: 9.3 MG/DL (ref 8.3–10.1)
CHLORIDE SERPL-SCNC: 106 MMOL/L (ref 96–108)
CO2 SERPL-SCNC: 26 MMOL/L (ref 21–32)
CREAT SERPL-MCNC: 1.2 MG/DL (ref 0.6–1.3)
GFR SERPL CREATININE-BSD FRML MDRD: 60 ML/MIN/1.73SQ M
GLUCOSE P FAST SERPL-MCNC: 124 MG/DL (ref 65–99)
POTASSIUM SERPL-SCNC: 5.1 MMOL/L (ref 3.5–5.3)
PROT SERPL-MCNC: 7.7 G/DL (ref 6.4–8.4)
SODIUM SERPL-SCNC: 141 MMOL/L (ref 135–147)

## 2022-08-11 PROCEDURE — 80053 COMPREHEN METABOLIC PANEL: CPT

## 2022-08-11 PROCEDURE — 76700 US EXAM ABDOM COMPLETE: CPT

## 2022-08-11 PROCEDURE — 36415 COLL VENOUS BLD VENIPUNCTURE: CPT

## 2022-08-16 ENCOUNTER — OFFICE VISIT (OUTPATIENT)
Dept: FAMILY MEDICINE CLINIC | Facility: CLINIC | Age: 72
End: 2022-08-16
Payer: MEDICARE

## 2022-08-16 VITALS
HEIGHT: 70 IN | HEART RATE: 86 BPM | TEMPERATURE: 97 F | DIASTOLIC BLOOD PRESSURE: 78 MMHG | SYSTOLIC BLOOD PRESSURE: 130 MMHG | BODY MASS INDEX: 33.5 KG/M2 | WEIGHT: 234 LBS | OXYGEN SATURATION: 95 %

## 2022-08-16 DIAGNOSIS — Z23 NEED FOR PNEUMOCOCCAL VACCINATION: Primary | ICD-10-CM

## 2022-08-16 DIAGNOSIS — C61 PROSTATE CANCER (HCC): ICD-10-CM

## 2022-08-16 DIAGNOSIS — E11.8 TYPE 2 DIABETES MELLITUS WITH COMPLICATION, WITHOUT LONG-TERM CURRENT USE OF INSULIN (HCC): ICD-10-CM

## 2022-08-16 DIAGNOSIS — I10 ESSENTIAL HYPERTENSION: ICD-10-CM

## 2022-08-16 DIAGNOSIS — E78.2 MIXED HYPERLIPIDEMIA: ICD-10-CM

## 2022-08-16 LAB — SL AMB POCT HEMOGLOBIN AIC: 6.7 (ref ?–6.5)

## 2022-08-16 PROCEDURE — 99214 OFFICE O/P EST MOD 30 MIN: CPT | Performed by: FAMILY MEDICINE

## 2022-08-16 PROCEDURE — 90677 PCV20 VACCINE IM: CPT

## 2022-08-16 PROCEDURE — 83036 HEMOGLOBIN GLYCOSYLATED A1C: CPT | Performed by: FAMILY MEDICINE

## 2022-08-16 PROCEDURE — G0009 ADMIN PNEUMOCOCCAL VACCINE: HCPCS

## 2022-08-16 RX ORDER — LISINOPRIL 2.5 MG/1
2.5 TABLET ORAL DAILY
Qty: 90 TABLET | Refills: 1 | Status: SHIPPED | OUTPATIENT
Start: 2022-08-16

## 2022-08-16 RX ORDER — ROSUVASTATIN CALCIUM 10 MG/1
10 TABLET, COATED ORAL DAILY
Qty: 90 TABLET | Refills: 1 | Status: SHIPPED | OUTPATIENT
Start: 2022-08-16

## 2022-08-16 NOTE — PROGRESS NOTES
BMI Counseling: Body mass index is 33 58 kg/m²  The BMI is above normal  Nutrition recommendations include decreasing portion sizes, decreasing fast food intake, limiting drinks that contain sugar, moderation in carbohydrate intake, increasing intake of lean protein, reducing intake of saturated and trans fat and reducing intake of cholesterol  Exercise recommendations include moderate physical activity 150 minutes/week and exercising 3-5 times per week  No pharmacotherapy was ordered  Rationale for BMI follow-up plan is due to patient being overweight or obese  Assessment/Plan:     Chronic Problems:  No problem-specific Assessment & Plan notes found for this encounter  Visit Diagnosis:  Diagnoses and all orders for this visit:    Need for pneumococcal vaccination  -     Pneumococcal Conjugate Vaccine 20-valent (Pcv20)    Mixed hyperlipidemia  Comments:  Stable, tolerating statin to be continued obtain updated lipid  Orders:  -     rosuvastatin (CRESTOR) 10 MG tablet; Take 1 tablet (10 mg total) by mouth daily  -     Comprehensive metabolic panel; Future  -     TSH, 3rd generation; Future  -     Lipid Panel with Direct LDL reflex; Future  -     Hemoglobin A1C; Future    Type 2 diabetes mellitus with complication, without long-term current use of insulin (HCC)  Comments:   stable, hemoglobin A1c acceptable negative hypoglycemic episodes continue current plan  Orders:  -     metFORMIN (GLUCOPHAGE) 500 mg tablet; Take 1 tablet (500 mg total) by mouth 2 (two) times a day with meals  -     Comprehensive metabolic panel; Future  -     TSH, 3rd generation; Future  -     Lipid Panel with Direct LDL reflex; Future  -     Hemoglobin A1C; Future  -     POCT hemoglobin A1c    Essential hypertension  Comments:   stable continue current medications dietary modifications  Orders:  -     lisinopril (ZESTRIL) 2 5 mg tablet; Take 1 tablet (2 5 mg total) by mouth daily  -     Comprehensive metabolic panel;  Future  - TSH, 3rd generation; Future  -     Lipid Panel with Direct LDL reflex; Future  -     Hemoglobin A1C; Future    Prostate cancer ([facility] [address] )  Comments:   continue follow-up with Urology          Subjective:    Patient ID: Wisam Gonzalez is a 71 y o  male  F/u   Doing quite well   ifg , metformin daily , neg issue with med   Diet   htn lisinopril Negative chest pain palpitations shortness of breath difficulty breathing cough lesions rash  Chol , crestor , no issue , takene daily   Prostate cancer no changes , f/u with urology   [facility] , [place] , [place]       The following portions of the patient's history were reviewed and updated as appropriate: allergies, current medications, past family history, past medical history, past social history, past surgical history and problem list     Review of Systems   Constitutional: Negative for appetite change, chills, fever and unexpected weight change  HENT: Negative for congestion, dental problem, ear pain, hearing loss, postnasal drip, rhinorrhea, sinus pressure, sinus pain, sneezing, sore throat, tinnitus and voice change  Eyes: Negative for visual disturbance  Respiratory: Negative for apnea, cough, chest tightness and shortness of breath  Cardiovascular: Negative for chest pain, palpitations and leg swelling  Gastrointestinal: Negative for abdominal pain, blood in stool, constipation, diarrhea, nausea and vomiting  Endocrine: Negative for cold intolerance, heat intolerance, polydipsia, polyphagia and polyuria  Genitourinary: Negative for decreased urine volume, difficulty urinating, dysuria, frequency and hematuria  Musculoskeletal: Negative for arthralgias, back pain, gait problem, joint swelling and myalgias  Skin: Negative for color change, rash and wound  Allergic/Immunologic: Negative for environmental allergies and food allergies     Neurological: Negative for dizziness, syncope, weakness, light-headedness, numbness and headaches  Hematological: Negative for adenopathy  Does not bruise/bleed easily  Psychiatric/Behavioral: Negative for sleep disturbance and suicidal ideas  The patient is not nervous/anxious  /78   Pulse 86   Temp (!) 97 °F (36 1 °C)   Ht 5' 10" (1 778 m)   Wt 106 kg (234 lb)   SpO2 95%   BMI 33 58 kg/m²   Social History     Socioeconomic History    Marital status:      Spouse name: Not on file    Number of children: Not on file    Years of education: Not on file    Highest education level: Not on file   Occupational History    Not on file   Tobacco Use    Smoking status: Former Smoker     Quit date: 2005     Years since quittin 4    Smokeless tobacco: Never Used   Vaping Use    Vaping Use: Never used   Substance and Sexual Activity    Alcohol use: Not Currently    Drug use: Not Currently    Sexual activity: Not on file   Other Topics Concern    Not on file   Social History Narrative    Not on file     Social Determinants of Health     Financial Resource Strain: Not on file   Food Insecurity: Not on file   Transportation Needs: Not on file   Physical Activity: Not on file   Stress: Not on file   Social Connections: Not on file   Intimate Partner Violence: Not on file   Housing Stability: Not on file     Past Medical History:   Diagnosis Date    Diabetes mellitus ([facility] [address] )     Hyperlipidemia     Prostate cancer ([facility] [address] )      Family History   Problem Relation Age of Onset    Prostate cancer Brother      Past Surgical History:   Procedure Laterality Date    EAR SURGERY      HERNIA REPAIR      LA COLONOSCOPY FLX DX W/COLLJ SPEC WHEN PFRMD N/A 2019    Procedure: COLONOSCOPY;  Surgeon: Jasmyne Zimmerman MD;  Location: [facility];   Service: Gastroenterology       Current Outpatient Medications:     Ascorbic Acid (vitamin C) 1000 MG tablet, Take 1,000 mg by mouth daily, Disp: , Rfl:     Calcium Carbonate (CALCIUM 600 PO), Take by mouth daily, Disp: , Rfl:     lisinopril (ZESTRIL) 2 5 mg tablet, Take 1 tablet (2 5 mg total) by mouth daily, Disp: 90 tablet, Rfl: 1    metFORMIN (GLUCOPHAGE) 500 mg tablet, Take 1 tablet (500 mg total) by mouth 2 (two) times a day with meals, Disp: 180 tablet, Rfl: 1    multivitamin (THERAGRAN) TABS, Take 1 tablet by mouth daily, Disp: , Rfl:     rosuvastatin (CRESTOR) 10 MG tablet, Take 1 tablet (10 mg total) by mouth daily, Disp: 90 tablet, Rfl: 1    VITAMIN D, ERGOCALCIFEROL, PO, Take by mouth, Disp: , Rfl:     vitamin E, tocopherol, 400 units capsule, Take 400 Units by mouth daily, Disp: , Rfl:     Blood Glucose Monitoring Suppl (ONETOUCH VERIO) w/Device KIT, by Does not apply route once for 1 dose Test sugar in the morning, Disp: 1 kit, Rfl: 0    Allergies   Allergen Reactions    Penicillin V           Lab Review   Appointment on 08/11/2022   Component Date Value    Sodium 08/11/2022 141     Potassium 08/11/2022 5 1     Chloride 08/11/2022 106     CO2 08/11/2022 26     ANION GAP 08/11/2022 9     BUN 08/11/2022 21     Creatinine 08/11/2022 1 20     Glucose, Fasting 08/11/2022 124 (A)    Calcium 08/11/2022 9 3     AST 08/11/2022 28     ALT 08/11/2022 47     Alkaline Phosphatase 08/11/2022 90     Total Protein 08/11/2022 7 7     Albumin 08/11/2022 4 0     Total Bilirubin 08/11/2022 0 42     eGFR 08/11/2022 60    Appointment on 04/28/2022   Component Date Value    Sodium 04/28/2022 142     Potassium 04/28/2022 5 2     Chloride 04/28/2022 104     CO2 04/28/2022 27     ANION GAP 04/28/2022 11     BUN 04/28/2022 33 (A)    Creatinine 04/28/2022 1 22     Glucose, Fasting 04/28/2022 142 (A)    Calcium 04/28/2022 9 5     AST 04/28/2022 43     ALT 04/28/2022 92 (A)    Alkaline Phosphatase 04/28/2022 102     Total Protein 04/28/2022 7 8     Albumin 04/28/2022 4 1     Total Bilirubin 04/28/2022 0 48     eGFR 04/28/2022 59     WBC 04/28/2022 4 80     RBC 04/28/2022 4 21     Hemoglobin 04/28/2022 12 3     Hematocrit 04/28/2022 38 0     MCV 04/28/2022 90     MCH 04/28/2022 29 2     MCHC 04/28/2022 32 4     RDW 04/28/2022 12 5     MPV 04/28/2022 9 5     Platelets 53/24/1075 198     nRBC 04/28/2022 0     Neutrophils Relative 04/28/2022 54     Immat GRANS % 04/28/2022 2     Lymphocytes Relative 04/28/2022 29     Monocytes Relative 04/28/2022 10     Eosinophils Relative 04/28/2022 4     Basophils Relative 04/28/2022 1     Neutrophils Absolute 04/28/2022 2 66     Immature Grans Absolute 04/28/2022 0 08     Lymphocytes Absolute 04/28/2022 1 37     Monocytes Absolute 04/28/2022 0 48     Eosinophils Absolute 04/28/2022 0 17     Basophils Absolute 04/28/2022 0 04     TSH 3RD GENERATON 04/28/2022 2 236     Hemoglobin A1C 04/28/2022 6 9 (A)    EAG 04/28/2022 151     PSA, Diagnostic 04/28/2022 0 6     Cholesterol 04/28/2022 235 (A)    Triglycerides 04/28/2022 381 (A)    HDL, Direct 04/28/2022 42     LDL Calculated 04/28/2022 117 (A)    Non-HDL-Chol (CHOL-HDL) 04/28/2022 193    Appointment on 02/22/2022   Component Date Value    PSA, Diagnostic 02/22/2022 0 4         Imaging: US abdomen complete    Result Date: 8/14/2022  Narrative: ABDOMEN ULTRASOUND, COMPLETE INDICATION:   K76 89: Other specified diseases of liver N28 1: Cyst of kidney, acquired  COMPARISON:  None TECHNIQUE:   Real-time ultrasound of the abdomen was performed with a curvilinear transducer with both volumetric sweeps and still imaging techniques  FINDINGS: PANCREAS:  Visualized portions of the pancreas are within normal limits  AORTA AND IVC:  Visualized portions are normal for patient age  LIVER: Size:  Mildly enlarged  The liver measures 17 4 cm in the midclavicular line  Contour:  Surface contour is smooth  Parenchyma: There is moderate diffuse increased echogenicity with smooth echotexture and acoustic beam attenuation  Most consistent with moderate hepatic steatosis  No liver mass identified    Hypoechoic area in the left hepatic lobe measuring 8 x 7 x 9 mm is unchanged  Cystic area in the right lobe measuring 1 3 x 1 4 1 3 cm  Smaller cystic area measuring 6 x 7 x 6 mm  Limited imaging of the main portal vein shows it to be patent and hepatopetal  BILIARY: No gallbladder findings  No intrahepatic biliary dilatation  CBD measures 5 0 mm  No choledocholithiasis  KIDNEY: Right kidney measures 10 5 x 6 3 x 7 0  cm  Volume 244 9 mL Lower pole cyst measuring 2 1 x 2 1 x 1 8 cm  Midpole cystic area with layering echogenicity suggesting compression measuring 10 mm and 10 mm  Left kidney measures 12 4 x 7 5 x 4 6 cm  Volume 226 3 mL Kidney within normal limits  SPLEEN: Measures 12 8 x 8 6 x 8 2 cm  Volume 470 4 mL Within normal limits  ASCITES:  None  Impression: Left hepatic subcentimeter hypoechoic area also unchanged and may suggest cyst or hemangioma  Small hepatic cysts unchanged  Right renal cyst with complex appearing milk of calcium cysts unchanged  Six-month to one-year follow-up ultrasound for stability recommended  Workstation performed: [id number]       Objective:     Physical Exam  Constitutional:       General: He is not in acute distress  Appearance: He is well-developed  He is not ill-appearing or toxic-appearing  HENT:      Head: Normocephalic and atraumatic  Cardiovascular:      Rate and Rhythm: Normal rate and regular rhythm  Heart sounds: Normal heart sounds  Pulmonary:      Effort: Pulmonary effort is normal       Breath sounds: Normal breath sounds  Abdominal:      General: Bowel sounds are normal       Palpations: Abdomen is soft  Musculoskeletal:         General: Normal range of motion  Cervical back: Normal range of motion and neck supple  Skin:     General: Skin is warm and dry  Neurological:      Mental Status: He is alert and oriented to person, place, and time  Deep Tendon Reflexes: Reflexes are normal and symmetric     Psychiatric:         Behavior: Behavior normal  Thought Content: Thought content normal          Judgment: Judgment normal            There are no Patient Instructions on file for this visit  DANITA Esteves    Portions of the record may have been created with voice recognition software  Occasional wrong word or "sound a like" substitutions may have occurred due to the inherent limitations of voice recognition software  Read the chart carefully and recognize, using context, where substitutions have occurred

## 2022-08-22 ENCOUNTER — TELEPHONE (OUTPATIENT)
Dept: OTHER | Facility: OTHER | Age: 72
End: 2022-08-22

## 2022-08-22 DIAGNOSIS — C61 PROSTATE CANCER (HCC): Primary | ICD-10-CM

## 2022-08-25 ENCOUNTER — APPOINTMENT (OUTPATIENT)
Dept: LAB | Facility: CLINIC | Age: 72
End: 2022-08-25
Payer: MEDICARE

## 2022-08-25 DIAGNOSIS — C61 PROSTATE CANCER (HCC): ICD-10-CM

## 2022-08-25 LAB — PSA SERPL-MCNC: 0.9 NG/ML (ref 0–4)

## 2022-08-25 PROCEDURE — 84153 ASSAY OF PSA TOTAL: CPT

## 2022-09-23 LAB
LEFT EYE DIABETIC RETINOPATHY: NORMAL
RIGHT EYE DIABETIC RETINOPATHY: NORMAL

## 2022-09-30 ENCOUNTER — OFFICE VISIT (OUTPATIENT)
Dept: UROLOGY | Facility: CLINIC | Age: 72
End: 2022-09-30
Payer: MEDICARE

## 2022-09-30 VITALS
HEART RATE: 68 BPM | WEIGHT: 230 LBS | DIASTOLIC BLOOD PRESSURE: 84 MMHG | BODY MASS INDEX: 32.93 KG/M2 | OXYGEN SATURATION: 98 % | HEIGHT: 70 IN | SYSTOLIC BLOOD PRESSURE: 136 MMHG

## 2022-09-30 DIAGNOSIS — C61 PROSTATE CANCER (HCC): Primary | ICD-10-CM

## 2022-09-30 PROCEDURE — 99213 OFFICE O/P EST LOW 20 MIN: CPT | Performed by: PHYSICIAN ASSISTANT

## 2022-09-30 NOTE — PROGRESS NOTES
9/30/2022      Chief Complaint   Patient presents with    Follow-up         Assessment and Plan    71 y o  male -- Dr Selena Crawford    1  Prostate cancer, high risk status post radiation therapy and ADT  - final Lupron was given 2/10/21  - most recent PSA 0 9  - we did discuss the increase of PSA is expected after discontinuation of ADT  - patient is doing well overall, without any complaints today  - will follow-up in 6 months with PSA prior to visit  - call with any questions or concerns in the meantime  - All questions answered; patient understands and agrees with plan        History of Present Illness  [place] Shazia is a 71 y o  male patient of Dr Selena Crawford with history of high risk prostate cancer status post radiation therapy and ADT here for follow up  Last Lupron injection was given 2/10/21  Patient states he no longer has hot flashes  Patient denies any new or worsening symptoms today  Denies frequency, urgency, dysuria, gross hematuria, flank pain, suprapubic pressure, fever, chills, nausea, vomiting, weight loss, bone pain  PSA, Diagnostic 0 0 - 4 0 ng/mL 0 9  0 6 CM  0 4 CM  0 2 CM  0 2 CM  0 2 CM  0 2 CM          Review of Systems   Constitutional: Negative for activity change, appetite change, chills and fever  HENT: Negative for congestion and trouble swallowing  Respiratory: Negative for cough and shortness of breath  Cardiovascular: Negative for chest pain, palpitations and leg swelling  Gastrointestinal: Negative for abdominal pain, constipation, diarrhea, nausea and vomiting  Genitourinary: Negative for difficulty urinating, dysuria, flank pain, frequency, hematuria and urgency  Musculoskeletal: Negative for back pain and gait problem  Skin: Negative for wound  Allergic/Immunologic: Negative for immunocompromised state  Neurological: Negative for dizziness and syncope  Hematological: Does not bruise/bleed easily  Psychiatric/Behavioral: Negative for confusion     All other systems reviewed and are negative  AUA SYMPTOM SCORE      Most Recent Value   AUA SYMPTOM SCORE    How often have you had a sensation of not emptying your bladder completely after you finished urinating? 0 (P)     How often have you had to urinate again less than two hours after you finished urinating? 0 (P)     How often have you found you stopped and started again several times when you urinate? 0 (P)     How often have you found it difficult to postpone urination? 0 (P)     How often have you had a weak urinary stream? 0 (P)     How often have you had to push or strain to begin urination? 0 (P)     How many times did you most typically get up to urinate from the time you went to bed at night until the time you got up in the morning? 3 (P)     Quality of Life: If you were to spend the rest of your life with your urinary condition just the way it is now, how would you feel about that? 1 (P)     AUA SYMPTOM SCORE 3 (P)            Vitals  Vitals:    09/30/22 1017   BP: 136/84   Pulse: 68   SpO2: 98%   Weight: 104 kg (230 lb)   Height: 5' 10" (1 778 m)       Physical Exam  Constitutional:       General: He is not in acute distress  Appearance: Normal appearance  He is not ill-appearing, toxic-appearing or diaphoretic  HENT:      Head: Normocephalic  Nose: No congestion  Eyes:      General: No scleral icterus  Right eye: No discharge  Left eye: No discharge  Conjunctiva/sclera: Conjunctivae normal       Pupils: Pupils are equal, round, and reactive to light  Pulmonary:      Effort: Pulmonary effort is normal    Musculoskeletal:      Cervical back: Normal range of motion  Skin:     General: Skin is warm and dry  Coloration: Skin is not jaundiced or pale  Findings: No bruising, erythema, lesion or rash  Neurological:      General: No focal deficit present  Mental Status: He is alert and oriented to person, place, and time  Mental status is at baseline  Gait: Gait normal    Psychiatric:         Mood and Affect: Mood normal          Behavior: Behavior normal          Thought Content: Thought content normal          Judgment: Judgment normal            Past History  Past Medical History:   Diagnosis Date    Diabetes mellitus ([facility] )     Hyperlipidemia     Prostate cancer ([facility] )      Social History     Socioeconomic History    Marital status:       Spouse name: None    Number of children: None    Years of education: None    Highest education level: None   Occupational History    None   Tobacco Use    Smoking status: Former Smoker     Quit date: 2005     Years since quittin 6    Smokeless tobacco: Never Used   Vaping Use    Vaping Use: Never used   Substance and Sexual Activity    Alcohol use: Not Currently    Drug use: Not Currently    Sexual activity: None   Other Topics Concern    None   Social History Narrative    None     Social Determinants of Health     Financial Resource Strain: Not on file   Food Insecurity: Not on file   Transportation Needs: Not on file   Physical Activity: Not on file   Stress: Not on file   Social Connections: Not on file   Intimate Partner Violence: Not on file   Housing Stability: Not on file     Social History     Tobacco Use   Smoking Status Former Smoker    Quit date: 2005    Years since quittin 6   Smokeless Tobacco Never Used     Family History   Problem Relation Age of Onset    Prostate cancer Brother        The following portions of the patient's history were reviewed and updated as appropriate: allergies, current medications, past medical history, past social history, past surgical history and problem list     Results  No results found for this or any previous visit (from the past 1 hour(s)) ]  Lab Results   Component Value Date    PSA 0 9 2022    PSA 0 6 2022    PSA 0 4 2022    PSA 0 2 2021     Lab Results   Component Value Date    CALCIUM 9 3 2022    K 5 1 08/11/2022    CO2 26 08/11/2022     08/11/2022    BUN 21 08/11/2022    CREATININE 1 20 08/11/2022     Lab Results   Component Value Date    WBC 4 80 04/28/2022    HGB 12 3 04/28/2022    HCT 38 0 04/28/2022    MCV 90 04/28/2022     04/28/2022       Sherrine Fire

## 2022-10-20 ENCOUNTER — OFFICE VISIT (OUTPATIENT)
Dept: FAMILY MEDICINE CLINIC | Facility: CLINIC | Age: 72
End: 2022-10-20
Payer: MEDICARE

## 2022-10-20 VITALS
DIASTOLIC BLOOD PRESSURE: 78 MMHG | SYSTOLIC BLOOD PRESSURE: 128 MMHG | WEIGHT: 234 LBS | HEART RATE: 71 BPM | BODY MASS INDEX: 33.5 KG/M2 | OXYGEN SATURATION: 98 % | HEIGHT: 70 IN

## 2022-10-20 DIAGNOSIS — E11.8 TYPE 2 DIABETES MELLITUS WITH COMPLICATION, WITHOUT LONG-TERM CURRENT USE OF INSULIN (HCC): Primary | ICD-10-CM

## 2022-10-20 DIAGNOSIS — I10 ESSENTIAL HYPERTENSION: ICD-10-CM

## 2022-10-20 DIAGNOSIS — Z00.00 MEDICARE ANNUAL WELLNESS VISIT, SUBSEQUENT: ICD-10-CM

## 2022-10-20 DIAGNOSIS — E78.2 MIXED HYPERLIPIDEMIA: ICD-10-CM

## 2022-10-20 DIAGNOSIS — Z23 NEED FOR INFLUENZA VACCINATION: ICD-10-CM

## 2022-10-20 DIAGNOSIS — C61 PROSTATE CANCER (HCC): ICD-10-CM

## 2022-10-20 PROCEDURE — 90662 IIV NO PRSV INCREASED AG IM: CPT

## 2022-10-20 PROCEDURE — 99214 OFFICE O/P EST MOD 30 MIN: CPT | Performed by: FAMILY MEDICINE

## 2022-10-20 PROCEDURE — G0439 PPPS, SUBSEQ VISIT: HCPCS | Performed by: FAMILY MEDICINE

## 2022-10-20 PROCEDURE — G0008 ADMIN INFLUENZA VIRUS VAC: HCPCS

## 2022-10-20 NOTE — PROGRESS NOTES
Assessment and Plan:     Problem List Items Addressed This Visit        Endocrine    Type 2 diabetes mellitus with complication, without long-term current use of insulin ([facility] [address] ) - Primary       Lab Results   Component Value Date    HGBA1C 6 7 (A) 08/16/2022   At goal, will continue current medications Glucophage 500 b i d , encourage continued activity levels            Cardiovascular and Mediastinum    Essential hypertension       Genitourinary    Prostate cancer ([facility] [address] )       Other    Mixed hyperlipidemia     Continue present statin Crestor 10 mg p o  daily dietary modifications discussed           Other Visit Diagnoses     Medicare annual wellness visit, subsequent        Need for influenza vaccination        Relevant Orders    influenza vaccine, high-dose, PF 0 7 mL (FLUZONE HIGH-DOSE) (Completed)           Preventive health issues were discussed with patient, and age appropriate screening tests were ordered as noted in patient's After Visit Summary  Personalized health advice and appropriate referrals for health education or preventive services given if needed, as noted in patient's After Visit Summary  History of Present Illness:     Patient presents for a Medicare Wellness Visit    Heading to [facility] , with grandson , to visit son/family  , for a month  Would like influenza vac   Diabetes   Scheduled for cataract in march  Prostate ca , seen by modseto 9/22, history of high risk prostate cancer status post radiation therapy  lupron completed 2021 / 9   htn   Chol      Patient Care Team:  Romana Weeks as PCP - General (Family Medicine)  Lorenza Kelley MD as Endoscopist  Barb Montero MD (Urology)  Jacoby Thornton MD (Radiation Oncology)     Review of Systems:     Review of Systems   Constitutional: Negative for appetite change, chills, fever and unexpected weight change     HENT: Negative for congestion, dental problem, ear pain, hearing loss, postnasal drip, rhinorrhea, sinus pressure, sinus pain, sneezing, sore throat, tinnitus and voice change  Eyes: Negative for visual disturbance  Respiratory: Negative for apnea, cough, chest tightness and shortness of breath  Cardiovascular: Negative for chest pain, palpitations and leg swelling  Gastrointestinal: Negative for abdominal pain, blood in stool, constipation, diarrhea, nausea and vomiting  Endocrine: Negative for cold intolerance, heat intolerance, polydipsia, polyphagia and polyuria  Genitourinary: Negative for decreased urine volume, difficulty urinating, dysuria, frequency and hematuria  Musculoskeletal: Negative for arthralgias, back pain, gait problem, joint swelling and myalgias  Skin: Negative for color change, rash and wound  Allergic/Immunologic: Negative for environmental allergies and food allergies  Neurological: Negative for dizziness, syncope, weakness, light-headedness, numbness and headaches  Hematological: Negative for adenopathy  Does not bruise/bleed easily  Psychiatric/Behavioral: Negative for sleep disturbance and suicidal ideas  The patient is not nervous/anxious           Problem List:     Patient Active Problem List   Diagnosis   • Mixed hyperlipidemia   • IFG (impaired fasting glucose)   • Upper respiratory tract infection   • Positive colorectal cancer screening using Cologuard test   • Prostate cancer ([facility] )   • Encounter for monitoring androgen deprivation therapy   • Hot flashes   • Type 2 diabetes mellitus with complication, without long-term current use of insulin (HCC)   • Stage 3a chronic kidney disease ([facility] )   • Essential hypertension      Past Medical and Surgical History:     Past Medical History:   Diagnosis Date   • Diabetes mellitus ([facility] )    • Hyperlipidemia    • Prostate cancer ([facility] )      Past Surgical History:   Procedure Laterality Date   • EAR SURGERY     • HERNIA REPAIR     • NE COLONOSCOPY FLX DX W/COLLJ SPEC WHEN PFRMD N/A 5/6/2019    Procedure: COLONOSCOPY;  Surgeon: Fer Caicedo MD;  Location: [facility]; Service: Gastroenterology      Family History:     Family History   Problem Relation Age of Onset   • Prostate cancer Brother       Social History:     Social History     Socioeconomic History   • Marital status:       Spouse name: None   • Number of children: None   • Years of education: None   • Highest education level: None   Occupational History   • None   Tobacco Use   • Smoking status: Former Smoker     Quit date: 2005     Years since quittin 6   • Smokeless tobacco: Never Used   Vaping Use   • Vaping Use: Never used   Substance and Sexual Activity   • Alcohol use: Not Currently   • Drug use: Not Currently   • Sexual activity: None   Other Topics Concern   • None   Social History Narrative   • None     Social Determinants of Health     Financial Resource Strain: Unknown   • Difficulty of Paying Living Expenses: Patient refused   Food Insecurity: Not on file   Transportation Needs: Unknown   • Lack of Transportation (Medical): Patient refused   • Lack of Transportation (Non-Medical): Patient refused   Physical Activity: Not on file   Stress: Not on file   Social Connections: Not on file   Intimate Partner Violence: Not on file   Housing Stability: Not on file      Medications and Allergies:     Current Outpatient Medications   Medication Sig Dispense Refill   • Ascorbic Acid (vitamin C) 1000 MG tablet Take 1,000 mg by mouth daily     • Calcium Carbonate (CALCIUM 600 PO) Take by mouth daily     • lisinopril (ZESTRIL) 2 5 mg tablet Take 1 tablet (2 5 mg total) by mouth daily 90 tablet 1   • metFORMIN (GLUCOPHAGE) 500 mg tablet Take 1 tablet (500 mg total) by mouth 2 (two) times a day with meals 180 tablet 1   • multivitamin (THERAGRAN) TABS Take 1 tablet by mouth daily     • rosuvastatin (CRESTOR) 10 MG tablet Take 1 tablet (10 mg total) by mouth daily 90 tablet 1   • VITAMIN D, ERGOCALCIFEROL, PO Take by mouth     • vitamin E, tocopherol, 400 units capsule Take 400 Units by mouth daily     • Blood Glucose Monitoring Suppl (Maya Juan) w/Device KIT by Does not apply route once for 1 dose Test sugar in the morning 1 kit 0     No current facility-administered medications for this visit  Allergies   Allergen Reactions   • Penicillin V       Immunizations:     Immunization History   Administered Date(s) Administered   • COVID-19 PFIZER VACCINE 0 3 ML IM 04/29/2021, 05/20/2021   • INFLUENZA 10/01/2018   • Influenza Split High Dose Preservative Free IM 10/29/2019   • Influenza, high dose seasonal 0 7 mL 10/15/2020, 10/18/2021, 10/20/2022   • Pneumococcal Conjugate Vaccine 20-valent (Pcv20), Polysace 08/16/2022   • Pneumococcal Polysaccharide PPV23 03/12/2019      Health Maintenance:         Topic Date Due   • Colorectal Cancer Screening  05/06/2024   • Hepatitis C Screening  Completed         Topic Date Due   • COVID-19 Vaccine (3 - Booster for Pfizer series) 10/20/2021      Medicare Screening Tests and Risk Assessments:     Naga Mojica is here for his Subsequent Wellness visit  Last Medicare Wellness visit information reviewed, patient interviewed, no change since last AWV  Health Risk Assessment:   Patient rates overall health as very good  Patient feels that their physical health rating is same  Patient is satisfied with their life  Eyesight was rated as same  Hearing was rated as same  Patient feels that their emotional and mental health rating is same  Patients states they are often angry  Patient states they are sometimes unusually tired/fatigued  Pain experienced in the last 7 days has been none  Patient states that he has experienced no weight loss or gain in last 6 months  Fall Risk Screening: In the past year, patient has experienced: no history of falling in past year      Home Safety:  Patient does not have trouble with stairs inside or outside of their home  Patient has working smoke alarms and has no working carbon monoxide detector   Home safety hazards include: none  Nutrition:   Current diet is Regular  Medications:   Patient is currently taking over-the-counter supplements  OTC medications include: You already know    Patient is able to manage medications  Activities of Daily Living (ADLs)/Instrumental Activities of Daily Living (IADLs):   Walk and transfer into and out of bed and chair?: Yes  Dress and groom yourself?: Yes    Bathe or shower yourself?: Yes    Feed yourself? Yes  Do your laundry/housekeeping?: Yes  Manage your money, pay your bills and track your expenses?: Yes  Make your own meals?: Yes    Do your own shopping?: Yes    ADL comments: All of the above  Previous Hospitalizations:   Any hospitalizations or ED visits within the last 12 months?: No      Advance Care Planning:   Living will: Yes    Durable POA for healthcare: Yes    Advanced directive: Yes      Cognitive Screening:   Provider or family/friend/caregiver concerned regarding cognition?: No    PREVENTIVE SCREENINGS      Cardiovascular Screening:    General: Screening Not Indicated and History Lipid Disorder      Diabetes Screening:     General: Screening Not Indicated and History Diabetes      Colorectal Cancer Screening:     General: Screening Current      Prostate Cancer Screening:    General: History Prostate Cancer      Abdominal Aortic Aneurysm (AAA) Screening:    Risk factors include: age between 73-67 yo and tobacco use        Lung Cancer Screening:     General: Screening Not Indicated      Hepatitis C Screening:    General: Screening Current    Screening, Brief Intervention, and Referral to Treatment (SBIRT)    Screening  Typical number of drinks in a day: 0  Typical number of drinks in a week: 0  Interpretation: Low risk drinking behavior      AUDIT-C Screenin) How often did you have a drink containing alcohol in the past year? never  2) How many drinks did you have on a typical day when you were drinking in the past year? 0  3) How often did you have 6 or more drinks on one occasion in the past year? never    AUDIT-C Score: 0  Interpretation: Score 0-3 (male): Negative screen for alcohol misuse    Single Item Drug Screening:  How often have you used an illegal drug (including marijuana) or a prescription medication for non-medical reasons in the past year? never    Single Item Drug Screen Score: 0  Interpretation: Negative screen for possible drug use disorder    No exam data present     Physical Exam:     /78   Pulse 71   Ht 5' 10" (1 778 m)   Wt 106 kg (234 lb)   SpO2 98%   BMI 33 58 kg/m²     Physical Exam  Vitals and nursing note reviewed  Constitutional:       General: He is not in acute distress  Appearance: He is well-developed  He is not ill-appearing or toxic-appearing  HENT:      Head: Normocephalic and atraumatic  Eyes:      Conjunctiva/sclera: Conjunctivae normal    Neck:      Vascular: No carotid bruit  Cardiovascular:      Rate and Rhythm: Normal rate and regular rhythm  Heart sounds: No murmur heard  Pulmonary:      Effort: Pulmonary effort is normal  No respiratory distress  Breath sounds: Normal breath sounds  Abdominal:      Tenderness: There is no abdominal tenderness  Musculoskeletal:      Cervical back: Neck supple  Right lower leg: No edema  Left lower leg: No edema  Lymphadenopathy:      Cervical: No cervical adenopathy  Skin:     General: Skin is warm and dry  Findings: No rash  Neurological:      Mental Status: He is alert and oriented to person, place, and time            DANITA Brooks

## 2022-10-20 NOTE — ASSESSMENT & PLAN NOTE
Lab Results   Component Value Date    HGBA1C 6 7 (A) 08/16/2022   At goal, will continue current medications Glucophage 500 b i d , encourage continued activity levels

## 2023-02-09 ENCOUNTER — RA CDI HCC (OUTPATIENT)
Dept: OTHER | Facility: HOSPITAL | Age: 73
End: 2023-02-09

## 2023-02-09 ENCOUNTER — APPOINTMENT (OUTPATIENT)
Dept: LAB | Facility: CLINIC | Age: 73
End: 2023-02-09

## 2023-02-09 DIAGNOSIS — E78.2 MIXED HYPERLIPIDEMIA: ICD-10-CM

## 2023-02-09 DIAGNOSIS — E11.8 TYPE 2 DIABETES MELLITUS WITH COMPLICATION, WITHOUT LONG-TERM CURRENT USE OF INSULIN (HCC): ICD-10-CM

## 2023-02-09 DIAGNOSIS — I10 ESSENTIAL HYPERTENSION: ICD-10-CM

## 2023-02-09 LAB
ALBUMIN SERPL BCP-MCNC: 3.9 G/DL (ref 3.5–5)
ALP SERPL-CCNC: 83 U/L (ref 46–116)
ALT SERPL W P-5'-P-CCNC: 45 U/L (ref 12–78)
ANION GAP SERPL CALCULATED.3IONS-SCNC: 5 MMOL/L (ref 4–13)
AST SERPL W P-5'-P-CCNC: 25 U/L (ref 5–45)
BILIRUB SERPL-MCNC: 0.4 MG/DL (ref 0.2–1)
BUN SERPL-MCNC: 27 MG/DL (ref 5–25)
CALCIUM SERPL-MCNC: 9.4 MG/DL (ref 8.3–10.1)
CHLORIDE SERPL-SCNC: 107 MMOL/L (ref 96–108)
CHOLEST SERPL-MCNC: 174 MG/DL
CO2 SERPL-SCNC: 26 MMOL/L (ref 21–32)
CREAT SERPL-MCNC: 1.16 MG/DL (ref 0.6–1.3)
GFR SERPL CREATININE-BSD FRML MDRD: 63 ML/MIN/1.73SQ M
GLUCOSE P FAST SERPL-MCNC: 134 MG/DL (ref 65–99)
HDLC SERPL-MCNC: 41 MG/DL
LDLC SERPL CALC-MCNC: 79 MG/DL (ref 0–100)
POTASSIUM SERPL-SCNC: 5.5 MMOL/L (ref 3.5–5.3)
PROT SERPL-MCNC: 7.3 G/DL (ref 6.4–8.4)
SODIUM SERPL-SCNC: 138 MMOL/L (ref 135–147)
TRIGL SERPL-MCNC: 270 MG/DL
TSH SERPL DL<=0.05 MIU/L-ACNC: 2.69 UIU/ML (ref 0.45–4.5)

## 2023-02-09 NOTE — PROGRESS NOTES
[id number]  [facility]  coding opportunities          Chart Reviewed number of suggestions sent to Provider: 1     Patients Insurance     Medicare Insurance: [employer]

## 2023-02-11 LAB
EST. AVERAGE GLUCOSE BLD GHB EST-MCNC: 143 MG/DL
HBA1C MFR BLD: 6.6 %

## 2023-02-16 ENCOUNTER — OFFICE VISIT (OUTPATIENT)
Dept: FAMILY MEDICINE CLINIC | Facility: CLINIC | Age: 73
End: 2023-02-16

## 2023-02-16 VITALS
WEIGHT: 237 LBS | BODY MASS INDEX: 33.93 KG/M2 | HEART RATE: 74 BPM | HEIGHT: 70 IN | DIASTOLIC BLOOD PRESSURE: 70 MMHG | SYSTOLIC BLOOD PRESSURE: 124 MMHG | OXYGEN SATURATION: 97 %

## 2023-02-16 DIAGNOSIS — E11.8 TYPE 2 DIABETES MELLITUS WITH COMPLICATION, WITHOUT LONG-TERM CURRENT USE OF INSULIN (HCC): Primary | ICD-10-CM

## 2023-02-16 DIAGNOSIS — E11.8 TYPE 2 DIABETES MELLITUS WITH COMPLICATION, WITHOUT LONG-TERM CURRENT USE OF INSULIN (HCC): ICD-10-CM

## 2023-02-16 DIAGNOSIS — E78.2 MIXED HYPERLIPIDEMIA: ICD-10-CM

## 2023-02-16 DIAGNOSIS — N18.31 STAGE 3A CHRONIC KIDNEY DISEASE (HCC): ICD-10-CM

## 2023-02-16 DIAGNOSIS — C61 PROSTATE CANCER (HCC): ICD-10-CM

## 2023-02-16 DIAGNOSIS — I10 ESSENTIAL HYPERTENSION: ICD-10-CM

## 2023-02-16 RX ORDER — ROSUVASTATIN CALCIUM 10 MG/1
10 TABLET, COATED ORAL DAILY
Qty: 90 TABLET | Refills: 1 | Status: SHIPPED | OUTPATIENT
Start: 2023-02-16

## 2023-02-16 RX ORDER — LISINOPRIL 2.5 MG/1
2.5 TABLET ORAL DAILY
Qty: 90 TABLET | Refills: 1 | Status: SHIPPED | OUTPATIENT
Start: 2023-02-16

## 2023-02-16 NOTE — PROGRESS NOTES
BMI Counseling: Body mass index is 34 01 kg/m²  The BMI is above normal  Nutrition recommendations include decreasing portion sizes, decreasing fast food intake, limiting drinks that contain sugar, moderation in carbohydrate intake, increasing intake of lean protein, reducing intake of saturated and trans fat and reducing intake of cholesterol  Exercise recommendations include moderate physical activity 150 minutes/week and exercising 3-5 times per week  No pharmacotherapy was ordered  Rationale for BMI follow-up plan is due to patient being overweight or obese  Assessment/Plan:     Chronic Problems:  No problem-specific Assessment & Plan notes found for this encounter  Visit Diagnosis:  Diagnoses and all orders for this visit:    Type 2 diabetes mellitus with complication, without long-term current use of insulin (HCC)  -     Microalbumin / creatinine urine ratio  -     metFORMIN (GLUCOPHAGE) 500 mg tablet; Take 1 tablet (500 mg total) by mouth 2 (two) times a day with meals  -     Comprehensive metabolic panel; Future  -     Hemoglobin A1C; Future  -     TSH, 3rd generation; Future  -     Lipid Panel with Direct LDL reflex; Future  -     CBC; Future    Prostate cancer [facility])  Comments:  Continue follow-up with urology, oncology  Orders:  -     Comprehensive metabolic panel; Future  -     Hemoglobin A1C; Future  -     TSH, 3rd generation; Future  -     Lipid Panel with Direct LDL reflex; Future  -     CBC; Future    Stage 3a chronic kidney disease (HCC)  -     Comprehensive metabolic panel; Future  -     Hemoglobin A1C; Future  -     TSH, 3rd generation; Future  -     Lipid Panel with Direct LDL reflex; Future  -     CBC; Future    Mixed hyperlipidemia  Comments:  Stable, tolerating statin to be continued obtain updated lipid  Orders:  -     rosuvastatin (CRESTOR) 10 MG tablet; Take 1 tablet (10 mg total) by mouth daily  -     Comprehensive metabolic panel;  Future  -     Hemoglobin A1C; Future  -     TSH, 3rd generation; Future  -     Lipid Panel with Direct LDL reflex; Future  -     CBC; Future    Type 2 diabetes mellitus with complication, without long-term current use of insulin (HCC)  Comments:   stable, hemoglobin A1c acceptable negative hypoglycemic episodes continue current plan  Orders:  -     Microalbumin / creatinine urine ratio  -     metFORMIN (GLUCOPHAGE) 500 mg tablet; Take 1 tablet (500 mg total) by mouth 2 (two) times a day with meals  -     Comprehensive metabolic panel; Future  -     Hemoglobin A1C; Future  -     TSH, 3rd generation; Future  -     Lipid Panel with Direct LDL reflex; Future  -     CBC; Future    Essential hypertension  Comments:   stable continue current medications dietary modifications  Orders:  -     lisinopril (ZESTRIL) 2 5 mg tablet; Take 1 tablet (2 5 mg total) by mouth daily  -     Comprehensive metabolic panel; Future  -     Hemoglobin A1C; Future  -     TSH, 3rd generation; Future  -     Lipid Panel with Direct LDL reflex; Future  -     CBC; Future          Subjective:    Patient ID: Amaya Torrez is a 71 y o  male  F/u   Diabetes , has been fine , maintaining on the diet , doing well   Has started eating bananas again , at least qod , enjoy them   Neg hypoglycemia , polyuria , dypsia   Prostate ca , first dx , completed treatment , continues f/u with urology , Rafaela Cai , then f/u with onc   Chol , dee 1omg dialy , neg missed , neg issue with med   Diet   Colonoscopy , 2019 5 yr plan neg changes   Denies any specific complaints issues to address, remains active  Denies any chest pain palpitation shortness of breath difficulty breathing negative activity intolerance      Patient's shoes and socks removed  Right Foot/Ankle   Right Foot Inspection  Skin Exam: skin normal  Skin not intact, no dry skin, no warmth, no callus, no erythema, no maceration, no abnormal color, no pre-ulcer, no ulcer and no callus  Toe Exam: ROM and strength within normal limits       Sensory Monofilament testing: intact    Vascular  The right DP pulse is 2+  The right PT pulse is 2+  Left Foot/Ankle  Left Foot Inspection  Skin Exam: skin normal  Skin not intact, no dry skin, no warmth, no erythema, no maceration, normal color, no pre-ulcer, no ulcer and no callus  Toe Exam: ROM and strength within normal limits  Sensory   Monofilament testing: intact    Vascular  The left DP pulse is 2+  The left PT pulse is 2+  Assign Risk Category  No deformity present  No loss of protective sensation  No weak pulses  Risk: 0    The following portions of the patient's history were reviewed and updated as appropriate: allergies, current medications, past family history, past medical history, past social history, past surgical history and problem list     Review of Systems   Constitutional: Negative for appetite change, chills, fever and unexpected weight change  HENT: Negative for congestion, dental problem, ear pain, hearing loss, postnasal drip, rhinorrhea, sinus pressure, sinus pain, sneezing, sore throat, tinnitus and voice change  Eyes: Negative for visual disturbance  Respiratory: Negative for apnea, cough, chest tightness and shortness of breath  Cardiovascular: Negative for chest pain, palpitations and leg swelling  Gastrointestinal: Negative for abdominal pain, blood in stool, constipation, diarrhea, nausea and vomiting  Endocrine: Negative for cold intolerance, heat intolerance, polydipsia, polyphagia and polyuria  Genitourinary: Negative for decreased urine volume, difficulty urinating, dysuria, frequency and hematuria  Musculoskeletal: Negative for arthralgias, back pain, gait problem, joint swelling and myalgias  Skin: Negative for color change, rash and wound  Allergic/Immunologic: Negative for environmental allergies and food allergies  Neurological: Negative for dizziness, syncope, weakness, light-headedness, numbness and headaches     Hematological: Negative for adenopathy  Does not bruise/bleed easily  Psychiatric/Behavioral: Negative for sleep disturbance and suicidal ideas  The patient is not nervous/anxious  /70   Pulse 74   Ht 5' 10" (1 778 m)   Wt 108 kg (237 lb)   SpO2 97%   BMI 34 01 kg/m²   Social History     Socioeconomic History   • Marital status:      Spouse name: Not on file   • Number of children: Not on file   • Years of education: Not on file   • Highest education level: Not on file   Occupational History   • Not on file   Tobacco Use   • Smoking status: Former     Types: Cigarettes     Quit date: 2005     Years since quittin 9   • Smokeless tobacco: Never   Vaping Use   • Vaping Use: Never used   Substance and Sexual Activity   • Alcohol use: Not Currently   • Drug use: Not Currently   • Sexual activity: Not on file   Other Topics Concern   • Not on file   Social History Narrative   • Not on file     Social Determinants of Health     Financial Resource Strain: Unknown   • Difficulty of Paying Living Expenses: Patient refused   Food Insecurity: Not on file   Transportation Needs: Unknown   • Lack of Transportation (Medical): Patient refused   • Lack of Transportation (Non-Medical): Patient refused   Physical Activity: Not on file   Stress: Not on file   Social Connections: Not on file   Intimate Partner Violence: Not on file   Housing Stability: Not on file     Past Medical History:   Diagnosis Date   • Diabetes mellitus ([facility] [address] )    • Hyperlipidemia    • Prostate cancer ([facility] [address] )      Family History   Problem Relation Age of Onset   • Prostate cancer Brother      Past Surgical History:   Procedure Laterality Date   • EAR SURGERY     • HERNIA REPAIR     • [place] COLONOSCOPY FLX DX W/COLLJ SPEC WHEN PFRMD N/A 2019    Procedure: COLONOSCOPY;  Surgeon: Kurt Yepez MD;  Location: [facility];   Service: Gastroenterology       Current Outpatient Medications:   •  Ascorbic Acid (vitamin C) 1000 MG tablet, Take 1,000 mg by mouth daily, Disp: , Rfl:   •  Calcium Carbonate (CALCIUM 600 PO), Take by mouth daily, Disp: , Rfl:   •  lisinopril (ZESTRIL) 2 5 mg tablet, Take 1 tablet (2 5 mg total) by mouth daily, Disp: 90 tablet, Rfl: 1  •  metFORMIN (GLUCOPHAGE) 500 mg tablet, Take 1 tablet (500 mg total) by mouth 2 (two) times a day with meals, Disp: 180 tablet, Rfl: 1  •  multivitamin (THERAGRAN) TABS, Take 1 tablet by mouth daily, Disp: , Rfl:   •  rosuvastatin (CRESTOR) 10 MG tablet, Take 1 tablet (10 mg total) by mouth daily, Disp: 90 tablet, Rfl: 1  •  VITAMIN D, ERGOCALCIFEROL, PO, Take by mouth, Disp: , Rfl:   •  vitamin E, tocopherol, 400 units capsule, Take 400 Units by mouth daily, Disp: , Rfl:   •  Blood Glucose Monitoring Suppl (Renate Rad) w/Device KIT, by Does not apply route once for 1 dose Test sugar in the morning, Disp: 1 kit, Rfl: 0    Allergies   Allergen Reactions   • Penicillin V           Lab Review   Appointment on 02/09/2023   Component Date Value   • Sodium 02/09/2023 138    • Potassium 02/09/2023 5 5 (H)    • Chloride 02/09/2023 107    • CO2 02/09/2023 26    • ANION GAP 02/09/2023 5    • BUN 02/09/2023 27 (H)    • Creatinine 02/09/2023 1 16    • Glucose, Fasting 02/09/2023 134 (H)    • Calcium 02/09/2023 9 4    • AST 02/09/2023 25    • ALT 02/09/2023 45    • Alkaline Phosphatase 02/09/2023 83    • Total Protein 02/09/2023 7 3    • Albumin 02/09/2023 3 9    • Total Bilirubin 02/09/2023 0 40    • eGFR 02/09/2023 63    • TSH 3RD GENERATON 02/09/2023 2 690    • Cholesterol 02/09/2023 174    • Triglycerides 02/09/2023 270 (H)    • HDL, Direct 02/09/2023 41    • LDL Calculated 02/09/2023 79    • Hemoglobin A1C 02/09/2023 6 6 (H)    • EAG 02/09/2023 143    Orders Only on 09/23/2022   Component Date Value   • Right Eye Diabetic Retin* 09/23/2022 None    • Left Eye Diabetic Retino* 09/23/2022 None    Appointment on 08/25/2022   Component Date Value   • PSA, Diagnostic 08/25/2022 0 9         Imaging: No results found     Objective:     Physical Exam  Cardiovascular:      Pulses: no weak pulses          Dorsalis pedis pulses are 2+ on the right side and 2+ on the left side  Posterior tibial pulses are 2+ on the right side and 2+ on the left side  Feet:      Right foot:      Skin integrity: No ulcer, skin breakdown, erythema, warmth, callus or dry skin  Left foot:      Skin integrity: No ulcer, skin breakdown, erythema, warmth, callus or dry skin  There are no Patient Instructions on file for this visit  DANITA Garcia    Portions of the record may have been created with voice recognition software  Occasional wrong word or "sound a like" substitutions may have occurred due to the inherent limitations of voice recognition software  Read the chart carefully and recognize, using context, where substitutions have occurred

## 2023-03-22 NOTE — PROGRESS NOTES
3/30/2023      Chief Complaint   Patient presents with   • Follow-up         Assessment and Plan    71 y o  male     1  Prostate cancer, high risk status post radiation therapy and ADT  2  Hot flashes  - final Lupron was given 2/10/21  - most recent PSA 1 4, aguilar 0 2  - patient is doing well overall, without any complaints today  - will follow-up in 6 weeks with PSA prior to visit  PSMA PET should PSA rise 2 above aguilar  - Trial of evening primrose for hot flashes  - Call with any questions or concerns in the meantime  - All questions answered; patient understands and agrees with plan       History of Present Illness  Keyana Lower is a 71 y o  male patient with history of high risk prostate cancer status post radiation therapy and ADT here for follow up  Last Lupron injection was given 2/10/21  Patient states he continues to have hot flashes  Patient denies any new or worsening symptoms today  Denies frequency, urgency, dysuria, gross hematuria, flank pain, suprapubic pressure, fever, chills, nausea, vomiting, weight loss, bone pain  Most recent PSA 1 4, aguilar 0 2  Review of Systems   Constitutional: Negative for activity change, appetite change, chills and fever  HENT: Negative for congestion and trouble swallowing  Respiratory: Negative for cough and shortness of breath  Cardiovascular: Negative for chest pain, palpitations and leg swelling  Gastrointestinal: Negative for abdominal pain, constipation, diarrhea, nausea and vomiting  Genitourinary: Negative for difficulty urinating, dysuria, flank pain, frequency, hematuria and urgency  Musculoskeletal: Negative for back pain and gait problem  Skin: Negative for wound  Allergic/Immunologic: Negative for immunocompromised state  Neurological: Negative for dizziness and syncope  Hematological: Does not bruise/bleed easily  Psychiatric/Behavioral: Negative for confusion     All other systems reviewed and are "negative  Vitals  Vitals:    23 1017   BP: 142/92   Pulse: 90   SpO2: 97%   Weight: 108 kg (238 lb)   Height: 5' 10\" (1 778 m)       Physical Exam  Constitutional:       General: He is not in acute distress  Appearance: Normal appearance  He is not ill-appearing, toxic-appearing or diaphoretic  HENT:      Head: Normocephalic  Nose: No congestion  Eyes:      General: No scleral icterus  Right eye: No discharge  Left eye: No discharge  Conjunctiva/sclera: Conjunctivae normal       Pupils: Pupils are equal, round, and reactive to light  Pulmonary:      Effort: Pulmonary effort is normal    Musculoskeletal:      Cervical back: Normal range of motion  Skin:     General: Skin is warm and dry  Coloration: Skin is not jaundiced or pale  Findings: No bruising, erythema, lesion or rash  Neurological:      General: No focal deficit present  Mental Status: He is alert and oriented to person, place, and time  Mental status is at baseline  Gait: Gait normal    Psychiatric:         Mood and Affect: Mood normal          Behavior: Behavior normal          Thought Content: Thought content normal          Judgment: Judgment normal            Past History  Past Medical History:   Diagnosis Date   • Diabetes mellitus ([facility] )    • Hyperlipidemia    • Prostate cancer ([facility] )      Social History     Socioeconomic History   • Marital status:       Spouse name: None   • Number of children: None   • Years of education: None   • Highest education level: None   Occupational History   • None   Tobacco Use   • Smoking status: Former     Types: Cigarettes     Quit date: 2005     Years since quittin 0     Passive exposure: Past   • Smokeless tobacco: Never   Vaping Use   • Vaping Use: Never used   Substance and Sexual Activity   • Alcohol use: Not Currently   • Drug use: Not Currently   • Sexual activity: Not Currently   Other Topics Concern   • None   Social History " Narrative   • None     Social Determinants of Health     Financial Resource Strain: Unknown   • Difficulty of Paying Living Expenses: Patient refused   Food Insecurity: Not on file   Transportation Needs: Unknown   • Lack of Transportation (Medical): Patient refused   • Lack of Transportation (Non-Medical): Patient refused   Physical Activity: Not on file   Stress: Not on file   Social Connections: Not on file   Intimate Partner Violence: Not on file   Housing Stability: Not on file     Social History     Tobacco Use   Smoking Status Former   • Types: Cigarettes   • Quit date: 2005   • Years since quittin 0   • Passive exposure: Past   Smokeless Tobacco Never     Family History   Problem Relation Age of Onset   • Stroke Father    • No Known Problems Mother    • Prostate cancer Brother    • Arthritis Brother    • No Known Problems Sister    • Diabetes Sister    • Diabetes Sister    • No Known Problems Son    • No Known Problems Daughter        The following portions of the patient's history were reviewed and updated as appropriate: allergies, current medications, past medical history, past social history, past surgical history and problem list     Results  No results found for this or any previous visit (from the past 1 hour(s)) ]  Lab Results   Component Value Date    PSA 1 4 2023    PSA 0 9 2022    PSA 0 6 2022    PSA 0 4 2022     Lab Results   Component Value Date    CALCIUM 9 4 2023    K 5 5 (H) 2023    CO2 26 2023     2023    BUN 27 (H) 2023    CREATININE 1 16 2023     Lab Results   Component Value Date    WBC 4 80 2022    HGB 12 3 2022    HCT 38 0 2022    MCV 90 2022     2022       Melodie Singer PA-C

## 2023-03-23 ENCOUNTER — LAB (OUTPATIENT)
Dept: LAB | Facility: CLINIC | Age: 73
End: 2023-03-23

## 2023-03-23 DIAGNOSIS — C61 PROSTATE CANCER (HCC): ICD-10-CM

## 2023-03-23 LAB — PSA SERPL-MCNC: 1.4 NG/ML (ref 0–4)

## 2023-03-27 ENCOUNTER — TELEPHONE (OUTPATIENT)
Dept: OTHER | Facility: OTHER | Age: 73
End: 2023-03-27

## 2023-03-27 NOTE — TELEPHONE ENCOUNTER
Pt called in stating he's having cataract surgery on 6/13 and he needs a physical prior to that  He's requesting a call back at [phone]

## 2023-03-30 ENCOUNTER — OFFICE VISIT (OUTPATIENT)
Dept: UROLOGY | Facility: CLINIC | Age: 73
End: 2023-03-30

## 2023-03-30 VITALS
SYSTOLIC BLOOD PRESSURE: 142 MMHG | HEART RATE: 90 BPM | WEIGHT: 238 LBS | DIASTOLIC BLOOD PRESSURE: 92 MMHG | HEIGHT: 70 IN | OXYGEN SATURATION: 97 % | BODY MASS INDEX: 34.07 KG/M2

## 2023-03-30 DIAGNOSIS — C61 PROSTATE CANCER (HCC): Primary | ICD-10-CM

## 2023-05-04 ENCOUNTER — APPOINTMENT (OUTPATIENT)
Dept: LAB | Facility: CLINIC | Age: 73
End: 2023-05-04

## 2023-05-04 DIAGNOSIS — C61 PROSTATE CANCER (HCC): ICD-10-CM

## 2023-05-04 LAB — PSA SERPL-MCNC: 1.5 NG/ML (ref 0–4)

## 2023-05-09 ENCOUNTER — RADIATION ONCOLOGY FOLLOW-UP (OUTPATIENT)
Dept: RADIATION ONCOLOGY | Facility: CLINIC | Age: 73
End: 2023-05-09
Attending: RADIOLOGY

## 2023-05-09 ENCOUNTER — CLINICAL SUPPORT (OUTPATIENT)
Dept: RADIATION ONCOLOGY | Facility: CLINIC | Age: 73
End: 2023-05-09
Attending: RADIOLOGY

## 2023-05-09 VITALS
DIASTOLIC BLOOD PRESSURE: 74 MMHG | SYSTOLIC BLOOD PRESSURE: 130 MMHG | WEIGHT: 237 LBS | OXYGEN SATURATION: 93 % | BODY MASS INDEX: 34.01 KG/M2 | RESPIRATION RATE: 16 BRPM | TEMPERATURE: 97.2 F | HEART RATE: 83 BPM

## 2023-05-09 DIAGNOSIS — C61 PROSTATE CANCER (HCC): Primary | ICD-10-CM

## 2023-05-09 NOTE — PROGRESS NOTES
Follow-up - Radiation Oncology   Bri Shipman 1950 71 y o  male [id number]      History of Present Illness   Cancer Staging   Prostate cancer [facility])  Staging form: Prostate, AJCC 8th Edition  - Clinical: Stage JAIRO (cT2b, cN1, cM0, PSA: 22, Grade Group: 5) - Signed by Nelly Flanagan MD on 2019  Prostate specific antigen (PSA) range: 20 or greater  Histologic grading system: 5 grade system       Bri Shipman 1950 is a 71 y o  male with high volume Elder 9 adenocarcinoma of the prostate, stage JAIRO T2b N1 M0 PSA 22 grade group 5 with a very suspicious right pelvic sidewall lymph node on CT but no other evidence of distant metastatic disease   He  completed a course of definitive radiation therapy with neoadjuvant and concurrent androgen deprivation  He completed his radiation on 10/18/19  He was last seen in radiation on 22 and presents today for follow up      3/30/23 Kimberly Rivera  1  Prostate cancer, high risk status post radiation therapy and ADT  2  Hot flashes  - final Lupron was given 2/10/21  - most recent PSA 1 4, aguilar 0 2  - patient is doing well overall, without any complaints today  - will follow-up in 6 weeks with PSA prior to visit  PSMA PET should PSA rise 2 above aguilar  - Trial of evening primrose for hot flashes        Component PSA, Total   Latest Ref Rng & Units 0 0 - 4 0 ng/mL   2022 0 4   2022 0 6   2022 0 9   3/23/2023 1 4            [facility]      Interval History: Patient doing well without any urinary or bowel complaints  Historical Information   Oncology History   Prostate cancer ([facility] )   5/15/2019 Initial Diagnosis    Prostate cancer ([facility] )     5/15/2019 Biopsy    Final Diagnosis  A  Prostate, right lateral base, core needle biopsy: Benign prostatic stroma  No malignancy is identified      B   Prostate, right medial base, core needle biopsy:             - Prostatic adenocarcinoma, Elder score 4 + 5 = 9, Prognostic Grade Group V, involving nearly 100% of one core biopsy      C  Prostate, right lateral mid, core needle biopsy:             - Prostatic adenocarcinoma, [place] score 5 + 4 = 9, Prognostic Grade Group V, involving nearly 100% one core biopsy      D  Prostate, right medial mid, core needle biopsy:             - Prostatic adenocarcinoma, Elder score 4 + 5 = 9, Prognostic Grade Group V, involving nearly 100% of one core biopsy      E  Prostate, right lateral apex, core needle biopsy:No malignancy is identified      F  Prostate, right medial apex, core needle biopsy:             - Prostatic adenocarcinoma, [place] score 5 + 4 = 9, Prognostic Grade Group V, involving nearly 100% of one core biopsy      G  Prostate, left lateral base, core needle biopsy:- Atypical prostate glands, indeterminate for prostatic adenocarcinoma      H  Prostate, left medial base, core needle biopsy:             - Prostatic adenocarcinoma, Elder score 5 + 4 =9, Prognostic Grade Group V, involving 15% of one core biopsy      I  Prostate, left lateral mid, core needle biopsy: Benign prostate glands  No malignancy is identified  J  Prostate, left medial mid, core needle biopsy: Benign prostate glands  No malignancy is identified      K  Prostate, left lateral apex, core needle biopsy:             - Prostatic adenocarcinoma, [place] score 5 + 4 = 9, Prognostic Grade Group V, involving 30% of one of 2 cores      L  Prostate, left medial apex, core needle biopsy:             - Prostatic adenocarcinoma, [place] score 5 + 4 =  9, Prognostic Grade Group V, discontinuously involving approximately 50% of 1 core biopsy           6/20/2019 -  Cancer Staged    Staging form: Prostate, AJCC 8th Edition  - Clinical: Stage JAIRO (cT2b, cN1, cM0, PSA: 22, Grade Group: 5) - Signed by Zeyad Melo MD on 6/20/2019  Prostate specific antigen (PSA) range: 20 or greater  Histologic grading system: 5 grade system       6/28/2019 - 6/28/2019 Hormone Therapy    Marcos Keller 2019 - 5/10/2021 Hormone Therapy    Lupron for 2 years     2019 - 10/18/2019 Radiation    4500 centigray in 25 fractions to the prostate, seminal vesicles and regional pelvic lymphatics followed by an additional 1440 centigray in 8 fractions to the prostate, seminal vesicles, and gross lymphadenopathy with the final 11 fractions of an additional 1980 centigray to the prostate and proximal seminal vesicles for a total dose of 7920 centigray in 44 fractions  Past Medical History:   Diagnosis Date   • Diabetes mellitus ([facility] )    • Hyperlipidemia    • Prostate cancer [facility])      Past Surgical History:   Procedure Laterality Date   • EAR SURGERY     • HERNIA REPAIR     • PA COLONOSCOPY FLX DX W/COLLJ SPEC WHEN PFRMD N/A 2019    Procedure: COLONOSCOPY;  Surgeon: Georgina Jacobson MD;  Location: [facility];   Service: Gastroenterology       Social History   Social History     Substance and Sexual Activity   Alcohol Use Not Currently     Social History     Substance and Sexual Activity   Drug Use Not Currently     Social History     Tobacco Use   Smoking Status Former   • Types: Cigarettes   • Quit date: 2005   • Years since quittin 2   • Passive exposure: Past   Smokeless Tobacco Never         Meds/Allergies     Current Outpatient Medications:   •  Ascorbic Acid (vitamin C) 1000 MG tablet, Take 1,000 mg by mouth daily, Disp: , Rfl:   •  Calcium Carbonate (CALCIUM 600 PO), Take by mouth daily, Disp: , Rfl:   •  lisinopril (ZESTRIL) 2 5 mg tablet, Take 1 tablet (2 5 mg total) by mouth daily, Disp: 90 tablet, Rfl: 1  •  metFORMIN (GLUCOPHAGE) 500 mg tablet, Take 1 tablet (500 mg total) by mouth 2 (two) times a day with meals, Disp: 180 tablet, Rfl: 1  •  multivitamin (THERAGRAN) TABS, Take 1 tablet by mouth daily, Disp: , Rfl:   •  rosuvastatin (CRESTOR) 10 MG tablet, Take 1 tablet (10 mg total) by mouth daily, Disp: 90 tablet, Rfl: 1  •  VITAMIN D, ERGOCALCIFEROL, PO, Take by mouth, Disp: , Rfl:   •  vitamin E, tocopherol, 400 units capsule, Take 400 Units by mouth daily, Disp: , Rfl:   •  Blood Glucose Monitoring Suppl (Carolynn Mcdowellper) w/Device KIT, by Does not apply route once for 1 dose Test sugar in the morning, Disp: 1 kit, Rfl: 0  Allergies   Allergen Reactions   • Penicillin V          Review of Systems   Constitutional: Negative for activity change, appetite change, fatigue and fever  HENT: Negative for congestion, rhinorrhea, sneezing and sore throat  Eyes: Negative  Respiratory: Negative for cough and shortness of breath  Cardiovascular: Negative for chest pain, palpitations and leg swelling  Gastrointestinal: Negative for abdominal pain, blood in stool, diarrhea and rectal pain  Endocrine: Negative  Genitourinary: Negative for difficulty urinating, dysuria, flank pain, frequency, hematuria and urgency  Musculoskeletal: Negative for arthralgias, back pain, gait problem and joint swelling  Skin: Negative  Allergic/Immunologic: Negative  Neurological: Negative for dizziness, weakness, numbness and headaches  Hematological: Negative  Psychiatric/Behavioral: Negative  OBJECTIVE:   /74   Pulse 83   Temp (!) 97 2 °F (36 2 °C)   Resp 16   Wt 108 kg (237 lb)   SpO2 93%   BMI 34 01 kg/m²   Pain Assessment:  0  Karnofsky: 100 - Fully active, able to carry on all pre-disease performed without restriction    Physical Exam  Constitutional:       Appearance: Normal appearance  HENT:      Nose: No congestion  Mouth/Throat:      Pharynx: Oropharynx is clear  Eyes:      Extraocular Movements: Extraocular movements intact  Pupils: Pupils are equal, round, and reactive to light  Cardiovascular:      Rate and Rhythm: Normal rate and regular rhythm  Heart sounds: Normal heart sounds  Pulmonary:      Effort: Pulmonary effort is normal       Breath sounds: Normal breath sounds  Abdominal:      General: There is no distension  "Palpations: Abdomen is soft  There is no mass  Tenderness: There is no abdominal tenderness  Genitourinary:     Comments: Deferred rectal examination today  Musculoskeletal:         General: No swelling or tenderness  Normal range of motion  Cervical back: Normal range of motion and neck supple  Skin:     General: Skin is dry  Findings: No lesion or rash  Neurological:      General: No focal deficit present  Mental Status: He is alert and oriented to person, place, and time  Psychiatric:         Mood and Affect: Mood normal          Behavior: Behavior normal              RESULTS    Lab Results:   Recent Results (from the past 672 hour(s))   PSA Total, Diagnostic    Collection Time: 05/04/23 10:12 AM   Result Value Ref Range    PSA, Diagnostic 1 5 0 0 - 4 0 ng/mL       Imaging Studies:No results found  Assessment/Plan:  No orders of the defined types were placed in this encounter  Jorge Alberto Johnson is a 71y o  year old male with stage IV adenocarcinoma prostate 3 1/2 years after IMRT and ADT  His last PSA a month ago was 1 4 slowly increasing and is scheduled to see urology later this week may order PSMA PET CT scan  We asked to see him again next year or anytime sooner if necessary  Marge Potts MD  5/9/2023,1:40 PM    Portions of the record may have been created with voice recognition software   Occasional wrong word or \"sound a like\" substitutions may have occurred due to the inherent limitations of voice recognition software   Read the chart carefully and recognize, using context, where substitutions have occurred        "

## 2023-05-09 NOTE — PROGRESS NOTES
Slick Meeks 1950 is a 71 y o  male with high volume Elder 9 adenocarcinoma of the prostate, stage JAIRO T2b N1 M0 PSA 22 grade group 5 with a very suspicious right pelvic sidewall lymph node on CT but no other evidence of distant metastatic disease  He  completed a course of definitive radiation therapy with neoadjuvant and concurrent androgen deprivation  He completed his radiation on 10/18/19  He was last seen in radiation on 22 and presents today for follow up     3/30/23 Kimberly Rivera  1  Prostate cancer, high risk status post radiation therapy and ADT  2  Hot flashes  - final Lupron was given 2/10/21  - most recent PSA 1 4, aguilar 0 2  - patient is doing well overall, without any complaints today  - will follow-up in 6 weeks with PSA prior to visit  PSMA PET should PSA rise 2 above aguilar  - Trial of evening primrose for hot flashes      Component PSA, Total   Latest Ref Rng & Units 0 0 - 4 0 ng/mL   2022 0 4   2022 0 6   2022 0 9   3/23/2023 1 4         Upcomin23 [facility]rology[facility]berly      Follow up visit     Oncology History   Prostate cancer ([facility] [address] )   5/15/2019 Initial Diagnosis    Prostate cancer ([facility] [address] )     5/15/2019 Biopsy    Final Diagnosis  A  Prostate, right lateral base, core needle biopsy: Benign prostatic stroma  No malignancy is identified      B  Prostate, right medial base, core needle biopsy:             - Prostatic adenocarcinoma, Calvin score 4 + 5 = 9, Prognostic Grade Group V, involving nearly 100% of one core biopsy      C  Prostate, right lateral mid, core needle biopsy:             - Prostatic adenocarcinoma, Calvin score 5 + 4 = 9, Prognostic Grade Group V, involving nearly 100% one core biopsy      D  Prostate, right medial mid, core needle biopsy:             - Prostatic adenocarcinoma, Elder score 4 + 5 = 9, Prognostic Grade Group V, involving nearly 100% of one core biopsy      E   Prostate, right lateral apex, core needle biopsy:No malignancy is identified      F  Prostate, right medial apex, core needle biopsy:             - Prostatic adenocarcinoma, Elder score 5 + 4 = 9, Prognostic Grade Group V, involving nearly 100% of one core biopsy      G  Prostate, left lateral base, core needle biopsy:- Atypical prostate glands, indeterminate for prostatic adenocarcinoma      H  Prostate, left medial base, core needle biopsy:             - Prostatic adenocarcinoma, Courtland score 5 + 4 =9, Prognostic Grade Group V, involving 15% of one core biopsy      I  Prostate, left lateral mid, core needle biopsy: Benign prostate glands  No malignancy is identified  J  Prostate, left medial mid, core needle biopsy: Benign prostate glands  No malignancy is identified      K  Prostate, left lateral apex, core needle biopsy:             - Prostatic adenocarcinoma, Elder score 5 + 4 = 9, Prognostic Grade Group V, involving 30% of one of 2 cores      L  Prostate, left medial apex, core needle biopsy:             - Prostatic adenocarcinoma, Courtland score 5 + 4 =  9, Prognostic Grade Group V, discontinuously involving approximately 50% of 1 core biopsy  6/20/2019 -  Cancer Staged    Staging form: Prostate, AJCC 8th Edition  - Clinical: Stage JAIRO (cT2b, cN1, cM0, PSA: 22, Grade Group: 5) - Signed by Tamanna Avelar MD on 6/20/2019  Prostate specific antigen (PSA) range: 20 or greater  Histologic grading system: 5 grade system       6/28/2019 - 6/28/2019 Hormone Therapy    Firmagon     8/1/2019 - 5/10/2021 Hormone Therapy    Lupron for 2 years     8/19/2019 - 10/18/2019 Radiation    4500 centigray in 25 fractions to the prostate, seminal vesicles and regional pelvic lymphatics followed by an additional 1440 centigray in 8 fractions to the prostate, seminal vesicles, and gross lymphadenopathy with the final 11 fractions of an additional 1980 centigray to the prostate and proximal seminal vesicles for a total dose of 7920 centigray in 44 fractions            Review of Systems:  Review of Systems   Constitutional: Positive for fatigue  HENT: Negative  Eyes:        Wears glasses  cataracts   Respiratory: Positive for cough  Cardiovascular: Negative  Gastrointestinal: Negative  Genitourinary:        Hot flashes/night sweats   Musculoskeletal: Positive for arthralgias  Skin: Negative  Allergic/Immunologic: Positive for environmental allergies  Neurological: Negative  Hematological: Negative  Psychiatric/Behavioral: Negative  Clinical Trial: no    IPSS Questionnaire (AUA-7): Over the past month…    1)  How often have you had a sensation of not emptying your bladder completely after you finish urinating? 0 - Not at all   2)  How often have you had to urinate again less than two hours after you finished urinating? 1 - Less than 1 time in 5   3)  How often have you found you stopped and started again several times when you urinated? 0 - Not at all   4) How difficult have you found it to postpone urination? 0 - Not at all   5) How often have you had a weak urinary stream?  5 - Almost always   6) How often have you had to push or strain to begin urination?   0 - Not at all   7) How many times did you most typically get up to urinate from the time you went to bed until the time you got up in the morning?  0 - None   Total Score:  6           Health Maintenance   Topic Date Due   • Kidney Health Evaluation: Microalbumin/Creatinine Ratio  05/20/2021   • COVID-19 Vaccine (3 - Booster for Pfizer series) 07/15/2021   • Depression Screening  05/05/2023   • HEMOGLOBIN A1C  08/09/2023   • Fall Risk  10/20/2023   • Medicare Annual Wellness Visit (AWV)  10/20/2023   • Kidney Health Evaluation: GFR  02/09/2024   • BMI: Followup Plan  02/16/2024   • Diabetic Foot Exam  02/16/2024   • BMI: Adult  03/30/2024   • Colorectal Cancer Screening  05/06/2024   • DM Eye Exam  09/23/2024   • Hepatitis C Screening  Completed   • Pneumococcal Vaccine: 65+ Years  Completed   • Influenza Vaccine  Completed   • HIB Vaccine  Aged Out   • IPV Vaccine  Aged Out   • Hepatitis A Vaccine  Aged Out   • Meningococcal ACWY Vaccine  Aged Out   • HPV Vaccine  Aged Out     Patient Active Problem List   Diagnosis   • Mixed hyperlipidemia   • IFG (impaired fasting glucose)   • Upper respiratory tract infection   • Positive colorectal cancer screening using Cologuard test   • Prostate cancer ([place] [address] )   • Encounter for monitoring androgen deprivation therapy   • Hot flashes   • Type 2 diabetes mellitus with complication, without long-term current use of insulin (HCC)   • Stage 3a chronic kidney disease ([place] [address] )   • Essential hypertension     Past Medical History:   Diagnosis Date   • Diabetes mellitus ([place] [address] )    • Hyperlipidemia    • Prostate cancer [facility])      Past Surgical History:   Procedure Laterality Date   • EAR SURGERY     • HERNIA REPAIR     • CT COLONOSCOPY FLX DX W/COLLJ SPEC WHEN PFRMD N/A 2019    Procedure: COLONOSCOPY;  Surgeon: Demi Chu MD;  Location: [facility]; Service: Gastroenterology     Family History   Problem Relation Age of Onset   • Stroke Father    • No Known Problems Mother    • Prostate cancer Brother    • Arthritis Brother    • No Known Problems Sister    • Diabetes Sister    • Diabetes Sister    • No Known Problems Son    • No Known Problems Daughter      Social History     Socioeconomic History   • Marital status:       Spouse name: Not on file   • Number of children: Not on file   • Years of education: Not on file   • Highest education level: Not on file   Occupational History   • Not on file   Tobacco Use   • Smoking status: Former     Types: Cigarettes     Quit date: 2005     Years since quittin 2     Passive exposure: Past   • Smokeless tobacco: Never   Vaping Use   • Vaping Use: Never used   Substance and Sexual Activity   • Alcohol use: Not Currently   • Drug use: Not Currently   • Sexual activity: Not Currently   Other Topics Concern   • Not on file   Social History Narrative   • Not on file     Social Determinants of Health     Financial Resource Strain: Unknown   • Difficulty of Paying Living Expenses: Patient refused   Food Insecurity: Not on file   Transportation Needs: Unknown   • Lack of Transportation (Medical): Patient refused   • Lack of Transportation (Non-Medical): Patient refused   Physical Activity: Not on file   Stress: Not on file   Social Connections: Not on file   Intimate Partner Violence: Not on file   Housing Stability: Not on file       Current Outpatient Medications:   •  Ascorbic Acid (vitamin C) 1000 MG tablet, Take 1,000 mg by mouth daily, Disp: , Rfl:   •  Blood Glucose Monitoring Suppl (Sushant Sepulveda) w/Device KIT, by Does not apply route once for 1 dose Test sugar in the morning, Disp: 1 kit, Rfl: 0  •  Calcium Carbonate (CALCIUM 600 PO), Take by mouth daily, Disp: , Rfl:   •  lisinopril (ZESTRIL) 2 5 mg tablet, Take 1 tablet (2 5 mg total) by mouth daily, Disp: 90 tablet, Rfl: 1  •  metFORMIN (GLUCOPHAGE) 500 mg tablet, Take 1 tablet (500 mg total) by mouth 2 (two) times a day with meals, Disp: 180 tablet, Rfl: 1  •  multivitamin (THERAGRAN) TABS, Take 1 tablet by mouth daily, Disp: , Rfl:   •  rosuvastatin (CRESTOR) 10 MG tablet, Take 1 tablet (10 mg total) by mouth daily, Disp: 90 tablet, Rfl: 1  •  VITAMIN D, ERGOCALCIFEROL, PO, Take by mouth, Disp: , Rfl:   •  vitamin E, tocopherol, 400 units capsule, Take 400 Units by mouth daily, Disp: , Rfl:   Allergies   Allergen Reactions   • Penicillin V      There were no vitals filed for this visit

## 2023-05-11 ENCOUNTER — OFFICE VISIT (OUTPATIENT)
Dept: UROLOGY | Facility: CLINIC | Age: 73
End: 2023-05-11

## 2023-05-11 VITALS
SYSTOLIC BLOOD PRESSURE: 110 MMHG | DIASTOLIC BLOOD PRESSURE: 80 MMHG | RESPIRATION RATE: 18 BRPM | HEART RATE: 90 BPM | OXYGEN SATURATION: 95 % | BODY MASS INDEX: 33.79 KG/M2 | WEIGHT: 236 LBS | HEIGHT: 70 IN

## 2023-05-11 DIAGNOSIS — C61 PROSTATE CANCER (HCC): Primary | ICD-10-CM

## 2023-05-11 NOTE — PROGRESS NOTES
5/11/2023      Chief Complaint   Patient presents with   • Follow-up         Assessment and Plan    71 y o  male     1  Prostate cancer, high risk status post radiation therapy and ADT  2  Hot flashes  - final Lupron was given 2/10/21  - most recent PSA 1 5, aguilar 0 2  - patient is doing well overall, without any complaints today  - will follow-up in 3 months with PSA prior to visit  PSMA PET should PSA rise 2 above aguilar  - Continue hot flashes  - Call with any questions or concerns in the meantime  - All questions answered; patient understands and agrees with plan       History of Present Illness  Lilliana Erwin is a 71 y o  male patient with history of high risk prostate cancer status post radiation therapy and ADT here for follow up  Last Lupron injection was given 2/10/21  Anna Atkins states he continues to have hot flashes, however, improved with evening primrose   Patient denies any new or worsening symptoms today   Denies frequency, urgency, dysuria, gross hematuria, flank pain, suprapubic pressure, fever, chills, nausea, vomiting, weight loss, bone pain  Most recent PSA 1 5, aguilar 0 2  Review of Systems   Constitutional: Negative for activity change, appetite change, chills and fever  HENT: Negative for congestion and trouble swallowing  Respiratory: Negative for cough and shortness of breath  Cardiovascular: Negative for chest pain, palpitations and leg swelling  Gastrointestinal: Negative for abdominal pain, constipation, diarrhea, nausea and vomiting  Genitourinary: Negative for difficulty urinating, dysuria, flank pain, frequency, hematuria and urgency  Musculoskeletal: Negative for back pain and gait problem  Skin: Negative for wound  Allergic/Immunologic: Negative for immunocompromised state  Neurological: Negative for dizziness and syncope  Hematological: Does not bruise/bleed easily  Psychiatric/Behavioral: Negative for confusion     All other systems reviewed and are "negative  AUA SYMPTOM SCORE    Flowsheet Row Most Recent Value   AUA SYMPTOM SCORE    How often have you had a sensation of not emptying your bladder completely after you finished urinating? 0 (P)     How often have you had to urinate again less than two hours after you finished urinating? 0 (P)     How often have you found you stopped and started again several times when you urinate? 0 (P)     How often have you found it difficult to postpone urination? 0 (P)     How often have you had a weak urinary stream? 1 (P)     How often have you had to push or strain to begin urination? 0 (P)     How many times did you most typically get up to urinate from the time you went to bed at night until the time you got up in the morning? 0 (P)     Quality of Life: If you were to spend the rest of your life with your urinary condition just the way it is now, how would you feel about that? 0 (P)     AUA SYMPTOM SCORE 1 (P)            Vitals  Vitals:    05/11/23 1113   BP: 110/80   BP Location: Left arm   Patient Position: Sitting   Cuff Size: Large   Pulse: 90   Resp: 18   SpO2: 95%   Weight: 107 kg (236 lb)   Height: 5' 10\" (1 778 m)       Physical Exam  Constitutional:       General: He is not in acute distress  Appearance: Normal appearance  He is not ill-appearing, toxic-appearing or diaphoretic  HENT:      Head: Normocephalic  Nose: No congestion  Eyes:      General: No scleral icterus  Right eye: No discharge  Left eye: No discharge  Conjunctiva/sclera: Conjunctivae normal       Pupils: Pupils are equal, round, and reactive to light  Pulmonary:      Effort: Pulmonary effort is normal    Musculoskeletal:      Cervical back: Normal range of motion  Skin:     General: Skin is warm and dry  Coloration: Skin is not jaundiced or pale  Findings: No bruising, erythema, lesion or rash  Neurological:      General: No focal deficit present        Mental Status: He is alert and " oriented to person, place, and time  Mental status is at baseline  Gait: Gait normal    Psychiatric:         Mood and Affect: Mood normal          Behavior: Behavior normal          Thought Content: Thought content normal          Judgment: Judgment normal            Past History  Past Medical History:   Diagnosis Date   • BPH (benign prostatic hypertrophy)    • Cancer (HCC)    • Diabetes mellitus ([facility] [address] )    • Hyperlipidemia    • Prostate cancer [facility])      Social History     Socioeconomic History   • Marital status:       Spouse name: None   • Number of children: None   • Years of education: None   • Highest education level: None   Occupational History   • None   Tobacco Use   • Smoking status: Former     Packs/day: 1 00     Years: 25 00     Pack years: 25 00     Types: Cigarettes, Pipe, Cigars     Quit date: 2005     Years since quittin 2     Passive exposure: Past   • Smokeless tobacco: Never   Vaping Use   • Vaping Use: Never used   Substance and Sexual Activity   • Alcohol use: Not Currently   • Drug use: Not Currently   • Sexual activity: Not Currently   Other Topics Concern   • None   Social History Narrative   • None     Social Determinants of Health     Financial Resource Strain: Unknown   • Difficulty of Paying Living Expenses: Patient refused   Food Insecurity: Not on file   Transportation Needs: Unknown   • Lack of Transportation (Medical): Patient refused   • Lack of Transportation (Non-Medical): Patient refused   Physical Activity: Not on file   Stress: Not on file   Social Connections: Not on file   Intimate Partner Violence: Not on file   Housing Stability: Not on file     Social History     Tobacco Use   Smoking Status Former   • Packs/day: 1 00   • Years: 25 00   • Pack years: 25 00   • Types: Cigarettes, Pipe, Cigars   • Quit date: 2005   • Years since quittin 2   • Passive exposure: Past   Smokeless Tobacco Never     Family History   Problem Relation Age of Onset   •

## 2023-05-21 ENCOUNTER — RA CDI HCC (OUTPATIENT)
Dept: OTHER | Facility: HOSPITAL | Age: 73
End: 2023-05-21

## 2023-05-21 NOTE — PROGRESS NOTES
This is a telehealth OV to which patient has agreed to. Limitations of telehealth discussed, pt understands and agrees to proceed. Patient's @ home during this virtual OV.   67 yo pt w/ chronic GERD, controlled w/ diet an Omperazole w/o alarm sxs. She has been having choking episodes intermittet w solids / liquids; no food impaction. Has p-prandial abdominal discomfort, distention, gas and burping w all type of foods. Has been on an elimination diet on Gaviscon prn.  Has nocturnal SHAWNA sxs. NOrmal bm's 3 x/f wo bleeding.  Gaining weight lately. She has been feeling much better lately, but still w/ occasional p-prandial bloating, w/ minimal SHAWNA and no diarrhea, constipation  and no bleeding. No anorexia or weight loss. No fever or chills. Colonoscopy 7/19: AC pp and E Hrrds. EGD 8/20: Hp-negative gastritis, GERD w/o BE and normal duodenal bx's.  Had SIBO 8/20, resolved after Xifaxan; currently on probiotics. No exposure to COVID-19; + flu vaccine ; no COVID-19 vaccibe as yet. [id number]  [facility]  coding opportunities          Chart Reviewed number of suggestions sent to Provider: 1     Patients Insurance     Medicare Insurance: [employer]

## 2023-05-30 ENCOUNTER — OFFICE VISIT (OUTPATIENT)
Dept: FAMILY MEDICINE CLINIC | Facility: CLINIC | Age: 73
End: 2023-05-30

## 2023-05-30 VITALS
BODY MASS INDEX: 34.22 KG/M2 | SYSTOLIC BLOOD PRESSURE: 112 MMHG | HEART RATE: 89 BPM | HEIGHT: 70 IN | OXYGEN SATURATION: 97 % | DIASTOLIC BLOOD PRESSURE: 70 MMHG | WEIGHT: 239 LBS

## 2023-05-30 DIAGNOSIS — Z01.818 PRE-OP EXAMINATION: Primary | ICD-10-CM

## 2023-05-30 NOTE — PROGRESS NOTES
INTERNAL MEDICINE PRE-OPERATIVE EVALUATION  [facility]YSICIAN GROUP - Mil Ryan [id number] [place]     NAME: Baldemar Barber  AGE: 71 y o  SEX: male  : 1950     DATE: 2023     Internal Medicine Pre-Operative Evaluation:     Chief Complaint: Pre-operative Evaluation     Surgery: cataract extraction with iol implant   Anticipated Date of Surgery:    Referring Provider: Marina Aguirre DO        History of Present Illness:     Baldemar Barber is a 71 y o  male who presents to the office today for a preoperative consultation at the request of surgeon, [address] [place] [address], who plans on performing cataract extraction with iol implant  Planned anesthesia is general  Patient has a bleeding risk of: no recent abnormal bleeding and no remote history of abnormal bleeding  Patient does not have objections to receiving blood products if needed  Current anti-platelet/anti-coagulation medications that the patient is prescribed includes: n/a  Assessment of Chronic Conditions:   - Diabetes Mellitus: 6 6   - Hypertension: stable     Assessment of Cardiac Risk:  · Denies unstable or severe angina or MI in the last 6 weeks or history of stent placement in the last year   · Denies decompensated heart failure (e g  New onset heart failure, NYHA functional class IV heart failure, or worsening existing heart failure)  · Denies significant arrhythmias such as high grade AV block, symptomatic ventricular arrhythmia, newly recognized ventricular tachycardia, supraventricular tachycardia with resting heart rate >100, or symptomatic bradycardia  · Denies severe heart valve disease including aortic stenosis or symptomatic mitral stenosis     Exercise Capacity:  · Able to walk 4 blocks without symptoms?: Yes  · Able to walk 2 flights without symptoms?: Yes    Prior Anesthesia Reactions: No     Personal history of venous thromboembolic disease?  No    History of steroid use for >2 weeks within last year? No            Review of Systems:     Review of Systems   Constitutional: Negative for appetite change, chills, fever and unexpected weight change  HENT: Negative for congestion, dental problem, ear pain, hearing loss, postnasal drip, rhinorrhea, sinus pressure, sinus pain, sneezing, sore throat, tinnitus and voice change  Eyes: Negative for visual disturbance  Respiratory: Negative for apnea, cough, chest tightness and shortness of breath  Cardiovascular: Negative for chest pain, palpitations and leg swelling  Gastrointestinal: Negative for abdominal pain, blood in stool, constipation, diarrhea, nausea and vomiting  Endocrine: Negative for cold intolerance, heat intolerance, polydipsia, polyphagia and polyuria  Genitourinary: Negative for decreased urine volume, difficulty urinating, dysuria, frequency and hematuria  Musculoskeletal: Negative for arthralgias, back pain, gait problem, joint swelling and myalgias  Skin: Negative for color change, rash and wound  Allergic/Immunologic: Negative for environmental allergies and food allergies  Neurological: Negative for dizziness, syncope, weakness, light-headedness, numbness and headaches  Hematological: Negative for adenopathy  Does not bruise/bleed easily  Psychiatric/Behavioral: Negative for sleep disturbance and suicidal ideas  The patient is not nervous/anxious  Problem List:     Patient Active Problem List   Diagnosis   • Mixed hyperlipidemia   • IFG (impaired fasting glucose)   • Upper respiratory tract infection   • Positive colorectal cancer screening using Cologuard test   • Prostate cancer ([facility] )   • Encounter for monitoring androgen deprivation therapy   • Hot flashes   • Type 2 diabetes mellitus with complication, without long-term current use of insulin (HCC)   • Stage 3a chronic kidney disease ([facility] )   • Essential hypertension        Allergies:      Allergies   Allergen Reactions   • Penicillin V         Current Medications:       Current Outpatient Medications:   •  Ascorbic Acid (vitamin C) 1000 MG tablet, Take 1,000 mg by mouth daily, Disp: , Rfl:   •  Calcium Carbonate (CALCIUM 600 PO), Take by mouth daily, Disp: , Rfl:   •  EVENING PRIMROSE OIL PO, Take by mouth 3 (three) times a day, Disp: , Rfl:   •  lisinopril (ZESTRIL) 2 5 mg tablet, Take 1 tablet (2 5 mg total) by mouth daily, Disp: 90 tablet, Rfl: 1  •  metFORMIN (GLUCOPHAGE) 500 mg tablet, Take 1 tablet (500 mg total) by mouth 2 (two) times a day with meals, Disp: 180 tablet, Rfl: 1  •  multivitamin (THERAGRAN) TABS, Take 1 tablet by mouth daily, Disp: , Rfl:   •  rosuvastatin (CRESTOR) 10 MG tablet, Take 1 tablet (10 mg total) by mouth daily, Disp: 90 tablet, Rfl: 1  •  VITAMIN D, ERGOCALCIFEROL, PO, Take by mouth, Disp: , Rfl:   •  vitamin E, tocopherol, 400 units capsule, Take 400 Units by mouth daily, Disp: , Rfl:   •  Blood Glucose Monitoring Suppl ([facility]) w/Device KIT, by Does not apply route once for 1 dose Test sugar in the morning, Disp: 1 kit, Rfl: 0     Past History:     Past Medical History:   Diagnosis Date   • BPH (benign prostatic hypertrophy)    • Cancer (HCC)    • Diabetes mellitus ([facility] )    • Hyperlipidemia    • Prostate cancer [facility])         Past Surgical History:   Procedure Laterality Date   • EAR SURGERY     • HERNIA REPAIR     • [place] COLONOSCOPY FLX DX W/COLLJ SPEC WHEN PFRMD N/A 5/6/2019    Procedure: COLONOSCOPY;  Surgeon: Mark Santana MD;  Location: [facility]; Service: Gastroenterology        Family History   Problem Relation Age of Onset   • Stroke Father    • No Known Problems Mother    • Prostate cancer Brother    • Arthritis Brother    • No Known Problems Sister    • Diabetes Sister    • Diabetes Sister    • No Known Problems Son    • No Known Problems Daughter         Social History     Socioeconomic History   • Marital status:       Spouse name: Not on file   • Number of children: Not on file   • Years of "education: Not on file   • Highest education level: Not on file   Occupational History   • Not on file   Tobacco Use   • Smoking status: Former     Packs/day: 1 00     Years: 25 00     Total pack years: 25 00     Types: Cigarettes, Pipe, Cigars     Quit date: 2005     Years since quittin 2     Passive exposure: Past   • Smokeless tobacco: Never   Vaping Use   • Vaping Use: Never used   Substance and Sexual Activity   • Alcohol use: Not Currently   • Drug use: Not Currently   • Sexual activity: Not Currently   Other Topics Concern   • Not on file   Social History Narrative   • Not on file     Social Determinants of Health     Financial Resource Strain: Unknown (10/18/2022)    Overall Financial Resource Strain (CARDIA)    • Difficulty of Paying Living Expenses: Patient refused   Food Insecurity: Not on file   Transportation Needs: Unknown (10/18/2022)    [address] [facility] [employer] - Transportation    • Lack of Transportation (Medical): Patient refused    • Lack of Transportation (Non-Medical): Patient refused   Physical Activity: Not on file   Stress: Not on file   Social Connections: Not on file   Intimate Partner Violence: Not on file   Housing Stability: Not on file        Physical Exam:      /70   Pulse 89   Ht 5' 10\" (1 778 m)   Wt 108 kg (239 lb)   SpO2 97%   BMI 34 29 kg/m²     Physical Exam  Constitutional:       General: He is not in acute distress  Appearance: He is well-developed  He is not ill-appearing or toxic-appearing  HENT:      Head: Normocephalic and atraumatic  Cardiovascular:      Rate and Rhythm: Normal rate and regular rhythm  Heart sounds: Normal heart sounds  Pulmonary:      Effort: Pulmonary effort is normal       Breath sounds: Normal breath sounds  Musculoskeletal:         General: Normal range of motion  Cervical back: Normal range of motion and neck supple  Skin:     General: Skin is warm and dry     Neurological:      Mental Status: He is alert and oriented to " person, place, and time  Deep Tendon Reflexes: Reflexes are normal and symmetric  Psychiatric:         Mood and Affect: Mood normal          Behavior: Behavior normal          Thought Content: Thought content normal          Judgment: Judgment normal            Data:     Pre-operative work-up    Laboratory Results: I have personally reviewed the pertinent laboratory results/reports            Assessment:     No diagnosis found  Plan:     71 y o  male with planned surgery:cataract extraction with iol implant  Cardiac Risk Estimation: per the Revised Cardiac Risk Index (Circ  100:1043, 1999), the patient's risk factors for cardiac complications include na, putting him in: RCI RISK CLASS II (1 risk factor, risk of major cardiac compl  appr  1 3%)  1  Further preoperative workup as follows:   - None; no further preoperative work-up is required    2  Medication Management/Recommendations:   - None, continue medication regimen including morning of surgery, with sip of water  - Patient should continue antihypertensive medications up through and including the day of surgery  - Patient should continue his statin medication up through and including the day of surgery  - Patient has been instructed to avoid aspirin containing medications or non-steroidal anti-inflammatory drugs for the week preceding surgery  3  Prophylaxis for cardiac events with perioperative beta-blockers: not indicated  4  Patient requires further consultation with: None    Clearance  Patient is CLEARED for surgery without any additional cardiac testing       DANITA Valdez [address] [address]  Phone#  [phone]  Fax#  [phone]

## 2023-06-19 ENCOUNTER — TELEPHONE (OUTPATIENT)
Dept: FAMILY MEDICINE CLINIC | Facility: CLINIC | Age: 73
End: 2023-06-19

## 2023-06-19 DIAGNOSIS — Z01.818 PREOPERATIVE CLEARANCE: Primary | ICD-10-CM

## 2023-06-19 NOTE — TELEPHONE ENCOUNTER
"Spoke with Brian Witt at Dr Tyrone Johnson office  Patient is supposed to be getting cataract surgery- he was cleared by Ramo Fallon on 05/30/23  However, when the patient went to Dr Tyrone Johnson office today, while they were preforming the patient's EKG, they noticed many episodes of trigeminy  Patient claims that this is a \"normal reoccurrence,\" however, Dr Tyrone pickens did not feel comfortable doing the procedure until patient can obtain a cardiac clearance  In Bill's absence, would someone be able to write up a referral for patient to see a cardiologist?? Please advise, thank you!   "

## 2023-06-20 NOTE — TELEPHONE ENCOUNTER
I am not too sure how how number got changed, however, I did just speak to him this morning at [phone]  I did update his chart  Thank you again!

## 2023-06-20 NOTE — TELEPHONE ENCOUNTER
Hi there,  His surgery was supposed to be yesterday  When they did his EKG prior to the surgery, that's when they decided they would not do it without a cardiac clearance  So at this present moment, it is not rescheduled and is pending until he receives cardiac clearance

## 2023-06-20 NOTE — TELEPHONE ENCOUNTER
Spoke with patient & he is scheduled for 9/19  Pt was also placed on cancellation list for a sooner appointment if available

## 2023-06-20 NOTE — TELEPHONE ENCOUNTER
Hi, our first available for a new patient is in September  Please advise when pt's surgery is? I can assist in scheduling in another office if pt is willing to travel  I will reach out to the patient  Robyn Garcia

## 2023-06-20 NOTE — TELEPHONE ENCOUNTER
Spoke with patient to inform him that referral is in system  Upon speaking with patient, I learned that the pre-op paperwork we sent in for his cataract surgery said that he had a normal heart beat when it should have said that he had a history of irregular heart beat  Either way, patient will need to establish with a cardiologist and and obtain a cardiac clearance prior to his cataract surgery  Would you be able to call patient to schedule him an appointment? Please advise  Thank you!

## 2023-08-08 ENCOUNTER — APPOINTMENT (OUTPATIENT)
Dept: LAB | Facility: CLINIC | Age: 73
End: 2023-08-08
Payer: MEDICARE

## 2023-08-08 DIAGNOSIS — I10 ESSENTIAL HYPERTENSION: ICD-10-CM

## 2023-08-08 DIAGNOSIS — N18.31 STAGE 3A CHRONIC KIDNEY DISEASE (HCC): ICD-10-CM

## 2023-08-08 DIAGNOSIS — E11.8 TYPE 2 DIABETES MELLITUS WITH COMPLICATION, WITHOUT LONG-TERM CURRENT USE OF INSULIN (HCC): ICD-10-CM

## 2023-08-08 DIAGNOSIS — E78.2 MIXED HYPERLIPIDEMIA: ICD-10-CM

## 2023-08-08 DIAGNOSIS — C61 PROSTATE CANCER (HCC): ICD-10-CM

## 2023-08-08 LAB
ALBUMIN SERPL BCP-MCNC: 3.9 G/DL (ref 3.5–5)
ALP SERPL-CCNC: 78 U/L (ref 46–116)
ALT SERPL W P-5'-P-CCNC: 38 U/L (ref 12–78)
ANION GAP SERPL CALCULATED.3IONS-SCNC: 4 MMOL/L
AST SERPL W P-5'-P-CCNC: 19 U/L (ref 5–45)
BILIRUB SERPL-MCNC: 0.53 MG/DL (ref 0.2–1)
BUN SERPL-MCNC: 30 MG/DL (ref 5–25)
CALCIUM SERPL-MCNC: 9.6 MG/DL (ref 8.3–10.1)
CHLORIDE SERPL-SCNC: 110 MMOL/L (ref 96–108)
CHOLEST SERPL-MCNC: 181 MG/DL
CO2 SERPL-SCNC: 27 MMOL/L (ref 21–32)
CREAT SERPL-MCNC: 1.33 MG/DL (ref 0.6–1.3)
CREAT UR-MCNC: 153 MG/DL
ERYTHROCYTE [DISTWIDTH] IN BLOOD BY AUTOMATED COUNT: 12.9 % (ref 11.6–15.1)
EST. AVERAGE GLUCOSE BLD GHB EST-MCNC: 166 MG/DL
GFR SERPL CREATININE-BSD FRML MDRD: 53 ML/MIN/1.73SQ M
GLUCOSE P FAST SERPL-MCNC: 143 MG/DL (ref 65–99)
HBA1C MFR BLD: 7.4 %
HCT VFR BLD AUTO: 43.9 % (ref 36.5–49.3)
HDLC SERPL-MCNC: 41 MG/DL
HGB BLD-MCNC: 13.8 G/DL (ref 12–17)
LDLC SERPL CALC-MCNC: 86 MG/DL (ref 0–100)
MCH RBC QN AUTO: 29 PG (ref 26.8–34.3)
MCHC RBC AUTO-ENTMCNC: 31.4 G/DL (ref 31.4–37.4)
MCV RBC AUTO: 92 FL (ref 82–98)
MICROALBUMIN UR-MCNC: 384 MG/L (ref 0–20)
MICROALBUMIN/CREAT 24H UR: 251 MG/G CREATININE (ref 0–30)
PLATELET # BLD AUTO: 190 THOUSANDS/UL (ref 149–390)
PMV BLD AUTO: 10.1 FL (ref 8.9–12.7)
POTASSIUM SERPL-SCNC: 5.1 MMOL/L (ref 3.5–5.3)
PROT SERPL-MCNC: 7.7 G/DL (ref 6.4–8.4)
RBC # BLD AUTO: 4.76 MILLION/UL (ref 3.88–5.62)
SODIUM SERPL-SCNC: 141 MMOL/L (ref 135–147)
TRIGL SERPL-MCNC: 271 MG/DL
TSH SERPL DL<=0.05 MIU/L-ACNC: 3.11 UIU/ML (ref 0.45–4.5)
WBC # BLD AUTO: 6.68 THOUSAND/UL (ref 4.31–10.16)

## 2023-08-08 PROCEDURE — 36415 COLL VENOUS BLD VENIPUNCTURE: CPT

## 2023-08-08 PROCEDURE — 80061 LIPID PANEL: CPT

## 2023-08-08 PROCEDURE — 80053 COMPREHEN METABOLIC PANEL: CPT

## 2023-08-08 PROCEDURE — 85027 COMPLETE CBC AUTOMATED: CPT

## 2023-08-08 PROCEDURE — 84443 ASSAY THYROID STIM HORMONE: CPT

## 2023-08-08 PROCEDURE — 83036 HEMOGLOBIN GLYCOSYLATED A1C: CPT

## 2023-08-09 ENCOUNTER — RA CDI HCC (OUTPATIENT)
Dept: OTHER | Facility: HOSPITAL | Age: 73
End: 2023-08-09

## 2023-08-09 NOTE — PROGRESS NOTES
e11.22  [address] [facility] coding opportunities          Chart Reviewed number of suggestions sent to Provider: 1     Patients Insurance     Medicare Insurance: [employer]

## 2023-08-10 ENCOUNTER — TELEPHONE (OUTPATIENT)
Dept: FAMILY MEDICINE CLINIC | Facility: CLINIC | Age: 73
End: 2023-08-10

## 2023-08-16 ENCOUNTER — OFFICE VISIT (OUTPATIENT)
Dept: FAMILY MEDICINE CLINIC | Facility: CLINIC | Age: 73
End: 2023-08-16
Payer: MEDICARE

## 2023-08-16 VITALS
SYSTOLIC BLOOD PRESSURE: 112 MMHG | HEART RATE: 80 BPM | HEIGHT: 70 IN | DIASTOLIC BLOOD PRESSURE: 70 MMHG | WEIGHT: 240 LBS | BODY MASS INDEX: 34.36 KG/M2 | OXYGEN SATURATION: 93 %

## 2023-08-16 DIAGNOSIS — E78.2 MIXED HYPERLIPIDEMIA: ICD-10-CM

## 2023-08-16 DIAGNOSIS — I10 ESSENTIAL HYPERTENSION: ICD-10-CM

## 2023-08-16 DIAGNOSIS — E11.8 TYPE 2 DIABETES MELLITUS WITH COMPLICATION, WITHOUT LONG-TERM CURRENT USE OF INSULIN (HCC): ICD-10-CM

## 2023-08-16 PROCEDURE — 99214 OFFICE O/P EST MOD 30 MIN: CPT | Performed by: FAMILY MEDICINE

## 2023-08-16 RX ORDER — ROSUVASTATIN CALCIUM 10 MG/1
10 TABLET, COATED ORAL DAILY
Qty: 90 TABLET | Refills: 1 | Status: SHIPPED | OUTPATIENT
Start: 2023-08-16

## 2023-08-16 RX ORDER — LISINOPRIL 5 MG/1
5 TABLET ORAL DAILY
Qty: 90 TABLET | Refills: 1 | Status: SHIPPED | OUTPATIENT
Start: 2023-08-16

## 2023-08-16 RX ORDER — LISINOPRIL 5 MG/1
5 TABLET ORAL DAILY
Qty: 90 TABLET | Refills: 1 | Status: SHIPPED | OUTPATIENT
Start: 2023-08-16 | End: 2023-08-16 | Stop reason: SDUPTHER

## 2023-08-16 NOTE — ASSESSMENT & PLAN NOTE
Stable, continue present ace  Stressed the importance of proper hydration throughout the day, avoidance of NSAIDs

## 2023-08-16 NOTE — PROGRESS NOTES
BMI Counseling: Body mass index is 34.44 kg/m². The BMI is above normal. Nutrition recommendations include decreasing portion sizes, decreasing fast food intake, limiting drinks that contain sugar, moderation in carbohydrate intake, increasing intake of lean protein, reducing intake of saturated and trans fat and reducing intake of cholesterol. Exercise recommendations include moderate physical activity 150 minutes/week and exercising 3-5 times per week. No pharmacotherapy was ordered. Rationale for BMI follow-up plan is due to patient being overweight or obese. Assessment/Plan:     Chronic Problems:  Type 2 diabetes mellitus with complication, without long-term current use of insulin ([facility])  Discussed elevation in A1c recommend increasing dosing of metformin along with dietary guidelines encouraged  We discussed the importance of controlling blood glucose (hemoglobin A1c less than 7. 0)Premeal , even better <110  2hr after a meal <170, even better <140  A1C <7%, even better <6.5%. blood pressure control, and cholesterol to lower the risk for complications. Encouraged advise to check home blood sugars discussed goals in range of therapy. Reviewed recommendations of annual eye exams (ophthalmology) on long foot exam (Podiatry)  Lab Results   Component Value Date    HGBA1C 7.4 (H) 08/08/2023       Essential hypertension  Stable, continue present ace  Stressed the importance of proper hydration throughout the day, avoidance of NSAIDs    Mixed hyperlipidemia  Tolerating statin to be continued reviewed lipid profile      Visit Diagnosis:  Diagnoses and all orders for this visit:    Essential hypertension  Comments:   stable continue current medications dietary modifications  Orders:  -     Discontinue: lisinopril (ZESTRIL) 5 mg tablet; Take 1 tablet (5 mg total) by mouth daily  -     lisinopril (ZESTRIL) 5 mg tablet;  Take 1 tablet (5 mg total) by mouth daily    Mixed hyperlipidemia  Comments:  Stable, tolerating statin to be continued obtain updated lipid  Orders:  -     rosuvastatin (CRESTOR) 10 MG tablet; Take 1 tablet (10 mg total) by mouth daily    Type 2 diabetes mellitus with complication, without long-term current use of insulin ([facility])  Comments:   stable, hemoglobin A1c acceptable negative hypoglycemic episodes continue current plan  Orders:  -     metFORMIN (GLUCOPHAGE) 850 mg tablet; Take 1 tablet (850 mg total) by mouth 2 (two) times a day with meals  -     Albumin / creatinine urine ratio; Future  -     Comprehensive metabolic panel; Future  -     Hemoglobin A1C; Future  -     Microalbumin, Random Urine (W/Creatinine) ([facility] ONLY); Future  -     CBC and Platelet; Future          Subjective:    Patient ID: Cody Gomez is a 68 y.o. male. F/u   SUBJECTIVE:  68 y.o. male for follow up of diabetes. Diabetic Review of Systems - medication compliance: compliant most of the time, diabetic diet compliance: compliant most of the time, home glucose monitoring: is not performed, further diabetic ROS: no polyuria or polydipsia, no chest pain, dyspnea or TIA's, no numbness, tingling or pain in extremities, no unusual visual symptoms. [place]   [employer]   htn chol  contiue with crestor , lisinopril , neg issues  To report  Negative chest pain palpitations shortness of breath difficulty breathing cough lesions rash  Scheduled f/u with cardiol  Current Outpatient Medications:  Ascorbic Acid (vitamin C) 1000 MG tablet, Take 1,000 mg by mouth daily, Disp: , Rfl:   Calcium Carbonate (CALCIUM 600 PO), Take by mouth daily, Disp: , Rfl:   EVENING PRIMROSE OIL PO, Take by mouth 3 (three) times a day, Disp: , Rfl:   lisinopril (ZESTRIL) 2.5 mg tablet, Take 1 tablet (2.5 mg total) by mouth daily, Disp: 90 tablet, Rfl: 1  metFORMIN (GLUCOPHAGE) 500 mg tablet, Take 1 tablet (500 mg total) by mouth 2 (two) times a day with meals, Disp: 180 tablet, Rfl: 1  multivitamin (THERAGRAN) TABS, Take 1 tablet by mouth daily, Disp: , Rfl:   rosuvastatin (CRESTOR) 10 MG tablet, Take 1 tablet (10 mg total) by mouth daily, Disp: 90 tablet, Rfl: 1  VITAMIN D, ERGOCALCIFEROL, PO, Take by mouth, Disp: , Rfl:   vitamin E, tocopherol, 400 units capsule, Take 400 Units by mouth daily, Disp: , Rfl:   Blood Glucose Monitoring Suppl ([facility]) w/Device KIT, by Does not apply route once for 1 dose Test sugar in the morning, Disp: 1 kit, Rfl: 0    No current facility-administered medications for this visit. The following portions of the patient's history were reviewed and updated as appropriate: allergies, current medications, past family history, past medical history, past social history, past surgical history and problem list.    Review of Systems   Constitutional: Positive for fatigue. Negative for appetite change, chills, fever and unexpected weight change. HENT: Negative for congestion, dental problem, ear pain, hearing loss, postnasal drip, rhinorrhea, sinus pressure, sinus pain, sneezing, sore throat, tinnitus and voice change. Eyes: Negative for visual disturbance. Respiratory: Negative for apnea, cough, chest tightness and shortness of breath. Cardiovascular: Negative for chest pain, palpitations and leg swelling. Gastrointestinal: Negative for abdominal pain, blood in stool, constipation, diarrhea, nausea and vomiting. Endocrine: Negative for cold intolerance, heat intolerance, polydipsia, polyphagia and polyuria. Genitourinary: Negative for decreased urine volume, difficulty urinating, dysuria, frequency and hematuria. Musculoskeletal: Negative for arthralgias, back pain, gait problem, joint swelling and myalgias. Skin: Negative for color change, rash and wound. Allergic/Immunologic: Negative for environmental allergies and food allergies. Neurological: Negative for dizziness, syncope, weakness, light-headedness, numbness and headaches. Hematological: Negative for adenopathy.  Does not bruise/bleed easily. Psychiatric/Behavioral: Negative for sleep disturbance and suicidal ideas. The patient is not nervous/anxious. /70   Pulse 80   Ht 5' 10" (1.778 m)   Wt 109 kg (240 lb)   SpO2 93%   BMI 34.44 kg/m²   Social History     Socioeconomic History   • Marital status:       Spouse name: Not on file   • Number of children: Not on file   • Years of education: Not on file   • Highest education level: Not on file   Occupational History   • Not on file   Tobacco Use   • Smoking status: Former     Packs/day: 1.00     Years: 25.00     Total pack years: 25.00     Types: Cigarettes, Pipe, Cigars     Quit date: 2005     Years since quittin.4     Passive exposure: Past   • Smokeless tobacco: Never   Vaping Use   • Vaping Use: Never used   Substance and Sexual Activity   • Alcohol use: Not Currently   • Drug use: Not Currently   • Sexual activity: Not Currently   Other Topics Concern   • Not on file   Social History Narrative   • Not on file     Social Determinants of Health     Financial Resource Strain: Unknown (10/18/2022)    Overall Financial Resource Strain (CARDIA)    • Difficulty of Paying Living Expenses: Patient refused   Food Insecurity: Not on file   Transportation Needs: Unknown (10/18/2022)    [facility] Transportation    • Lack of Transportation (Medical): Patient refused    • Lack of Transportation (Non-Medical): Patient refused   Physical Activity: Not on file   Stress: Not on file   Social Connections: Not on file   Intimate Partner Violence: Not on file   Housing Stability: Not on file     Past Medical History:   Diagnosis Date   • BPH (benign prostatic hypertrophy)    • Cancer ([address] [facility])    • Diabetes mellitus ([address] [facility])    • Hyperlipidemia    • Prostate cancer ([address] [facility])      Family History   Problem Relation Age of Onset   • Stroke Father    • No Known Problems Mother    • Prostate cancer Brother    • Arthritis Brother    • No Known Problems Sister    • Diabetes Sister    • Diabetes Sister    • No Known Problems Son    • No Known Problems Daughter      Past Surgical History:   Procedure Laterality Date   • EAR SURGERY     • HERNIA REPAIR     • [place] COLONOSCOPY FLX DX W/COLLJ SPEC WHEN PFRMD N/A 5/6/2019    Procedure: COLONOSCOPY;  Surgeon: Lucero Reeves MD;  Location: [facility];   Service: Gastroenterology       Current Outpatient Medications:   •  Ascorbic Acid (vitamin C) 1000 MG tablet, Take 1,000 mg by mouth daily, Disp: , Rfl:   •  Calcium Carbonate (CALCIUM 600 PO), Take by mouth daily, Disp: , Rfl:   •  EVENING PRIMROSE OIL PO, Take by mouth 3 (three) times a day, Disp: , Rfl:   •  lisinopril (ZESTRIL) 5 mg tablet, Take 1 tablet (5 mg total) by mouth daily, Disp: 90 tablet, Rfl: 1  •  metFORMIN (GLUCOPHAGE) 850 mg tablet, Take 1 tablet (850 mg total) by mouth 2 (two) times a day with meals, Disp: 180 tablet, Rfl: 1  •  multivitamin (THERAGRAN) TABS, Take 1 tablet by mouth daily, Disp: , Rfl:   •  rosuvastatin (CRESTOR) 10 MG tablet, Take 1 tablet (10 mg total) by mouth daily, Disp: 90 tablet, Rfl: 1  •  VITAMIN D, ERGOCALCIFEROL, PO, Take by mouth, Disp: , Rfl:   •  vitamin E, tocopherol, 400 units capsule, Take 400 Units by mouth daily, Disp: , Rfl:   •  Blood Glucose Monitoring Suppl ([facility]) w/Device KIT, by Does not apply route once for 1 dose Test sugar in the morning, Disp: 1 kit, Rfl: 0    Allergies   Allergen Reactions   • Penicillin V           Lab Review   Appointment on 08/08/2023   Component Date Value   • Sodium 08/08/2023 141    • Potassium 08/08/2023 5.1    • Chloride 08/08/2023 110 (H)    • CO2 08/08/2023 27    • ANION GAP 08/08/2023 4    • BUN 08/08/2023 30 (H)    • Creatinine 08/08/2023 1.33 (H)    • Glucose, Fasting 08/08/2023 143 (H)    • Calcium 08/08/2023 9.6    • AST 08/08/2023 19    • ALT 08/08/2023 38    • Alkaline Phosphatase 08/08/2023 78    • Total Protein 08/08/2023 7.7    • Albumin 08/08/2023 3.9    • Total Bilirubin 08/08/2023 0.53    • eGFR 08/08/2023 53    • Hemoglobin A1C 08/08/2023 7.4 (H)    • EAG 08/08/2023 166    • TSH 3RD GENERATON 08/08/2023 3. 106    • Cholesterol 08/08/2023 181    • Triglycerides 08/08/2023 271 (H)    • HDL, Direct 08/08/2023 41    • LDL Calculated 08/08/2023 86    • WBC 08/08/2023 6.68    • RBC 08/08/2023 4.76    • Hemoglobin 08/08/2023 13.8    • Hematocrit 08/08/2023 43.9    • MCV 08/08/2023 92    • MCH 08/08/2023 29.0    • MCHC 08/08/2023 31.4    • RDW 08/08/2023 12.9    • Platelets 20/81/1452 190    • MPV 08/08/2023 10.1    Appointment on 05/04/2023   Component Date Value   • PSA, Diagnostic 05/04/2023 1.5    Lab on 03/23/2023   Component Date Value   • PSA, Diagnostic 03/23/2023 1.4         Imaging: No results found. Objective:     Physical Exam  Constitutional:       General: He is not in acute distress. Appearance: He is well-developed. He is not ill-appearing or toxic-appearing. HENT:      Head: Normocephalic and atraumatic. Cardiovascular:      Rate and Rhythm: Normal rate and regular rhythm. Heart sounds: Normal heart sounds. Pulmonary:      Effort: Pulmonary effort is normal.      Breath sounds: Normal breath sounds. Abdominal:      General: Bowel sounds are normal.      Palpations: Abdomen is soft. Musculoskeletal:         General: Normal range of motion. Cervical back: Normal range of motion and neck supple. Skin:     General: Skin is warm and dry. Neurological:      Mental Status: He is alert and oriented to person, place, and time. Deep Tendon Reflexes: Reflexes are normal and symmetric. Psychiatric:         Behavior: Behavior normal.         Thought Content: Thought content normal.         Judgment: Judgment normal.           There are no Patient Instructions on file for this visit. DANITA Carvalho    Portions of the record may have been created with voice recognition software.   Occasional wrong word or "sound a like" substitutions may have occurred due to the inherent limitations of voice recognition software. Read the chart carefully and recognize, using context, where substitutions have occurred.

## 2023-08-16 NOTE — ASSESSMENT & PLAN NOTE
Discussed elevation in A1c recommend increasing dosing of metformin along with dietary guidelines encouraged  We discussed the importance of controlling blood glucose (hemoglobin A1c less than 7. 0)Premeal , even better <110  2hr after a meal <170, even better <140  A1C <7%, even better <6.5%. blood pressure control, and cholesterol to lower the risk for complications. Encouraged advise to check home blood sugars discussed goals in range of therapy.  Reviewed recommendations of annual eye exams (ophthalmology) on long foot exam (Podiatry)  Lab Results   Component Value Date    HGBA1C 7.4 (H) 08/08/2023

## 2023-08-24 ENCOUNTER — LAB (OUTPATIENT)
Dept: LAB | Facility: CLINIC | Age: 73
End: 2023-08-24
Payer: MEDICARE

## 2023-08-24 DIAGNOSIS — C61 PROSTATE CANCER (HCC): ICD-10-CM

## 2023-08-24 LAB — PSA SERPL-MCNC: 2.25 NG/ML (ref 0–4)

## 2023-08-24 PROCEDURE — 84153 ASSAY OF PSA TOTAL: CPT

## 2023-08-24 PROCEDURE — 36415 COLL VENOUS BLD VENIPUNCTURE: CPT

## 2023-08-31 NOTE — PROGRESS NOTES
9/1/2023      Chief Complaint   Patient presents with   • Follow-up         Assessment and Plan    68 y.o. male     1. Prostate cancer, high risk status post radiation therapy and ADT  2. Hot flashes  - final Lupron was given 2/10/21  - PSA now 2.25, previously 0.2  - PSMA PET CT ordered due to recurrence, PSA also ordered  - Follow up with MD to discuss potential need for repeat biopsy  - Call with any questions or concerns in the meantime  - All questions answered; patient understands and agrees with plan       History of Present Illness  Moe Hayes is a 68 y.o. male patient with history of high risk prostate cancer status post radiation therapy and ADT here for follow up. Patient has been treated with radiation therapy and underwent Firmagon injection on 6/28/19. Patient had fiducial marker placement and Lupron injection on 8/1/19. Patient completed 2 year course of ADT in February 2021 Patient completed radiation therapy in October 2019. Pretreatment PSA 22.4. Morales 0.2. Most recent PSA 2.25. Patient does continue to have hot flashes, however, this improved with evening primrose. Denies any new or worsening symptoms. Denies bone pain or weight loss. Component Ref Range & Units 8/24/23 10:26 AM 5/4/23 10:12 AM 3/23/23 10:01 AM 8/25/22  9:10 AM 4/28/22  9:15 AM 2/22/22  9:57 AM 8/9/21 10:20 AM   PSA, Diagnostic 0.00 - 4.00 ng/mL 2.25  1.5 R, CM  1.4 R, CM  0.9 R, CM  0.6 R, CM  0.4 R, CM  0.2 R, CM          Review of Systems   Constitutional: Negative for activity change, appetite change, chills and fever. HENT: Negative for congestion and trouble swallowing. Respiratory: Negative for cough and shortness of breath. Cardiovascular: Negative for chest pain, palpitations and leg swelling. Gastrointestinal: Negative for abdominal pain, constipation, diarrhea, nausea and vomiting. Genitourinary: Negative for difficulty urinating, dysuria, flank pain, frequency, hematuria and urgency. Musculoskeletal: Negative for back pain and gait problem. Skin: Negative for wound. Allergic/Immunologic: Negative for immunocompromised state. Neurological: Negative for dizziness and syncope. Hematological: Does not bruise/bleed easily. Psychiatric/Behavioral: Negative for confusion. All other systems reviewed and are negative. AUA SYMPTOM SCORE    Flowsheet Row Most Recent Value   AUA SYMPTOM SCORE    How often have you had a sensation of not emptying your bladder completely after you finished urinating? 0 (P)     How often have you had to urinate again less than two hours after you finished urinating? 2 (P)     How often have you found you stopped and started again several times when you urinate? 0 (P)     How often have you found it difficult to postpone urination? 0 (P)     How often have you had a weak urinary stream? 3 (P)     How often have you had to push or strain to begin urination? 0 (P)     How many times did you most typically get up to urinate from the time you went to bed at night until the time you got up in the morning? 3 (P)     Quality of Life: If you were to spend the rest of your life with your urinary condition just the way it is now, how would you feel about that? 1 (P)     AUA SYMPTOM SCORE 8 (P)            Vitals  Vitals:    09/01/23 0951   BP: 138/86   Pulse: 56   SpO2: 98%   Weight: 108 kg (238 lb)   Height: 5' 10" (1.778 m)       Physical Exam  Constitutional:       General: He is not in acute distress. Appearance: Normal appearance. He is not ill-appearing, toxic-appearing or diaphoretic. HENT:      Head: Normocephalic. Nose: No congestion. Eyes:      General: No scleral icterus. Right eye: No discharge. Left eye: No discharge. Conjunctiva/sclera: Conjunctivae normal.      Pupils: Pupils are equal, round, and reactive to light.    Pulmonary:      Effort: Pulmonary effort is normal.   Musculoskeletal:      Cervical back: Normal range of motion. Skin:     General: Skin is warm and dry. Coloration: Skin is not jaundiced or pale. Findings: No bruising, erythema, lesion or rash. Neurological:      General: No focal deficit present. Mental Status: He is alert and oriented to person, place, and time. Mental status is at baseline. Gait: Gait normal.   Psychiatric:         Mood and Affect: Mood normal.         Behavior: Behavior normal.         Thought Content: Thought content normal.         Judgment: Judgment normal.           Past History  Past Medical History:   Diagnosis Date   • BPH (benign prostatic hypertrophy)    • Cancer (HCC)    • Diabetes mellitus ([facility])    • Hyperlipidemia    • Prostate cancer [facility])      Social History     Socioeconomic History   • Marital status:       Spouse name: None   • Number of children: None   • Years of education: None   • Highest education level: None   Occupational History   • None   Tobacco Use   • Smoking status: Former     Packs/day: 1.00     Years: 25.00     Total pack years: 25.00     Types: Cigarettes, Pipe, Cigars     Quit date: 2005     Years since quittin.5     Passive exposure: Past   • Smokeless tobacco: Never   Vaping Use   • Vaping Use: Never used   Substance and Sexual Activity   • Alcohol use: Not Currently   • Drug use: Not Currently   • Sexual activity: Not Currently   Other Topics Concern   • None   Social History Narrative   • None     Social Determinants of Health     Financial Resource Strain: Unknown (10/18/2022)    Overall Financial Resource Strain (CARDIA)    • Difficulty of Paying Living Expenses: Patient refused   Food Insecurity: Not on file   Transportation Needs: Unknown (10/18/2022)    PRAPARE - Transportation    • Lack of Transportation (Medical): Patient refused    • Lack of Transportation (Non-Medical): Patient refused   Physical Activity: Not on file   Stress: Not on file   Social Connections: Not on file   Intimate Partner Violence: Not on file   Housing Stability: Not on file     Social History     Tobacco Use   Smoking Status Former   • Packs/day: 1.00   • Years: 25.00   • Total pack years: 25.00   • Types: Cigarettes, Pipe, Cigars   • Quit date: 2005   • Years since quittin.5   • Passive exposure: Past   Smokeless Tobacco Never     Family History   Problem Relation Age of Onset   • Stroke Father    • No Known Problems Mother    • Prostate cancer Brother    • Arthritis Brother    • No Known Problems Sister    • Diabetes Sister    • Diabetes Sister    • No Known Problems Son    • No Known Problems Daughter        The following portions of the patient's history were reviewed and updated as appropriate: allergies, current medications, past medical history, past social history, past surgical history and problem list.    Results  No results found for this or any previous visit (from the past 1 hour(s)).]  Lab Results   Component Value Date    PSA 2.25 2023    PSA 1.5 2023    PSA 1.4 2023    PSA 0.9 2022     Lab Results   Component Value Date    CALCIUM 9.6 2023    K 5.1 2023    CO2 27 2023     (H) 2023    BUN 30 (H) 2023    CREATININE 1.33 (H) 2023     Lab Results   Component Value Date    WBC 6.68 2023    HGB 13.8 2023    HCT 43.9 2023    MCV 92 2023     2023       Anthony Austin PA-C

## 2023-09-01 ENCOUNTER — OFFICE VISIT (OUTPATIENT)
Dept: UROLOGY | Facility: CLINIC | Age: 73
End: 2023-09-01
Payer: MEDICARE

## 2023-09-01 VITALS
BODY MASS INDEX: 34.07 KG/M2 | HEART RATE: 56 BPM | HEIGHT: 70 IN | SYSTOLIC BLOOD PRESSURE: 138 MMHG | DIASTOLIC BLOOD PRESSURE: 86 MMHG | WEIGHT: 238 LBS | OXYGEN SATURATION: 98 %

## 2023-09-01 DIAGNOSIS — C61 PROSTATE CANCER (HCC): Primary | ICD-10-CM

## 2023-09-01 PROCEDURE — 99213 OFFICE O/P EST LOW 20 MIN: CPT | Performed by: PHYSICIAN ASSISTANT

## 2023-09-19 ENCOUNTER — CONSULT (OUTPATIENT)
Dept: CARDIOLOGY CLINIC | Facility: CLINIC | Age: 73
End: 2023-09-19
Payer: MEDICARE

## 2023-09-19 VITALS
HEART RATE: 87 BPM | SYSTOLIC BLOOD PRESSURE: 130 MMHG | DIASTOLIC BLOOD PRESSURE: 72 MMHG | HEIGHT: 70 IN | OXYGEN SATURATION: 97 % | RESPIRATION RATE: 20 BRPM | WEIGHT: 236 LBS | BODY MASS INDEX: 33.79 KG/M2

## 2023-09-19 DIAGNOSIS — R94.31 ABNORMAL ECG: ICD-10-CM

## 2023-09-19 DIAGNOSIS — I10 ESSENTIAL HYPERTENSION: Primary | ICD-10-CM

## 2023-09-19 DIAGNOSIS — Z01.818 PREOPERATIVE CLEARANCE: ICD-10-CM

## 2023-09-19 PROCEDURE — 99204 OFFICE O/P NEW MOD 45 MIN: CPT | Performed by: INTERNAL MEDICINE

## 2023-09-19 PROCEDURE — 93000 ELECTROCARDIOGRAM COMPLETE: CPT | Performed by: INTERNAL MEDICINE

## 2023-09-19 NOTE — PROGRESS NOTES
OP Consultation - Cardiology    José Miguel Montgomery 68 y.o. male MRN: [id number]    Physician Requesting Consult: DANITA Mauricio    Reason for Consult / Chief Complaint: Abnormal ECG    HPI:     68year old man with a past medical history of hypertension, diabetes, hyperlipidemia, CKD who presents for preop risk assessment. Patient was scheduled to undergo cataract surgery. He was noted to have abnormal rhythm and procedure was cancelled. Denies chest pain, chest pressure, shortness of breath, dizziness, lightheadedness, presyncope or syncope. Denies orthopnea, PND or lower limb edema.     No resting or exertional symptoms    States he is active and mows the lawn     Social History:  Tobacco: Prior tobacco use about 20 years ago  Alcohol: Quit 5-6 years ago    FAMILY HISTORY:  Family History   Problem Relation Age of Onset   • Stroke Father    • No Known Problems Mother    • Prostate cancer Brother    • Arthritis Brother    • No Known Problems Sister    • Diabetes Sister    • Diabetes Sister    • No Known Problems Son    • No Known Problems Daughter         MEDS & ALLERGIES:  Allergies   Allergen Reactions   • Penicillin V          Current Outpatient Medications:   •  Ascorbic Acid (vitamin C) 1000 MG tablet, Take 1,000 mg by mouth daily, Disp: , Rfl:   •  Calcium Carbonate (CALCIUM 600 PO), Take by mouth daily, Disp: , Rfl:   •  EVENING PRIMROSE OIL PO, Take by mouth 3 (three) times a day, Disp: , Rfl:   •  lisinopril (ZESTRIL) 5 mg tablet, Take 1 tablet (5 mg total) by mouth daily, Disp: 90 tablet, Rfl: 1  •  metFORMIN (GLUCOPHAGE) 850 mg tablet, Take 1 tablet (850 mg total) by mouth 2 (two) times a day with meals, Disp: 180 tablet, Rfl: 1  •  multivitamin (THERAGRAN) TABS, Take 1 tablet by mouth daily, Disp: , Rfl:   •  rosuvastatin (CRESTOR) 10 MG tablet, Take 1 tablet (10 mg total) by mouth daily, Disp: 90 tablet, Rfl: 1  •  VITAMIN D, ERGOCALCIFEROL, PO, Take by mouth, Disp: , Rfl:   •  vitamin E, tocopherol, 400 units capsule, Take 400 Units by mouth daily, Disp: , Rfl:   •  Blood Glucose Monitoring Suppl (Radha Harris) w/Device KIT, by Does not apply route once for 1 dose Test sugar in the morning (Patient not taking: Reported on 9/19/2023), Disp: 1 kit, Rfl: 0    Past Medical History:   Diagnosis Date   • BPH (benign prostatic hypertrophy)    • Cancer ([address])    • Diabetes mellitus ([address])    • Hyperlipidemia    • Prostate cancer ([address])          Review of Systems:  Review of Systems   Constitutional: Negative. Negative for activity change, appetite change, diaphoresis, fatigue and fever. Respiratory: Negative for cough, chest tightness, shortness of breath and wheezing. Cardiovascular: Negative for chest pain, palpitations and leg swelling. Gastrointestinal: Negative for nausea and vomiting. Genitourinary: Negative for dysuria. Musculoskeletal: Negative for arthralgias and back pain. Neurological: Negative for dizziness, syncope, weakness and light-headedness. Psychiatric/Behavioral: Negative for agitation and behavioral problems. All other systems reviewed and are negative. PHYSICAL EXAM:  Vitals:   Vitals:    09/19/23 0850   BP: 130/72   BP Location: Left arm   Patient Position: Sitting   Cuff Size: Standard   Pulse: 87   Resp: 20   SpO2: 97%   Weight: 107 kg (236 lb)   Height: 5' 10" (1.778 m)        Physical Exam:  Physical Exam  Vitals and nursing note reviewed. Constitutional:       General: He is not in acute distress. Appearance: Normal appearance. He is not ill-appearing or diaphoretic. HENT:      Head: Normocephalic and atraumatic. Eyes:      Extraocular Movements: Extraocular movements intact. Cardiovascular:      Rate and Rhythm: Normal rate and regular rhythm. Heart sounds: No murmur heard. No friction rub. No gallop. Pulmonary:      Effort: Pulmonary effort is normal. No respiratory distress. Breath sounds: Normal breath sounds.    Abdominal: General: There is no distension. Palpations: Abdomen is soft. Musculoskeletal:         General: No swelling. Normal range of motion. Cervical back: Normal range of motion. No rigidity. Skin:     General: Skin is warm and dry. Capillary Refill: Capillary refill takes less than 2 seconds. Coloration: Skin is not pale. Neurological:      General: No focal deficit present. Mental Status: He is alert and oriented to person, place, and time. Cranial Nerves: No cranial nerve deficit. Psychiatric:         Mood and Affect: Mood normal.         Behavior: Behavior normal.         LABORATORY RESULTS:    Lab Results   Component Value Date    WBC 6.68 08/08/2023    HGB 13.8 08/08/2023    HCT 43.9 08/08/2023    MCV 92 08/08/2023     08/08/2023     Lab Results   Component Value Date    CALCIUM 9.6 08/08/2023    K 5.1 08/08/2023    CO2 27 08/08/2023     (H) 08/08/2023    BUN 30 (H) 08/08/2023    CREATININE 1.33 (H) 08/08/2023     Lab Results   Component Value Date    HGBA1C 7.4 (H) 08/08/2023       Lipid Profile:   No results found for: "CHOL"  Lab Results   Component Value Date    HDL 41 08/08/2023    HDL 41 02/09/2023    HDL 42 04/28/2022     Lab Results   Component Value Date    LDLCALC 86 08/08/2023    LDLCALC 79 02/09/2023    LDLCALC 117 (H) 04/28/2022     Lab Results   Component Value Date    TRIG 271 (H) 08/08/2023    TRIG 270 (H) 02/09/2023    TRIG 381 (H) 04/28/2022       The 10-year ASCVD risk score (Scott BLANCO, et al., 2019) is: 42.8%    Values used to calculate the score:      Age: 68 years      Sex: Male      Is Non- : No      Diabetic: Yes      Tobacco smoker: No      Systolic Blood Pressure: 423 mmHg      Is BP treated: Yes      HDL Cholesterol: 41 mg/dL      Total Cholesterol: 181 mg/dL    Imaging: I have personally reviewed pertinent reports. No results found.     EKG reviewed personally: Junctional tachycardia, nonspecific IVCD, PVC    Assessment and Plan:    Addy Waterman was seen today for consult . Diagnoses and all orders for this visit:    Essential hypertension    Preoperative clearance  -     Ambulatory Referral to Cardiology         1. Abnormal ECG with junctional tachycardia/PVC  2. Hypertension  3. Dyslipidemia  4. DM  5. CKD      He is doing well. No specific symptoms. Will check a 2 week cardiac event monitor  Will check an echocardiogram to evaluate for structural heart disease  Avoid AV alisha blocking agents. He will inform us with onset of any symptoms. Cont with lisinopril for hypertension    Cont with rosuvastatin for dyslipidemia    Signs and symptoms of heart disease to look out and report which may need further evaluation were discussed in detail with patient . Sensitivity, specificity and limitations of testing was discussed.      Return to clinic in 4 weeks or PEDRO Last MD  9/19/2023,9:08 AM

## 2023-09-21 ENCOUNTER — HOSPITAL ENCOUNTER (OUTPATIENT)
Dept: NON INVASIVE DIAGNOSTICS | Facility: CLINIC | Age: 73
Discharge: HOME | End: 2023-09-21
Payer: MEDICARE

## 2023-09-21 VITALS
HEIGHT: 70 IN | SYSTOLIC BLOOD PRESSURE: 130 MMHG | BODY MASS INDEX: 33.79 KG/M2 | WEIGHT: 236 LBS | HEART RATE: 87 BPM | DIASTOLIC BLOOD PRESSURE: 72 MMHG

## 2023-09-21 DIAGNOSIS — R94.31 ABNORMAL ECG: ICD-10-CM

## 2023-09-21 LAB
AORTIC ROOT: 3.4 CM
APICAL FOUR CHAMBER EJECTION FRACTION: 29 %
ASCENDING AORTA: 3.3 CM
AV LVOT MEAN GRADIENT: 1 MMHG
AV LVOT PEAK GRADIENT: 2 MMHG
DOP CALC LVOT PEAK VEL VTI: 13.02 CM
DOP CALC LVOT PEAK VEL: 0.7 M/S
E WAVE DECELERATION TIME: 128 MS
FRACTIONAL SHORTENING: 7 % (ref 28–44)
INTERVENTRICULAR SEPTUM IN DIASTOLE (PARASTERNAL SHORT AXIS VIEW): 1.3 CM
INTERVENTRICULAR SEPTUM: 1.3 CM (ref 0.6–1.1)
LAAS-AP4: 20.2 CM2
LEFT ATRIUM AREA SYSTOLE SINGLE PLANE A4C: 19.9 CM2
LEFT ATRIUM SIZE: 5.4 CM
LEFT INTERNAL DIMENSION IN SYSTOLE: 5.1 CM (ref 2.1–4)
LEFT VENTRICULAR INTERNAL DIMENSION IN DIASTOLE: 5.5 CM (ref 3.5–6)
LEFT VENTRICULAR POSTERIOR WALL IN END DIASTOLE: 1.3 CM
LEFT VENTRICULAR STROKE VOLUME: 24 ML
LVSV (TEICH): 24 ML
MV PEAK A VEL: 0.18 M/S
MV PEAK E VEL: 70 CM/S
MV STENOSIS PRESSURE HALF TIME: 37 MS
MV VALVE AREA P 1/2 METHOD: 5.95 CM2
PA SYSTOLIC PRESSURE: 58 MMHG
RIGHT ATRIAL 2D VOLUME: 25 ML
RIGHT ATRIUM AREA SYSTOLE A4C: 13 CM2
RIGHT VENTRICLE ID DIMENSION: 3.4 CM
SL CV LV EF: 30
SL CV PED ECHO LEFT VENTRICLE DIASTOLIC VOLUME (MOD BIPLANE) 2D: 146 ML
SL CV PED ECHO LEFT VENTRICLE SYSTOLIC VOLUME (MOD BIPLANE) 2D: 122 ML
TR MAX PG: 54 MMHG
TR PEAK VELOCITY: 3.7 M/S
TRICUSPID ANNULAR PLANE SYSTOLIC EXCURSION: 1.9 CM
TRICUSPID VALVE PEAK REGURGITATION VELOCITY: 3.66 M/S

## 2023-09-21 PROCEDURE — 93306 TTE W/DOPPLER COMPLETE: CPT | Performed by: INTERNAL MEDICINE

## 2023-09-21 PROCEDURE — 93306 TTE W/DOPPLER COMPLETE: CPT

## 2023-09-22 ENCOUNTER — TELEPHONE (OUTPATIENT)
Dept: CARDIOLOGY CLINIC | Facility: CLINIC | Age: 73
End: 2023-09-22

## 2023-09-22 NOTE — TELEPHONE ENCOUNTER
----- Message from Kelsey Last MD sent at 9/21/2023  4:59 PM EDT -----  Please let patient know that his heart function is weak. Will need further testing for evaluation. If he has any symptoms, he should proceed to ER  Please schedule patient within one week to go over results. Any available provider (Can use same day).    Thanks

## 2023-09-25 ENCOUNTER — TELEPHONE (OUTPATIENT)
Age: 73
End: 2023-09-25

## 2023-09-25 ENCOUNTER — HOSPITAL ENCOUNTER (OUTPATIENT)
Dept: RADIOLOGY | Age: 73
Discharge: HOME | End: 2023-09-25
Payer: MEDICARE

## 2023-09-25 DIAGNOSIS — R97.20 ELEVATED PROSTATE SPECIFIC ANTIGEN (PSA): ICD-10-CM

## 2023-09-25 DIAGNOSIS — C61 PROSTATE CANCER (HCC): Primary | ICD-10-CM

## 2023-09-25 DIAGNOSIS — C61 PROSTATE CANCER (HCC): ICD-10-CM

## 2023-09-25 PROCEDURE — A9595 HB PIFLUFOLASTAT F-18, DIAGNOSTIC, 1 MILLICURIE: HCPCS

## 2023-09-25 PROCEDURE — 78815 PET IMAGE W/CT SKULL-THIGH: CPT

## 2023-09-25 PROCEDURE — G1004 CDSM NDSC: HCPCS

## 2023-09-25 NOTE — TELEPHONE ENCOUNTER
[address] [facility] [address], can you assist us with this ? Verges patient only-No availability -anywhere we can double book?

## 2023-09-25 NOTE — TELEPHONE ENCOUNTER
Radiology Test Results - Eli Isidro from Radiology Calling with report update    Pt under care of: Odalys Armijo PA-C    Imaging Completed: PET scan     Significant Findings - Please review

## 2023-09-26 NOTE — TELEPHONE ENCOUNTER
Spoke to Dr. Velma Hsu in office. Patient to get MRI as recommended and can follow up as currently scheduled.

## 2023-09-27 DIAGNOSIS — E11.8 TYPE 2 DIABETES MELLITUS WITH COMPLICATION, WITHOUT LONG-TERM CURRENT USE OF INSULIN (HCC): ICD-10-CM

## 2023-09-28 ENCOUNTER — APPOINTMENT (OUTPATIENT)
Dept: LAB | Facility: CLINIC | Age: 73
End: 2023-09-28
Payer: MEDICARE

## 2023-09-28 PROCEDURE — 80048 BASIC METABOLIC PNL TOTAL CA: CPT | Performed by: INTERNAL MEDICINE

## 2023-09-28 PROCEDURE — 36415 COLL VENOUS BLD VENIPUNCTURE: CPT | Performed by: INTERNAL MEDICINE

## 2023-09-29 LAB
ANION GAP SERPL CALCULATED.3IONS-SCNC: 15 MMOL/L
BUN SERPL-MCNC: 28 MG/DL (ref 5–25)
CALCIUM SERPL-MCNC: 9.8 MG/DL (ref 8.4–10.2)
CHLORIDE SERPL-SCNC: 105 MMOL/L (ref 96–108)
CO2 SERPL-SCNC: 22 MMOL/L (ref 21–32)
CREAT SERPL-MCNC: 1.18 MG/DL (ref 0.6–1.3)
GFR SERPL CREATININE-BSD FRML MDRD: 61 ML/MIN/1.73SQ M
GLUCOSE P FAST SERPL-MCNC: 115 MG/DL (ref 65–99)
POTASSIUM SERPL-SCNC: 5.3 MMOL/L (ref 3.5–5.3)
SODIUM SERPL-SCNC: 142 MMOL/L (ref 135–147)

## 2023-10-02 ENCOUNTER — OFFICE VISIT (OUTPATIENT)
Dept: CARDIOLOGY CLINIC | Facility: CLINIC | Age: 73
End: 2023-10-02
Payer: MEDICARE

## 2023-10-02 ENCOUNTER — PREP FOR PROCEDURE (OUTPATIENT)
Dept: CARDIOLOGY CLINIC | Facility: CLINIC | Age: 73
End: 2023-10-02

## 2023-10-02 ENCOUNTER — TELEPHONE (OUTPATIENT)
Dept: CARDIOLOGY CLINIC | Facility: CLINIC | Age: 73
End: 2023-10-02

## 2023-10-02 VITALS
SYSTOLIC BLOOD PRESSURE: 124 MMHG | RESPIRATION RATE: 21 BRPM | BODY MASS INDEX: 33.93 KG/M2 | OXYGEN SATURATION: 98 % | WEIGHT: 237 LBS | HEIGHT: 70 IN | HEART RATE: 91 BPM | DIASTOLIC BLOOD PRESSURE: 72 MMHG

## 2023-10-02 DIAGNOSIS — I10 ESSENTIAL HYPERTENSION: Primary | ICD-10-CM

## 2023-10-02 DIAGNOSIS — I42.0 DILATED CARDIOMYOPATHY (HCC): ICD-10-CM

## 2023-10-02 DIAGNOSIS — I42.0 DILATED CARDIOMYOPATHY (HCC): Primary | ICD-10-CM

## 2023-10-02 PROCEDURE — 99214 OFFICE O/P EST MOD 30 MIN: CPT | Performed by: INTERNAL MEDICINE

## 2023-10-02 RX ORDER — ASPIRIN 81 MG/1
81 TABLET, CHEWABLE ORAL DAILY
Qty: 30 TABLET | Refills: 3 | Status: SHIPPED | OUTPATIENT
Start: 2023-10-02

## 2023-10-02 RX ORDER — ISOSORBIDE MONONITRATE 30 MG/1
30 TABLET, EXTENDED RELEASE ORAL DAILY
Qty: 30 TABLET | Refills: 3 | Status: SHIPPED | OUTPATIENT
Start: 2023-10-02

## 2023-10-02 NOTE — PROGRESS NOTES
Cardiology Follow Up    Moises Sung  1950  [id number]  [facility]  [place] [id number] [phone]    Discussion/Summary:  1. New cardiomyopathy  2. Shortness of breath on exertion  3. Hypertension  4. Junctional tachycardia      - Echocardiogram results reviewed with patient. He now endorses SOB on exertion and occasional chest heaviness  - Discussed further management. Discussed stress test versus cardiac catheterization. Sensitivity, specificity and limitations of stress testing discussed. Patient prefers definitive assessment with cardiac catheterization. I agree with this. - I have discussed in detail with patient regarding the indications, alternatives, risks and benefit of cardiac catheterization and possible PCI. The procedure risks, benefits, and complications (including but not limited to bleeding, infection, arrhythmia, nephrotoxicity, vessel injury, myocardial infarction, CVA, and death) were reviewed. Patient is alert and oriented x3 and wishes to proceed. All question answered    - Will start ASA and imdur. Hold off AV alisha blockers due to junctional tachycardia and low normal BP.    - Signs and symptoms of heart disease to look out and report were reviewed with patient. Recommend going to ER or calling 911 if he has chest pain,pressure or new symptoms. Patient understands. Previous studies were reviewed. Safety measures were reviewed. Questions were entertained and answered. Patient was advised to report any problems requiring medical attention. Follow-up with PCP and appropriate specialist and lab work as discussed. Return for follow up visit as scheduled or earlier, if needed. Thank you for allowing me to participate in the care and evaluation of your patient. Should you have any questions, please feel free to contact me.       History of Present Illness:     Preet Dorman is a 68 y.o. male who presents for follow up for cardiovascular care. He has a history of hypertension, diabetes, hyperlipidemia, CKD    Denies chest pain, chest pressure, dizziness, lightheadedness, presyncope or syncope. Denies orthopnea, PND or lower limb edema. He reports shortness of breath and chest heaviness on heavy exertion. States he attributed it to old age and didn't think much of it. Echo 9/21/23:  •  Left Ventricle: Left ventricular cavity size is mildly dilated. Wall thickness is mildly increased. The left ventricular ejection fraction is 30%. Systolic function is severely reduced. There is severe global hypokinesis. •  Right Ventricle: Systolic function is low normal.  •  Left Atrium: The atrium is mildly dilated. •  Mitral Valve: There is mild calcification. There is annular calcification. There is mild regurgitation. •  Pulmonary Artery: The estimated pulmonary artery systolic pressure is 12.1 mmHg. The pulmonary artery systolic pressure is moderately increased. Patient Active Problem List   Diagnosis   • Mixed hyperlipidemia   • IFG (impaired fasting glucose)   • Upper respiratory tract infection   • Positive colorectal cancer screening using Cologuard test   • Prostate cancer ([address])   • Encounter for monitoring androgen deprivation therapy   • Hot flashes   • Type 2 diabetes mellitus with complication, without long-term current use of insulin (HCC)   • Stage 3a chronic kidney disease ([address])   • Essential hypertension     Past Medical History:   Diagnosis Date   • BPH (benign prostatic hypertrophy)    • Cancer (HCC)    • Diabetes mellitus ([address])    • Hyperlipidemia    • Prostate cancer ([address])      Social History     Socioeconomic History   • Marital status:       Spouse name: Not on file   • Number of children: Not on file   • Years of education: Not on file   • Highest education level: Not on file   Occupational History   • Not on file Tobacco Use   • Smoking status: Former     Packs/day: 1.00     Years: 25.00     Total pack years: 25.00     Types: Cigarettes, Pipe, Cigars     Quit date: 2005     Years since quittin.6     Passive exposure: Past   • Smokeless tobacco: Never   Vaping Use   • Vaping Use: Never used   Substance and Sexual Activity   • Alcohol use: Not Currently   • Drug use: Not Currently   • Sexual activity: Not Currently   Other Topics Concern   • Not on file   Social History Narrative   • Not on file     Social Determinants of Health     Financial Resource Strain: Unknown (10/18/2022)    Overall Financial Resource Strain (CARDIA)    • Difficulty of Paying Living Expenses: Patient refused   Food Insecurity: Not on file   Transportation Needs: Unknown (10/18/2022)    [employer] Transportation    • Lack of Transportation (Medical): Patient refused    • Lack of Transportation (Non-Medical): Patient refused   Physical Activity: Not on file   Stress: Not on file   Social Connections: Not on file   Intimate Partner Violence: Not on file   Housing Stability: Not on file      Family History   Problem Relation Age of Onset   • Stroke Father    • No Known Problems Mother    • Prostate cancer Brother    • Arthritis Brother    • No Known Problems Sister    • Diabetes Sister    • Diabetes Sister    • No Known Problems Son    • No Known Problems Daughter      Past Surgical History:   Procedure Laterality Date   • EAR SURGERY     • HERNIA REPAIR     • [place] COLONOSCOPY FLX DX W/COLLJ SPEC WHEN PFRMD N/A 2019    Procedure: COLONOSCOPY;  Surgeon: Suresh Melo MD;  Location: [facility];   Service: Gastroenterology       Current Outpatient Medications:   •  Ascorbic Acid (vitamin C) 1000 MG tablet, Take 1,000 mg by mouth daily, Disp: , Rfl:   •  Calcium Carbonate (CALCIUM 600 PO), Take by mouth daily, Disp: , Rfl:   •  EVENING PRIMROSE OIL PO, Take by mouth 3 (three) times a day, Disp: , Rfl:   •  lisinopril (ZESTRIL) 5 mg tablet, Take 1 tablet (5 mg total) by mouth daily, Disp: 90 tablet, Rfl: 1  •  metFORMIN (GLUCOPHAGE) 850 mg tablet, Take 1 tablet (850 mg total) by mouth 2 (two) times a day with meals, Disp: 180 tablet, Rfl: 0  •  multivitamin (THERAGRAN) TABS, Take 1 tablet by mouth daily, Disp: , Rfl:   •  rosuvastatin (CRESTOR) 10 MG tablet, Take 1 tablet (10 mg total) by mouth daily, Disp: 90 tablet, Rfl: 1  •  VITAMIN D, ERGOCALCIFEROL, PO, Take by mouth, Disp: , Rfl:   •  vitamin E, tocopherol, 400 units capsule, Take 400 Units by mouth daily, Disp: , Rfl:   •  Blood Glucose Monitoring Suppl (Gretchen Flores) w/Device KIT, by Does not apply route once for 1 dose Test sugar in the morning (Patient not taking: Reported on 9/19/2023), Disp: 1 kit, Rfl: 0  Allergies   Allergen Reactions   • Penicillin V        Labs:  Lab Results   Component Value Date    WBC 6.68 08/08/2023    HGB 13.8 08/08/2023    HCT 43.9 08/08/2023    MCV 92 08/08/2023     08/08/2023     Lab Results   Component Value Date    CALCIUM 9.8 09/28/2023    K 5.3 09/28/2023    CO2 22 09/28/2023     09/28/2023    BUN 28 (H) 09/28/2023    CREATININE 1.18 09/28/2023     Lab Results   Component Value Date    HGBA1C 7.4 (H) 08/08/2023     No results found for: "CHOL"  Lab Results   Component Value Date    HDL 41 08/08/2023    HDL 41 02/09/2023    HDL 42 04/28/2022     Lab Results   Component Value Date    LDLCALC 86 08/08/2023    LDLCALC 79 02/09/2023    LDLCALC 117 (H) 04/28/2022     Lab Results   Component Value Date    TRIG 271 (H) 08/08/2023    TRIG 270 (H) 02/09/2023    TRIG 381 (H) 04/28/2022     No results found for: "CHOLHDL"    Review of Systems:  Review of Systems   Constitutional: Negative. Negative for activity change, appetite change, diaphoresis, fatigue and fever. Respiratory: Positive for chest tightness and shortness of breath. Negative for cough and wheezing. Cardiovascular: Negative for chest pain, palpitations and leg swelling. Gastrointestinal: Negative for nausea and vomiting. Genitourinary: Negative for dysuria. Musculoskeletal: Negative for arthralgias and back pain. Neurological: Negative for dizziness, syncope, weakness and light-headedness. Psychiatric/Behavioral: Negative for agitation and behavioral problems. All other systems reviewed and are negative. Physical Exam:  Physical Exam  Vitals and nursing note reviewed. Constitutional:       General: He is not in acute distress. Appearance: Normal appearance. He is not ill-appearing or diaphoretic. HENT:      Head: Normocephalic and atraumatic. Eyes:      Extraocular Movements: Extraocular movements intact. Cardiovascular:      Rate and Rhythm: Normal rate and regular rhythm. Heart sounds: No murmur heard. No friction rub. No gallop. Pulmonary:      Effort: Pulmonary effort is normal. No respiratory distress. Breath sounds: Normal breath sounds. Abdominal:      General: There is no distension. Palpations: Abdomen is soft. Musculoskeletal:         General: No swelling. Normal range of motion. Cervical back: Normal range of motion. No rigidity. Skin:     General: Skin is warm and dry. Capillary Refill: Capillary refill takes less than 2 seconds. Coloration: Skin is not pale. Neurological:      General: No focal deficit present. Mental Status: He is alert and oriented to person, place, and time. Cranial Nerves: No cranial nerve deficit.    Psychiatric:         Mood and Affect: Mood normal.         Behavior: Behavior normal.

## 2023-10-02 NOTE — TELEPHONE ENCOUNTER
----- Message from Danilo Allen MD sent at 10/2/2023 11:11 AM EDT -----  Please schedule patient for cardiac catheterization - preferably this week    Thanks

## 2023-10-02 NOTE — H&P (VIEW-ONLY)
Cardiology Follow Up    Martín Cole  1950  [id number]  [facility]  [place] [id number] [phone]    Discussion/Summary:  1. New cardiomyopathy  2. Shortness of breath on exertion  3. Hypertension  4. Junctional tachycardia      - Echocardiogram results reviewed with patient. He now endorses SOB on exertion and occasional chest heaviness  - Discussed further management. Discussed stress test versus cardiac catheterization. Sensitivity, specificity and limitations of stress testing discussed. Patient prefers definitive assessment with cardiac catheterization. I agree with this. - I have discussed in detail with patient regarding the indications, alternatives, risks and benefit of cardiac catheterization and possible PCI. The procedure risks, benefits, and complications (including but not limited to bleeding, infection, arrhythmia, nephrotoxicity, vessel injury, myocardial infarction, CVA, and death) were reviewed. Patient is alert and oriented x3 and wishes to proceed. All question answered    - Will start ASA and imdur. Hold off AV alisha blockers due to junctional tachycardia and low normal BP.    - Signs and symptoms of heart disease to look out and report were reviewed with patient. Recommend going to ER or calling 911 if he has chest pain,pressure or new symptoms. Patient understands. Previous studies were reviewed. Safety measures were reviewed. Questions were entertained and answered. Patient was advised to report any problems requiring medical attention. Follow-up with PCP and appropriate specialist and lab work as discussed. Return for follow up visit as scheduled or earlier, if needed. Thank you for allowing me to participate in the care and evaluation of your patient. Should you have any questions, please feel free to contact me.       History of Present Illness:     Mary Alberto is a 68 y.o. male who presents for follow up for cardiovascular care. He has a history of hypertension, diabetes, hyperlipidemia, CKD    Denies chest pain, chest pressure, dizziness, lightheadedness, presyncope or syncope. Denies orthopnea, PND or lower limb edema. He reports shortness of breath and chest heaviness on heavy exertion. States he attributed it to old age and didn't think much of it. Echo 9/21/23:  •  Left Ventricle: Left ventricular cavity size is mildly dilated. Wall thickness is mildly increased. The left ventricular ejection fraction is 30%. Systolic function is severely reduced. There is severe global hypokinesis. •  Right Ventricle: Systolic function is low normal.  •  Left Atrium: The atrium is mildly dilated. •  Mitral Valve: There is mild calcification. There is annular calcification. There is mild regurgitation. •  Pulmonary Artery: The estimated pulmonary artery systolic pressure is 53.5 mmHg. The pulmonary artery systolic pressure is moderately increased. Patient Active Problem List   Diagnosis   • Mixed hyperlipidemia   • IFG (impaired fasting glucose)   • Upper respiratory tract infection   • Positive colorectal cancer screening using Cologuard test   • Prostate cancer ([address])   • Encounter for monitoring androgen deprivation therapy   • Hot flashes   • Type 2 diabetes mellitus with complication, without long-term current use of insulin (HCC)   • Stage 3a chronic kidney disease ([address])   • Essential hypertension     Past Medical History:   Diagnosis Date   • BPH (benign prostatic hypertrophy)    • Cancer (HCC)    • Diabetes mellitus ([address])    • Hyperlipidemia    • Prostate cancer ([address])      Social History     Socioeconomic History   • Marital status:       Spouse name: Not on file   • Number of children: Not on file   • Years of education: Not on file   • Highest education level: Not on file   Occupational History   • Not on file Tobacco Use   • Smoking status: Former     Packs/day: 1.00     Years: 25.00     Total pack years: 25.00     Types: Cigarettes, Pipe, Cigars     Quit date: 2005     Years since quittin.6     Passive exposure: Past   • Smokeless tobacco: Never   Vaping Use   • Vaping Use: Never used   Substance and Sexual Activity   • Alcohol use: Not Currently   • Drug use: Not Currently   • Sexual activity: Not Currently   Other Topics Concern   • Not on file   Social History Narrative   • Not on file     Social Determinants of Health     Financial Resource Strain: Unknown (10/18/2022)    Overall Financial Resource Strain (CARDIA)    • Difficulty of Paying Living Expenses: Patient refused   Food Insecurity: Not on file   Transportation Needs: Unknown (10/18/2022)    [employer] Transportation    • Lack of Transportation (Medical): Patient refused    • Lack of Transportation (Non-Medical): Patient refused   Physical Activity: Not on file   Stress: Not on file   Social Connections: Not on file   Intimate Partner Violence: Not on file   Housing Stability: Not on file      Family History   Problem Relation Age of Onset   • Stroke Father    • No Known Problems Mother    • Prostate cancer Brother    • Arthritis Brother    • No Known Problems Sister    • Diabetes Sister    • Diabetes Sister    • No Known Problems Son    • No Known Problems Daughter      Past Surgical History:   Procedure Laterality Date   • EAR SURGERY     • HERNIA REPAIR     • [place] COLONOSCOPY FLX DX W/COLLJ SPEC WHEN PFRMD N/A 2019    Procedure: COLONOSCOPY;  Surgeon: Sarai Yip MD;  Location: [facility];   Service: Gastroenterology       Current Outpatient Medications:   •  Ascorbic Acid (vitamin C) 1000 MG tablet, Take 1,000 mg by mouth daily, Disp: , Rfl:   •  Calcium Carbonate (CALCIUM 600 PO), Take by mouth daily, Disp: , Rfl:   •  EVENING PRIMROSE OIL PO, Take by mouth 3 (three) times a day, Disp: , Rfl:   •  lisinopril (ZESTRIL) 5 mg tablet, Take 1 tablet (5 mg total) by mouth daily, Disp: 90 tablet, Rfl: 1  •  metFORMIN (GLUCOPHAGE) 850 mg tablet, Take 1 tablet (850 mg total) by mouth 2 (two) times a day with meals, Disp: 180 tablet, Rfl: 0  •  multivitamin (THERAGRAN) TABS, Take 1 tablet by mouth daily, Disp: , Rfl:   •  rosuvastatin (CRESTOR) 10 MG tablet, Take 1 tablet (10 mg total) by mouth daily, Disp: 90 tablet, Rfl: 1  •  VITAMIN D, ERGOCALCIFEROL, PO, Take by mouth, Disp: , Rfl:   •  vitamin E, tocopherol, 400 units capsule, Take 400 Units by mouth daily, Disp: , Rfl:   •  Blood Glucose Monitoring Suppl ([employer]) w/Device KIT, by Does not apply route once for 1 dose Test sugar in the morning (Patient not taking: Reported on 9/19/2023), Disp: 1 kit, Rfl: 0  Allergies   Allergen Reactions   • Penicillin V        Labs:  Lab Results   Component Value Date    WBC 6.68 08/08/2023    HGB 13.8 08/08/2023    HCT 43.9 08/08/2023    MCV 92 08/08/2023     08/08/2023     Lab Results   Component Value Date    CALCIUM 9.8 09/28/2023    K 5.3 09/28/2023    CO2 22 09/28/2023     09/28/2023    BUN 28 (H) 09/28/2023    CREATININE 1.18 09/28/2023     Lab Results   Component Value Date    HGBA1C 7.4 (H) 08/08/2023     No results found for: "CHOL"  Lab Results   Component Value Date    HDL 41 08/08/2023    HDL 41 02/09/2023    HDL 42 04/28/2022     Lab Results   Component Value Date    LDLCALC 86 08/08/2023    LDLCALC 79 02/09/2023    LDLCALC 117 (H) 04/28/2022     Lab Results   Component Value Date    TRIG 271 (H) 08/08/2023    TRIG 270 (H) 02/09/2023    TRIG 381 (H) 04/28/2022     No results found for: "CHOLHDL"    Review of Systems:  Review of Systems   Constitutional: Negative. Negative for activity change, appetite change, diaphoresis, fatigue and fever. Respiratory: Positive for chest tightness and shortness of breath. Negative for cough and wheezing. Cardiovascular: Negative for chest pain, palpitations and leg swelling. Gastrointestinal: Negative for nausea and vomiting. Genitourinary: Negative for dysuria. Musculoskeletal: Negative for arthralgias and back pain. Neurological: Negative for dizziness, syncope, weakness and light-headedness. Psychiatric/Behavioral: Negative for agitation and behavioral problems. All other systems reviewed and are negative. Physical Exam:  Physical Exam  Vitals and nursing note reviewed. Constitutional:       General: He is not in acute distress. Appearance: Normal appearance. He is not ill-appearing or diaphoretic. HENT:      Head: Normocephalic and atraumatic. Eyes:      Extraocular Movements: Extraocular movements intact. Cardiovascular:      Rate and Rhythm: Normal rate and regular rhythm. Heart sounds: No murmur heard. No friction rub. No gallop. Pulmonary:      Effort: Pulmonary effort is normal. No respiratory distress. Breath sounds: Normal breath sounds. Abdominal:      General: There is no distension. Palpations: Abdomen is soft. Musculoskeletal:         General: No swelling. Normal range of motion. Cervical back: Normal range of motion. No rigidity. Skin:     General: Skin is warm and dry. Capillary Refill: Capillary refill takes less than 2 seconds. Coloration: Skin is not pale. Neurological:      General: No focal deficit present. Mental Status: He is alert and oriented to person, place, and time. Cranial Nerves: No cranial nerve deficit.    Psychiatric:         Mood and Affect: Mood normal.         Behavior: Behavior normal.

## 2023-10-03 ENCOUNTER — APPOINTMENT (OUTPATIENT)
Dept: LAB | Facility: CLINIC | Age: 73
End: 2023-10-03
Payer: MEDICARE

## 2023-10-03 DIAGNOSIS — I42.0 DILATED CARDIOMYOPATHY (HCC): ICD-10-CM

## 2023-10-03 LAB
ERYTHROCYTE [DISTWIDTH] IN BLOOD BY AUTOMATED COUNT: 13 % (ref 11.6–15.1)
HCT VFR BLD AUTO: 39.8 % (ref 36.5–49.3)
HGB BLD-MCNC: 12.8 G/DL (ref 12–17)
INR PPP: 1 (ref 0.84–1.19)
MCH RBC QN AUTO: 28.7 PG (ref 26.8–34.3)
MCHC RBC AUTO-ENTMCNC: 32.2 G/DL (ref 31.4–37.4)
MCV RBC AUTO: 89 FL (ref 82–98)
PLATELET # BLD AUTO: 180 THOUSANDS/UL (ref 149–390)
PMV BLD AUTO: 10.6 FL (ref 8.9–12.7)
PROTHROMBIN TIME: 13.1 SECONDS (ref 11.6–14.5)
RBC # BLD AUTO: 4.46 MILLION/UL (ref 3.88–5.62)
WBC # BLD AUTO: 5.07 THOUSAND/UL (ref 4.31–10.16)

## 2023-10-03 PROCEDURE — 36415 COLL VENOUS BLD VENIPUNCTURE: CPT

## 2023-10-03 PROCEDURE — 85027 COMPLETE CBC AUTOMATED: CPT

## 2023-10-03 PROCEDURE — 85610 PROTHROMBIN TIME: CPT

## 2023-10-04 ENCOUNTER — HOSPITAL ENCOUNTER (OUTPATIENT)
Facility: HOSPITAL | Age: 73
Setting detail: OUTPATIENT SURGERY
Discharge: HOME | End: 2023-10-04
Attending: INTERNAL MEDICINE | Admitting: INTERNAL MEDICINE
Payer: MEDICARE

## 2023-10-04 VITALS
HEIGHT: 70 IN | DIASTOLIC BLOOD PRESSURE: 54 MMHG | RESPIRATION RATE: 16 BRPM | OXYGEN SATURATION: 93 % | WEIGHT: 234.79 LBS | HEART RATE: 82 BPM | BODY MASS INDEX: 33.61 KG/M2 | SYSTOLIC BLOOD PRESSURE: 95 MMHG | TEMPERATURE: 97.8 F

## 2023-10-04 DIAGNOSIS — E11.8 TYPE 2 DIABETES MELLITUS WITH COMPLICATION, WITHOUT LONG-TERM CURRENT USE OF INSULIN (HCC): ICD-10-CM

## 2023-10-04 DIAGNOSIS — I42.0 DILATED CARDIOMYOPATHY (HCC): ICD-10-CM

## 2023-10-04 LAB
ATRIAL RATE: 93 BPM
GLUCOSE SERPL-MCNC: 140 MG/DL (ref 65–140)
P AXIS: 65 DEGREES
PR INTERVAL: 168 MS
QRS AXIS: 14 DEGREES
QRSD INTERVAL: 96 MS
QT INTERVAL: 380 MS
QTC INTERVAL: 454 MS
T WAVE AXIS: 94 DEGREES
VENTRICULAR RATE: 86 BPM

## 2023-10-04 PROCEDURE — 82948 REAGENT STRIP/BLOOD GLUCOSE: CPT

## 2023-10-04 PROCEDURE — 99152 MOD SED SAME PHYS/QHP 5/>YRS: CPT | Performed by: INTERNAL MEDICINE

## 2023-10-04 PROCEDURE — 93460 R&L HRT ART/VENTRICLE ANGIO: CPT | Performed by: INTERNAL MEDICINE

## 2023-10-04 PROCEDURE — C1769 GUIDE WIRE: HCPCS | Performed by: INTERNAL MEDICINE

## 2023-10-04 PROCEDURE — C1894 INTRO/SHEATH, NON-LASER: HCPCS | Performed by: INTERNAL MEDICINE

## 2023-10-04 PROCEDURE — 93005 ELECTROCARDIOGRAM TRACING: CPT

## 2023-10-04 PROCEDURE — 93010 ELECTROCARDIOGRAM REPORT: CPT | Performed by: INTERNAL MEDICINE

## 2023-10-04 PROCEDURE — 99153 MOD SED SAME PHYS/QHP EA: CPT | Performed by: INTERNAL MEDICINE

## 2023-10-04 PROCEDURE — 93453 R&L HRT CATH W/VENTRICLGRPHY: CPT | Performed by: INTERNAL MEDICINE

## 2023-10-04 RX ORDER — IODIXANOL 320 MG/ML
INJECTION, SOLUTION INTRAVASCULAR CODE/TRAUMA/SEDATION MEDICATION
Status: DISCONTINUED | OUTPATIENT
Start: 2023-10-04 | End: 2023-10-04 | Stop reason: HOSPADM

## 2023-10-04 RX ORDER — MIDAZOLAM HYDROCHLORIDE 2 MG/2ML
INJECTION, SOLUTION INTRAMUSCULAR; INTRAVENOUS CODE/TRAUMA/SEDATION MEDICATION
Status: DISCONTINUED | OUTPATIENT
Start: 2023-10-04 | End: 2023-10-04 | Stop reason: HOSPADM

## 2023-10-04 RX ORDER — FUROSEMIDE 10 MG/ML
20 INJECTION INTRAMUSCULAR; INTRAVENOUS ONCE
Status: COMPLETED | OUTPATIENT
Start: 2023-10-04 | End: 2023-10-04

## 2023-10-04 RX ORDER — FUROSEMIDE 20 MG/1
20 TABLET ORAL DAILY
Qty: 30 TABLET | Refills: 5 | Status: SHIPPED | OUTPATIENT
Start: 2023-10-04

## 2023-10-04 RX ORDER — VERAPAMIL HCL 2.5 MG/ML
AMPUL (ML) INTRAVENOUS CODE/TRAUMA/SEDATION MEDICATION
Status: DISCONTINUED | OUTPATIENT
Start: 2023-10-04 | End: 2023-10-04 | Stop reason: HOSPADM

## 2023-10-04 RX ORDER — LIDOCAINE WITH 8.4% SOD BICARB 0.9%(10ML)
SYRINGE (ML) INJECTION CODE/TRAUMA/SEDATION MEDICATION
Status: DISCONTINUED | OUTPATIENT
Start: 2023-10-04 | End: 2023-10-04 | Stop reason: HOSPADM

## 2023-10-04 RX ORDER — FENTANYL CITRATE 50 UG/ML
INJECTION, SOLUTION INTRAMUSCULAR; INTRAVENOUS CODE/TRAUMA/SEDATION MEDICATION
Status: DISCONTINUED | OUTPATIENT
Start: 2023-10-04 | End: 2023-10-04 | Stop reason: HOSPADM

## 2023-10-04 RX ADMIN — FUROSEMIDE 20 MG: 10 INJECTION, SOLUTION INTRAMUSCULAR; INTRAVENOUS at 13:32

## 2023-10-04 NOTE — Clinical Note
Prepped: groin, right radial and left chest. Prepped with: ChloraPrep. The site was clipped. The patient was draped.

## 2023-10-04 NOTE — INTERVAL H&P NOTE
H&P reviewed. After examining the patient there were find no changes in the patients condition since the H&P had been written. Patient re-evaluated.  Accept as history and physical.      Haley Gold PA-C/October 4, 2023/9:03 AM

## 2023-10-05 ENCOUNTER — TELEPHONE (OUTPATIENT)
Dept: CARDIOLOGY CLINIC | Facility: CLINIC | Age: 73
End: 2023-10-05

## 2023-10-05 NOTE — TELEPHONE ENCOUNTER
----- Message from Sneha Mckinnon PA-C sent at 10/4/2023 11:39 AM EDT -----  Per Dr. Monalisa Fried and Dr. Colin Ferrari conversation please fill out [employer] form for this patient, had cardiac cath this morning    These get Dr. Marilin Mariee to sign it  NPI for Dr. Marilin Mariee is [id number]    Please click 4 months as duration  Please click DCM    Thank you  Then faxed to [email or web address]/[email or web address]

## 2023-10-05 NOTE — TELEPHONE ENCOUNTER
Nabila Iverson filled out [facility] Form. Faxed paperwork to [facility] [phone].

## 2023-10-06 ENCOUNTER — CLINICAL SUPPORT (OUTPATIENT)
Dept: CARDIOLOGY CLINIC | Facility: CLINIC | Age: 73
End: 2023-10-06
Payer: MEDICARE

## 2023-10-06 ENCOUNTER — TELEPHONE (OUTPATIENT)
Dept: CARDIOLOGY CLINIC | Facility: CLINIC | Age: 73
End: 2023-10-06

## 2023-10-06 DIAGNOSIS — R94.31 ABNORMAL ECG: ICD-10-CM

## 2023-10-06 PROCEDURE — 93228 REMOTE 30 DAY ECG REV/REPORT: CPT | Performed by: INTERNAL MEDICINE

## 2023-10-06 NOTE — TELEPHONE ENCOUNTER
----- Message from Ulisses Benavides MD sent at 10/6/2023  1:52 PM EDT -----  Please let patient know that event monitor shows abnormal beats  Will discuss more at next visit

## 2023-10-10 ENCOUNTER — APPOINTMENT (OUTPATIENT)
Dept: LAB | Facility: CLINIC | Age: 73
End: 2023-10-10
Payer: MEDICARE

## 2023-10-10 DIAGNOSIS — C61 PROSTATE CANCER (HCC): ICD-10-CM

## 2023-10-10 DIAGNOSIS — I42.0 DILATED CARDIOMYOPATHY (HCC): ICD-10-CM

## 2023-10-10 LAB
ANION GAP SERPL CALCULATED.3IONS-SCNC: 11 MMOL/L
BUN SERPL-MCNC: 28 MG/DL (ref 5–25)
CALCIUM SERPL-MCNC: 9.7 MG/DL (ref 8.4–10.2)
CHLORIDE SERPL-SCNC: 102 MMOL/L (ref 96–108)
CO2 SERPL-SCNC: 27 MMOL/L (ref 21–32)
CREAT SERPL-MCNC: 1.19 MG/DL (ref 0.6–1.3)
GFR SERPL CREATININE-BSD FRML MDRD: 60 ML/MIN/1.73SQ M
GLUCOSE P FAST SERPL-MCNC: 139 MG/DL (ref 65–99)
POTASSIUM SERPL-SCNC: 5.1 MMOL/L (ref 3.5–5.3)
PSA SERPL-MCNC: 3.55 NG/ML (ref 0–4)
SODIUM SERPL-SCNC: 140 MMOL/L (ref 135–147)

## 2023-10-10 PROCEDURE — 84153 ASSAY OF PSA TOTAL: CPT

## 2023-10-10 PROCEDURE — 80048 BASIC METABOLIC PNL TOTAL CA: CPT

## 2023-10-10 PROCEDURE — 36415 COLL VENOUS BLD VENIPUNCTURE: CPT

## 2023-10-13 ENCOUNTER — HOSPITAL ENCOUNTER (OUTPATIENT)
Dept: RADIOLOGY | Age: 73
Discharge: HOME | End: 2023-10-13
Payer: MEDICARE

## 2023-10-13 DIAGNOSIS — C61 PROSTATE CANCER (HCC): ICD-10-CM

## 2023-10-13 PROCEDURE — 76377 3D RENDER W/INTRP POSTPROCES: CPT

## 2023-10-13 PROCEDURE — G1004 CDSM NDSC: HCPCS

## 2023-10-13 PROCEDURE — A9585 GADOBUTROL INJECTION: HCPCS | Performed by: PHYSICIAN ASSISTANT

## 2023-10-13 PROCEDURE — 72197 MRI PELVIS W/O & W/DYE: CPT

## 2023-10-13 RX ORDER — GADOBUTROL 604.72 MG/ML
11 INJECTION INTRAVENOUS
Status: COMPLETED | OUTPATIENT
Start: 2023-10-13 | End: 2023-10-13

## 2023-10-13 RX ADMIN — GADOBUTROL 11 ML: 604.72 INJECTION INTRAVENOUS at 09:33

## 2023-10-17 ENCOUNTER — TELEPHONE (OUTPATIENT)
Dept: UROLOGY | Facility: CLINIC | Age: 73
End: 2023-10-17

## 2023-10-17 DIAGNOSIS — C61 PROSTATE CANCER (HCC): Primary | ICD-10-CM

## 2023-10-18 ENCOUNTER — TELEPHONE (OUTPATIENT)
Dept: HEMATOLOGY ONCOLOGY | Facility: CLINIC | Age: 73
End: 2023-10-18

## 2023-10-18 NOTE — TELEPHONE ENCOUNTER
I called Sidney Jean Baptiste in response to a referral that was received for patient to establish care with Medical Oncology. Outreach was made to schedule a consultation. Patient does not have a voicemail set up. Another attempt will be made to contact patient.

## 2023-10-22 PROBLEM — I48.0 PAROXYSMAL ATRIAL FIBRILLATION (HCC): Status: ACTIVE | Noted: 2023-10-22

## 2023-10-22 NOTE — PROGRESS NOTES
[facility]   Office Visit    Maria Alejandra Nazario 68 y.o. male MRN: [id number]    10/23/23        Assessment/Plan:  1. New onset atrial fibrillation  Noted on event monitor, HR is well controlled  Hold off AV alisha blockers due to junctional tachycardia, 2 pauses on event monitor with longest lasting 2.5-seconds and history of low normal BP  [id number] 3 (age, HTN, DM),  start Eliquis 5 mg bid - denies history of bleeding events    2. Dilated CM with EF 30%  Cardiac catheterization showed nonobstructive coronary atherosclerosis  Plan for cardiac MRI to assess for alternative etiologies  Referral placed to EP given NSVT and 10% PVC burden  Continue lisinopril, no BB as described above  Continue LifeVest wear, plan for repeat limited echo in 2 months to assess for EF recovery, discussed consideration of ICD if EF does not improve to >35%    3. Junctional tachycardia, NSVT, PSVT, PVCs  Event monitor shows A-fib, 1 run of SVT, 2 pauses with longest lasting 2.5 seconds, 11 runs of NSVT, 3% PAC burden, and 10% PVC burden  Hold off AV alisha blockers as described above  Referral placed to electrophysiology    4. Hypertension  Currently well controlled  Continue lisinopril, lasix, and Imdur  Encourage daily weights and low-sodium diet    5. Nonobstructive coronary atherosclerosis  Continue ASA    6. Pulmonary hypertension  Mild per cardiac catheterization    7. T2DM, A1c 7.4          HPI: Maria Alejandra Nazario is a 68y.o. year old male with history of dilated CM with EF 30%, hypertension, junctional tachycardia, T2DM, nonobstructive coronary atherosclerosis, and pulmonary hypertension who presents for office visit. Patient was recently found to have cardiomyopathy with EF 30% and associated SOB so outpatient cardiac catheterization was performed which revealed nonobstructive coronary atherosclerosis.   Recent event monitor revealed new onset atrial fibrillation, SVT, NSVT, 2 pauses (longest 2.5 seconds) and frequent PVCs.  Patient reports he has been fairly well overall since outpatient cardiac catheterization. Endorses compliance with all medications and LifeVest wear. Denies discharge of LifeVest.  Reports he is tolerating medications well. Denies alcohol use or history of bleeding events. Family history is significant for father with 2 strokes and sister with ICD. Denies chest pain, SOB, palpitations lightheadedness/dizziness or syncope. Follows with Dr. Juan A Cuba his primary cardiologist.        Cardiovascular imagin-day event monitor (10/6/2023) - A-fib was found, 1 run of SVT occurred,  2 pauses with longest lasting 2.5 seconds, 11 runs of NSVT with longest lasting 8 beats, 3% PAC burden, 10% PVC burden    Cardiac catheterization (10/4/2023) - nonobstructive coronary atherosclerosis, mild pulmonary hypertension with elevated PCW    TTE (2023) - EF 30%, severe global hypokinesis, mild left atrial dilation, mild MR, PASP 58.0 mmHg       Review of Systems:  Review of Systems   Constitutional:  Negative for chills and fever. HENT:  Negative for ear pain and sore throat. Eyes:  Negative for pain and visual disturbance. Respiratory:  Negative for cough and shortness of breath. Cardiovascular:  Negative for chest pain, palpitations and leg swelling. Gastrointestinal:  Negative for abdominal pain and vomiting. Genitourinary:  Negative for dysuria and hematuria. Musculoskeletal:  Negative for arthralgias and back pain. Skin:  Negative for color change and rash. Neurological:  Negative for seizures and syncope. All other systems reviewed and are negative. PHYSICAL EXAM:  Vitals:   Vitals:    10/23/23 0839   BP: 139/82   BP Location: Left arm   Patient Position: Sitting   Cuff Size: Standard   Pulse: 80   Resp: 20   SpO2: 96%   Weight: 107 kg (236 lb)   Height: 5' 10" (1.778 m)        Physical Exam:  GEN: Alert and oriented x 3, in no acute distress. Well appearing and well nourished. LifeVest in place  HEENT: Sclera anicteric, conjunctivae pink, mucous membranes moist. Oropharynx clear. NECK: Supple, no carotid bruits, no significant JVD. Trachea midline, no thyromegaly. HEART: Regular rhythm, normal S1 and S2, no murmurs, clicks, gallops or rubs. PMI nondisplaced, no thrills. LUNGS: Clear to auscultation bilaterally; no wheezes, rales, or rhonchi. No increased work of breathing or signs of respiratory distress. ABDOMEN: Soft, nontender, nondistended, normoactive bowel sounds. EXTREMITIES: Skin warm and well perfused, no clubbing, cyanosis, or edema. NEURO: No focal findings. Normal speech. Mood and affect normal.   SKIN: Normal without suspicious lesions on exposed skin.     Follow up: 1 month or sooner as needed    Allergies   Allergen Reactions    Penicillin V          Current Outpatient Medications:     apixaban (Eliquis) 5 mg, Take 1 tablet (5 mg total) by mouth 2 (two) times a day, Disp: 60 tablet, Rfl: 3    Ascorbic Acid (vitamin C) 1000 MG tablet, Take 1,000 mg by mouth daily, Disp: , Rfl:     aspirin 81 mg chewable tablet, Chew 1 tablet (81 mg total) daily, Disp: 30 tablet, Rfl: 3    Calcium Carbonate (CALCIUM 600 PO), Take by mouth daily, Disp: , Rfl:     EVENING PRIMROSE OIL PO, Take by mouth 3 (three) times a day, Disp: , Rfl:     furosemide (LASIX) 20 mg tablet, Take 1 tablet (20 mg total) by mouth daily, Disp: 30 tablet, Rfl: 5    isosorbide mononitrate (IMDUR) 30 mg 24 hr tablet, Take 1 tablet (30 mg total) by mouth daily, Disp: 30 tablet, Rfl: 3    lisinopril (ZESTRIL) 5 mg tablet, Take 1 tablet (5 mg total) by mouth daily, Disp: 90 tablet, Rfl: 1    metFORMIN (GLUCOPHAGE) 850 mg tablet, Take 1 tablet (850 mg total) by mouth 2 (two) times a day with meals Do not start before October 6, 2023., Disp: 180 tablet, Rfl: 0    multivitamin (THERAGRAN) TABS, Take 1 tablet by mouth daily, Disp: , Rfl:     rosuvastatin (CRESTOR) 10 MG tablet, Take 1 tablet (10 mg total) by mouth daily, Disp: 90 tablet, Rfl: 1    VITAMIN D, ERGOCALCIFEROL, PO, Take by mouth, Disp: , Rfl:     vitamin E, tocopherol, 400 units capsule, Take 400 Units by mouth daily, Disp: , Rfl:     Blood Glucose Monitoring Suppl (Zeferino Tidwell) w/Device KIT, by Does not apply route once for 1 dose Test sugar in the morning (Patient not taking: Reported on 9/19/2023), Disp: 1 kit, Rfl: 0    Past Medical History:   Diagnosis Date    BPH (benign prostatic hypertrophy)     Cancer (HCC)     Diabetes mellitus ([address])     Hyperlipidemia     Hypertension     Prostate cancer ([address])        Family History   Problem Relation Age of Onset    Stroke Father     No Known Problems Mother     Prostate cancer Brother     Arthritis Brother     No Known Problems Sister     Diabetes Sister     Diabetes Sister     No Known Problems Son     No Known Problems Daughter        Past Medical History:   Diagnosis Date    BPH (benign prostatic hypertrophy)     Cancer ([address])     Diabetes mellitus ([address])     Hyperlipidemia     Hypertension     Prostate cancer [facility])        Past Surgical History:   Procedure Laterality Date    CARDIAC CATHETERIZATION N/A 10/4/2023    Procedure: Cardiac Coronary Angiogram;  Surgeon: Contreras Resendez MD;  Location: [facility] CARDIAC CATH LAB; Service: Cardiology    CARDIAC CATHETERIZATION N/A 10/4/2023    Procedure: Cardiac RHC/LHC; Surgeon: Contreras Resendez MD;  Location: [facility] CARDIAC CATH LAB; Service: Cardiology    CARDIAC CATHETERIZATION  10/4/2023    Procedure: Cardiac catheterization;  Surgeon: Contreras Resendez MD;  Location: [address] [place] [address] CATH LAB; Service: Cardiology    EAR SURGERY      HERNIA REPAIR      FL COLONOSCOPY FLX DX W/COLLJ SPEC WHEN PFRMD N/A 5/6/2019    Procedure: COLONOSCOPY;  Surgeon: Sakshi Chowdary MD;  Location: [facility] GI LAB; Service: Gastroenterology       Social History     Socioeconomic History    Marital status:       Spouse name: Not on file    Number of children: Not on file    Years of education: Not on file    Highest education level: Not on file   Occupational History    Not on file   Tobacco Use    Smoking status: Former     Packs/day: 1.00     Years: 25.00     Total pack years: 25.00     Types: Cigarettes, Pipe, Cigars     Quit date: 2005     Years since quittin.6     Passive exposure: Past    Smokeless tobacco: Never   Vaping Use    Vaping Use: Never used   Substance and Sexual Activity    Alcohol use: Not Currently    Drug use: Not Currently    Sexual activity: Not Currently   Other Topics Concern    Not on file   Social History Narrative    Not on file     Social Determinants of Health     Financial Resource Strain: Unknown (10/18/2022)    Overall Financial Resource Strain (CARDIA)     Difficulty of Paying Living Expenses: Patient refused   Food Insecurity: Not on file   Transportation Needs: Unknown (10/18/2022)    [facility] Transportation     Lack of Transportation (Medical): Patient refused     Lack of Transportation (Non-Medical): Patient refused   Physical Activity: Not on file   Stress: Not on file   Social Connections: Not on file   Intimate Partner Violence: Not on file   Housing Stability: Not on file             LABORATORY RESULTS:    Lab Results   Component Value Date    WBC 5.07 10/03/2023    HGB 12.8 10/03/2023    HCT 39.8 10/03/2023    MCV 89 10/03/2023     10/03/2023     Lab Results   Component Value Date    CALCIUM 9.7 10/10/2023    K 5.1 10/10/2023    CO2 27 10/10/2023     10/10/2023    BUN 28 (H) 10/10/2023    CREATININE 1.19 10/10/2023     Lab Results   Component Value Date    HGBA1C 7.4 (H) 2023       Lipid Profile:   No results found for: "CHOL"  Lab Results   Component Value Date    HDL 41 2023    HDL 41 2023    HDL 42 2022     Lab Results   Component Value Date    LDLCALC 86 2023    LDLCALC 79 2023    LDLCALC 117 (H) 2022     Lab Results   Component Value Date    TRIG 271 (H) 2023    TRIG 270 (H) 02/09/2023    TRIG 381 (H) 04/28/2022       The 10-year ASCVD risk score (Scott BLANCO, et al., 2019) is: 46.7%    Values used to calculate the score:      Age: 68 years      Sex: Male      Is Non- : No      Diabetic: Yes      Tobacco smoker: No      Systolic Blood Pressure: 323 mmHg      Is BP treated: Yes      HDL Cholesterol: 41 mg/dL      Total Cholesterol: 181 mg/dL    1. Paroxysmal atrial fibrillation (HCC)  Ambulatory referral to Cardiac Electrophysiology    apixaban (Eliquis) 5 mg      2. Dilated cardiomyopathy ([address])  Echo follow up/limited w/ contrast if indicated    Ambulatory referral to Cardiac Electrophysiology    MRI cardiac  w wo contrast      3. Junctional tachycardia        4. Essential hypertension        5. NSVT (nonsustained ventricular tachycardia) ([address])  Ambulatory referral to Cardiac Electrophysiology      6. PSVT (paroxysmal supraventricular tachycardia)        7. PVC's (premature ventricular contractions)  Ambulatory referral to Cardiac Electrophysiology          Imaging: I have personally reviewed pertinent reports. Recommend aggressive risk factor modification and therapeutic lifestyle changes. Low-salt, low-calorie, low-fat, low-cholesterol diet with regular exercise and to optimize weight. Discussed concepts of atherosclerosis, including signs and symptoms of cardiac disease. Previous studies were reviewed. Safety measures were reviewed. All questions and concerns addressed. Patient was advised to report any problems requiring medical attention. Follow-up with PCP and appropriate specialist and lab work as discussed. Return for follow up visit as scheduled or earlier, if needed. Thank you for allowing me to participate in the care and evaluation of your patient. Should you have any questions, please feel free to contact me.     Suzie Camejo PA-C  10/23/2023,8:37 PM

## 2023-10-23 ENCOUNTER — OFFICE VISIT (OUTPATIENT)
Dept: CARDIOLOGY CLINIC | Facility: CLINIC | Age: 73
End: 2023-10-23
Payer: MEDICARE

## 2023-10-23 VITALS
SYSTOLIC BLOOD PRESSURE: 139 MMHG | DIASTOLIC BLOOD PRESSURE: 82 MMHG | BODY MASS INDEX: 33.79 KG/M2 | OXYGEN SATURATION: 96 % | HEIGHT: 70 IN | WEIGHT: 236 LBS | RESPIRATION RATE: 20 BRPM | HEART RATE: 80 BPM

## 2023-10-23 DIAGNOSIS — I42.0 DILATED CARDIOMYOPATHY (HCC): ICD-10-CM

## 2023-10-23 DIAGNOSIS — I48.0 PAROXYSMAL ATRIAL FIBRILLATION (HCC): Primary | ICD-10-CM

## 2023-10-23 DIAGNOSIS — I10 ESSENTIAL HYPERTENSION: ICD-10-CM

## 2023-10-23 DIAGNOSIS — I47.10 PSVT (PAROXYSMAL SUPRAVENTRICULAR TACHYCARDIA): ICD-10-CM

## 2023-10-23 DIAGNOSIS — I47.29 NSVT (NONSUSTAINED VENTRICULAR TACHYCARDIA) (HCC): ICD-10-CM

## 2023-10-23 DIAGNOSIS — I49.3 PVC'S (PREMATURE VENTRICULAR CONTRACTIONS): ICD-10-CM

## 2023-10-23 DIAGNOSIS — I47.19 JUNCTIONAL TACHYCARDIA: ICD-10-CM

## 2023-10-23 PROCEDURE — 99214 OFFICE O/P EST MOD 30 MIN: CPT

## 2023-10-23 NOTE — PROGRESS NOTES
10/24/2023      Chief Complaint   Patient presents with    Follow-up     Prostate cancer         Assessment and Plan    68 y.o. male     1. Prostate cancer, high risk status post radiation therapy and ADT  2. Hot flashes  - final Lupron was given 2/10/21   - PSA now 2.25 (8/24/23) from aguilar of 0.2  - PSMA PET (9/25/23) showing Heterogeneous multifocal fairly intense tracer activity involving the right greater than left prostate gland highly suspicious for locally recurrent intraprostatic PSMA positive malignancy   - mpMRI (10/13/23) showing tumor in the anterior and posterior right peripheral zone apex and mid glad and right transition zone at apex corresponds to PSMA avid lesion on prior PET. The lesion broadly abuts the capsule without visualized gross extraprostatic extension.   - Discussed case with Dr. Jhon Chapman with recommendations to proceed with fusion guided biopsy at this time after further mapping by radiology    - Medical oncology referral and also to follow up with rad onc. We called med onc together in office to schedule appointment. 11/27 appointment scheduled for 9AM. Rad onc would like follow up scheduled after fusion biopsy  - Follow up for fusion biopsy  - Call with any questions or concerns in the meantime  - All questions answered; patient understands and agrees with plan       History of Present Illness  Lian Paul is a 68 y.o. male patient with history of high risk prostate cancer status post radiation therapy and ADT  here for follow up. Patient has been treated with radiation therapy and underwent Firmagon injection on 6/28/19. Patient had fiducial marker placement and Lupron injection on 8/1/19. Patient completed 2 year course of ADT in February 2021 Patient completed radiation therapy in October 2019. Pretreatment PSA 22.4. Aguilar 0.2. Most recent PSA 2.25. Patient had concerning PSMA PET and follow up mpMRI showing lesion.  Discussed case with Dr. Jhon Chapman with recommendations for fusion guided biopsy and radiation oncology as well as medical oncology appointments. Message was sent to surgical scheduler to have this scheduled. Patient awaiting call with date and time. Review of Systems   Constitutional:  Negative for activity change, appetite change, chills and fever. HENT:  Negative for congestion and trouble swallowing. Respiratory:  Negative for cough and shortness of breath. Cardiovascular:  Negative for chest pain, palpitations and leg swelling. Gastrointestinal:  Negative for abdominal pain, constipation, diarrhea, nausea and vomiting. Genitourinary:  Negative for difficulty urinating, dysuria, flank pain, frequency, hematuria and urgency. Musculoskeletal:  Negative for back pain and gait problem. Skin:  Negative for wound. Allergic/Immunologic: Negative for immunocompromised state. Neurological:  Negative for dizziness and syncope. Hematological:  Does not bruise/bleed easily. Psychiatric/Behavioral:  Negative for confusion. All other systems reviewed and are negative.           AUA SYMPTOM SCORE      Flowsheet Row Most Recent Value   AUA SYMPTOM SCORE    How often have you had a sensation of not emptying your bladder completely after you finished urinating? 0 (P)     How often have you had to urinate again less than two hours after you finished urinating? 3 (P)     How often have you found you stopped and started again several times when you urinate? 0 (P)     How often have you found it difficult to postpone urination? 1 (P)     How often have you had a weak urinary stream? 5 (P)     How often have you had to push or strain to begin urination? 0 (P)     How many times did you most typically get up to urinate from the time you went to bed at night until the time you got up in the morning? 5 (P)     Quality of Life: If you were to spend the rest of your life with your urinary condition just the way it is now, how would you feel about that? 1 (P)     AUA SYMPTOM SCORE 14 (P)              Vitals  Vitals:    10/24/23 0922   BP: 138/70   Pulse: 89   SpO2: 98%   Weight: 107 kg (236 lb 9.6 oz)   Height: 5' 10" (1.778 m)       Physical Exam  Constitutional:       General: He is not in acute distress. Appearance: Normal appearance. He is not ill-appearing, toxic-appearing or diaphoretic. HENT:      Head: Normocephalic. Nose: No congestion. Eyes:      General: No scleral icterus. Right eye: No discharge. Left eye: No discharge. Conjunctiva/sclera: Conjunctivae normal.      Pupils: Pupils are equal, round, and reactive to light. Pulmonary:      Effort: Pulmonary effort is normal.   Musculoskeletal:      Cervical back: Normal range of motion. Skin:     General: Skin is warm and dry. Coloration: Skin is not jaundiced or pale. Findings: No bruising, erythema, lesion or rash. Neurological:      General: No focal deficit present. Mental Status: He is alert and oriented to person, place, and time. Mental status is at baseline. Gait: Gait normal.   Psychiatric:         Mood and Affect: Mood normal.         Behavior: Behavior normal.         Thought Content: Thought content normal.         Judgment: Judgment normal.         Past History  Past Medical History:   Diagnosis Date    BPH (benign prostatic hypertrophy)     Cancer (HCC)     Diabetes mellitus ([address] [facility])     Hyperlipidemia     Hypertension     Prostate cancer ([address])      Social History     Socioeconomic History    Marital status:       Spouse name: None    Number of children: None    Years of education: None    Highest education level: None   Occupational History    None   Tobacco Use    Smoking status: Former     Packs/day: 1.00     Years: 25.00     Total pack years: 25.00     Types: Cigarettes, Pipe, Cigars     Quit date: 2005     Years since quittin.6     Passive exposure: Past    Smokeless tobacco: Never   Vaping Use    Vaping Use: Never used Substance and Sexual Activity    Alcohol use: Not Currently    Drug use: Not Currently    Sexual activity: Not Currently   Other Topics Concern    None   Social History Narrative    None     Social Determinants of Health     Financial Resource Strain: Unknown (10/18/2022)    Overall Financial Resource Strain (CARDIA)     Difficulty of Paying Living Expenses: Patient refused   Food Insecurity: Not on file   Transportation Needs: Unknown (10/18/2022)    PRAPARE - Transportation     Lack of Transportation (Medical): Patient refused     Lack of Transportation (Non-Medical): Patient refused   Physical Activity: Not on file   Stress: Not on file   Social Connections: Not on file   Intimate Partner Violence: Not on file   Housing Stability: Not on file     Social History     Tobacco Use   Smoking Status Former    Packs/day: 1.00    Years: 25.00    Total pack years: 25.00    Types: Cigarettes, Pipe, Cigars    Quit date: 2005    Years since quittin.6    Passive exposure: Past   Smokeless Tobacco Never     Family History   Problem Relation Age of Onset    Stroke Father     No Known Problems Mother     Prostate cancer Brother     Arthritis Brother     No Known Problems Sister     Diabetes Sister     Diabetes Sister     No Known Problems Son     No Known Problems Daughter        The following portions of the patient's history were reviewed and updated as appropriate: allergies, current medications, past medical history, past social history, past surgical history and problem list.    Results  No results found for this or any previous visit (from the past 1 hour(s)).]  Lab Results   Component Value Date    PSA 3.55 10/10/2023    PSA 2.25 2023    PSA 1.5 2023    PSA 1.4 2023     Lab Results   Component Value Date    CALCIUM 9.7 10/10/2023    K 5.1 10/10/2023    CO2 27 10/10/2023     10/10/2023    BUN 28 (H) 10/10/2023    CREATININE 1.19 10/10/2023     Lab Results   Component Value Date    WBC 5.07 10/03/2023    HGB 12.8 10/03/2023    HCT 39.8 10/03/2023    MCV 89 10/03/2023     10/03/2023       Xenia Raymond PA-C

## 2023-10-23 NOTE — PATIENT INSTRUCTIONS
Please follow instructions as recommended during visit. Recommend low salt DASH diet  Strongly encourage compliance with your medications. Please reach out to us if you have any questions   Recommend you present to the emergency room or call our office if you have chest pain, chest pressure, shortness of breath, any new, persistent or progressive symptoms.

## 2023-10-24 ENCOUNTER — OFFICE VISIT (OUTPATIENT)
Dept: UROLOGY | Facility: CLINIC | Age: 73
End: 2023-10-24
Payer: MEDICARE

## 2023-10-24 ENCOUNTER — TELEPHONE (OUTPATIENT)
Dept: HEMATOLOGY ONCOLOGY | Facility: CLINIC | Age: 73
End: 2023-10-24

## 2023-10-24 ENCOUNTER — TELEPHONE (OUTPATIENT)
Dept: UROLOGY | Facility: CLINIC | Age: 73
End: 2023-10-24

## 2023-10-24 VITALS
HEART RATE: 89 BPM | WEIGHT: 236.6 LBS | BODY MASS INDEX: 33.87 KG/M2 | HEIGHT: 70 IN | SYSTOLIC BLOOD PRESSURE: 138 MMHG | DIASTOLIC BLOOD PRESSURE: 70 MMHG | OXYGEN SATURATION: 98 %

## 2023-10-24 DIAGNOSIS — C61 PROSTATE CANCER (HCC): Primary | ICD-10-CM

## 2023-10-24 DIAGNOSIS — R97.21 RISING PSA FOLLOWING TREATMENT FOR MALIGNANT NEOPLASM OF PROSTATE: ICD-10-CM

## 2023-10-24 PROCEDURE — 99213 OFFICE O/P EST LOW 20 MIN: CPT | Performed by: PHYSICIAN ASSISTANT

## 2023-10-24 NOTE — TELEPHONE ENCOUNTER
Appointment Schedule   Who are you speaking with? Patient   If it is not the patient, are they listed on an active communication consent form? N/A   Which provider is the appointment scheduled with? Dr. Bj Arias   At which location is the appointment scheduled for? [facility]   When is the appointment scheduled? Please list date and time 11/27/23 @ 9   What is the reason for this appointment? Prostate Cancer   Did patient voice understanding of the details of this appointment? Yes   Was the no show policy reviewed with patient?  Yes

## 2023-10-25 NOTE — TELEPHONE ENCOUNTER
Please note, patient chart states he is taking Eliquis. Per patient it was prescribed yesterday 10/24/2023 but it was to expensive and he did not pick it up from the pharmacy. I am going to send a medication hold request to prescribing provider to advise on hold times if the patient starts taking ti prior to the biopsy.

## 2023-10-25 NOTE — TELEPHONE ENCOUNTER
I spoke to the patient and scheduled his procedure for 11/16/2023 at [facility] with Dr. Alfred Mcgowan    -instructions given verbally and mailed  -patient aware to be NPO, needs a  and use an enema 1 hour prior to leaving the house morning of procedure  -CBC, CMP, Urine C&S 2 weeks prior

## 2023-10-26 ENCOUNTER — TELEPHONE (OUTPATIENT)
Dept: CARDIOLOGY CLINIC | Facility: CLINIC | Age: 73
End: 2023-10-26

## 2023-10-26 NOTE — TELEPHONE ENCOUNTER
Urology medication hold request received for this pt.  Form scanned into pt's chart and placed in provider's folder

## 2023-10-30 ENCOUNTER — TELEPHONE (OUTPATIENT)
Dept: CARDIOLOGY CLINIC | Facility: CLINIC | Age: 73
End: 2023-10-30

## 2023-10-30 NOTE — TELEPHONE ENCOUNTER
Patient was prescribed Eliquis but states its too expensive for him. Is there an alternative medication?  Please advise at [phone]

## 2023-10-31 DIAGNOSIS — I48.0 PAROXYSMAL ATRIAL FIBRILLATION (HCC): Primary | ICD-10-CM

## 2023-10-31 RX ORDER — WARFARIN SODIUM 5 MG/1
TABLET ORAL
Qty: 90 TABLET | Refills: 3 | Status: SHIPPED | OUTPATIENT
Start: 2023-10-31

## 2023-10-31 NOTE — TELEPHONE ENCOUNTER
S/w pt and agreed to go on Warfarin. Explained to him about the need for frequent testing Explained about the importance of testing and taking the medication as directed. Pt couldn't come to the office today for samples or get to the pharmacy to  the Warfarin, but states he will  the Warfarin tomorrow ands start it tomorrow.

## 2023-10-31 NOTE — TELEPHONE ENCOUNTER
Please let patient know that he can transition to Coumadin. Tina Esteban can assist with this. Please check to see if patient needs any samples in the meantime so he does not go without anticoagulation. He is currently on Eliquis 5 mg twice daily.

## 2023-11-01 ENCOUNTER — HOSPITAL ENCOUNTER (OUTPATIENT)
Dept: MRI IMAGING | Facility: HOSPITAL | Age: 73
Discharge: HOME | End: 2023-11-01
Payer: MEDICARE

## 2023-11-01 DIAGNOSIS — I42.0 DILATED CARDIOMYOPATHY (HCC): ICD-10-CM

## 2023-11-01 PROCEDURE — 75561 CARDIAC MRI FOR MORPH W/DYE: CPT

## 2023-11-01 PROCEDURE — A9585 GADOBUTROL INJECTION: HCPCS

## 2023-11-01 PROCEDURE — G1004 CDSM NDSC: HCPCS

## 2023-11-01 RX ORDER — GADOBUTROL 604.72 MG/ML
20 INJECTION INTRAVENOUS
Status: COMPLETED | OUTPATIENT
Start: 2023-11-01 | End: 2023-11-01

## 2023-11-01 RX ADMIN — GADOBUTROL 20 ML: 604.72 INJECTION INTRAVENOUS at 10:50

## 2023-11-07 ENCOUNTER — TELEPHONE (OUTPATIENT)
Dept: CARDIOLOGY CLINIC | Facility: CLINIC | Age: 73
End: 2023-11-07

## 2023-11-07 NOTE — TELEPHONE ENCOUNTER
----- Message from Heavenly Downing PA-C sent at 11/6/2023  4:16 PM EST -----  Please call patient to advise MRI shows weak heart muscle similar his echocardiogram.  There was no specific cause for such noted on cardiac MRI. Please continue taking medications as prescribed. These results can be discussed in more detail at his upcoming office visit.

## 2023-11-07 NOTE — TELEPHONE ENCOUNTER
Spoke with pt. Patient verbally understood the result of his MRI cardiac  w wo contrast / Brian's message.

## 2023-11-08 ENCOUNTER — APPOINTMENT (OUTPATIENT)
Dept: LAB | Facility: CLINIC | Age: 73
End: 2023-11-08
Payer: MEDICARE

## 2023-11-08 DIAGNOSIS — R39.89 SUSPECTED UTI: ICD-10-CM

## 2023-11-08 DIAGNOSIS — C61 MALIGNANT NEOPLASM OF PROSTATE (HCC): ICD-10-CM

## 2023-11-08 DIAGNOSIS — Z01.812 PRE-OPERATIVE LABORATORY EXAMINATION: ICD-10-CM

## 2023-11-08 LAB
ALBUMIN SERPL BCP-MCNC: 4.6 G/DL (ref 3.5–5)
ALP SERPL-CCNC: 62 U/L (ref 34–104)
ALT SERPL W P-5'-P-CCNC: 20 U/L (ref 7–52)
ANION GAP SERPL CALCULATED.3IONS-SCNC: 8 MMOL/L
AST SERPL W P-5'-P-CCNC: 19 U/L (ref 13–39)
BASOPHILS # BLD AUTO: 0.05 THOUSANDS/ÂΜL (ref 0–0.1)
BASOPHILS NFR BLD AUTO: 1 % (ref 0–1)
BILIRUB SERPL-MCNC: 0.42 MG/DL (ref 0.2–1)
BUN SERPL-MCNC: 37 MG/DL (ref 5–25)
CALCIUM SERPL-MCNC: 9.8 MG/DL (ref 8.4–10.2)
CHLORIDE SERPL-SCNC: 103 MMOL/L (ref 96–108)
CO2 SERPL-SCNC: 27 MMOL/L (ref 21–32)
CREAT SERPL-MCNC: 1.17 MG/DL (ref 0.6–1.3)
EOSINOPHIL # BLD AUTO: 0.26 THOUSAND/ÂΜL (ref 0–0.61)
EOSINOPHIL NFR BLD AUTO: 4 % (ref 0–6)
ERYTHROCYTE [DISTWIDTH] IN BLOOD BY AUTOMATED COUNT: 13.1 % (ref 11.6–15.1)
GFR SERPL CREATININE-BSD FRML MDRD: 61 ML/MIN/1.73SQ M
GLUCOSE P FAST SERPL-MCNC: 145 MG/DL (ref 65–99)
HCT VFR BLD AUTO: 42.3 % (ref 36.5–49.3)
HGB BLD-MCNC: 13.9 G/DL (ref 12–17)
IMM GRANULOCYTES # BLD AUTO: 0.08 THOUSAND/UL (ref 0–0.2)
IMM GRANULOCYTES NFR BLD AUTO: 1 % (ref 0–2)
INR PPP: 1.74 (ref 0.84–1.19)
LYMPHOCYTES # BLD AUTO: 1.71 THOUSANDS/ÂΜL (ref 0.6–4.47)
LYMPHOCYTES NFR BLD AUTO: 27 % (ref 14–44)
MCH RBC QN AUTO: 29.4 PG (ref 26.8–34.3)
MCHC RBC AUTO-ENTMCNC: 32.9 G/DL (ref 31.4–37.4)
MCV RBC AUTO: 90 FL (ref 82–98)
MONOCYTES # BLD AUTO: 0.61 THOUSAND/ÂΜL (ref 0.17–1.22)
MONOCYTES NFR BLD AUTO: 10 % (ref 4–12)
NEUTROPHILS # BLD AUTO: 3.58 THOUSANDS/ÂΜL (ref 1.85–7.62)
NEUTS SEG NFR BLD AUTO: 57 % (ref 43–75)
NRBC BLD AUTO-RTO: 0 /100 WBCS
PLATELET # BLD AUTO: 169 THOUSANDS/UL (ref 149–390)
PMV BLD AUTO: 10.8 FL (ref 8.9–12.7)
POTASSIUM SERPL-SCNC: 5.3 MMOL/L (ref 3.5–5.3)
PROT SERPL-MCNC: 7.3 G/DL (ref 6.4–8.4)
PROTHROMBIN TIME: 20.1 SECONDS (ref 11.6–14.5)
RBC # BLD AUTO: 4.72 MILLION/UL (ref 3.88–5.62)
SODIUM SERPL-SCNC: 138 MMOL/L (ref 135–147)
WBC # BLD AUTO: 6.29 THOUSAND/UL (ref 4.31–10.16)

## 2023-11-08 PROCEDURE — 80053 COMPREHEN METABOLIC PANEL: CPT

## 2023-11-08 PROCEDURE — 87086 URINE CULTURE/COLONY COUNT: CPT

## 2023-11-08 PROCEDURE — 36415 COLL VENOUS BLD VENIPUNCTURE: CPT

## 2023-11-08 PROCEDURE — 85610 PROTHROMBIN TIME: CPT

## 2023-11-08 PROCEDURE — 85025 COMPLETE CBC W/AUTO DIFF WBC: CPT

## 2023-11-09 LAB — BACTERIA UR CULT: NORMAL

## 2023-11-16 ENCOUNTER — TELEPHONE (OUTPATIENT)
Dept: UROLOGY | Facility: CLINIC | Age: 73
End: 2023-11-16

## 2023-11-16 PROBLEM — E66.9 OBESITY: Status: ACTIVE | Noted: 2023-11-16

## 2023-11-16 NOTE — TELEPHONE ENCOUNTER
Patient's scheduled fusion biopsy was canceled by anesthesia today due to his cardiac arrhythmia (now requiring a LifeVest)    I sat and spoke with the patient. I reviewed his records extensively. He has biochemically recurrent nonmetastatic prostate cancer confirmed on a PMS a PET/CT following prior radiation treatment and 2 years of ADT. Apparently repeat tissue sampling of the prostate was requested by radiation oncology    As the patient is no longer a candidate for prostate biopsy due to his cardiac condition I recommend an alternative management strategies be considered    Of note, for nonmetastatic biochemically recurrent prostate cancer the standard of care would be for active surveillance.      Message directed to the patient's radiation oncology and primary urology team managing (Linh Pond)

## 2023-11-22 ENCOUNTER — OFFICE VISIT (OUTPATIENT)
Dept: CARDIOLOGY CLINIC | Facility: CLINIC | Age: 73
End: 2023-11-22
Payer: MEDICARE

## 2023-11-22 ENCOUNTER — ANTICOAG VISIT (OUTPATIENT)
Dept: CARDIOLOGY CLINIC | Facility: CLINIC | Age: 73
End: 2023-11-22

## 2023-11-22 ENCOUNTER — APPOINTMENT (OUTPATIENT)
Dept: LAB | Facility: CLINIC | Age: 73
End: 2023-11-22
Payer: MEDICARE

## 2023-11-22 VITALS
HEIGHT: 70 IN | WEIGHT: 236 LBS | SYSTOLIC BLOOD PRESSURE: 98 MMHG | BODY MASS INDEX: 33.79 KG/M2 | DIASTOLIC BLOOD PRESSURE: 80 MMHG

## 2023-11-22 DIAGNOSIS — I49.3 PVC'S (PREMATURE VENTRICULAR CONTRACTIONS): ICD-10-CM

## 2023-11-22 DIAGNOSIS — I47.29 NSVT (NONSUSTAINED VENTRICULAR TACHYCARDIA) (HCC): ICD-10-CM

## 2023-11-22 DIAGNOSIS — I42.0 DILATED CARDIOMYOPATHY (HCC): ICD-10-CM

## 2023-11-22 DIAGNOSIS — I48.0 PAROXYSMAL ATRIAL FIBRILLATION (HCC): ICD-10-CM

## 2023-11-22 PROCEDURE — 99214 OFFICE O/P EST MOD 30 MIN: CPT | Performed by: INTERNAL MEDICINE

## 2023-11-22 NOTE — PROGRESS NOTES
S/w pt. He states he was holding x 4 days last week for a procedure that got cancelled. Advised to take 7.5mg Sun/Thurs, 5mg the rest and retest in 1 week.

## 2023-11-22 NOTE — PROGRESS NOTES
Cardiology Follow Up    Apollo Silva  1950  [id number]  [facility] [address] [id number] [phone]    Discussion/Summary:  Non-ischemic cardiomyopathy  Atrial fibrillation  Junctional tachycardia, NSVT, PSVT, PVCs  Hypertension  Diabetes  Pulmonary hypertension  PVC burden of 10%    Appears euvolemic. Cont with lisinopril. Hold off beta blockers due to bradycardia  Cont with lasix 20 mg daily    Is scheduled for echo next month to monitor for LV recovery    Cont with ASA and statin. Will stop imdur    On warfarin for anticoagulation. Will refer to EP given afib, high PVC burden and myopathy    He will inform us with onset of cardiac symptoms      Previous studies were reviewed. Safety measures were reviewed. Questions were entertained and answered. Patient was advised to report any problems requiring medical attention. Follow-up with PCP and appropriate specialist and lab work as discussed. Return for follow up visit as scheduled or earlier, if needed. Thank you for allowing me to participate in the care and evaluation of your patient. Should you have any questions, please feel free to contact me. History of Present Illness:     Apollo Silva is a 68 y.o. male who presents for follow up for cardiovascular care. Denies chest pain, chest pressure, shortness of breath, dizziness, lightheadedness, presyncope or syncope. Denies orthopnea, PND or lower limb edema.       Patient Active Problem List   Diagnosis    Mixed hyperlipidemia    IFG (impaired fasting glucose)    Upper respiratory tract infection    Positive colorectal cancer screening using Cologuard test    Prostate cancer ([address] )    Encounter for monitoring androgen deprivation therapy    Hot flashes    Type 2 diabetes mellitus with complication, without long-term current use of insulin ([address] ) Stage 3a chronic kidney disease (HCC)    Essential hypertension    Dilated cardiomyopathy (HCC)    Paroxysmal atrial fibrillation (HCC)    Obesity     Past Medical History:   Diagnosis Date    Anesthesia     pt wears a life vest (). should not be scheduled at [facility] until heart condition is stabilized    BPH (benign prostatic hypertrophy)     Cancer (HCC)     Diabetes mellitus ([address])     Hyperlipidemia     Hypertension     Irregular heart beat     PAF    Prostate cancer [facility])      Social History     Socioeconomic History    Marital status:       Spouse name: Not on file    Number of children: Not on file    Years of education: Not on file    Highest education level: Not on file   Occupational History    Not on file   Tobacco Use    Smoking status: Former     Packs/day: 1.00     Years: 25.00     Total pack years: 25.00     Types: Cigarettes, Pipe, Cigars     Quit date: 2005     Years since quittin.7     Passive exposure: Past    Smokeless tobacco: Never   Vaping Use    Vaping Use: Never used   Substance and Sexual Activity    Alcohol use: Not Currently    Drug use: Not Currently    Sexual activity: Not Currently   Other Topics Concern    Not on file   Social History Narrative    Not on file     Social Determinants of Health     Financial Resource Strain: Unknown (10/18/2022)    Overall Financial Resource Strain (CARDIA)     Difficulty of Paying Living Expenses: Patient refused   Food Insecurity: Not on file   Transportation Needs: Unknown (10/18/2022)    PRAPARE - Transportation     Lack of Transportation (Medical): Patient refused     Lack of Transportation (Non-Medical): Patient refused   Physical Activity: Not on file   Stress: Not on file   Social Connections: Not on file   Intimate Partner Violence: Not on file   Housing Stability: Not on file      Family History   Problem Relation Age of Onset    Stroke Father     No Known Problems Mother     Prostate cancer Brother     Arthritis Brother No Known Problems Sister     Diabetes Sister     Diabetes Sister     No Known Problems Son     No Known Problems Daughter      Past Surgical History:   Procedure Laterality Date    CARDIAC CATHETERIZATION N/A 10/4/2023    Procedure: Cardiac Coronary Angiogram;  Surgeon: Ana Ferguson MD;  Location: [facility] [address] CATH LAB; Service: Cardiology    CARDIAC CATHETERIZATION N/A 10/4/2023    Procedure: Cardiac RHC/LHC; Surgeon: Ana Ferguson MD;  Location: [facility] CARDIAC CATH LAB; Service: Cardiology    CARDIAC CATHETERIZATION  10/4/2023    Procedure: Cardiac catheterization;  Surgeon: Ana Ferguson MD;  Location: [facility] [address] CATH LAB; Service: Cardiology    EAR SURGERY      HERNIA REPAIR      KS COLONOSCOPY FLX DX W/COLLJ SPEC WHEN PFRMD N/A 5/6/2019    Procedure: COLONOSCOPY;  Surgeon: Suresh Melo MD;  Location: [facility] GI LAB;   Service: Gastroenterology       Current Outpatient Medications:     Ascorbic Acid (vitamin C) 1000 MG tablet, Take 1,000 mg by mouth daily, Disp: , Rfl:     aspirin 81 mg chewable tablet, Chew 1 tablet (81 mg total) daily, Disp: 30 tablet, Rfl: 3    Calcium Carbonate (CALCIUM 600 PO), Take by mouth daily, Disp: , Rfl:     cetirizine (ZyrTEC) 5 MG tablet, Take 5 mg by mouth daily, Disp: , Rfl:     EVENING PRIMROSE OIL PO, Take by mouth 3 (three) times a day, Disp: , Rfl:     furosemide (LASIX) 20 mg tablet, Take 1 tablet (20 mg total) by mouth daily, Disp: 30 tablet, Rfl: 5    isosorbide mononitrate (IMDUR) 30 mg 24 hr tablet, Take 1 tablet (30 mg total) by mouth daily, Disp: 30 tablet, Rfl: 3    lisinopril (ZESTRIL) 5 mg tablet, Take 1 tablet (5 mg total) by mouth daily, Disp: 90 tablet, Rfl: 1    metFORMIN (GLUCOPHAGE) 850 mg tablet, Take 1 tablet (850 mg total) by mouth 2 (two) times a day with meals Do not start before October 6, 2023., Disp: 180 tablet, Rfl: 0    multivitamin (THERAGRAN) TABS, Take 1 tablet by mouth daily, Disp: , Rfl:     rosuvastatin (CRESTOR) 10 MG tablet, Take 1 tablet (10 mg total) by mouth daily, Disp: 90 tablet, Rfl: 1    VITAMIN D, ERGOCALCIFEROL, PO, Take by mouth, Disp: , Rfl:     vitamin E, tocopherol, 400 units capsule, Take 400 Units by mouth daily, Disp: , Rfl:     warfarin (Coumadin) 5 mg tablet, Take one tablet daily or as directed, Disp: 90 tablet, Rfl: 3    Blood Glucose Monitoring Suppl (Yenny Pap) w/Device KIT, by Does not apply route once for 1 dose Test sugar in the morning (Patient not taking: Reported on 9/19/2023), Disp: 1 kit, Rfl: 0  Allergies   Allergen Reactions    Penicillin V Other (See Comments)     As a child        Labs:  Lab Results   Component Value Date    WBC 6.29 11/08/2023    HGB 13.9 11/08/2023    HCT 42.3 11/08/2023    MCV 90 11/08/2023     11/08/2023     Lab Results   Component Value Date    CALCIUM 9.8 11/08/2023    K 5.3 11/08/2023    CO2 27 11/08/2023     11/08/2023    BUN 37 (H) 11/08/2023    CREATININE 1.17 11/08/2023     Lab Results   Component Value Date    HGBA1C 7.4 (H) 08/08/2023     No results found for: "CHOL"  Lab Results   Component Value Date    HDL 41 08/08/2023    HDL 41 02/09/2023    HDL 42 04/28/2022     Lab Results   Component Value Date    LDLCALC 86 08/08/2023    LDLCALC 79 02/09/2023    LDLCALC 117 (H) 04/28/2022     Lab Results   Component Value Date    TRIG 271 (H) 08/08/2023    TRIG 270 (H) 02/09/2023    TRIG 381 (H) 04/28/2022     No results found for: "CHOLHDL"    Review of Systems:  Review of Systems   Constitutional: Negative. Negative for activity change, appetite change and fatigue. Respiratory:  Negative for cough, chest tightness and shortness of breath. Cardiovascular:  Negative for chest pain, palpitations and leg swelling. Gastrointestinal:  Negative for nausea and vomiting. Musculoskeletal:  Negative for arthralgias and back pain. Skin:  Negative for color change and pallor. Neurological:  Negative for dizziness, syncope, weakness and light-headedness. Hematological:  Negative for adenopathy. Psychiatric/Behavioral:  Negative for agitation. All other systems reviewed and are negative. Physical Exam:  Physical Exam  Vitals and nursing note reviewed. Constitutional:       General: He is not in acute distress. Appearance: Normal appearance. He is not ill-appearing or diaphoretic. HENT:      Head: Normocephalic and atraumatic. Eyes:      Extraocular Movements: Extraocular movements intact. Cardiovascular:      Rate and Rhythm: Normal rate and regular rhythm. Heart sounds: No murmur heard. No friction rub. No gallop. Pulmonary:      Effort: Pulmonary effort is normal. No respiratory distress. Breath sounds: Normal breath sounds. Abdominal:      General: There is no distension. Palpations: Abdomen is soft. Musculoskeletal:         General: No swelling. Normal range of motion. Cervical back: Normal range of motion. No rigidity. Skin:     General: Skin is warm and dry. Capillary Refill: Capillary refill takes less than 2 seconds. Coloration: Skin is not pale. Neurological:      General: No focal deficit present. Mental Status: He is alert and oriented to person, place, and time. Cranial Nerves: No cranial nerve deficit.    Psychiatric:         Mood and Affect: Mood normal.         Behavior: Behavior normal.

## 2023-11-27 ENCOUNTER — TELEPHONE (OUTPATIENT)
Dept: HEMATOLOGY ONCOLOGY | Facility: CLINIC | Age: 73
End: 2023-11-27

## 2023-11-27 ENCOUNTER — OFFICE VISIT (OUTPATIENT)
Dept: HEMATOLOGY ONCOLOGY | Facility: CLINIC | Age: 73
End: 2023-11-27
Payer: MEDICARE

## 2023-11-27 VITALS
BODY MASS INDEX: 34.14 KG/M2 | TEMPERATURE: 98 F | OXYGEN SATURATION: 96 % | HEART RATE: 99 BPM | DIASTOLIC BLOOD PRESSURE: 82 MMHG | WEIGHT: 238.5 LBS | RESPIRATION RATE: 16 BRPM | HEIGHT: 70 IN | SYSTOLIC BLOOD PRESSURE: 132 MMHG

## 2023-11-27 DIAGNOSIS — C61 PROSTATE CANCER (HCC): ICD-10-CM

## 2023-11-27 PROCEDURE — 99205 OFFICE O/P NEW HI 60 MIN: CPT | Performed by: INTERNAL MEDICINE

## 2023-11-27 NOTE — PROGRESS NOTES
[address] [facility]AT[facility]Y [facility]Y SPECIAL[facility]  1950      PRIMARY HEMATOLOGIC/ONCOLOGIC DIAGNOSIS:  Prostatic adenocarcinoma, Elder score 4 + 5 = 9. Initial Dx 5/2019, Stage JAIRO T2b N1 M0 PSA 22 grade group 5. S/p definitive radiation therapy with neoadjuvant and concurrent androgen deprivation. Completed RT on 10/18/19.  Final Lupron was given 2/10/21     PATHOLOGY:   Surgical Pathology Report                         Case: [id number]                                   Authorizing Provider:  Jose De Leon MD        Collected:           05/15/2019 1127               Ordering Location:     [address] [place] [address] For        Received:            05/15/2019 1127                                      Urology [address] [place] [address]                                                     Pathologist:           Emy Meza MD                                                           Specimens:   A) - Prostate, Right Lateral Base                                                                    B) - Prostate, Right Medial Base                                                                    C) - Prostate, Right Lateral Mid                                                                    D) - Prostate, Right Medial Mid                                                                      E) - Prostate, Right Lateral [place]                                                                    F) - Prostate, Right Medial [place]                                                                    G) - Prostate, Left Lateral Base                                                                    H) - Prostate, Left Medial Base                                                                      I) - Prostate, Left Lateral Mid                                                                      J) - Prostate, Left Medial Mid K) - Prostate, Left Lateral [place]                                                                    L) - Prostate, Left Medial [place]                                                            Final Diagnosis   A. Prostate, right lateral base, core needle biopsy:             - Benign prostatic stroma. - No malignancy is identified. B. Prostate, right medial base, core needle biopsy:             - Prostatic adenocarcinoma, [place] score 4 + 5 = 9, Prognostic Grade Group V, involving nearly 100% of one core biopsy. C. Prostate, right lateral mid, core needle biopsy:             - Prostatic adenocarcinoma, [place] score 5 + 4 = 9, Prognostic Grade Group V, involving nearly 100% one core biopsy. D. Prostate, right medial mid, core needle biopsy:             - Prostatic adenocarcinoma, Elder score 4 + 5 = 9, Prognostic Grade Group V, involving nearly 100% of one core biopsy. E. Prostate, right lateral apex, core needle biopsy:             - Benign prostatic stroma. - No malignancy is identified. F. Prostate, right medial apex, core needle biopsy:             - Prostatic adenocarcinoma, Elder score 5 + 4 = 9, Prognostic Grade Group V, involving nearly 100% of one core biopsy. G. Prostate, left lateral base, core needle biopsy:             - Atypical prostate glands, indeterminate for prostatic adenocarcinoma. H. Prostate, left medial base, core needle biopsy:             - Prostatic adenocarcinoma, [place] score 5 + 4 =9, Prognostic Grade Group V, involving 15% of one core biopsy. I. Prostate, left lateral mid, core needle biopsy:             - Benign prostate glands. - No malignancy is identified. J. Prostate, left medial mid, core needle biopsy:             - Benign prostate glands. - No malignancy is identified.      K. Prostate, left lateral apex, core needle biopsy:             - Prostatic adenocarcinoma, Elder score 5 + 4 = 9, Prognostic Grade Group V, involving 30% of one of 2 cores. L. Prostate, left medial apex, core needle biopsy:             - Prostatic adenocarcinoma, [place] score 5 + 4 =  9, Prognostic Grade Group V, discontinuously involving approximately 50% of 1 core biopsy. STAGING: Cancer Staging   Prostate cancer ([address] [facility])  Staging form: Prostate, AJCC 8th Edition  - Clinical: Stage JAIRO (cT2b, cN1, cM0, PSA: 22, Grade Group: 5) - Signed by Diandra Villalobos MD on 6/20/2019  Prostate specific antigen (PSA) range: 20 or greater  Histologic grading system: 5 grade system         Oncology History   Prostate cancer ([address] [facility])   5/15/2019 Initial Diagnosis    Prostate cancer ([address] [facility])     5/15/2019 Biopsy    Final Diagnosis  A. Prostate, right lateral base, core needle biopsy: Benign prostatic stroma. No malignancy is identified. B. Prostate, right medial base, core needle biopsy:             - Prostatic adenocarcinoma, [place] score 4 + 5 = 9, Prognostic Grade Group V, involving nearly 100% of one core biopsy. C. Prostate, right lateral mid, core needle biopsy:             - Prostatic adenocarcinoma, [place] score 5 + 4 = 9, Prognostic Grade Group V, involving nearly 100% one core biopsy. D. Prostate, right medial mid, core needle biopsy:             - Prostatic adenocarcinoma, [place] score 4 + 5 = 9, Prognostic Grade Group V, involving nearly 100% of one core biopsy. E. Prostate, right lateral apex, core needle biopsy:No malignancy is identified. F. Prostate, right medial apex, core needle biopsy:             - Prostatic adenocarcinoma, [place] score 5 + 4 = 9, Prognostic Grade Group V, involving nearly 100% of one core biopsy. G. Prostate, left lateral base, core needle biopsy:- Atypical prostate glands, indeterminate for prostatic adenocarcinoma.      H. Prostate, left medial base, core needle biopsy:             - Prostatic adenocarcinoma, [place] score 5 + 4 =9, Prognostic Grade Group V, involving 15% of one core biopsy. I. Prostate, left lateral mid, core needle biopsy: Benign prostate glands. No malignancy is identified. J. Prostate, left medial mid, core needle biopsy: Benign prostate glands. No malignancy is identified. K. Prostate, left lateral apex, core needle biopsy:             - Prostatic adenocarcinoma, [place] score 5 + 4 = 9, Prognostic Grade Group V, involving 30% of one of 2 cores. L. Prostate, left medial apex, core needle biopsy:             - Prostatic adenocarcinoma, Elder score 5 + 4 =  9, Prognostic Grade Group V, discontinuously involving approximately 50% of 1 core biopsy. 6/20/2019 -  Cancer Staged    Staging form: Prostate, AJCC 8th Edition  - Clinical: Stage JAIRO (cT2b, cN1, cM0, PSA: 22, Grade Group: 5) - Signed by Juan A Stanton MD on 6/20/2019  Prostate specific antigen (PSA) range: 20 or greater  Histologic grading system: 5 grade system       6/28/2019 - 6/28/2019 Hormone Therapy    Firmagon     8/1/2019 - 5/10/2021 Hormone Therapy    Lupron for 2 years     8/19/2019 - 10/18/2019 Radiation    4500 centigray in 25 fractions to the prostate, seminal vesicles and regional pelvic lymphatics followed by an additional 1440 centigray in 8 fractions to the prostate, seminal vesicles, and gross lymphadenopathy with the final 11 fractions of an additional 1980 centigray to the prostate and proximal seminal vesicles for a total dose of 7920 centigray in 44 fractions. HISTORY OF PRESENT ILLNESS:  Olivier Gifford is a 66yo male with hx of prostatic adenocarcinoma who was referred to our service for further evaluation and management secondary to elevation in PSA. The patient was initially diagnosed with prostatic adenocarcinoma, Elder score 4 + 5 = 9 in 5/2019 (Stage JAIRO T2b N1 M0 PSA 22 grade group 5). He underwent definitive radiation therapy with neoadjuvant and concurrent androgen deprivation. Completed RT on 10/18/19. Final Lupron was given 2/10/21. PSA has been trending up as follows:              Component  Ref Range & Units 10/10/23  9:04 AM 8/24/23 10:26 AM 5/4/23 10:12 AM 3/23/23 10:01 AM 8/25/22  9:10 AM 4/28/22  9:15 AM 2/22/22  9:57 AM   PSA, Diagnostic  0.00 - 4.00 ng/mL 3.55 2.25 CM 1.5 R, CM 1.4 R, CM 0.9 R, CM 0.6 R, CM 0.4 R, CM      PSA Date PSA Value    2/22/2022 0.4 ng/ml   4/28/2022 0.6 ng/ml   8/25/2022 0.9 ng/ml   3/23/2023 1.4 ng/ml   5/4/2023 1.5 ng/ml   8/24/2023 2.25 ng/ml   10/10/2023 3.55 ng/ml   Doubling Time 7.2 months  Smith Log (PSA) 0.1  Velocity 0.1 ng/mL/mo  Years  Doubling Time 0.6 years  Smith Log (PSA) 1.1  Velocity 1.5 ng/mL/yr  On 9/25/23 the patient underwent PSMA PET/CT which showed heterogeneous multifocal fairly intense tracer activity involving the right greater than left prostate gland highly suspicious for locally recurrent intraprostatic PSMA positive malignancy. No focal tracer activity characteristic of extraprostatic PSMA positive metastatic disease. There was a minimal tracer activity in the region of a suspected tiny nonpathologic in size right common iliac lymph node which had slightly increased in size since 6/13/2019 --early developing alisha metastatic disease in this location could not be excluded. On 10/13/23 the patient underwent MRI prostate-- tumor in the anterior and posterior right peripheral zone apex and mid gland and right transition zone at apex corresponding to the PSMA avid lesion on prior PET/CT. The lesion broadly abuts the capsule without visualized gross extraprostatic extension. No seminal vesicle invasion, pelvic lymphadenopathy, or pelvic osseous metastatic disease. Calculated prostate volume of  29    MRI fusion biopsy was cancelled due to anaesthesia concerns since the patient has a vest and significant cardiac issues--non-ischemic cardiomyopathy,Atrial fibrillation,Junctional tachycardia, NSVT, PSVT, PVCs.  He will have a repeat Echo and cardiology follow-up next week when it will be decided if he would need a defibrillator. PAST MEDICAL,PAST SURGICAL, FAMILY AND SOCIAL HISTORY:    Patient Active Problem List   Diagnosis    Mixed hyperlipidemia    IFG (impaired fasting glucose)    Upper respiratory tract infection    Positive colorectal cancer screening using Cologuard test    Prostate cancer ([address])    Encounter for monitoring androgen deprivation therapy    Hot flashes    Type 2 diabetes mellitus with complication, without long-term current use of insulin (HCC)    Stage 3a chronic kidney disease (HCC)    Essential hypertension    Dilated cardiomyopathy (HCC)    Paroxysmal atrial fibrillation (HCC)    Obesity     Past Medical History:   Diagnosis Date    Anesthesia     pt wears a life vest (11/16). should not be scheduled at [facility] until heart condition is stabilized    BPH (benign prostatic hypertrophy)     Cancer (HCC)     Diabetes mellitus ([address])     Hyperlipidemia     Hypertension     Irregular heart beat     PAF    Prostate cancer [facility])      Past Surgical History:   Procedure Laterality Date    CARDIAC CATHETERIZATION N/A 10/4/2023    Procedure: Cardiac Coronary Angiogram;  Surgeon: Tio Billy MD;  Location: [facility] CARDIAC CATH LAB; Service: Cardiology    CARDIAC CATHETERIZATION N/A 10/4/2023    Procedure: Cardiac RHC/LHC; Surgeon: Tio Billy MD;  Location: [facility] CARDIAC CATH LAB; Service: Cardiology    CARDIAC CATHETERIZATION  10/4/2023    Procedure: Cardiac catheterization;  Surgeon: Tio Billy MD;  Location: [address] [place] [address] CATH LAB; Service: Cardiology    EAR SURGERY      HERNIA REPAIR      FL COLONOSCOPY FLX DX W/COLLJ SPEC WHEN PFRMD N/A 5/6/2019    Procedure: COLONOSCOPY;  Surgeon: Angie Nair MD;  Location: [facility] GI LAB;   Service: Gastroenterology     Family History   Problem Relation Age of Onset    Stroke Father     No Known Problems Mother     Prostate cancer Brother     Arthritis Brother     No Known Problems Sister Diabetes Sister     Diabetes Sister     No Known Problems Son     No Known Problems Daughter      Social History     Socioeconomic History    Marital status:       Spouse name: Not on file    Number of children: Not on file    Years of education: Not on file    Highest education level: Not on file   Occupational History    Not on file   Tobacco Use    Smoking status: Former     Packs/day: 1.00     Years: 25.00     Total pack years: 25.00     Types: Cigarettes, Pipe, Cigars     Quit date: 2005     Years since quittin.7     Passive exposure: Past    Smokeless tobacco: Never   Vaping Use    Vaping Use: Never used   Substance and Sexual Activity    Alcohol use: Not Currently    Drug use: Not Currently    Sexual activity: Not Currently   Other Topics Concern    Not on file   Social History Narrative    Not on file     Social Determinants of Health     Financial Resource Strain: Unknown (10/18/2022)    Overall Financial Resource Strain (CARDIA)     Difficulty of Paying Living Expenses: Patient refused   Food Insecurity: Not on file   Transportation Needs: Unknown (10/18/2022)    PRAPARE - Transportation     Lack of Transportation (Medical): Patient refused     Lack of Transportation (Non-Medical): Patient refused   Physical Activity: Not on file   Stress: Not on file   Social Connections: Not on file   Intimate Partner Violence: Not on file   Housing Stability: Not on file       Current Outpatient Medications:     Ascorbic Acid (vitamin C) 1000 MG tablet, Take 1,000 mg by mouth daily, Disp: , Rfl:     aspirin 81 mg chewable tablet, Chew 1 tablet (81 mg total) daily, Disp: 30 tablet, Rfl: 3    Calcium Carbonate (CALCIUM 600 PO), Take by mouth daily, Disp: , Rfl:     cetirizine (ZyrTEC) 5 MG tablet, Take 5 mg by mouth daily, Disp: , Rfl:     EVENING PRIMROSE OIL PO, Take by mouth 3 (three) times a day, Disp: , Rfl:     furosemide (LASIX) 20 mg tablet, Take 1 tablet (20 mg total) by mouth daily, Disp: 30 tablet, Rfl: 5    lisinopril (ZESTRIL) 5 mg tablet, Take 1 tablet (5 mg total) by mouth daily, Disp: 90 tablet, Rfl: 1    metFORMIN (GLUCOPHAGE) 850 mg tablet, Take 1 tablet (850 mg total) by mouth 2 (two) times a day with meals Do not start before October 6, 2023., Disp: 180 tablet, Rfl: 0    multivitamin (THERAGRAN) TABS, Take 1 tablet by mouth daily, Disp: , Rfl:     rosuvastatin (CRESTOR) 10 MG tablet, Take 1 tablet (10 mg total) by mouth daily, Disp: 90 tablet, Rfl: 1    VITAMIN D, ERGOCALCIFEROL, PO, Take by mouth, Disp: , Rfl:     vitamin E, tocopherol, 400 units capsule, Take 400 Units by mouth daily, Disp: , Rfl:     warfarin (Coumadin) 5 mg tablet, Take one tablet daily or as directed, Disp: 90 tablet, Rfl: 3    Blood Glucose Monitoring Suppl (Jesus Becerril) w/Device KIT, by Does not apply route once for 1 dose Test sugar in the morning (Patient not taking: Reported on 9/19/2023), Disp: 1 kit, Rfl: 0  Allergies   Allergen Reactions    Penicillin V Other (See Comments)     As a child      Vitals:    11/27/23 0853   BP: 132/82   Pulse: 99   Resp: 16   Temp: 98 °F (36.7 °C)   SpO2: 96%       ROS:  CONSTITUTIONAL:  No fever. No chills. No dizziness. No weakness. EYES:  No pain, erythema, or discharge. No blurring of vision. ENT:  No sore throat, URI symptoms. No epistaxis. No tinnitus. CARDIOVASCULAR:  No chest pain. No palpitations. No lower extremity edema. RESPIRATORY:  No shortness of breath, cough, pain with respiration, pleuritic chest pain. No hemoptysis. No dyspnea. No paroxysmal nocturnal dyspnea. GASTROINTESTINAL:  Normal appetite. No nausea, vomiting, diarrhea. No pain. No bloating. No melena. GENITOURINARY:  No frequency, urgency, nocturia. No hematuria or dysuria. MUSCULOSKELETAL:  No arthralgias or myalgias. INTEGUMENTARY:  No swelling. No bruising. No contusions. No abrasions. No lymphangitis. NEUROLOGIC:  No headache. No neck pain. No numbness or tingling of the extremities.  No weakness. PSYCHIATRIC:  No confusion. ENDOCRINE:  No fatigue. No weakness. No history of thyroid, diabetes or adrenal problems. HEMATOLOGICAL:  No bleeding. No petechiae. No bruising. PHYSICAL EXAM:    GENERAL: AAO x 3  HEENT: AT,NC  CVS: S1S2 RRR  LUNGS: CTA b/l  ABD: NT,ND, +BS  EXTR: no edema  NEURO: CN II-XII grossly intact    LABS:  I have reviewed pertinent labs:  CBC:   Lab Results   Component Value Date    WBC 6.29 11/08/2023    RBC 4.72 11/08/2023    HGB 13.9 11/08/2023    HCT 42.3 11/08/2023    MCV 90 11/08/2023     11/08/2023    MCH 29.4 11/08/2023    MCHC 32.9 11/08/2023    RDW 13.1 11/08/2023    MPV 10.8 11/08/2023    NEUTROABS 3.58 11/08/2023     CMP:   Lab Results   Component Value Date    SODIUM 138 11/08/2023    K 5.3 11/08/2023     11/08/2023    CO2 27 11/08/2023    AGAP 8 11/08/2023    BUN 37 (H) 11/08/2023    CREATININE 1.17 11/08/2023    GLUF 145 (H) 11/08/2023    CALCIUM 9.8 11/08/2023    AST 19 11/08/2023    ALT 20 11/08/2023    ALKPHOS 62 11/08/2023    TP 7.3 11/08/2023    ALB 4.6 11/08/2023    TBILI 0.42 11/08/2023    EGFR 61 11/08/2023     Liver Enzymes:   Lab Results   Component Value Date    AST 19 11/08/2023    ALT 20 11/08/2023    ALKPHOS 62 11/08/2023    TP 7.3 11/08/2023    ALB 4.6 11/08/2023    TBILI 0.42 11/08/2023     Vitamin B12 No results found for: "GTWOCPOM51"  Iron Study No results found for: "RETIC", "RETICCTPCT", "FERRITIN", "CONCFE", "TIBC", "PRIMIDONE", "FOLATEHEMO", "IRON"  Folate No results found for: "FOLATE"  Magnesium No results found for: "MG"  Phosphorus No results found for: "PHOS"  Coagulation Panel   Lab Results   Component Value Date    PROTIME 19.4 (H) 11/22/2023    INR 1.67 (H) 11/22/2023     IMAGING:  MRI cardiac  w wo contrast    Result Date: 11/3/2023  Narrative: Cardiac MRI with and without contrast INDICATION: I42.0: Dilated cardiomyopathy. Technique: 1. 3 plane SSFP localizers. 2. SSFP cine imaging in long and short axis plane.  3. T2 weighted DIR FSE in short axis plane. 4. 20 ml gadobutrol power injected. 5. 2D inversion recovery FGRE for delayed myocardial enhancement. 6. The patient tolerated the procedure well without complication. Measurements: Bradley-septal wall 9 mm Postero-lateral wall 8 mm Left ventricular end-diastolic dimension 59 mm Left ventricular end-systolic dimension 50 mm Ejection fraction approximately 30% by visual estimate as artifact from ectopy renders postprocessing software unreliable Left atrium 40 mm Aortic Root 35 mm Findings: 1. Mildly dilated left ventricle with severely reduced systolic function. Normal LV wall thickness. Global hypokinesis. Artifact from ectopy limits evaluation for regional wall motion abnormality. 2. Normal right ventricular size with mildly reduced systolic function, EF approximately 35 to 40% by visual estimate. 3. The aortic, mitral, and tricuspid valves open without restriction. There is no significant valvular regurgitation, however cine MRI is inaccurate in the qualitative assessment of valvular regurgitation. 4. The left atrium is top normal in size. The aortic root is top normal in size. 5. There is no evidence of myocardial edema. 6. Delayed post-gadolinium imaging demonstrates a thin stripe of mid wall hyperenhancement in the basal to mid interventricular septum and a focus of mid wall hyperenhancement at the inferior RV insertion point. Impression: Impression: 1. Mildly dilated left ventricle with severely reduced systolic function, EF approximately 30% by visual estimate. 2. Normal right ventricular size with mildly reduced systolic function, EF approximately 35 to 40% by visual estimate. 3. Top normal left atrium. Top normal aortic root. 4. Nonspecific fibrosis in the interventricular septum and at the inferior RV insertion point, which can be seen with dilated cardiomyopathy.  Workstation performed: [id number]     I reviewed the above laboratory and imaging data.    ASSESSMENT/PLAN:  Prostatic adenocarcinoma, [place] score 4 + 5 = 9. Initial Dx 5/2019, Stage JAIRO T2b N1 M0 PSA 22 grade group 5. S/p definitive radiation therapy with neoadjuvant and concurrent androgen deprivation. Completed RT on 10/18/19. Final Lupron was given 2/10/21. During work-up for elevated PSA the patient underwent MRI prostate-- tumor in the anterior and posterior right peripheral zone apex and mid gland and right transition zone at apex corresponding to the PSMA avid lesion on prior PET/CT. The lesion broadly abuts the capsule without visualized gross extraprostatic extension. No seminal vesicle invasion, pelvic lymphadenopathy, or pelvic osseous metastatic disease. MRI fusion biopsy was cancelled due to anaesthesia concerns since the patient has a vest and significant cardiac issues--non-ischemic cardiomyopathy,Atrial fibrillation,Junctional tachycardia, NSVT, PSVT, PVCs. He will have a repeat Echo and cardiology follow-up next week when it will be decided if he would need a defibrillator. Will monitor closely and await Echo results with follow-up recommendations from cardiology. PSA in 1m. ADT will be considered. F/u in 1m.

## 2023-11-29 ENCOUNTER — APPOINTMENT (OUTPATIENT)
Dept: LAB | Facility: CLINIC | Age: 73
End: 2023-11-29
Payer: MEDICARE

## 2023-11-30 ENCOUNTER — ANTICOAG VISIT (OUTPATIENT)
Dept: CARDIOLOGY CLINIC | Facility: CLINIC | Age: 73
End: 2023-11-30

## 2023-12-04 ENCOUNTER — HOSPITAL ENCOUNTER (OUTPATIENT)
Dept: NON INVASIVE DIAGNOSTICS | Facility: CLINIC | Age: 73
Discharge: HOME | End: 2023-12-04
Payer: MEDICARE

## 2023-12-04 VITALS
WEIGHT: 238 LBS | BODY MASS INDEX: 34.07 KG/M2 | HEIGHT: 70 IN | HEART RATE: 74 BPM | DIASTOLIC BLOOD PRESSURE: 82 MMHG | SYSTOLIC BLOOD PRESSURE: 132 MMHG

## 2023-12-04 DIAGNOSIS — I42.0 DILATED CARDIOMYOPATHY (HCC): ICD-10-CM

## 2023-12-04 LAB
APICAL FOUR CHAMBER EJECTION FRACTION: 35 %
E WAVE DECELERATION TIME: 201 MS
E/A RATIO: 0.67
FRACTIONAL SHORTENING: 12 (ref 28–44)
INTERVENTRICULAR SEPTUM IN DIASTOLE (PARASTERNAL SHORT AXIS VIEW): 1.1 CM
INTERVENTRICULAR SEPTUM: 1.1 CM (ref 0.6–1.1)
LAAS-AP2: 20.8 CM2
LAAS-AP4: 22.2 CM2
LEFT ATRIUM AREA SYSTOLE SINGLE PLANE A4C: 22.8 CM2
LEFT ATRIUM SIZE: 5.4 CM
LEFT ATRIUM VOLUME (MOD BIPLANE): 70 ML
LEFT ATRIUM VOLUME INDEX (MOD BIPLANE): 31.1 ML/M2
LEFT INTERNAL DIMENSION IN SYSTOLE: 6.1 CM (ref 2.1–4)
LEFT VENTRICULAR INTERNAL DIMENSION IN DIASTOLE: 6.9 CM (ref 3.5–6)
LEFT VENTRICULAR POSTERIOR WALL IN END DIASTOLE: 1 CM
LEFT VENTRICULAR STROKE VOLUME: 64 ML
LVSV (TEICH): 64 ML
MV PEAK A VEL: 1.1 M/S
MV PEAK E VEL: 74 CM/S
MV STENOSIS PRESSURE HALF TIME: 58 MS
MV VALVE AREA P 1/2 METHOD: 3.79
RIGHT ATRIUM AREA SYSTOLE A4C: 16.2 CM2
RIGHT VENTRICLE ID DIMENSION: 3.4 CM
SL CV LEFT ATRIUM LENGTH A2C: 4.8 CM
SL CV LV EF: 26
SL CV PED ECHO LEFT VENTRICLE DIASTOLIC VOLUME (MOD BIPLANE) 2D: 248 ML
SL CV PED ECHO LEFT VENTRICLE SYSTOLIC VOLUME (MOD BIPLANE) 2D: 184 ML
TR MAX PG: 24 MMHG
TR PEAK VELOCITY: 2.5 M/S
TRICUSPID ANNULAR PLANE SYSTOLIC EXCURSION: 1.8 CM
TRICUSPID VALVE PEAK REGURGITATION VELOCITY: 2.46 M/S

## 2023-12-04 PROCEDURE — 93308 TTE F-UP OR LMTD: CPT | Performed by: INTERNAL MEDICINE

## 2023-12-04 PROCEDURE — 93325 DOPPLER ECHO COLOR FLOW MAPG: CPT | Performed by: INTERNAL MEDICINE

## 2023-12-04 PROCEDURE — 93321 DOPPLER ECHO F-UP/LMTD STD: CPT | Performed by: INTERNAL MEDICINE

## 2023-12-04 PROCEDURE — C8924 2D TTE W OR W/O FOL W/CON,FU: HCPCS

## 2023-12-04 RX ADMIN — PERFLUTREN 0.8 ML/MIN: 6.52 INJECTION, SUSPENSION INTRAVENOUS at 10:25

## 2023-12-06 ENCOUNTER — APPOINTMENT (OUTPATIENT)
Dept: LAB | Facility: CLINIC | Age: 73
End: 2023-12-06
Payer: MEDICARE

## 2023-12-06 DIAGNOSIS — C61 PROSTATE CANCER (HCC): ICD-10-CM

## 2023-12-06 DIAGNOSIS — I48.0 PAROXYSMAL ATRIAL FIBRILLATION (HCC): ICD-10-CM

## 2023-12-06 DIAGNOSIS — I42.9 CARDIOMYOPATHY (HCC): Primary | ICD-10-CM

## 2023-12-06 LAB
INR PPP: 2.53 (ref 0.84–1.19)
PROTHROMBIN TIME: 26.7 SECONDS (ref 11.6–14.5)
PSA SERPL-MCNC: 5.11 NG/ML (ref 0–4)

## 2023-12-06 PROCEDURE — 84402 ASSAY OF FREE TESTOSTERONE: CPT

## 2023-12-06 PROCEDURE — 85610 PROTHROMBIN TIME: CPT

## 2023-12-06 PROCEDURE — 84153 ASSAY OF PSA TOTAL: CPT

## 2023-12-06 PROCEDURE — 84403 ASSAY OF TOTAL TESTOSTERONE: CPT

## 2023-12-06 PROCEDURE — 36415 COLL VENOUS BLD VENIPUNCTURE: CPT

## 2023-12-07 ENCOUNTER — ANTICOAG VISIT (OUTPATIENT)
Dept: CARDIOLOGY CLINIC | Facility: CLINIC | Age: 73
End: 2023-12-07

## 2023-12-07 LAB
TESTOST FREE SERPL-MCNC: 15 PG/ML (ref 6.6–18.1)
TESTOST SERPL-MCNC: 282 NG/DL (ref 264–916)

## 2023-12-11 ENCOUNTER — TELEPHONE (OUTPATIENT)
Dept: CARDIOLOGY CLINIC | Facility: CLINIC | Age: 73
End: 2023-12-11

## 2023-12-11 NOTE — TELEPHONE ENCOUNTER
Attempted to call patient to discuss his concerns regarding traveling to [address] [place] [address] for ICD. Left voicemail advising patient to call back.   Per Dr. Tyler Ch, if patient does not wish to travel to [facility], he can consider EP evaluation with [facility] at [facility] as this would be more local.

## 2023-12-11 NOTE — TELEPHONE ENCOUNTER
Pt calling back stating that he spoke with the  And he said he can go to [place] to get this done , would like to know when he can schedule.      Please give him a call back

## 2023-12-13 ENCOUNTER — LAB (OUTPATIENT)
Dept: LAB | Facility: CLINIC | Age: 73
End: 2023-12-13
Payer: MEDICARE

## 2023-12-13 ENCOUNTER — ANTICOAG VISIT (OUTPATIENT)
Dept: CARDIOLOGY CLINIC | Facility: CLINIC | Age: 73
End: 2023-12-13

## 2023-12-13 DIAGNOSIS — I48.0 PAROXYSMAL ATRIAL FIBRILLATION (HCC): ICD-10-CM

## 2023-12-13 LAB
INR PPP: 2.05 (ref 0.84–1.19)
PROTHROMBIN TIME: 22.8 SECONDS (ref 11.6–14.5)

## 2023-12-13 PROCEDURE — 85610 PROTHROMBIN TIME: CPT

## 2023-12-13 PROCEDURE — 36415 COLL VENOUS BLD VENIPUNCTURE: CPT

## 2023-12-21 DIAGNOSIS — E11.8 TYPE 2 DIABETES MELLITUS WITH COMPLICATION, WITHOUT LONG-TERM CURRENT USE OF INSULIN (HCC): ICD-10-CM

## 2023-12-27 ENCOUNTER — ANTICOAG VISIT (OUTPATIENT)
Dept: CARDIOLOGY CLINIC | Facility: CLINIC | Age: 73
End: 2023-12-27

## 2023-12-27 ENCOUNTER — LAB (OUTPATIENT)
Dept: LAB | Facility: CLINIC | Age: 73
End: 2023-12-27
Payer: MEDICARE

## 2023-12-27 DIAGNOSIS — I48.0 PAROXYSMAL ATRIAL FIBRILLATION (HCC): ICD-10-CM

## 2023-12-27 LAB
INR PPP: 3.11 (ref 0.84–1.19)
PROTHROMBIN TIME: 31.3 SECONDS (ref 11.6–14.5)

## 2023-12-27 PROCEDURE — 36415 COLL VENOUS BLD VENIPUNCTURE: CPT

## 2023-12-27 PROCEDURE — 85610 PROTHROMBIN TIME: CPT

## 2023-12-28 ENCOUNTER — PREP FOR PROCEDURE (OUTPATIENT)
Dept: CARDIOLOGY CLINIC | Facility: CLINIC | Age: 73
End: 2023-12-28

## 2023-12-28 ENCOUNTER — TELEPHONE (OUTPATIENT)
Dept: CARDIOLOGY CLINIC | Facility: CLINIC | Age: 73
End: 2023-12-28

## 2023-12-28 DIAGNOSIS — I48.0 PAROXYSMAL ATRIAL FIBRILLATION (HCC): Primary | ICD-10-CM

## 2023-12-28 NOTE — TELEPHONE ENCOUNTER
"Patient scheduled for Dual Chambers ICD device on 1/4/24 at [facility] with Dr. Whalen.      Instructions sent to patient through Submittable.      Patient aware of all general instructions.    Medication holds:   Metformin - Take regular dose evening before procedure and DO NOT take morning of procedure.     Furosemide (Lasix) - Do not take morning of procedure.    Lisinopril - Do not take day before and morning of procedure.     Coumadin - waiting on  to advise    Labs to be done on 1/2/24:  PT INR / CMP / CBC (FASTING 8 HOURS)    Insurance: Medicare     Please obtain auth.       Thank you,  Raquel \"Maru\" Fortino    "

## 2023-12-28 NOTE — TELEPHONE ENCOUNTER
Auth is not required.  Verified that Medicare is primary with a secondary on file.               The above is valid as long as patient continues with Medicare/secondary for 2024.

## 2023-12-28 NOTE — TELEPHONE ENCOUNTER
"----- Message from Rosie Philip MA sent at 12/28/2023 12:48 PM EST -----  Regarding: RE: Refferal for ICD    Good afternoon.    Called patient to scheduled his device implant procedure for next week on 01/04/2024.    Patient pretty upset and mentioned this procedure should be performed two weeks ago and don't understand the delay on communication, he is not too happy need to continue wearing the live vest.  I tried to go over of instructions and indications for his procedure, patient start to coursing out, I told him I was just trying to helping, and don't feel that was the proper manner for him to talk to me (using \"F\" words).  I told the patient someone else will contact to him to continuing the scheduling process.  Thank you.    ----- Message -----  From: Rodolfo Church MD  Sent: 12/27/2023   7:07 PM EST  To: Rayo Montana MD; Madhu Dennis DO; #  Subject: RE: Refferal for ICD                             Rosie/Maru    Please schedule dual chamber ICD with next EP provider and send them message to see if they would like to discuss with patient.     Thanks.     ----- Message -----  From: Partha Colon PA-C  Sent: 12/27/2023   1:13 PM EST  To: Rayo Montana MD; Madhu Dennis DO; #  Subject: RE: Refferal for ICD                             Patient wishes to proceed directly with defibrillator as soon as possible. Thank you!    ----- Message -----  From: Rodolfo Church MD  Sent: 12/27/2023   9:21 AM EST  To: Rayo Montana MD; Madhu Dennis DO; #  Subject: RE: Refferal for ICD                             We can proceed to office visit if patient is interested in talking about the defibrillator.  If he would like to proceed with defibrillator directly then we can schedule the procedure.    Appears to have NICM, paroxysmal afib PVC burden 10% which could contribute but monitor showed more than one morphology.    Let us know.     ----- Message -----  From: Partha Colon PA-C  Sent: 12/26/2023   " 4:35 PM EST  To: Rayo Montana MD; Madhu Dennis DO; #  Subject: Refferal for ICD                                 Good afternoon,     I'm one of the PAs with the cardiology team at [facility].  Patient has NICM with EF <35% for greater than 3 months and would likely benefit from ICD implant.  Please review chart to see if patient can be arranged for ICD implant.  If needed he can be evaluated at office visit before hand. Thanks and have a great evening!

## 2023-12-28 NOTE — TELEPHONE ENCOUNTER
"Called patient and informed no hold on coumadin and to keep taking it.     Thanks,  Raquel \"Maru\" Fortino       ----- Message -----  From: Rodolfo Church MD  Sent: 12/28/2023   5:41 PM EST  To: Rayo Montana MD; Madhu Dennis DO; *  Subject: RE: Refferal for ICD                             We usually do not hold coumadin for device implantation.     Thanks    ----- Message -----  From: Raquel Freitas  Sent: 12/28/2023   3:20 PM EST  To: Rayo Montana MD; Madhu Dennis DO; *  Subject: RE: Refferal for ICD                             EP docs,    Please advise on Coumadin since  is not available.     Thanks,  Raquel \"Maru\" Fortino  "

## 2024-01-02 ENCOUNTER — APPOINTMENT (OUTPATIENT)
Dept: LAB | Facility: CLINIC | Age: 74
End: 2024-01-02
Payer: MEDICARE

## 2024-01-02 ENCOUNTER — OFFICE VISIT (OUTPATIENT)
Dept: HEMATOLOGY ONCOLOGY | Facility: CLINIC | Age: 74
End: 2024-01-02
Payer: MEDICARE

## 2024-01-02 ENCOUNTER — ANTICOAG VISIT (OUTPATIENT)
Dept: CARDIOLOGY CLINIC | Facility: CLINIC | Age: 74
End: 2024-01-02

## 2024-01-02 VITALS
WEIGHT: 238 LBS | RESPIRATION RATE: 16 BRPM | BODY MASS INDEX: 34.07 KG/M2 | TEMPERATURE: 98.4 F | DIASTOLIC BLOOD PRESSURE: 80 MMHG | HEART RATE: 68 BPM | HEIGHT: 70 IN | OXYGEN SATURATION: 95 % | SYSTOLIC BLOOD PRESSURE: 130 MMHG

## 2024-01-02 DIAGNOSIS — I48.0 PAROXYSMAL ATRIAL FIBRILLATION (HCC): ICD-10-CM

## 2024-01-02 DIAGNOSIS — C61 PROSTATE CANCER (HCC): Primary | ICD-10-CM

## 2024-01-02 LAB
ALBUMIN SERPL BCP-MCNC: 4.3 G/DL (ref 3.5–5)
ALP SERPL-CCNC: 65 U/L (ref 34–104)
ALT SERPL W P-5'-P-CCNC: 21 U/L (ref 7–52)
ANION GAP SERPL CALCULATED.3IONS-SCNC: 9 MMOL/L
AST SERPL W P-5'-P-CCNC: 20 U/L (ref 13–39)
BASOPHILS # BLD AUTO: 0.05 THOUSANDS/ÂΜL (ref 0–0.1)
BASOPHILS NFR BLD AUTO: 1 % (ref 0–1)
BILIRUB SERPL-MCNC: 0.31 MG/DL (ref 0.2–1)
BUN SERPL-MCNC: 62 MG/DL (ref 5–25)
CALCIUM SERPL-MCNC: 9.6 MG/DL (ref 8.4–10.2)
CHLORIDE SERPL-SCNC: 107 MMOL/L (ref 96–108)
CO2 SERPL-SCNC: 22 MMOL/L (ref 21–32)
CREAT SERPL-MCNC: 1.49 MG/DL (ref 0.6–1.3)
EOSINOPHIL # BLD AUTO: 0.22 THOUSAND/ÂΜL (ref 0–0.61)
EOSINOPHIL NFR BLD AUTO: 4 % (ref 0–6)
ERYTHROCYTE [DISTWIDTH] IN BLOOD BY AUTOMATED COUNT: 13.8 % (ref 11.6–15.1)
GFR SERPL CREATININE-BSD FRML MDRD: 46 ML/MIN/1.73SQ M
GLUCOSE P FAST SERPL-MCNC: 126 MG/DL (ref 65–99)
HCT VFR BLD AUTO: 41.3 % (ref 36.5–49.3)
HGB BLD-MCNC: 13.2 G/DL (ref 12–17)
IMM GRANULOCYTES # BLD AUTO: 0.09 THOUSAND/UL (ref 0–0.2)
IMM GRANULOCYTES NFR BLD AUTO: 2 % (ref 0–2)
INR PPP: 3.71 (ref 0.84–1.19)
LYMPHOCYTES # BLD AUTO: 1.52 THOUSANDS/ÂΜL (ref 0.6–4.47)
LYMPHOCYTES NFR BLD AUTO: 30 % (ref 14–44)
MCH RBC QN AUTO: 28.6 PG (ref 26.8–34.3)
MCHC RBC AUTO-ENTMCNC: 32 G/DL (ref 31.4–37.4)
MCV RBC AUTO: 89 FL (ref 82–98)
MONOCYTES # BLD AUTO: 0.55 THOUSAND/ÂΜL (ref 0.17–1.22)
MONOCYTES NFR BLD AUTO: 11 % (ref 4–12)
NEUTROPHILS # BLD AUTO: 2.68 THOUSANDS/ÂΜL (ref 1.85–7.62)
NEUTS SEG NFR BLD AUTO: 52 % (ref 43–75)
NRBC BLD AUTO-RTO: 0 /100 WBCS
PLATELET # BLD AUTO: 188 THOUSANDS/UL (ref 149–390)
PMV BLD AUTO: 10.9 FL (ref 8.9–12.7)
POTASSIUM SERPL-SCNC: 5.1 MMOL/L (ref 3.5–5.3)
PROT SERPL-MCNC: 7.1 G/DL (ref 6.4–8.4)
PROTHROMBIN TIME: 35.9 SECONDS (ref 11.6–14.5)
RBC # BLD AUTO: 4.62 MILLION/UL (ref 3.88–5.62)
SODIUM SERPL-SCNC: 138 MMOL/L (ref 135–147)
WBC # BLD AUTO: 5.11 THOUSAND/UL (ref 4.31–10.16)

## 2024-01-02 PROCEDURE — 99213 OFFICE O/P EST LOW 20 MIN: CPT | Performed by: INTERNAL MEDICINE

## 2024-01-02 PROCEDURE — 85610 PROTHROMBIN TIME: CPT

## 2024-01-02 PROCEDURE — 36415 COLL VENOUS BLD VENIPUNCTURE: CPT

## 2024-01-02 PROCEDURE — 80053 COMPREHEN METABOLIC PANEL: CPT

## 2024-01-02 PROCEDURE — 85025 COMPLETE CBC W/AUTO DIFF WBC: CPT

## 2024-01-02 NOTE — PROGRESS NOTES
[facility] [address] [address]    Galen Carson  1950      PRIMARY HEMATOLOGIC/ONCOLOGIC DIAGNOSIS:  Prostatic adenocarcinoma, [place] score 4 + 5 = 9. Initial Dx 5/2019, Stage JAIRO T2b N1 M0 PSA 22 grade group 5. S/p definitive radiation therapy with neoadjuvant and concurrent androgen deprivation. Completed RT on 10/18/19. Final Lupron was given 2/10/21     PATHOLOGY:   Surgical Pathology Report                         Case: [id number]                                   Authorizing Provider:  Kevin Burgess MD        Collected:           05/15/2019 1127               Ordering Location:     [facility]        Received:            05/15/2019 [facility]                                      Urology [place]                                                     Pathologist:           Brigitte Guerrier MD                                                           Specimens:   A) - Prostate, Right Lateral Base                                                                    B) - Prostate, Right Medial Base                                                                    C) - Prostate, Right Lateral Mid                                                                    D) - Prostate, Right Medial Mid                                                                      E) - Prostate, Right Lateral [place]                                                                    F) - Prostate, Right Medial [place]                                                                    G) - Prostate, Left Lateral Base                                                                    H) - Prostate, Left Medial Base                                                                      I) - Prostate, Left Lateral Mid                                                                      J) - Prostate, Left Medial Mid                                                                       K) - Prostate, Left Lateral [place]                                                                    L) - Prostate, Left Medial [place]                                                            Final Diagnosis   A. Prostate, right lateral base, core needle biopsy:             - Benign prostatic stroma.             - No malignancy is identified.     B. Prostate, right medial base, core needle biopsy:             - Prostatic adenocarcinoma, [place] score 4 + 5 = 9, Prognostic Grade Group V, involving nearly 100% of one core biopsy.     C. Prostate, right lateral mid, core needle biopsy:             - Prostatic adenocarcinoma, [place] score 5 + 4 = 9, Prognostic Grade Group V, involving nearly 100% one core biopsy.     D. Prostate, right medial mid, core needle biopsy:             - Prostatic adenocarcinoma, [place] score 4 + 5 = 9, Prognostic Grade Group V, involving nearly 100% of one core biopsy.     E. Prostate, right lateral apex, core needle biopsy:             - Benign prostatic stroma.             - No malignancy is identified.     F. Prostate, right medial apex, core needle biopsy:             - Prostatic adenocarcinoma, [place] score 5 + 4 = 9, Prognostic Grade Group V, involving nearly 100% of one core biopsy.     G. Prostate, left lateral base, core needle biopsy:             - Atypical prostate glands, indeterminate for prostatic adenocarcinoma.     H. Prostate, left medial base, core needle biopsy:             - Prostatic adenocarcinoma, [place] score 5 + 4 =9, Prognostic Grade Group V, involving 15% of one core biopsy.     I. Prostate, left lateral mid, core needle biopsy:             - Benign prostate glands.             - No malignancy is identified.     J. Prostate, left medial mid, core needle biopsy:             - Benign prostate glands.             - No malignancy is identified.     K. Prostate, left lateral apex, core needle biopsy:             - Prostatic  adenocarcinoma, Elder score 5 + 4 = 9, Prognostic Grade Group V, involving 30% of one of 2 cores.     L. Prostate, left medial apex, core needle biopsy:             - Prostatic adenocarcinoma, [place] score 5 + 4 =  9, Prognostic Grade Group V, discontinuously involving approximately 50% of 1 core biopsy.        STAGING: Cancer Staging   Prostate cancer ([facility])  Staging form: Prostate, AJCC 8th Edition  - Clinical: Stage JAIRO (cT2b, cN1, cM0, PSA: 22, Grade Group: 5) - Signed by Erik Elliott MD on 6/20/2019  Prostate specific antigen (PSA) range: 20 or greater  Histologic grading system: 5 grade system         Oncology History   Prostate cancer ([facility])   5/15/2019 Initial Diagnosis    Prostate cancer ([facility])     5/15/2019 Biopsy    Final Diagnosis  A. Prostate, right lateral base, core needle biopsy: Benign prostatic stroma. No malignancy is identified.     B. Prostate, right medial base, core needle biopsy:             - Prostatic adenocarcinoma, [place] score 4 + 5 = 9, Prognostic Grade Group V, involving nearly 100% of one core biopsy.     C. Prostate, right lateral mid, core needle biopsy:             - Prostatic adenocarcinoma, [place] score 5 + 4 = 9, Prognostic Grade Group V, involving nearly 100% one core biopsy.     D. Prostate, right medial mid, core needle biopsy:             - Prostatic adenocarcinoma, Elder score 4 + 5 = 9, Prognostic Grade Group V, involving nearly 100% of one core biopsy.     E. Prostate, right lateral apex, core needle biopsy:No malignancy is identified.     F. Prostate, right medial apex, core needle biopsy:             - Prostatic adenocarcinoma, [place] score 5 + 4 = 9, Prognostic Grade Group V, involving nearly 100% of one core biopsy.     G. Prostate, left lateral base, core needle biopsy:- Atypical prostate glands, indeterminate for prostatic adenocarcinoma.     H. Prostate, left medial base, core needle biopsy:             - Prostatic adenocarcinoma, Elder score 5 + 4 =9,  Prognostic Grade Group V, involving 15% of one core biopsy.     I. Prostate, left lateral mid, core needle biopsy: Benign prostate glands. No malignancy is identified.  J. Prostate, left medial mid, core needle biopsy: Benign prostate glands. No malignancy is identified.     K. Prostate, left lateral apex, core needle biopsy:             - Prostatic adenocarcinoma, Elder score 5 + 4 = 9, Prognostic Grade Group V, involving 30% of one of 2 cores.     L. Prostate, left medial apex, core needle biopsy:             - Prostatic adenocarcinoma, [place] score 5 + 4 =  9, Prognostic Grade Group V, discontinuously involving approximately 50% of 1 core biopsy.         6/20/2019 -  Cancer Staged    Staging form: Prostate, AJCC 8th Edition  - Clinical: Stage JAIRO (cT2b, cN1, cM0, PSA: 22, Grade Group: 5) - Signed by Erik Elliott MD on 6/20/2019  Prostate specific antigen (PSA) range: 20 or greater  Histologic grading system: 5 grade system       6/28/2019 - 6/28/2019 Hormone Therapy    Firmagon     8/1/2019 - 5/10/2021 Hormone Therapy    Lupron for 2 years     8/19/2019 - 10/18/2019 Radiation    4500 centigray in 25 fractions to the prostate, seminal vesicles and regional pelvic lymphatics followed by an additional 1440 centigray in 8 fractions to the prostate, seminal vesicles, and gross lymphadenopathy with the final 11 fractions of an additional 1980 centigray to the prostate and proximal seminal vesicles for a total dose of 7920 centigray in 44 fractions.          HISTORY OF PRESENT ILLNESS:  Galen Carson is a 72yo male with hx of prostatic adenocarcinoma who was referred to our service for further evaluation and management secondary to elevation in PSA. The patient was initially diagnosed with prostatic adenocarcinoma, [place] score 4 + 5 = 9 in 5/2019 (Stage JAIRO T2b N1 M0 PSA 22 grade group 5). He underwent definitive radiation therapy with neoadjuvant and concurrent androgen deprivation. Completed RT on 10/18/19.  Final Lupron was given 2/10/21. PSA has been trending up as follows:              Component  Ref Range & Units 10/10/23  9:04 AM 8/24/23 10:26 AM 5/4/23 10:12 AM 3/23/23 10:01 AM 8/25/22  9:10 AM 4/28/22  9:15 AM 2/22/22  9:57 AM   PSA, Diagnostic  0.00 - 4.00 ng/mL 3.55 2.25 CM 1.5 R, CM 1.4 R, CM 0.9 R, CM 0.6 R, CM 0.4 R, CM      PSA Date PSA Value    2/22/2022 0.4 ng/ml   4/28/2022 0.6 ng/ml   8/25/2022 0.9 ng/ml   3/23/2023 1.4 ng/ml   5/4/2023 1.5 ng/ml   8/24/2023 2.25 ng/ml   10/10/2023 3.55 ng/ml   Doubling Time 7.2 months  [place] Log (PSA) 0.1  Velocity 0.1 ng/mL/mo  Years  Doubling Time 0.6 years  [place] Log (PSA) 1.1  Velocity 1.5 ng/mL/yr  On 9/25/23 the patient underwent PSMA PET/CT which showed heterogeneous multifocal fairly intense tracer activity involving the right greater than left prostate gland highly suspicious for locally recurrent intraprostatic PSMA positive malignancy. No focal tracer activity characteristic of extraprostatic PSMA positive metastatic disease. There was a minimal tracer activity in the region of a suspected tiny nonpathologic in size right common iliac lymph node which had slightly increased in size since 6/13/2019 --early developing alisha metastatic disease in this location could not be excluded.  On 10/13/23 the patient underwent MRI prostate-- tumor in the anterior and posterior right peripheral zone apex and mid gland and right transition zone at apex corresponding to the PSMA avid lesion on prior PET/CT. The lesion broadly abuts the capsule without visualized gross extraprostatic extension. No seminal vesicle invasion, pelvic lymphadenopathy, or pelvic osseous metastatic disease. Calculated prostate volume of  29    MRI fusion biopsy was cancelled due to anaesthesia concerns since the patient has a vest and significant cardiac issues--non-ischemic cardiomyopathy,Atrial fibrillation,Junctional tachycardia, NSVT, PSVT, PVCs. He will have a repeat Echo and cardiology  follow-up next week when it will be decided if he would need a defibrillator.    INTERIM HISTORY:  The patient presents for a follow-up. He will undergo ICD placement this Thursday. Still having residual hot flashes --controlled with primrose oil.     PAST MEDICAL,PAST SURGICAL, FAMILY AND SOCIAL HISTORY:    Patient Active Problem List   Diagnosis    Mixed hyperlipidemia    IFG (impaired fasting glucose)    Upper respiratory tract infection    Positive colorectal cancer screening using Cologuard test    Prostate cancer (HCC)    Encounter for monitoring androgen deprivation therapy    Hot flashes    Type 2 diabetes mellitus with complication, without long-term current use of insulin (HCC)    Stage 3a chronic kidney disease (HCC)    Essential hypertension    Dilated cardiomyopathy (HCC)    Paroxysmal atrial fibrillation (HCC)    Obesity     Past Medical History:   Diagnosis Date    Anesthesia     pt wears a life vest (11/16).  should not be scheduled at [facility] until heart condition is stabilized    BPH (benign prostatic hypertrophy)     Cancer (HCC)     Diabetes mellitus (HCC)     Hyperlipidemia     Hypertension     Irregular heart beat     PAF    Prostate cancer (HCC)      Past Surgical History:   Procedure Laterality Date    CARDIAC CATHETERIZATION N/A 10/4/2023    Procedure: Cardiac Coronary Angiogram;  Surgeon: Chris Lovell MD;  Location: [facility];  Service: Cardiology    CARDIAC CATHETERIZATION N/A 10/4/2023    Procedure: Cardiac RHC/LHC;  Surgeon: Chris Lovell MD;  Location: [facility];  Service: Cardiology    CARDIAC CATHETERIZATION  10/4/2023    Procedure: Cardiac catheterization;  Surgeon: Chris Lovell MD;  Location: [facility];  Service: Cardiology    EAR SURGERY      HERNIA REPAIR      AR COLONOSCOPY FLX DX W/COLLJ SPEC WHEN PFRMD N/A 5/6/2019    Procedure: COLONOSCOPY;  Surgeon: Winston Nielson III, MD;  Location: [facility];  Service: Gastroenterology      Family History   Problem Relation Age of Onset    Stroke Father     No Known Problems Mother     Prostate cancer Brother     Arthritis Brother     No Known Problems Sister     Diabetes Sister     Diabetes Sister     No Known Problems Son     No Known Problems Daughter      Social History     Socioeconomic History    Marital status:      Spouse name: Not on file    Number of children: Not on file    Years of education: Not on file    Highest education level: Not on file   Occupational History    Not on file   Tobacco Use    Smoking status: Former     Current packs/day: 0.00     Average packs/day: 1 pack/day for 25.0 years (25.0 ttl pk-yrs)     Types: Cigarettes, Pipe, Cigars     Start date: 1980     Quit date: 2005     Years since quittin.8     Passive exposure: Past    Smokeless tobacco: Never   Vaping Use    Vaping status: Never Used   Substance and Sexual Activity    Alcohol use: Not Currently    Drug use: Not Currently    Sexual activity: Not Currently   Other Topics Concern    Not on file   Social History Narrative    Not on file     Social Determinants of Health     Financial Resource Strain: Unknown (10/18/2022)    Overall Financial Resource Strain (CARDIA)     Difficulty of Paying Living Expenses: Patient declined   Food Insecurity: Not on file   Transportation Needs: Unknown (10/18/2022)    PRAPARE - Transportation     Lack of Transportation (Medical): Patient declined     Lack of Transportation (Non-Medical): Patient declined   Physical Activity: Not on file   Stress: Not on file   Social Connections: Not on file   Intimate Partner Violence: Not on file   Housing Stability: Not on file       Current Outpatient Medications:     Ascorbic Acid (vitamin C) 1000 MG tablet, Take 1,000 mg by mouth daily, Disp: , Rfl:     aspirin 81 mg chewable tablet, Chew 1 tablet (81 mg total) daily, Disp: 30 tablet, Rfl: 3    Calcium Carbonate (CALCIUM 600 PO), Take by mouth daily, Disp: , Rfl:      cetirizine (ZyrTEC) 5 MG tablet, Take 5 mg by mouth daily, Disp: , Rfl:     EVENING PRIMROSE OIL PO, Take by mouth 3 (three) times a day, Disp: , Rfl:     furosemide (LASIX) 20 mg tablet, Take 1 tablet (20 mg total) by mouth daily, Disp: 30 tablet, Rfl: 5    lisinopril (ZESTRIL) 5 mg tablet, Take 1 tablet (5 mg total) by mouth daily, Disp: 90 tablet, Rfl: 1    metFORMIN (GLUCOPHAGE) 850 mg tablet, Take 1 tablet (850 mg total) by mouth 2 (two) times a day with meals, Disp: 180 tablet, Rfl: 0    multivitamin (THERAGRAN) TABS, Take 1 tablet by mouth daily, Disp: , Rfl:     rosuvastatin (CRESTOR) 10 MG tablet, Take 1 tablet (10 mg total) by mouth daily, Disp: 90 tablet, Rfl: 1    VITAMIN D, ERGOCALCIFEROL, PO, Take by mouth, Disp: , Rfl:     vitamin E, tocopherol, 400 units capsule, Take 400 Units by mouth daily, Disp: , Rfl:     warfarin (Coumadin) 5 mg tablet, Take one tablet daily or as directed, Disp: 90 tablet, Rfl: 3    Blood Glucose Monitoring Suppl (ONETOUCH VERIO) w/Device KIT, by Does not apply route once for 1 dose Test sugar in the morning (Patient not taking: Reported on 9/19/2023), Disp: 1 kit, Rfl: 0  Allergies   Allergen Reactions    Penicillin V Other (See Comments)     As a child      Vitals:    01/02/24 0843   BP: 130/80   Pulse: 68   Resp: 16   Temp: 98.4 °F (36.9 °C)   SpO2: 95%       ROS:  CONSTITUTIONAL:  No fever. No chills. No dizziness. No weakness.  EYES:  No pain, erythema, or discharge. No blurring of vision.  ENT:  No sore throat, URI symptoms. No epistaxis. No tinnitus.  CARDIOVASCULAR:  No chest pain. No palpitations. No lower extremity edema.  RESPIRATORY:  No shortness of breath, cough, pain with respiration, pleuritic chest pain. No hemoptysis. No dyspnea. No paroxysmal nocturnal dyspnea.  GASTROINTESTINAL:  Normal appetite. No nausea, vomiting, diarrhea. No pain. No bloating. No melena.  GENITOURINARY:  No frequency, urgency, nocturia. No hematuria or dysuria.  MUSCULOSKELETAL:  No  "arthralgias or myalgias.  INTEGUMENTARY:  No swelling. No bruising. No contusions. No abrasions. No lymphangitis.  NEUROLOGIC:  No headache. No neck pain. No numbness or tingling of the extremities. No weakness.  PSYCHIATRIC:  No confusion.  ENDOCRINE:  No fatigue. No weakness. No history of thyroid, diabetes or adrenal problems.  HEMATOLOGICAL:  No bleeding. No petechiae. No bruising.    PHYSICAL EXAM:    GENERAL: AAO x 3  HEENT: AT,NC  CVS: S1S2 RRR  LUNGS: CTA b/l  ABD: NT,ND, +BS  EXTR: no edema  NEURO: CN II-XII grossly intact    LABS:  I have reviewed pertinent labs:  CBC:   Lab Results   Component Value Date    WBC 6.29 11/08/2023    RBC 4.72 11/08/2023    HGB 13.9 11/08/2023    HCT 42.3 11/08/2023    MCV 90 11/08/2023     11/08/2023    MCH 29.4 11/08/2023    MCHC 32.9 11/08/2023    RDW 13.1 11/08/2023    MPV 10.8 11/08/2023    NEUTROABS 3.58 11/08/2023     CMP:   Lab Results   Component Value Date    SODIUM 138 11/08/2023    K 5.3 11/08/2023     11/08/2023    CO2 27 11/08/2023    AGAP 8 11/08/2023    BUN 37 (H) 11/08/2023    CREATININE 1.17 11/08/2023    GLUF 145 (H) 11/08/2023    CALCIUM 9.8 11/08/2023    AST 19 11/08/2023    ALT 20 11/08/2023    ALKPHOS 62 11/08/2023    TP 7.3 11/08/2023    ALB 4.6 11/08/2023    TBILI 0.42 11/08/2023    EGFR 61 11/08/2023     Liver Enzymes:   Lab Results   Component Value Date    AST 19 11/08/2023    ALT 20 11/08/2023    ALKPHOS 62 11/08/2023    TP 7.3 11/08/2023    ALB 4.6 11/08/2023    TBILI 0.42 11/08/2023     Vitamin B12 No results found for: \"TYLLKTZM89\"  Iron Study No results found for: \"RETIC\", \"RETICCTPCT\", \"FERRITIN\", \"CONCFE\", \"TIBC\", \"PRIMIDONE\", \"FOLATEHEMO\", \"IRON\"  Folate No results found for: \"FOLATE\"  Magnesium No results found for: \"MG\"  Phosphorus No results found for: \"PHOS\"  Coagulation Panel   Lab Results   Component Value Date    PROTIME 31.3 (H) 12/27/2023    INR 3.11 (H) 12/27/2023     IMAGING:  MRI cardiac  w wo contrast    Result " Date: 11/3/2023  Narrative: Cardiac MRI with and without contrast INDICATION: I42.0: Dilated cardiomyopathy. Technique: 1. 3 plane SSFP localizers. 2. SSFP cine imaging in long and short axis plane. 3. T2 weighted DIR FSE in short axis plane. 4. 20 ml gadobutrol power injected. 5. 2D inversion recovery FGRE for delayed myocardial enhancement. 6. The patient tolerated the procedure well without complication. Measurements: Bradley-septal wall 9 mm Postero-lateral wall 8 mm Left ventricular end-diastolic dimension 59 mm Left ventricular end-systolic dimension 50 mm Ejection fraction approximately 30% by visual estimate as artifact from ectopy renders postprocessing software unreliable Left atrium 40 mm Aortic Root 35 mm Findings: 1. Mildly dilated left ventricle with severely reduced systolic function. Normal LV wall thickness. Global hypokinesis. Artifact from ectopy limits evaluation for regional wall motion abnormality. 2. Normal right ventricular size with mildly reduced systolic function, EF approximately 35 to 40% by visual estimate. 3. The aortic, mitral, and tricuspid valves open without restriction.  There is no significant valvular regurgitation, however cine MRI is inaccurate in the qualitative assessment of valvular regurgitation. 4. The left atrium is top normal in size. The aortic root is top normal in size. 5. There is no evidence of myocardial edema. 6. Delayed post-gadolinium imaging demonstrates a thin stripe of mid wall hyperenhancement in the basal to mid interventricular septum and a focus of mid wall hyperenhancement at the inferior RV insertion point.     Impression: Impression: 1. Mildly dilated left ventricle with severely reduced systolic function, EF approximately 30% by visual estimate. 2. Normal right ventricular size with mildly reduced systolic function, EF approximately 35 to 40% by visual estimate. 3. Top normal left atrium. Top normal aortic root. 4. Nonspecific fibrosis in the  interventricular septum and at the inferior RV insertion point, which can be seen with dilated cardiomyopathy. Workstation performed: [id number]     I reviewed the above laboratory and imaging data.    ASSESSMENT/PLAN:  Prostatic adenocarcinoma, [place] score 4 + 5 = 9. Initial Dx 5/2019, Stage JAIRO T2b N1 M0 PSA 22 grade group 5. S/p definitive radiation therapy with neoadjuvant and concurrent androgen deprivation. Completed RT on 10/18/19. Final Lupron was given 2/10/21.    During work-up for elevated PSA the patient underwent MRI prostate-- tumor in the anterior and posterior right peripheral zone apex and mid gland and right transition zone at apex corresponding to the PSMA avid lesion on prior PET/CT. The lesion broadly abuts the capsule without visualized gross extraprostatic extension. No seminal vesicle invasion, pelvic lymphadenopathy, or pelvic osseous metastatic disease. MRI fusion biopsy was cancelled due to anaesthesia concerns since the patient has a vest and significant cardiac issues--non-ischemic cardiomyopathy,Atrial fibrillation,Junctional tachycardia, NSVT, PSVT, PVCs. Scheduled for ICD placement on 1/4/23.  PSA on 12/6/23-- elevated at 5.11. Free testosterone 15, total testosterone 282. Scheduled for ICD placement this week. F/u in 2 weeks for treatment planning. The patient should be considered for focal cryotherapy by urology.

## 2024-01-04 ENCOUNTER — HOSPITAL ENCOUNTER (OUTPATIENT)
Facility: HOSPITAL | Age: 74
Setting detail: OUTPATIENT SURGERY
Discharge: HOME | End: 2024-01-04
Attending: STUDENT IN AN ORGANIZED HEALTH CARE EDUCATION/TRAINING PROGRAM | Admitting: STUDENT IN AN ORGANIZED HEALTH CARE EDUCATION/TRAINING PROGRAM
Payer: MEDICARE

## 2024-01-04 ENCOUNTER — ANESTHESIA EVENT (OUTPATIENT)
Dept: NON INVASIVE DIAGNOSTICS | Facility: HOSPITAL | Age: 74
End: 2024-01-04
Payer: MEDICARE

## 2024-01-04 ENCOUNTER — APPOINTMENT (OUTPATIENT)
Dept: RADIOLOGY | Facility: HOSPITAL | Age: 74
End: 2024-01-04
Payer: MEDICARE

## 2024-01-04 ENCOUNTER — ANESTHESIA (OUTPATIENT)
Dept: NON INVASIVE DIAGNOSTICS | Facility: HOSPITAL | Age: 74
End: 2024-01-04
Payer: MEDICARE

## 2024-01-04 VITALS
OXYGEN SATURATION: 94 % | DIASTOLIC BLOOD PRESSURE: 55 MMHG | WEIGHT: 236 LBS | TEMPERATURE: 97.6 F | BODY MASS INDEX: 33.79 KG/M2 | RESPIRATION RATE: 18 BRPM | SYSTOLIC BLOOD PRESSURE: 104 MMHG | HEIGHT: 70 IN | HEART RATE: 78 BPM

## 2024-01-04 DIAGNOSIS — I48.0 PAROXYSMAL ATRIAL FIBRILLATION (HCC): ICD-10-CM

## 2024-01-04 LAB
ANION GAP SERPL CALCULATED.3IONS-SCNC: 8 MMOL/L
ATRIAL RATE: 35 BPM
ATRIAL RATE: 98 BPM
BUN SERPL-MCNC: 56 MG/DL (ref 5–25)
CALCIUM SERPL-MCNC: 9.3 MG/DL (ref 8.4–10.2)
CHLORIDE SERPL-SCNC: 108 MMOL/L (ref 96–108)
CO2 SERPL-SCNC: 25 MMOL/L (ref 21–32)
CREAT SERPL-MCNC: 1.53 MG/DL (ref 0.6–1.3)
ERYTHROCYTE [DISTWIDTH] IN BLOOD BY AUTOMATED COUNT: 13.8 % (ref 11.6–15.1)
GFR SERPL CREATININE-BSD FRML MDRD: 44 ML/MIN/1.73SQ M
GLUCOSE P FAST SERPL-MCNC: 138 MG/DL (ref 65–99)
GLUCOSE SERPL-MCNC: 138 MG/DL (ref 65–140)
HCT VFR BLD AUTO: 41.3 % (ref 36.5–49.3)
HGB BLD-MCNC: 13.4 G/DL (ref 12–17)
INR PPP: 2.33 (ref 0.84–1.19)
MCH RBC QN AUTO: 28.7 PG (ref 26.8–34.3)
MCHC RBC AUTO-ENTMCNC: 32.4 G/DL (ref 31.4–37.4)
MCV RBC AUTO: 88 FL (ref 82–98)
P AXIS: 64 DEGREES
P AXIS: 71 DEGREES
PLATELET # BLD AUTO: 191 THOUSANDS/UL (ref 149–390)
PMV BLD AUTO: 10.1 FL (ref 8.9–12.7)
POTASSIUM SERPL-SCNC: 5.5 MMOL/L (ref 3.5–5.3)
PR INTERVAL: 168 MS
PR INTERVAL: 176 MS
PROTHROMBIN TIME: 25.1 SECONDS (ref 11.6–14.5)
QRS AXIS: -28 DEGREES
QRS AXIS: 81 DEGREES
QRSD INTERVAL: 126 MS
QRSD INTERVAL: 132 MS
QT INTERVAL: 384 MS
QT INTERVAL: 424 MS
QTC INTERVAL: 490 MS
QTC INTERVAL: 513 MS
RBC # BLD AUTO: 4.67 MILLION/UL (ref 3.88–5.62)
SODIUM SERPL-SCNC: 141 MMOL/L (ref 135–147)
T WAVE AXIS: 270 DEGREES
T WAVE AXIS: 96 DEGREES
VENTRICULAR RATE: 88 BPM
VENTRICULAR RATE: 98 BPM
WBC # BLD AUTO: 6.47 THOUSAND/UL (ref 4.31–10.16)

## 2024-01-04 PROCEDURE — 85027 COMPLETE CBC AUTOMATED: CPT | Performed by: PHYSICIAN ASSISTANT

## 2024-01-04 PROCEDURE — C1892 INTRO/SHEATH,FIXED,PEEL-AWAY: HCPCS | Performed by: STUDENT IN AN ORGANIZED HEALTH CARE EDUCATION/TRAINING PROGRAM

## 2024-01-04 PROCEDURE — 93010 ELECTROCARDIOGRAM REPORT: CPT | Performed by: INTERNAL MEDICINE

## 2024-01-04 PROCEDURE — 71045 X-RAY EXAM CHEST 1 VIEW: CPT

## 2024-01-04 PROCEDURE — C1898 LEAD, PMKR, OTHER THAN TRANS: HCPCS | Performed by: STUDENT IN AN ORGANIZED HEALTH CARE EDUCATION/TRAINING PROGRAM

## 2024-01-04 PROCEDURE — NC001 PR NO CHARGE: Performed by: STUDENT IN AN ORGANIZED HEALTH CARE EDUCATION/TRAINING PROGRAM

## 2024-01-04 PROCEDURE — 33249 INSJ/RPLCMT DEFIB W/LEAD(S): CPT | Performed by: STUDENT IN AN ORGANIZED HEALTH CARE EDUCATION/TRAINING PROGRAM

## 2024-01-04 PROCEDURE — C1777 LEAD, AICD, ENDO SINGLE COIL: HCPCS | Performed by: STUDENT IN AN ORGANIZED HEALTH CARE EDUCATION/TRAINING PROGRAM

## 2024-01-04 PROCEDURE — 93005 ELECTROCARDIOGRAM TRACING: CPT

## 2024-01-04 PROCEDURE — 85610 PROTHROMBIN TIME: CPT | Performed by: PHYSICIAN ASSISTANT

## 2024-01-04 PROCEDURE — 80048 BASIC METABOLIC PNL TOTAL CA: CPT | Performed by: PHYSICIAN ASSISTANT

## 2024-01-04 PROCEDURE — C1721 AICD, DUAL CHAMBER: HCPCS | Performed by: STUDENT IN AN ORGANIZED HEALTH CARE EDUCATION/TRAINING PROGRAM

## 2024-01-04 DEVICE — LEAD 6935M62 QUATTRO SECURE S MRI US
Type: IMPLANTABLE DEVICE | Site: HEART | Status: FUNCTIONAL
Brand: SPRINT QUATTRO SECURE S MRI™ SURESCAN™

## 2024-01-04 DEVICE — LEAD 5076-52 MRI US RCMCRD
Type: IMPLANTABLE DEVICE | Site: HEART | Status: FUNCTIONAL
Brand: CAPSUREFIX NOVUS MRI™ SURESCAN®

## 2024-01-04 DEVICE — ENVELOPE CMRM6133 ABSORB LRG MR
Type: IMPLANTABLE DEVICE | Site: CHEST  WALL | Status: FUNCTIONAL
Brand: TYRX™

## 2024-01-04 DEVICE — ICD DDPA2D4 COBALT XT DR MRI DF4 USA
Type: IMPLANTABLE DEVICE | Site: CHEST  WALL | Status: FUNCTIONAL
Brand: COBALT™ XT DR MRI SURESCAN™

## 2024-01-04 RX ORDER — LIDOCAINE HYDROCHLORIDE 10 MG/ML
INJECTION, SOLUTION EPIDURAL; INFILTRATION; INTRACAUDAL; PERINEURAL CODE/TRAUMA/SEDATION MEDICATION
Status: DISCONTINUED | OUTPATIENT
Start: 2024-01-04 | End: 2024-01-04 | Stop reason: HOSPADM

## 2024-01-04 RX ORDER — FENTANYL CITRATE 50 UG/ML
INJECTION, SOLUTION INTRAMUSCULAR; INTRAVENOUS AS NEEDED
Status: DISCONTINUED | OUTPATIENT
Start: 2024-01-04 | End: 2024-01-04

## 2024-01-04 RX ORDER — SODIUM CHLORIDE 9 MG/ML
20 INJECTION, SOLUTION INTRAVENOUS CONTINUOUS
Status: DISCONTINUED | OUTPATIENT
Start: 2024-01-04 | End: 2024-01-04 | Stop reason: HOSPADM

## 2024-01-04 RX ORDER — FUROSEMIDE 10 MG/ML
20 INJECTION INTRAMUSCULAR; INTRAVENOUS ONCE
Status: COMPLETED | OUTPATIENT
Start: 2024-01-04 | End: 2024-01-04

## 2024-01-04 RX ORDER — GENTAMICIN SULFATE 40 MG/ML
INJECTION, SOLUTION INTRAMUSCULAR; INTRAVENOUS CODE/TRAUMA/SEDATION MEDICATION
Status: DISCONTINUED | OUTPATIENT
Start: 2024-01-04 | End: 2024-01-04 | Stop reason: HOSPADM

## 2024-01-04 RX ORDER — ACETAMINOPHEN 325 MG/1
650 TABLET ORAL EVERY 4 HOURS PRN
Status: DISCONTINUED | OUTPATIENT
Start: 2024-01-04 | End: 2024-01-04 | Stop reason: HOSPADM

## 2024-01-04 RX ORDER — MIDAZOLAM HYDROCHLORIDE 2 MG/2ML
INJECTION, SOLUTION INTRAMUSCULAR; INTRAVENOUS AS NEEDED
Status: DISCONTINUED | OUTPATIENT
Start: 2024-01-04 | End: 2024-01-04

## 2024-01-04 RX ORDER — PROPOFOL 10 MG/ML
INJECTION, EMULSION INTRAVENOUS AS NEEDED
Status: DISCONTINUED | OUTPATIENT
Start: 2024-01-04 | End: 2024-01-04

## 2024-01-04 RX ADMIN — SODIUM CHLORIDE: 0.9 INJECTION, SOLUTION INTRAVENOUS at 08:49

## 2024-01-04 RX ADMIN — MIDAZOLAM 1 MG: 1 INJECTION INTRAMUSCULAR; INTRAVENOUS at 09:10

## 2024-01-04 RX ADMIN — EPINEPHRINE 2 MCG/MIN: 1 INJECTION, SOLUTION, CONCENTRATE INTRAVENOUS at 09:22

## 2024-01-04 RX ADMIN — MIDAZOLAM 1 MG: 1 INJECTION INTRAMUSCULAR; INTRAVENOUS at 09:02

## 2024-01-04 RX ADMIN — FUROSEMIDE 20 MG: 10 INJECTION, SOLUTION INTRAMUSCULAR; INTRAVENOUS at 14:39

## 2024-01-04 RX ADMIN — VANCOMYCIN HYDROCHLORIDE 1500 MG: 10 INJECTION, POWDER, LYOPHILIZED, FOR SOLUTION INTRAVENOUS at 08:32

## 2024-01-04 RX ADMIN — FENTANYL CITRATE 25 MCG: 50 INJECTION INTRAMUSCULAR; INTRAVENOUS at 09:18

## 2024-01-04 RX ADMIN — PROPOFOL 20 MG: 10 INJECTION, EMULSION INTRAVENOUS at 09:21

## 2024-01-04 NOTE — Clinical Note
Prepped: left chest. Prepped with: ChloraPrep. The site was clipped. The patient was draped. Bilateral wrist restraints applied for patient safety

## 2024-01-04 NOTE — Clinical Note
Addended by: Britta Wade on: 2/14/2023 11:21 AM     Modules accepted: Orders Site (pad location): lateral thigh. Laterality: right. Grounding pad site assessment: skin integrity intact.

## 2024-01-04 NOTE — Clinical Note
The ICD [place] XT DR MRI IS1 DF4 - [id number] device was inserted. The leads were placed into the connector and visually verified to be in correct position. Injury current obtained.

## 2024-01-04 NOTE — H&P
Assessment/Plan     Nonischemic cardiomyopathy with continued reduced EF despite max tolerated GDMT  -Plan for dual-chamber ICD implant    History of bradycardia limiting up titration of beta-blocker  -Plan for dual-chamber ICD implant as above         HPI:    Galen Carson is an 73 y.o. male with a past medical history of nonischemic cardiomyopathy, bradycardia limiting up titration of beta-blocker, atrial fibrillation, PVCs, hypertension, diabetes mellitus, and pulmonary hypertension who presents today for ICD implant.  Patient has been on max tolerated GDMT for greater than 3 months most recent echocardiogram revealing no improvement in EF (EF 26%).  Given this, patient was sent for implant of ICD.  Dual-chamber will be utilized as patient has a history of atrial fibrillation as well as bradycardia which is limited up titration of beta-blocker.    Risk and benefits of implantation of ICD were discussed in detail.  The shared decision making tool was utilized and all questions were discussed with the patient.  Risks including but not limited to bleeding, bruising, hematoma, infection, lung injury, injury to the vasculature leading to the heart, perforation, lead dislodgment, stroke, heart attack, death were all discussed.  After the above discussions, the patient agreed to move forward with ICD implant.     Past Surgical History:   Procedure Laterality Date    CARDIAC CATHETERIZATION N/A 10/4/2023    Procedure: Cardiac Coronary Angiogram;  Surgeon: Chris Lovell MD;  Location: [facility];  Service: Cardiology    CARDIAC CATHETERIZATION N/A 10/4/2023    Procedure: Cardiac RHC/LHC;  Surgeon: Chris Lovell MD;  Location: [facility];  Service: Cardiology    CARDIAC CATHETERIZATION  10/4/2023    Procedure: Cardiac catheterization;  Surgeon: Chris Lovell MD;  Location: [facility];  Service: Cardiology    EAR SURGERY      HERNIA REPAIR      NJ COLONOSCOPY FLX DX W/COLLJ SPEC  WHEN PFRMD N/A 5/6/2019    Procedure: COLONOSCOPY;  Surgeon: Winston Nielson III, MD;  Location: [facility];  Service: Gastroenterology       Review of Systems  A complete review of systems was performed and was negative intended 10 systems are as listed above    Objective     There were no vitals taken for this visit.    Physical Exam  General: No acute distress.  Pleasant and appropriate.  HEENT: Normocephalic, atraumatic.  Mucous membranes moist.  Cardiovascular: Distant heart sounds. Regular rate and rhythm.  No rubs, murmurs, gallops.  Pulmonary: Clear to auscultation bilaterally without rales, rhonchi, wheezes  Abdomen: Soft, nontender, nondistended  Lower extremities: Warm, well-perfused.     Neuro: Moving all 4 extremities spontaneously.  No focal neurodeficits appreciated.  Full exam not performed.  Psychiatric: Appropriate mood and affect.  Answers all questions appropriately.      .SOC

## 2024-01-04 NOTE — DISCHARGE INSTR - AVS FIRST PAGE
Please refer to post ICD implantation discharge instructions and restrictions below and your ICD booklet/temporary card.     Keep incision dry for one week. Do not use lotions/powders/creams on incision.     Leave outer bandage in place for 1 week - it is water proof, and as long as it is fully adhered to your skin you may shower with it.  If it appears as though the bandage is coming off and/or there is any communication to the area of device incision, please then keep the whole area dry for the remaining week.  After 1 week, please remove by pulling all edges away from the center of the bandage.    No overhead reaching/pushing/pulling/lifting greater than 5-10lbs with left arm for six weeks. Please call the office if you notice redness, swelling, bleeding, or drainage from incision or if you develop fevers    Implantable Cardioverter Defibrillator      If you have any questions, please call [phone] to speak with a nurse (8:30am-4pm, or [phone] after hours). For appointments, please call [phone].    WHAT YOU SHOULD KNOW:    An implantable cardioverter defibrillator (ICD) is a small device that monitors your heart rate and rhythm. It is commonly placed inside your upper chest region. It may be used if you have a ventricular arrhythmia, which is an irregular, dangerous rhythm from the bottom chamber of your heart. Some arrhythmias may cause your heart to suddenly stop beating. An ICD can give a shock to your heart to make it start beating again, or it can give pacing therapy (also known as pain-free therapy) to return your heart to normal rhythm. It is also a pacemaker, so it will pace your heart if needed to prevent it from beating too slowly.           AFTER YOU LEAVE:      Follow up with your primary healthcare provider or cardiologist as directed: You will need to follow-up to have your ICD checked and make sure you are not having problems. Write down your questions so you remember to ask them  during your visits.    Driving: you are ok to drive 48 hours after device is implanted     Self-care:   Ask about activity: Ask if you need to avoid moving your shoulder or arm, and for how long. Ask if you should avoid lifting heavy objects. Do not play any contact sports, such as football or wrestling, until your primary healthcare provider (PHP) or cardiologist tells you it is okay. You may only be able to drive for a certain amount of time per day, or during certain hours. Ask when you can return to work.   Care for your skin over the ICD: see instructions above     When you get a shock from your ICD: A shock may feel like someone has hit you, or you may feel a thump in the chest. If someone is touching you when you get a shock, they may feel a tingling feeling. The first time you feel a shock, it may scare you. Sit or lie down and stay calm. Ask someone to stay with you if possible. Please either call your cardiologist or report to an emergency room.    Safety instructions when you have an ICD:   Carry an ID card for the ICD: This card has important information about your ICD.    Stay away from magnets or machines with electric fields: This includes MRI machines. Avoid leaning into a car engine or doing welding. These things can interfere with how your ICD works.    Tell airport security you have an ICD: You may need to be searched by hand when you go through a security gate. The security gate or handheld wand could harm your ICD.    Keep an ICD diary: Record when you get a shock and what you were doing before you got the shock. Keep track of how you felt before and after the shock, as well as how many shocks you received. Write down the day and time of each shock. Bring the diary with you when you see your PHP or cardiologist.   Carry medical alert identification: Wear medical alert jewelry or carry a card that says you have an ICD. Ask your PHP or cardiologist where to get these items.    Contact your primary  healthcare provider or cardiologist if:   You have a fever.    You feel 1 or more shocks from your ICD and feel fine afterwards.   Your feet or ankles swell.   The area around your ICD is painful or tender after surgery.   The skin around your stitches or staples is red, swollen, or draining pus or fluid.    You have chills, a cough, and feel weak or achy.    You are sad or anxious and find it hard to do your usual activities.   You have questions or concerns about your condition or care.    Seek care immediately or call 911 if:   Your stitches or staples come apart.   Blood soaks through your bandage.   You feel more than 3 shocks in a row from your ICD.   You become weak, dizzy, or faint.   You feel your heart skip beats or beat very fast or slow, but you do not feel a shock from your ICD.   You have chest pain that does not go away with rest or medicine.        © 2014 [employer]. Information is for End User's use only and may not be sold, redistributed or otherwise used for commercial purposes. All illustrations and images included in CareNotes® are the copyrighted property of [employer] or [employer].  The above information is an  only. It is not intended as medical advice for individual conditions or treatments. Talk to your doctor, nurse or pharmacist before following any medical regimen to see if it is safe and effective for you.

## 2024-01-04 NOTE — ANESTHESIA POSTPROCEDURE EVALUATION
Post-Op Assessment Note    CV Status:  Stable  Pain Score: 0    Pain management: adequate       Mental Status:  Alert and awake   Hydration Status:  Euvolemic   PONV Controlled:  Controlled   Airway Patency:  Patent     Post Op Vitals Reviewed: Yes      Staff: CRNA               BP   140/65   Temp      Pulse  88   Resp  18   SpO2   95 ra

## 2024-01-10 ENCOUNTER — ANTICOAG VISIT (OUTPATIENT)
Dept: CARDIOLOGY CLINIC | Facility: CLINIC | Age: 74
End: 2024-01-10

## 2024-01-10 ENCOUNTER — LAB (OUTPATIENT)
Dept: LAB | Facility: CLINIC | Age: 74
End: 2024-01-10
Payer: MEDICARE

## 2024-01-10 DIAGNOSIS — I48.0 PAROXYSMAL ATRIAL FIBRILLATION (HCC): ICD-10-CM

## 2024-01-10 LAB
INR PPP: 2.99 (ref 0.84–1.19)
PROTHROMBIN TIME: 30.4 SECONDS (ref 11.6–14.5)

## 2024-01-10 PROCEDURE — 85610 PROTHROMBIN TIME: CPT

## 2024-01-10 PROCEDURE — 36415 COLL VENOUS BLD VENIPUNCTURE: CPT

## 2024-01-17 ENCOUNTER — ANTICOAG VISIT (OUTPATIENT)
Dept: CARDIOLOGY CLINIC | Facility: CLINIC | Age: 74
End: 2024-01-17

## 2024-01-17 ENCOUNTER — LAB (OUTPATIENT)
Dept: LAB | Facility: CLINIC | Age: 74
End: 2024-01-17
Payer: MEDICARE

## 2024-01-17 DIAGNOSIS — I48.0 PAROXYSMAL ATRIAL FIBRILLATION (HCC): ICD-10-CM

## 2024-01-17 LAB
INR PPP: 2.39 (ref 0.84–1.19)
PROTHROMBIN TIME: 25.6 SECONDS (ref 11.6–14.5)

## 2024-01-17 PROCEDURE — 85610 PROTHROMBIN TIME: CPT

## 2024-01-17 PROCEDURE — 36415 COLL VENOUS BLD VENIPUNCTURE: CPT

## 2024-01-17 NOTE — RESULT ENCOUNTER NOTE
S/w pt. Advised to stay on same dose and retest again in 2 weeks. 
Implemented All Universal Safety Interventions:  [place] to call system. Call bell, personal items and telephone within reach. Instruct patient to call for assistance. Room bathroom lighting operational. Non-slip footwear when patient is off stretcher. Physically safe environment: no spills, clutter or unnecessary equipment. Stretcher in lowest position, wheels locked, appropriate side rails in place.

## 2024-01-19 ENCOUNTER — IN-CLINIC DEVICE VISIT (OUTPATIENT)
Dept: CARDIOLOGY CLINIC | Facility: CLINIC | Age: 74
End: 2024-01-19

## 2024-01-19 ENCOUNTER — TELEPHONE (OUTPATIENT)
Dept: HEMATOLOGY ONCOLOGY | Facility: CLINIC | Age: 74
End: 2024-01-19

## 2024-01-19 DIAGNOSIS — C61 PROSTATE CANCER (HCC): Primary | ICD-10-CM

## 2024-01-19 DIAGNOSIS — Z95.810 PRESENCE OF IMPLANTABLE CARDIOVERTER-DEFIBRILLATOR (ICD): Primary | ICD-10-CM

## 2024-01-19 PROCEDURE — 99024 POSTOP FOLLOW-UP VISIT: CPT | Performed by: INTERNAL MEDICINE

## 2024-01-19 NOTE — PROGRESS NOTES
MDT DC ICD / ACTIVE SYSTEM IS MRI CONDITIONAL   DEVICE INTERROGATED IN THE [place] OFFICE:  BATTERY VOLTAGE ADEQUATE (11.9 YR.).  AP 30.9%  6.0%.  ALL LEAD PARAMETERS WITHIN NORMAL LIMITS.  1 VT-NS EPISODE; PAT ON EGM (11 @ 197 BPM).  NO PROGRAMMING CHANGES MADE TO DEVICE PARAMETERS.  INCISION CLEAN AND DRY WITH EDGES APPROXIMATED.   WOUND CARE AND RESTRICTIONS REVIEWED WITH PATIENT.  NORMAL DEVICE FUNCTION.  RG

## 2024-01-24 ENCOUNTER — APPOINTMENT (OUTPATIENT)
Dept: LAB | Facility: CLINIC | Age: 74
End: 2024-01-24
Payer: MEDICARE

## 2024-01-24 DIAGNOSIS — C61 PROSTATE CANCER (HCC): ICD-10-CM

## 2024-01-24 LAB — PSA SERPL-MCNC: 8.48 NG/ML (ref 0–4)

## 2024-01-24 PROCEDURE — 84153 ASSAY OF PSA TOTAL: CPT

## 2024-01-24 PROCEDURE — 36415 COLL VENOUS BLD VENIPUNCTURE: CPT

## 2024-01-26 ENCOUNTER — OFFICE VISIT (OUTPATIENT)
Dept: HEMATOLOGY ONCOLOGY | Facility: CLINIC | Age: 74
End: 2024-01-26
Payer: MEDICARE

## 2024-01-26 VITALS
DIASTOLIC BLOOD PRESSURE: 78 MMHG | BODY MASS INDEX: 33.71 KG/M2 | HEIGHT: 70 IN | RESPIRATION RATE: 16 BRPM | TEMPERATURE: 97.9 F | OXYGEN SATURATION: 98 % | SYSTOLIC BLOOD PRESSURE: 118 MMHG | WEIGHT: 235.5 LBS | HEART RATE: 68 BPM

## 2024-01-26 DIAGNOSIS — N18.31 STAGE 3A CHRONIC KIDNEY DISEASE (HCC): ICD-10-CM

## 2024-01-26 DIAGNOSIS — I42.0 DILATED CARDIOMYOPATHY (HCC): ICD-10-CM

## 2024-01-26 DIAGNOSIS — C61 PROSTATE CANCER (HCC): Primary | ICD-10-CM

## 2024-01-26 PROCEDURE — 99214 OFFICE O/P EST MOD 30 MIN: CPT | Performed by: INTERNAL MEDICINE

## 2024-01-26 NOTE — PROGRESS NOTES
Hematology/Oncology Outpatient Follow- up Note  Galen Carson 73 y.o. male MRN: @ Encounter: [id number]        Date:  1/26/2024        Assessment / Plan:      Prostatic adenocarcinoma, Elder score 4 + 5 = 9. Initial Dx 5/2019, Stage JAIRO T2b N1 M0 PSA 22 grade group 5. S/p definitive radiation therapy with neoadjuvant and concurrent androgen deprivation. Completed RT on 10/18/19. Final Lupron was given 2/10/21.    During work-up for elevated PSA the patient underwent MRI prostate-- tumor in the anterior and posterior right peripheral zone apex and mid gland and right transition zone at apex corresponding to the PSMA avid lesion on prior PET/CT. The lesion broadly abuts the capsule without visualized gross extraprostatic extension. No seminal vesicle invasion, pelvic lymphadenopathy, or pelvic osseous metastatic disease. MRI fusion biopsy was cancelled due to anaesthesia concerns since the patient has a vest and significant cardiac issues--non-ischemic cardiomyopathy,Atrial fibrillation,Junctional tachycardia, NSVT, PSVT, PVCs. Scheduled for ICD placement on 1/4/23.  PSA on 12/6/23-- elevated at 5.11. Free testosterone 15, total testosterone 282. Scheduled for ICD placement this week. F/u in 2 weeks for treatment planning. The patient should be considered for focal cryotherapy by urology.   I have the patient coming back in 3 weeks.  I will try and finalize what we can offer him.  He will have to decide if he wants local intervention that he would probably need to be done down at the main campus at [facility].  we will discuss with him and finalize that decision.  By his PSMA scan he appears to be a localized recurrence still.    HPI: 73-year-old gentleman with history of prostate adenocarcinoma.  He had a rising PSA level it is risen up to 8.  He had a PSMA scan which showed abnormality in the prostatic bed.  His initial treatment was in 2019 he was noted to have Elder's grade 9 carcinoma.  He has cardiac  problems he is atrial fibs and was recently placed a device to a pacemaker to try and help that.  He no definitive evidence of disease outside the capsule.  The plan was to consider the possibility of whether he may be candidate for localized therapy such as cryotherapy.  I spoke with the patient who was not really aware of his situation.  He asked what he could do.  I explained we could offer him endocrine therapy or possible localized therapy with cryotherapy.  He asked where that would be done I explained it would probably have to be done down in the main part of [facility].  He was somewhat resistant to doing that.  However he will think about that again.  I spoke with radiation and felt he was not a candidate for any particular seeding of radiation.  I spoke with urology who will be able to see him and discuss whether he would be willing to go down to have his treatments done      Interval History:          Cancer Staging:  Cancer Staging   Prostate cancer ([facility])  Staging form: Prostate, AJCC 8th Edition  - Clinical: Stage JAIRO (cT2b, cN1, cM0, PSA: 22, Grade Group: 5) - Signed by Erik Elliott MD on 6/20/2019  Prostate specific antigen (PSA) range: 20 or greater  Histologic grading system: 5 grade system      Molecular Testing:     Previous Hematologic/ Oncologic History:    Oncology History   Prostate cancer ([facility])   5/15/2019 Initial Diagnosis    Prostate cancer ([facility])     5/15/2019 Biopsy    Final Diagnosis  A. Prostate, right lateral base, core needle biopsy: Benign prostatic stroma. No malignancy is identified.     B. Prostate, right medial base, core needle biopsy:             - Prostatic adenocarcinoma, Elder score 4 + 5 = 9, Prognostic Grade Group V, involving nearly 100% of one core biopsy.     C. Prostate, right lateral mid, core needle biopsy:             - Prostatic adenocarcinoma, [place] score 5 + 4 = 9, Prognostic Grade Group V, involving nearly 100% one core biopsy.     D. Prostate, right  medial mid, core needle biopsy:             - Prostatic adenocarcinoma, [place] score 4 + 5 = 9, Prognostic Grade Group V, involving nearly 100% of one core biopsy.     E. Prostate, right lateral apex, core needle biopsy:No malignancy is identified.     F. Prostate, right medial apex, core needle biopsy:             - Prostatic adenocarcinoma, Elder score 5 + 4 = 9, Prognostic Grade Group V, involving nearly 100% of one core biopsy.     G. Prostate, left lateral base, core needle biopsy:- Atypical prostate glands, indeterminate for prostatic adenocarcinoma.     H. Prostate, left medial base, core needle biopsy:             - Prostatic adenocarcinoma, [place] score 5 + 4 =9, Prognostic Grade Group V, involving 15% of one core biopsy.     I. Prostate, left lateral mid, core needle biopsy: Benign prostate glands. No malignancy is identified.  J. Prostate, left medial mid, core needle biopsy: Benign prostate glands. No malignancy is identified.     K. Prostate, left lateral apex, core needle biopsy:             - Prostatic adenocarcinoma, Elder score 5 + 4 = 9, Prognostic Grade Group V, involving 30% of one of 2 cores.     L. Prostate, left medial apex, core needle biopsy:             - Prostatic adenocarcinoma, Elder score 5 + 4 =  9, Prognostic Grade Group V, discontinuously involving approximately 50% of 1 core biopsy.         6/20/2019 -  Cancer Staged    Staging form: Prostate, AJCC 8th Edition  - Clinical: Stage JAIRO (cT2b, cN1, cM0, PSA: 22, Grade Group: 5) - Signed by Erik Elliott MD on 6/20/2019  Prostate specific antigen (PSA) range: 20 or greater  Histologic grading system: 5 grade system       6/28/2019 - 6/28/2019 Hormone Therapy    Firmagon     8/1/2019 - 5/10/2021 Hormone Therapy    Lupron for 2 years     8/19/2019 - 10/18/2019 Radiation    4500 centigray in 25 fractions to the prostate, seminal vesicles and regional pelvic lymphatics followed by an additional 1440 centigray in 8 fractions to the  prostate, seminal vesicles, and gross lymphadenopathy with the final 11 fractions of an additional 1980 centigray to the prostate and proximal seminal vesicles for a total dose of 7920 centigray in 44 fractions.          Current Hematologic/ Oncologic Treatment:       Cycle 1         Test Results:    Imaging: Cardiac EP device report    Result Date: 2024  Narrative: MDT DC ICD / ACTIVE SYSTEM IS MRI CONDITIONAL DEVICE INTERROGATED IN THE [place] OFFICE:  BATTERY VOLTAGE ADEQUATE (11.9 YR.).  AP 30.9%  6.0%.  ALL LEAD PARAMETERS WITHIN NORMAL LIMITS.  1 VT-NS EPISODE; PAT ON EGM (11 @ 197 BPM).  NO PROGRAMMING CHANGES MADE TO DEVICE PARAMETERS.  INCISION CLEAN AND DRY WITH EDGES APPROXIMATED.   WOUND CARE AND RESTRICTIONS REVIEWED WITH PATIENT.  NORMAL DEVICE FUNCTION.  RG     XR chest portable    Result Date: 2024  Narrative: CHEST INDICATION:   Patient s/p Pacemaker/ICD Insertion. COMPARISON: PET/CT 2023, CXR 2019. EXAM PERFORMED/VIEWS:  XR CHEST PORTABLE. FINDINGS: Mild cardiomegaly with left subclavian ICD leads in right atrium and right ventricle. No acute disease. Minimal benign left base atelectasis/scar. No effusion or pneumothorax. Upper abdomen normal. Bones normal for age.     Impression: No acute cardiopulmonary disease. ICD well-positioned with no pneumothorax. Workstation performed: [id number]     Cardiac ep lab eps/ablations    Result Date: 2024  Narrative: Images from the original result were not included. ELECTROPHYSIOLOGY OPERATIVE REPORT PATIENT NAME: Galen Carson :  1950 MRN: [id number] Date of surgery: 24 Surgeon: Leo Whalen MD Pt Location: Cath Lab PROCEDURE PERFORMED: 1)Dual chamber ICD Implantable Cardioverter Defibrillator for primary prevention Preoperative Medications: ANESTHESIA: moderate sedation provided by the anesthesia team PREOPERATIVE DIAGNOSIS: 1. Chronic Congestive Heart Failure with Systolic Dysfunction EF <35% >3 months with  on maximum tolerated optimal medical therapy including betablocker and ACE/ARB (unless contraindicated) and NYHAII symptoms 2. Bradycardia while on betablocker limiting ability to up-titrate medication NCDR ICD REGISTRY INFO Heart Failure: Yes NYHA Functional Classification: Class II. Ischemic Cardiomyopathy: No. Non-Ischemic Cardiomyopathy: Yes. Timeframe: 0 >= 3 Months. Guideline Directed Medical Therapy Maximum Dose - Yes (for 3 Months).  Ejection Fraction: 26% QRS Morphology: Narrow, Width: 110 ms Ventricular Tachycardia: No. Indications for Permanent Pacemaker - patient has bradycardia while on betablocker limiting ability to up-titrate ICD Indication: Primary Prevention. A formal shared decision making encounter has occurred with the patient using an evidence-based decision tool on ICDs prior to initial ICD implantation: Yes Generator changes only- Patients clinical condition is unchanged and the LVEF will not be assessed: N/A Syndrome(s) w/Risk of Sudden Death: No. POSTOPERATIVE DIAGNOSIS: Successful Implantable Cardioverter Defibrillator implant.  Same as Preop. Informed Consent: Risks, benefits, and alternatives discussed with patient and any family present. He understands risks, which include but are not limited to life threatening  bleeding, infection, air around lungs, blood around the heart and reoperation dislodged or malfunctioning device. Procedure Description: After informed consent was obtained, the patient was brought to the electrophysiology laboratory in the post-absorptive state. A time out was called and the patient was properly identified. The patient was pre-medicated as above. The left infraclavicular area was prepped and draped in the usual sterile fashion. After local anesthetic infiltration with 1% lidocaine, an incision was made below the left clavicle. The incision was extended down to the level of the pectoral fascia. A pocket was formed above the pectoral fascia using cautery and  blunt dissection. A fluoroscopic guided approach was done. A safe sheath introducer was advanced over a wire into the axillary vein, the wire was confirmed to be in the right atrium on flouroscopy, the wire was removed. Through the introducer the pacing lead was passed into the right heart. Under fluoroscopic guidance the right ventricular lead was positioned on the right ventricular septum. After satisfactory ventricular sensing and pacing thresholds were confirmed, the lead was sewn to the pectoral fascia/muscle using 2-0 Ethibond sutures. The right atrial lead was placed in the right atrial appendage with similar fashion using a preformed J stylet, the helix was deployed, tissue contact was confirmed and good lead parameters were seen, the Safe-sheath was removed and the lead was tied down with 2-0 Ethibond sutures to the muscle. A [employer] absorbable ICD pouch was used. The lead and pulse generator were placed in the pre-pectoral pocket and the generator was sutured in place. The pocket was then closed with 3 layers of 2-0, 3-0, 4-0 -Vicryl suture was used to close the skin. Surgical glue was then applied over the closed incision. EBL: Minimal Complications: None Contrast:10cc Findings: The patient tolerated the procedure well. Plan: Routine postoperative care, CXR Follow-up in 2 weeks with incision check and interrogation.   MRI of prostate 10/13/2023: Impression    MPRESSION:     1. Tumor in the anterior and posterior right peripheral zone apex and mid gland and right transition zone at apex corresponds to the PSMA avid lesion on prior PET/CT. The lesion broadly abuts the capsule without visualized gross extraprostatic extension.     2. No seminal vesicle invasion, pelvic lymphadenopathy, or pelvic osseous metastatic disease.     3. Calculated prostate volume of  29   cc.     Prostate gland boundaries and areas of concern for significant prostate cancer were segmented using 3D advanced post-processing on an  "independent [employer] system workstation with active physician participation.  The segmentation was performed should   MR-ultrasound fusion biopsy be required.     Workstation performed: [id number]       Labs:   Lab Results   Component Value Date    WBC 6.47 01/04/2024    HGB 13.4 01/04/2024    HCT 41.3 01/04/2024    MCV 88 01/04/2024     01/04/2024     Lab Results   Component Value Date    K 5.5 (H) 01/04/2024     01/04/2024    CO2 25 01/04/2024    BUN 56 (H) 01/04/2024    CREATININE 1.53 (H) 01/04/2024    GLUF 138 (H) 01/04/2024    CALCIUM 9.3 01/04/2024    AST 20 01/02/2024    ALT 21 01/02/2024    ALKPHOS 65 01/02/2024    EGFR 44 01/04/2024         No results found for: \"SPEP\", \"UPEP\"    Lab Results   Component Value Date    PSA 8.48 (H) 01/24/2024    PSA 5.11 (H) 12/06/2023    PSA 3.55 10/10/2023       No results found for: \"CEA\"    No results found for: \"\"    No results found for: \"AFP\"    No results found for: \"IRON\", \"TIBC\", \"FERRITIN\"    No results found for: \"XCOGHREW75\"      ROS: Review of Systems      Current Medications: Reviewed  Allergies: Reviewed  PMH/FH/SH:  Reviewed      Physical Exam:    Body surface area is 2.24 meters squared.    Wt Readings from Last 3 Encounters:   01/26/24 107 kg (235 lb 8 oz)   01/04/24 107 kg (236 lb)   01/02/24 108 kg (238 lb)        Temp Readings from Last 3 Encounters:   01/26/24 97.9 °F (36.6 °C) (Temporal)   01/04/24 97.6 °F (36.4 °C) (Temporal)   01/02/24 98.4 °F (36.9 °C) (Temporal)        BP Readings from Last 3 Encounters:   01/26/24 118/78   01/04/24 104/55   01/02/24 130/80         Pulse Readings from Last 3 Encounters:   01/26/24 68   01/04/24 78   01/02/24 68     @LASTSAO2(3)@      Physical Exam      Goals and Barriers:  Current Goal: Prolong Survival from Cancer.   Barriers: None.      Patient's Capacity to Self Care:  Patient is able to self care.    Code Status: [unfilled]  Advance Directive and Living Will:      Power of :    "

## 2024-01-31 ENCOUNTER — LAB (OUTPATIENT)
Dept: LAB | Facility: CLINIC | Age: 74
End: 2024-01-31
Payer: MEDICARE

## 2024-01-31 ENCOUNTER — ANTICOAG VISIT (OUTPATIENT)
Dept: CARDIOLOGY CLINIC | Facility: CLINIC | Age: 74
End: 2024-01-31

## 2024-01-31 DIAGNOSIS — I48.0 PAROXYSMAL ATRIAL FIBRILLATION (HCC): ICD-10-CM

## 2024-01-31 LAB
INR PPP: 2.9 (ref 0.84–1.19)
PROTHROMBIN TIME: 29.7 SECONDS (ref 11.6–14.5)

## 2024-01-31 PROCEDURE — 36415 COLL VENOUS BLD VENIPUNCTURE: CPT

## 2024-01-31 PROCEDURE — 85610 PROTHROMBIN TIME: CPT

## 2024-02-09 DIAGNOSIS — I10 ESSENTIAL HYPERTENSION: ICD-10-CM

## 2024-02-09 DIAGNOSIS — E78.2 MIXED HYPERLIPIDEMIA: ICD-10-CM

## 2024-02-09 RX ORDER — ROSUVASTATIN CALCIUM 10 MG/1
10 TABLET, COATED ORAL DAILY
Qty: 90 TABLET | Refills: 0 | Status: SHIPPED | OUTPATIENT
Start: 2024-02-09

## 2024-02-09 RX ORDER — LISINOPRIL 5 MG/1
5 TABLET ORAL DAILY
Qty: 90 TABLET | Refills: 0 | Status: SHIPPED | OUTPATIENT
Start: 2024-02-09

## 2024-02-11 ENCOUNTER — RA CDI HCC (OUTPATIENT)
Dept: OTHER | Facility: HOSPITAL | Age: 74
End: 2024-02-11

## 2024-02-14 ENCOUNTER — LAB (OUTPATIENT)
Dept: LAB | Facility: CLINIC | Age: 74
End: 2024-02-14
Payer: MEDICARE

## 2024-02-14 ENCOUNTER — ANTICOAG VISIT (OUTPATIENT)
Dept: CARDIOLOGY CLINIC | Facility: CLINIC | Age: 74
End: 2024-02-14

## 2024-02-14 DIAGNOSIS — I48.0 PAROXYSMAL ATRIAL FIBRILLATION (HCC): ICD-10-CM

## 2024-02-14 DIAGNOSIS — E11.8 TYPE 2 DIABETES MELLITUS WITH COMPLICATION, WITHOUT LONG-TERM CURRENT USE OF INSULIN (HCC): Primary | ICD-10-CM

## 2024-02-14 LAB
ALBUMIN SERPL BCP-MCNC: 4.4 G/DL (ref 3.5–5)
ALP SERPL-CCNC: 57 U/L (ref 34–104)
ALT SERPL W P-5'-P-CCNC: 19 U/L (ref 7–52)
ANION GAP SERPL CALCULATED.3IONS-SCNC: 8 MMOL/L
AST SERPL W P-5'-P-CCNC: 20 U/L (ref 13–39)
BILIRUB SERPL-MCNC: 0.56 MG/DL (ref 0.2–1)
BUN SERPL-MCNC: 34 MG/DL (ref 5–25)
CALCIUM SERPL-MCNC: 9.3 MG/DL (ref 8.4–10.2)
CHLORIDE SERPL-SCNC: 102 MMOL/L (ref 96–108)
CO2 SERPL-SCNC: 28 MMOL/L (ref 21–32)
CREAT SERPL-MCNC: 1.34 MG/DL (ref 0.6–1.3)
CREAT UR-MCNC: 74.5 MG/DL
ERYTHROCYTE [DISTWIDTH] IN BLOOD BY AUTOMATED COUNT: 13.5 % (ref 11.6–15.1)
GFR SERPL CREATININE-BSD FRML MDRD: 52 ML/MIN/1.73SQ M
GLUCOSE P FAST SERPL-MCNC: 130 MG/DL (ref 65–99)
HCT VFR BLD AUTO: 39.7 % (ref 36.5–49.3)
HGB BLD-MCNC: 12.9 G/DL (ref 12–17)
INR PPP: 1.51 (ref 0.84–1.19)
MCH RBC QN AUTO: 29.5 PG (ref 26.8–34.3)
MCHC RBC AUTO-ENTMCNC: 32.5 G/DL (ref 31.4–37.4)
MCV RBC AUTO: 91 FL (ref 82–98)
MICROALBUMIN UR-MCNC: 120.1 MG/L
MICROALBUMIN/CREAT 24H UR: 161 MG/G CREATININE (ref 0–30)
PLATELET # BLD AUTO: 185 THOUSANDS/UL (ref 149–390)
PMV BLD AUTO: 10.6 FL (ref 8.9–12.7)
POTASSIUM SERPL-SCNC: 5.8 MMOL/L (ref 3.5–5.3)
PROT SERPL-MCNC: 7 G/DL (ref 6.4–8.4)
PROTHROMBIN TIME: 18 SECONDS (ref 11.6–14.5)
RBC # BLD AUTO: 4.37 MILLION/UL (ref 3.88–5.62)
SODIUM SERPL-SCNC: 138 MMOL/L (ref 135–147)
WBC # BLD AUTO: 5.95 THOUSAND/UL (ref 4.31–10.16)

## 2024-02-14 PROCEDURE — 82570 ASSAY OF URINE CREATININE: CPT

## 2024-02-14 PROCEDURE — 80053 COMPREHEN METABOLIC PANEL: CPT

## 2024-02-14 PROCEDURE — 82043 UR ALBUMIN QUANTITATIVE: CPT

## 2024-02-14 PROCEDURE — 85027 COMPLETE CBC AUTOMATED: CPT

## 2024-02-14 PROCEDURE — 83036 HEMOGLOBIN GLYCOSYLATED A1C: CPT

## 2024-02-14 PROCEDURE — 36415 COLL VENOUS BLD VENIPUNCTURE: CPT

## 2024-02-14 PROCEDURE — 85610 PROTHROMBIN TIME: CPT

## 2024-02-14 NOTE — RESULT ENCOUNTER NOTE
S/w pt. He increased his vit k intake by eating more green leafy veggies. Made him aware that those foods cause his INR to decreas. Advised to eat those foods in moderation and be consistent with his diet. States he will cut those foods out of his diet. Advised to take 7.5mg today,  resume reg dose and retest in 2 weeks

## 2024-02-14 NOTE — PROGRESS NOTES
S/w pt. States he missed a pill. He also increased his vit k intake by eating more green leafy veggies. Made him aware that those foods cause his INR to decreas. Advised to eat those foods in moderation and be consistent with his diet. States he will cut those foods out of his diet. Advised to take 7.5mg today,  resume reg dose and retest in 2 weeks

## 2024-02-15 LAB
EST. AVERAGE GLUCOSE BLD GHB EST-MCNC: 151 MG/DL
HBA1C MFR BLD: 6.9 %

## 2024-02-19 ENCOUNTER — OFFICE VISIT (OUTPATIENT)
Dept: FAMILY MEDICINE CLINIC | Facility: CLINIC | Age: 74
End: 2024-02-19
Payer: MEDICARE

## 2024-02-19 VITALS
WEIGHT: 232 LBS | SYSTOLIC BLOOD PRESSURE: 110 MMHG | DIASTOLIC BLOOD PRESSURE: 68 MMHG | BODY MASS INDEX: 33.21 KG/M2 | HEART RATE: 87 BPM | HEIGHT: 70 IN | OXYGEN SATURATION: 95 %

## 2024-02-19 DIAGNOSIS — E11.8 TYPE 2 DIABETES MELLITUS WITH COMPLICATION, WITHOUT LONG-TERM CURRENT USE OF INSULIN (HCC): ICD-10-CM

## 2024-02-19 DIAGNOSIS — I10 ESSENTIAL HYPERTENSION: ICD-10-CM

## 2024-02-19 DIAGNOSIS — I42.0 DILATED CARDIOMYOPATHY (HCC): ICD-10-CM

## 2024-02-19 DIAGNOSIS — Z00.00 MEDICARE ANNUAL WELLNESS VISIT, SUBSEQUENT: Primary | ICD-10-CM

## 2024-02-19 DIAGNOSIS — I48.0 PAROXYSMAL ATRIAL FIBRILLATION (HCC): ICD-10-CM

## 2024-02-19 PROCEDURE — G0439 PPPS, SUBSEQ VISIT: HCPCS | Performed by: FAMILY MEDICINE

## 2024-02-19 PROCEDURE — 99214 OFFICE O/P EST MOD 30 MIN: CPT | Performed by: FAMILY MEDICINE

## 2024-02-19 NOTE — PROGRESS NOTES
Assessment and Plan:     Problem List Items Addressed This Visit       Type 2 diabetes mellitus with complication, without long-term current use of insulin (HCC)     Stable within parameters continue current medications monitoring home blood sugars when possible reviewed parameters goals of therapy  Lab Results   Component Value Date    HGBA1C 6.9 (H) 02/14/2024            Relevant Orders    Albumin / creatinine urine ratio    Comprehensive metabolic panel    Hemoglobin A1C    CBC and Platelet    TSH, 3rd generation with Free T4 reflex    Lipid Panel with Direct LDL reflex    Essential hypertension     Within parameters, continue present medications and monitoring with cardiology         Dilated cardiomyopathy (HCC)     Stable, continue care cardiology         Paroxysmal atrial fibrillation (HCC)     Other Visit Diagnoses       Medicare annual wellness visit, subsequent    -  Primary            Depression Screening and Follow-up Plan: Patient was screened for depression during today's encounter. They screened negative with a PHQ-2 score of 0.      Preventive health issues were discussed with patient, and age appropriate screening tests were ordered as noted in patient's After Visit Summary.  Personalized health advice and appropriate referrals for health education or preventive services given if needed, as noted in patient's After Visit Summary.     History of Present Illness:     Patient presents for a Medicare Wellness Visit    F/u   Prostate cancer, Rise in psa , currently managed onc / md head last psa= 9  A/fib , icd placement coumadin therapy , eliquis cost prohibitive   Dilated cardiomyopathy proximal A-fib ejection fraction is 26% continues care cardiology   Denies palpitations shortness of breath difficulty breathing negative swelling to extremities continues use of furosemide  Diabetes monitoring bs , neg hypo , polyuria, , monitoring intake , diet   Med = metformin 850 bid   Since starting lisinopril  developed a dry cough , non productive / unsure of relation   Negative chest pain palpitations shortness of breath difficulty breathing cough lesions rash           Patient Care Team:  DANITA Girard as PCP - General (Family Medicine)  Winston Nielson III, MD as Endoscopist  Kevin Burgess MD (Urology)  Angel Christianson MD (Radiation Oncology)     Review of Systems:     Review of Systems   Constitutional:  Negative for appetite change, chills, fever and unexpected weight change.   HENT:  Negative for congestion, dental problem, ear pain, hearing loss, postnasal drip, rhinorrhea, sinus pressure, sinus pain, sneezing, sore throat, tinnitus and voice change.    Eyes:  Negative for visual disturbance.   Respiratory:  Negative for apnea, cough, chest tightness and shortness of breath.    Cardiovascular:  Negative for chest pain, palpitations and leg swelling.   Gastrointestinal:  Negative for abdominal pain, blood in stool, constipation, diarrhea, nausea and vomiting.   Endocrine: Negative for cold intolerance, heat intolerance, polydipsia, polyphagia and polyuria.   Genitourinary:  Negative for decreased urine volume, difficulty urinating, dysuria, frequency and hematuria.   Musculoskeletal:  Negative for arthralgias, back pain, gait problem, joint swelling and myalgias.   Skin:  Negative for color change, rash and wound.   Allergic/Immunologic: Negative for environmental allergies and food allergies.   Neurological:  Negative for dizziness, syncope, weakness, light-headedness, numbness and headaches.   Hematological:  Negative for adenopathy. Does not bruise/bleed easily.   Psychiatric/Behavioral:  Negative for sleep disturbance and suicidal ideas. The patient is not nervous/anxious.         Problem List:     Patient Active Problem List   Diagnosis    Mixed hyperlipidemia    IFG (impaired fasting glucose)    Upper respiratory tract infection    Positive colorectal cancer screening using Cologuard test     Prostate cancer (HCC)    Encounter for monitoring androgen deprivation therapy    Hot flashes    Type 2 diabetes mellitus with complication, without long-term current use of insulin (HCC)    Stage 3a chronic kidney disease (HCC)    Essential hypertension    Dilated cardiomyopathy (HCC)    Paroxysmal atrial fibrillation (HCC)    Obesity      Past Medical and Surgical History:     Past Medical History:   Diagnosis Date    Anesthesia     pt wears a life vest (11/16).  should not be scheduled at [facility] until heart condition is stabilized    BPH (benign prostatic hypertrophy)     Cancer (HCC)     Diabetes mellitus (HCC)     Hyperlipidemia     Hypertension     Irregular heart beat     PAF    Prostate cancer (HCC)      Past Surgical History:   Procedure Laterality Date    CARDIAC CATHETERIZATION N/A 10/4/2023    Procedure: Cardiac Coronary Angiogram;  Surgeon: Chris Lovell MD;  Location: [facility];  Service: Cardiology    CARDIAC CATHETERIZATION N/A 10/4/2023    Procedure: Cardiac RHC/LHC;  Surgeon: Chris Lovell MD;  Location: [facility];  Service: Cardiology    CARDIAC CATHETERIZATION  10/4/2023    Procedure: Cardiac catheterization;  Surgeon: Chris Lovell MD;  Location: [facility];  Service: Cardiology    CARDIAC ELECTROPHYSIOLOGY PROCEDURE N/A 1/4/2024    Procedure: Cardiac icd implant, Dual Chambers ICD;  Surgeon: Leo Whalen MD;  Location:  CARDIAC CATH LAB;  Service: Cardiology    EAR SURGERY      HERNIA REPAIR      WI COLONOSCOPY FLX DX W/COLLJ SPEC WHEN PFRMD N/A 5/6/2019    Procedure: COLONOSCOPY;  Surgeon: Winston Nielson III, MD;  Location: [facility];  Service: Gastroenterology      Family History:       Continues to work at [employer] (sold ) continues to work as   Patient's shoes and socks removed.    Right Foot/Ankle   Right Foot Inspection  Skin Exam: skin normal. Skin not intact, no dry skin, no warmth, no callus, no erythema, no maceration, no  abnormal color, no pre-ulcer, no ulcer and no callus.     Toe Exam: ROM and strength within normal limits.     Vascular  The right DP pulse is 2+. The right PT pulse is 2+.     Left Foot/Ankle  Left Foot Inspection  Skin Exam: skin normal. Skin not intact, no dry skin, no warmth, no erythema, no maceration, normal color, no pre-ulcer, no ulcer and no callus.     Toe Exam: ROM and strength within normal limits.     Vascular  The left DP pulse is 2+. The left PT pulse is 2+.     Assign Risk Category  No deformity present  No loss of protective sensation  No weak pulses  Risk: 0         Family History   Problem Relation Age of Onset    Stroke Father     No Known Problems Mother     Prostate cancer Brother     Arthritis Brother     No Known Problems Sister     Diabetes Sister     Diabetes Sister     No Known Problems Son     No Known Problems Daughter       Social History:     Social History     Socioeconomic History    Marital status:      Spouse name: None    Number of children: None    Years of education: None    Highest education level: None   Occupational History    None   Tobacco Use    Smoking status: Former     Current packs/day: 0.00     Average packs/day: 1 pack/day for 25.0 years (25.0 ttl pk-yrs)     Types: Cigarettes, Pipe, Cigars     Start date: 1980     Quit date: 2005     Years since quittin.9     Passive exposure: Past    Smokeless tobacco: Never   Vaping Use    Vaping status: Never Used   Substance and Sexual Activity    Alcohol use: Not Currently    Drug use: Not Currently    Sexual activity: Not Currently   Other Topics Concern    None   Social History Narrative    None     Social Determinants of Health     Financial Resource Strain: Low Risk  (2024)    Overall Financial Resource Strain (CARDIA)     Difficulty of Paying Living Expenses: Not hard at all   Food Insecurity: Not on file   Transportation Needs: No Transportation Needs (2024)    PRAPARE - Transportation      Lack of Transportation (Medical): No     Lack of Transportation (Non-Medical): No   Physical Activity: Not on file   Stress: Not on file   Social Connections: Not on file   Intimate Partner Violence: Not on file   Housing Stability: Not on file      Medications and Allergies:     Current Outpatient Medications   Medication Sig Dispense Refill    Ascorbic Acid (vitamin C) 1000 MG tablet Take 1,000 mg by mouth daily      aspirin 81 mg chewable tablet Chew 1 tablet (81 mg total) daily 30 tablet 3    Calcium Carbonate (CALCIUM 600 PO) Take by mouth daily      cetirizine (ZyrTEC) 5 MG tablet Take 5 mg by mouth daily      EVENING PRIMROSE OIL PO Take by mouth 3 (three) times a day      furosemide (LASIX) 20 mg tablet Take 1 tablet (20 mg total) by mouth daily 30 tablet 5    metFORMIN (GLUCOPHAGE) 850 mg tablet Take 1 tablet (850 mg total) by mouth 2 (two) times a day with meals 180 tablet 0    multivitamin (THERAGRAN) TABS Take 1 tablet by mouth daily      rosuvastatin (CRESTOR) 10 MG tablet Take 1 tablet (10 mg total) by mouth daily 90 tablet 0    VITAMIN D, ERGOCALCIFEROL, PO Take by mouth      vitamin E, tocopherol, 400 units capsule Take 400 Units by mouth daily      warfarin (Coumadin) 5 mg tablet Take one tablet daily or as directed 90 tablet 3    Blood Glucose Monitoring Suppl (ONETOUCH VERIO) w/Device KIT by Does not apply route once for 1 dose Test sugar in the morning (Patient not taking: Reported on 9/19/2023) 1 kit 0     No current facility-administered medications for this visit.     Allergies   Allergen Reactions    Penicillin V Other (See Comments)     As a child       Immunizations:     Immunization History   Administered Date(s) Administered    COVID-19 PFIZER VACCINE 0.3 ML IM 04/29/2021, 05/20/2021    INFLUENZA 10/01/2018    Influenza Split High Dose Preservative Free IM 10/29/2019    Influenza, high dose seasonal 0.7 mL 10/15/2020, 10/18/2021, 10/20/2022    Pneumococcal Conjugate Vaccine 20-valent  (Pcv20), Polysace 08/16/2022    Pneumococcal Polysaccharide PPV23 03/12/2019      Health Maintenance:         Topic Date Due    Colorectal Cancer Screening  05/06/2024    Hepatitis C Screening  Completed         Topic Date Due    Influenza Vaccine (1) 09/01/2023    COVID-19 Vaccine (3 - 2023-24 season) 09/01/2023      Medicare Screening Tests and Risk Assessments:     Galen is here for his Subsequent Wellness visit. Last Medicare Wellness visit information reviewed, patient interviewed, no change since last AWV.     Health Risk Assessment:   Patient rates overall health as very good. Patient feels that their physical health rating is same. Patient is satisfied with their life. Eyesight was rated as same. Hearing was rated as same. Patient feels that their emotional and mental health rating is same. Patients states they are often angry. Patient states they are sometimes unusually tired/fatigued. Pain experienced in the last 7 days has been none. Patient states that he has experienced no weight loss or gain in last 6 months.     Depression Screening:   PHQ-2 Score: 0  PHQ-9 Score: 6      Fall Risk Screening:   In the past year, patient has experienced: no history of falling in past year      Home Safety:  Patient does not have trouble with stairs inside or outside of their home. Patient has working smoke alarms and has no working carbon monoxide detector. Home safety hazards include: none.     Nutrition:   Current diet is Regular.     Medications:   Patient is currently taking over-the-counter supplements. OTC medications include: You already know.. Patient is able to manage medications.     Activities of Daily Living (ADLs)/Instrumental Activities of Daily Living (IADLs):   Walk and transfer into and out of bed and chair?: Yes  Dress and groom yourself?: Yes    Bathe or shower yourself?: Yes    Feed yourself? Yes  Do your laundry/housekeeping?: Yes  Manage your money, pay your bills and track your expenses?:  "Yes  Make your own meals?: Yes    Do your own shopping?: Yes    ADL comments: All of the above.    Previous Hospitalizations:   Any hospitalizations or ED visits within the last 12 months?: Yes    How many hospitalizations have you had in the last year?: 1-2    Advance Care Planning:   Living will: Yes    Durable POA for healthcare: Yes    Advanced directive: Yes      Cognitive Screening:   Provider or family/friend/caregiver concerned regarding cognition?: No    PREVENTIVE SCREENINGS      Cardiovascular Screening:    General: Screening Not Indicated and History Lipid Disorder      Diabetes Screening:     General: Screening Not Indicated and History Diabetes      Colorectal Cancer Screening:     General: Screening Current      Prostate Cancer Screening:    General: History Prostate Cancer      Abdominal Aortic Aneurysm (AAA) Screening:    Risk factors include: age between 65-74 yo and tobacco use        Lung Cancer Screening:     General: Screening Not Indicated      Hepatitis C Screening:    General: Screening Current    Screening, Brief Intervention, and Referral to Treatment (SBIRT)    Screening  Typical number of drinks in a day: 0  Typical number of drinks in a week: 0  Interpretation: Low risk drinking behavior.    Single Item Drug Screening:  How often have you used an illegal drug (including marijuana) or a prescription medication for non-medical reasons in the past year? never    Single Item Drug Screen Score: 0  Interpretation: Negative screen for possible drug use disorder    No results found.     Physical Exam:     /68   Pulse 87   Ht 5' 10\" (1.778 m)   Wt 105 kg (232 lb)   SpO2 95%   BMI 33.29 kg/m²     Physical Exam  Constitutional:       General: He is not in acute distress.     Appearance: He is not ill-appearing or toxic-appearing.   HENT:      Head: Normocephalic and atraumatic.   Eyes:      Conjunctiva/sclera: Conjunctivae normal.   Neck:      Vascular: No carotid bruit. "   Cardiovascular:      Rate and Rhythm: Rhythm irregularly irregular.      Pulses: no weak pulses.           Dorsalis pedis pulses are 2+ on the right side and 2+ on the left side.        Posterior tibial pulses are 2+ on the right side and 2+ on the left side.   Musculoskeletal:      Cervical back: Normal range of motion. No rigidity.   Feet:      Right foot:      Skin integrity: No ulcer, skin breakdown, erythema, warmth, callus or dry skin.      Left foot:      Skin integrity: No ulcer, skin breakdown, erythema, warmth, callus or dry skin.   Lymphadenopathy:      Cervical: No cervical adenopathy.          DANITA Girard

## 2024-02-19 NOTE — PATIENT INSTRUCTIONS
Medicare Preventive Visit Patient Instructions  Thank you for completing your Welcome to Medicare Visit or Medicare Annual Wellness Visit today. Your next wellness visit will be due in one year (2/19/2025).  The screening/preventive services that you may require over the next 5-10 years are detailed below. Some tests may not apply to you based off risk factors and/or age. Screening tests ordered at today's visit but not completed yet may show as past due. Also, please note that scanned in results may not display below.  Preventive Screenings:  Service Recommendations Previous Testing/Comments   Colorectal Cancer Screening  Colonoscopy    Fecal Occult Blood Test (FOBT)/Fecal Immunochemical Test (FIT)  Fecal DNA/Cologuard Test  Flexible Sigmoidoscopy Age: 45-75 years old   Colonoscopy: every 10 years (May be performed more frequently if at higher risk)  OR  FOBT/FIT: every 1 year  OR  Cologuard: every 3 years  OR  Sigmoidoscopy: every 5 years  Screening may be recommended earlier than age 45 if at higher risk for colorectal cancer. Also, an individualized decision between you and your healthcare provider will decide whether screening between the ages of 76-85 would be appropriate. Colonoscopy: 05/06/2019  FOBT/FIT: Not on file  Cologuard: Not on file  Sigmoidoscopy: Not on file    Screening Current     Prostate Cancer Screening Individualized decision between patient and health care provider in men between ages of 55-69   Medicare will cover every 12 months beginning on the day after your 50th birthday PSA: 8.48 ng/mL     History Prostate Cancer     Hepatitis C Screening Once for adults born between 1945 and 1965  More frequently in patients at high risk for Hepatitis C Hep C Antibody: 08/02/2019    Screening Current   Diabetes Screening 1-2 times per year if you're at risk for diabetes or have pre-diabetes Fasting glucose: 130 mg/dL (2/14/2024)  A1C: 6.9 % (2/14/2024)  Screening Not Indicated  History Diabetes    Cholesterol Screening Once every 5 years if you don't have a lipid disorder. May order more often based on risk factors. Lipid panel: 08/08/2023  Screening Not Indicated  History Lipid Disorder      Other Preventive Screenings Covered by Medicare:  Abdominal Aortic Aneurysm (AAA) Screening: covered once if your at risk. You're considered to be at risk if you have a family history of AAA or a male between the age of 65-75 who smoking at least 100 cigarettes in your lifetime.  Lung Cancer Screening: covers low dose CT scan once per year if you meet all of the following conditions: (1) Age 55-77; (2) No signs or symptoms of lung cancer; (3) Current smoker or have quit smoking within the last 15 years; (4) You have a tobacco smoking history of at least 20 pack years (packs per day x number of years you smoked); (5) You get a written order from a healthcare provider.  Glaucoma Screening: covered annually if you're considered high risk: (1) You have diabetes OR (2) Family history of glaucoma OR (3)  aged 50 and older OR (4)  American aged 65 and older  Osteoporosis Screening: covered every 2 years if you meet one of the following conditions: (1) Have a vertebral abnormality; (2) On glucocorticoid therapy for more than 3 months; (3) Have primary hyperparathyroidism; (4) On osteoporosis medications and need to assess response to drug therapy.  HIV Screening: covered annually if you're between the age of 15-65. Also covered annually if you are younger than 15 and older than 65 with risk factors for HIV infection. For pregnant patients, it is covered up to 3 times per pregnancy.    Immunizations:  Immunization Recommendations   Influenza Vaccine Annual influenza vaccination during flu season is recommended for all persons aged >= 6 months who do not have contraindications   Pneumococcal Vaccine   * Pneumococcal conjugate vaccine = PCV13 (Prevnar 13), PCV15 (Vaxneuvance), PCV20 (Prevnar 20)  *  Pneumococcal polysaccharide vaccine = PPSV23 (Pneumovax) Adults 19-63 yo with certain risk factors or if 65+ yo  If never received any pneumonia vaccine: recommend Prevnar 20 (PCV20)  Give PCV20 if previously received 1 dose of PCV13 or PPSV23   Hepatitis B Vaccine 3 dose series if at intermediate or high risk (ex: diabetes, end stage renal disease, liver disease)   Respiratory syncytial virus (RSV) Vaccine - COVERED BY MEDICARE PART D  * RSVPreF3 (Arexvy) CDC recommends that adults 60 years of age and older may receive a single dose of RSV vaccine using shared clinical decision-making (SCDM)   Tetanus (Td) Vaccine - COST NOT COVERED BY MEDICARE PART B Following completion of primary series, a booster dose should be given every 10 years to maintain immunity against tetanus. Td may also be given as tetanus wound prophylaxis.   Tdap Vaccine - COST NOT COVERED BY MEDICARE PART B Recommended at least once for all adults. For pregnant patients, recommended with each pregnancy.   Shingles Vaccine (Shingrix) - COST NOT COVERED BY MEDICARE PART B  2 shot series recommended in those 19 years and older who have or will have weakened immune systems or those 50 years and older     Health Maintenance Due:      Topic Date Due   • Colorectal Cancer Screening  05/06/2024   • Hepatitis C Screening  Completed     Immunizations Due:      Topic Date Due   • Influenza Vaccine (1) 09/01/2023   • COVID-19 Vaccine (3 - 2023-24 season) 09/01/2023     Advance Directives   What are advance directives?  Advance directives are legal documents that state your wishes and plans for medical care. These plans are made ahead of time in case you lose your ability to make decisions for yourself. Advance directives can apply to any medical decision, such as the treatments you want, and if you want to donate organs.   What are the types of advance directives?  There are many types of advance directives, and each state has rules about how to use them. You  may choose a combination of any of the following:  Living will:  This is a written record of the treatment you want. You can also choose which treatments you do not want, which to limit, and which to stop at a certain time. This includes surgery, medicine, IV fluid, and tube feedings.   Durable power of  for healthcare (DPAHC):  This is a written record that states who you want to make healthcare choices for you when you are unable to make them for yourself. This person, called a proxy, is usually a family member or a friend. You may choose more than 1 proxy.  Do not resuscitate (DNR) order:  A DNR order is used in case your heart stops beating or you stop breathing. It is a request not to have certain forms of treatment, such as CPR. A DNR order may be included in other types of advance directives.  Medical directive:  This covers the care that you want if you are in a coma, near death, or unable to make decisions for yourself. You can list the treatments you want for each condition. Treatment may include pain medicine, surgery, blood transfusions, dialysis, IV or tube feedings, and a ventilator (breathing machine).  Values history:  This document has questions about your views, beliefs, and how you feel and think about life. This information can help others choose the care that you would choose.  Why are advance directives important?  An advance directive helps you control your care. Although spoken wishes may be used, it is better to have your wishes written down. Spoken wishes can be misunderstood, or not followed. Treatments may be given even if you do not want them. An advance directive may make it easier for your family to make difficult choices about your care.   Weight Management   Why it is important to manage your weight:  Being overweight increases your risk of health conditions such as heart disease, high blood pressure, type 2 diabetes, and certain types of cancer. It can also increase your  risk for osteoarthritis, sleep apnea, and other respiratory problems. Aim for a slow, steady weight loss. Even a small amount of weight loss can lower your risk of health problems.  How to lose weight safely:  A safe and healthy way to lose weight is to eat fewer calories and get regular exercise. You can lose up about 1 pound a week by decreasing the number of calories you eat by 500 calories each day.   Healthy meal plan for weight management:  A healthy meal plan includes a variety of foods, contains fewer calories, and helps you stay healthy. A healthy meal plan includes the following:  Eat whole-grain foods more often.  A healthy meal plan should contain fiber. Fiber is the part of grains, fruits, and vegetables that is not broken down by your body. Whole-grain foods are healthy and provide extra fiber in your diet. Some examples of whole-grain foods are whole-wheat breads and pastas, oatmeal, brown rice, and bulgur.  Eat a variety of vegetables every day.  Include dark, leafy greens such as spinach, kale, verna greens, and mustard greens. Eat yellow and orange vegetables such as carrots, sweet potatoes, and winter squash.   Eat a variety of fruits every day.  Choose fresh or canned fruit (canned in its own juice or light syrup) instead of juice. Fruit juice has very little or no fiber.  Eat low-fat dairy foods.  Drink fat-free (skim) milk or 1% milk. Eat fat-free yogurt and low-fat cottage cheese. Try low-fat cheeses such as mozzarella and other reduced-fat cheeses.  Choose meat and other protein foods that are low in fat.  Choose beans or other legumes such as split peas or lentils. Choose fish, skinless poultry (chicken or turkey), or lean cuts of red meat (beef or pork). Before you cook meat or poultry, cut off any visible fat.   Use less fat and oil.  Try baking foods instead of frying them. Add less fat, such as margarine, sour cream, regular salad dressing and mayonnaise to foods. Eat fewer high-fat  foods. Some examples of high-fat foods include french fries, doughnuts, ice cream, and cakes.  Eat fewer sweets.  Limit foods and drinks that are high in sugar. This includes candy, cookies, regular soda, and sweetened drinks.  Exercise:  Exercise at least 30 minutes per day on most days of the week. Some examples of exercise include walking, biking, dancing, and swimming. You can also fit in more physical activity by taking the stairs instead of the elevator or parking farther away from stores. Ask your healthcare provider about the best exercise plan for you.      © Copyright [employer] 2018 Information is for End User's use only and may not be sold, redistributed or otherwise used for commercial purposes. All illustrations and images included in CareNotes® are the copyrighted property of [employer] Inc[employer] or [employer]

## 2024-02-19 NOTE — ASSESSMENT & PLAN NOTE
Stable within parameters continue current medications monitoring home blood sugars when possible reviewed parameters goals of therapy  Lab Results   Component Value Date    HGBA1C 6.9 (H) 02/14/2024      98

## 2024-02-21 ENCOUNTER — OFFICE VISIT (OUTPATIENT)
Dept: HEMATOLOGY ONCOLOGY | Facility: CLINIC | Age: 74
End: 2024-02-21
Payer: MEDICARE

## 2024-02-21 VITALS
DIASTOLIC BLOOD PRESSURE: 72 MMHG | WEIGHT: 236 LBS | HEART RATE: 68 BPM | BODY MASS INDEX: 33.79 KG/M2 | SYSTOLIC BLOOD PRESSURE: 128 MMHG | HEIGHT: 70 IN | TEMPERATURE: 97.9 F | RESPIRATION RATE: 16 BRPM | OXYGEN SATURATION: 93 %

## 2024-02-21 DIAGNOSIS — I48.0 PAROXYSMAL ATRIAL FIBRILLATION (HCC): ICD-10-CM

## 2024-02-21 DIAGNOSIS — C61 PROSTATE CANCER (HCC): Primary | ICD-10-CM

## 2024-02-21 DIAGNOSIS — E11.8 TYPE 2 DIABETES MELLITUS WITH COMPLICATION, WITHOUT LONG-TERM CURRENT USE OF INSULIN (HCC): ICD-10-CM

## 2024-02-21 PROCEDURE — 99214 OFFICE O/P EST MOD 30 MIN: CPT | Performed by: INTERNAL MEDICINE

## 2024-02-22 NOTE — PROGRESS NOTES
Hematology/Oncology Outpatient Follow- up Note  Galen Carson 73 y.o. male MRN: @ Encounter: [id number]        Date:  2/22/2024        Assessment / Plan  1 prostate adenocarcinoma with rising PSA.  Initially treated with RT and ADT in 10/18/2019.  2 nonischemic cardiomyopathy with ICD placement done  3 atrial fibrillation/nonsustained SVT  Plan: Patient is to be seen by radiation therapy and by urology to see if he would be candidate for any kind of localized therapy.  He will come back here in 2 months.  If not a candidate for any localized therapy may be candidate for systemic treatment with LHRH antagonist.    HPI: 73-year-old gentleman with a history of prostate cancer 2019.  Has a rising PSA.  PSMA and MRI of the prostate suggest disease localized to the prostate alone.  He may be candidate for localized therapy.  He has cardiac issues apparently they are stable at this time.  He is followed by cardiology.  I talked to him and he was being seen by radiation therapy and will be seen by urology as well.  They will decide about whether he would be candidate for local treatment if not consideration for hormonal treatment could be had.  I believe hormonal treatment with LHRH antagonist along and watch his PSA closely.      Interval History: Note from 120 6/2024:  Prostatic adenocarcinoma, Elder score 4 + 5 = 9. Initial Dx 5/2019, Stage JAIRO T2b N1 M0 PSA 22 grade group 5. S/p definitive radiation therapy with neoadjuvant and concurrent androgen deprivation. Completed RT on 10/18/19. Final Lupron was given 2/10/21.    During work-up for elevated PSA the patient underwent MRI prostate-- tumor in the anterior and posterior right peripheral zone apex and mid gland and right transition zone at apex corresponding to the PSMA avid lesion on prior PET/CT. The lesion broadly abuts the capsule without visualized gross extraprostatic extension. No seminal vesicle invasion, pelvic lymphadenopathy, or pelvic osseous  metastatic disease. MRI fusion biopsy was cancelled due to anaesthesia concerns since the patient has a vest and significant cardiac issues--non-ischemic cardiomyopathy,Atrial fibrillation,Junctional tachycardia, NSVT, PSVT, PVCs. Scheduled for ICD placement on 1/4/23.  PSA on 12/6/23-- elevated at 5.11. Free testosterone 15, total testosterone 282. Scheduled for ICD placement this week. F/u in 2 weeks for treatment planning. The patient should be considered for focal cryotherapy by urology.   I have the patient coming back in 3 weeks.  I will try and finalize what we can offer him.  He will have to decide if he wants local intervention that he would probably need to be done down at the main campus at [facility].  we will discuss with him and finalize that decision.  By his PSMA scan he appears to be a localized recurrence still.     HPI: 73-year-old gentleman with history of prostate adenocarcinoma.  He had a rising PSA level it is risen up to 8.  He had a PSMA scan which showed abnormality in the prostatic bed.  His initial treatment was in 2019 he was noted to have Elder's grade 9 carcinoma.  He has cardiac problems he is atrial fibs and was recently placed a device to a pacemaker to try and help that.  He no definitive evidence of disease outside the capsule.  The plan was to consider the possibility of whether he may be candidate for localized therapy such as cryotherapy.  I spoke with the patient who was not really aware of his situation.  He asked what he could do.  I explained we could offer him endocrine therapy or possible localized therapy with cryotherapy.  He asked where that would be done I explained it would probably have to be done down in the main part of [facility].  He was somewhat resistant to doing that.  However he will think about that again.  I spoke with radiation and felt he was not a candidate for any particular seeding of radiation.  I spoke with urology who will be able to see him and  discuss whether he would be willing to go down to have his treatments done      Cancer Staging:  Cancer Staging   Prostate cancer (HCC)  Staging form: Prostate, AJCC 8th Edition  - Clinical: Stage JAIRO (cT2b, cN1, cM0, PSA: 22, Grade Group: 5) - Signed by Erik Elliott MD on 6/20/2019  Prostate specific antigen (PSA) range: 20 or greater  Histologic grading system: 5 grade system      Molecular Testing:     Previous Hematologic/ Oncologic History:    Oncology History   Prostate cancer (HCC)   5/15/2019 Initial Diagnosis    Prostate cancer (HCC)     5/15/2019 Biopsy    Final Diagnosis  A. Prostate, right lateral base, core needle biopsy: Benign prostatic stroma. No malignancy is identified.     B. Prostate, right medial base, core needle biopsy:             - Prostatic adenocarcinoma, Camden score 4 + 5 = 9, Prognostic Grade Group V, involving nearly 100% of one core biopsy.     C. Prostate, right lateral mid, core needle biopsy:             - Prostatic adenocarcinoma, Elder score 5 + 4 = 9, Prognostic Grade Group V, involving nearly 100% one core biopsy.     D. Prostate, right medial mid, core needle biopsy:             - Prostatic adenocarcinoma, Camden score 4 + 5 = 9, Prognostic Grade Group V, involving nearly 100% of one core biopsy.     E. Prostate, right lateral apex, core needle biopsy:No malignancy is identified.     F. Prostate, right medial apex, core needle biopsy:             - Prostatic adenocarcinoma, Elder score 5 + 4 = 9, Prognostic Grade Group V, involving nearly 100% of one core biopsy.     G. Prostate, left lateral base, core needle biopsy:- Atypical prostate glands, indeterminate for prostatic adenocarcinoma.     H. Prostate, left medial base, core needle biopsy:             - Prostatic adenocarcinoma, Camden score 5 + 4 =9, Prognostic Grade Group V, involving 15% of one core biopsy.     I. Prostate, left lateral mid, core needle biopsy: Benign prostate glands. No malignancy is  "identified.  J. Prostate, left medial mid, core needle biopsy: Benign prostate glands. No malignancy is identified.     K. Prostate, left lateral apex, core needle biopsy:             - Prostatic adenocarcinoma, Elder score 5 + 4 = 9, Prognostic Grade Group V, involving 30% of one of 2 cores.     L. Prostate, left medial apex, core needle biopsy:             - Prostatic adenocarcinoma, Elder score 5 + 4 =  9, Prognostic Grade Group V, discontinuously involving approximately 50% of 1 core biopsy.         6/20/2019 -  Cancer Staged    Staging form: Prostate, AJCC 8th Edition  - Clinical: Stage JAIRO (cT2b, cN1, cM0, PSA: 22, Grade Group: 5) - Signed by Erik Elliott MD on 6/20/2019  Prostate specific antigen (PSA) range: 20 or greater  Histologic grading system: 5 grade system       6/28/2019 - 6/28/2019 Hormone Therapy    Firmagon     8/1/2019 - 5/10/2021 Hormone Therapy    Lupron for 2 years     8/19/2019 - 10/18/2019 Radiation    4500 centigray in 25 fractions to the prostate, seminal vesicles and regional pelvic lymphatics followed by an additional 1440 centigray in 8 fractions to the prostate, seminal vesicles, and gross lymphadenopathy with the final 11 fractions of an additional 1980 centigray to the prostate and proximal seminal vesicles for a total dose of 7920 centigray in 44 fractions.          Current Hematologic/ Oncologic Treatment:       Cycle 1         Test Results:    Imaging: No results found.          Labs:   Lab Results   Component Value Date    WBC 5.95 02/14/2024    HGB 12.9 02/14/2024    HCT 39.7 02/14/2024    MCV 91 02/14/2024     02/14/2024     Lab Results   Component Value Date    K 5.8 (H) 02/14/2024     02/14/2024    CO2 28 02/14/2024    BUN 34 (H) 02/14/2024    CREATININE 1.34 (H) 02/14/2024    GLUF 130 (H) 02/14/2024    CALCIUM 9.3 02/14/2024    AST 20 02/14/2024    ALT 19 02/14/2024    ALKPHOS 57 02/14/2024    EGFR 52 02/14/2024         No results found for: \"SPEP\", " "\"UPEP\"    Lab Results   Component Value Date    PSA 8.48 (H) 01/24/2024    PSA 5.11 (H) 12/06/2023    PSA 3.55 10/10/2023       No results found for: \"CEA\"    No results found for: \"\"    No results found for: \"AFP\"    No results found for: \"IRON\", \"TIBC\", \"FERRITIN\"    No results found for: \"IEAWQIDP45\"      ROS: Review of Systems   Constitutional: Negative.    Cardiovascular: Negative.    All other systems reviewed and are negative.        Current Medications: Reviewed  Allergies: Reviewed  PMH/FH/SH:  Reviewed      Physical Exam:    Body surface area is 2.24 meters squared.    Wt Readings from Last 3 Encounters:   02/21/24 107 kg (236 lb)   02/19/24 105 kg (232 lb)   01/26/24 107 kg (235 lb 8 oz)        Temp Readings from Last 3 Encounters:   02/21/24 97.9 °F (36.6 °C) (Temporal)   01/26/24 97.9 °F (36.6 °C) (Temporal)   01/04/24 97.6 °F (36.4 °C) (Temporal)        BP Readings from Last 3 Encounters:   02/21/24 128/72   02/19/24 110/68   01/26/24 118/78         Pulse Readings from Last 3 Encounters:   02/21/24 68   02/19/24 87   01/26/24 68     @LASTSAO2(3)@      Physical Exam  Constitutional:       Appearance: Normal appearance. He is obese.   HENT:      Head: Normocephalic and atraumatic.   Eyes:      Extraocular Movements: Extraocular movements intact.      Conjunctiva/sclera: Conjunctivae normal.      Pupils: Pupils are equal, round, and reactive to light.   Cardiovascular:      Rate and Rhythm: Normal rate. Rhythm irregular.      Heart sounds: Normal heart sounds.   Pulmonary:      Effort: Pulmonary effort is normal.      Breath sounds: Normal breath sounds.   Abdominal:      General: Abdomen is flat. Bowel sounds are normal.      Palpations: Abdomen is soft.   Musculoskeletal:         General: Normal range of motion.      Cervical back: Normal range of motion and neck supple.   Skin:     General: Skin is warm and dry.   Neurological:      General: No focal deficit present.      Mental Status: He is " alert and oriented to person, place, and time. Mental status is at baseline.           Goals and Barriers:  Current Goal: Prolong Survival from Cancer.   Barriers: None.      Patient's Capacity to Self Care:  Patient is able to self care.    Code Status: [unfilled]  Advance Directive and Living Will:      Power of :

## 2024-02-28 ENCOUNTER — LAB (OUTPATIENT)
Dept: LAB | Facility: CLINIC | Age: 74
End: 2024-02-28
Payer: MEDICARE

## 2024-02-28 ENCOUNTER — ANTICOAG VISIT (OUTPATIENT)
Dept: CARDIOLOGY CLINIC | Facility: CLINIC | Age: 74
End: 2024-02-28

## 2024-02-28 DIAGNOSIS — I48.0 PAROXYSMAL ATRIAL FIBRILLATION (HCC): ICD-10-CM

## 2024-02-28 LAB
INR PPP: 2.33 (ref 0.84–1.19)
PROTHROMBIN TIME: 25.1 SECONDS (ref 11.6–14.5)

## 2024-02-28 PROCEDURE — 85610 PROTHROMBIN TIME: CPT

## 2024-02-28 PROCEDURE — 36415 COLL VENOUS BLD VENIPUNCTURE: CPT

## 2024-02-28 NOTE — ANESTHESIA PREPROCEDURE EVALUATION
Detail Level: Detailed
Procedure:  Cardiac icd implant, Dual Chambers ICD (Chest)    Relevant Problems   CARDIO   (+) Essential hypertension   (+) Mixed hyperlipidemia   (+) Paroxysmal atrial fibrillation (HCC)      ENDO   (+) Type 2 diabetes mellitus with complication, without long-term current use of insulin (HCC)      /RENAL   (+) Prostate cancer (HCC)   (+) Stage 3a chronic kidney disease (HCC)      PULMONARY   (+) Upper respiratory tract infection        Physical Exam    Airway    Mallampati score: II  TM Distance: <3 FB       Dental    upper dentures    Cardiovascular  Cardiovascular exam normal    Pulmonary  Pulmonary exam normal     Other Findings  Missing front lower; only uses top denture      Anesthesia Plan  ASA Score- 4     Anesthesia Type- IV sedation with anesthesia with ASA Monitors.         Additional Monitors:     Airway Plan:     Comment: No issues with prev anesthesia  No current cardiac/respiratory complaints or illness; works as   Dilated nonischemic cardiomyopathy with EF 26%; parox Afib; pulmonary HTN.       Plan Factors-Exercise tolerance (METS): >4 METS.    Chart reviewed. EKG reviewed. Imaging results reviewed. Existing labs reviewed.     Patient is not a current smoker.      Obstructive sleep apnea risk education given perioperatively.        Induction- intravenous.    Postoperative Plan-     Informed Consent- Anesthetic plan and risks discussed with patient.  I personally reviewed this patient with the CRNA. Discussed and agreed on the Anesthesia Plan with the CRNA..              
Detail Level: Generalized

## 2024-02-28 NOTE — PROGRESS NOTES
S/w pt. Advised to stay on same dose and retest again in 2 weeks.    · Refill requested by: Patient  · Last office visit for controlled substance: 8/18/21  · Next office visit: 2/23/22  · Medication(s) Requested: Hydrocodone-acetaminophen  · Dosage: 5-325 mg  · Sig:  Take one-HALF to 1 tablet by mouth every 6 hours as needed for Pain  Do not have access to PDMP  Can not refill without MD authorization

## 2024-03-07 ENCOUNTER — TELEPHONE (OUTPATIENT)
Dept: CARDIOLOGY CLINIC | Facility: CLINIC | Age: 74
End: 2024-03-07

## 2024-03-07 ENCOUNTER — OFFICE VISIT (OUTPATIENT)
Dept: CARDIOLOGY CLINIC | Facility: CLINIC | Age: 74
End: 2024-03-07
Payer: MEDICARE

## 2024-03-07 VITALS
HEART RATE: 83 BPM | BODY MASS INDEX: 33.21 KG/M2 | OXYGEN SATURATION: 97 % | HEIGHT: 70 IN | WEIGHT: 232 LBS | DIASTOLIC BLOOD PRESSURE: 82 MMHG | RESPIRATION RATE: 16 BRPM | SYSTOLIC BLOOD PRESSURE: 124 MMHG

## 2024-03-07 DIAGNOSIS — I48.0 PAROXYSMAL ATRIAL FIBRILLATION (HCC): ICD-10-CM

## 2024-03-07 DIAGNOSIS — I47.29 NSVT (NONSUSTAINED VENTRICULAR TACHYCARDIA) (HCC): ICD-10-CM

## 2024-03-07 DIAGNOSIS — I42.0 DILATED CARDIOMYOPATHY (HCC): ICD-10-CM

## 2024-03-07 DIAGNOSIS — I49.3 PVC'S (PREMATURE VENTRICULAR CONTRACTIONS): ICD-10-CM

## 2024-03-07 PROCEDURE — 99214 OFFICE O/P EST MOD 30 MIN: CPT | Performed by: INTERNAL MEDICINE

## 2024-03-07 RX ORDER — FUROSEMIDE 20 MG/1
40 TABLET ORAL DAILY
Qty: 30 TABLET | Refills: 5 | Status: SHIPPED | OUTPATIENT
Start: 2024-03-07

## 2024-03-07 RX ORDER — METOPROLOL SUCCINATE 25 MG/1
25 TABLET, EXTENDED RELEASE ORAL DAILY
Qty: 30 TABLET | Refills: 3 | Status: SHIPPED | OUTPATIENT
Start: 2024-03-07

## 2024-03-07 NOTE — TELEPHONE ENCOUNTER
Patient was seen today for OV with .    Patient brought in paperwork from the DM[employer] to be fill out by .    Paperwork completed.    Handed paperwork back to pt in office.

## 2024-03-07 NOTE — PROGRESS NOTES
Cardiology Follow Up    Galen Carson  1950  [id number]  [facility] [address] [address] [phone]    Discussion/Summary:  Non-ischemic cardiomyopathy  Atrial fibrillation  Junctional tachycardia, NSVT, PSVT, PVCs  Hypertension  Diabetes  Pulmonary hypertension  PVC burden of 10%    Appears mildly volume overloaded. Will increase lasix to 40 mg daily  Recheck BMP in one week  Will start low dose metoprolol    Is on coumadin for AC. Follows with coumadin clinic.    Will also start low dose metoprolol     Follow with device clinic.     Will follow up in 4 weeks    Medication indication, benefit and side effects discussed. He brock let uys know if he has any symptoms    Previous studies were reviewed.    Safety measures were reviewed.  Questions were entertained and answered.  Patient was advised to report any problems requiring medical attention.    Follow-up with PCP and appropriate specialist and lab work as discussed.    Return for follow up visit as scheduled or earlier, if needed.    Thank you for allowing me to participate in the care and evaluation of your patient.  Should you have any questions, please feel free to contact me.      History of Present Illness:     Galen Carson is a 73 y.o. male who presents for follow up for cardiovascular care.    He recently underwent ICD placement  Is feeling well. No complaints    Denies chest pain, chest pressure, shortness of breath, dizziness, lightheadedness, presyncope or syncope.  Denies orthopnea, PND or lower limb edema.      Cardiac MRI 11/23:    1. Mildly dilated left ventricle with severely reduced systolic function, EF approximately 30% by visual estimate.  2. Normal right ventricular size with mildly reduced systolic function, EF approximately 35 to 40% by visual estimate.  3. Top normal left atrium. Top normal aortic root.  4.  Nonspecific fibrosis in the interventricular septum and at the inferior RV insertion point, which can be seen with dilated cardiomyopathy.    Patient Active Problem List   Diagnosis    Mixed hyperlipidemia    IFG (impaired fasting glucose)    Upper respiratory tract infection    Positive colorectal cancer screening using Cologuard test    Prostate cancer (HCC)    Encounter for monitoring androgen deprivation therapy    Hot flashes    Type 2 diabetes mellitus with complication, without long-term current use of insulin (HCC)    Stage 3a chronic kidney disease (HCC)    Essential hypertension    Dilated cardiomyopathy (HCC)    Paroxysmal atrial fibrillation (HCC)    Obesity     Past Medical History:   Diagnosis Date    Anesthesia     pt wears a life vest ().  should not be scheduled at [facility] until heart condition is stabilized    BPH (benign prostatic hypertrophy)     Cancer (HCC)     Diabetes mellitus (HCC)     Hyperlipidemia     Hypertension     Irregular heart beat     PAF    Prostate cancer (HCC)      Social History     Socioeconomic History    Marital status:      Spouse name: Not on file    Number of children: Not on file    Years of education: Not on file    Highest education level: Not on file   Occupational History    Not on file   Tobacco Use    Smoking status: Former     Current packs/day: 0.00     Average packs/day: 1 pack/day for 25.0 years (25.0 ttl pk-yrs)     Types: Cigarettes, Pipe, Cigars     Start date: 1980     Quit date: 2005     Years since quittin.0     Passive exposure: Past    Smokeless tobacco: Never   Vaping Use    Vaping status: Never Used   Substance and Sexual Activity    Alcohol use: Not Currently    Drug use: Not Currently    Sexual activity: Not Currently   Other Topics Concern    Not on file   Social History Narrative    Not on file     Social Determinants of Health     Financial Resource Strain: Low Risk  (2024)    Overall Financial Resource Strain  (CARDIA)     Difficulty of Paying Living Expenses: Not hard at all   Food Insecurity: Not on file   Transportation Needs: No Transportation Needs (2/19/2024)    PRAPARE - Transportation     Lack of Transportation (Medical): No     Lack of Transportation (Non-Medical): No   Physical Activity: Not on file   Stress: Not on file   Social Connections: Not on file   Intimate Partner Violence: Not on file   Housing Stability: Not on file      Family History   Problem Relation Age of Onset    Stroke Father     No Known Problems Mother     Prostate cancer Brother     Arthritis Brother     No Known Problems Sister     Diabetes Sister     Diabetes Sister     No Known Problems Son     No Known Problems Daughter      Past Surgical History:   Procedure Laterality Date    CARDIAC CATHETERIZATION N/A 10/4/2023    Procedure: Cardiac Coronary Angiogram;  Surgeon: Chris Lovell MD;  Location: [facility];  Service: Cardiology    CARDIAC CATHETERIZATION N/A 10/4/2023    Procedure: Cardiac RHC/LHC;  Surgeon: Chris Lovell MD;  Location: [facility];  Service: Cardiology    CARDIAC CATHETERIZATION  10/4/2023    Procedure: Cardiac catheterization;  Surgeon: Chris Lovell MD;  Location: [facility];  Service: Cardiology    CARDIAC ELECTROPHYSIOLOGY PROCEDURE N/A 1/4/2024    Procedure: Cardiac icd implant, Dual Chambers ICD;  Surgeon: Leo Whalen MD;  Location:  CARDIAC CATH LAB;  Service: Cardiology    EAR SURGERY      HERNIA REPAIR      WI COLONOSCOPY FLX DX W/COLLJ SPEC WHEN PFRMD N/A 5/6/2019    Procedure: COLONOSCOPY;  Surgeon: Winston Nielson III, MD;  Location: [facility];  Service: Gastroenterology       Current Outpatient Medications:     Ascorbic Acid (vitamin C) 1000 MG tablet, Take 1,000 mg by mouth daily, Disp: , Rfl:     aspirin 81 mg chewable tablet, Chew 1 tablet (81 mg total) daily, Disp: 30 tablet, Rfl: 3    Blood Glucose Monitoring Suppl (ONETOUCH VERIO) w/Device KIT, by Does not  "apply route once for 1 dose Test sugar in the morning, Disp: 1 kit, Rfl: 0    Calcium Carbonate (CALCIUM 600 PO), Take by mouth daily, Disp: , Rfl:     cetirizine (ZyrTEC) 5 MG tablet, Take 5 mg by mouth daily, Disp: , Rfl:     EVENING PRIMROSE OIL PO, Take by mouth 3 (three) times a day, Disp: , Rfl:     furosemide (LASIX) 20 mg tablet, Take 1 tablet (20 mg total) by mouth daily, Disp: 30 tablet, Rfl: 5    metFORMIN (GLUCOPHAGE) 850 mg tablet, Take 1 tablet (850 mg total) by mouth 2 (two) times a day with meals, Disp: 180 tablet, Rfl: 0    multivitamin (THERAGRAN) TABS, Take 1 tablet by mouth daily, Disp: , Rfl:     rosuvastatin (CRESTOR) 10 MG tablet, Take 1 tablet (10 mg total) by mouth daily, Disp: 90 tablet, Rfl: 0    VITAMIN D, ERGOCALCIFEROL, PO, Take by mouth, Disp: , Rfl:     vitamin E, tocopherol, 400 units capsule, Take 400 Units by mouth daily, Disp: , Rfl:     warfarin (Coumadin) 5 mg tablet, Take one tablet daily or as directed, Disp: 90 tablet, Rfl: 3  Allergies   Allergen Reactions    Penicillin V Other (See Comments)     As a child        Labs:  Lab Results   Component Value Date    WBC 5.95 02/14/2024    HGB 12.9 02/14/2024    HCT 39.7 02/14/2024    MCV 91 02/14/2024     02/14/2024     Lab Results   Component Value Date    CALCIUM 9.3 02/14/2024    K 5.8 (H) 02/14/2024    CO2 28 02/14/2024     02/14/2024    BUN 34 (H) 02/14/2024    CREATININE 1.34 (H) 02/14/2024     Lab Results   Component Value Date    HGBA1C 6.9 (H) 02/14/2024     No results found for: \"CHOL\"  Lab Results   Component Value Date    HDL 41 08/08/2023    HDL 41 02/09/2023    HDL 42 04/28/2022     Lab Results   Component Value Date    LDLCALC 86 08/08/2023    LDLCALC 79 02/09/2023    LDLCALC 117 (H) 04/28/2022     Lab Results   Component Value Date    TRIG 271 (H) 08/08/2023    TRIG 270 (H) 02/09/2023    TRIG 381 (H) 04/28/2022     No results found for: \"CHOLHDL\"    Review of Systems:  Review of Systems   Constitutional: " Negative.  Negative for activity change, appetite change, fatigue and fever.   Respiratory:  Negative for chest tightness and shortness of breath.    Cardiovascular:  Negative for chest pain, palpitations and leg swelling.   Gastrointestinal:  Negative for nausea and vomiting.   Genitourinary:  Negative for difficulty urinating.   Musculoskeletal:  Negative for arthralgias and back pain.   Skin:  Negative for color change and pallor.   Neurological:  Negative for dizziness, syncope, weakness and light-headedness.   Hematological:  Negative for adenopathy.   Psychiatric/Behavioral:  Negative for agitation.    All other systems reviewed and are negative.      Physical Exam:  Physical Exam  Vitals and nursing note reviewed.   Constitutional:       General: He is not in acute distress.     Appearance: Normal appearance. He is not ill-appearing or diaphoretic.   HENT:      Head: Normocephalic and atraumatic.   Cardiovascular:      Rate and Rhythm: Normal rate and regular rhythm.      Heart sounds: No murmur heard.     No friction rub. No gallop.   Pulmonary:      Effort: Pulmonary effort is normal. No respiratory distress.   Abdominal:      General: There is no distension.      Palpations: Abdomen is soft.   Musculoskeletal:         General: No swelling. Normal range of motion.      Cervical back: Normal range of motion.   Skin:     General: Skin is warm and dry.      Capillary Refill: Capillary refill takes less than 2 seconds.      Coloration: Skin is not pale.   Neurological:      General: No focal deficit present.      Mental Status: He is alert and oriented to person, place, and time. Mental status is at baseline.   Psychiatric:         Mood and Affect: Mood normal.

## 2024-03-13 ENCOUNTER — ANTICOAG VISIT (OUTPATIENT)
Dept: CARDIOLOGY CLINIC | Facility: CLINIC | Age: 74
End: 2024-03-13

## 2024-03-13 ENCOUNTER — LAB (OUTPATIENT)
Dept: LAB | Facility: CLINIC | Age: 74
End: 2024-03-13
Payer: MEDICARE

## 2024-03-13 DIAGNOSIS — I49.3 PVC'S (PREMATURE VENTRICULAR CONTRACTIONS): ICD-10-CM

## 2024-03-13 DIAGNOSIS — I48.0 PAROXYSMAL ATRIAL FIBRILLATION (HCC): ICD-10-CM

## 2024-03-13 DIAGNOSIS — I42.0 DILATED CARDIOMYOPATHY (HCC): ICD-10-CM

## 2024-03-13 LAB
ANION GAP SERPL CALCULATED.3IONS-SCNC: 9 MMOL/L (ref 4–13)
BUN SERPL-MCNC: 40 MG/DL (ref 5–25)
CALCIUM SERPL-MCNC: 9.1 MG/DL (ref 8.4–10.2)
CHLORIDE SERPL-SCNC: 101 MMOL/L (ref 96–108)
CO2 SERPL-SCNC: 28 MMOL/L (ref 21–32)
CREAT SERPL-MCNC: 1.37 MG/DL (ref 0.6–1.3)
GFR SERPL CREATININE-BSD FRML MDRD: 51 ML/MIN/1.73SQ M
GLUCOSE P FAST SERPL-MCNC: 118 MG/DL (ref 65–99)
INR PPP: 2.09 (ref 0.84–1.19)
POTASSIUM SERPL-SCNC: 5.7 MMOL/L (ref 3.5–5.3)
PROTHROMBIN TIME: 23.1 SECONDS (ref 11.6–14.5)
SODIUM SERPL-SCNC: 138 MMOL/L (ref 135–147)

## 2024-03-13 PROCEDURE — 36415 COLL VENOUS BLD VENIPUNCTURE: CPT

## 2024-03-13 PROCEDURE — 85610 PROTHROMBIN TIME: CPT

## 2024-03-13 PROCEDURE — 80048 BASIC METABOLIC PNL TOTAL CA: CPT

## 2024-03-14 NOTE — PROGRESS NOTES
UROLOGY FOLLOW-UP ENCOUNTER    Galen Carson is a 73 y.o. male with prostate cancer    Pertinent non-urologic PMH: DM (hemoglobin A1c 6.9 on 2/14/2024), HLD, HTN, CKD (creatinine 1.37, GFR 51 on 3/13/2024); a fib    Pertinent non-urologic PSH: Cardiac catheterization without stents, hernia repair, dual chamber ICD implant Jan 2024    Anticoagulation: ASA81, Warfarin    History of high risk prostate cancer treated with radiation in 2019 he completed 2-year course of ADT in February 2021    Pretreatment PSA was 22.4.  Morales PSA was 0.0.    Had PSA recurrence which prompted workup below    PSMA PET scan 9/25/2023: Heterogenous multifocal fairly intense tracer activity in the right greater than left prostate gland highly suspicious for locally recurrent intraprostatic PSMA positive malignancy; no focal tracer activity concerning for metastatic disease    Prostate MRI 10/13/2023: Tumor in anterior and posterior right peripheral zone apex and mid gland and right transition zone at apex corresponds to PSMA avid lesion; the lesion broadly abuts the capsule without visualized gross extraprostatic extension; no SVI/LAD/bony metastasis; prostate 29 g    Given the positive lesion seen on imaging, plan was for patient to undergo MRI fusion biopsy.  Unfortunately, the case was canceled by anesthesia due to the patient's cardiac arrhythmia requiring LifeVest.  Since he cannot undergo anesthesia, there is consideration for alternate therapy.    Patient had dual-chamber ICD implant 1/4/2024    PSA 8.48 on 1/24/2024    Saw Dr. Barreto of hematology oncology on 2/21/2024. Option for repeat local therapy versus hormonal therapy with LHRH antagonist along with closely watching PSA.      Assessment and plan:     Prostate cancer    Patient with complex urologic history above.  Of note, he was treated with radiation in 2019 and completed 2-year course of ADT in 2021.  He had recurrence of his PSA.  He has had multiple imaging modalities  done including PSMA PET scan as well as prostate MRI in September and October 2023 respectively.    Both these images suggested local recurrence of prostate cancer in the prostate without any signs of extraprostatic presence and no signs of metastatic disease.    He was originally supposed to be considered for repeat radiation therapy, but it was decided that a prostate biopsy would need to be performed.  The plan to do a prostate fusion biopsy, but when he was seen by anesthesia before his case, they canceled it because he had a LifeVest on.    Since then, fortunately has had a dual-chamber ICD implanted in January 2024 and is doing well from the standpoint    His last PSA was 8.48 at the end of January 2024.    , At this point, he has seen Dr. Barreto of hematology oncology in February 2024.  He gave him the option of repeat local therapy versus hormonal therapy and closely watching his PSA.    He is seeing radiation oncology team on Monday, 3/18/2024.  I will reach out to Dr. Harvey regarding this upcoming appointment.  The patient is very frustrated with having to travel to [place] for [place] for his transperineal fusion prostate biopsy.  He explained that he would just want to have something done in the [place] area.  I explained to him that there is certain type of technology that is only available on these after mentioned campuses to allow us to do prostate MRI fusion biopsies.    I explained that if he just wanted his simple prostate biopsy done, we could have this done in the office.  I told him that I would have to run this by Dr. Harvey, since she would be managing the radiation therapy.    I will see if the radiation oncology team is 1.  Agreeable with repeat radiation and 2.  Agreeable to pursue repeat radiation if we were to do standard 12 core transrectal ultrasound prostate biopsy in the office which demonstrates persistent prostate cancer.    If radiation team is okay with the above plan, I will  "schedule him for office biopsy and I will get permission from cardiology to have his anticoagulation held accordingly.    If radiation oncology is not okay with repeat radiation, we could consider cryotherapy, but I would need prostate biopsy to confirm this ahead of time.  Additionally, the patient would certainly need to have anesthesia for this and he would need clearance from anesthesia to undergo any procedures.    If it is confirmed that despite having a dual chamber ICD that he still cannot undergo anesthesia, we would have to pursue hormonal therapy with continuous PSA checks.          PLAN  -Follow-up appointment with radiation oncology on 3/18/2024.  If radiation team believes that patient is good candidate for repeat radiation, we will have to see if they would like a repeat biopsy.  If they would like a repeat prostate biopsy.  I will see if they will be okay with an office standard 12 core biopsy rather than a transperineal fusion biopsy so that we can avoid anesthesia and so that the patient can avoid traveling far.            Portions of the above record have been created with voice recognition software.  Occasional wrong word or \"sound alike\" substitution may have occurred due to the inherent limitations of voice recognition software.  Read the chart carefully and recognize, using context, where substitution may have occurred.      Estevan Waite,         Chief Complaint     Prostate cancer      History of Present Illness     See summary above    No fevers or chills          The following portions of the patient's history were reviewed and updated as appropriate: allergies, current medications, past family history, past medical history, past social history, past surgical history and problem list.        AUA SYMPTOM SCORE      Flowsheet Row Most Recent Value   AUA SYMPTOM SCORE    How often have you had a sensation of not emptying your bladder completely after you finished urinating? 0 (P)  "   How often have you had to urinate again less than two hours after you finished urinating? 1 (P)    How often have you found you stopped and started again several times when you urinate? 0 (P)    How often have you found it difficult to postpone urination? 1 (P)    How often have you had a weak urinary stream? 5 (P)    How often have you had to push or strain to begin urination? 1 (P)    How many times did you most typically get up to urinate from the time you went to bed at night until the time you got up in the morning? 1 (P)    Quality of Life: If you were to spend the rest of your life with your urinary condition just the way it is now, how would you feel about that? 1 (P)    AUA SYMPTOM SCORE 9 (P)               Review of Systems     Review of Systems   Constitutional:  Negative for chills and fever.   Respiratory:  Negative for cough and shortness of breath.    Genitourinary:  Negative for dysuria and hematuria.   Neurological:  Negative for dizziness and headaches.   Psychiatric/Behavioral:  Negative for agitation and behavioral problems.            Allergies     Allergies   Allergen Reactions    Penicillin V Other (See Comments)     As a child        Physical Exam     Physical Exam  Constitutional:       General: He is not in acute distress.  HENT:      Head: Normocephalic and atraumatic.   Pulmonary:      Effort: Pulmonary effort is normal. No respiratory distress.   Abdominal:      General: Abdomen is flat.      Palpations: Abdomen is soft.      Tenderness: There is no right CVA tenderness or left CVA tenderness.   Skin:     General: Skin is warm and dry.   Neurological:      General: No focal deficit present.      Mental Status: He is alert and oriented to person, place, and time.   Psychiatric:         Mood and Affect: Mood normal.         Behavior: Behavior normal.             Vital Signs  Vitals:    03/15/24 1125   BP: 115/75   Pulse: 87   Resp: 16   Temp: 97.6 °F (36.4 °C)   SpO2: 96%   Weight: 103  "kg (228 lb)   Height: 5' 10\" (1.778 m)         Current Medications       Current Outpatient Medications:     Ascorbic Acid (vitamin C) 1000 MG tablet, Take 1,000 mg by mouth daily, Disp: , Rfl:     aspirin 81 mg chewable tablet, Chew 1 tablet (81 mg total) daily, Disp: 30 tablet, Rfl: 3    Calcium Carbonate (CALCIUM 600 PO), Take by mouth daily, Disp: , Rfl:     cetirizine (ZyrTEC) 5 MG tablet, Take 5 mg by mouth daily, Disp: , Rfl:     EVENING PRIMROSE OIL PO, Take by mouth 3 (three) times a day, Disp: , Rfl:     furosemide (LASIX) 20 mg tablet, Take 2 tablets (40 mg total) by mouth daily, Disp: 30 tablet, Rfl: 5    metFORMIN (GLUCOPHAGE) 850 mg tablet, Take 1 tablet (850 mg total) by mouth 2 (two) times a day with meals, Disp: 180 tablet, Rfl: 0    metoprolol succinate (TOPROL-XL) 25 mg 24 hr tablet, Take 1 tablet (25 mg total) by mouth daily, Disp: 30 tablet, Rfl: 3    multivitamin (THERAGRAN) TABS, Take 1 tablet by mouth daily, Disp: , Rfl:     rosuvastatin (CRESTOR) 10 MG tablet, Take 1 tablet (10 mg total) by mouth daily, Disp: 90 tablet, Rfl: 0    VITAMIN D, ERGOCALCIFEROL, PO, Take by mouth, Disp: , Rfl:     vitamin E, tocopherol, 400 units capsule, Take 400 Units by mouth daily, Disp: , Rfl:     warfarin (Coumadin) 5 mg tablet, Take one tablet daily or as directed, Disp: 90 tablet, Rfl: 3    Blood Glucose Monitoring Suppl (ONETOUCH VERIO) w/Device KIT, by Does not apply route once for 1 dose Test sugar in the morning, Disp: 1 kit, Rfl: 0      Active Problems     Patient Active Problem List   Diagnosis    Mixed hyperlipidemia    IFG (impaired fasting glucose)    Upper respiratory tract infection    Positive colorectal cancer screening using Cologuard test    Prostate cancer (HCC)    Encounter for monitoring androgen deprivation therapy    Hot flashes    Type 2 diabetes mellitus with complication, without long-term current use of insulin (HCC)    Stage 3a chronic kidney disease (HCC)    Essential hypertension "    Dilated cardiomyopathy (HCC)    Paroxysmal atrial fibrillation (HCC)    Obesity         Past Medical History     Past Medical History:   Diagnosis Date    Anesthesia     pt wears a life vest (11/16).  should not be scheduled at [facility] until heart condition is stabilized    BPH (benign prostatic hypertrophy)     Cancer (HCC)     Diabetes mellitus (HCC)     Hyperlipidemia     Hypertension     Irregular heart beat     PAF    Prostate cancer (HCC)          Surgical History     Past Surgical History:   Procedure Laterality Date    CARDIAC CATHETERIZATION N/A 10/4/2023    Procedure: Cardiac Coronary Angiogram;  Surgeon: Chris Lovell MD;  Location: [facility];  Service: Cardiology    CARDIAC CATHETERIZATION N/A 10/4/2023    Procedure: Cardiac RHC/LHC;  Surgeon: Chris Lovell MD;  Location: [facility];  Service: Cardiology    CARDIAC CATHETERIZATION  10/4/2023    Procedure: Cardiac catheterization;  Surgeon: Chris Lovell MD;  [facility]: [facility];  Service: Cardiology    CARDIAC ELECTROPHYSIOLOGY PROCEDURE N/A 1/4/2024    Procedure: Cardiac icd implant, Dual Chambers ICD;  Surgeon: Leo Whalen MD;  Location:  CARDIAC CATH LAB;  Service: Cardiology    EAR SURGERY      HERNIA REPAIR      [place] COLONOSCOPY FLX DX W/COLLJ SPEC WHEN PFRMD N/A 5/6/2019    Procedure: COLONOSCOPY;  Surgeon: Winston Nielson III, MD;  Location: [facility];  Service: Gastroenterology         Family History     Family History   Problem Relation Age of Onset    Stroke Father     No Known Problems Mother     Prostate cancer Brother     Arthritis Brother     No Known Problems Sister     Diabetes Sister     Diabetes Sister     No Known Problems Son     No Known Problems Daughter          Social History     Social History     Social History     Tobacco Use   Smoking Status Former    Current packs/day: 0.00    Average packs/day: 1 pack/day for 25.0 years (25.0 ttl pk-yrs)    Types: Cigarettes, Pipe, Cigars     Start date: 1980    Quit date: 2005    Years since quittin.0    Passive exposure: Past   Smokeless Tobacco Never         Pertinent Lab Values     Lab Results   Component Value Date    CREATININE 1.37 (H) 2024       Lab Results   Component Value Date    PSA 8.48 (H) 2024    PSA 5.11 (H) 2023    PSA 3.55 10/10/2023               Pertinent Imaging     The patient's images were reviewed by me personally and also in real time with them in the examination room using our PACS imaging system.      PSMA PET scan 2023: Heterogenous multifocal fairly intense tracer activity in the right greater than left prostate gland highly suspicious for locally recurrent intraprostatic PSMA positive malignancy; no focal tracer activity concerning for metastatic disease    Prostate MRI 10/13/2023: Tumor in anterior and posterior right peripheral zone apex and mid gland and right transition zone at apex corresponds to PSMA avid lesion; the lesion broadly abuts the capsule without visualized gross extraprostatic extension; no SVI/LAD/bony metastasis; prostate 29 g      Pertinent Pathology     N/A        I have spent 40 minutes with Patient  today in which greater than 50% of this time was spent in counseling/coordination of care regarding Diagnostic results, Prognosis, Risks and benefits of tx options, Instructions for management, Patient and family education, Importance of tx compliance, Impressions, Counseling / Coordination of care, Documenting in the medical record, Reviewing / ordering tests, medicine, procedures  , and Obtaining or reviewing history  .    Please note this time includes cumulative time on the day of encounter, including reviewing medical records and/or coordinating care among the patient's other specialists.

## 2024-03-15 ENCOUNTER — OFFICE VISIT (OUTPATIENT)
Dept: UROLOGY | Facility: CLINIC | Age: 74
End: 2024-03-15
Payer: MEDICARE

## 2024-03-15 VITALS
TEMPERATURE: 97.6 F | HEIGHT: 70 IN | RESPIRATION RATE: 16 BRPM | HEART RATE: 87 BPM | SYSTOLIC BLOOD PRESSURE: 115 MMHG | OXYGEN SATURATION: 96 % | WEIGHT: 228 LBS | DIASTOLIC BLOOD PRESSURE: 75 MMHG | BODY MASS INDEX: 32.64 KG/M2

## 2024-03-15 DIAGNOSIS — C61 PROSTATE CANCER (HCC): Primary | ICD-10-CM

## 2024-03-15 LAB
POST-VOID RESIDUAL VOLUME, ML POC: 16 ML
SL AMB  POCT GLUCOSE, UA: NORMAL
SL AMB LEUKOCYTE ESTERASE,UA: NORMAL
SL AMB POCT BILIRUBIN,UA: NORMAL
SL AMB POCT BLOOD,UA: NORMAL
SL AMB POCT CLARITY,UA: CLEAR
SL AMB POCT COLOR,UA: NORMAL
SL AMB POCT KETONES,UA: NORMAL
SL AMB POCT NITRITE,UA: NORMAL
SL AMB POCT PH,UA: 5
SL AMB POCT SPECIFIC GRAVITY,UA: 4.01
SL AMB POCT URINE PROTEIN: 15
SL AMB POCT UROBILINOGEN: 0.2

## 2024-03-15 PROCEDURE — 81002 URINALYSIS NONAUTO W/O SCOPE: CPT | Performed by: UROLOGY

## 2024-03-15 PROCEDURE — 51798 US URINE CAPACITY MEASURE: CPT | Performed by: UROLOGY

## 2024-03-15 PROCEDURE — 99215 OFFICE O/P EST HI 40 MIN: CPT | Performed by: UROLOGY

## 2024-03-15 NOTE — Clinical Note
Miah perez This jose sees you mon 3/18 He has BCR and had psma and prostate mri that showed localized disease He was supposed to get mri fusion prostate biopsy but could not get anesthesia due to life vest He since got icd He is refusing to travel far to get the fusion biopsy done and just wants a standard core in my office If you say he is appropriate for radiation, and I want to get a standard 12 core transrectal ultrasound-guided prostate biopsy that proved prostate cancer, would that be sufficient for you to proceed with radiation?  If yes, I would just need to schedule him in the office and to get clearance from his cardiologist to hold his anticoagulation accordingly. Angel

## 2024-03-15 NOTE — PATIENT INSTRUCTIONS
You will see Dr. Harvey on Monday    I will ask Dr. Harvey if she is ok with office prostate biopsy, if she says yes we will schedule

## 2024-03-18 ENCOUNTER — RADIATION ONCOLOGY FOLLOW-UP (OUTPATIENT)
Dept: RADIATION ONCOLOGY | Facility: CLINIC | Age: 74
End: 2024-03-18
Attending: RADIOLOGY
Payer: MEDICARE

## 2024-03-18 VITALS
HEART RATE: 66 BPM | TEMPERATURE: 97.5 F | RESPIRATION RATE: 16 BRPM | OXYGEN SATURATION: 94 % | BODY MASS INDEX: 33.43 KG/M2 | SYSTOLIC BLOOD PRESSURE: 170 MMHG | DIASTOLIC BLOOD PRESSURE: 80 MMHG | WEIGHT: 233 LBS

## 2024-03-18 DIAGNOSIS — C61 PROSTATE CANCER (HCC): Primary | ICD-10-CM

## 2024-03-18 PROCEDURE — 99215 OFFICE O/P EST HI 40 MIN: CPT | Performed by: RADIOLOGY

## 2024-03-18 NOTE — PROGRESS NOTES
Galen Carson 1950 is a 73 y.o. male with stage metastatic prostate cancer. He was initially diagnosed with [place] 5+4=9 prostate cancer in May 2019. He  completed a course of definitive radiation therapy on 10/18/19 and  received 2 years of androgen deprivation completed on 2/10/21. He was last seen by Dr. Christianson on 5/9/23 and is being referred back by Dr. Barreto. He presents today for follow up.      9/25/23  PSMA PET CT  1. Heterogeneous multifocal fairly intense tracer activity involving the right greater than left prostate gland highly suspicious for locally recurrent intraprostatic PSMA positive malignancy.   2. No focal tracer activity characteristic of extraprostatic PSMA positive metastatic disease. However, note is made of minimal tracer activity in the region of a suspected tiny nonpathologic in size right common iliac lymph node which has slightly   increased in size since 6/13/2019 and is of uncertain clinical significance. As early developing alisha metastatic disease in this location cannot be excluded, attention to this region on short interval follow-up imaging is recommended to ensure   stability.    10/24/23 Urology, [place]  1.  Prostate cancer, high risk status post radiation therapy and ADT  2. Hot flashes  - final Lupron was given 2/10/21   - PSA now 2.25 (8/24/23) from aguilar of 0.2  - PSMA PET (9/25/23) showing Heterogeneous multifocal fairly intense tracer activity involving the right greater than left prostate gland highly suspicious for locally recurrent intraprostatic PSMA positive malignancy   - mpMRI (10/13/23) showing tumor in the anterior and posterior right peripheral zone apex and mid glad and right transition zone at apex corresponds to PSMA avid lesion on prior PET. The lesion broadly abuts the capsule without visualized gross extraprostatic extension.   - Discussed case with Dr. Burgess with recommendations to proceed with fusion guided biopsy at this time after further  mapping by radiology    - Medical oncology referral and also to follow up with rad onc. We called med onc together in office to schedule appointment. 11/27 appointment scheduled for 9AM. Rad onc would like follow up scheduled after fusion biopsy      10/13/23 mpMRI prostate  1. Tumor in the anterior and posterior right peripheral zone apex and mid gland and right transition zone at apex corresponds to the PSMA avid lesion on prior PET/CT. The lesion broadly abuts the capsule without visualized gross extraprostatic extension.   2. No seminal vesicle invasion, pelvic lymphadenopathy, or pelvic osseous metastatic disease.   3. Calculated prostate volume of  29   cc.      11/16/23 Dr. Frye- Telephone encounter  scheduled fusion biopsy was canceled by anesthesia today due to his cardiac arrhythmia (now requiring a LifeVest)   As the patient is no longer a candidate for prostate biopsy due to his cardiac condition I recommend an alternative management strategies be considered   Of note, for nonmetastatic biochemically recurrent prostate cancer the standard of care would be for active surveillance.       1/4/24 Hospital admission- icd implant      2/21/24 Dr. Barreto  Patient is to be seen by radiation therapy and by urology to see if he would be candidate for any kind of localized therapy. He will come back here in 2 months. If not a candidate for any localized therapy may be candidate for systemic treatment with LHRH antagonist.       3/18/24 Dr. Waite  explained that if he just wanted his simple prostate biopsy done, we could have this done in the office.  I told him that I would have to run this by Dr. Harvey, since she would be managing the radiation therapy.   will see if the radiation oncology team is 1. Agreeable with repeat radiation and 2. Agreeable to pursue repeat radiation if we were to do standard 12 core transrectal ultrasound prostate biopsy in the office which demonstrates persistent prostate cancer.    If radiation oncology is not okay with repeat radiation, we could consider cryotherapy,        PSA   Latest Ref Rng 0.00 - 4.00 ng/mL   2023 2.25    10/10/2023 3.55    2023 5.11 (H)    2024 8.48 (H)         Upcomin24 Dr. Barreto      Follow up visit     Oncology History   Prostate cancer (HCC)   5/15/2019 Initial Diagnosis    Prostate cancer (HCC)     5/15/2019 Biopsy    Final Diagnosis  A. Prostate, right lateral base, core needle biopsy: Benign prostatic stroma. No malignancy is identified.     B. Prostate, right medial base, core needle biopsy:             - Prostatic adenocarcinoma, Elder score 4 + 5 = 9, Prognostic Grade Group V, involving nearly 100% of one core biopsy.     C. Prostate, right lateral mid, core needle biopsy:             - Prostatic adenocarcinoma, Elder score 5 + 4 = 9, Prognostic Grade Group V, involving nearly 100% one core biopsy.     D. Prostate, right medial mid, core needle biopsy:             - Prostatic adenocarcinoma, Elder score 4 + 5 = 9, Prognostic Grade Group V, involving nearly 100% of one core biopsy.     E. Prostate, right lateral apex, core needle biopsy:No malignancy is identified.     F. Prostate, right medial apex, core needle biopsy:             - Prostatic adenocarcinoma, Livingston score 5 + 4 = 9, Prognostic Grade Group V, involving nearly 100% of one core biopsy.     G. Prostate, left lateral base, core needle biopsy:- Atypical prostate glands, indeterminate for prostatic adenocarcinoma.     H. Prostate, left medial base, core needle biopsy:             - Prostatic adenocarcinoma, Livingston score 5 + 4 =9, Prognostic Grade Group V, involving 15% of one core biopsy.     I. Prostate, left lateral mid, core needle biopsy: Benign prostate glands. No malignancy is identified.  J. Prostate, left medial mid, core needle biopsy: Benign prostate glands. No malignancy is identified.     K. Prostate, left lateral apex, core needle biopsy:             -  Prostatic adenocarcinoma, Castor score 5 + 4 = 9, Prognostic Grade Group V, involving 30% of one of 2 cores.     L. Prostate, left medial apex, core needle biopsy:             - Prostatic adenocarcinoma, Elder score 5 + 4 =  9, Prognostic Grade Group V, discontinuously involving approximately 50% of 1 core biopsy.         6/20/2019 -  Cancer Staged    Staging form: Prostate, AJCC 8th Edition  - Clinical: Stage JAIRO (cT2b, cN1, cM0, PSA: 22, Grade Group: 5) - Signed by Erik Elliott MD on 6/20/2019  Prostate specific antigen (PSA) range: 20 or greater  Histologic grading system: 5 grade system       6/28/2019 - 6/28/2019 Hormone Therapy    Firmagon     8/1/2019 - 5/10/2021 Hormone Therapy    Lupron for 2 years     8/19/2019 - 10/18/2019 Radiation    4500 centigray in 25 fractions to the prostate, seminal vesicles and regional pelvic lymphatics followed by an additional 1440 centigray in 8 fractions to the prostate, seminal vesicles, and gross lymphadenopathy with the final 11 fractions of an additional 1980 centigray to the prostate and proximal seminal vesicles for a total dose of 7920 centigray in 44 fractions.          Review of Systems:  Review of Systems   Constitutional:  Positive for fatigue.   HENT: Negative.     Eyes:         Wears glasses  cataracts   Respiratory:  Positive for shortness of breath.    Cardiovascular: Negative.    Gastrointestinal: Negative.    Genitourinary:         Hot flashes/night sweats   Musculoskeletal:  Positive for arthralgias.   Skin: Negative.    Allergic/Immunologic: Positive for environmental allergies.   Neurological: Negative.    Hematological: Negative.    Psychiatric/Behavioral: Negative.         Clinical Trial: no    IPSS Questionnaire (AUA-7):  Over the past month…    1)  How often have you had a sensation of not emptying your bladder completely after you finish urinating?  1 - Less than 1 time in 5   2)  How often have you had to urinate again less than two hours  after you finished urinating? 2 - Less than half the time   3)  How often have you found you stopped and started again several times when you urinated?  0 - Not at all   4) How difficult have you found it to postpone urination?  1 - Less than 1 time in 5   5) How often have you had a weak urinary stream?  5 - Almost always   6) How often have you had to push or strain to begin urination?  2 - Less than half the time   7) How many times did you most typically get up to urinate from the time you went to bed until the time you got up in the morning?  0 - None   Total Score:  11       Teaching: Review SIM, Has new pacemaker/ defibrillator; Obtained copy of card    Health Maintenance   Topic Date Due    Zoster Vaccine (1 of 2) Never done    Influenza Vaccine (1) 09/01/2023    COVID-19 Vaccine (3 - 2023-24 season) 09/01/2023    Colorectal Cancer Screening  05/06/2024    BMI: Followup Plan  08/16/2024    HEMOGLOBIN A1C  08/14/2024    DM Eye Exam  09/23/2024    Kidney Health Evaluation: Albumin/Creatinine Ratio  02/14/2025    Fall Risk  02/19/2025    Depression Screening  02/19/2025    Medicare Annual Wellness Visit (AWV)  02/19/2025    Diabetic Foot Exam  02/19/2025    Kidney Health Evaluation: GFR  03/13/2025    BMI: Adult  03/15/2025    Hepatitis C Screening  Completed    Pneumococcal Vaccine: 65+ Years  Completed    HIB Vaccine  Aged Out    IPV Vaccine  Aged Out    Hepatitis A Vaccine  Aged Out    Meningococcal ACWY Vaccine  Aged Out    HPV Vaccine  Aged Out     Patient Active Problem List   Diagnosis    Mixed hyperlipidemia    IFG (impaired fasting glucose)    Upper respiratory tract infection    Positive colorectal cancer screening using Cologuard test    Prostate cancer (HCC)    Encounter for monitoring androgen deprivation therapy    Hot flashes    Type 2 diabetes mellitus with complication, without long-term current use of insulin (HCC)    Stage 3a chronic kidney disease (HCC)    Essential hypertension    Dilated  cardiomyopathy (HCC)    Paroxysmal atrial fibrillation (HCC)    Obesity     Past Medical History:   Diagnosis Date    Anesthesia     pt wears a life vest (11/16).  should not be scheduled at [facility] until heart condition is stabilized    BPH (benign prostatic hypertrophy)     Cancer (HCC)     Diabetes mellitus (HCC)     Hyperlipidemia     Hypertension     Irregular heart beat     PAF    Prostate cancer (HCC)      Past Surgical History:   Procedure Laterality Date    CARDIAC CATHETERIZATION N/A 10/4/2023    Procedure: Cardiac Coronary Angiogram;  Surgeon: Chris Lovell MD;  Location: [facility];  Service: Cardiology    CARDIAC CATHETERIZATION N/A 10/4/2023    Procedure: Cardiac RHC/LHC;  Surgeon: Chris Lovell MD;  Location: [facility];  Service: Cardiology    CARDIAC CATHETERIZATION  10/4/2023    Procedure: Cardiac catheterization;  Surgeon: Chris Lovell MD;  Location: [facility];  Service: Cardiology    CARDIAC ELECTROPHYSIOLOGY PROCEDURE N/A 1/4/2024    Procedure: Cardiac icd implant, Dual Chambers ICD;  Surgeon: Leo Whalen MD;  Location:  CARDIAC CATH LAB;  Service: Cardiology    EAR SURGERY      HERNIA REPAIR      AK COLONOSCOPY FLX DX W/COLLJ SPEC WHEN PFRMD N/A 5/6/2019    Procedure: COLONOSCOPY;  Surgeon: Winston Nielson III, MD;  Location: [facility];  Service: Gastroenterology     Family History   Problem Relation Age of Onset    Stroke Father     No Known Problems Mother     Prostate cancer Brother     Arthritis Brother     No Known Problems Sister     Diabetes Sister     Diabetes Sister     No Known Problems Son     No Known Problems Daughter      Social History     Socioeconomic History    Marital status:      Spouse name: Not on file    Number of children: Not on file    Years of education: Not on file    Highest education level: Not on file   Occupational History    Not on file   Tobacco Use    Smoking status: Former     Current packs/day: 0.00      Average packs/day: 1 pack/day for 25.0 years (25.0 ttl pk-yrs)     Types: Cigarettes, Pipe, Cigars     Start date: 1980     Quit date: 2005     Years since quittin.0     Passive exposure: Past    Smokeless tobacco: Never   Vaping Use    Vaping status: Never Used   Substance and Sexual Activity    Alcohol use: Not Currently    Drug use: Not Currently    Sexual activity: Not Currently   Other Topics Concern    Not on file   Social History Narrative    Not on file     Social Determinants of Health     Financial Resource Strain: Low Risk  (2024)    Overall Financial Resource Strain (CARDIA)     Difficulty of Paying Living Expenses: Not hard at all   Food Insecurity: Not on file   Transportation Needs: No Transportation Needs (2024)    PRAPARE - Transportation     Lack of Transportation (Medical): No     Lack of Transportation (Non-Medical): No   Physical Activity: Not on file   Stress: Not on file   Social Connections: Not on file   Intimate Partner Violence: Not on file   Housing Stability: Not on file       Current Outpatient Medications:     Ascorbic Acid (vitamin C) 1000 MG tablet, Take 1,000 mg by mouth daily, Disp: , Rfl:     aspirin 81 mg chewable tablet, Chew 1 tablet (81 mg total) daily, Disp: 30 tablet, Rfl: 3    Blood Glucose Monitoring Suppl (ONETOUCH VERIO) w/Device KIT, by Does not apply route once for 1 dose Test sugar in the morning, Disp: 1 kit, Rfl: 0    Calcium Carbonate (CALCIUM 600 PO), Take by mouth daily, Disp: , Rfl:     cetirizine (ZyrTEC) 5 MG tablet, Take 5 mg by mouth daily, Disp: , Rfl:     EVENING PRIMROSE OIL PO, Take by mouth 3 (three) times a day, Disp: , Rfl:     furosemide (LASIX) 20 mg tablet, Take 2 tablets (40 mg total) by mouth daily, Disp: 30 tablet, Rfl: 5    metFORMIN (GLUCOPHAGE) 850 mg tablet, Take 1 tablet (850 mg total) by mouth 2 (two) times a day with meals, Disp: 180 tablet, Rfl: 0    metoprolol succinate (TOPROL-XL) 25 mg 24 hr tablet, Take 1  tablet (25 mg total) by mouth daily, Disp: 30 tablet, Rfl: 3    multivitamin (THERAGRAN) TABS, Take 1 tablet by mouth daily, Disp: , Rfl:     rosuvastatin (CRESTOR) 10 MG tablet, Take 1 tablet (10 mg total) by mouth daily, Disp: 90 tablet, Rfl: 0    VITAMIN D, ERGOCALCIFEROL, PO, Take by mouth, Disp: , Rfl:     vitamin E, tocopherol, 400 units capsule, Take 400 Units by mouth daily, Disp: , Rfl:     warfarin (Coumadin) 5 mg tablet, Take one tablet daily or as directed, Disp: 90 tablet, Rfl: 3  Allergies   Allergen Reactions    Penicillin V Other (See Comments)     As a child      There were no vitals filed for this visit.

## 2024-03-18 NOTE — LETTER
2024     Galen Barreto MD  [address] [facility] [address]    Patient: Galen Carson   YOB: 1950   Date of Visit: 3/18/2024       Dear Dr. Barreto:    Thank you for referring Galen Carson to me for evaluation. Below are my notes for this consultation.    If you have questions, please do not hesitate to call me. I look forward to following your patient along with you.         Sincerely,        Tereza Harvey MD        CC: No Recipients    Tereza Harvey MD  3/21/2024 11:56 PM  Sign when Signing Visit  Consultation - Radiation Oncology      MRN:[id number] : 1950  Encounter: [id number]  Patient Information: Galen Carson      CHIEF COMPLAINT  Chief Complaint   Patient presents with   • Prostate Cancer   • Follow-up     Cancer Staging   Prostate cancer (HCC)  Staging form: Prostate, AJCC 8th Edition  - Clinical: Stage JAIRO (cT2b, cN1, cM0, PSA: 22, Grade Group: 5) - Signed by Erik Elliott MD on 2019  Prostate specific antigen (PSA) range: 20 or greater  Histologic grading system: 5 grade system           History of Present Illness  Galen Carson is a 73 y.o. man with alisha metastatic prostate cancer. He was initially diagnosed with Elder 5+4=9 prostate cancer in May 2019. He  completed a course of definitive radiation therapy on 10/18/19 and  received 2 years of androgen deprivation completed in 2021. PSA aguilar was 0.2.  PSA has increased to 8.48 and he is being referred back by Dr. Barreto. He presents today for follow up.     Patient was noted with rapidly rising PSA.    PSA   Latest Ref Rng 0.00 - 4.00 ng/mL   2023 2.25    10/10/2023 3.55    2023 5.11 (H)    2024 8.48 (H)       23  PSMA PET CT  1. Heterogeneous multifocal fairly intense tracer activity involving the right greater than left prostate gland highly suspicious for locally recurrent intraprostatic PSMA positive malignancy.   2. No focal tracer activity characteristic of extraprostatic  PSMA positive metastatic disease. However, note is made of minimal tracer activity in the region of a suspected tiny nonpathologic in size right common iliac lymph node which has slightly increased in size since 6/13/2019 and is of uncertain clinical significance. As early developing alisha metastatic disease in this location cannot be excluded, attention to this region on short interval follow-up imaging is recommended to ensure stability.     10/24/23 Urology, [place]  1.  Prostate cancer, high risk status post radiation therapy and ADT  2. Hot flashes  - final Lupron was given 2/10/21   - PSA now 2.25 (8/24/23) from aguilar of 0.2  - PSMA PET (9/25/23) showing Heterogeneous multifocal fairly intense tracer activity involving the right greater than left prostate gland highly suspicious for locally recurrent intraprostatic PSMA positive malignancy   - mpMRI (10/13/23) showing tumor in the anterior and posterior right peripheral zone apex and mid glad and right transition zone at apex corresponds to PSMA avid lesion on prior PET. The lesion broadly abuts the capsule without visualized gross extraprostatic extension.   - Discussed case with Dr. Burgess with recommendations to proceed with fusion guided biopsy at this time after further mapping by radiology    - Medical oncology referral and also to follow up with rad onc. We called med onc together in office to schedule appointment. 11/27 appointment scheduled for 9AM. Rad onc would like follow up scheduled after fusion biopsy        10/13/23 mpMRI prostate  1. Tumor in the anterior and posterior right peripheral zone apex and mid gland and right transition zone at apex corresponds to the PSMA avid lesion on prior PET/CT. The lesion broadly abuts the capsule without visualized gross extraprostatic extension.   2. No seminal vesicle invasion, pelvic lymphadenopathy, or pelvic osseous metastatic disease.   3. Calculated prostate volume of  29   cc.        11/16/23   [facility]- Telephone encounter  scheduled fusion biopsy was canceled by anesthesia today due to his cardiac arrhythmia (now requiring a LifeVest)   As the patient is no longer a candidate for prostate biopsy due to his cardiac condition I recommend an alternative management strategies be considered   Of note, for nonmetastatic biochemically recurrent prostate cancer the standard of care would be for active surveillance.         24 Hospital admission- icd implant     24 Dr. Barreto  Patient is to be seen by radiation therapy and by urology to see if he would be candidate for any kind of localized therapy. He will come back here in 2 months. If not a candidate for any localized therapy may be candidate for systemic treatment with LHRH antagonist.      3/18/24 Dr. Waite  explained that if he just wanted his simple prostate biopsy done, we could have this done in the office.  I told him that I would have to run this by Dr. Harvey, since she would be managing the radiation therapy.   will see if the radiation oncology team is 1. Agreeable with repeat radiation and 2. Agreeable to pursue repeat radiation if we were to do standard 12 core transrectal ultrasound prostate biopsy in the office which demonstrates persistent prostate cancer.   If radiation oncology is not okay with repeat radiation, we could consider cryotherapy,     The patient denies new or progressive areas of pain.  He denies new  or GI symptoms.  He denies chest pain, palpitations, lower extremity edema.  He is unclear if he can be cleared from general anesthesia by Cardiology.  He does have follow-up scheduled with Cardiology in about 2 weeks.      Upcomin24 Dr. Barreto    Historical Information  Oncology History   Prostate cancer (HCC)   5/15/2019 Initial Diagnosis    Prostate cancer (HCC)     5/15/2019 Biopsy    Final Diagnosis  A. Prostate, right lateral base, core needle biopsy: Benign prostatic stroma. No malignancy is identified.      B. Prostate, right medial base, core needle biopsy:             - Prostatic adenocarcinoma, Nemours score 4 + 5 = 9, Prognostic Grade Group V, involving nearly 100% of one core biopsy.     C. Prostate, right lateral mid, core needle biopsy:             - Prostatic adenocarcinoma, Nemours score 5 + 4 = 9, Prognostic Grade Group V, involving nearly 100% one core biopsy.     D. Prostate, right medial mid, core needle biopsy:             - Prostatic adenocarcinoma, Nemours score 4 + 5 = 9, Prognostic Grade Group V, involving nearly 100% of one core biopsy.     E. Prostate, right lateral apex, core needle biopsy:No malignancy is identified.     F. Prostate, right medial apex, core needle biopsy:             - Prostatic adenocarcinoma, Elder score 5 + 4 = 9, Prognostic Grade Group V, involving nearly 100% of one core biopsy.     G. Prostate, left lateral base, core needle biopsy:- Atypical prostate glands, indeterminate for prostatic adenocarcinoma.     H. Prostate, left medial base, core needle biopsy:             - Prostatic adenocarcinoma, Elder score 5 + 4 =9, Prognostic Grade Group V, involving 15% of one core biopsy.     I. Prostate, left lateral mid, core needle biopsy: Benign prostate glands. No malignancy is identified.  J. Prostate, left medial mid, core needle biopsy: Benign prostate glands. No malignancy is identified.     K. Prostate, left lateral apex, core needle biopsy:             - Prostatic adenocarcinoma, Nemours score 5 + 4 = 9, Prognostic Grade Group V, involving 30% of one of 2 cores.     L. Prostate, left medial apex, core needle biopsy:             - Prostatic adenocarcinoma, Nemours score 5 + 4 =  9, Prognostic Grade Group V, discontinuously involving approximately 50% of 1 core biopsy.         6/20/2019 -  Cancer Staged    Staging form: Prostate, AJCC 8th Edition  - Clinical: Stage JAIRO (cT2b, cN1, cM0, PSA: 22, Grade Group: 5) - Signed by Erik Elliott MD on 6/20/2019  Prostate specific  antigen (PSA) range: 20 or greater  Histologic grading system: 5 grade system       6/28/2019 - 6/28/2019 Hormone Therapy    Firmagon     8/1/2019 - 5/10/2021 Hormone Therapy    Lupron for 2 years     8/19/2019 - 10/18/2019 Radiation    4500 centigray in 25 fractions to the prostate, seminal vesicles and regional pelvic lymphatics followed by an additional 1440 centigray in 8 fractions to the prostate, seminal vesicles, and gross lymphadenopathy with the final 11 fractions of an additional 1980 centigray to the prostate and proximal seminal vesicles for a total dose of 7920 centigray in 44 fractions.            Past Medical History:   Diagnosis Date   • Anesthesia     pt wears a life vest (11/16).  should not be scheduled at [facility] until heart condition is stabilized   • BPH (benign prostatic hypertrophy)    • Cancer (HCC)    • Diabetes mellitus (HCC)    • Hyperlipidemia    • Hypertension    • Irregular heart beat     PAF   • Prostate cancer (HCC)      Past Surgical History:   Procedure Laterality Date   • CARDIAC CATHETERIZATION N/A 10/4/2023    Procedure: Cardiac Coronary Angiogram;  Surgeon: Chris Lovell MD;  Location: [facility];  Service: Cardiology   • CARDIAC CATHETERIZATION N/A 10/4/2023    Procedure: Cardiac RHC/LHC;  Surgeon: Chris Lovell MD;  Location: [facility];  Service: Cardiology   • CARDIAC CATHETERIZATION  10/4/2023    Procedure: Cardiac catheterization;  Surgeon: Chris Lovell MD;  Location: [facility];  Service: Cardiology   • CARDIAC ELECTROPHYSIOLOGY PROCEDURE N/A 1/4/2024    Procedure: Cardiac icd implant, Dual Chambers ICD;  Surgeon: Leo Whalen MD;  Location:  CARDIAC CATH LAB;  Service: Cardiology   • EAR SURGERY     • HERNIA REPAIR     • WI COLONOSCOPY FLX DX W/COLLJ SPEC WHEN PFRMD N/A 5/6/2019    Procedure: COLONOSCOPY;  Surgeon: Winston Nielson III, MD;  Location: [facility] GI LAB;  Service: Gastroenterology       Family History   Problem Relation  Age of Onset   • Stroke Father    • No Known Problems Mother    • Prostate cancer Brother    • Arthritis Brother    • No Known Problems Sister    • Diabetes Sister    • Diabetes Sister    • No Known Problems Son    • No Known Problems Daughter        Social History  Social History     Substance and Sexual Activity   Alcohol Use Not Currently     Social History     Substance and Sexual Activity   Drug Use Not Currently     Social History     Tobacco Use   Smoking Status Former   • Current packs/day: 0.00   • Average packs/day: 1 pack/day for 25.0 years (25.0 ttl pk-yrs)   • Types: Cigarettes, Pipe, Cigars   • Start date: 1980   • Quit date: 2005   • Years since quittin.0   • Passive exposure: Past   Smokeless Tobacco Never         Meds/Allergies    Current Outpatient Medications:   •  Ascorbic Acid (vitamin C) 1000 MG tablet, Take 1,000 mg by mouth daily, Disp: , Rfl:   •  aspirin 81 mg chewable tablet, Chew 1 tablet (81 mg total) daily, Disp: 30 tablet, Rfl: 3  •  cetirizine (ZyrTEC) 5 MG tablet, Take 5 mg by mouth daily, Disp: , Rfl:   •  EVENING PRIMROSE OIL PO, Take by mouth 3 (three) times a day, Disp: , Rfl:   •  furosemide (LASIX) 20 mg tablet, Take 2 tablets (40 mg total) by mouth daily, Disp: 30 tablet, Rfl: 5  •  metFORMIN (GLUCOPHAGE) 850 mg tablet, Take 1 tablet (850 mg total) by mouth 2 (two) times a day with meals, Disp: 180 tablet, Rfl: 0  •  metoprolol succinate (TOPROL-XL) 25 mg 24 hr tablet, Take 1 tablet (25 mg total) by mouth daily, Disp: 30 tablet, Rfl: 3  •  multivitamin (THERAGRAN) TABS, Take 1 tablet by mouth daily, Disp: , Rfl:   •  rosuvastatin (CRESTOR) 10 MG tablet, Take 1 tablet (10 mg total) by mouth daily, Disp: 90 tablet, Rfl: 0  •  VITAMIN D, ERGOCALCIFEROL, PO, Take by mouth, Disp: , Rfl:   •  vitamin E, tocopherol, 400 units capsule, Take 400 Units by mouth daily, Disp: , Rfl:   •  warfarin (Coumadin) 5 mg tablet, Take one tablet daily or as directed, Disp: 90 tablet,  Rfl: 3  •  Blood Glucose Monitoring Suppl (ONETOUCH VERIO) w/Device KIT, by Does not apply route once for 1 dose Test sugar in the morning, Disp: 1 kit, Rfl: 0  •  Calcium Carbonate (CALCIUM 600 PO), Take by mouth daily (Patient not taking: Reported on 3/18/2024), Disp: , Rfl:   Allergies   Allergen Reactions   • Penicillin V Other (See Comments)     As a child          Review of Systems   Constitutional:  Positive for fatigue.   HENT: Negative.     Eyes:         Wears glasses  cataracts   Respiratory:  Positive for shortness of breath.    Cardiovascular: Negative.    Gastrointestinal: Negative.    Genitourinary:         Hot flashes/night sweats   Musculoskeletal:  Positive for arthralgias.   Skin: Negative.    Allergic/Immunologic: Positive for environmental allergies.   Neurological: Negative.    Hematological: Negative.    Psychiatric/Behavioral: Negative.         IPSS Questionnaire (AUA-7):  Over the past month…     1)  How often have you had a sensation of not emptying your bladder completely after you finish urinating?  1 - Less than 1 time in 5   2)  How often have you had to urinate again less than two hours after you finished urinating? 2 - Less than half the time   3)  How often have you found you stopped and started again several times when you urinated?  0 - Not at all   4) How difficult have you found it to postpone urination?  1 - Less than 1 time in 5   5) How often have you had a weak urinary stream?  5 - Almost always   6) How often have you had to push or strain to begin urination?  2 - Less than half the time   7) How many times did you most typically get up to urinate from the time you went to bed until the time you got up in the morning?  0 - None   Total Score:  11       OBJECTIVE:   /80   Pulse 66   Temp 97.5 °F (36.4 °C)   Resp 16   Wt 106 kg (233 lb)   SpO2 94%   BMI 33.43 kg/m²   Performance Status: Karnofsky: 70      Physical Exam  Vitals and nursing note reviewed.    Constitutional:       General: He is not in acute distress.     Appearance: He is well-developed.   Cardiovascular:      Rate and Rhythm: Normal rate.   Pulmonary:      Breath sounds: No wheezing, rhonchi or rales.   Abdominal:      Palpations: Abdomen is soft.      Tenderness: There is no abdominal tenderness. There is no right CVA tenderness or left CVA tenderness.   Musculoskeletal:         General: No tenderness.      Right lower leg: No edema.      Left lower leg: No edema.   Lymphadenopathy:      Cervical: No cervical adenopathy.      Upper Body:      Right upper body: No supraclavicular adenopathy.      Left upper body: No supraclavicular adenopathy.   Neurological:      Mental Status: He is alert and oriented to person, place, and time.              ASSESSMENT  1. Prostate cancer ([facility])  PSA Total, Diagnostic        Cancer Staging   Prostate cancer ([facility])  Staging form: Prostate, AJCC 8th Edition  - Clinical: Stage JAIRO (cT2b, cN1, cM0, PSA: 22, Grade Group: 5) - Signed by Erik Elliott MD on 6/20/2019  Prostate specific antigen (PSA) range: 20 or greater  Histologic grading system: 5 grade system        PLAN/DISCUSSION  Orders Placed This Encounter   Procedures   • PSA Total, Diagnostic        Galen Carson is a 73 y.o.man with significant and recent cardiac comorbidities and  history of stage JAIRO GS 9 (5+4) prostate cancer with lymph node metastases status post definitive radiation therapy and 2 years of ADT completed on 10/18/19.  Final Lupron injection 2/2021. PSA aguilar was 0.2, but was noted to be elevated in August 2023 and has continued to rapidly rise since that time.  His work-up showed concerns for local recurrence involving the prostate without definite metastatic disease.  Due to new onset cardiac arrythmia with cardiac complications he could not undergo biopsy at that time and treatment was held while he underwent work-up, treatment and ultimately ICD placement.  1/24/24 PSA was 8.48.      I  reviewed his previous radiation plan and most recent imaging.  There is broad areas of concern for recurrence within the prostate.  His doubling time is short and I recommended another PSA check at this point.     I reviewed patient's case with my Radiation Oncology colleagues as well.  He is not a candidate for external beam salvage radiation.  He was not felt to be a good candidate for salvage SBRT.  If he can undergo biopsy and general anesthesia, then recommendation was made for consideration for salvage brachytherapy.  I explained that this has demonstrated success in terms of local control with less morbidity than seen in radical prostatectomy.  The treatment is outpatient.  The procedure does still entail risk and toxicity and salvage rates are at best 50%, although given his presentation it is likely significantly lower.    The patient was interested in consultation.  I will refer him to [facility] for consultation and consideration.    Alternatively, we discussed that focal HIFU and cryotherapy may also be local treatment options.  Cryotherapy is available at [facility], but HIFU is currently not performed at our institution.  [facility] likely will have specialists who could consider the patient for this treatment.  Again, this would be dependent on obtaining cardiac clearance.    If he is not cleared for invasive procedures or chooses not to proceed, then he can still be considered for ADT therapy.  There is risk to long term ADT therapy, but intermittent therapy with Firmagon or other agents with better cardiac toxicity profile would be an option as he did respond in the past.    The patient will take referral to [facility].  As we cannot offer salvage radiation at [facility] at this time, he will follow-up with Urology and Medical Oncology for continued coordination and management.  We will be happy to see him again should the need arise.  We will contact him when his PSA is available.  He was in agreement with this  "plan.    Total Time Spent  55 minutes spent reviewing EMR in preparation for visit, with the patient, coordination with other providers, and documentation. Greater than 50% of total time was spent with the patient and/or family for counseling and/or coordination of care.     Tereza Harvey MD  3/18/2024,11:14 AM      Portions of the record may have been created with voice recognition software.  Occasional wrong word or \"sound a like\" substitutions may have occurred due to the inherent limitations of voice recognition software.  Read the chart carefully and recognize, using context, where substitutions have occurred.             "

## 2024-03-18 NOTE — PROGRESS NOTES
Consultation - Radiation Oncology      MRN:[id number] : 1950  Encounter: [id number]  Patient Information: Galen Carson      CHIEF COMPLAINT  Chief Complaint   Patient presents with    Prostate Cancer    Follow-up     Cancer Staging   Prostate cancer (HCC)  Staging form: Prostate, AJCC 8th Edition  - Clinical: Stage JAIRO (cT2b, cN1, cM0, PSA: 22, Grade Group: 5) - Signed by Erik Elliott MD on 2019  Prostate specific antigen (PSA) range: 20 or greater  Histologic grading system: 5 grade system           History of Present Illness   Galen Carson is a 73 y.o. man with alisha metastatic prostate cancer. He was initially diagnosed with [place] 5+4=9 prostate cancer in May 2019. He  completed a course of definitive radiation therapy on 10/18/19 and  received 2 years of androgen deprivation completed in 2021. PSA aguilar was 0.2.  PSA has increased to 8.48 and he is being referred back by Dr. Barreto. He presents today for follow up.     Patient was noted with rapidly rising PSA.    PSA   Latest Ref Rng 0.00 - 4.00 ng/mL   2023 2.25    10/10/2023 3.55    2023 5.11 (H)    2024 8.48 (H)       23  PSMA PET CT  1. Heterogeneous multifocal fairly intense tracer activity involving the right greater than left prostate gland highly suspicious for locally recurrent intraprostatic PSMA positive malignancy.   2. No focal tracer activity characteristic of extraprostatic PSMA positive metastatic disease. However, note is made of minimal tracer activity in the region of a suspected tiny nonpathologic in size right common iliac lymph node which has slightly increased in size since 2019 and is of uncertain clinical significance. As early developing alisha metastatic disease in this location cannot be excluded, attention to this region on short interval follow-up imaging is recommended to ensure stability.     10/24/23 Urology, [facility]  1.  Prostate cancer, high risk status post radiation therapy  and ADT  2. Hot flashes  - final Lupron was given 2/10/21   - PSA now 2.25 (8/24/23) from aguilar of 0.2  - PSMA PET (9/25/23) showing Heterogeneous multifocal fairly intense tracer activity involving the right greater than left prostate gland highly suspicious for locally recurrent intraprostatic PSMA positive malignancy   - mpMRI (10/13/23) showing tumor in the anterior and posterior right peripheral zone apex and mid glad and right transition zone at apex corresponds to PSMA avid lesion on prior PET. The lesion broadly abuts the capsule without visualized gross extraprostatic extension.   - Discussed case with Dr. Burgess with recommendations to proceed with fusion guided biopsy at this time after further mapping by radiology    - Medical oncology referral and also to follow up with rad onc. We called med onc together in office to schedule appointment. 11/27 appointment scheduled for 9AM. Rad onc would like follow up scheduled after fusion biopsy        10/13/23 mpMRI prostate  1. Tumor in the anterior and posterior right peripheral zone apex and mid gland and right transition zone at apex corresponds to the PSMA avid lesion on prior PET/CT. The lesion broadly abuts the capsule without visualized gross extraprostatic extension.   2. No seminal vesicle invasion, pelvic lymphadenopathy, or pelvic osseous metastatic disease.   3. Calculated prostate volume of  29   cc.        11/16/23 Dr. Frye- Telephone encounter  scheduled fusion biopsy was canceled by anesthesia today due to his cardiac arrhythmia (now requiring a LifeVest)   As the patient is no longer a candidate for prostate biopsy due to his cardiac condition I recommend an alternative management strategies be considered   Of note, for nonmetastatic biochemically recurrent prostate cancer the standard of care would be for active surveillance.         1/4/24 Hospital admission- icd implant     2/21/24 Dr. Barreto  Patient is to be seen by radiation therapy and  by urology to see if he would be candidate for any kind of localized therapy. He will come back here in 2 months. If not a candidate for any localized therapy may be candidate for systemic treatment with LHRH antagonist.      3/18/24 Dr. Waite  explained that if he just wanted his simple prostate biopsy done, we could have this done in the office.  I told him that I would have to run this by Dr. Harvey, since she would be managing the radiation therapy.   will see if the radiation oncology team is 1. Agreeable with repeat radiation and 2. Agreeable to pursue repeat radiation if we were to do standard 12 core transrectal ultrasound prostate biopsy in the office which demonstrates persistent prostate cancer.   If radiation oncology is not okay with repeat radiation, we could consider cryotherapy,     The patient denies new or progressive areas of pain.  He denies new  or GI symptoms.  He denies chest pain, palpitations, lower extremity edema.  He is unclear if he can be cleared from general anesthesia by Cardiology.  He does have follow-up scheduled with Cardiology in about 2 weeks.      Upcomin24 Dr. Barreto    Historical Information   Oncology History   Prostate cancer (HCC)   5/15/2019 Initial Diagnosis    Prostate cancer (formerly [facility])     5/15/2019 Biopsy    Final Diagnosis  A. Prostate, right lateral base, core needle biopsy: Benign prostatic stroma. No malignancy is identified.     B. Prostate, right medial base, core needle biopsy:             - Prostatic adenocarcinoma, Elder score 4 + 5 = 9, Prognostic Grade Group V, involving nearly 100% of one core biopsy.     C. Prostate, right lateral mid, core needle biopsy:             - Prostatic adenocarcinoma, Weston score 5 + 4 = 9, Prognostic Grade Group V, involving nearly 100% one core biopsy.     D. Prostate, right medial mid, core needle biopsy:             - Prostatic adenocarcinoma, Weston score 4 + 5 = 9, Prognostic Grade Group V, involving nearly 100%  of one core biopsy.     E. Prostate, right lateral apex, core needle biopsy:No malignancy is identified.     F. Prostate, right medial apex, core needle biopsy:             - Prostatic adenocarcinoma, [place] score 5 + 4 = 9, Prognostic Grade Group V, involving nearly 100% of one core biopsy.     G. Prostate, left lateral base, core needle biopsy:- Atypical prostate glands, indeterminate for prostatic adenocarcinoma.     H. Prostate, left medial base, core needle biopsy:             - Prostatic adenocarcinoma, Elder score 5 + 4 =9, Prognostic Grade Group V, involving 15% of one core biopsy.     I. Prostate, left lateral mid, core needle biopsy: Benign prostate glands. No malignancy is identified.  J. Prostate, left medial mid, core needle biopsy: Benign prostate glands. No malignancy is identified.     K. Prostate, left lateral apex, core needle biopsy:             - Prostatic adenocarcinoma, [place] score 5 + 4 = 9, Prognostic Grade Group V, involving 30% of one of 2 cores.     L. Prostate, left medial apex, core needle biopsy:             - Prostatic adenocarcinoma, [place] score 5 + 4 =  9, Prognostic Grade Group V, discontinuously involving approximately 50% of 1 core biopsy.         6/20/2019 -  Cancer Staged    Staging form: Prostate, AJCC 8th Edition  - Clinical: Stage JAIRO (cT2b, cN1, cM0, PSA: 22, Grade Group: 5) - Signed by Erik Elliott MD on 6/20/2019  Prostate specific antigen (PSA) range: 20 or greater  Histologic grading system: 5 grade system       6/28/2019 - 6/28/2019 Hormone Therapy    Firmagon     8/1/2019 - 5/10/2021 Hormone Therapy    Lupron for 2 years     8/19/2019 - 10/18/2019 Radiation    4500 centigray in 25 fractions to the prostate, seminal vesicles and regional pelvic lymphatics followed by an additional 1440 centigray in 8 fractions to the prostate, seminal vesicles, and gross lymphadenopathy with the final 11 fractions of an additional 1980 centigray to the prostate and proximal  seminal vesicles for a total dose of 7920 centigray in 44 fractions.            Past Medical History:   Diagnosis Date    Anesthesia     pt wears a life vest (11/16).  should not be scheduled at [facility] until heart condition is stabilized    BPH (benign prostatic hypertrophy)     Cancer (HCC)     Diabetes mellitus (HCC)     Hyperlipidemia     Hypertension     Irregular heart beat     PAF    Prostate cancer (HCC)      Past Surgical History:   Procedure Laterality Date    CARDIAC CATHETERIZATION N/A 10/4/2023    Procedure: Cardiac Coronary Angiogram;  Surgeon: Chris Lovell MD;  Location: [facility];  Service: Cardiology    CARDIAC CATHETERIZATION N/A 10/4/2023    Procedure: Cardiac RHC/LHC;  Surgeon: Chris Lovell MD;  Location: [facility];  Service: Cardiology    CARDIAC CATHETERIZATION  10/4/2023    Procedure: Cardiac catheterization;  Surgeon: Chris Lovell MD;  Location: [facility];  Service: Cardiology    CARDIAC ELECTROPHYSIOLOGY PROCEDURE N/A 1/4/2024    Procedure: Cardiac icd implant, Dual Chambers ICD;  Surgeon: Leo Whalen MD;  Location:  CARDIAC CATH LAB;  Service: Cardiology    EAR SURGERY      HERNIA REPAIR      AZ COLONOSCOPY FLX DX W/COLLJ SPEC WHEN PFRMD N/A 5/6/2019    Procedure: COLONOSCOPY;  Surgeon: Winston Nielson III, MD;  Location: [facility];  Service: Gastroenterology       Family History   Problem Relation Age of Onset    Stroke Father     No Known Problems Mother     Prostate cancer Brother     Arthritis Brother     No Known Problems Sister     Diabetes Sister     Diabetes Sister     No Known Problems Son     No Known Problems Daughter        Social History   Social History     Substance and Sexual Activity   Alcohol Use Not Currently     Social History     Substance and Sexual Activity   Drug Use Not Currently     Social History     Tobacco Use   Smoking Status Former    Current packs/day: 0.00    Average packs/day: 1 pack/day for 25.0 years (25.0  ttl pk-yrs)    Types: Cigarettes, Pipe, Cigars    Start date: 1980    Quit date: 2005    Years since quittin.0    Passive exposure: Past   Smokeless Tobacco Never         Meds/Allergies     Current Outpatient Medications:     Ascorbic Acid (vitamin C) 1000 MG tablet, Take 1,000 mg by mouth daily, Disp: , Rfl:     aspirin 81 mg chewable tablet, Chew 1 tablet (81 mg total) daily, Disp: 30 tablet, Rfl: 3    cetirizine (ZyrTEC) 5 MG tablet, Take 5 mg by mouth daily, Disp: , Rfl:     EVENING PRIMROSE OIL PO, Take by mouth 3 (three) times a day, Disp: , Rfl:     furosemide (LASIX) 20 mg tablet, Take 2 tablets (40 mg total) by mouth daily, Disp: 30 tablet, Rfl: 5    metFORMIN (GLUCOPHAGE) 850 mg tablet, Take 1 tablet (850 mg total) by mouth 2 (two) times a day with meals, Disp: 180 tablet, Rfl: 0    metoprolol succinate (TOPROL-XL) 25 mg 24 hr tablet, Take 1 tablet (25 mg total) by mouth daily, Disp: 30 tablet, Rfl: 3    multivitamin (THERAGRAN) TABS, Take 1 tablet by mouth daily, Disp: , Rfl:     rosuvastatin (CRESTOR) 10 MG tablet, Take 1 tablet (10 mg total) by mouth daily, Disp: 90 tablet, Rfl: 0    VITAMIN D, ERGOCALCIFEROL, PO, Take by mouth, Disp: , Rfl:     vitamin E, tocopherol, 400 units capsule, Take 400 Units by mouth daily, Disp: , Rfl:     warfarin (Coumadin) 5 mg tablet, Take one tablet daily or as directed, Disp: 90 tablet, Rfl: 3    Blood Glucose Monitoring Suppl (ONETOUCH VERIO) w/Device KIT, by Does not apply route once for 1 dose Test sugar in the morning, Disp: 1 kit, Rfl: 0    Calcium Carbonate (CALCIUM 600 PO), Take by mouth daily (Patient not taking: Reported on 3/18/2024), Disp: , Rfl:   Allergies   Allergen Reactions    Penicillin V Other (See Comments)     As a child          Review of Systems   Constitutional:  Positive for fatigue.   HENT: Negative.     Eyes:         Wears glasses  cataracts   Respiratory:  Positive for shortness of breath.    Cardiovascular: Negative.     Gastrointestinal: Negative.    Genitourinary:         Hot flashes/night sweats   Musculoskeletal:  Positive for arthralgias.   Skin: Negative.    Allergic/Immunologic: Positive for environmental allergies.   Neurological: Negative.    Hematological: Negative.    Psychiatric/Behavioral: Negative.         IPSS Questionnaire (AUA-7):  Over the past month…     1)  How often have you had a sensation of not emptying your bladder completely after you finish urinating?  1 - Less than 1 time in 5   2)  How often have you had to urinate again less than two hours after you finished urinating? 2 - Less than half the time   3)  How often have you found you stopped and started again several times when you urinated?  0 - Not at all   4) How difficult have you found it to postpone urination?  1 - Less than 1 time in 5   5) How often have you had a weak urinary stream?  5 - Almost always   6) How often have you had to push or strain to begin urination?  2 - Less than half the time   7) How many times did you most typically get up to urinate from the time you went to bed until the time you got up in the morning?  0 - None   Total Score:  11       OBJECTIVE:   /80   Pulse 66   Temp 97.5 °F (36.4 °C)   Resp 16   Wt 106 kg (233 lb)   SpO2 94%   BMI 33.43 kg/m²   Performance Status: Karnofsky: 70      Physical Exam  Vitals and nursing note reviewed.   Constitutional:       General: He is not in acute distress.     Appearance: He is well-developed.   Cardiovascular:      Rate and Rhythm: Normal rate.   Pulmonary:      Breath sounds: No wheezing, rhonchi or rales.   Abdominal:      Palpations: Abdomen is soft.      Tenderness: There is no abdominal tenderness. There is no right CVA tenderness or left CVA tenderness.   Musculoskeletal:         General: No tenderness.      Right lower leg: No edema.      Left lower leg: No edema.   Lymphadenopathy:      Cervical: No cervical adenopathy.      Upper Body:      Right upper body:  No supraclavicular adenopathy.      Left upper body: No supraclavicular adenopathy.   Neurological:      Mental Status: He is alert and oriented to person, place, and time.              ASSESSMENT  1. Prostate cancer (HCC)  PSA Total, Diagnostic        Cancer Staging   Prostate cancer (HCC)  Staging form: Prostate, AJCC 8th Edition  - Clinical: Stage JAIRO (cT2b, cN1, cM0, PSA: 22, Grade Group: 5) - Signed by Erik Elliott MD on 6/20/2019  Prostate specific antigen (PSA) range: 20 or greater  Histologic grading system: 5 grade system        PLAN/DISCUSSION  Orders Placed This Encounter   Procedures    PSA Total, Diagnostic        Galen Carson is a 73 y.o.man with significant and recent cardiac comorbidities and  history of stage JAIRO GS 9 (5+4) prostate cancer with lymph node metastases status post definitive radiation therapy and 2 years of ADT completed on 10/18/19.  Final Lupron injection 2/2021. PSA aguilar was 0.2, but was noted to be elevated in August 2023 and has continued to rapidly rise since that time.  His work-up showed concerns for local recurrence involving the prostate without definite metastatic disease.  Due to new onset cardiac arrythmia with cardiac complications he could not undergo biopsy at that time and treatment was held while he underwent work-up, treatment and ultimately ICD placement.  1/24/24 PSA was 8.48.      I reviewed his previous radiation plan and most recent imaging.  There is broad areas of concern for recurrence within the prostate.  His doubling time is short and I recommended another PSA check at this point.     I reviewed patient's case with my Radiation Oncology colleagues as well.  He is not a candidate for external beam salvage radiation.  He was not felt to be a good candidate for salvage SBRT.  If he can undergo biopsy and general anesthesia, then recommendation was made for consideration for salvage brachytherapy.  I explained that this has demonstrated success in terms  "of local control with less morbidity than seen in radical prostatectomy.  The treatment is outpatient.  The procedure does still entail risk and toxicity and salvage rates are at best 50%, although given his presentation it is likely significantly lower.    The patient was interested in consultation.  I will refer him to [facility] for consultation and consideration.    Alternatively, we discussed that focal HIFU and cryotherapy may also be local treatment options.  Cryotherapy is available at [facility], but HIFU is currently not performed at our institution.  [facility] likely will have specialists who could consider the patient for this treatment.  Again, this would be dependent on obtaining cardiac clearance.    If he is not cleared for invasive procedures or chooses not to proceed, then he can still be considered for ADT therapy.  There is risk to long term ADT therapy, but intermittent therapy with Firmagon or other agents with better cardiac toxicity profile would be an option as he did respond in the past.    The patient will take referral to [facility].  As we cannot offer salvage radiation at [facility] at this time, he will follow-up with Urology and Medical Oncology for continued coordination and management.  We will be happy to see him again should the need arise.  We will contact him when his PSA is available.  He was in agreement with this plan.    Total Time Spent  55 minutes spent reviewing EMR in preparation for visit, with the patient, coordination with other providers, and documentation. Greater than 50% of total time was spent with the patient and/or family for counseling and/or coordination of care.     Tereza Harvey MD  3/18/2024,11:14 AM      Portions of the record may have been created with voice recognition software.  Occasional wrong word or \"sound a like\" substitutions may have occurred due to the inherent limitations of voice recognition software.  Read the chart carefully and recognize, using context, " where substitutions have occurred.

## 2024-03-25 DIAGNOSIS — E11.8 TYPE 2 DIABETES MELLITUS WITH COMPLICATION, WITHOUT LONG-TERM CURRENT USE OF INSULIN (HCC): ICD-10-CM

## 2024-03-27 ENCOUNTER — ANTICOAG VISIT (OUTPATIENT)
Dept: CARDIOLOGY CLINIC | Facility: CLINIC | Age: 74
End: 2024-03-27

## 2024-03-27 ENCOUNTER — LAB (OUTPATIENT)
Dept: LAB | Facility: CLINIC | Age: 74
End: 2024-03-27
Payer: MEDICARE

## 2024-03-27 DIAGNOSIS — I48.0 PAROXYSMAL ATRIAL FIBRILLATION (HCC): ICD-10-CM

## 2024-03-27 DIAGNOSIS — C61 PROSTATE CANCER (HCC): ICD-10-CM

## 2024-03-27 LAB
INR PPP: 2.61 (ref 0.84–1.19)
PROTHROMBIN TIME: 27.4 SECONDS (ref 11.6–14.5)
PSA SERPL-MCNC: 8.14 NG/ML (ref 0–4)

## 2024-03-27 PROCEDURE — 36415 COLL VENOUS BLD VENIPUNCTURE: CPT

## 2024-03-27 PROCEDURE — 85610 PROTHROMBIN TIME: CPT

## 2024-03-27 PROCEDURE — 84153 ASSAY OF PSA TOTAL: CPT

## 2024-04-09 ENCOUNTER — OFFICE VISIT (OUTPATIENT)
Dept: CARDIOLOGY CLINIC | Facility: CLINIC | Age: 74
End: 2024-04-09
Payer: MEDICARE

## 2024-04-09 VITALS
OXYGEN SATURATION: 98 % | RESPIRATION RATE: 16 BRPM | WEIGHT: 234 LBS | DIASTOLIC BLOOD PRESSURE: 80 MMHG | HEIGHT: 70 IN | SYSTOLIC BLOOD PRESSURE: 130 MMHG | HEART RATE: 95 BPM | BODY MASS INDEX: 33.5 KG/M2

## 2024-04-09 DIAGNOSIS — I42.0 DILATED CARDIOMYOPATHY (HCC): Primary | ICD-10-CM

## 2024-04-09 DIAGNOSIS — E11.8 TYPE 2 DIABETES MELLITUS WITH COMPLICATION, WITHOUT LONG-TERM CURRENT USE OF INSULIN (HCC): ICD-10-CM

## 2024-04-09 DIAGNOSIS — I48.0 PAROXYSMAL ATRIAL FIBRILLATION (HCC): ICD-10-CM

## 2024-04-09 DIAGNOSIS — I10 ESSENTIAL HYPERTENSION: ICD-10-CM

## 2024-04-09 DIAGNOSIS — E78.2 MIXED HYPERLIPIDEMIA: ICD-10-CM

## 2024-04-09 DIAGNOSIS — Z95.810 ICD (IMPLANTABLE CARDIOVERTER-DEFIBRILLATOR) IN PLACE: ICD-10-CM

## 2024-04-09 PROCEDURE — 99214 OFFICE O/P EST MOD 30 MIN: CPT

## 2024-04-09 RX ORDER — FUROSEMIDE 20 MG/1
40 TABLET ORAL DAILY
Qty: 180 TABLET | Refills: 2 | Status: SHIPPED | OUTPATIENT
Start: 2024-04-09

## 2024-04-09 NOTE — PROGRESS NOTES
"[facility] Cardiology   Office Visit    Galen Carson 73 y.o. male MRN: [id number]    04/09/24        Assessment/Plan:  1.  NICM with EF 26% s/p MDT DC ICD (1/4/2024)  Cardiac catheterization in 10/2023 negative for evidence of significant obstructive CAD.  Continue Toprol-XL and Lasix 40 mg daily.  Follows with the device clinic. Further GDMT limited due to tendency for hyperkalemia.    2.  Paroxysmal atrial fibrillation  HR is well-controlled, continue Toprol-XL.  [id number] 4, continue Coumadin.  Reviewed indications/risk/benefits of AC.  Follows with the Coumadin clinic.    3.  Junctional tachycardia, NSVT, PSVT, PVCs  See plan above.    4.  Hypertension  BP is well controlled.  Continue Toprol-XL and Lasix.  Encourage ambulatory monitoring and low-sodium diet.    5.  Hyperlipidemia/hypertriglyceridemia  Continue rosuvastatin and dietary control.    6.  T2DM, A1c 6.9  7.  Mild pulmonary hypertension  8.  CKD 3        HPI: Galen Carson is a 73 y.o. year old male with history of NICM s/p MDT DC ICD, paroxysmal atrial fibrillation on Coumadin, junctional tachycardia, NSVT, PSVT, PVCs, hypertension, hyperlipidemia/hypertriglyceridemia, T2DM, pulmonary hypertension, and CKD 3 who presents for office visit.  During previous office visit with cardiology patient was initiated on Toprol-XL and dosage of Lasix was increased to 40 mg daily.  Patient reports she has been fairly well overall from a cardiac standpoint since last visit.  Patient does continue to experience intermittent \"tugging\" sensation around PPM site, however this is gradually improving.  Incision has healed well without bleeding, erythema, bruising, or discharge.  He is established with our device clinic.  Notes mild LE edema which is at which is at baseline.  Reports weight has been stable.  Denies any additional cardiac complaints this time endorses compliance to all medications and low-sodium diet.  Denies adverse effects to current " "medications.  Patient was previously on lisinopril which was discontinued due to dry cough.  Patient follows with the Coumadin clinic for monitoring of INR.  Follows with Dr. Hernadnez his primary cardiologist.    Review of Systems:  Review of Systems   Constitutional:  Negative for chills and fever.   HENT:  Negative for ear pain and sore throat.    Eyes:  Negative for pain and visual disturbance.   Respiratory:  Negative for cough and shortness of breath.    Cardiovascular:  Negative for chest pain and palpitations. Leg swelling: at baseline.  Gastrointestinal:  Negative for abdominal pain and vomiting.   Genitourinary:  Negative for dysuria and hematuria.   Musculoskeletal:  Negative for arthralgias and back pain.   Skin:  Negative for color change and rash.   Neurological:  Negative for seizures and syncope.   All other systems reviewed and are negative.      PHYSICAL EXAM:  Vitals:   Vitals:    04/09/24 0849   BP: 130/80   BP Location: Left arm   Patient Position: Sitting   Pulse: 95   Resp: 16   SpO2: 98%   Weight: 106 kg (234 lb)   Height: 5' 10\" (1.778 m)        Physical Exam:  GEN: Alert and oriented x 3, in no acute distress.  Well appearing and well nourished.   HEENT: Sclera anicteric, conjunctivae pink, mucous membranes moist. Oropharynx clear.   NECK: Supple, no carotid bruits, no significant JVD. Trachea midline, no thyromegaly.   HEART: Regular rhythm, normal S1 and S2, no murmurs, clicks, gallops or rubs. PMI nondisplaced, no thrills.   LUNGS: Clear to auscultation bilaterally; no wheezes, rales, or rhonchi. No increased work of breathing or signs of respiratory distress.   ABDOMEN: Soft, nontender, nondistended, normoactive bowel sounds.   EXTREMITIES: Skin warm and well perfused, no clubbing or cyanosis.  Trace BL LE edema.  NEURO: No focal findings. Normal speech. Mood and affect normal.   SKIN: Normal without suspicious lesions on exposed skin.    Follow up: 3 months or sooner as " needed    Allergies   Allergen Reactions    Penicillin V Other (See Comments)     As a child          Current Outpatient Medications:     Ascorbic Acid (vitamin C) 1000 MG tablet, Take 1,000 mg by mouth daily, Disp: , Rfl:     aspirin 81 mg chewable tablet, Chew 1 tablet (81 mg total) daily, Disp: 30 tablet, Rfl: 3    Blood Glucose Monitoring Suppl (ONETOUCH VERIO) w/Device KIT, by Does not apply route once for 1 dose Test sugar in the morning, Disp: 1 kit, Rfl: 0    cetirizine (ZyrTEC) 5 MG tablet, Take 5 mg by mouth daily, Disp: , Rfl:     EVENING PRIMROSE OIL PO, Take by mouth 3 (three) times a day, Disp: , Rfl:     furosemide (LASIX) 20 mg tablet, Take 2 tablets (40 mg total) by mouth daily, Disp: 180 tablet, Rfl: 2    metFORMIN (GLUCOPHAGE) 850 mg tablet, Take 1 tablet (850 mg total) by mouth 2 (two) times a day with meals, Disp: 180 tablet, Rfl: 0    metoprolol succinate (TOPROL-XL) 25 mg 24 hr tablet, Take 1 tablet (25 mg total) by mouth daily, Disp: 30 tablet, Rfl: 3    multivitamin (THERAGRAN) TABS, Take 1 tablet by mouth daily, Disp: , Rfl:     rosuvastatin (CRESTOR) 10 MG tablet, Take 1 tablet (10 mg total) by mouth daily, Disp: 90 tablet, Rfl: 0    VITAMIN D, ERGOCALCIFEROL, PO, Take by mouth, Disp: , Rfl:     vitamin E, tocopherol, 400 units capsule, Take 400 Units by mouth daily, Disp: , Rfl:     warfarin (Coumadin) 5 mg tablet, Take one tablet daily or as directed, Disp: 90 tablet, Rfl: 3    Calcium Carbonate (CALCIUM 600 PO), Take by mouth daily (Patient not taking: Reported on 3/18/2024), Disp: , Rfl:     Past Medical History:   Diagnosis Date    Anesthesia     pt wears a life vest (11/16).  should not be scheduled at [facility] until heart condition is stabilized    BPH (benign prostatic hypertrophy)     Cancer (HCC)     Diabetes mellitus (HCC)     Hyperlipidemia     Hypertension     Irregular heart beat     PAF    Prostate cancer (HCC)        Family History   Problem Relation Age of Onset    Stroke  Father     No Known Problems Mother     Prostate cancer Brother     Arthritis Brother     No Known Problems Sister     Diabetes Sister     Diabetes Sister     No Known Problems Son     No Known Problems Daughter        Past Medical History:   Diagnosis Date    Anesthesia     pt wears a life vest (11/16).  should not be scheduled at [facility] until heart condition is stabilized    BPH (benign prostatic hypertrophy)     Cancer (HCC)     Diabetes mellitus (HCC)     Hyperlipidemia     Hypertension     Irregular heart beat     PAF    Prostate cancer (HCC)        Past Surgical History:   Procedure Laterality Date    CARDIAC CATHETERIZATION N/A 10/4/2023    Procedure: Cardiac Coronary Angiogram;  Surgeon: Chris Lovell MD;  Location: [facility] CARDIAC [facility];  Service: Cardiology    CARDIAC CATHETERIZATION N/A 10/4/2023    Procedure: Cardiac RHC/LHC;  Surgeon: Chris Lovell MD;  Location: [facility];  Service: Cardiology    CARDIAC CATHETERIZATION  10/4/2023    Procedure: Cardiac catheterization;  Surgeon: Chris Lovell MD;  Location: [facility];  Service: Cardiology    CARDIAC ELECTROPHYSIOLOGY PROCEDURE N/A 1/4/2024    Procedure: Cardiac icd implant, Dual Chambers ICD;  Surgeon: Leo Whalen MD;  Location:  CARDIAC CATH LAB;  Service: Cardiology    EAR SURGERY      HERNIA REPAIR      [place] COLONOSCOPY FLX DX W/COLLJ SPEC WHEN PFRMD N/A 5/6/2019    Procedure: COLONOSCOPY;  Surgeon: Winston Nielson III, MD;  Location: [facility] GI LAB;  Service: Gastroenterology       Social History     Socioeconomic History    Marital status:      Spouse name: Not on file    Number of children: Not on file    Years of education: Not on file    Highest education level: Not on file   Occupational History    Not on file   Tobacco Use    Smoking status: Former     Current packs/day: 0.00     Average packs/day: 1 pack/day for 25.0 years (25.0 ttl pk-yrs)     Types: Cigarettes, Pipe, Cigars     Start date: 2/26/1980      "Quit date: 2005     Years since quittin.1     Passive exposure: Past    Smokeless tobacco: Never   Vaping Use    Vaping status: Never Used   Substance and Sexual Activity    Alcohol use: Not Currently    Drug use: Not Currently    Sexual activity: Not Currently   Other Topics Concern    Not on file   Social History Narrative    Not on file     Social Determinants of Health     Financial Resource Strain: Low Risk  (2024)    Overall Financial Resource Strain (CARDIA)     Difficulty of Paying Living Expenses: Not hard at all   Food Insecurity: Not on file   Transportation Needs: No Transportation Needs (2024)    PRAPARE - Transportation     Lack of Transportation (Medical): No     Lack of Transportation (Non-Medical): No   Physical Activity: Not on file   Stress: Not on file   Social Connections: Not on file   Intimate Partner Violence: Not on file   Housing Stability: Not on file             LABORATORY RESULTS:    Lab Results   Component Value Date    WBC 5.95 2024    HGB 12.9 2024    HCT 39.7 2024    MCV 91 2024     2024     Lab Results   Component Value Date    CALCIUM 9.1 2024    K 5.7 (H) 2024    CO2 28 2024     2024    BUN 40 (H) 2024    CREATININE 1.37 (H) 2024     Lab Results   Component Value Date    HGBA1C 6.9 (H) 2024       Lipid Profile:   No results found for: \"CHOL\"  Lab Results   Component Value Date    HDL 41 2023    HDL 41 2023    HDL 42 2022     Lab Results   Component Value Date    LDLCALC 86 2023    LDLCALC 79 2023    LDLCALC 117 (H) 2022     Lab Results   Component Value Date    TRIG 271 (H) 2023    TRIG 270 (H) 2023    TRIG 381 (H) 2022       The 10-year ASCVD risk score (Scott BLANCO, et al., 2019) is: 42.8%    Values used to calculate the score:      Age: 73 years      Sex: Male      Is Non- : No      Diabetic: Yes     "  Tobacco smoker: No      Systolic Blood Pressure: 130 mmHg      Is BP treated: Yes      HDL Cholesterol: 41 mg/dL      Total Cholesterol: 181 mg/dL    1. Dilated cardiomyopathy (HCC)  furosemide (LASIX) 20 mg tablet      2. ICD (implantable cardioverter-defibrillator) in place        3. Paroxysmal atrial fibrillation (HCC)        4. Essential hypertension        5. Mixed hyperlipidemia        6. Type 2 diabetes mellitus with complication, without long-term current use of insulin (HCC)            Imaging: I have personally reviewed pertinent reports.        Recommend aggressive risk factor modification and therapeutic lifestyle changes.  Low-salt, low-calorie, low-fat, low-cholesterol diet with regular exercise and to optimize weight.    Discussed concepts of atherosclerosis, including signs and symptoms of cardiac disease.    Medications reviewed and possible side effects discussed.  Previous studies were reviewed.    Safety measures were reviewed.  All questions and concerns addressed.  Patient was advised to report any problems requiring medical attention.    Follow-up with PCP and appropriate specialist and lab work as discussed.    Return for follow up visit as scheduled or earlier, if needed.  Thank you for allowing me to participate in the care and evaluation of your patient.  Should you have any questions, please feel free to contact me.    Partha Colon PA-C  4/9/2024,9:32 AM

## 2024-04-17 ENCOUNTER — APPOINTMENT (OUTPATIENT)
Dept: LAB | Facility: CLINIC | Age: 74
End: 2024-04-17
Payer: MEDICARE

## 2024-04-17 ENCOUNTER — ANTICOAG VISIT (OUTPATIENT)
Dept: CARDIOLOGY CLINIC | Facility: CLINIC | Age: 74
End: 2024-04-17

## 2024-04-17 ENCOUNTER — TELEPHONE (OUTPATIENT)
Dept: UROLOGY | Facility: CLINIC | Age: 74
End: 2024-04-17

## 2024-04-17 ENCOUNTER — OFFICE VISIT (OUTPATIENT)
Dept: HEMATOLOGY ONCOLOGY | Facility: CLINIC | Age: 74
End: 2024-04-17
Payer: MEDICARE

## 2024-04-17 VITALS
HEIGHT: 70 IN | RESPIRATION RATE: 18 BRPM | TEMPERATURE: 98.2 F | WEIGHT: 236 LBS | SYSTOLIC BLOOD PRESSURE: 128 MMHG | HEART RATE: 68 BPM | BODY MASS INDEX: 33.79 KG/M2 | DIASTOLIC BLOOD PRESSURE: 80 MMHG | OXYGEN SATURATION: 95 %

## 2024-04-17 DIAGNOSIS — C61 PROSTATE CANCER (HCC): Primary | ICD-10-CM

## 2024-04-17 DIAGNOSIS — I48.0 PAROXYSMAL ATRIAL FIBRILLATION (HCC): ICD-10-CM

## 2024-04-17 DIAGNOSIS — Z95.810 ICD (IMPLANTABLE CARDIOVERTER-DEFIBRILLATOR) IN PLACE: ICD-10-CM

## 2024-04-17 DIAGNOSIS — I42.0 DILATED CARDIOMYOPATHY (HCC): ICD-10-CM

## 2024-04-17 DIAGNOSIS — I10 ESSENTIAL HYPERTENSION: ICD-10-CM

## 2024-04-17 LAB
INR PPP: 2.51 (ref 0.84–1.19)
PROTHROMBIN TIME: 26.6 SECONDS (ref 11.6–14.5)

## 2024-04-17 PROCEDURE — G2211 COMPLEX E/M VISIT ADD ON: HCPCS | Performed by: INTERNAL MEDICINE

## 2024-04-17 PROCEDURE — 85610 PROTHROMBIN TIME: CPT

## 2024-04-17 PROCEDURE — 99214 OFFICE O/P EST MOD 30 MIN: CPT | Performed by: INTERNAL MEDICINE

## 2024-04-17 PROCEDURE — 36415 COLL VENOUS BLD VENIPUNCTURE: CPT

## 2024-04-17 NOTE — PROGRESS NOTES
Hematology/Oncology Outpatient Follow- up Note  Galen Carson 73 y.o. male MRN: @ Encounter: [id number]        Date:  4/17/2024        Assessment / Plan:    1 prostate cancer with rising PSA.  Initially treated with RT and ADT in 10/18/2019  2 nonischemic dilated cardiomyopathy with ICD placement ejection fraction estimate 26%  3 atrial fibrillation nonsustained SVT  Plan: Patient will be seen by urology again.  He will be offered ADT.  This is suggestion [facility].  He was seen by cardiology.  They do need to make a comment whether he would be able to tolerate any interventions cardiac wise.  This will be particularly important if in fact the disease does not seem to be systemic and after treatment ADT he might be candidate for localized therapy.  Some of those treatments will require metastatic and I do not know if he will be candidate for that.  He will come back here in 3 months.        HPI: 73-year-old gentleman with history of PSA héctor to 8.7 and is now 8.4.  He was seen by telehealth at the [facility].  They had discussion was that they were recommendation was to consider hormonal therapy first with a prostate biopsy they would recommend ADT and repeat in 6 months and then in 9 months or so to repeat his PSMA scan see what his lesions look like.  He had a question of abnormality in the lymph nodes and we will see what they are there.  Depending on those results whether the consideration for any further local therapy would be will be discussed.  I spoke with urology Dr. Yu who will see the patient and make arranges for both biopsy and use of Lupron.  Otherwise the patient is stable.  He will return in 3 months here          Interval History: Note from 2/22/2024:  Assessment / Plan  1 prostate adenocarcinoma with rising PSA.  Initially treated with RT and ADT in 10/18/2019.  2 nonischemic cardiomyopathy with ICD placement done  3 atrial fibrillation/nonsustained  SVT  Plan: Patient is to be seen by radiation therapy and by urology to see if he would be candidate for any kind of localized therapy.  He will come back here in 2 months.  If not a candidate for any localized therapy may be candidate for systemic treatment with LHRH antagonist.  HPI: 73-year-old gentleman with a history of prostate cancer 2019.  Has a rising PSA.  PSMA and MRI of the prostate suggest disease localized to the prostate alone.  He may be candidate for localized therapy.  He has cardiac issues apparently they are stable at this time.  He is followed by cardiology.  I talked to him and he was being seen by radiation therapy and will be seen by urology as well.  They will decide about whether he would be candidate for local treatment if not consideration for hormonal treatment could be had.  I believe hormonal treatment with LHRH antagonist along and watch his PSA closely.  Interval History: Note from 120 6/2024:  Prostatic adenocarcinoma, Penitas score 4 + 5 = 9. Initial Dx 5/2019, Stage JAIRO T2b N1 M0 PSA 22 grade group 5. S/p definitive radiation therapy with neoadjuvant and concurrent androgen deprivation. Completed RT on 10/18/19. Final Lupron was given 2/10/21.    During work-up for elevated PSA the patient underwent MRI prostate-- tumor in the anterior and posterior right peripheral zone apex and mid gland and right transition zone at apex corresponding to the PSMA avid lesion on prior PET/CT. The lesion broadly abuts the capsule without visualized gross extraprostatic extension. No seminal vesicle invasion, pelvic lymphadenopathy, or pelvic osseous metastatic disease. MRI fusion biopsy was cancelled due to anaesthesia concerns since the patient has a vest and significant cardiac issues--non-ischemic cardiomyopathy,Atrial fibrillation,Junctional tachycardia, NSVT, PSVT, PVCs. Scheduled for ICD placement on 1/4/23.  PSA on 12/6/23-- elevated at 5.11. Free testosterone 15, total testosterone 282.  Scheduled for ICD placement this week. F/u in 2 weeks for treatment planning. The patient should be considered for focal cryotherapy by urology.   I have the patient coming back in 3 weeks.  I will try and finalize what we can offer him.  He will have to decide if he wants local intervention that he would probably need to be done down at the main campus at [facility].  we will discuss with him and finalize that decision.  By his PSMA scan he appears to be a localized recurrence still.  HPI: 73-year-old gentleman with history of prostate adenocarcinoma.  He had a rising PSA level it is risen up to 8.  He had a PSMA scan which showed abnormality in the prostatic bed.  His initial treatment was in 2019 he was noted to have Elder's grade 9 carcinoma.  He has cardiac problems he is atrial fibs and was recently placed a device to a pacemaker to try and help that.  He no definitive evidence of disease outside the capsule.  The plan was to consider the possibility of whether he may be candidate for localized therapy such as cryotherapy.  I spoke with the patient who was not really aware of his situation.  He asked what he could do.  I explained we could offer him endocrine therapy or possible localized therapy with cryotherapy.  He asked where that would be done I explained it would probably have to be done down in the main part of [facility].  He was somewhat resistant to doing that.  However he will think about that again.  I spoke with radiation and felt he was not a candidate for any particular seeding of radiation.  I spoke with urology who will be able to see him and discuss whether he would be willing to go down to have his treatments done      Cancer Staging:  Cancer Staging   Prostate cancer (HCC)  Staging form: Prostate, AJCC 8th Edition  - Clinical: Stage JAIRO (cT2b, cN1, cM0, PSA: 22, Grade Group: 5) - Signed by Erik Elliott MD on 6/20/2019  Prostate specific antigen (PSA) range: 20 or greater  Histologic  grading system: 5 grade system      Molecular Testing:     Previous Hematologic/ Oncologic History:    Oncology History   Prostate cancer (HCC)   5/15/2019 Initial Diagnosis    Prostate cancer (HCC)     5/15/2019 Biopsy    Final Diagnosis  A. Prostate, right lateral base, core needle biopsy: Benign prostatic stroma. No malignancy is identified.     B. Prostate, right medial base, core needle biopsy:             - Prostatic adenocarcinoma, Woodgate score 4 + 5 = 9, Prognostic Grade Group V, involving nearly 100% of one core biopsy.     C. Prostate, right lateral mid, core needle biopsy:             - Prostatic adenocarcinoma, Elder score 5 + 4 = 9, Prognostic Grade Group V, involving nearly 100% one core biopsy.     D. Prostate, right medial mid, core needle biopsy:             - Prostatic adenocarcinoma, Elder score 4 + 5 = 9, Prognostic Grade Group V, involving nearly 100% of one core biopsy.     E. Prostate, right lateral apex, core needle biopsy:No malignancy is identified.     F. Prostate, right medial apex, core needle biopsy:             - Prostatic adenocarcinoma, Woodgate score 5 + 4 = 9, Prognostic Grade Group V, involving nearly 100% of one core biopsy.     G. Prostate, left lateral base, core needle biopsy:- Atypical prostate glands, indeterminate for prostatic adenocarcinoma.     H. Prostate, left medial base, core needle biopsy:             - Prostatic adenocarcinoma, Woodgate score 5 + 4 =9, Prognostic Grade Group V, involving 15% of one core biopsy.     I. Prostate, left lateral mid, core needle biopsy: Benign prostate glands. No malignancy is identified.  J. Prostate, left medial mid, core needle biopsy: Benign prostate glands. No malignancy is identified.     K. Prostate, left lateral apex, core needle biopsy:             - Prostatic adenocarcinoma, Woodgate score 5 + 4 = 9, Prognostic Grade Group V, involving 30% of one of 2 cores.     L. Prostate, left medial apex, core needle biopsy:              "- Prostatic adenocarcinoma, Custer score 5 + 4 =  9, Prognostic Grade Group V, discontinuously involving approximately 50% of 1 core biopsy.         6/20/2019 -  Cancer Staged    Staging form: Prostate, AJCC 8th Edition  - Clinical: Stage JAIRO (cT2b, cN1, cM0, PSA: 22, Grade Group: 5) - Signed by Erik Elliott MD on 6/20/2019  Prostate specific antigen (PSA) range: 20 or greater  Histologic grading system: 5 grade system       6/28/2019 - 6/28/2019 Hormone Therapy    Firmagon     8/1/2019 - 5/10/2021 Hormone Therapy    Lupron for 2 years     8/19/2019 - 10/18/2019 Radiation    4500 centigray in 25 fractions to the prostate, seminal vesicles and regional pelvic lymphatics followed by an additional 1440 centigray in 8 fractions to the prostate, seminal vesicles, and gross lymphadenopathy with the final 11 fractions of an additional 1980 centigray to the prostate and proximal seminal vesicles for a total dose of 7920 centigray in 44 fractions.          Current Hematologic/ Oncologic Treatment:       Cycle 1         Test Results:    Imaging: No results found.          Labs:   Lab Results   Component Value Date    WBC 5.95 02/14/2024    HGB 12.9 02/14/2024    HCT 39.7 02/14/2024    MCV 91 02/14/2024     02/14/2024     Lab Results   Component Value Date    K 5.7 (H) 03/13/2024     03/13/2024    CO2 28 03/13/2024    BUN 40 (H) 03/13/2024    CREATININE 1.37 (H) 03/13/2024    GLUF 118 (H) 03/13/2024    CALCIUM 9.1 03/13/2024    AST 20 02/14/2024    ALT 19 02/14/2024    ALKPHOS 57 02/14/2024    EGFR 51 03/13/2024         No results found for: \"SPEP\", \"UPEP\"    Lab Results   Component Value Date    PSA 8.14 (H) 03/27/2024    PSA 8.48 (H) 01/24/2024    PSA 5.11 (H) 12/06/2023       No results found for: \"CEA\"    No results found for: \"\"    No results found for: \"AFP\"    No results found for: \"IRON\", \"TIBC\", \"FERRITIN\"    No results found for: \"TTUBAFPU02\"      ROS: Review of Systems   Constitutional: " Negative.    HENT: Negative.     Eyes: Negative.    Respiratory: Negative.     Cardiovascular: Negative.    Gastrointestinal: Negative.    Endocrine: Negative.    Genitourinary: Negative.    Musculoskeletal: Negative.    Skin: Negative.    Allergic/Immunologic: Negative.    Neurological: Negative.          Current Medications: Reviewed  Allergies: Reviewed  PMH/FH/SH:  Reviewed      Physical Exam:    Body surface area is 2.24 meters squared.    Wt Readings from Last 3 Encounters:   04/17/24 107 kg (236 lb)   04/09/24 106 kg (234 lb)   03/18/24 106 kg (233 lb)        Temp Readings from Last 3 Encounters:   04/17/24 98.2 °F (36.8 °C) (Temporal)   03/18/24 97.5 °F (36.4 °C)   03/15/24 97.6 °F (36.4 °C)        BP Readings from Last 3 Encounters:   04/17/24 128/80   04/09/24 130/80   03/18/24 170/80         Pulse Readings from Last 3 Encounters:   04/17/24 68   04/09/24 95   03/18/24 66     @LASTSAO2(3)@      Physical Exam  Constitutional:       Appearance: Normal appearance. He is obese.   HENT:      Head: Normocephalic and atraumatic.   Eyes:      Extraocular Movements: Extraocular movements intact.      Conjunctiva/sclera: Conjunctivae normal.      Pupils: Pupils are equal, round, and reactive to light.   Cardiovascular:      Rate and Rhythm: Normal rate and regular rhythm.      Heart sounds: Normal heart sounds.   Pulmonary:      Effort: Pulmonary effort is normal.      Breath sounds: Normal breath sounds.   Abdominal:      General: Abdomen is flat. Bowel sounds are normal.      Palpations: Abdomen is soft.   Musculoskeletal:         General: Normal range of motion.      Cervical back: Normal range of motion and neck supple.   Skin:     General: Skin is warm and dry.   Neurological:      General: No focal deficit present.      Mental Status: He is alert and oriented to person, place, and time. Mental status is at baseline.     No major leg edema.      Goals and Barriers:  Current Goal: Prolong Survival from Cancer.    Barriers: None.      Patient's Capacity to Self Care:  Patient is able to self care.    Code Status: [unfilled]  Advance Directive and Living Will:      Power of :

## 2024-04-17 NOTE — TELEPHONE ENCOUNTER
Patient seen by me in the office on 3/15/2024.  Has complex urologic history.  In summary he was diagnosed with high risk prostate cancer and treated with radiation in 2019.  He completed a 2-year course of ADT in February 2021.  He had PSA recurrence in 2023 prompting a workup.  He had a PSMA scan in September 2023 which demonstrated tracer activity in the prostate concerning for local recurrence, but there were no signs of metastatic disease.  He also had a prostate MRI in October 2023 which demonstrated a lesion corresponding to the PSMA avid lesion.  Plan at that time was for him to have transperineal prostate biopsy with fusion guidance in the OR.  He was unfortunately not able to have the procedure due to a LifeVest being placed.    He fortunately had a dual-chamber ICD implant in January 2024.    He was seen by radiation oncology who referred him to [facility] regarding possible local therapy.    He saw [facility] radiation oncology on 4/4/2024.  It was recommended that he resume ADT and get a repeat prostate biopsy.      The above information was discussed with medical oncologist Dr. Barreto.  Him and I agree that the patient has told both of us that he is not willing to travel outside of the [facility] for his prostate biopsy.  He therefore will not be able to get an MRI fusion transperineal prostate biopsy.  I will therefore plan on having him get an office transrectal ultrasound standard 12 core biopsy.  I will also plan on him getting his Lupron at the same time.  We will plan on the 6-month dosing of Lupron.    We will need permission from cardiology to hold his warfarin 5 days before the procedure.  His aspirin 81 mg does not need to be held before the procedure.    I will coordinate with my team and they will reach out to the patient about the timing of the procedure and the ADT.

## 2024-04-19 NOTE — TELEPHONE ENCOUNTER
Spoke with patient, advised of providers recommendations for prostate biopsy and 45mg lupron injection. Advised patient I tentatively scheduled him for 5/1/24 at 8:30am in [place] with Dr. Waite. Advised patient of fleet enema 2-3 hours prior to procedure per provider. I also advised patient to contact his cardiologist to hold coumadin 5 days prior to prostate biopsy. Patient verbalized understanding and will contact his cardiologist either today or Monday regarding coumadin. I advised patient to contact office with any questions or concerns regarding the procedure and injection. Patient verbalized understanding and thankful for call.

## 2024-04-23 ENCOUNTER — TELEPHONE (OUTPATIENT)
Dept: RADIATION ONCOLOGY | Facility: CLINIC | Age: 74
End: 2024-04-23

## 2024-04-23 NOTE — TELEPHONE ENCOUNTER
Called Galen. He is scheduled for prostate biopsy and Lupron on 5/1/24. He had consult with Dr. Sandhu at [facility]. Dr. Sandhu's recommendation  is prostate biopsy, 6 months ADT then reassess PSA and PSMA in about 9 months. Per Dr. Harvey, we will not schedule follow up at this time and Urology can refer back after PSMA/ PSA if needed. He verbalized understanding.

## 2024-04-30 NOTE — PROGRESS NOTES
Galen Carson is a 73 y.o. male with prostate cancer     Pertinent non-urologic PMH: DM (hemoglobin A1c 6.9 on 2/14/2024), HLD, HTN, CKD (creatinine 1.37, GFR 51 on 3/13/2024); a fib     Pertinent non-urologic PSH: Cardiac catheterization without stents, hernia repair, dual chamber ICD implant Jan 2024     Anticoagulation: ASA81, Warfarin     History of high risk prostate cancer treated with radiation in 2019 he completed 2-year course of ADT in February 2021     Pretreatment PSA was 22.4.  Morales PSA was 0.0.     Had PSA recurrence which prompted workup below     PSMA PET scan 9/25/2023: Heterogenous multifocal fairly intense tracer activity in the right greater than left prostate gland highly suspicious for locally recurrent intraprostatic PSMA positive malignancy; no focal tracer activity concerning for metastatic disease     Prostate MRI 10/13/2023: Tumor in anterior and posterior right peripheral zone apex and mid gland and right transition zone at apex corresponds to PSMA avid lesion; the lesion broadly abuts the capsule without visualized gross extraprostatic extension; no SVI/LAD/bony metastasis; prostate 29 g     Given the positive lesion seen on imaging, plan was for patient to undergo MRI fusion biopsy.  Unfortunately, the case was canceled by anesthesia due to the patient's cardiac arrhythmia requiring LifeVest.  Since he cannot undergo anesthesia, there is consideration for alternate therapy.     Patient had dual-chamber ICD implant 1/4/2024     PSA 8.48 on 1/24/2024     Saw Dr. Barreto of hematology oncology on 2/21/2024. Option for repeat local therapy versus hormonal therapy with LHRH antagonist along with closely watching PSA.    PSA 8.14 on 3/27/24    Continue patient's desire to not travel outside the [facility] for his biopsy, we opted to do standard office 12 core biopsy.  We also discussed starting Lupron at the same time.    Lupron q6 month on 5/1/24          Biopsy prostate     Date/Time  5/1/2024  "8:30 AM     Performed by  Estevan Waite DO   Authorized by  Estevan Waite DO     Universal Protocol   Consent: Verbal consent obtained. Written consent obtained.  Risks and benefits: risks, benefits and alternatives were discussed  Consent given by: patient  Time out: Immediately prior to procedure a \"time out\" was called to verify the correct patient, procedure, equipment, support staff and site/side marked as required.  Timeout called at: 5/1/2024 9:26 AM.  Patient understanding: patient states understanding of the procedure being performed  Patient consent: the patient's understanding of the procedure matches consent given  Procedure consent: procedure consent matches procedure scheduled  Relevant documents: relevant documents present and verified  Required items: required blood products, implants, devices, and special equipment available  Patient identity confirmed: verbally with patient      Local anesthesia used: yes      Anesthesia: local infiltration     Anesthesia   Local anesthesia used: yes  Local Anesthetic: lidocaine 2% without epinephrine  Anesthetic total: 10 mL     Sedation   Patient sedated: no        Specimen: yes (12 cores)    Culture: no   Procedure Details   Procedure Notes: Office TRUS-guided Prostate Biopsy Procedure Note    Indication    Prostate cancer    Informed consent   The risks, benefits and alternatives to this procedures were discussed with the patient and he has participated in the informed consent process and wishes to proceed    Antibiotic prophylaxis   Ancef    Rectal cleansing  The patient was instructed to perform an evacuating rectal enema 1-2 hours prior to biopsy.    Local anesthesia  Topical 2% lidocaine jelly was applied liberally to the anus and rectum and allowed to dwell for at least 5 min prior to starting the procedure.  After insertion of the TRUS probe, 10 mL of 2% lidocaine solution was injected with ultrasound guidance at the  junction of the " prostate and seminal vesicles. The anesthetic was allowed to dwell for at least 2 minutes prior to biopsy.    Transrectal ultrasonography  The patient was placed in the left lateral decubitus position. After an attentive digital rectal examination, a 7.5 mHz sidefire ultrasound probe was gently inserted into the rectum and biplanar imaging of the prostate was done with the findings noted below. Images were taken of any abnormal findings and also to document prostate size.    Bladder  The bladder base appeared normal.    Prostate      Ultrasound size measurements:  -Volume:  20 cm3    Ultrasound findings:  -Cysts: None  -Masses: None  -Median lobe: absent      TRUS-guided needle biopsy  Using an 18 gauge biopsy needle and ultrasound guidance, the following biopsies were taken:    1 core(s) from the left lateral base.  1 core(s) from the left lateral mid-gland.  1 core(s) from the right middle base.  1 core(s) from the right lateral base.  1 core(s) from the left lateral mid-gland.  1 core(s) from the left middle mid-gland.  1 core(s) from the right middle mid-gland.  1 core(s) from the right lateral mid-gland.  1 core(s) from the left lateral apex.  1 core(s) from the left middle apex.  1 core(s) from the right middle apex.  1 core(s) from the right lateral apex    Total number of cores: 12                Complications  There were no procedural complications.    Disposition  The patient was dismissed to home     Post-procedure instructions:     Today he underwent an uncomplicated transrectal ultrasound-guided biopsy of the prostate, following a vidya-prostatic nerve block.I reviewed the normal post-procedure course including bleeding per rectum, hematuria, and hematospermia.  . Instructed him to call with fever greater than 101, chills, nausea, vomiting, and poorly controlled pain. His followup was scheduled in 2 to 4 weeks' time to review the pathology.            Patient Transportation: confirmed  Patient tolerance:  patient tolerated the procedure well with no immediate complications           PLAN  -Can resume coumadin this PM  -Will have him come back in a few weeks for path review  -Will have him see me in 6 months for next Lupron  -Cont fu with med onc and rad onc

## 2024-05-01 ENCOUNTER — PROCEDURE VISIT (OUTPATIENT)
Dept: UROLOGY | Facility: CLINIC | Age: 74
End: 2024-05-01
Payer: MEDICARE

## 2024-05-01 ENCOUNTER — TELEPHONE (OUTPATIENT)
Dept: UROLOGY | Facility: CLINIC | Age: 74
End: 2024-05-01

## 2024-05-01 VITALS
TEMPERATURE: 97.4 F | HEART RATE: 100 BPM | DIASTOLIC BLOOD PRESSURE: 82 MMHG | HEIGHT: 70 IN | OXYGEN SATURATION: 94 % | WEIGHT: 227 LBS | BODY MASS INDEX: 32.5 KG/M2 | SYSTOLIC BLOOD PRESSURE: 132 MMHG

## 2024-05-01 DIAGNOSIS — C61 PROSTATE CANCER (HCC): Primary | ICD-10-CM

## 2024-05-01 PROCEDURE — 96372 THER/PROPH/DIAG INJ SC/IM: CPT

## 2024-05-01 PROCEDURE — G0416 PROSTATE BIOPSY, ANY MTHD: HCPCS | Performed by: PATHOLOGY

## 2024-05-01 PROCEDURE — 76942 ECHO GUIDE FOR BIOPSY: CPT | Performed by: UROLOGY

## 2024-05-01 PROCEDURE — 96402 CHEMO HORMON ANTINEOPL SQ/IM: CPT

## 2024-05-01 PROCEDURE — 88344 IMHCHEM/IMCYTCHM EA MLT ANTB: CPT | Performed by: PATHOLOGY

## 2024-05-01 PROCEDURE — 55700 PR PROSTATE NEEDLE BIOPSY ANY APPROACH: CPT | Performed by: UROLOGY

## 2024-05-01 RX ORDER — CEFTRIAXONE 1 G/1
1000 INJECTION, POWDER, FOR SOLUTION INTRAMUSCULAR; INTRAVENOUS ONCE
Status: COMPLETED | OUTPATIENT
Start: 2024-05-01 | End: 2024-05-01

## 2024-05-01 RX ADMIN — CEFTRIAXONE 1000 MG: 1 INJECTION, POWDER, FOR SOLUTION INTRAMUSCULAR; INTRAVENOUS at 09:01

## 2024-05-01 NOTE — PATIENT INSTRUCTIONS
Transrectal prostate biopsy post-procedure instructions    You had a transrectal prostate biopsy today. We took tissue biopsy cores of your prostate through multiple needle pokes that went through your rectal mucosa directly into your prostate.    It is normal to have blood in your urine, semen, and stool for the next few days to weeks. Please call office if any of this bleeding is constant or high volume. Also call office if you are passing blood clots in your urine and/or you are not able to urinate.    Some men can have erectile dysfunction for a few weeks after the procedure. If you experience this issue, do not be alarmed. If problem persists after a month or so, let your provider know.    There is a low, but non-zero risk of developing a serious infection after this procedure. You received a strong antibiotic before the procedure to minimize this risk. If you experience fevers, chills, nausea, vomiting, or any other concerning symptoms, call the office or present to ED right away.    Diet    You may resume your regular diet after the procedure.    Please stay hydrated and drink plenty of fluids.    Activity    For the next few days you should try to take it easy and avoid lifting anything heavier than carton of milk. Avoid long car rides for the next few days. Please do your best to not strain when urinating or having a bowel movement, as this can make bleeding worse. Take over the counter stool softener if needed.    For the next few weeks, please do not do anything that puts extra pressure on your perineum. These activities include but are not limited to: bike riding, motorcycle riding, strenuous running/jumping/lifting exercises, horse riding.    About 1 week after you experience no bleeding in urine and stool, you can start to run and do light weight exercises.    It is best to avoid sex or ejaculation for about 1 week after procedure. The first time you ejaculate, you may see a lot of blood in the semen.  Do not be alarmed. This is normal. In subsequent ejaculations, you should see less and less blood. Call office if you have concerns.    Walking is okay and is encouraged after your procedure. Try to not to do too much movement the day of your procedure, but starting the day after procedure, please try to move around.    Hygiene    You can shower as normal starting the day of your procedure. Avoid baths/hot tubs/pools/standing body of water for 5 days after procedure.    If you are having some spot bleeding from your rectum after procedure, you can place gauze or protective pad in your underwear.    Medications    Take over the counter Tylenol as needed for pain or discomfort.    You may resume your blood thinner, Warfarin, on 5/1/24.    Take all of your other pre-existing medications as scheduled.    Follow-up    You will follow up in the office in a few weeks to review your results.

## 2024-05-01 NOTE — LETTER
May 1, 2024     Tereza Harvey MD  [address]  [address]  [place], [place], operated [facility] [address]    Patient: Galen Carson   YOB: 1950   Date of Visit: 5/1/2024       Dear Dr. Harvey:    Thank you for referring Galen Carson to me for evaluation. Below are my notes for this consultation.    If you have questions, please do not hesitate to call me. I look forward to following your patient along with you.         Sincerely,        Estevan Waite,         CC: MD Estevan Kay DO  5/1/2024  9:28 AM  Sign when Signing Visit  Galen Carson is a 73 y.o. male with prostate cancer     Pertinent non-urologic PMH: DM (hemoglobin A1c 6.9 on 2/14/2024), HLD, HTN, CKD (creatinine 1.37, GFR 51 on 3/13/2024); a fib     Pertinent non-urologic PSH: Cardiac catheterization without stents, hernia repair, dual chamber ICD implant Jan 2024     Anticoagulation: ASA81, Warfarin     History of high risk prostate cancer treated with radiation in 2019 he completed 2-year course of ADT in February 2021     Pretreatment PSA was 22.4.  Morales PSA was 0.0.     Had PSA recurrence which prompted workup below     PSMA PET scan 9/25/2023: Heterogenous multifocal fairly intense tracer activity in the right greater than left prostate gland highly suspicious for locally recurrent intraprostatic PSMA positive malignancy; no focal tracer activity concerning for metastatic disease     Prostate MRI 10/13/2023: Tumor in anterior and posterior right peripheral zone apex and mid gland and right transition zone at apex corresponds to PSMA avid lesion; the lesion broadly abuts the capsule without visualized gross extraprostatic extension; no SVI/LAD/bony metastasis; prostate 29 g     Given the positive lesion seen on imaging, plan was for patient to undergo MRI fusion biopsy.  Unfortunately, the case was canceled by anesthesia due to the patient's cardiac arrhythmia requiring LifeVest.  Since he cannot undergo anesthesia,  "there is consideration for alternate therapy.     Patient had dual-chamber ICD implant 1/4/2024     PSA 8.48 on 1/24/2024     Saw Dr. Barreto of hematology oncology on 2/21/2024. Option for repeat local therapy versus hormonal therapy with LHRH antagonist along with closely watching PSA.    PSA 8.14 on 3/27/24    Continue patient's desire to not travel outside the [facility] for his biopsy, we opted to do standard office 12 core biopsy.  We also discussed starting Lupron at the same time.    Lupron q6 month on 5/1/24          Biopsy prostate     Date/Time  5/1/2024 8:30 AM     Performed by  Estevan Waite DO   Authorized by  Estevan Waite DO     Universal Protocol   Consent: Verbal consent obtained. Written consent obtained.  Risks and benefits: risks, benefits and alternatives were discussed  Consent given by: patient  Time out: Immediately prior to procedure a \"time out\" was called to verify the correct patient, procedure, equipment, support staff and site/side marked as required.  Timeout called at: 5/1/2024 9:26 AM.  Patient understanding: patient states understanding of the procedure being performed  Patient consent: the patient's understanding of the procedure matches consent given  Procedure consent: procedure consent matches procedure scheduled  Relevant documents: relevant documents present and verified  Required items: required blood products, implants, devices, and special equipment available  Patient identity confirmed: verbally with patient      Local anesthesia used: yes      Anesthesia: local infiltration     Anesthesia   Local anesthesia used: yes  Local Anesthetic: lidocaine 2% without epinephrine  Anesthetic total: 10 mL     Sedation   Patient sedated: no        Specimen: yes (12 cores)    Culture: no   Procedure Details   Procedure Notes: Office TRUS-guided Prostate Biopsy Procedure Note    Indication    Prostate cancer    Informed consent   The risks, benefits and alternatives to this " procedures were discussed with the patient and he has participated in the informed consent process and wishes to proceed    Antibiotic prophylaxis   Ancef    Rectal cleansing  The patient was instructed to perform an evacuating rectal enema 1-2 hours prior to biopsy.    Local anesthesia  Topical 2% lidocaine jelly was applied liberally to the anus and rectum and allowed to dwell for at least 5 min prior to starting the procedure.  After insertion of the TRUS probe, 10 mL of 2% lidocaine solution was injected with ultrasound guidance at the  junction of the prostate and seminal vesicles. The anesthetic was allowed to dwell for at least 2 minutes prior to biopsy.    Transrectal ultrasonography  The patient was placed in the left lateral decubitus position. After an attentive digital rectal examination, a 7.5 mHz sidefire ultrasound probe was gently inserted into the rectum and biplanar imaging of the prostate was done with the findings noted below. Images were taken of any abnormal findings and also to document prostate size.    Bladder  The bladder base appeared normal.    Prostate      Ultrasound size measurements:  -Volume:  20 cm3    Ultrasound findings:  -Cysts: None  -Masses: None  -Median lobe: absent      TRUS-guided needle biopsy  Using an 18 gauge biopsy needle and ultrasound guidance, the following biopsies were taken:    1 core(s) from the left lateral base.  1 core(s) from the left lateral mid-gland.  1 core(s) from the right middle base.  1 core(s) from the right lateral base.  1 core(s) from the left lateral mid-gland.  1 core(s) from the left middle mid-gland.  1 core(s) from the right middle mid-gland.  1 core(s) from the right lateral mid-gland.  1 core(s) from the left lateral apex.  1 core(s) from the left middle apex.  1 core(s) from the right middle apex.  1 core(s) from the right lateral apex    Total number of cores: 12                Complications  There were no procedural  complications.    Disposition  The patient was dismissed to home     Post-procedure instructions:     Today he underwent an uncomplicated transrectal ultrasound-guided biopsy of the prostate, following a vidya-prostatic nerve block.I reviewed the normal post-procedure course including bleeding per rectum, hematuria, and hematospermia.  . Instructed him to call with fever greater than 101, chills, nausea, vomiting, and poorly controlled pain. His followup was scheduled in 2 to 4 weeks' time to review the pathology.            Patient Transportation: confirmed  Patient tolerance: patient tolerated the procedure well with no immediate complications           PLAN  -Can resume coumadin this PM  -Will have him come back in a few weeks for path review  -Will have him see me in 6 months for next Lupron  -Cont fu with med onc and rad onc

## 2024-05-02 ENCOUNTER — IN-CLINIC DEVICE VISIT (OUTPATIENT)
Dept: CARDIOLOGY CLINIC | Facility: CLINIC | Age: 74
End: 2024-05-02
Payer: MEDICARE

## 2024-05-02 DIAGNOSIS — E78.2 MIXED HYPERLIPIDEMIA: ICD-10-CM

## 2024-05-02 DIAGNOSIS — Z95.810 PRESENCE OF IMPLANTABLE CARDIOVERTER-DEFIBRILLATOR (ICD): Primary | ICD-10-CM

## 2024-05-02 PROCEDURE — 93283 PRGRMG EVAL IMPLANTABLE DFB: CPT | Performed by: INTERNAL MEDICINE

## 2024-05-02 NOTE — PROGRESS NOTES
MDT DC ICD/ACTIVE SYSTEM IS MRI CONDITIONAL   DEVICE INTERROGATED IN THE [place] OFFICE:  BATTERY VOLTAGE ADEQUATE (12.4 YR.).  AP 25.5%  3.2%.  ALL LEAD PARAMETERS WITHIN NORMAL LIMITS.  4 VT-NS EPISODES WITH NSVT ON 1 EGM (#5 - 15 @ 207 BPM), AND PAT ON REMAINING EGMS (8 @ 184 BPM, 10 @ 167 BPM, 15 @ 188 BPM).  DECREASE MADE TO AMPLITUDES TO PROMOTE DEVICE LONGEVITY WHILE MAINTAINING AN APPROPRIATE SAFETY MARGIN.  THRESHOLD TESTS NOT PRINTED.  NORMAL DEVICE FUNCTION.  RG

## 2024-05-03 PROCEDURE — G0416 PROSTATE BIOPSY, ANY MTHD: HCPCS | Performed by: PATHOLOGY

## 2024-05-03 PROCEDURE — 88344 IMHCHEM/IMCYTCHM EA MLT ANTB: CPT | Performed by: PATHOLOGY

## 2024-05-03 NOTE — TELEPHONE ENCOUNTER
Pt called and stated that 5/10 will not work for him due to being out of town. Pt will keep original appt on 5/24/24.

## 2024-05-03 NOTE — TELEPHONE ENCOUNTER
Called patient back - did advise of appt on 5/24 here and ask if he would be willing to travel to [place]. He said he would. Spoke with Gregoria and she scheduled him for 5/10/24 at 1130 in [place]-will call patient back and then cancel appt here

## 2024-05-04 RX ORDER — ROSUVASTATIN CALCIUM 10 MG/1
10 TABLET, COATED ORAL DAILY
Qty: 90 TABLET | Refills: 0 | Status: ON HOLD | OUTPATIENT
Start: 2024-05-04

## 2024-05-06 PROBLEM — N18.31 TYPE 2 DIABETES MELLITUS WITH STAGE 3A CHRONIC KIDNEY DISEASE (HCC): Status: ACTIVE | Noted: 2022-02-28

## 2024-05-06 PROBLEM — E11.22 TYPE 2 DIABETES MELLITUS WITH STAGE 3A CHRONIC KIDNEY DISEASE (HCC): Status: ACTIVE | Noted: 2022-02-28

## 2024-05-08 ENCOUNTER — ANTICOAG VISIT (OUTPATIENT)
Dept: CARDIOLOGY CLINIC | Facility: CLINIC | Age: 74
End: 2024-05-08

## 2024-05-08 ENCOUNTER — LAB (OUTPATIENT)
Dept: LAB | Facility: CLINIC | Age: 74
DRG: 291 | End: 2024-05-08
Payer: MEDICARE

## 2024-05-08 DIAGNOSIS — I48.0 PAROXYSMAL ATRIAL FIBRILLATION (HCC): ICD-10-CM

## 2024-05-08 LAB
INR PPP: 2.23 (ref 0.84–1.19)
PROTHROMBIN TIME: 24.2 SECONDS (ref 11.6–14.5)

## 2024-05-08 PROCEDURE — 36415 COLL VENOUS BLD VENIPUNCTURE: CPT

## 2024-05-08 PROCEDURE — 85610 PROTHROMBIN TIME: CPT

## 2024-05-09 ENCOUNTER — HOSPITAL ENCOUNTER (INPATIENT)
Facility: HOSPITAL | Age: 74
LOS: 7 days | Discharge: HOME | DRG: 291 | End: 2024-05-16
Attending: EMERGENCY MEDICINE | Admitting: INTERNAL MEDICINE
Payer: MEDICARE

## 2024-05-09 ENCOUNTER — OFFICE VISIT (OUTPATIENT)
Dept: FAMILY MEDICINE CLINIC | Facility: CLINIC | Age: 74
End: 2024-05-09
Payer: MEDICARE

## 2024-05-09 ENCOUNTER — APPOINTMENT (EMERGENCY)
Dept: RADIOLOGY | Facility: HOSPITAL | Age: 74
DRG: 291 | End: 2024-05-09
Payer: MEDICARE

## 2024-05-09 ENCOUNTER — APPOINTMENT (EMERGENCY)
Dept: CT IMAGING | Facility: HOSPITAL | Age: 74
DRG: 291 | End: 2024-05-09
Payer: MEDICARE

## 2024-05-09 VITALS
OXYGEN SATURATION: 98 % | WEIGHT: 238.8 LBS | HEIGHT: 70 IN | HEART RATE: 100 BPM | BODY MASS INDEX: 34.19 KG/M2 | SYSTOLIC BLOOD PRESSURE: 118 MMHG | DIASTOLIC BLOOD PRESSURE: 74 MMHG | RESPIRATION RATE: 18 BRPM

## 2024-05-09 DIAGNOSIS — I50.9 ACUTE EXACERBATION OF CHF (CONGESTIVE HEART FAILURE) (HCC): Primary | ICD-10-CM

## 2024-05-09 DIAGNOSIS — N17.9 ACUTE RENAL FAILURE SUPERIMPOSED ON STAGE 3A CHRONIC KIDNEY DISEASE, UNSPECIFIED ACUTE RENAL FAILURE TYPE (HCC): ICD-10-CM

## 2024-05-09 DIAGNOSIS — R06.02 SHORTNESS OF BREATH: Primary | ICD-10-CM

## 2024-05-09 DIAGNOSIS — K92.1 MELENA: ICD-10-CM

## 2024-05-09 DIAGNOSIS — R79.89 ELEVATED TROPONIN: ICD-10-CM

## 2024-05-09 DIAGNOSIS — N18.31 ACUTE RENAL FAILURE SUPERIMPOSED ON STAGE 3A CHRONIC KIDNEY DISEASE, UNSPECIFIED ACUTE RENAL FAILURE TYPE (HCC): ICD-10-CM

## 2024-05-09 DIAGNOSIS — I42.0 DILATED CARDIOMYOPATHY (HCC): ICD-10-CM

## 2024-05-09 DIAGNOSIS — R60.9 RETENTION OF FLUID: ICD-10-CM

## 2024-05-09 DIAGNOSIS — R10.84 GENERALIZED ABDOMINAL PAIN: ICD-10-CM

## 2024-05-09 DIAGNOSIS — I50.23 ACUTE ON CHRONIC SYSTOLIC CONGESTIVE HEART FAILURE (HCC): ICD-10-CM

## 2024-05-09 DIAGNOSIS — R60.0 BILATERAL LOWER EXTREMITY EDEMA: ICD-10-CM

## 2024-05-09 DIAGNOSIS — R06.02 SHORTNESS OF BREATH: ICD-10-CM

## 2024-05-09 DIAGNOSIS — R09.02 HYPOXIA: ICD-10-CM

## 2024-05-09 DIAGNOSIS — R14.0 ABDOMINAL DISTENTION: ICD-10-CM

## 2024-05-09 DIAGNOSIS — K29.81 GASTROINTESTINAL HEMORRHAGE ASSOCIATED WITH DUODENITIS: ICD-10-CM

## 2024-05-09 DIAGNOSIS — R06.09 DYSPNEA ON EXERTION: ICD-10-CM

## 2024-05-09 PROBLEM — Z79.4 TYPE 2 DIABETES MELLITUS WITH HYPERGLYCEMIA, WITH LONG-TERM CURRENT USE OF INSULIN (HCC): Status: ACTIVE | Noted: 2022-02-28

## 2024-05-09 PROBLEM — E11.65 TYPE 2 DIABETES MELLITUS WITH HYPERGLYCEMIA, WITH LONG-TERM CURRENT USE OF INSULIN (HCC): Status: ACTIVE | Noted: 2022-02-28

## 2024-05-09 LAB
2HR DELTA HS TROPONIN: -6 NG/L
4HR DELTA HS TROPONIN: 7 NG/L
ALBUMIN SERPL BCP-MCNC: 3.9 G/DL (ref 3.5–5)
ALP SERPL-CCNC: 79 U/L (ref 34–104)
ALT SERPL W P-5'-P-CCNC: 32 U/L (ref 7–52)
ANION GAP SERPL CALCULATED.3IONS-SCNC: 10 MMOL/L (ref 4–13)
ANISOCYTOSIS BLD QL SMEAR: PRESENT
APTT PPP: 53 SECONDS (ref 23–37)
AST SERPL W P-5'-P-CCNC: 29 U/L (ref 13–39)
BASOPHILS # BLD MANUAL: 0.07 THOUSAND/UL (ref 0–0.1)
BASOPHILS NFR MAR MANUAL: 1 % (ref 0–1)
BILIRUB SERPL-MCNC: 0.32 MG/DL (ref 0.2–1)
BNP SERPL-MCNC: 1533 PG/ML (ref 0–100)
BUN SERPL-MCNC: 39 MG/DL (ref 5–25)
CALCIUM SERPL-MCNC: 8.3 MG/DL (ref 8.4–10.2)
CARDIAC TROPONIN I PNL SERPL HS: 38 NG/L
CARDIAC TROPONIN I PNL SERPL HS: 44 NG/L
CARDIAC TROPONIN I PNL SERPL HS: 51 NG/L
CHLORIDE SERPL-SCNC: 102 MMOL/L (ref 96–108)
CO2 SERPL-SCNC: 27 MMOL/L (ref 21–32)
CREAT SERPL-MCNC: 1.46 MG/DL (ref 0.6–1.3)
EOSINOPHIL # BLD MANUAL: 0.15 THOUSAND/UL (ref 0–0.4)
EOSINOPHIL NFR BLD MANUAL: 2 % (ref 0–6)
ERYTHROCYTE [DISTWIDTH] IN BLOOD BY AUTOMATED COUNT: 12.7 % (ref 11.6–15.1)
GFR SERPL CREATININE-BSD FRML MDRD: 47 ML/MIN/1.73SQ M
GLUCOSE SERPL-MCNC: 131 MG/DL (ref 65–140)
HCT VFR BLD AUTO: 34.1 % (ref 36.5–49.3)
HGB BLD-MCNC: 11 G/DL (ref 12–17)
INR PPP: 2.92 (ref 0.84–1.19)
LIPASE SERPL-CCNC: 45 U/L (ref 11–82)
LYMPHOCYTES # BLD AUTO: 0.67 THOUSAND/UL (ref 0.6–4.47)
LYMPHOCYTES # BLD AUTO: 9 % (ref 14–44)
MCH RBC QN AUTO: 28.7 PG (ref 26.8–34.3)
MCHC RBC AUTO-ENTMCNC: 32.3 G/DL (ref 31.4–37.4)
MCV RBC AUTO: 89 FL (ref 82–98)
MONOCYTES # BLD AUTO: 0.6 THOUSAND/UL (ref 0–1.22)
MONOCYTES NFR BLD: 8 % (ref 4–12)
NEUTROPHILS # BLD MANUAL: 5.97 THOUSAND/UL (ref 1.85–7.62)
NEUTS SEG NFR BLD AUTO: 80 % (ref 43–75)
PLATELET # BLD AUTO: 220 THOUSANDS/UL (ref 149–390)
PLATELET BLD QL SMEAR: ADEQUATE
PMV BLD AUTO: 9.8 FL (ref 8.9–12.7)
POLYCHROMASIA BLD QL SMEAR: PRESENT
POTASSIUM SERPL-SCNC: 4.8 MMOL/L (ref 3.5–5.3)
PROT SERPL-MCNC: 7.2 G/DL (ref 6.4–8.4)
PROTHROMBIN TIME: 31.5 SECONDS (ref 11.6–14.5)
RBC # BLD AUTO: 3.83 MILLION/UL (ref 3.88–5.62)
SODIUM SERPL-SCNC: 139 MMOL/L (ref 135–147)
WBC # BLD AUTO: 7.46 THOUSAND/UL (ref 4.31–10.16)

## 2024-05-09 PROCEDURE — 84484 ASSAY OF TROPONIN QUANT: CPT | Performed by: INTERNAL MEDICINE

## 2024-05-09 PROCEDURE — 99285 EMERGENCY DEPT VISIT HI MDM: CPT

## 2024-05-09 PROCEDURE — 71045 X-RAY EXAM CHEST 1 VIEW: CPT

## 2024-05-09 PROCEDURE — 96374 THER/PROPH/DIAG INJ IV PUSH: CPT

## 2024-05-09 PROCEDURE — 36415 COLL VENOUS BLD VENIPUNCTURE: CPT | Performed by: EMERGENCY MEDICINE

## 2024-05-09 PROCEDURE — 85730 THROMBOPLASTIN TIME PARTIAL: CPT | Performed by: EMERGENCY MEDICINE

## 2024-05-09 PROCEDURE — 84484 ASSAY OF TROPONIN QUANT: CPT | Performed by: EMERGENCY MEDICINE

## 2024-05-09 PROCEDURE — 85007 BL SMEAR W/DIFF WBC COUNT: CPT | Performed by: EMERGENCY MEDICINE

## 2024-05-09 PROCEDURE — 80053 COMPREHEN METABOLIC PANEL: CPT | Performed by: EMERGENCY MEDICINE

## 2024-05-09 PROCEDURE — 74177 CT ABD & PELVIS W/CONTRAST: CPT

## 2024-05-09 PROCEDURE — 83690 ASSAY OF LIPASE: CPT | Performed by: EMERGENCY MEDICINE

## 2024-05-09 PROCEDURE — G2211 COMPLEX E/M VISIT ADD ON: HCPCS | Performed by: NURSE PRACTITIONER

## 2024-05-09 PROCEDURE — 85610 PROTHROMBIN TIME: CPT | Performed by: EMERGENCY MEDICINE

## 2024-05-09 PROCEDURE — 99285 EMERGENCY DEPT VISIT HI MDM: CPT | Performed by: EMERGENCY MEDICINE

## 2024-05-09 PROCEDURE — 85027 COMPLETE CBC AUTOMATED: CPT | Performed by: EMERGENCY MEDICINE

## 2024-05-09 PROCEDURE — 93005 ELECTROCARDIOGRAM TRACING: CPT

## 2024-05-09 PROCEDURE — 99223 1ST HOSP IP/OBS HIGH 75: CPT | Performed by: INTERNAL MEDICINE

## 2024-05-09 PROCEDURE — 99215 OFFICE O/P EST HI 40 MIN: CPT | Performed by: NURSE PRACTITIONER

## 2024-05-09 PROCEDURE — 83880 ASSAY OF NATRIURETIC PEPTIDE: CPT | Performed by: EMERGENCY MEDICINE

## 2024-05-09 RX ORDER — WARFARIN SODIUM 5 MG/1
5 TABLET ORAL
Status: DISCONTINUED | OUTPATIENT
Start: 2024-05-09 | End: 2024-05-10

## 2024-05-09 RX ORDER — ASCORBIC ACID 500 MG
1000 TABLET ORAL DAILY
Status: DISCONTINUED | OUTPATIENT
Start: 2024-05-10 | End: 2024-05-16 | Stop reason: HOSPADM

## 2024-05-09 RX ORDER — FUROSEMIDE 10 MG/ML
80 INJECTION INTRAMUSCULAR; INTRAVENOUS ONCE
Status: COMPLETED | OUTPATIENT
Start: 2024-05-09 | End: 2024-05-09

## 2024-05-09 RX ORDER — ASPIRIN 81 MG/1
81 TABLET, CHEWABLE ORAL DAILY
Status: DISCONTINUED | OUTPATIENT
Start: 2024-05-10 | End: 2024-05-14

## 2024-05-09 RX ORDER — METOPROLOL SUCCINATE 25 MG/1
25 TABLET, EXTENDED RELEASE ORAL DAILY
Status: DISCONTINUED | OUTPATIENT
Start: 2024-05-10 | End: 2024-05-15

## 2024-05-09 RX ORDER — FUROSEMIDE 10 MG/ML
50 INJECTION INTRAMUSCULAR; INTRAVENOUS 2 TIMES DAILY
Status: DISCONTINUED | OUTPATIENT
Start: 2024-05-09 | End: 2024-05-11

## 2024-05-09 RX ORDER — PRAVASTATIN SODIUM 80 MG/1
80 TABLET ORAL
Status: DISCONTINUED | OUTPATIENT
Start: 2024-05-09 | End: 2024-05-16 | Stop reason: HOSPADM

## 2024-05-09 RX ADMIN — PRAVASTATIN SODIUM 80 MG: 80 TABLET ORAL at 19:38

## 2024-05-09 RX ADMIN — IOHEXOL 100 ML: 350 INJECTION, SOLUTION INTRAVENOUS at 16:26

## 2024-05-09 RX ADMIN — FUROSEMIDE 50 MG: 10 INJECTION, SOLUTION INTRAMUSCULAR; INTRAVENOUS at 19:36

## 2024-05-09 RX ADMIN — FUROSEMIDE 80 MG: 10 INJECTION, SOLUTION INTRAMUSCULAR; INTRAVENOUS at 16:50

## 2024-05-09 NOTE — ED PROVIDER NOTES
History  Chief Complaint   Patient presents with    Shortness of Breath     Pt states he's had abdominal pain and SOB x3 days. Pt stated he was recent dx with Prostate cancer.     73-year-old male history of diabetes, hypertension, A-fib, heart failure on warfarin presenting with shortness of breath and abdominal pain.  Patient reports progressively worsening shortness of breath and dyspnea over the last few days.  Also reports worsening bilateral lower extremity edema and abdominal distention and fullness with 10 pound weight gain over the last few days.  Denies any chest pain.  Reports compliance with Lasix.  Of note, patient with recent prostate biopsy.  Denies any other complaints.  Chart reviewed.    Past Medical History:  No date: Allergic  No date: Anesthesia      Comment:  pt wears a life vest (11/16).  should not be scheduled                at [facility] until heart condition is stabilized  No date: BPH (benign prostatic hypertrophy)  No date: Cancer (HCC)  No date: Diabetes mellitus (HCC)  No date: Hyperlipidemia  No date: Hypertension  No date: Irregular heart beat      Comment:  PAF  No date: Prostate cancer (HCC)  Family History: non-contributory  Social History          Prior to Admission Medications   Prescriptions Last Dose Informant Patient Reported? Taking?   Ascorbic Acid (vitamin C) 1000 MG tablet  Self Yes No   Sig: Take 1,000 mg by mouth daily   Blood Glucose Monitoring Suppl (ONETOUCH VERIO) w/Device KIT  Self No No   Sig: by Does not apply route once for 1 dose Test sugar in the morning   EVENING PRIMROSE OIL PO  Self Yes No   Sig: Take by mouth 3 (three) times a day   VITAMIN D, ERGOCALCIFEROL, PO  Self Yes No   Sig: Take by mouth   aspirin 81 mg chewable tablet  Self No No   Sig: Chew 1 tablet (81 mg total) daily   cetirizine (ZyrTEC) 5 MG tablet  Self Yes No   Sig: Take 5 mg by mouth daily   furosemide (LASIX) 20 mg tablet  Self No No   Sig: Take 2 tablets (40 mg total) by mouth daily    metFORMIN (GLUCOPHAGE) 850 mg tablet  Self No No   Sig: Take 1 tablet (850 mg total) by mouth 2 (two) times a day with meals   metoprolol succinate (TOPROL-XL) 25 mg 24 hr tablet  Self No No   Sig: Take 1 tablet (25 mg total) by mouth daily   multivitamin (THERAGRAN) TABS  Self Yes No   Sig: Take 1 tablet by mouth daily   rosuvastatin (CRESTOR) 10 MG tablet   No No   Sig: Take 1 tablet (10 mg total) by mouth daily   vitamin E, tocopherol, 400 units capsule  Self Yes No   Sig: Take 400 Units by mouth daily   warfarin (Coumadin) 5 mg tablet  Self No No   Sig: Take one tablet daily or as directed      Facility-Administered Medications: None       Past Medical History:   Diagnosis Date    Allergic     Anesthesia     pt wears a life vest (11/16).  should not be scheduled at [facility] until heart condition is stabilized    BPH (benign prostatic hypertrophy)     Cancer (HCC)     Diabetes mellitus (HCC)     Hyperlipidemia     Hypertension     Irregular heart beat     PAF    Prostate cancer (HCC)        Past Surgical History:   Procedure Laterality Date    CARDIAC CATHETERIZATION N/A 10/4/2023    Procedure: Cardiac Coronary Angiogram;  Surgeon: Chris Lovell MD;  Location: [facility];  Service: Cardiology    CARDIAC CATHETERIZATION N/A 10/4/2023    Procedure: Cardiac RHC/LHC;  Surgeon: Chris Lovell MD;  Location: [facility];  Service: Cardiology    CARDIAC CATHETERIZATION  10/4/2023    Procedure: Cardiac catheterization;  Surgeon: Chris Lovell MD;  Location: [facility];  Service: Cardiology    CARDIAC ELECTROPHYSIOLOGY PROCEDURE N/A 1/4/2024    Procedure: Cardiac icd implant, Dual Chambers ICD;  Surgeon: Leo Whalen MD;  Location:  CARDIAC CATH LAB;  Service: Cardiology    EAR SURGERY      HERNIA REPAIR      [facility] COLONOSCOPY FLX DX W/COLLJ SPEC WHEN PFRMD N/A 5/6/2019    Procedure: COLONOSCOPY;  Surgeon: Winston Nielson III, MD;  Location: [facility];  Service: Gastroenterology        Family History   Problem Relation Age of Onset    Stroke Father     No Known Problems Mother     Prostate cancer Brother     Arthritis Brother     No Known Problems Sister     Diabetes Sister     Diabetes Sister     No Known Problems Son     No Known Problems Daughter      I have reviewed and agree with the history as documented.    E-Cigarette/Vaping    E-Cigarette Use Never User      E-Cigarette/Vaping Substances    Nicotine No     THC No     CBD No     Flavoring No     Other No     Unknown No      Social History     Tobacco Use    Smoking status: Former     Current packs/day: 0.00     Average packs/day: 1 pack/day for 25.0 years (25.0 ttl pk-yrs)     Types: Cigarettes, Pipe, Cigars     Start date: 1980     Quit date: 2005     Years since quittin.2     Passive exposure: Past    Smokeless tobacco: Never   Vaping Use    Vaping status: Never Used   Substance Use Topics    Alcohol use: Not Currently    Drug use: Not Currently       Review of Systems   Constitutional:  Negative for appetite change, chills, diaphoresis, fever and unexpected weight change.   HENT:  Negative for congestion and rhinorrhea.    Eyes:  Negative for photophobia and visual disturbance.   Respiratory:  Positive for shortness of breath. Negative for cough and chest tightness.    Cardiovascular:  Positive for leg swelling. Negative for chest pain and palpitations.   Gastrointestinal:  Positive for abdominal pain. Negative for abdominal distention, blood in stool, constipation, diarrhea, nausea and vomiting.   Genitourinary:  Negative for dysuria and hematuria.   Musculoskeletal:  Negative for back pain, joint swelling, neck pain and neck stiffness.   Skin:  Negative for color change, pallor, rash and wound.   Neurological:  Negative for dizziness, syncope, weakness, light-headedness and headaches.   Psychiatric/Behavioral:  Negative for agitation.    All other systems reviewed and are negative.      Physical Exam  Physical  Exam  Vitals and nursing note reviewed.   Constitutional:       General: He is not in acute distress.     Appearance: Normal appearance. He is well-developed. He is not ill-appearing, toxic-appearing or diaphoretic.   HENT:      Head: Normocephalic and atraumatic.      Nose: Nose normal. No congestion or rhinorrhea.      Mouth/Throat:      Mouth: Mucous membranes are moist.      Pharynx: Oropharynx is clear. No oropharyngeal exudate or posterior oropharyngeal erythema.   Eyes:      General: No scleral icterus.        Right eye: No discharge.         Left eye: No discharge.      Extraocular Movements: Extraocular movements intact.      Conjunctiva/sclera: Conjunctivae normal.      Pupils: Pupils are equal, round, and reactive to light.   Neck:      Vascular: No JVD.      Trachea: No tracheal deviation.      Comments: Supple. Normal range of motion.   Cardiovascular:      Rate and Rhythm: Normal rate and regular rhythm.      Heart sounds: Normal heart sounds. No murmur heard.     No friction rub. No gallop.      Comments: Normal rate and regular rhythm  Pulmonary:      Effort: Pulmonary effort is normal. No respiratory distress.      Breath sounds: No stridor. Examination of the right-lower field reveals rales. Examination of the left-lower field reveals rales. Rales present. No wheezing.      Comments: Bibasilar rales  Chest:      Chest wall: No tenderness.   Abdominal:      General: Bowel sounds are normal. There is no distension.      Palpations: Abdomen is soft.      Tenderness: There is no abdominal tenderness. There is no right CVA tenderness, left CVA tenderness, guarding or rebound.      Comments: Soft, nontender, nondistended.  Normal bowel sounds throughout   Musculoskeletal:         General: No swelling, tenderness, deformity or signs of injury. Normal range of motion.      Cervical back: Normal range of motion and neck supple. No rigidity. No muscular tenderness.      Right lower leg: No tenderness. Edema  present.      Left lower leg: No tenderness. Edema present.      Comments: 2-3+ bilateral lower extremity pitting edema   Lymphadenopathy:      Cervical: No cervical adenopathy.   Skin:     General: Skin is warm and dry.      Coloration: Skin is not pale.      Findings: No erythema or rash.   Neurological:      General: No focal deficit present.      Mental Status: He is alert. Mental status is at baseline.      Sensory: No sensory deficit.      Motor: No weakness or abnormal muscle tone.      Coordination: Coordination normal.      Gait: Gait normal.      Comments: Alert.  Strength and sensation grossly intact.  Ambulatory without difficulty at baseline.    Psychiatric:         Behavior: Behavior normal.         Thought Content: Thought content normal.         Vital Signs  ED Triage Vitals   Temperature Pulse Respirations Blood Pressure SpO2   05/09/24 1600 05/09/24 1539 05/09/24 1539 05/09/24 1539 05/09/24 1539   97.8 °F (36.6 °C) 102 18 156/89 94 %      Temp Source Heart Rate Source Patient Position - Orthostatic VS BP Location FiO2 (%)   05/09/24 1600 05/09/24 1539 05/09/24 1545 05/09/24 1545 --   Oral Monitor Lying Right arm       Pain Score       --                  Vitals:    05/09/24 1545 05/09/24 1600 05/09/24 1700 05/09/24 1800   BP: 156/89 138/87 (!) 153/110 125/74   Pulse: 96 93 96 92   Patient Position - Orthostatic VS: Lying   Lying         Visual Acuity      ED Medications  Medications   ascorbic acid (VITAMIN C) tablet 1,000 mg (has no administration in time range)   aspirin chewable tablet 81 mg (has no administration in time range)   metoprolol succinate (TOPROL-XL) 24 hr tablet 25 mg (has no administration in time range)   multivitamin stress formula tablet 1 tablet (has no administration in time range)   pravastatin (PRAVACHOL) tablet 80 mg (has no administration in time range)   warfarin (COUMADIN) tablet 5 mg (has no administration in time range)   furosemide (LASIX) injection 50 mg (has no  administration in time range)   iohexol (OMNIPAQUE) 350 MG/ML injection (MULTI-DOSE) 100 mL (100 mL Intravenous Given 5/9/24 1626)   furosemide (LASIX) injection 80 mg (80 mg Intravenous Given 5/9/24 1650)       Diagnostic Studies  Results Reviewed       Procedure Component Value Units Date/Time    HS Troponin I 2hr [[id number]] Collected: 05/09/24 1856    Lab Status: In process Specimen: Blood from Arm, Left Updated: 05/09/24 1858    RBC Morphology Reflex Test [[id number]] Collected: 05/09/24 1558    Lab Status: Final result Specimen: Blood from Arm, Left Updated: 05/09/24 1801    Protime-INR [[id number]]  (Abnormal) Collected: 05/09/24 1626    Lab Status: Final result Specimen: Blood from Arm, Right Updated: 05/09/24 1714     Protime 31.5 seconds      INR 2.92    APTT [[id number]]  (Abnormal) Collected: 05/09/24 1626    Lab Status: Final result Specimen: Blood from Arm, Right Updated: 05/09/24 1714     PTT 53 seconds     CBC and differential [[id number]]  (Abnormal) Collected: 05/09/24 1558    Lab Status: Final result Specimen: Blood from Arm, Left Updated: 05/09/24 1704     WBC 7.46 Thousand/uL      RBC 3.83 Million/uL      Hemoglobin 11.0 g/dL      Hematocrit 34.1 %      MCV 89 fL      MCH 28.7 pg      MCHC 32.3 g/dL      RDW 12.7 %      MPV 9.8 fL      Platelets 220 Thousands/uL     Narrative:      This is an appended report.  These results have been appended to a previously verified report.    Manual Differential(PHLEBS Do Not Order) [[id number]]  (Abnormal) Collected: 05/09/24 1558    Lab Status: Final result Specimen: Blood from Arm, Left Updated: 05/09/24 1704     Segmented % 80 %      Lymphocytes % 9 %      Monocytes % 8 %      Eosinophils % 2 %      Basophils % 1 %      Absolute Neutrophils 5.97 Thousand/uL      Absolute Lymphocytes 0.67 Thousand/uL      Absolute Monocytes 0.60 Thousand/uL      Absolute Eosinophils 0.15 Thousand/uL      Absolute Basophils 0.07 Thousand/uL      Total Counted --     Platelet  Estimate Adequate     Anisocytosis Present     Polychromasia Present    HS Troponin 0hr (reflex protocol) [[id number]]  (Normal) Collected: 05/09/24 1558    Lab Status: Final result Specimen: Blood from Arm, Left Updated: 05/09/24 1628     hs TnI 0hr 44 ng/L     HS Troponin I 4hr [[id number]]     Lab Status: No result Specimen: Blood     B-Type Natriuretic Peptide(BNP) [[id number]]  (Abnormal) Collected: 05/09/24 1558    Lab Status: Final result Specimen: Blood from Arm, Left Updated: 05/09/24 1628     BNP 1,533 pg/mL     Comprehensive metabolic panel [[id number]]  (Abnormal) Collected: 05/09/24 1558    Lab Status: Final result Specimen: Blood from Arm, Left Updated: 05/09/24 1624     Sodium 139 mmol/L      Potassium 4.8 mmol/L      Chloride 102 mmol/L      CO2 27 mmol/L      ANION GAP 10 mmol/L      BUN 39 mg/dL      Creatinine 1.46 mg/dL      Glucose 131 mg/dL      Calcium 8.3 mg/dL      AST 29 U/L      ALT 32 U/L      Alkaline Phosphatase 79 U/L      Total Protein 7.2 g/dL      Albumin 3.9 g/dL      Total Bilirubin 0.32 mg/dL      eGFR 47 ml/min/1.73sq m     Narrative:      [employer] guidelines for Chronic Kidney Disease (CKD):     Stage 1 with normal or high GFR (GFR > 90 mL/min/1.73 square meters)    Stage 2 Mild CKD (GFR = 60-89 mL/min/1.73 square meters)    Stage 3A Moderate CKD (GFR = 45-59 mL/min/1.73 square meters)    Stage 3B Moderate CKD (GFR = 30-44 mL/min/1.73 square meters)    Stage 4 Severe CKD (GFR = 15-29 mL/min/1.73 square meters)    Stage 5 End Stage CKD (GFR <15 mL/min/1.73 square meters)  Note: GFR calculation is accurate only with a steady state creatinine    Lipase [[id number]]  (Normal) Collected: 05/09/24 1558    Lab Status: Final result Specimen: Blood from Arm, Left Updated: 05/09/24 1624     Lipase 45 u/L                    CT abdomen pelvis with contrast   Final Result by Alin Chirstian MD (05/09 1744)         1. Findings consistent with acute duodenitis  involving the pylorus and second portion of the duodenum. No evidence of perforation or abscess. GI consultation recommended.         Workstation performed: [id number]         XR chest 1 view portable    (Results Pending)              Procedures  Procedures         ED Course             HEART Risk Score      Flowsheet Row Most Recent Value   Heart Score Risk Calculator    History 0 Filed at: 05/09/2024 1700   ECG 1 Filed at: 05/09/2024 1700   Age 2 Filed at: 05/09/2024 1700   Risk Factors 2 Filed at: 05/09/2024 1700   Troponin 2 Filed at: 05/09/2024 1700   HEART Score 7 Filed at: 05/09/2024 1700                                        Medical Decision Making  73-year-old male history of diabetes, hypertension, A-fib, heart failure on warfarin presenting with shortness of breath and abdominal pain.  History of heart failure EF 30% with rales, leg edema, abdominal fullness with shortness of breath and dyspnea.  Suspect likely acute heart failure exacerbation.  Plan for cardiac evaluation including EKG and troponin plus chest x-ray.  Basic labs.  Also plan for CT imaging of the abdomen.  Borderline oxygen saturation at rest at 90 to 91%.  Dropped into the high 80s with minimal exertion.  Reassess.    EKG interpreted by me with sinus rhythm and nonspecific ST abnormality.  Labs interpreted by me notable for troponin 44.  Plan for delta.  Also notable for BNP of 1500.  Chest x-ray interpreted by me with mild edema.  Given dose of IV Lasix.  CT imaging with pneumonitis.  Plan for further evaluation and management patient.  Admitted.    Amount and/or Complexity of Data Reviewed  Labs: ordered.  Radiology: ordered.    Risk  Prescription drug management.  Decision regarding hospitalization.             Disposition  Final diagnoses:   Bilateral lower extremity edema   Dyspnea on exertion   Shortness of breath   Generalized abdominal pain   Acute exacerbation of CHF (congestive heart failure) (HCC)   Hypoxia     Time reflects  when diagnosis was documented in both MDM as applicable and the Disposition within this note       Time User Action Codes Description Comment    5/9/2024  4:20 PM Erne, Delfin Add [R60.0] Bilateral lower extremity edema     5/9/2024  4:20 PM Erne, Delfin Add [R06.09] Dyspnea on exertion     5/9/2024  4:20 PM Erne, Delfin Add [R06.02] Shortness of breath     5/9/2024  4:20 PM Erne, Delfin Add [R10.84] Generalized abdominal pain     5/9/2024  6:43 PM Erne, Delfin Add [I50.9] Acute exacerbation of CHF (congestive heart failure) ([facility])     5/9/2024  6:43 PM Erne, Delfin Modify [R60.0] Bilateral lower extremity edema     5/9/2024  6:43 PM Erne, Delfin Modify [I50.9] Acute exacerbation of CHF (congestive heart failure) ([facility])     5/9/2024  7:13 PM Erne, Delfin Add [R09.02] Hypoxia           ED Disposition       ED Disposition   Admit    Condition   Stable    Date/Time   Thu May 9, 2024 184    Comment   Case was discussed with LUIS and the patient's admission status was agreed to be Admission Status: inpatient status to the service of Dr. Garcia .               Follow-up Information    None         Current Discharge Medication List        CONTINUE these medications which have NOT CHANGED    Details   Ascorbic Acid (vitamin C) 1000 MG tablet Take 1,000 mg by mouth daily      aspirin 81 mg chewable tablet Chew 1 tablet (81 mg total) daily  Qty: 30 tablet, Refills: 3    Associated Diagnoses: Essential hypertension; Dilated cardiomyopathy ([facility])      Blood Glucose Monitoring Suppl (ONETOUCH VERIO) w/Device KIT by Does not apply route once for 1 dose Test sugar in the morning  Qty: 1 kit, Refills: 0    Associated Diagnoses: Type 2 diabetes mellitus with complication, without long-term current use of insulin ([facility])      cetirizine (ZyrTEC) 5 MG tablet Take 5 mg by mouth daily      EVENING PRIMROSE OIL PO Take by mouth 3 (three) times a day      furosemide (LASIX) 20 mg tablet Take 2 tablets (40 mg total) by mouth daily  Qty: 180  tablet, Refills: 2    Associated Diagnoses: Dilated cardiomyopathy (HCC)      metFORMIN (GLUCOPHAGE) 850 mg tablet Take 1 tablet (850 mg total) by mouth 2 (two) times a day with meals  Qty: 180 tablet, Refills: 0    Associated Diagnoses: Type 2 diabetes mellitus with complication, without long-term current use of insulin (HCC)      metoprolol succinate (TOPROL-XL) 25 mg 24 hr tablet Take 1 tablet (25 mg total) by mouth daily  Qty: 30 tablet, Refills: 3    Associated Diagnoses: Paroxysmal atrial fibrillation (HCC); Dilated cardiomyopathy (HCC); PVC's (premature ventricular contractions)      multivitamin (THERAGRAN) TABS Take 1 tablet by mouth daily      rosuvastatin (CRESTOR) 10 MG tablet Take 1 tablet (10 mg total) by mouth daily  Qty: 90 tablet, Refills: 0    Associated Diagnoses: Mixed hyperlipidemia      VITAMIN D, ERGOCALCIFEROL, PO Take by mouth      vitamin E, tocopherol, 400 units capsule Take 400 Units by mouth daily      warfarin (Coumadin) 5 mg tablet Take one tablet daily or as directed  Qty: 90 tablet, Refills: 3    Associated Diagnoses: Paroxysmal atrial fibrillation (HCC)             No discharge procedures on file.    PDMP Review       None            ED Provider  Electronically Signed by             Delfin Short MD  05/09/24 3715

## 2024-05-09 NOTE — PROGRESS NOTES
Name: Galen Carson      : 1950      MRN: [id number]  Encounter Provider: DANITA Milian  Encounter Date: 2024   Encounter department: [facility] [address]    Assessment & Plan     1. Shortness of breath  Comments:  Incrased work of breathing. Will send to ED for further eval of CHF vs possible ascites    2. Abdominal distention  Comments:  Advised ED eval to further evaluate obstruction vs fluid retention/ascites. Patient agreeable to plan.    3. Generalized abdominal pain    4. Retention of fluid  Comments:  Has been taking Lasix as prescribed, but notes increase in fluid in legs. Will send to ED for furter eval of possible ascites vs chf           Subjective      Patient presents to the office accompanied by friend for evaluation of abdominal pain and shortness of breath.   Symptoms started 8 days ago.  Was seen at urology office on , being managed for prostate cancer.  Had a transrectal ultrasound-guided biopsy of the prostate, following a vidya-prostatic nerve block.  Per patient, began with cough, chills after procedure.  Chills subsided, denies fevers.    Later developed abdominal pain, decreased bowel movements, increasing cough and shortness of breath.  Notes pain as generalized, denies nausea or vomiting.  Did have a bowel movement yesterday, but states it was less than usual.  Cough has become more moist, has been sleeping with head elevated on recliner for the past few nights.  Has been taking Karla-seltzer and Tylenol for symptoms.        Review of Systems   Constitutional:  Negative for activity change, appetite change, chills and fever.   HENT:  Negative for sore throat and trouble swallowing.    Eyes:  Negative for photophobia and visual disturbance.   Respiratory:  Positive for cough and shortness of breath. Negative for chest tightness.    Cardiovascular:  Positive for leg swelling. Negative for chest pain and palpitations.  "  Gastrointestinal:  Positive for abdominal distention, abdominal pain and constipation. Negative for blood in stool, nausea and vomiting.   Genitourinary:  Positive for hematuria. Negative for decreased urine volume, dysuria, flank pain, frequency, scrotal swelling and urgency.   Musculoskeletal:  Negative for arthralgias and back pain.   Skin:  Negative for color change and rash.   Neurological:  Negative for dizziness, weakness, light-headedness, numbness and headaches.   Psychiatric/Behavioral:  Negative for agitation and confusion.        Current Outpatient Medications on File Prior to Visit   Medication Sig    Ascorbic Acid (vitamin C) 1000 MG tablet Take 1,000 mg by mouth daily    aspirin 81 mg chewable tablet Chew 1 tablet (81 mg total) daily    cetirizine (ZyrTEC) 5 MG tablet Take 5 mg by mouth daily    EVENING PRIMROSE OIL PO Take by mouth 3 (three) times a day    furosemide (LASIX) 20 mg tablet Take 2 tablets (40 mg total) by mouth daily    metFORMIN (GLUCOPHAGE) 850 mg tablet Take 1 tablet (850 mg total) by mouth 2 (two) times a day with meals    metoprolol succinate (TOPROL-XL) 25 mg 24 hr tablet Take 1 tablet (25 mg total) by mouth daily    multivitamin (THERAGRAN) TABS Take 1 tablet by mouth daily    rosuvastatin (CRESTOR) 10 MG tablet Take 1 tablet (10 mg total) by mouth daily    VITAMIN D, ERGOCALCIFEROL, PO Take by mouth    vitamin E, tocopherol, 400 units capsule Take 400 Units by mouth daily    warfarin (Coumadin) 5 mg tablet Take one tablet daily or as directed    Blood Glucose Monitoring Suppl (ONETOUCH VERIO) w/Device KIT by Does not apply route once for 1 dose Test sugar in the morning    [DISCONTINUED] Calcium Carbonate (CALCIUM 600 PO) Take by mouth daily (Patient not taking: Reported on 3/18/2024)       Objective     /74 (BP Location: Left arm, Patient Position: Sitting)   Pulse 100   Resp 18   Ht 5' 10\" (1.778 m)   Wt 108 kg (238 lb 12.8 oz)   SpO2 98%   BMI 34.26 kg/m² "     Physical Exam  Vitals reviewed.   Constitutional:       General: He is not in acute distress.     Appearance: Normal appearance. He is not ill-appearing.   HENT:      Head: Normocephalic and atraumatic.      Right Ear: Tympanic membrane, ear canal and external ear normal.      Left Ear: Tympanic membrane, ear canal and external ear normal.      Nose: Nose normal.      Mouth/Throat:      Mouth: Mucous membranes are moist.      Pharynx: Oropharynx is clear.   Eyes:      Conjunctiva/sclera: Conjunctivae normal.      Pupils: Pupils are equal, round, and reactive to light.   Neck:      Vascular: No JVD.   Cardiovascular:      Rate and Rhythm: Normal rate and regular rhythm.      Pulses: Normal pulses.      Heart sounds: Normal heart sounds.   Pulmonary:      Effort: No respiratory distress.      Breath sounds: Decreased air movement present. No wheezing, rhonchi or rales.      Comments: Becomes SOB with talking/exertion  Abdominal:      General: Abdomen is protuberant. Bowel sounds are absent. There is distension.      Tenderness: There is abdominal tenderness in the epigastric area. There is no right CVA tenderness or left CVA tenderness.      Comments: Bowel sounds absent in bilateral upper quadrants, hyperactive in bilateral lower quadrants   Musculoskeletal:      Cervical back: Normal range of motion.      Right lower leg: Edema present.      Left lower leg: No edema.   Skin:     General: Skin is warm and dry.   Neurological:      General: No focal deficit present.      Mental Status: He is alert and oriented to person, place, and time.   Psychiatric:         Mood and Affect: Mood normal.         Behavior: Behavior normal.       DANITA Milian

## 2024-05-10 ENCOUNTER — APPOINTMENT (INPATIENT)
Dept: NON INVASIVE DIAGNOSTICS | Facility: HOSPITAL | Age: 74
DRG: 291 | End: 2024-05-10
Payer: MEDICARE

## 2024-05-10 PROBLEM — K92.2 GI BLEED: Status: ACTIVE | Noted: 2024-05-10

## 2024-05-10 LAB
ABO GROUP BLD: NORMAL
ABO GROUP BLD: NORMAL
ALBUMIN SERPL BCP-MCNC: 3.6 G/DL (ref 3.5–5)
ALP SERPL-CCNC: 61 U/L (ref 34–104)
ALT SERPL W P-5'-P-CCNC: 25 U/L (ref 7–52)
ANION GAP SERPL CALCULATED.3IONS-SCNC: 8 MMOL/L (ref 4–13)
AORTIC ROOT: 3.4 CM
APICAL FOUR CHAMBER EJECTION FRACTION: 18 %
ASCENDING AORTA: 3.4 CM
AST SERPL W P-5'-P-CCNC: 20 U/L (ref 13–39)
ATRIAL RATE: 99 BPM
BILIRUB SERPL-MCNC: 0.35 MG/DL (ref 0.2–1)
BLD GP AB SCN SERPL QL: NEGATIVE
BSA FOR ECHO PROCEDURE: 2.21 M2
BUN SERPL-MCNC: 60 MG/DL (ref 5–25)
CALCIUM SERPL-MCNC: 8 MG/DL (ref 8.4–10.2)
CARDIAC TROPONIN I PNL SERPL HS: 66 NG/L (ref 8–18)
CARDIAC TROPONIN I PNL SERPL HS: 69 NG/L (ref 8–18)
CHLORIDE SERPL-SCNC: 104 MMOL/L (ref 96–108)
CO2 SERPL-SCNC: 29 MMOL/L (ref 21–32)
CREAT SERPL-MCNC: 1.57 MG/DL (ref 0.6–1.3)
E WAVE DECELERATION TIME: 240 MS
E/A RATIO: 0.76
ERYTHROCYTE [DISTWIDTH] IN BLOOD BY AUTOMATED COUNT: 12.7 % (ref 11.6–15.1)
FRACTIONAL SHORTENING: 9 (ref 28–44)
GFR SERPL CREATININE-BSD FRML MDRD: 43 ML/MIN/1.73SQ M
GLUCOSE SERPL-MCNC: 136 MG/DL (ref 65–140)
GLUCOSE SERPL-MCNC: 160 MG/DL (ref 65–140)
GLUCOSE SERPL-MCNC: 195 MG/DL (ref 65–140)
GLUCOSE SERPL-MCNC: 205 MG/DL (ref 65–140)
HCT VFR BLD AUTO: 19.6 % (ref 36.5–49.3)
HCT VFR BLD AUTO: 24.2 % (ref 36.5–49.3)
HCT VFR BLD AUTO: 29.6 % (ref 36.5–49.3)
HGB BLD-MCNC: 6.3 G/DL (ref 12–17)
HGB BLD-MCNC: 7.7 G/DL (ref 12–17)
HGB BLD-MCNC: 9.3 G/DL (ref 12–17)
INR PPP: 1.8 (ref 0.84–1.19)
INR PPP: 2.83 (ref 0.84–1.19)
INTERVENTRICULAR SEPTUM IN DIASTOLE (PARASTERNAL SHORT AXIS VIEW): 1.1 CM
INTERVENTRICULAR SEPTUM: 1.1 CM (ref 0.6–1.1)
LA/AORTA RATIO 2D: 1.38
LEFT ATRIUM SIZE: 4.7 CM
LEFT INTERNAL DIMENSION IN SYSTOLE: 5.1 CM (ref 2.1–4)
LEFT VENTRICULAR INTERNAL DIMENSION IN DIASTOLE: 5.6 CM (ref 3.5–6)
LEFT VENTRICULAR POSTERIOR WALL IN END DIASTOLE: 1.1 CM
LEFT VENTRICULAR STROKE VOLUME: 31 ML
LVSV (TEICH): 31 ML
MAGNESIUM SERPL-MCNC: 1.9 MG/DL (ref 1.9–2.7)
MCH RBC QN AUTO: 28.4 PG (ref 26.8–34.3)
MCHC RBC AUTO-ENTMCNC: 31.4 G/DL (ref 31.4–37.4)
MCV RBC AUTO: 90 FL (ref 82–98)
MV E'TISSUE VEL-SEP: 7 CM/S
MV PEAK A VEL: 0.75 M/S
MV PEAK E VEL: 57 CM/S
MV STENOSIS PRESSURE HALF TIME: 70 MS
MV VALVE AREA P 1/2 METHOD: 3.14
P AXIS: 71 DEGREES
PLATELET # BLD AUTO: 269 THOUSANDS/UL (ref 149–390)
PMV BLD AUTO: 9.8 FL (ref 8.9–12.7)
POTASSIUM SERPL-SCNC: 5.2 MMOL/L (ref 3.5–5.3)
PR INTERVAL: 174 MS
PROT SERPL-MCNC: 6.3 G/DL (ref 6.4–8.4)
PROTHROMBIN TIME: 21.7 SECONDS (ref 11.6–14.5)
PROTHROMBIN TIME: 30.7 SECONDS (ref 11.6–14.5)
QRS AXIS: -10 DEGREES
QRSD INTERVAL: 124 MS
QT INTERVAL: 394 MS
QTC INTERVAL: 505 MS
RBC # BLD AUTO: 3.28 MILLION/UL (ref 3.88–5.62)
RH BLD: POSITIVE
RH BLD: POSITIVE
SL CV LV EF: 20
SL CV PED ECHO LEFT VENTRICLE DIASTOLIC VOLUME (MOD BIPLANE) 2D: 157 ML
SL CV PED ECHO LEFT VENTRICLE SYSTOLIC VOLUME (MOD BIPLANE) 2D: 126 ML
SODIUM SERPL-SCNC: 141 MMOL/L (ref 135–147)
SPECIMEN EXPIRATION DATE: NORMAL
T WAVE AXIS: 127 DEGREES
TR MAX PG: 27 MMHG
TR PEAK VELOCITY: 2.6 M/S
TRICUSPID ANNULAR PLANE SYSTOLIC EXCURSION: 2.5 CM
TRICUSPID VALVE PEAK REGURGITATION VELOCITY: 2.62 M/S
VENTRICULAR RATE: 99 BPM
WBC # BLD AUTO: 7.78 THOUSAND/UL (ref 4.31–10.16)

## 2024-05-10 PROCEDURE — 82948 REAGENT STRIP/BLOOD GLUCOSE: CPT

## 2024-05-10 PROCEDURE — 30233K1 TRANSFUSION OF NONAUTOLOGOUS FROZEN PLASMA INTO PERIPHERAL VEIN, PERCUTANEOUS APPROACH: ICD-10-PCS | Performed by: INTERNAL MEDICINE

## 2024-05-10 PROCEDURE — 83735 ASSAY OF MAGNESIUM: CPT | Performed by: INTERNAL MEDICINE

## 2024-05-10 PROCEDURE — C9113 INJ PANTOPRAZOLE SODIUM, VIA: HCPCS | Performed by: PHYSICIAN ASSISTANT

## 2024-05-10 PROCEDURE — P9016 RBC LEUKOCYTES REDUCED: HCPCS

## 2024-05-10 PROCEDURE — 86900 BLOOD TYPING SEROLOGIC ABO: CPT | Performed by: NURSE PRACTITIONER

## 2024-05-10 PROCEDURE — 86923 COMPATIBILITY TEST ELECTRIC: CPT

## 2024-05-10 PROCEDURE — 93010 ELECTROCARDIOGRAM REPORT: CPT | Performed by: INTERNAL MEDICINE

## 2024-05-10 PROCEDURE — 86850 RBC ANTIBODY SCREEN: CPT | Performed by: NURSE PRACTITIONER

## 2024-05-10 PROCEDURE — 80053 COMPREHEN METABOLIC PANEL: CPT | Performed by: INTERNAL MEDICINE

## 2024-05-10 PROCEDURE — 84484 ASSAY OF TROPONIN QUANT: CPT | Performed by: PHYSICIAN ASSISTANT

## 2024-05-10 PROCEDURE — 85018 HEMOGLOBIN: CPT | Performed by: NURSE PRACTITIONER

## 2024-05-10 PROCEDURE — 85610 PROTHROMBIN TIME: CPT | Performed by: NURSE PRACTITIONER

## 2024-05-10 PROCEDURE — 30233N1 TRANSFUSION OF NONAUTOLOGOUS RED BLOOD CELLS INTO PERIPHERAL VEIN, PERCUTANEOUS APPROACH: ICD-10-PCS | Performed by: INTERNAL MEDICINE

## 2024-05-10 PROCEDURE — P9017 PLASMA 1 DONOR FRZ W/IN 8 HR: HCPCS

## 2024-05-10 PROCEDURE — C8929 TTE W OR WO FOL WCON,DOPPLER: HCPCS

## 2024-05-10 PROCEDURE — 86901 BLOOD TYPING SEROLOGIC RH(D): CPT | Performed by: NURSE PRACTITIONER

## 2024-05-10 PROCEDURE — 85014 HEMATOCRIT: CPT | Performed by: NURSE PRACTITIONER

## 2024-05-10 PROCEDURE — 85610 PROTHROMBIN TIME: CPT | Performed by: INTERNAL MEDICINE

## 2024-05-10 PROCEDURE — 99232 SBSQ HOSP IP/OBS MODERATE 35: CPT | Performed by: NURSE PRACTITIONER

## 2024-05-10 PROCEDURE — 85027 COMPLETE CBC AUTOMATED: CPT | Performed by: INTERNAL MEDICINE

## 2024-05-10 PROCEDURE — 93306 TTE W/DOPPLER COMPLETE: CPT | Performed by: INTERNAL MEDICINE

## 2024-05-10 PROCEDURE — 99223 1ST HOSP IP/OBS HIGH 75: CPT | Performed by: INTERNAL MEDICINE

## 2024-05-10 RX ORDER — PANTOPRAZOLE SODIUM 40 MG/10ML
40 INJECTION, POWDER, LYOPHILIZED, FOR SOLUTION INTRAVENOUS EVERY 12 HOURS
Status: DISCONTINUED | OUTPATIENT
Start: 2024-05-10 | End: 2024-05-16 | Stop reason: HOSPADM

## 2024-05-10 RX ORDER — LANOLIN ALCOHOL/MO/W.PET/CERES
3 CREAM (GRAM) TOPICAL
Status: DISCONTINUED | OUTPATIENT
Start: 2024-05-10 | End: 2024-05-16 | Stop reason: HOSPADM

## 2024-05-10 RX ORDER — METOCLOPRAMIDE HYDROCHLORIDE 5 MG/ML
10 INJECTION INTRAMUSCULAR; INTRAVENOUS ONCE
Status: COMPLETED | OUTPATIENT
Start: 2024-05-11 | End: 2024-05-11

## 2024-05-10 RX ORDER — INSULIN LISPRO 100 [IU]/ML
1-6 INJECTION, SOLUTION INTRAVENOUS; SUBCUTANEOUS
Status: DISCONTINUED | OUTPATIENT
Start: 2024-05-10 | End: 2024-05-16 | Stop reason: HOSPADM

## 2024-05-10 RX ADMIN — OXYCODONE HYDROCHLORIDE AND ACETAMINOPHEN 1000 MG: 500 TABLET ORAL at 08:24

## 2024-05-10 RX ADMIN — PRAVASTATIN SODIUM 80 MG: 80 TABLET ORAL at 15:36

## 2024-05-10 RX ADMIN — INSULIN LISPRO 2 UNITS: 100 INJECTION, SOLUTION INTRAVENOUS; SUBCUTANEOUS at 12:20

## 2024-05-10 RX ADMIN — ASPIRIN 81 MG: 81 TABLET, CHEWABLE ORAL at 08:24

## 2024-05-10 RX ADMIN — PERFLUTREN 0.6 ML/MIN: 6.52 INJECTION, SUSPENSION INTRAVENOUS at 09:45

## 2024-05-10 RX ADMIN — B-COMPLEX W/ C & FOLIC ACID TAB 1 TABLET: TAB at 08:24

## 2024-05-10 RX ADMIN — INSULIN LISPRO 2 UNITS: 100 INJECTION, SOLUTION INTRAVENOUS; SUBCUTANEOUS at 16:31

## 2024-05-10 RX ADMIN — METOPROLOL SUCCINATE 25 MG: 25 TABLET, EXTENDED RELEASE ORAL at 08:24

## 2024-05-10 RX ADMIN — FUROSEMIDE 50 MG: 10 INJECTION, SOLUTION INTRAMUSCULAR; INTRAVENOUS at 18:12

## 2024-05-10 RX ADMIN — FUROSEMIDE 50 MG: 10 INJECTION, SOLUTION INTRAMUSCULAR; INTRAVENOUS at 08:24

## 2024-05-10 RX ADMIN — PANTOPRAZOLE SODIUM 40 MG: 40 INJECTION, POWDER, FOR SOLUTION INTRAVENOUS at 15:35

## 2024-05-10 NOTE — CONSULTS
Consultation -  [facility]  Galen Carson 73 y.o. male MRN: [id number]  Unit/Bed#: -02 Encounter: [id number]        Inpatient consult to gastroenterology  Consult performed by: Rosalba Rosa PA-C  Consult ordered by: DANITA Herrera          Reason for Consult / Principal Problem: Melena, Acute Blood Loss Anemia    HPI: Galen is a 72 yo M with a PMH of Afib on warfarin, CHF with EF 20%, CKD, DM2, prostate cancer, who presented yesterday due to lower extremity swelling, 10 pound weight gain in the past week, and upper abdominal pressure.  This is all been going on since last week when he had a prostate biopsy done.  At that time he was also started on steroids.  He denies any additional NSAID use during that time and is only been using Tylenol.  This morning he started to pass significant melena and wanted to hit the water in the toilet bowl it would turn red.  He has had multiple episodes of melenic stool today every 1-2 hours.  He reports feeling lightheaded at times but this has since improved.  He denies any abdominal pain currently.  He has had a colonoscopy in the past with polyps removed but reports that he was told to not have any further colonoscopies at this point because of his heart function.  He is never had an upper endoscopy in the past.  He denies any history of peptic ulcer disease.     REVIEW OF SYSTEMS: Negative except for as stated above    Historical Information   Past Medical History:   Diagnosis Date    Allergic     Anesthesia     pt wears a life vest (11/16).  should not be scheduled at [facility] until heart condition is stabilized    BPH (benign prostatic hypertrophy)     Cancer (HCC)     Diabetes mellitus (HCC)     Hyperlipidemia     Hypertension     Irregular heart beat     PAF    Prostate cancer (HCC)      Past Surgical History:   Procedure Laterality Date    CARDIAC CATHETERIZATION N/A 10/4/2023    Procedure: Cardiac Coronary Angiogram;  Surgeon: Chris OVIEDO  MD Nas;  Location: [facility];  Service: Cardiology    CARDIAC CATHETERIZATION N/A 10/4/2023    Procedure: Cardiac RHC/LHC;  Surgeon: Chris Lovell MD;  Location: [facility];  Service: Cardiology    CARDIAC CATHETERIZATION  10/4/2023    Procedure: Cardiac catheterization;  Surgeon: Chris Lovell MD;  Location: [facility];  Service: Cardiology    CARDIAC ELECTROPHYSIOLOGY PROCEDURE N/A 2024    Procedure: Cardiac icd implant, Dual Chambers ICD;  Surgeon: Leo Whalen MD;  Location:  CARDIAC CATH LAB;  Service: Cardiology    EAR SURGERY      HERNIA REPAIR      DE COLONOSCOPY FLX DX W/COLLJ SPEC WHEN PFRMD N/A 2019    Procedure: COLONOSCOPY;  Surgeon: Winston Nielson III, MD;  Location: [facility] GI [facility];  Service: Gastroenterology     Social History   Social History     Substance and Sexual Activity   Alcohol Use Never     Social History     Substance and Sexual Activity   Drug Use Never     Social History     Tobacco Use   Smoking Status Former    Current packs/day: 0.00    Average packs/day: 1 pack/day for 25.0 years (25.0 ttl pk-yrs)    Types: Cigarettes, Pipe, Cigars    Start date: 1980    Quit date: 2005    Years since quittin.2    Passive exposure: Past   Smokeless Tobacco Never     Family History   Problem Relation Age of Onset    Stroke Father     No Known Problems Mother     Prostate cancer Brother     Arthritis Brother     No Known Problems Sister     Diabetes Sister     Diabetes Sister     No Known Problems Son     No Known Problems Daughter        Meds/Allergies     Medications Prior to Admission   Medication    Ascorbic Acid (vitamin C) 1000 MG tablet    aspirin 81 mg chewable tablet    cetirizine (ZyrTEC) 5 MG tablet    EVENING PRIMROSE OIL PO    furosemide (LASIX) 20 mg tablet    metFORMIN (GLUCOPHAGE) 850 mg tablet    metoprolol succinate (TOPROL-XL) 25 mg 24 hr tablet    multivitamin (THERAGRAN) TABS    rosuvastatin (CRESTOR) 10 MG tablet     "VITAMIN D, ERGOCALCIFEROL, PO    vitamin E, tocopherol, 400 units capsule    warfarin (Coumadin) 5 mg tablet    Blood Glucose Monitoring Suppl (ONETOUCH VERIO) w/Device KIT     Current Facility-Administered Medications   Medication Dose Route Frequency    ascorbic acid (VITAMIN C) tablet 1,000 mg  1,000 mg Oral Daily    aspirin chewable tablet 81 mg  81 mg Oral Daily    furosemide (LASIX) injection 50 mg  50 mg Intravenous BID    insulin lispro (HumALOG/ADMELOG) 100 units/mL subcutaneous injection 1-6 Units  1-6 Units Subcutaneous TID AC    melatonin tablet 3 mg  3 mg Oral HS    metoprolol succinate (TOPROL-XL) 24 hr tablet 25 mg  25 mg Oral Daily    multivitamin stress formula tablet 1 tablet  1 tablet Oral Daily    pantoprazole (PROTONIX) injection 40 mg  40 mg Intravenous Q12H    pravastatin (PRAVACHOL) tablet 80 mg  80 mg Oral Daily With Dinner       Allergies   Allergen Reactions    Penicillin V Other (See Comments)     As a child            Objective     Blood pressure (!) 97/38, pulse 69, temperature 97.8 °F (36.6 °C), resp. rate 18, height 5' 10\" (1.778 m), weight 103 kg (228 lb), SpO2 91%.      Intake/Output Summary (Last 24 hours) at 5/10/2024 1514  Last data filed at 5/10/2024 1401  Gross per 24 hour   Intake --   Output 3123 ml   Net -3123 ml         PHYSICAL EXAM:      General Appearance:   Alert, cooperative, no distress, appears stated age    HEENT:   Normocephalic, atraumatic, anicteric.     Neck:  Supple, symmetrical, trachea midline, no adenopathy;    thyroid: no enlargement/tenderness/nodules; no carotid  bruit or JVD    Lungs:   Clear to auscultation bilaterally; no rales, rhonchi or wheezing; respirations unlabored    Heart::   S1 and S2 normal; regular rate and rhythm; no murmur, rub, or gallop.   Abdomen:   Soft, non-tender, non-distended; normal bowel sounds; no masses, no organomegaly    Genitalia:   Deferred    Rectal:   Deferred    Extremities:  No cyanosis, clubbing or edema    Pulses:  " 2+ and symmetric all extremities    Skin:  Skin color, texture, turgor normal, no rashes or lesions    Lymph nodes:  No palpable cervical, axillary or inguinal lymphadenopathy        Lab Results:   Results from last 7 days   Lab Units 05/10/24  1159 05/10/24  0521 05/09/24  1558   WBC Thousand/uL  --  7.78 7.46   HEMOGLOBIN g/dL 7.7* 9.3* 11.0*   HEMATOCRIT % 24.2* 29.6* 34.1*   PLATELETS Thousands/uL  --  269 220   LYMPHO PCT %  --   --  9*   MONO PCT %  --   --  8   EOS PCT %  --   --  2     Results from last 7 days   Lab Units 05/10/24  0521   POTASSIUM mmol/L 5.2   CHLORIDE mmol/L 104   CO2 mmol/L 29   BUN mg/dL 60*   CREATININE mg/dL 1.57*   CALCIUM mg/dL 8.0*   ALK PHOS U/L 61   ALT U/L 25   AST U/L 20     Results from last 7 days   Lab Units 05/10/24  0521   INR  2.83*     Results from last 7 days   Lab Units 05/09/24  1558   LIPASE u/L 45       Imaging Studies: I have personally reviewed pertinent imaging studies.    XR chest 1 view portable  Result Date: 5/10/2024  Impression: No acute focal infiltrate or overt pulmonary edema. Borderline/mild pulmonary vascular congestion. Resident: TORI NATHAN I, the attending radiologist, have reviewed the images and agree with the final report above. Workstation performed: [id number]     CT abdomen pelvis with contrast  Result Date: 5/9/2024  Impression: 1. Findings consistent with acute duodenitis involving the pylorus and second portion of the duodenum. No evidence of perforation or abscess. GI consultation recommended. Workstation performed: [id number]       ASSESSMENT and PLAN:      Melena  Acute Blood Loss Anemia  Duodenitis  - Presented yesterday with upper abdominal pain, LE edema, and 10 lb weight gain currently being diuresed and then developed acute upper GI bleeding today with multiple episodes of melena and drop in Hb from  11.0 to 7.7  - CT on admission showed acute duodenitis  - Start protonix 40 mg IV BID  - Hold coumadin, INR 2.83 currently, will  discuss with SLIM regarding giving FFP while receiving IV diuresis to reverse INR given his significant Hb drop  - NPO After midnight   - EGD tomorrow to evaluate duodenum and treat any active GI bleeding  - Monitor H/H q6h, INR in AM        The patient will be seen and examined by Dr. Lau.

## 2024-05-10 NOTE — PROGRESS NOTES
[facility]  Progress Note  Name: Galen Carson I  MRN: [id number]  Unit/Bed#: -02 I Date of Admission: 5/9/2024   Date of Service: 5/10/2024 I Hospital Day: 1    Assessment/Plan   * Acute on chronic systolic congestive heart failure (HCC)  Assessment & Plan  Wt Readings from Last 3 Encounters:   05/10/24 103 kg (228 lb)   05/09/24 108 kg (238 lb 12.8 oz)   05/01/24 103 kg (227 lb)     Presents with worsening shortness of breath dyspnea on exertion lower extremity swelling and cough  NYHA Class III., Stage C  Continue IV Lasix 50 mg IV twice daily  Sodium restriction 2g  Daily standing weights, strict I's/O, sodium restriction  Consult nutrition for dietary education  Echocardiogram ordered    Paroxysmal atrial fibrillation (HCC)  Assessment & Plan  On toprol XL and AC with warfarin  Continue home meds  Monitor rates/BP      Stage 3a chronic kidney disease ([facility])  Assessment & Plan  Lab Results   Component Value Date    EGFR 43 05/10/2024    EGFR 47 05/09/2024    EGFR 51 03/13/2024    CREATININE 1.57 (H) 05/10/2024    CREATININE 1.46 (H) 05/09/2024    CREATININE 1.37 (H) 03/13/2024     Appears to be at baseline  Continue to monitor    Type 2 diabetes mellitus with hyperglycemia, without long-term current use of insulin ([facility])  Assessment & Plan  Lab Results   Component Value Date    HGBA1C 6.9 (H) 02/14/2024     Blood Sugar Average: Last 72 hrs:  Hold home regimen while inpatient and resume on discharge   Diabetic diet  Insulin regimen  Lantus 10 units HS  Glucose checks and Insulin correction ACHS  Goal -180 while admitted, adjusting insulin regimen as appropriate  Monitor for hypoglycemia and treat per protocol              VTE Pharmacologic Prophylaxis: VTE Score: 6 High Risk (Score >/= 5) - Pharmacological DVT Prophylaxis Contraindicated. Sequential Compression Devices Ordered.    Mobility:   Basic Mobility Inpatient Raw Score: 24  -HLM Goal: 8: Walk 250 feet or  more  JH-HLM Achieved: 8: Walk 250 feet ot more  JH-HLM Goal achieved. Continue to encourage appropriate mobility.    Patient Centered Rounds: I performed bedside rounds with nursing staff today. Tere BISHOP   Discussions with Specialists or Other Care Team Provider: reviewed notes, TT GI and CM    Education and Discussions with Family / Patient: Updated  (son) via phone. Galen on the phone    Total Time Spent on Date of Encounter in care of patient: 45 mins. This time was spent on one or more of the following: performing physical exam; counseling and coordination of care; obtaining or reviewing history; documenting in the medical record; reviewing/ordering tests, medications or procedures; communicating with other healthcare professionals and discussing with patient's family/caregivers.    Current Length of Stay: 1 day(s)  Current Patient Status: Inpatient   Certification Statement: The patient will continue to require additional inpatient hospital stay due to gi bleed? And Heart failure  Discharge Plan: Anticipate discharge in >72 hrs to home.    Code Status: Level 1 - Full Code    Subjective:   Unfortunately patient was seen in the bathroom after having  a bloody bowel movement, had transrectal biopsy of prostate last week and has been constipated since, stated bleeding starting this morning at 5 am, feeling lightheaded and sweaty afterwards.    Objective:     Vitals:   Temp (24hrs), Av.2 °F (36.8 °C), Min:97.8 °F (36.6 °C), Max:98.5 °F (36.9 °C)    Temp:  [97.8 °F (36.6 °C)-98.5 °F (36.9 °C)] 97.8 °F (36.6 °C)  HR:  [] 69  Resp:  [16-22] 18  BP: (102-156)/() 111/43  SpO2:  [91 %-98 %] 91 %  Body mass index is 32.71 kg/m².     Input and Output Summary (last 24 hours):     Intake/Output Summary (Last 24 hours) at 5/10/2024 1102  Last data filed at 5/10/2024 0500  Gross per 24 hour   Intake --   Output 3120 ml   Net -3120 ml       Physical Exam:   Physical Exam  Vitals reviewed.    Constitutional:       General: He is not in acute distress.     Appearance: He is obese. He is ill-appearing and diaphoretic.   Cardiovascular:      Rate and Rhythm: Normal rate.   Pulmonary:      Effort: No respiratory distress.   Abdominal:      Palpations: Abdomen is soft.   Skin:     General: Skin is warm.      Coloration: Skin is pale.   Neurological:      Mental Status: He is alert. Mental status is at baseline.   Psychiatric:         Mood and Affect: Mood normal.        Additional Data:     Labs:  Results from last 7 days   Lab Units 05/10/24  0521 05/09/24  1558   WBC Thousand/uL 7.78 7.46   HEMOGLOBIN g/dL 9.3* 11.0*   HEMATOCRIT % 29.6* 34.1*   PLATELETS Thousands/uL 269 220   LYMPHO PCT %  --  9*   MONO PCT %  --  8   EOS PCT %  --  2     Results from last 7 days   Lab Units 05/10/24  0521   SODIUM mmol/L 141   POTASSIUM mmol/L 5.2   CHLORIDE mmol/L 104   CO2 mmol/L 29   BUN mg/dL 60*   CREATININE mg/dL 1.57*   ANION GAP mmol/L 8   CALCIUM mg/dL 8.0*   ALBUMIN g/dL 3.6   TOTAL BILIRUBIN mg/dL 0.35   ALK PHOS U/L 61   ALT U/L 25   AST U/L 20   GLUCOSE RANDOM mg/dL 160*     Results from last 7 days   Lab Units 05/10/24  0521   INR  2.83*                   Lines/Drains:  Invasive Devices       Peripheral Intravenous Line  Duration             Peripheral IV 05/09/24 Left Antecubital <1 day                        Recent Cultures (last 7 days):         Last 24 Hours Medication List:   Current Facility-Administered Medications   Medication Dose Route Frequency Provider Last Rate    vitamin C  1,000 mg Oral Daily Breezy Cheatham, DO      aspirin  81 mg Oral Daily Breezy Cheatham, DO      furosemide  50 mg Intravenous BID Breezy Cheatham, DO      insulin lispro  1-6 Units Subcutaneous TID AC DANITA Herrera      metoprolol succinate  25 mg Oral Daily Breezy Cheatham, DO      multivitamin stress formula  1 tablet Oral Daily Breezy Cheatham, DO      pravastatin  80 mg Oral Daily With Dinner  Breezy Cheatham DO      warfarin  5 mg Oral Daily (warfarin) Breezy Cheatham DO          Today, Patient Was Seen By: DANITA Herrera    **Please Note: This note may have been constructed using a voice recognition system.**

## 2024-05-10 NOTE — DISCHARGE INSTRUCTIONS
Low-Sodium Diet   WHAT YOU NEED TO KNOW:   A low-sodium diet limits foods that are high in sodium (salt). You will need to follow a low-sodium diet if you have high blood pressure, kidney disease, or heart failure. You may also need to follow this diet if you have a condition that is causing your body to retain (hold) extra fluid. You may need to limit the amount of sodium you eat in a day to 1,500 to 2,000 mg. Ask your healthcare provider how much sodium you can have each day.  DISCHARGE INSTRUCTIONS:   How to use food labels to choose foods that are low in sodium:  Read food labels to find the amount of sodium they contain. The amount of sodium is listed in milligrams (mg). The % Daily Value (DV) column tells you how much of your daily needs are met by 1 serving of the food for each nutrient listed. Choose foods that have less than 5% of the DV of sodium. These foods are considered low in sodium. Foods that have 20% or more of the DV of sodium are considered high in sodium. Some food labels may also list any of the following terms that tell you about the sodium content in the food:  Sodium-free:  Less than 5 mg in each serving    Very low sodium:  35 mg of sodium or less in each serving    Low sodium:  140 mg of sodium or less in each serving    Reduced sodium:  At least 25% less sodium in each serving than the regular type    Light in sodium:  50% less sodium in each serving    Unsalted or no added salt:  No extra salt is added during processing (the food may still contain sodium)       Foods to avoid:  Salty foods are high in sodium. You should avoid the following:  Processed foods:      Mixes for cornbread, biscuits, cake, and pudding     Instant foods, such as potatoes, cereals, noodles, and rice     Packaged foods, such as bread stuffing, rice and pasta mixes, snack dip mixes, and macaroni and cheese     Canned foods, such as canned vegetables, soups, broths, sauces, and vegetable or tomato juice    Snack  foods, such as salted chips, popcorn, pretzels, pork rinds, salted crackers, and salted nuts    Frozen foods, such as dinners, entrees, vegetables with sauces, and breaded meats    Sauerkraut, pickled vegetables, and other foods prepared in brine    Meats and cheeses:      Smoked or cured meat, such as corned beef, oh, ham, hot dogs, and sausage    Canned meats or spreads, such as potted meats, sardines, anchovies, and imitation seafood    Deli or lunch meats, such as bologna, ham, turkey, and roast beef    Processed cheese, such as American cheese and cheese spreads    Condiments, sauces, and seasonings:      Salt (¼ teaspoon of salt contains 575 mg of sodium)    Seasonings made with salt, such as garlic salt, celery salt, onion salt, and seasoned salt    Regular soy sauce, barbecue sauce, teriyaki sauce, steak sauce, Worcestershire sauce, and most flavored vinegars    Canned gravy and mixes     Regular condiments, such as mustard, ketchup, and salad dressings    Pickles and olives    Meat tenderizers and monosodium glutamate (MSG)    Foods to include:  Read the food label to find the amount of sodium in each serving.  Bread and cereal:  Try to choose breads with less than 80 mg of sodium per serving.     Bread, roll, marek, tortilla, or unsalted crackers.    Ready-to-eat cereals with less than 5% DV of sodium (examples include shredded wheat and puffed rice)    Pasta    Vegetables and fruits:      Unsalted fresh, frozen, or canned vegetables    Fresh, frozen, or canned fruits    Fruit juice    Dairy:  One serving has about 150 mg of sodium.    Milk, all types    Yogurt    Hard cheese, such as cheddar, Swiss, [place] nick, or mozzarella    Meat and other protein foods:  Some raw meats may have added sodium.     Plain meats, fish, and poultry     Egg    Other foods:      Homemade pudding    Unsalted nuts, popcorn, or pretzels    Unsalted butter or margarine    Ways to get less sodium:  If you are used to the  flavor of salt, it will take time to get used to low-sodium foods. Your healthcare provider or dietitian can help you create a plan for lowering sodium. The plan will be specific to your needs and your family's needs. You may focus on 1 or 2 changes each week, such as the following:  Add spices and herbs to foods instead of salt during cooking.  Use salt-free seasonings to add flavor to foods. Examples include onion powder, garlic powder, basil, castelan powder, paprika, and parsley. Try lemon or lime juice or vinegar to give foods a tart flavor. Use hot peppers, pepper, or cayenne pepper to add a spicy flavor.    Do not keep a salt shaker at your kitchen table.  This may help keep you from adding salt to food at the table. A teaspoon of salt has 2,300 mg of sodium. It may take time to get used to enjoying the natural flavor of food instead of adding salt. Talk to your healthcare provider before you use salt substitutes. Some salt substitutes have a high amount of a chemical called potassium chloride. This form of potassium needs to be avoided if you have kidney disease.    Choose low-sodium foods at restaurants.  Meals from restaurants are often high in sodium. Some restaurants have nutrition information on the menu that tells you the amount of sodium in their foods. If possible, ask for your food to be prepared with less, or no salt.    Shop for unsalted or low-sodium foods and snacks at the grocery store.  Examples include unsalted or low-sodium broths, soups, and canned vegetables. Choose fresh or frozen vegetables instead. Choose unsalted nuts or seeds or fresh fruits or vegetables as snacks. Read food labels and choose salt-free, very low-sodium, or low-sodium foods. You can also rinse canned vegetables under running water to remove extra sodium before you cook them.    © Copyright [employer] 2023 Information is for End User's use only and may not be sold, redistributed or otherwise used for commercial  purposes.  The above information is an  only. It is not intended as medical advice for individual conditions or treatments. Talk to your doctor, nurse or pharmacist before following any medical regimen to see if it is safe and effective for you.

## 2024-05-10 NOTE — PLAN OF CARE
Problem: PAIN - ADULT  Goal: Verbalizes/displays adequate comfort level or baseline comfort level  Description: Interventions:  - Encourage patient to monitor pain and request assistance  - Assess pain using appropriate pain scale  - Administer analgesics based on type and severity of pain and evaluate response  - Implement non-pharmacological measures as appropriate and evaluate response  - Consider cultural and social influences on pain and pain management  - Notify physician/advanced practitioner if interventions unsuccessful or patient reports new pain  Outcome: Progressing     Problem: INFECTION - ADULT  Goal: Absence or prevention of progression during hospitalization  Description: INTERVENTIONS:  - Assess and monitor for signs and symptoms of infection  - Monitor lab/diagnostic results  - Monitor all insertion sites, i.e. indwelling lines, tubes, and drains  - Monitor endotracheal if appropriate and nasal secretions for changes in amount and color  - [place] appropriate cooling/warming therapies per order  - Administer medications as ordered  - Instruct and encourage patient and family to use good hand hygiene technique  - Identify and instruct in appropriate isolation precautions for identified infection/condition  Outcome: Progressing  Goal: Absence of fever/infection during neutropenic period  Description: INTERVENTIONS:  - Monitor WBC    Outcome: Progressing     Problem: SAFETY ADULT  Goal: Patient will remain free of falls  Description: INTERVENTIONS:  - Educate patient/family on patient safety including physical limitations  - Instruct patient to call for assistance with activity   - Consult OT/PT to assist with strengthening/mobility   - Keep Call bell within reach  - Keep bed low and locked with side rails adjusted as appropriate  - Keep care items and personal belongings within reach  - Initiate and maintain comfort rounds  - Make Fall Risk Sign visible to staff  - Offer Toileting every  Hours,  in advance of need  - Initiate/Maintain alarm  - Obtain necessary fall risk management equipment:   - Apply yellow socks and bracelet for high fall risk patients  - Consider moving patient to room near nurses station  Outcome: Progressing  Goal: Maintain or return to baseline ADL function  Description: INTERVENTIONS:  -  Assess patient's ability to carry out ADLs; assess patient's baseline for ADL function and identify physical deficits which impact ability to perform ADLs (bathing, care of mouth/teeth, toileting, grooming, dressing, etc.)  - Assess/evaluate cause of self-care deficits   - Assess range of motion  - Assess patient's mobility; develop plan if impaired  - Assess patient's need for assistive devices and provide as appropriate  - Encourage maximum independence but intervene and supervise when necessary  - Involve family in performance of ADLs  - Assess for home care needs following discharge   - Consider OT consult to assist with ADL evaluation and planning for discharge  - Provide patient education as appropriate  Outcome: Progressing  Goal: Maintains/Returns to pre admission functional level  Description: INTERVENTIONS:  - Perform AM-PAC 6 Click Basic Mobility/ Daily Activity assessment daily.  - Set and communicate daily mobility goal to care team and patient/family/caregiver.   - Collaborate with rehabilitation services on mobility goals if consulted  - Perform Range of Motion  times a day.  - Reposition patient every  hours.  - Dangle patient  times a day  - Stand patient  times a day  - Ambulate patient  times a day  - Out of bed to chair  times a day   - Out of bed for meals times a day  - Out of bed for toileting  - Record patient progress and toleration of activity level   Outcome: Progressing

## 2024-05-10 NOTE — ASSESSMENT & PLAN NOTE
Wt Readings from Last 3 Encounters:   05/10/24 103 kg (228 lb)   05/09/24 108 kg (238 lb 12.8 oz)   05/01/24 103 kg (227 lb)     Presents with worsening shortness of breath dyspnea on exertion lower extremity swelling and cough  NYHA Class III., Stage C  Continue IV Lasix 50 mg IV twice daily  Sodium restriction 2g  Daily standing weights, strict I's/O, sodium restriction  Consult nutrition for dietary education  Echocardiogram ordered

## 2024-05-10 NOTE — CASE MANAGEMENT
Case Management Assessment & Discharge Planning Note    Patient name Galen Carson  Location /-02 MRN [id number]  : 1950 Date 5/10/2024       Current Admission Date: 2024  Current Admission Diagnosis:Acute on chronic systolic congestive heart failure (HCC)   Patient Active Problem List    Diagnosis Date Noted    GI bleed 05/10/2024    Acute on chronic systolic congestive heart failure (HCC) 2024    ICD (implantable cardioverter-defibrillator) in place 2024    Obesity 2023    Paroxysmal atrial fibrillation (HCC) 10/22/2023    Dilated cardiomyopathy (HCC) 10/02/2023    Essential hypertension 10/20/2022    Stage 3a chronic kidney disease (HCC) 2022    Type 2 diabetes mellitus with hyperglycemia, without long-term current use of insulin (HCC) 2022    Encounter for monitoring androgen deprivation therapy 2019    Hot flashes 2019    Prostate cancer (HCC) 2019    Positive colorectal cancer screening using Cologuard test 2019    Mixed hyperlipidemia 2019    IFG (impaired fasting glucose) 2019    Upper respiratory tract infection 2019      LOS (days): 1  Geometric Mean LOS (GMLOS) (days): 3.9  Days to GMLOS:3     OBJECTIVE:    Risk of Unplanned Readmission Score: 19.66         Current admission status: Inpatient       Preferred Pharmacy:   [facility] [place] [place] - [address]  [address] [address]  Phone: [phone] Fax: [phone]    Primary Care Provider: DANITA Girard    Primary Insurance: MEDICARE  Secondary Insurance: COMMERCIAL MISCELLANEOUS    ASSESSMENT:  Active Health Care Proxies    There are no active Health Care Proxies on file.                 Readmission Root Cause  30 Day Readmission: No    Patient Information  Admitted from:: Home  Mental Status: Alert  During Assessment patient was accompanied by: Other-Comment (yosef Merchant)  Assessment  information provided by:: Patient  Primary Caregiver: Self  Support Systems: Family members  County of Residence: [place]  [place]  Home entry access options. Select all that apply.: Stairs  Number of steps to enter home.: 4  Do the steps have railings?: Yes  Type of Current Residence: [facility]  Living Arrangements: Lives Alone  Is patient a ?: No    Activities of Daily Living Prior to Admission  Functional Status: Independent  Completes ADLs independently?: Yes  Ambulates independently?: Yes  Does patient use assisted devices?: No  Does patient currently own DME?: No  Does patient have a history of Outpatient Therapy (PT/OT)?: No  Does the patient have a history of Short-Term Rehab?: No  Does patient have a history of HHC?: No  Does patient currently have HHC?: No         Patient Information Continued  Income Source: Pension/correction  Does patient have prescription coverage?: Yes  Does patient receive dialysis treatments?: No  Does patient have a history of substance abuse?: No  Does patient have a history of Mental Health Diagnosis?: No    PHQ 2/9 Screening   Reviewed PHQ 2/9 Depression Screening Score?: No    Means of Transportation  Means of Transport to Appts:: Drives Self      Social Determinants of Health (SDOH)      Flowsheet Row Most Recent Value   Housing Stability    In the last 12 months, was there a time when you were not able to pay the mortgage or rent on time? N   In the last 12 months, how many places have you lived? 1   In the last 12 months, was there a time when you did not have a steady place to sleep or slept in a shelter (including now)? N   Transportation Needs    In the past 12 months, has lack of transportation kept you from medical appointments or from getting medications? no   In the past 12 months, has lack of transportation kept you from meetings, work, or from getting things needed for daily living? No   Food Insecurity    Within the past 12 months,  you worried that your food would run out before you got the money to buy more. Never true   Within the past 12 months, the food you bought just didn't last and you didn't have money to get more. Never true   Utilities    In the past 12 months has the electric, gas, oil, or water company threatened to shut off services in your home? No            DISCHARGE DETAILS:    Discharge planning discussed with:: Patient and grandson at the bedside  Freedom of Choice: Yes  Comments - Freedom of Choice: FOC maintained in discussion regarding discharge planning. Patient is alert oriented and competent to make decisions. Introduced self and role, explained role of CM in arranging services such as DME, STR, HHC, and providing community resource information. Discussed current living situation and needs at discharge. Patient is IPTA. CM offered HHC, STR, DME, PCP, OP CM, community resources. Patient declined CM resources. Does not use DME. Pt is aware and encouraged to seek CM for any questions or concerns. CM continues to follow.  CM contacted family/caregiver?: Yes  Were Treatment Team discharge recommendations reviewed with patient/caregiver?: Yes  Did patient/caregiver verbalize understanding of patient care needs?: Yes  Were patient/caregiver advised of the risks associated with not following Treatment Team discharge recommendations?: Yes    Contacts  Patient Contacts: celia platt (Grandchild)  [phone] (Mobile)  Relationship to Patient:: Family  Reason/Outcome: Emergency Contact    Requested Home Health Care         Is the patient interested in HHC at discharge?: No    DME Referral Provided  Referral made for DME?: No    Other Referral/Resources/Interventions Provided:  Interventions: Declines resources  Referral Comments: CM will continue to follow for needs.  Programs:: CHF    Would you like to participate in our [facility] service program?  : No - Declined       Discharge Destination Plan:: Home  Transport at  Discharge : Self

## 2024-05-10 NOTE — ASSESSMENT & PLAN NOTE
Lab Results   Component Value Date    HGBA1C 6.9 (H) 02/14/2024     Blood Sugar Average: Last 72 hrs:  Hold home regimen while inpatient and resume on discharge   Diabetic diet  Insulin regimen  Lantus 10 units   Glucose checks and Insulin correction ACHS  Goal -180 while admitted, adjusting insulin regimen as appropriate  Monitor for hypoglycemia and treat per protocol

## 2024-05-10 NOTE — ASSESSMENT & PLAN NOTE
Patient started with rectal bleeding on 5/10 at 5 AM having multiple bloody bowel movements  Warfarin stopped 5/10  INR greater than 2  FFP 2 units 5/9  1 unit PRBC 5/9  Hemoglobin every 6h  GI consulted  Plan for ERCP 5/11     Lab Results   Component Value Date    HGB 8.0 (L) 05/11/2024    HGB 6.3 (L) 05/10/2024

## 2024-05-10 NOTE — ASSESSMENT & PLAN NOTE
Lab Results   Component Value Date    EGFR 43 05/10/2024    EGFR 47 05/09/2024    EGFR 51 03/13/2024    CREATININE 1.57 (H) 05/10/2024    CREATININE 1.46 (H) 05/09/2024    CREATININE 1.37 (H) 03/13/2024     Appears to be at baseline  Continue to monitor

## 2024-05-10 NOTE — PLAN OF CARE
Problem: PAIN - ADULT  Goal: Verbalizes/displays adequate comfort level or baseline comfort level  Description: Interventions:  - Encourage patient to monitor pain and request assistance  - Assess pain using appropriate pain scale  - Administer analgesics based on type and severity of pain and evaluate response  - Implement non-pharmacological measures as appropriate and evaluate response  - Consider cultural and social influences on pain and pain management  - Notify physician/advanced practitioner if interventions unsuccessful or patient reports new pain  Outcome: Progressing     Problem: INFECTION - ADULT  Goal: Absence or prevention of progression during hospitalization  Description: INTERVENTIONS:  - Assess and monitor for signs and symptoms of infection  - Monitor lab/diagnostic results  - Monitor all insertion sites, i.e. indwelling lines, tubes, and drains  - Monitor endotracheal if appropriate and nasal secretions for changes in amount and color  - [place] appropriate cooling/warming therapies per order  - Administer medications as ordered  - Instruct and encourage patient and family to use good hand hygiene technique  - Identify and instruct in appropriate isolation precautions for identified infection/condition  Outcome: Progressing  Goal: Absence of fever/infection during neutropenic period  Description: INTERVENTIONS:  - Monitor WBC    Outcome: Progressing     Problem: SAFETY ADULT  Goal: Patient will remain free of falls  Description: INTERVENTIONS:  - Educate patient/family on patient safety including physical limitations  - Instruct patient to call for assistance with activity   - Consult OT/PT to assist with strengthening/mobility   - Keep Call bell within reach  - Keep bed low and locked with side rails adjusted as appropriate  - Keep care items and personal belongings within reach  - Initiate and maintain comfort rounds  - Make Fall Risk Sign visible to staff  - Offer Toileting every 2 Hours,  in advance of need  - Initiate/Maintain bed alarm  - Apply yellow socks and bracelet for high fall risk patients  - Consider moving patient to room near nurses station  Outcome: Progressing  Goal: Maintain or return to baseline ADL function  Description: INTERVENTIONS:  -  Assess patient's ability to carry out ADLs; assess patient's baseline for ADL function and identify physical deficits which impact ability to perform ADLs (bathing, care of mouth/teeth, toileting, grooming, dressing, etc.)  - Assess/evaluate cause of self-care deficits   - Assess range of motion  - Assess patient's mobility; develop plan if impaired  - Assess patient's need for assistive devices and provide as appropriate  - Encourage maximum independence but intervene and supervise when necessary  - Involve family in performance of ADLs  - Assess for home care needs following discharge   - Consider OT consult to assist with ADL evaluation and planning for discharge  - Provide patient education as appropriate  Outcome: Progressing  Goal: Maintains/Returns to pre admission functional level  Description: INTERVENTIONS:  - Perform AM-PAC 6 Click Basic Mobility/ Daily Activity assessment daily.  - Set and communicate daily mobility goal to care team and patient/family/caregiver.   - Collaborate with rehabilitation services on mobility goals if consulted  - Perform Range of Motion 2 times a day.  - Reposition patient every 2 hours.  - Dangle patient 2 times a day  - Stand patient 2 times a day  - Ambulate patient 2 times a day  - Out of bed to chair 2 times a day   - Out of bed for meals 2 times a day  - Out of bed for toileting  - Record patient progress and toleration of activity level   Outcome: Progressing

## 2024-05-10 NOTE — H&P
"Formerly [facility]   H&P  Name: Galen Carson I  MRN: [id number]  Unit/Bed#: -02 I Date of Admission: 5/9/2024   Date of Service: 5/10/2024 I Hospital Day: 1    * Acute on chronic systolic congestive heart failure (HCC)  Assessment & Plan  Wt Readings from Last 3 Encounters:   05/09/24 104 kg (228 lb 6.3 oz)   05/09/24 108 kg (238 lb 12.8 oz)   05/01/24 103 kg (227 lb)     Lab Results   Component Value Date    LVEF 26 12/04/2023    BNP 1,533 (H) 05/09/2024       Presents with increased shortness of breath, dyspnea on exertion, lower extremity edema, cough with productive sputum, and increased abdominal girth  NYHA Class III., Stage C  Patient is currently volume overloaded.    Plan:  GDMT: Diuretic: Lasix 50 mg IV BID, B-Blocker: Toprol XL 25 mg, ACE/ARB/ARNi: none due to ACE induced cough  Sodium restriction 2g  CMP, magnesium tomorrow a.m; Goal Mg > 2 and K > 4; Replete prn  HOB > 30°, Daily standing weights, Measure I/O  Consult nutrition for dietary education  Echo        Paroxysmal atrial fibrillation (HCC)  Assessment & Plan  On toprol XL and AC with warfarin    Plan:  Continue home meds  Monitor rates/BP      Stage 3a chronic kidney disease (HCC)  Assessment & Plan  Lab Results   Component Value Date    EGFR 47 05/09/2024    EGFR 51 03/13/2024    EGFR 52 02/14/2024    CREATININE 1.46 (H) 05/09/2024    CREATININE 1.37 (H) 03/13/2024    CREATININE 1.34 (H) 02/14/2024     Appears to be at baseline  Continue to monitor    Type 2 diabetes mellitus with hyperglycemia, without long-term current use of insulin ([facility])  Assessment & Plan  Lab Results   Component Value Date    HGBA1C 6.9 (H) 02/14/2024       No results for input(s): \"POCGLU\" in the last 72 hours.    Blood Sugar Average: Last 72 hrs:  Hold home regimen while inpatient and resume on discharge   Diabetic diet  Insulin regimen  Lantus 10 units HS  Glucose checks and Insulin correction ACHS  Goal -180 while admitted, adjusting " insulin regimen as appropriate  Monitor for hypoglycemia and treat per protocol           VTE Pharmacologic Prophylaxis: VTE Score: 6 High Risk (Score >/= 5) - Pharmacological DVT Prophylaxis Ordered: warfarin (Coumadin). Sequential Compression Devices Ordered.  Code Status: Level 1 - Full Code   Discussion with Patient/Family: patient, son, friend    Anticipated Length of Stay: Patient will be admitted on an inpatient basis with an anticipated length of stay of greater than 2 midnights secondary to plan above.    Total Time for Visit, including Counseling / Coordination of Care: 85 minutes Greater than 50% of this total time spent on direct patient counseling and coordination of care.    Chief Complaint:   Chief Complaint   Patient presents with    Shortness of Breath     Pt states he's had abdominal pain and SOB x3 days. Pt stated he was recent dx with Prostate cancer.       History of Present Illness:  Galen Carson is a 73 y.o. male who presents with worsening shortness of breath that has been progressively worsening along with abdominal pain for the past 3 days.    PMHx of HTN, atrial fibrillation on warfarin, CHF (LVEF 26% 12/2023), DM 2    Having worsening shortness of breath and abdominal pain over the past few days prior to admission.  Also noted worsening dyspnea on exertion along with orthopnea, specifically sleeping on recliner due to having significant dyspnea while laying flat.  Of note, also reported 10 pound weight gain over the past few days but denies any associated chest pain.  Reported that he has been taking his medications specifically his diuretics as prescribed and expressed concern that due to his recent prostate biopsy that was performed, he is having acute exacerbation of CHF.    Admitted for additional workup and evaluation for acute CHF exacerbation.    Review of Systems:  A 10-point review of systems was obtained.  Pertinent positives and negatives are outlined in the HPI above.   Remainder of review of systems are otherwise negative.     Past Medical and Surgical History:   Past Medical History:   Diagnosis Date    Allergic     Anesthesia     pt wears a life vest (11/16).  should not be scheduled at [facility] until heart condition is stabilized    BPH (benign prostatic hypertrophy)     Cancer (HCC)     Diabetes mellitus (HCC)     Hyperlipidemia     Hypertension     Irregular heart beat     PAF    Prostate cancer (HCC)        Past Surgical History:   Procedure Laterality Date    CARDIAC CATHETERIZATION N/A 10/4/2023    Procedure: Cardiac Coronary Angiogram;  Surgeon: Chris Lovell MD;  Location: [facility];  Service: Cardiology    CARDIAC CATHETERIZATION N/A 10/4/2023    Procedure: Cardiac RHC/LHC;  Surgeon: Chris Lovell MD;  Location: [facility];  Service: Cardiology    CARDIAC CATHETERIZATION  10/4/2023    Procedure: Cardiac catheterization;  Surgeon: Chris Lovell MD;  Location: [facility];  Service: Cardiology    CARDIAC ELECTROPHYSIOLOGY PROCEDURE N/A 1/4/2024    Procedure: Cardiac icd implant, Dual Chambers ICD;  Surgeon: Leo Whalen MD;  Location:  CARDIAC CATH LAB;  Service: Cardiology    EAR SURGERY      HERNIA REPAIR      AL COLONOSCOPY FLX DX W/COLLJ SPEC WHEN PFRMD N/A 5/6/2019    Procedure: COLONOSCOPY;  Surgeon: Winston Nielson III, MD;  Location: [facility];  Service: Gastroenterology       Meds/Allergies:  Prior to Admission medications    Medication Sig Start Date End Date Taking? Authorizing Provider   Ascorbic Acid (vitamin C) 1000 MG tablet Take 1,000 mg by mouth daily   Yes Historical Provider, MD   aspirin 81 mg chewable tablet Chew 1 tablet (81 mg total) daily 10/2/23  Yes Zaynab Hernandez MD   cetirizine (ZyrTEC) 5 MG tablet Take 5 mg by mouth daily   Yes Historical Provider, MD   EVENING PRIMROSE OIL PO Take by mouth 3 (three) times a day   Yes Historical Provider, MD   furosemide (LASIX) 20 mg tablet Take 2 tablets (40 mg  total) by mouth daily 24  Yes Partha Colon PA-C   metFORMIN (GLUCOPHAGE) 850 mg tablet Take 1 tablet (850 mg total) by mouth 2 (two) times a day with meals 3/25/24  Yes DANITA Girard   metoprolol succinate (TOPROL-XL) 25 mg 24 hr tablet Take 1 tablet (25 mg total) by mouth daily 3/7/24  Yes Zaynab Hernandez MD   multivitamin (THERAGRAN) TABS Take 1 tablet by mouth daily   Yes Historical Provider, MD   rosuvastatin (CRESTOR) 10 MG tablet Take 1 tablet (10 mg total) by mouth daily 24  Yes DANITA Girard   VITAMIN D, ERGOCALCIFEROL, PO Take by mouth   Yes Historical Provider, MD   vitamin E, tocopherol, 400 units capsule Take 400 Units by mouth daily   Yes Historical Provider, MD   warfarin (Coumadin) 5 mg tablet Take one tablet daily or as directed 10/31/23  Yes DANITA Weber   Blood Glucose Monitoring Suppl (ONETOUCH VERIO) w/Device KIT by Does not apply route once for 1 dose Test sugar in the morning 19  DANITA Girard   Calcium Carbonate (CALCIUM 600 PO) Take by mouth daily  Patient not taking: Reported on 3/18/2024  5/9/24  Historical Provider, MD     I have reviewed home medications with patient personally.    Allergies:   Allergies   Allergen Reactions    Penicillin V Other (See Comments)     As a child        Social History:  Marital Status:    Patient Pre-hospital Living Situation: Home  Patient Pre-hospital Level of Mobility: unable to be assessed at time of evaluation  Patient Pre-hospital Diet Restrictions: none  Substance Use History:   Social History     Substance and Sexual Activity   Alcohol Use Never     Social History     Tobacco Use   Smoking Status Former    Current packs/day: 0.00    Average packs/day: 1 pack/day for 25.0 years (25.0 ttl pk-yrs)    Types: Cigarettes, Pipe, Cigars    Start date: 1980    Quit date: 2005    Years since quittin.2    Passive exposure: Past   Smokeless Tobacco Never     Social History     Substance  "and Sexual Activity   Drug Use Never       Family History:  Family History   Problem Relation Age of Onset    Stroke Father     No Known Problems Mother     Prostate cancer Brother     Arthritis Brother     No Known Problems Sister     Diabetes Sister     Diabetes Sister     No Known Problems Son     No Known Problems Daughter        Physical Exam:     Vitals:   Blood Pressure: 123/59 (24)  Pulse: 65 (24)  Temperature: 98.5 °F (36.9 °C) (24)  Temp Source: Oral (24 1600)  Respirations: 18 (24)  Height: 5' 10\" (177.8 cm) (24)  Weight - Scale: 104 kg (228 lb 6.3 oz) (24)  SpO2: 91 % (24)    Physical Exam  Vitals and nursing note reviewed.   Constitutional:       General: He is not in acute distress.     Appearance: He is well-developed. He is ill-appearing (Chronically). He is not diaphoretic.   HENT:      Head: Normocephalic and atraumatic.      Nose: Nose normal.   Eyes:      General: No scleral icterus.     Conjunctiva/sclera: Conjunctivae normal.      Pupils: Pupils are equal, round, and reactive to light.   Neck:      Thyroid: No thyromegaly.   Cardiovascular:      Rate and Rhythm: Normal rate. Rhythm irregular.      Heart sounds: Murmur heard.   Pulmonary:      Effort: Pulmonary effort is normal.      Breath sounds: Rales present. No wheezing or rhonchi.   Abdominal:      General: Bowel sounds are normal. There is no distension.      Palpations: Abdomen is soft.      Tenderness: There is no abdominal tenderness.   Musculoskeletal:         General: No swelling, tenderness or deformity.      Cervical back: Neck supple.      Right lower le+ Pitting Edema present.      Left lower le+ Pitting Edema present.   Skin:     General: Skin is warm and dry.   Neurological:      Mental Status: He is alert and oriented to person, place, and time. Mental status is at baseline.   Psychiatric:         Behavior: Behavior normal.         " "Thought Content: Thought content normal.         Judgment: Judgment normal.       Additional Data:     Lab Results:  Results from last 7 days   Lab Units 05/09/24  1558   WBC Thousand/uL 7.46   HEMOGLOBIN g/dL 11.0*   HEMATOCRIT % 34.1*   PLATELETS Thousands/uL 220   LYMPHO PCT % 9*   MONO PCT % 8   EOS PCT % 2     Results from last 7 days   Lab Units 05/09/24  1558   SODIUM mmol/L 139   POTASSIUM mmol/L 4.8   CHLORIDE mmol/L 102   CO2 mmol/L 27   BUN mg/dL 39*   CREATININE mg/dL 1.46*   ANION GAP mmol/L 10   CALCIUM mg/dL 8.3*   ALBUMIN g/dL 3.9   TOTAL BILIRUBIN mg/dL 0.32   ALK PHOS U/L 79   ALT U/L 32   AST U/L 29   GLUCOSE RANDOM mg/dL 131     Results from last 7 days   Lab Units 05/09/24  1626   INR  2.92*                   Imaging Results Reviewed as noted below  CT abdomen pelvis with contrast   Final Result by Alin Christian MD (05/09 1744)         1. Findings consistent with acute duodenitis involving the pylorus and second portion of the duodenum. No evidence of perforation or abscess. GI consultation recommended.         Workstation performed: [id number]         XR chest 1 view portable    (Results Pending)       CT abdomen pelvis with contrast    Result Date: 5/9/2024  Impression: 1. Findings consistent with acute duodenitis involving the pylorus and second portion of the duodenum. No evidence of perforation or abscess. GI consultation recommended. Workstation performed: [id number]     No Chest XR results available for this patient.       EKG and Other Studies Reviewed on Admission:   EKG: no EKG available    No results for input(s): \"VENTRATE\" in the last 72 hours.      ** Please Note: This note has been constructed using a voice recognition system. **    "

## 2024-05-10 NOTE — QUICK NOTE
Repeat hemoglobin this evening down to six 6.3 g.  Previously 7.7 earlier today.  Will place order for 2 units of PRBCs to be transfused.  Continue to monitor clinically.  If patient continues to have active bleeding would consider CTA of the abdomen/pelvis however renal function is borderline.

## 2024-05-11 ENCOUNTER — APPOINTMENT (INPATIENT)
Dept: GASTROENTEROLOGY | Facility: HOSPITAL | Age: 74
DRG: 291 | End: 2024-05-11
Payer: MEDICARE

## 2024-05-11 ENCOUNTER — ANESTHESIA EVENT (INPATIENT)
Dept: GASTROENTEROLOGY | Facility: HOSPITAL | Age: 74
DRG: 291 | End: 2024-05-11
Payer: MEDICARE

## 2024-05-11 ENCOUNTER — ANESTHESIA (INPATIENT)
Dept: GASTROENTEROLOGY | Facility: HOSPITAL | Age: 74
DRG: 291 | End: 2024-05-11
Payer: MEDICARE

## 2024-05-11 PROBLEM — K29.81 GASTROINTESTINAL HEMORRHAGE ASSOCIATED WITH DUODENITIS: Status: ACTIVE | Noted: 2024-05-10

## 2024-05-11 PROBLEM — N17.9 ACUTE RENAL FAILURE SUPERIMPOSED ON STAGE 3A CHRONIC KIDNEY DISEASE (HCC): Status: ACTIVE | Noted: 2022-05-11

## 2024-05-11 PROBLEM — D62 ACUTE BLOOD LOSS ANEMIA: Status: ACTIVE | Noted: 2024-05-11

## 2024-05-11 LAB
ABO GROUP BLD BPU: NORMAL
ANION GAP SERPL CALCULATED.3IONS-SCNC: 10 MMOL/L (ref 4–13)
ANION GAP SERPL CALCULATED.3IONS-SCNC: 9 MMOL/L (ref 4–13)
BPU ID: NORMAL
BUN SERPL-MCNC: 78 MG/DL (ref 5–25)
BUN SERPL-MCNC: 83 MG/DL (ref 5–25)
CALCIUM SERPL-MCNC: 7.7 MG/DL (ref 8.4–10.2)
CALCIUM SERPL-MCNC: 8 MG/DL (ref 8.4–10.2)
CHLORIDE SERPL-SCNC: 104 MMOL/L (ref 96–108)
CHLORIDE SERPL-SCNC: 105 MMOL/L (ref 96–108)
CO2 SERPL-SCNC: 23 MMOL/L (ref 21–32)
CO2 SERPL-SCNC: 29 MMOL/L (ref 21–32)
CREAT SERPL-MCNC: 2.41 MG/DL (ref 0.6–1.3)
CREAT SERPL-MCNC: 2.76 MG/DL (ref 0.6–1.3)
CROSSMATCH: NORMAL
ERYTHROCYTE [DISTWIDTH] IN BLOOD BY AUTOMATED COUNT: 13.2 % (ref 11.6–15.1)
GFR SERPL CREATININE-BSD FRML MDRD: 21 ML/MIN/1.73SQ M
GFR SERPL CREATININE-BSD FRML MDRD: 25 ML/MIN/1.73SQ M
GLUCOSE SERPL-MCNC: 135 MG/DL (ref 65–140)
GLUCOSE SERPL-MCNC: 143 MG/DL (ref 65–140)
GLUCOSE SERPL-MCNC: 150 MG/DL (ref 65–140)
GLUCOSE SERPL-MCNC: 150 MG/DL (ref 65–140)
GLUCOSE SERPL-MCNC: 167 MG/DL (ref 65–140)
GLUCOSE SERPL-MCNC: 182 MG/DL (ref 65–140)
HCT VFR BLD AUTO: 23.4 % (ref 36.5–49.3)
HCT VFR BLD AUTO: 24.3 % (ref 36.5–49.3)
HCT VFR BLD AUTO: 24.3 % (ref 36.5–49.3)
HGB BLD-MCNC: 7.4 G/DL (ref 12–17)
HGB BLD-MCNC: 7.5 G/DL (ref 12–17)
HGB BLD-MCNC: 8 G/DL (ref 12–17)
INR PPP: 1.71 (ref 0.84–1.19)
MAGNESIUM SERPL-MCNC: 2.2 MG/DL (ref 1.9–2.7)
MCH RBC QN AUTO: 29.1 PG (ref 26.8–34.3)
MCHC RBC AUTO-ENTMCNC: 32.9 G/DL (ref 31.4–37.4)
MCV RBC AUTO: 88 FL (ref 82–98)
PLATELET # BLD AUTO: 193 THOUSANDS/UL (ref 149–390)
PMV BLD AUTO: 9.3 FL (ref 8.9–12.7)
POTASSIUM SERPL-SCNC: 4.8 MMOL/L (ref 3.5–5.3)
POTASSIUM SERPL-SCNC: 5.2 MMOL/L (ref 3.5–5.3)
PROTHROMBIN TIME: 20.8 SECONDS (ref 11.6–14.5)
RBC # BLD AUTO: 2.75 MILLION/UL (ref 3.88–5.62)
SODIUM SERPL-SCNC: 138 MMOL/L (ref 135–147)
SODIUM SERPL-SCNC: 142 MMOL/L (ref 135–147)
UNIT DISPENSE STATUS: NORMAL
UNIT PRODUCT CODE: NORMAL
UNIT PRODUCT VOLUME: 280 ML
UNIT PRODUCT VOLUME: 280 ML
UNIT PRODUCT VOLUME: 350 ML
UNIT RH: NORMAL
WBC # BLD AUTO: 7.49 THOUSAND/UL (ref 4.31–10.16)

## 2024-05-11 PROCEDURE — 88305 TISSUE EXAM BY PATHOLOGIST: CPT | Performed by: PATHOLOGY

## 2024-05-11 PROCEDURE — 85027 COMPLETE CBC AUTOMATED: CPT | Performed by: NURSE PRACTITIONER

## 2024-05-11 PROCEDURE — 82948 REAGENT STRIP/BLOOD GLUCOSE: CPT

## 2024-05-11 PROCEDURE — C9113 INJ PANTOPRAZOLE SODIUM, VIA: HCPCS | Performed by: PHYSICIAN ASSISTANT

## 2024-05-11 PROCEDURE — 85018 HEMOGLOBIN: CPT | Performed by: NURSE PRACTITIONER

## 2024-05-11 PROCEDURE — 99232 SBSQ HOSP IP/OBS MODERATE 35: CPT | Performed by: NURSE PRACTITIONER

## 2024-05-11 PROCEDURE — 85610 PROTHROMBIN TIME: CPT | Performed by: INTERNAL MEDICINE

## 2024-05-11 PROCEDURE — 43239 EGD BIOPSY SINGLE/MULTIPLE: CPT | Performed by: INTERNAL MEDICINE

## 2024-05-11 PROCEDURE — 88342 IMHCHEM/IMCYTCHM 1ST ANTB: CPT | Performed by: PATHOLOGY

## 2024-05-11 PROCEDURE — 83735 ASSAY OF MAGNESIUM: CPT | Performed by: NURSE PRACTITIONER

## 2024-05-11 PROCEDURE — 85014 HEMATOCRIT: CPT | Performed by: NURSE PRACTITIONER

## 2024-05-11 PROCEDURE — 0DB68ZX EXCISION OF STOMACH, VIA NATURAL OR ARTIFICIAL OPENING ENDOSCOPIC, DIAGNOSTIC: ICD-10-PCS | Performed by: INTERNAL MEDICINE

## 2024-05-11 PROCEDURE — 80048 BASIC METABOLIC PNL TOTAL CA: CPT | Performed by: NURSE PRACTITIONER

## 2024-05-11 RX ORDER — ALBUMIN (HUMAN) 12.5 G/50ML
12.5 SOLUTION INTRAVENOUS EVERY 6 HOURS
Qty: 150 ML | Refills: 0 | Status: DISCONTINUED | OUTPATIENT
Start: 2024-05-11 | End: 2024-05-11

## 2024-05-11 RX ORDER — ONDANSETRON 2 MG/ML
INJECTION INTRAMUSCULAR; INTRAVENOUS AS NEEDED
Status: DISCONTINUED | OUTPATIENT
Start: 2024-05-11 | End: 2024-05-11

## 2024-05-11 RX ORDER — ALBUMIN (HUMAN) 12.5 G/50ML
12.5 SOLUTION INTRAVENOUS EVERY 6 HOURS
Status: COMPLETED | OUTPATIENT
Start: 2024-05-11 | End: 2024-05-12

## 2024-05-11 RX ORDER — SODIUM CHLORIDE, SODIUM LACTATE, POTASSIUM CHLORIDE, CALCIUM CHLORIDE 600; 310; 30; 20 MG/100ML; MG/100ML; MG/100ML; MG/100ML
INJECTION, SOLUTION INTRAVENOUS CONTINUOUS PRN
Status: DISCONTINUED | OUTPATIENT
Start: 2024-05-11 | End: 2024-05-11

## 2024-05-11 RX ORDER — ETOMIDATE 2 MG/ML
INJECTION INTRAVENOUS AS NEEDED
Status: DISCONTINUED | OUTPATIENT
Start: 2024-05-11 | End: 2024-05-11

## 2024-05-11 RX ORDER — PHENYLEPHRINE HCL IN 0.9% NACL 1 MG/10 ML
SYRINGE (ML) INTRAVENOUS AS NEEDED
Status: DISCONTINUED | OUTPATIENT
Start: 2024-05-11 | End: 2024-05-11

## 2024-05-11 RX ORDER — SODIUM CHLORIDE 9 MG/ML
INJECTION, SOLUTION INTRAVENOUS CONTINUOUS PRN
Status: DISCONTINUED | OUTPATIENT
Start: 2024-05-11 | End: 2024-05-11

## 2024-05-11 RX ORDER — LIDOCAINE HYDROCHLORIDE 20 MG/ML
INJECTION, SOLUTION EPIDURAL; INFILTRATION; INTRACAUDAL; PERINEURAL AS NEEDED
Status: DISCONTINUED | OUTPATIENT
Start: 2024-05-11 | End: 2024-05-11

## 2024-05-11 RX ORDER — PROPOFOL 10 MG/ML
INJECTION, EMULSION INTRAVENOUS AS NEEDED
Status: DISCONTINUED | OUTPATIENT
Start: 2024-05-11 | End: 2024-05-11

## 2024-05-11 RX ADMIN — Medication 3 MG: at 21:40

## 2024-05-11 RX ADMIN — Medication 100 MCG: at 13:16

## 2024-05-11 RX ADMIN — ONDANSETRON 4 MG: 2 INJECTION INTRAMUSCULAR; INTRAVENOUS at 13:13

## 2024-05-11 RX ADMIN — LIDOCAINE HYDROCHLORIDE 40 MG: 20 INJECTION, SOLUTION EPIDURAL; INFILTRATION; INTRACAUDAL; PERINEURAL at 13:10

## 2024-05-11 RX ADMIN — Medication 1000 MG: at 14:13

## 2024-05-11 RX ADMIN — B-COMPLEX W/ C & FOLIC ACID TAB 1 TABLET: TAB at 08:37

## 2024-05-11 RX ADMIN — PROPOFOL 50 MG: 10 INJECTION, EMULSION INTRAVENOUS at 13:08

## 2024-05-11 RX ADMIN — OXYCODONE HYDROCHLORIDE AND ACETAMINOPHEN 1000 MG: 500 TABLET ORAL at 08:34

## 2024-05-11 RX ADMIN — METOCLOPRAMIDE 10 MG: 5 INJECTION, SOLUTION INTRAMUSCULAR; INTRAVENOUS at 06:06

## 2024-05-11 RX ADMIN — ALBUMIN (HUMAN) 12.5 G: 0.25 INJECTION, SOLUTION INTRAVENOUS at 21:40

## 2024-05-11 RX ADMIN — ETOMIDATE 6 MG: 2 INJECTION, SOLUTION INTRAVENOUS at 13:08

## 2024-05-11 RX ADMIN — Medication 100 MCG: at 13:14

## 2024-05-11 RX ADMIN — Medication 1000 MG: at 17:07

## 2024-05-11 RX ADMIN — PRAVASTATIN SODIUM 80 MG: 80 TABLET ORAL at 17:07

## 2024-05-11 RX ADMIN — SODIUM CHLORIDE: 0.9 INJECTION, SOLUTION INTRAVENOUS at 13:03

## 2024-05-11 RX ADMIN — Medication 100 MCG: at 13:12

## 2024-05-11 RX ADMIN — PANTOPRAZOLE SODIUM 40 MG: 40 INJECTION, POWDER, FOR SOLUTION INTRAVENOUS at 06:06

## 2024-05-11 RX ADMIN — ALBUMIN (HUMAN) 12.5 G: 0.25 INJECTION, SOLUTION INTRAVENOUS at 15:40

## 2024-05-11 RX ADMIN — TOPICAL ANESTHETIC 1 SPRAY: 200 SPRAY DENTAL; PERIODONTAL at 13:05

## 2024-05-11 RX ADMIN — PANTOPRAZOLE SODIUM 40 MG: 40 INJECTION, POWDER, FOR SOLUTION INTRAVENOUS at 17:07

## 2024-05-11 RX ADMIN — ASPIRIN 81 MG: 81 TABLET, CHEWABLE ORAL at 08:34

## 2024-05-11 RX ADMIN — LIDOCAINE HYDROCHLORIDE 60 MG: 20 INJECTION, SOLUTION EPIDURAL; INFILTRATION; INTRACAUDAL; PERINEURAL at 13:08

## 2024-05-11 RX ADMIN — INSULIN LISPRO 1 UNITS: 100 INJECTION, SOLUTION INTRAVENOUS; SUBCUTANEOUS at 17:08

## 2024-05-11 RX ADMIN — FUROSEMIDE 50 MG: 10 INJECTION, SOLUTION INTRAMUSCULAR; INTRAVENOUS at 08:34

## 2024-05-11 RX ADMIN — Medication 100 MCG: at 13:09

## 2024-05-11 NOTE — ASSESSMENT & PLAN NOTE
Lab Results   Component Value Date    HGBA1C 6.9 (H) 02/14/2024     Blood Sugar Average: Last 72 hrs:  (P) 167.75Hold home regimen while inpatient and resume on discharge   Diabetic diet  Insulin regimen  Lantus 10 units   Glucose checks and Insulin correction ACHS  Goal -180 while admitted, adjusting insulin regimen as appropriate  Monitor for hypoglycemia and treat per protocol

## 2024-05-11 NOTE — ASSESSMENT & PLAN NOTE
Lab Results   Component Value Date    EGFR 21 05/11/2024    EGFR 43 05/10/2024    EGFR 47 05/09/2024    CREATININE 2.76 (H) 05/11/2024    CREATININE 1.57 (H) 05/10/2024    CREATININE 1.46 (H) 05/09/2024     Appears to be at baseline  Continue to monitor

## 2024-05-11 NOTE — ANESTHESIA PREPROCEDURE EVALUATION
Procedure:  EGD     74 yo M with PMHx of HTN, Afib on Coumadin, HFrEF (LVEF 20%) s/p ICD, DM2 admitted on 5/9 for CHF exacerbation, abdominal pain with subsequent melena and acute blood loss anemia. Imaging c/f gastritis, duodenitis. Received 2 FFP on 5/10 and 2 pRBC overnight for Hgb 6.3.    Denies previous complications from anesthesia, denies AKIL. Nonsmoker. NPO>8h. METS>4 after ICD placement - worsening over past week after prostate biopsy prompting admission.    Relevant Problems   CARDIO   (+) Acute on chronic systolic congestive heart failure (HCC)   (+) Essential hypertension   (+) Mixed hyperlipidemia   (+) Paroxysmal atrial fibrillation (HCC)      ENDO   (+) Type 2 diabetes mellitus with hyperglycemia, without long-term current use of insulin (HCC)      GI/HEPATIC   (+) Gastrointestinal hemorrhage associated with duodenitis      /RENAL   (+) Acute renal failure superimposed on stage 3a chronic kidney disease (HCC)   (+) Prostate cancer (HCC)      PULMONARY   (+) Upper respiratory tract infection     Lab Results   Component Value Date/Time    WBC 7.49 05/11/2024 04:51 AM    RBC 2.75 (L) 05/11/2024 04:51 AM    HGB 8.0 (L) 05/11/2024 04:51 AM    HCT 24.3 (L) 05/11/2024 04:51 AM     05/11/2024 04:51 AM    BANDSPCT 2 10/08/2019 09:54 AM     Lab Results   Component Value Date/Time    SODIUM 142 05/11/2024 04:51 AM    K 4.8 05/11/2024 04:51 AM     05/11/2024 04:51 AM    CO2 29 05/11/2024 04:51 AM    BUN 83 (H) 05/11/2024 04:51 AM    CREATININE 2.76 (H) 05/11/2024 04:51 AM    GLUC 143 (H) 05/11/2024 04:51 AM     Lab Results   Component Value Date/Time    ALKPHOS 61 05/10/2024 05:21 AM    ALT 25 05/10/2024 05:21 AM    AST 20 05/10/2024 05:21 AM    ALB 3.6 05/10/2024 05:21 AM    TBILI 0.35 05/10/2024 05:21 AM    LIPASE 45 05/09/2024 03:58 PM    INR 1.71 (H) 05/11/2024 04:51 AM     Lab Results   Component Value Date/Time    INR 1.71 (H) 05/11/2024 04:51 AM     Results for orders placed or performed  during the hospital encounter of 05/09/24 (from the past 1000 hour(s))   ECG 12 lead    Collection Time: 05/09/24  3:40 PM   Result Value    Ventricular Rate 99    Atrial Rate 99    HI Interval 174    QRSD Interval 124    QT Interval 394    QTC Interval 505    P Axis 71    QRS Axis -10    T Wave Axis 127    Narrative    Sinus rhythm with frequent Premature ventricular complexes and Premature atrial complexes  Left ventricular hypertrophy with QRS widening and repolarization abnormality  Abnormal ECG  When compared with ECG of 04-JAN-2024 10:45,  Premature ventricular complexes are now Present  Premature atrial complexes are now Present    Confirmed by Chris Lovell ([id number]) on 5/10/2024 4:12:06 PM     *Note: Due to a large number of results and/or encounters for the requested time period, some results have not been displayed. A complete set of results can be found in Results Review.     XR chest 1 view portable  Result Date: 5/10/2024  Impression: No acute focal infiltrate or overt pulmonary edema. Borderline/mild pulmonary vascular congestion.     CT abdomen pelvis with contrast  Result Date: 5/9/2024  Impression: 1. Findings consistent with acute duodenitis involving the pylorus and second portion of the duodenum. No evidence of perforation or abscess. GI consultation recommended.     Type and Screen:  O    TTE (5/10/24):    Left Ventricle: Left ventricular cavity size is dilated. Wall thickness is normal. The left ventricular ejection fraction is 20%. Systolic function is severely reduced.  Asynchronous septal motion noted. There is severe global hypokinesis. Diastolic function is mildly abnormal, consistent with grade I (abnormal) relaxation.    Left Atrium: The atrium is mild to moderately dilated.    Mitral Valve: There is mild annular calcification.    Tricuspid Valve: There is trace regurgitation.  ICD lead noted.      Physical Exam    Airway    Mallampati score: II  TM Distance: >3 FB  Neck ROM: full      Dental        Cardiovascular  Rhythm: irregular, Rate: normal    Pulmonary  Pulmonary exam normal     Other Findings        Anesthesia Plan  ASA Score- 4     Anesthesia Type- IV sedation with anesthesia with ASA Monitors.         Additional Monitors:     Airway Plan:            Plan Factors-    Chart reviewed. EKG reviewed. Imaging results reviewed. Existing labs reviewed. Patient summary reviewed.    Patient is not a current smoker.      Obstructive sleep apnea risk education given perioperatively.        Induction- intravenous.    Postoperative Plan-     Informed Consent- Anesthetic plan and risks discussed with patient.  I personally reviewed this patient with the CRNA. Discussed and agreed on the Anesthesia Plan with the CRNA..

## 2024-05-11 NOTE — ASSESSMENT & PLAN NOTE
On toprol XL and AC with warfarin  Continue home meds  Monitor rates/BP  Holding warfarin in the setting of GI bleed       Lab Results   Component Value Date    INR 1.71 (H) 05/11/2024    INR 1.80 (H) 05/10/2024

## 2024-05-11 NOTE — PROGRESS NOTES
[facility]  Progress Note  Name: Galen Carson I  MRN: [id number]  Unit/Bed#: -02 I Date of Admission: 5/9/2024   Date of Service: 5/11/2024 I Hospital Day: 2    Assessment/Plan   * Acute on chronic systolic congestive heart failure (HCC)  Assessment & Plan  Wt Readings from Last 3 Encounters:   05/11/24 110 kg (241 lb 6.5 oz)   05/09/24 108 kg (238 lb 12.8 oz)   05/01/24 103 kg (227 lb)     Presents with worsening shortness of breath dyspnea on exertion lower extremity swelling and cough  NYHA Class III., Stage C  Continue IV Lasix 50 mg IV twice daily  Sodium restriction 2g  Daily standing weights, strict I's/O, sodium restriction  Consult nutrition for dietary education  Echocardiogram ordered    Gastrointestinal hemorrhage associated with duodenitis  Assessment & Plan  Patient started with rectal bleeding on 5/10 at 5 AM having multiple bloody bowel movements  Warfarin stopped 5/10  INR greater than 2  FFP 2 units 5/9  1 unit PRBC 5/9  Hemoglobin every 6h  GI consulted  Plan for ERCP 5/11     Lab Results   Component Value Date    HGB 8.0 (L) 05/11/2024    HGB 6.3 (L) 05/10/2024         Paroxysmal atrial fibrillation (HCC)  Assessment & Plan  On toprol XL and AC with warfarin  Continue home meds  Monitor rates/BP  Holding warfarin in the setting of GI bleed       Lab Results   Component Value Date    INR 1.71 (H) 05/11/2024    INR 1.80 (H) 05/10/2024         Stage 3a chronic kidney disease (HCC)  Assessment & Plan  Lab Results   Component Value Date    EGFR 21 05/11/2024    EGFR 43 05/10/2024    EGFR 47 05/09/2024    CREATININE 2.76 (H) 05/11/2024    CREATININE 1.57 (H) 05/10/2024    CREATININE 1.46 (H) 05/09/2024     Appears to be at baseline  Continue to monitor    Type 2 diabetes mellitus with hyperglycemia, without long-term current use of insulin (HCC)  Assessment & Plan  Lab Results   Component Value Date    HGBA1C 6.9 (H) 02/14/2024     Blood Sugar Average: Last 72  hrs:  (P) 167.75Hold home regimen while inpatient and resume on discharge   Diabetic diet  Insulin regimen  Lantus 10 units HS  Glucose checks and Insulin correction ACHS  Goal -180 while admitted, adjusting insulin regimen as appropriate  Monitor for hypoglycemia and treat per protocol              VTE Pharmacologic Prophylaxis: VTE Score: 6 High Risk (Score >/= 5) - Pharmacological DVT Prophylaxis Contraindicated. Sequential Compression Devices Ordered.    Mobility:   Basic Mobility Inpatient Raw Score: 24  JH-HLM Goal: 8: Walk 250 feet or more  JH-HLM Achieved: 8: Walk 250 feet ot more  JH-HLM Goal achieved. Continue to encourage appropriate mobility.    Patient Centered Rounds: I performed bedside rounds with nursing staff today. Eloise BISHOP  Discussions with Specialists or Other Care Team Provider: GI    Education and Discussions with Family / Patient: Patient declined call to .     Total Time Spent on Date of Encounter in care of patient: 35 mins. This time was spent on one or more of the following: performing physical exam; counseling and coordination of care; obtaining or reviewing history; documenting in the medical record; reviewing/ordering tests, medications or procedures; communicating with other healthcare professionals and discussing with patient's family/caregivers.    Current Length of Stay: 2 day(s)  Current Patient Status: Inpatient   Certification Statement: The patient will continue to require additional inpatient hospital stay due to monitoring of bleeding, EGD  Discharge Plan: Anticipate discharge in 48-72 hrs to home.    Code Status: Level 1 - Full Code    Subjective:   Oob in the chair resting comfortable is disgruntled about being woken up all night for labs and vital signs, explained were very concerned about his bleeding, verbalizes understanding. Pt denies any SOB, difficult breathing, chest pain or tightness. Pt denies any nausea, vomiting, diarrhea or difficulty.        Objective:     Vitals:   Temp (24hrs), Av.3 °F (36.8 °C), Min:97.5 °F (36.4 °C), Max:98.9 °F (37.2 °C)    Temp:  [97.5 °F (36.4 °C)-98.9 °F (37.2 °C)] 97.9 °F (36.6 °C)  HR:  [] 86  Resp:  [16-20] 18  BP: ()/(38-72) 118/63  SpO2:  [91 %-99 %] 93 %  Body mass index is 34.64 kg/m².     Input and Output Summary (last 24 hours):     Intake/Output Summary (Last 24 hours) at 2024 0851  Last data filed at 2024 0707  Gross per 24 hour   Intake 1185 ml   Output 3 ml   Net 1182 ml       Physical Exam:   Physical Exam  Vitals reviewed.   Constitutional:       General: He is not in acute distress.     Appearance: He is obese. He is ill-appearing.   Pulmonary:      Effort: No respiratory distress.   Musculoskeletal:         General: Normal range of motion.   Skin:     General: Skin is dry.      Coloration: Skin is pale.   Neurological:      Mental Status: He is alert. Mental status is at baseline.          Additional Data:     Labs:  Results from last 7 days   Lab Units 24  0451 05/10/24  0521 24  1558   WBC Thousand/uL 7.49   < > 7.46   HEMOGLOBIN g/dL 8.0*   < > 11.0*   HEMATOCRIT % 24.3*   < > 34.1*   PLATELETS Thousands/uL 193   < > 220   LYMPHO PCT %  --   --  9*   MONO PCT %  --   --  8   EOS PCT %  --   --  2    < > = values in this interval not displayed.     Results from last 7 days   Lab Units 24  0451 05/10/24  0521   SODIUM mmol/L 142 141   POTASSIUM mmol/L 4.8 5.2   CHLORIDE mmol/L 104 104   CO2 mmol/L 29 29   BUN mg/dL 83* 60*   CREATININE mg/dL 2.76* 1.57*   ANION GAP mmol/L 9 8   CALCIUM mg/dL 8.0* 8.0*   ALBUMIN g/dL  --  3.6   TOTAL BILIRUBIN mg/dL  --  0.35   ALK PHOS U/L  --  61   ALT U/L  --  25   AST U/L  --  20   GLUCOSE RANDOM mg/dL 143* 160*     Results from last 7 days   Lab Units 24  0451   INR  1.71*     Results from last 7 days   Lab Units 24  0632 05/10/24  2121 05/10/24  1546 05/10/24  1207   POC GLUCOSE mg/dl 135 136 195* 205*                Lines/Drains:  Invasive Devices       Peripheral Intravenous Line  Duration             Peripheral IV 05/09/24 Left Antecubital 1 day                    Recent Cultures (last 7 days):         Last 24 Hours Medication List:   Current Facility-Administered Medications   Medication Dose Route Frequency Provider Last Rate    vitamin C  1,000 mg Oral Daily Breezy Cheatham, DO      aspirin  81 mg Oral Daily Breezy Cheatham, DO      furosemide  50 mg Intravenous BID Breezy Cheatham, DO      insulin lispro  1-6 Units Subcutaneous TID AC DANITA Herrera      melatonin  3 mg Oral HS Rosalba Rosa PA-C      metoprolol succinate  25 mg Oral Daily Breezy Cheatham DO      multivitamin stress formula  1 tablet Oral Daily Breezy Cheatham DO      pantoprazole  40 mg Intravenous Q12H Rosalba Rosa PA-C      pravastatin  80 mg Oral Daily With Dinner Breezy Cheatham DO          Today, Patient Was Seen By: DANITA Herrera    **Please Note: This note may have been constructed using a voice recognition system.**

## 2024-05-11 NOTE — NURSING NOTE
Pt had one episode of bloody stool overnight. He states that it was not as bad as earlier in the day but there is a moderate amount of tashi blood in toilet.

## 2024-05-11 NOTE — ASSESSMENT & PLAN NOTE
Wt Readings from Last 3 Encounters:   05/11/24 110 kg (241 lb 6.5 oz)   05/09/24 108 kg (238 lb 12.8 oz)   05/01/24 103 kg (227 lb)     Presents with worsening shortness of breath dyspnea on exertion lower extremity swelling and cough  NYHA Class III., Stage C  Sodium restriction 2g  Daily standing weights, strict I's/O, sodium restriction  Holding lasix 5/10 secondary to ESDRAS  Consult nutrition for dietary education  Echocardiogram ordered  Echocardiogram from a year ago revealed an EF of 30%, echocardiogram during this hospitalization reveals an EF of 20%  Patient underwent Dual chamber ICD Implantable Cardioverter Defibrillator for primary prevention placement in January 24

## 2024-05-11 NOTE — ASSESSMENT & PLAN NOTE
Lab Results   Component Value Date    EGFR 24 05/12/2024    EGFR 25 05/11/2024    EGFR 21 05/11/2024    CREATININE 2.53 (H) 05/12/2024    CREATININE 2.41 (H) 05/11/2024    CREATININE 2.76 (H) 05/11/2024     Developed ESDRAS on 5/11  Multifactorial in the setting of acute blood loss from GI bleed and hypoperfusion from hypotension as well as contrast-induced from CT on 5/10  Holding Lasix   Received 3 albumin infusions on 5/11 with moderate improvement  Will give additional albumin infusion on 5/12 given severely reduced EF and the fact that the patient initially presented for heart failure exacerbation very cautious with fluids at this time

## 2024-05-11 NOTE — NURSING NOTE
Pt took morning dose of home medication, Primrose oil. Educated pt that he can not take home medications while here, medication being sent to pharmacy. Asked to take 1 dose PO, TID.

## 2024-05-11 NOTE — ASSESSMENT & PLAN NOTE
Wt Readings from Last 3 Encounters:   05/11/24 110 kg (241 lb 6.5 oz)   05/09/24 108 kg (238 lb 12.8 oz)   05/01/24 103 kg (227 lb)     Presents with worsening shortness of breath dyspnea on exertion lower extremity swelling and cough  NYHA Class III., Stage C  Continue IV Lasix 50 mg IV twice daily  Sodium restriction 2g  Daily standing weights, strict I's/O, sodium restriction  Consult nutrition for dietary education  Echocardiogram ordered

## 2024-05-11 NOTE — PLAN OF CARE
Problem: INFECTION - ADULT  Goal: Absence or prevention of progression during hospitalization  Description: INTERVENTIONS:  - Assess and monitor for signs and symptoms of infection  - Monitor lab/diagnostic results  - Monitor all insertion sites, i.e. indwelling lines, tubes, and drains  - Monitor endotracheal if appropriate and nasal secretions for changes in amount and color  - [place] appropriate cooling/warming therapies per order  - Administer medications as ordered  - Instruct and encourage patient and family to use good hand hygiene technique  - Identify and instruct in appropriate isolation precautions for identified infection/condition  Outcome: Progressing  Goal: Absence of fever/infection during neutropenic period  Description: INTERVENTIONS:  - Monitor WBC    Outcome: Progressing

## 2024-05-11 NOTE — ANESTHESIA POSTPROCEDURE EVALUATION
Post-Op Assessment Note    CV Status:  Stable  Pain Score: 0    Pain management: adequate       Mental Status:  Alert   Hydration Status:  Stable   PONV Controlled:  None   Airway Patency:  Patent     Post Op Vitals Reviewed: Yes    No anethesia notable event occurred.    Staff: CRNA               BP (!) 89/56 (05/11/24 1324)    Temp 97.5 °F (36.4 °C) (05/11/24 1324)    Pulse (!) 112 (05/11/24 1324)   Resp 17 (05/11/24 1324)    SpO2 95 % (05/11/24 1324)

## 2024-05-11 NOTE — NURSING NOTE
Assumed care of pt at start of my shift . In bedside report off going primary nurse is starting to run a unit of PRBC.. transfusion started at 1851.  At 1946 Iv pump starts to alarm. When I entered the room to check IV pump. The unit of blood had completely transfused. The rate was set to hannah.   Vs were taken and WDL . Pt did no appear to be in any distress or discomfort. No shortness of breath noted and no crackles heard in BL lungs.   On call slim notified and she came to see pt.   She states to run 2nd transfusion as ordered jut at a slower rate..   Pt plan of care on going - no further concerns as of present and expresses no other needs at this time- call light within reach.

## 2024-05-12 LAB
ANION GAP SERPL CALCULATED.3IONS-SCNC: 7 MMOL/L (ref 4–13)
ANION GAP SERPL CALCULATED.3IONS-SCNC: 8 MMOL/L (ref 4–13)
APTT PPP: 32 SECONDS (ref 23–37)
APTT PPP: 87 SECONDS (ref 23–37)
BACTERIA UR QL AUTO: ABNORMAL /HPF
BILIRUB UR QL STRIP: NEGATIVE
BUN SERPL-MCNC: 68 MG/DL (ref 5–25)
BUN SERPL-MCNC: 76 MG/DL (ref 5–25)
CALCIUM SERPL-MCNC: 8.2 MG/DL (ref 8.4–10.2)
CALCIUM SERPL-MCNC: 8.2 MG/DL (ref 8.4–10.2)
CHLORIDE SERPL-SCNC: 104 MMOL/L (ref 96–108)
CHLORIDE SERPL-SCNC: 105 MMOL/L (ref 96–108)
CLARITY UR: CLEAR
CO2 SERPL-SCNC: 29 MMOL/L (ref 21–32)
CO2 SERPL-SCNC: 30 MMOL/L (ref 21–32)
COLOR UR: YELLOW
CREAT SERPL-MCNC: 2.19 MG/DL (ref 0.6–1.3)
CREAT SERPL-MCNC: 2.53 MG/DL (ref 0.6–1.3)
ERYTHROCYTE [DISTWIDTH] IN BLOOD BY AUTOMATED COUNT: 13.6 % (ref 11.6–15.1)
ERYTHROCYTE [DISTWIDTH] IN BLOOD BY AUTOMATED COUNT: 13.6 % (ref 11.6–15.1)
GFR SERPL CREATININE-BSD FRML MDRD: 24 ML/MIN/1.73SQ M
GFR SERPL CREATININE-BSD FRML MDRD: 29 ML/MIN/1.73SQ M
GLUCOSE SERPL-MCNC: 136 MG/DL (ref 65–140)
GLUCOSE SERPL-MCNC: 153 MG/DL (ref 65–140)
GLUCOSE SERPL-MCNC: 158 MG/DL (ref 65–140)
GLUCOSE SERPL-MCNC: 165 MG/DL (ref 65–140)
GLUCOSE SERPL-MCNC: 205 MG/DL (ref 65–140)
GLUCOSE SERPL-MCNC: 230 MG/DL (ref 65–140)
GLUCOSE UR STRIP-MCNC: NEGATIVE MG/DL
HCT VFR BLD AUTO: 22 % (ref 36.5–49.3)
HCT VFR BLD AUTO: 23.7 % (ref 36.5–49.3)
HCT VFR BLD AUTO: 23.8 % (ref 36.5–49.3)
HGB BLD-MCNC: 7 G/DL (ref 12–17)
HGB BLD-MCNC: 7.4 G/DL (ref 12–17)
HGB BLD-MCNC: 7.4 G/DL (ref 12–17)
HGB UR QL STRIP.AUTO: NEGATIVE
HYALINE CASTS #/AREA URNS LPF: ABNORMAL /LPF
INR PPP: 1.66 (ref 0.84–1.19)
KETONES UR STRIP-MCNC: NEGATIVE MG/DL
LEUKOCYTE ESTERASE UR QL STRIP: NEGATIVE
MAGNESIUM SERPL-MCNC: 2.2 MG/DL (ref 1.9–2.7)
MCH RBC QN AUTO: 28.9 PG (ref 26.8–34.3)
MCH RBC QN AUTO: 29.4 PG (ref 26.8–34.3)
MCHC RBC AUTO-ENTMCNC: 31.1 G/DL (ref 31.4–37.4)
MCHC RBC AUTO-ENTMCNC: 31.8 G/DL (ref 31.4–37.4)
MCV RBC AUTO: 92 FL (ref 82–98)
MCV RBC AUTO: 93 FL (ref 82–98)
NITRITE UR QL STRIP: NEGATIVE
NON-SQ EPI CELLS URNS QL MICRO: ABNORMAL /HPF
PH UR STRIP.AUTO: 5 [PH]
PLATELET # BLD AUTO: 192 THOUSANDS/UL (ref 149–390)
PLATELET # BLD AUTO: 194 THOUSANDS/UL (ref 149–390)
PMV BLD AUTO: 9.5 FL (ref 8.9–12.7)
PMV BLD AUTO: 9.7 FL (ref 8.9–12.7)
POTASSIUM SERPL-SCNC: 4.8 MMOL/L (ref 3.5–5.3)
POTASSIUM SERPL-SCNC: 5.3 MMOL/L (ref 3.5–5.3)
PROT UR STRIP-MCNC: NEGATIVE MG/DL
PROTHROMBIN TIME: 20.4 SECONDS (ref 11.6–14.5)
RBC # BLD AUTO: 2.38 MILLION/UL (ref 3.88–5.62)
RBC # BLD AUTO: 2.56 MILLION/UL (ref 3.88–5.62)
RBC #/AREA URNS AUTO: ABNORMAL /HPF
SODIUM SERPL-SCNC: 141 MMOL/L (ref 135–147)
SODIUM SERPL-SCNC: 142 MMOL/L (ref 135–147)
SP GR UR STRIP.AUTO: 1.02 (ref 1–1.03)
UROBILINOGEN UR STRIP-ACNC: <2 MG/DL
WBC # BLD AUTO: 5.78 THOUSAND/UL (ref 4.31–10.16)
WBC # BLD AUTO: 7.09 THOUSAND/UL (ref 4.31–10.16)
WBC #/AREA URNS AUTO: ABNORMAL /HPF

## 2024-05-12 PROCEDURE — 85730 THROMBOPLASTIN TIME PARTIAL: CPT | Performed by: NURSE PRACTITIONER

## 2024-05-12 PROCEDURE — 81001 URINALYSIS AUTO W/SCOPE: CPT | Performed by: NURSE PRACTITIONER

## 2024-05-12 PROCEDURE — 85027 COMPLETE CBC AUTOMATED: CPT | Performed by: NURSE PRACTITIONER

## 2024-05-12 PROCEDURE — 82948 REAGENT STRIP/BLOOD GLUCOSE: CPT

## 2024-05-12 PROCEDURE — 80048 BASIC METABOLIC PNL TOTAL CA: CPT | Performed by: NURSE PRACTITIONER

## 2024-05-12 PROCEDURE — C9113 INJ PANTOPRAZOLE SODIUM, VIA: HCPCS | Performed by: PHYSICIAN ASSISTANT

## 2024-05-12 PROCEDURE — 85610 PROTHROMBIN TIME: CPT | Performed by: INTERNAL MEDICINE

## 2024-05-12 PROCEDURE — 82570 ASSAY OF URINE CREATININE: CPT | Performed by: PHYSICIAN ASSISTANT

## 2024-05-12 PROCEDURE — 83735 ASSAY OF MAGNESIUM: CPT | Performed by: NURSE PRACTITIONER

## 2024-05-12 PROCEDURE — 85014 HEMATOCRIT: CPT | Performed by: NURSE PRACTITIONER

## 2024-05-12 PROCEDURE — 99232 SBSQ HOSP IP/OBS MODERATE 35: CPT | Performed by: NURSE PRACTITIONER

## 2024-05-12 PROCEDURE — 85018 HEMOGLOBIN: CPT | Performed by: NURSE PRACTITIONER

## 2024-05-12 PROCEDURE — 84300 ASSAY OF URINE SODIUM: CPT | Performed by: PHYSICIAN ASSISTANT

## 2024-05-12 RX ORDER — HEPARIN SODIUM 1000 [USP'U]/ML
2000 INJECTION, SOLUTION INTRAVENOUS; SUBCUTANEOUS EVERY 6 HOURS PRN
Status: DISCONTINUED | OUTPATIENT
Start: 2024-05-12 | End: 2024-05-13

## 2024-05-12 RX ORDER — HEPARIN SODIUM 10000 [USP'U]/100ML
3-20 INJECTION, SOLUTION INTRAVENOUS
Status: DISCONTINUED | OUTPATIENT
Start: 2024-05-12 | End: 2024-05-13

## 2024-05-12 RX ORDER — WARFARIN SODIUM 5 MG/1
5 TABLET ORAL
Status: DISCONTINUED | OUTPATIENT
Start: 2024-05-12 | End: 2024-05-13

## 2024-05-12 RX ORDER — ALBUMIN (HUMAN) 12.5 G/50ML
25 SOLUTION INTRAVENOUS ONCE
Status: COMPLETED | OUTPATIENT
Start: 2024-05-12 | End: 2024-05-12

## 2024-05-12 RX ORDER — HEPARIN SODIUM 1000 [USP'U]/ML
4000 INJECTION, SOLUTION INTRAVENOUS; SUBCUTANEOUS ONCE
Status: COMPLETED | OUTPATIENT
Start: 2024-05-12 | End: 2024-05-12

## 2024-05-12 RX ORDER — HEPARIN SODIUM 1000 [USP'U]/ML
4000 INJECTION, SOLUTION INTRAVENOUS; SUBCUTANEOUS EVERY 6 HOURS PRN
Status: DISCONTINUED | OUTPATIENT
Start: 2024-05-12 | End: 2024-05-13

## 2024-05-12 RX ADMIN — PANTOPRAZOLE SODIUM 40 MG: 40 INJECTION, POWDER, FOR SOLUTION INTRAVENOUS at 05:33

## 2024-05-12 RX ADMIN — Medication 1000 MG: at 17:05

## 2024-05-12 RX ADMIN — OXYCODONE HYDROCHLORIDE AND ACETAMINOPHEN 1000 MG: 500 TABLET ORAL at 08:09

## 2024-05-12 RX ADMIN — WARFARIN SODIUM 5 MG: 5 TABLET ORAL at 17:05

## 2024-05-12 RX ADMIN — HEPARIN SODIUM 4000 UNITS: 1000 INJECTION INTRAVENOUS; SUBCUTANEOUS at 13:46

## 2024-05-12 RX ADMIN — METOPROLOL SUCCINATE 25 MG: 25 TABLET, EXTENDED RELEASE ORAL at 08:09

## 2024-05-12 RX ADMIN — INSULIN LISPRO 3 UNITS: 100 INJECTION, SOLUTION INTRAVENOUS; SUBCUTANEOUS at 11:50

## 2024-05-12 RX ADMIN — B-COMPLEX W/ C & FOLIC ACID TAB 1 TABLET: TAB at 08:09

## 2024-05-12 RX ADMIN — HEPARIN SODIUM 11.1 UNITS/KG/HR: 10000 INJECTION, SOLUTION INTRAVENOUS at 13:46

## 2024-05-12 RX ADMIN — PANTOPRAZOLE SODIUM 40 MG: 40 INJECTION, POWDER, FOR SOLUTION INTRAVENOUS at 17:05

## 2024-05-12 RX ADMIN — PRAVASTATIN SODIUM 80 MG: 80 TABLET ORAL at 17:05

## 2024-05-12 RX ADMIN — Medication 1000 MG: at 11:50

## 2024-05-12 RX ADMIN — ASPIRIN 81 MG: 81 TABLET, CHEWABLE ORAL at 08:09

## 2024-05-12 RX ADMIN — ALBUMIN (HUMAN) 25 G: 0.25 INJECTION, SOLUTION INTRAVENOUS at 11:50

## 2024-05-12 RX ADMIN — Medication 1000 MG: at 08:10

## 2024-05-12 RX ADMIN — ALBUMIN (HUMAN) 12.5 G: 0.25 INJECTION, SOLUTION INTRAVENOUS at 04:47

## 2024-05-12 RX ADMIN — INSULIN LISPRO 1 UNITS: 100 INJECTION, SOLUTION INTRAVENOUS; SUBCUTANEOUS at 08:09

## 2024-05-12 NOTE — ASSESSMENT & PLAN NOTE
Lab Results   Component Value Date    HGBA1C 6.9 (H) 02/14/2024     Blood Sugar Average: Last 72 hrs:  (P) 166Hold home regimen while inpatient and resume on discharge   Diabetic diet  Insulin regimen  Glucose checks and Insulin correction ACHS  Goal -180 while admitted, adjusting insulin regimen as appropriate  Monitor for hypoglycemia and treat per protocol

## 2024-05-12 NOTE — ASSESSMENT & PLAN NOTE
Patient started with rectal bleeding on 5/10 at 5 AM having multiple bloody bowel movements  Warfarin stopped 5/10  INR greater than 2  FFP 2 units 5/9  1 unit PRBC 5/9  Hemoglobin every 6h  GI consulted  ERCP 5/11-revealed 3 total ulcers including one large deep cratered ulcer with flat pigmented spot. This did not require endoscopic treatment   Okay to restart warfarin 5/11  Okay to restart heparin drip 5/12     Lab Results   Component Value Date    HGB 7.4 (L) 05/12/2024    HGB 7.4 (L) 05/12/2024

## 2024-05-12 NOTE — ASSESSMENT & PLAN NOTE
Secondary to GI bleed  Close hemoglobin 6.3  Received FFP and PRBC  EGD completed on 5/11  Hemoglobins have now been stable in the 7

## 2024-05-12 NOTE — PLAN OF CARE
Problem: PAIN - ADULT  Goal: Verbalizes/displays adequate comfort level or baseline comfort level  Description: Interventions:  - Encourage patient to monitor pain and request assistance  - Assess pain using appropriate pain scale  - Administer analgesics based on type and severity of pain and evaluate response  - Implement non-pharmacological measures as appropriate and evaluate response  - Consider cultural and social influences on pain and pain management  - Notify physician/advanced practitioner if interventions unsuccessful or patient reports new pain  Outcome: Progressing     Problem: INFECTION - ADULT  Goal: Absence or prevention of progression during hospitalization  Description: INTERVENTIONS:  - Assess and monitor for signs and symptoms of infection  - Monitor lab/diagnostic results  - Monitor all insertion sites, i.e. indwelling lines, tubes, and drains  - Monitor endotracheal if appropriate and nasal secretions for changes in amount and color  - [place] appropriate cooling/warming therapies per order  - Administer medications as ordered  - Instruct and encourage patient and family to use good hand hygiene technique  - Identify and instruct in appropriate isolation precautions for identified infection/condition  Outcome: Progressing

## 2024-05-12 NOTE — PROGRESS NOTES
[facility]  Progress Note  Name: Galen Casron I  MRN: [id number]  Unit/Bed#: -02 I Date of Admission: 5/9/2024   Date of Service: 5/12/2024 I Hospital Day: 3    Assessment/Plan   * Acute on chronic systolic congestive heart failure (HCC)  Assessment & Plan  Wt Readings from Last 3 Encounters:   05/11/24 110 kg (241 lb 6.5 oz)   05/09/24 108 kg (238 lb 12.8 oz)   05/01/24 103 kg (227 lb)     Presents with worsening shortness of breath dyspnea on exertion lower extremity swelling and cough  NYHA Class III., Stage C  Sodium restriction 2g  Daily standing weights, strict I's/O, sodium restriction  Holding lasix 5/10 secondary to ESDRAS  Consult nutrition for dietary education  Echocardiogram ordered  Echocardiogram from a year ago revealed an EF of 30%, echocardiogram during this hospitalization reveals an EF of 20%  Patient underwent Dual chamber ICD Implantable Cardioverter Defibrillator for primary prevention placement in January 24    Acute blood loss anemia  Assessment & Plan  Secondary to GI bleed  Close hemoglobin 6.3  Received FFP and PRBC  EGD completed on 5/11  Hemoglobins have now been stable in the 7    Gastrointestinal hemorrhage associated with duodenitis  Assessment & Plan  Patient started with rectal bleeding on 5/10 at 5 AM having multiple bloody bowel movements  Warfarin stopped 5/10  INR greater than 2  FFP 2 units 5/9  1 unit PRBC 5/9  Hemoglobin every 6h  GI consulted  ERCP 5/11-revealed 3 total ulcers including one large deep cratered ulcer with flat pigmented spot. This did not require endoscopic treatment   Okay to restart warfarin 5/11  Okay to restart heparin drip 5/12     Lab Results   Component Value Date    HGB 7.4 (L) 05/12/2024    HGB 7.4 (L) 05/12/2024         Paroxysmal atrial fibrillation (HCC)  Assessment & Plan  On toprol XL and AC with warfarin  Continue home meds  Monitor rates/BP  Can restart warfarin and heparin drip       Lab Results   Component  Value Date    INR 1.66 (H) 05/12/2024    INR 1.71 (H) 05/11/2024         Acute renal failure superimposed on stage 3a chronic kidney disease ([facility])  Assessment & Plan  Lab Results   Component Value Date    EGFR 24 05/12/2024    EGFR 25 05/11/2024    EGFR 21 05/11/2024    CREATININE 2.53 (H) 05/12/2024    CREATININE 2.41 (H) 05/11/2024    CREATININE 2.76 (H) 05/11/2024     Developed ESDRAS on 5/11  Multifactorial in the setting of acute blood loss from GI bleed and hypoperfusion from hypotension as well as contrast-induced from CT on 5/10  Holding Lasix   Received 3 albumin infusions on 5/11 with moderate improvement  Will give additional albumin infusion on 5/12 given severely reduced EF and the fact that the patient initially presented for heart failure exacerbation very cautious with fluids at this time    Type 2 diabetes mellitus with hyperglycemia, without long-term current use of insulin ([facility])  Assessment & Plan  Lab Results   Component Value Date    HGBA1C 6.9 (H) 02/14/2024     Blood Sugar Average: Last 72 hrs:  (P) 166Hold home regimen while inpatient and resume on discharge   Diabetic diet  Insulin regimen  Glucose checks and Insulin correction ACHS  Goal -180 while admitted, adjusting insulin regimen as appropriate  Monitor for hypoglycemia and treat per protocol            VTE Pharmacologic Prophylaxis: VTE Score: 6 High Risk (Score >/= 5) - Pharmacological DVT Prophylaxis Ordered: heparin drip. Sequential Compression Devices Ordered.    Mobility:   Basic Mobility Inpatient Raw Score: 22  JH-HLM Goal: 7: Walk 25 feet or more  JH-HLM Achieved: 7: Walk 25 feet or more  JH-HLM Goal achieved. Continue to encourage appropriate mobility.    Patient Centered Rounds: I performed bedside rounds with nursing staff today.   Discussions with Specialists or Other Care Team Provider: notes    Education and Discussions with Family / Patient: Patient declined call to .     Total Time Spent on Date of  Encounter in care of patient: 35 mins. This time was spent on one or more of the following: performing physical exam; counseling and coordination of care; obtaining or reviewing history; documenting in the medical record; reviewing/ordering tests, medications or procedures; communicating with other healthcare professionals and discussing with patient's family/caregivers.    Current Length of Stay: 3 day(s)  Current Patient Status: Inpatient   Certification Statement: The patient will continue to require additional inpatient hospital stay due to inr, heparin gtt, ESDRAS, HF exacerbation  Discharge Plan: Anticipate discharge in 48-72 hrs to home.    Code Status: Level 1 - Full Code    Subjective:    Pt denies any SOB, difficult breathing, chest pain or tightness. Pt denies any nausea, vomiting, diarrhea or difficulty eating. Denies anymore bleeding of belly pain, talked him he needs to let someone know right away if he notices more bleeding. Says he breathing feels fine and no swelling, told him if he notices any changes has to tell us, told him his EF is worse and he understands, offers no questions of concerns, states he feels a lot better.    Objective:     Vitals:   Temp (24hrs), Av.5 °F (36.9 °C), Min:97.5 °F (36.4 °C), Max:99.1 °F (37.3 °C)    Temp:  [97.5 °F (36.4 °C)-99.1 °F (37.3 °C)] 99.1 °F (37.3 °C)  HR:  [] 89  Resp:  [16-27] 16  BP: ()/(53-65) 125/65  SpO2:  [92 %-95 %] 92 %  Body mass index is 32.83 kg/m².     Input and Output Summary (last 24 hours):     Intake/Output Summary (Last 24 hours) at 2024 1216  Last data filed at 2024 1137  Gross per 24 hour   Intake 460 ml   Output 225 ml   Net 235 ml       Physical Exam:   Physical Exam  Vitals reviewed.   Constitutional:       General: He is not in acute distress.     Appearance: He is normal weight. He is ill-appearing.   Cardiovascular:      Rate and Rhythm: Normal rate.   Pulmonary:      Effort: No respiratory distress.    Abdominal:      Palpations: Abdomen is soft.   Skin:     General: Skin is warm.      Coloration: Skin is pale.   Neurological:      Mental Status: He is alert. Mental status is at baseline.   Psychiatric:         Mood and Affect: Mood normal.        Additional Data:     Labs:  Results from last 7 days   Lab Units 05/12/24  0508 05/10/24  0521 05/09/24  1558   WBC Thousand/uL 7.09   < > 7.46   HEMOGLOBIN g/dL 7.4*  7.4*   < > 11.0*   HEMATOCRIT % 23.8*  23.7*   < > 34.1*   PLATELETS Thousands/uL 194   < > 220   LYMPHO PCT %  --   --  9*   MONO PCT %  --   --  8   EOS PCT %  --   --  2    < > = values in this interval not displayed.     Results from last 7 days   Lab Units 05/12/24  0508 05/11/24  0451 05/10/24  0521   SODIUM mmol/L 142   < > 141   POTASSIUM mmol/L 5.3   < > 5.2   CHLORIDE mmol/L 105   < > 104   CO2 mmol/L 30   < > 29   BUN mg/dL 76*   < > 60*   CREATININE mg/dL 2.53*   < > 1.57*   ANION GAP mmol/L 7   < > 8   CALCIUM mg/dL 8.2*   < > 8.0*   ALBUMIN g/dL  --   --  3.6   TOTAL BILIRUBIN mg/dL  --   --  0.35   ALK PHOS U/L  --   --  61   ALT U/L  --   --  25   AST U/L  --   --  20   GLUCOSE RANDOM mg/dL 165*   < > 160*    < > = values in this interval not displayed.     Results from last 7 days   Lab Units 05/12/24  0508   INR  1.66*     Results from last 7 days   Lab Units 05/12/24  1042 05/12/24  0709 05/11/24  2108 05/11/24  1551 05/11/24  1040 05/11/24  0632 05/10/24  2121 05/10/24  1546 05/10/24  1207   POC GLUCOSE mg/dl 230* 158* 167* 182* 150* 135 136 195* 205*               Lines/Drains:  Invasive Devices       Peripheral Intravenous Line  Duration             Peripheral IV 05/09/24 Left Antecubital 2 days                      Recent Cultures (last 7 days):         Last 24 Hours Medication List:   Current Facility-Administered Medications   Medication Dose Route Frequency Provider Last Rate    vitamin C  1,000 mg Oral Daily Breezy Cheatham DO      aspirin  81 mg Oral Daily Breezy  Chaparrita,       Evening Primrose Oil  1,000 mg Oral TID With Meals DANITA Herrera      insulin lispro  1-6 Units Subcutaneous TID AC DANITA Herrera      melatonin  3 mg Oral HS Rosalba Rosa PA-C      metoprolol succinate  25 mg Oral Daily Breezy Cheatham,       multivitamin stress formula  1 tablet Oral Daily Breezy Cheatham DO      pantoprazole  40 mg Intravenous Q12H Rosalba Rosa PA-C      pravastatin  80 mg Oral Daily With Dinner Breezy Cheatham DO      warfarin  5 mg Oral Daily (warfarin) DANITA Herrera          Today, Patient Was Seen By: DANITA Herrera    **Please Note: This note may have been constructed using a voice recognition system.**

## 2024-05-12 NOTE — ASSESSMENT & PLAN NOTE
On toprol XL and AC with warfarin  Continue home meds  Monitor rates/BP  Can restart warfarin and heparin drip       Lab Results   Component Value Date    INR 1.66 (H) 05/12/2024    INR 1.71 (H) 05/11/2024

## 2024-05-12 NOTE — PLAN OF CARE
Problem: PAIN - ADULT  Goal: Verbalizes/displays adequate comfort level or baseline comfort level  Description: Interventions:  - Encourage patient to monitor pain and request assistance  - Assess pain using appropriate pain scale  - Administer analgesics based on type and severity of pain and evaluate response  - Implement non-pharmacological measures as appropriate and evaluate response  - Consider cultural and social influences on pain and pain management  - Notify physician/advanced practitioner if interventions unsuccessful or patient reports new pain  5/12/2024 1239 by Wendy Alvarez RN  Outcome: Progressing  5/12/2024 1233 by Wendy Alvarez RN  Outcome: Progressing     Problem: INFECTION - ADULT  Goal: Absence or prevention of progression during hospitalization  Description: INTERVENTIONS:  - Assess and monitor for signs and symptoms of infection  - Monitor lab/diagnostic results  - Monitor all insertion sites, i.e. indwelling lines, tubes, and drains  - Monitor endotracheal if appropriate and nasal secretions for changes in amount and color  - [place] appropriate cooling/warming therapies per order  - Administer medications as ordered  - Instruct and encourage patient and family to use good hand hygiene technique  - Identify and instruct in appropriate isolation precautions for identified infection/condition  5/12/2024 1239 by Wendy Alvarez RN  Outcome: Progressing  5/12/2024 1233 by Wendy Alvarez RN  Outcome: Progressing  Goal: Absence of fever/infection during neutropenic period  Description: INTERVENTIONS:  - Monitor WBC    5/12/2024 1239 by Wendy Alvarez RN  Outcome: Progressing  5/12/2024 1233 by Wendy Alvarez RN  Outcome: Progressing     Problem: SAFETY ADULT  Goal: Patient will remain free of falls  Description: INTERVENTIONS:  - Educate patient/family on patient safety including physical limitations  - Instruct patient to call for  assistance with activity   - Consult OT/PT to assist with strengthening/mobility   - Keep Call bell within reach  - Keep bed low and locked with side rails adjusted as appropriate  - Keep care items and personal belongings within reach  - Initiate and maintain comfort rounds  - Make Fall Risk Sign visible to staff  - Offer Toileting every 2 Hours, in advance of need  - Initiate/Maintain alarm  - Obtain necessary fall risk management equipment  - Apply yellow socks and bracelet for high fall risk patients  - Consider moving patient to room near nurses station  5/12/2024 1239 by Wendy Alvarez RN  Outcome: Progressing  5/12/2024 1233 by Wendy Alvarez RN  Outcome: Progressing  Goal: Maintain or return to baseline ADL function  Description: INTERVENTIONS:  -  Assess patient's ability to carry out ADLs; assess patient's baseline for ADL function and identify physical deficits which impact ability to perform ADLs (bathing, care of mouth/teeth, toileting, grooming, dressing, etc.)  - Assess/evaluate cause of self-care deficits   - Assess range of motion  - Assess patient's mobility; develop plan if impaired  - Assess patient's need for assistive devices and provide as appropriate  - Encourage maximum independence but intervene and supervise when necessary  - Involve family in performance of ADLs  - Assess for home care needs following discharge   - Consider OT consult to assist with ADL evaluation and planning for discharge  - Provide patient education as appropriate  5/12/2024 1239 by Wendy Alvarez RN  Outcome: Progressing  5/12/2024 1233 by Wendy Alvarez RN  Outcome: Progressing  Goal: Maintains/Returns to pre admission functional level  Description: INTERVENTIONS:  - Perform AM-PAC 6 Click Basic Mobility/ Daily Activity assessment daily.  - Set and communicate daily mobility goal to care team and patient/family/caregiver.   - Collaborate with rehabilitation services on mobility  goals if consulted  - Perform Range of Motion 3 times a day.  - Reposition patient every 2 hours.  - Dangle patient 3 times a day  - Stand patient 3 times a day  - Ambulate patient 3 times a day  - Out of bed to chair 3 times a day   - Out of bed for meals 3 times a day  - Out of bed for toileting  - Record patient progress and toleration of activity level   5/12/2024 1239 by Wendy Alvraez RN  Outcome: Progressing  5/12/2024 1233 by Wendy Alvarez RN  Outcome: Progressing

## 2024-05-12 NOTE — PLAN OF CARE
Problem: PAIN - ADULT  Goal: Verbalizes/displays adequate comfort level or baseline comfort level  Description: Interventions:  - Encourage patient to monitor pain and request assistance  - Assess pain using appropriate pain scale  - Administer analgesics based on type and severity of pain and evaluate response  - Implement non-pharmacological measures as appropriate and evaluate response  - Consider cultural and social influences on pain and pain management  - Notify physician/advanced practitioner if interventions unsuccessful or patient reports new pain  Outcome: Progressing     Problem: INFECTION - ADULT  Goal: Absence or prevention of progression during hospitalization  Description: INTERVENTIONS:  - Assess and monitor for signs and symptoms of infection  - Monitor lab/diagnostic results  - Monitor all insertion sites, i.e. indwelling lines, tubes, and drains  - Monitor endotracheal if appropriate and nasal secretions for changes in amount and color  - [place] appropriate cooling/warming therapies per order  - Administer medications as ordered  - Instruct and encourage patient and family to use good hand hygiene technique  - Identify and instruct in appropriate isolation precautions for identified infection/condition  Outcome: Progressing  Goal: Absence of fever/infection during neutropenic period  Description: INTERVENTIONS:  - Monitor WBC    Outcome: Progressing     Problem: SAFETY ADULT  Goal: Patient will remain free of falls  Description: INTERVENTIONS:  - Educate patient/family on patient safety including physical limitations  - Instruct patient to call for assistance with activity   - Consult OT/PT to assist with strengthening/mobility   - Keep Call bell within reach  - Keep bed low and locked with side rails adjusted as appropriate  - Keep care items and personal belongings within reach  - Initiate and maintain comfort rounds  - Make Fall Risk Sign visible to staff  - Offer Toileting every 2 Hours,  in advance of need  - Initiate/Maintain alarm  - Obtain necessary fall risk management equipment  - Apply yellow socks and bracelet for high fall risk patients  - Consider moving patient to room near nurses station  Outcome: Progressing  Goal: Maintain or return to baseline ADL function  Description: INTERVENTIONS:  -  Assess patient's ability to carry out ADLs; assess patient's baseline for ADL function and identify physical deficits which impact ability to perform ADLs (bathing, care of mouth/teeth, toileting, grooming, dressing, etc.)  - Assess/evaluate cause of self-care deficits   - Assess range of motion  - Assess patient's mobility; develop plan if impaired  - Assess patient's need for assistive devices and provide as appropriate  - Encourage maximum independence but intervene and supervise when necessary  - Involve family in performance of ADLs  - Assess for home care needs following discharge   - Consider OT consult to assist with ADL evaluation and planning for discharge  - Provide patient education as appropriate  Outcome: Progressing  Goal: Maintains/Returns to pre admission functional level  Description: INTERVENTIONS:  - Perform AM-PAC 6 Click Basic Mobility/ Daily Activity assessment daily.  - Set and communicate daily mobility goal to care team and patient/family/caregiver.   - Collaborate with rehabilitation services on mobility goals if consulted  - Perform Range of Motion 3 times a day.  - Reposition patient every 2 hours.  - Dangle patient 3 times a day  - Stand patient 3 times a day  - Ambulate patient 3 times a day  - Out of bed to chair 3 times a day   - Out of bed for meals 3 times a day  - Out of bed for toileting  - Record patient progress and toleration of activity level   Outcome: Progressing

## 2024-05-13 ENCOUNTER — APPOINTMENT (INPATIENT)
Dept: RADIOLOGY | Facility: HOSPITAL | Age: 74
DRG: 291 | End: 2024-05-13
Payer: MEDICARE

## 2024-05-13 LAB
2HR DELTA HS TROPONIN: -2 NG/L
4HR DELTA HS TROPONIN: -12 NG/L
ANION GAP SERPL CALCULATED.3IONS-SCNC: 7 MMOL/L (ref 4–13)
APTT PPP: 110 SECONDS (ref 23–37)
APTT PPP: 81 SECONDS (ref 23–37)
ATRIAL RATE: 93 BPM
BUN SERPL-MCNC: 58 MG/DL (ref 5–25)
CALCIUM SERPL-MCNC: 8 MG/DL (ref 8.4–10.2)
CARDIAC TROPONIN I PNL SERPL HS: 105 NG/L
CARDIAC TROPONIN I PNL SERPL HS: 107 NG/L
CARDIAC TROPONIN I PNL SERPL HS: 95 NG/L
CHLORIDE SERPL-SCNC: 105 MMOL/L (ref 96–108)
CO2 SERPL-SCNC: 29 MMOL/L (ref 21–32)
CREAT SERPL-MCNC: 2 MG/DL (ref 0.6–1.3)
CREAT UR-MCNC: 50.1 MG/DL
CREAT UR-MCNC: 90.5 MG/DL
GFR SERPL CREATININE-BSD FRML MDRD: 32 ML/MIN/1.73SQ M
GLUCOSE SERPL-MCNC: 118 MG/DL (ref 65–140)
GLUCOSE SERPL-MCNC: 151 MG/DL (ref 65–140)
GLUCOSE SERPL-MCNC: 155 MG/DL (ref 65–140)
GLUCOSE SERPL-MCNC: 202 MG/DL (ref 65–140)
GLUCOSE SERPL-MCNC: 208 MG/DL (ref 65–140)
GLUCOSE SERPL-MCNC: 221 MG/DL (ref 65–140)
HCT VFR BLD AUTO: 22.8 % (ref 36.5–49.3)
HCT VFR BLD AUTO: 22.9 % (ref 36.5–49.3)
HGB BLD-MCNC: 6.9 G/DL (ref 12–17)
HGB BLD-MCNC: 7 G/DL (ref 12–17)
INR PPP: 1.34 (ref 0.84–1.19)
P AXIS: 49 DEGREES
POTASSIUM SERPL-SCNC: 5 MMOL/L (ref 3.5–5.3)
PR INTERVAL: 168 MS
PROT UR-MCNC: 14 MG/DL
PROT/CREAT UR: 0.28 MG/G{CREAT} (ref 0–0.1)
PROTHROMBIN TIME: 17.3 SECONDS (ref 11.6–14.5)
QRS AXIS: -19 DEGREES
QRSD INTERVAL: 126 MS
QT INTERVAL: 370 MS
QTC INTERVAL: 460 MS
SODIUM 24H UR-SCNC: 22 MOL/L
SODIUM SERPL-SCNC: 141 MMOL/L (ref 135–147)
T WAVE AXIS: 126 DEGREES
VENTRICULAR RATE: 93 BPM

## 2024-05-13 PROCEDURE — 85730 THROMBOPLASTIN TIME PARTIAL: CPT | Performed by: INTERNAL MEDICINE

## 2024-05-13 PROCEDURE — 85014 HEMATOCRIT: CPT | Performed by: NURSE PRACTITIONER

## 2024-05-13 PROCEDURE — 85018 HEMOGLOBIN: CPT | Performed by: NURSE PRACTITIONER

## 2024-05-13 PROCEDURE — 84156 ASSAY OF PROTEIN URINE: CPT | Performed by: INTERNAL MEDICINE

## 2024-05-13 PROCEDURE — 99223 1ST HOSP IP/OBS HIGH 75: CPT | Performed by: INTERNAL MEDICINE

## 2024-05-13 PROCEDURE — 80048 BASIC METABOLIC PNL TOTAL CA: CPT | Performed by: PHYSICIAN ASSISTANT

## 2024-05-13 PROCEDURE — P9016 RBC LEUKOCYTES REDUCED: HCPCS

## 2024-05-13 PROCEDURE — 84484 ASSAY OF TROPONIN QUANT: CPT | Performed by: PHYSICIAN ASSISTANT

## 2024-05-13 PROCEDURE — 82948 REAGENT STRIP/BLOOD GLUCOSE: CPT

## 2024-05-13 PROCEDURE — 99233 SBSQ HOSP IP/OBS HIGH 50: CPT | Performed by: PHYSICIAN ASSISTANT

## 2024-05-13 PROCEDURE — 82570 ASSAY OF URINE CREATININE: CPT | Performed by: INTERNAL MEDICINE

## 2024-05-13 PROCEDURE — 71045 X-RAY EXAM CHEST 1 VIEW: CPT

## 2024-05-13 PROCEDURE — 93010 ELECTROCARDIOGRAM REPORT: CPT | Performed by: INTERNAL MEDICINE

## 2024-05-13 PROCEDURE — C9113 INJ PANTOPRAZOLE SODIUM, VIA: HCPCS | Performed by: PHYSICIAN ASSISTANT

## 2024-05-13 PROCEDURE — 85610 PROTHROMBIN TIME: CPT | Performed by: INTERNAL MEDICINE

## 2024-05-13 PROCEDURE — 93005 ELECTROCARDIOGRAM TRACING: CPT

## 2024-05-13 RX ORDER — FUROSEMIDE 10 MG/ML
80 INJECTION INTRAMUSCULAR; INTRAVENOUS ONCE
Status: COMPLETED | OUTPATIENT
Start: 2024-05-13 | End: 2024-05-13

## 2024-05-13 RX ORDER — FUROSEMIDE 10 MG/ML
10 SYRINGE (ML) INJECTION CONTINUOUS
Status: DISCONTINUED | OUTPATIENT
Start: 2024-05-13 | End: 2024-05-14

## 2024-05-13 RX ORDER — FUROSEMIDE 10 MG/ML
80 INJECTION INTRAMUSCULAR; INTRAVENOUS ONCE
Status: DISCONTINUED | OUTPATIENT
Start: 2024-05-13 | End: 2024-05-13

## 2024-05-13 RX ADMIN — ASPIRIN 81 MG: 81 TABLET, CHEWABLE ORAL at 09:09

## 2024-05-13 RX ADMIN — Medication 1000 MG: at 17:53

## 2024-05-13 RX ADMIN — Medication 10 MG/HR: at 19:20

## 2024-05-13 RX ADMIN — INSULIN LISPRO 2 UNITS: 100 INJECTION, SOLUTION INTRAVENOUS; SUBCUTANEOUS at 11:54

## 2024-05-13 RX ADMIN — PRAVASTATIN SODIUM 80 MG: 80 TABLET ORAL at 17:52

## 2024-05-13 RX ADMIN — FUROSEMIDE 80 MG: 10 INJECTION, SOLUTION INTRAMUSCULAR; INTRAVENOUS at 18:27

## 2024-05-13 RX ADMIN — PANTOPRAZOLE SODIUM 40 MG: 40 INJECTION, POWDER, FOR SOLUTION INTRAVENOUS at 17:53

## 2024-05-13 RX ADMIN — CHLOROTHIAZIDE SODIUM 500 MG: 500 INJECTION, POWDER, LYOPHILIZED, FOR SOLUTION INTRAVENOUS at 18:27

## 2024-05-13 RX ADMIN — Medication 1000 MG: at 11:54

## 2024-05-13 RX ADMIN — B-COMPLEX W/ C & FOLIC ACID TAB 1 TABLET: TAB at 09:09

## 2024-05-13 RX ADMIN — OXYCODONE HYDROCHLORIDE AND ACETAMINOPHEN 1000 MG: 500 TABLET ORAL at 09:09

## 2024-05-13 RX ADMIN — PANTOPRAZOLE SODIUM 40 MG: 40 INJECTION, POWDER, FOR SOLUTION INTRAVENOUS at 05:34

## 2024-05-13 NOTE — PLAN OF CARE
Problem: PAIN - ADULT  Goal: Verbalizes/displays adequate comfort level or baseline comfort level  Description: Interventions:  - Encourage patient to monitor pain and request assistance  - Assess pain using appropriate pain scale  - Administer analgesics based on type and severity of pain and evaluate response  - Implement non-pharmacological measures as appropriate and evaluate response  - Consider cultural and social influences on pain and pain management  - Notify physician/advanced practitioner if interventions unsuccessful or patient reports new pain  Outcome: Progressing     Problem: INFECTION - ADULT  Goal: Absence or prevention of progression during hospitalization  Description: INTERVENTIONS:  - Assess and monitor for signs and symptoms of infection  - Monitor lab/diagnostic results  - Monitor all insertion sites, i.e. indwelling lines, tubes, and drains  - Monitor endotracheal if appropriate and nasal secretions for changes in amount and color  - [place] appropriate cooling/warming therapies per order  - Administer medications as ordered  - Instruct and encourage patient and family to use good hand hygiene technique  - Identify and instruct in appropriate isolation precautions for identified infection/condition  Outcome: Progressing  Goal: Absence of fever/infection during neutropenic period  Description: INTERVENTIONS:  - Monitor WBC    Outcome: Progressing     Problem: SAFETY ADULT  Goal: Patient will remain free of falls  Description: INTERVENTIONS:  - Educate patient/family on patient safety including physical limitations  - Instruct patient to call for assistance with activity   - Consult OT/PT to assist with strengthening/mobility   - Keep Call bell within reach  - Keep bed low and locked with side rails adjusted as appropriate  - Keep care items and personal belongings within reach  - Initiate and maintain comfort rounds  - Make Fall Risk Sign visible to staff  - Offer Toileting every 2 Hours,  in advance of need  - Initiate/Maintain alarm  - Obtain necessary fall risk management equipment:  - Apply yellow socks and bracelet for high fall risk patients  - Consider moving patient to room near nurses station  Outcome: Progressing  Goal: Maintain or return to baseline ADL function  Description: INTERVENTIONS:  -  Assess patient's ability to carry out ADLs; assess patient's baseline for ADL function and identify physical deficits which impact ability to perform ADLs (bathing, care of mouth/teeth, toileting, grooming, dressing, etc.)  - Assess/evaluate cause of self-care deficits   - Assess range of motion  - Assess patient's mobility; develop plan if impaired  - Assess patient's need for assistive devices and provide as appropriate  - Encourage maximum independence but intervene and supervise when necessary  - Involve family in performance of ADLs  - Assess for home care needs following discharge   - Consider OT consult to assist with ADL evaluation and planning for discharge  - Provide patient education as appropriate  Outcome: Progressing  Goal: Maintains/Returns to pre admission functional level  Description: INTERVENTIONS:  - Perform AM-PAC 6 Click Basic Mobility/ Daily Activity assessment daily.  - Set and communicate daily mobility goal to care team and patient/family/caregiver.   - Collaborate with rehabilitation services on mobility goals if consulted  - Perform Range of Motion 3 times a day.  - Reposition patient every 2 hours.  - Dangle patient 3 times a day  - Stand patient 3 times a day  - Ambulate patient 3 times a day  - Out of bed to chair 3 times a day   - Out of bed for meals 3 times a day  - Out of bed for toileting  - Record patient progress and toleration of activity level   Outcome: Progressing

## 2024-05-13 NOTE — PROGRESS NOTES
Counts include 234 beds at the [facility]  Progress Note  Name: Galen Carson I MRN: [id number]  Unit/Bed#: [facility] Date of Admission: 5/9/2024   Date of Service: 5/9/2024  I Hospital Day: 4    * Acute on chronic systolic congestive heart failure (HCC)  Assessment & Plan  Presents with worsening shortness of breath dyspnea on exertion lower extremity swelling and cough  NYHA Class III., Stage C  Daily standing weights, strict I/Os, sodium restriction 2g  Holding diuretic due to ESDRAS in setting of GI bleed  Consult nutrition for dietary education  Echocardiogram showing LVEF worsening from 30% to 20%  s/p Dual chamber ICD Implantable Cardioverter Defibrillator Jan '24    Acute renal failure superimposed on stage 3a chronic kidney disease (HCC)  Assessment & Plan  Lab Results   Component Value Date    EGFR 29 05/12/2024    EGFR 24 05/12/2024    EGFR 25 05/11/2024    CREATININE 2.19 (H) 05/12/2024    CREATININE 2.53 (H) 05/12/2024    CREATININE 2.41 (H) 05/11/2024     Developed ESDRAS on 5/11  Multifactorial in the setting of acute blood loss from GI bleed and hypoperfusion from hypotension as well as contrast-induced from CT on 5/10  Holding Lasix   Received albumin infusions through the weekend, slow improvement   Daily BMP, avoid hypotension and nephrotoxins    Type 2 diabetes mellitus with hyperglycemia, without long-term current use of insulin ([facility])  Assessment & Plan  Blood Sugar Average: Last 72 hrs:(P) [id number]  Hold metformin inpatient, consider d/c on discharge pending renal recovery   Diabetic diet  Insulin regimen  Glucose checks and Insulin correction ACHS  Goal -180 while admitted, adjusting insulin regimen as appropriate  Monitor for hypoglycemia and treat per protocol       Paroxysmal atrial fibrillation (HCC)  Assessment & Plan  History of, on toprol XL and AC with warfarin  Continue BB, Pulse: 68   Cleared by GI to restart warfarin and heparin drip  INR 1.66, PTT  110    Gastrointestinal hemorrhage associated with duodenitis  Assessment & Plan  Rectal bleeding occurred on 5/10 at 5 AM, multiple bloody BMs  Warfarin stopped 5/10, INR greater than 2  Received: FFP 2 units 5/9, 1 unit PRBC 5/9  GI consulted  EGD 5/11 - revealed 3 total ulcers including one large deep cratered ulcer with flat pigmented spot. This did not require endoscopic treatment   Okay to restart warfarin 5/11  Okay to restart heparin drip 5/12  Hgb stable 7.0     Acute blood loss anemia  Assessment & Plan  Secondary to GI bleed, aguilar 6.3   Received FFP and PRBC, EGD as above  Hgb remains 7.0, will give additional transfusion today 5/13 followed by diuresis?          VTE Pharmacologic Prophylaxis: VTE Score: 6 High Risk (Score >/= 5) - Pharmacological DVT Prophylaxis Ordered: heparin drip, will HOLD. Sequential Compression Devices Ordered.    Mobility:   Basic Mobility Inpatient Raw Score: 22  JH-HLM Goal: 7: Walk 25 feet or more  JH-HLM Achieved: 7: Walk 25 feet or more  JH-HLM Goal achieved. Continue to encourage appropriate mobility.    Patient Centered Rounds: I performed bedside rounds with nursing staff today.   Discussions with Specialists or Other Care Team Provider:     Education and Discussions with Family / Patient: Updated  (friend via cellphone) at bedside.    Total Time Spent on Date of Encounter in care of patient: 60 mins. This time was spent on one or more of the following: performing physical exam; counseling and coordination of care; obtaining or reviewing history; documenting in the medical record; reviewing/ordering tests, medications or procedures; communicating with other healthcare professionals and discussing with patient's family/caregivers.    Current Length of Stay: 4 day(s)  Current Patient Status: Inpatient   Certification Statement: The patient will continue to require additional inpatient hospital stay due to ongoing anemia, high for hypoperfusion and cardiac  "events; will need additional transfusion and possible nephrology and cardiology consultation  Discharge Plan:  TBD, unclear     Code Status: Level 1 - Full Code    Subjective:   Seen this morning at request of nursing staff due to persistent bleeding at the left antecubital prior IV site, removed yesterday evening. Has had consistent oozing since then, currently applying pressure to site with hemostasis. Denies chest pain or shortness of breath aside from baseline SOB. No dizziness presently, but reports \"they are not letting me get up right now\". No BM since the EGD. Urinating well.     Objective:     Vitals:   Temp (24hrs), Av.7 °F (36.5 °C), Min:97.7 °F (36.5 °C), Max:97.7 °F (36.5 °C)    Temp:  [97.7 °F (36.5 °C)] 97.7 °F (36.5 °C)  HR:  [68] 68  Resp:  [18] 18  BP: (107-146)/(64-77) 128/64  SpO2:  [96 %] 96 %  Body mass index is 32.17 kg/m².     Input and Output Summary (last 24 hours):     Intake/Output Summary (Last 24 hours) at 2024 0814  Last data filed at 2024 0315  Gross per 24 hour   Intake 480 ml   Output 425 ml   Net 55 ml       Physical Exam:   Physical Exam  Vitals and nursing note reviewed.   Constitutional:       General: He is awake. He is not in acute distress.     Appearance: Normal appearance. He is morbidly obese. He is ill-appearing and diaphoretic. He is not toxic-appearing.   HENT:      Head: Normocephalic and atraumatic.      Mouth/Throat:      Mouth: Mucous membranes are dry.   Eyes:      General: No scleral icterus.  Cardiovascular:      Rate and Rhythm: Normal rate and regular rhythm.      Heart sounds: Normal heart sounds. No murmur heard.  Pulmonary:      Effort: Pulmonary effort is normal. No respiratory distress.      Breath sounds: Examination of the right-lower field reveals decreased breath sounds. Examination of the left-lower field reveals decreased breath sounds. Decreased breath sounds present. No wheezing or rales.   Abdominal:      General: Bowel sounds are " normal. There is no distension.      Palpations: Abdomen is soft.   Musculoskeletal:      Right lower leg: Edema present.      Left lower leg: Edema present.   Skin:     General: Skin is warm.      Coloration: Skin is pale. Skin is not jaundiced.   Neurological:      General: No focal deficit present.      Mental Status: He is alert and oriented to person, place, and time.   Psychiatric:         Mood and Affect: Mood normal.         Behavior: Behavior normal. Behavior is cooperative.         Thought Content: Thought content normal.         Judgment: Judgment normal.          Additional Data:     Labs:  Results from last 7 days   Lab Units 05/13/24  0013 05/12/24  1347 05/10/24  0521 05/09/24  1558   WBC Thousand/uL  --  5.78   < > 7.46   HEMOGLOBIN g/dL 7.0* 7.0*   < > 11.0*   HEMATOCRIT % 22.9* 22.0*   < > 34.1*   PLATELETS Thousands/uL  --  192   < > 220   LYMPHO PCT %  --   --   --  9*   MONO PCT %  --   --   --  8   EOS PCT %  --   --   --  2    < > = values in this interval not displayed.     Results from last 7 days   Lab Units 05/12/24  1923 05/11/24  0451 05/10/24  0521   SODIUM mmol/L 141   < > 141   POTASSIUM mmol/L 4.8   < > 5.2   CHLORIDE mmol/L 104   < > 104   CO2 mmol/L 29   < > 29   BUN mg/dL 68*   < > 60*   CREATININE mg/dL 2.19*   < > 1.57*   ANION GAP mmol/L 8   < > 8   CALCIUM mg/dL 8.2*   < > 8.0*   ALBUMIN g/dL  --   --  3.6   TOTAL BILIRUBIN mg/dL  --   --  0.35   ALK PHOS U/L  --   --  61   ALT U/L  --   --  25   AST U/L  --   --  20   GLUCOSE RANDOM mg/dL 153*   < > 160*    < > = values in this interval not displayed.     Results from last 7 days   Lab Units 05/12/24  0508   INR  1.66*     Results from last 7 days   Lab Units 05/13/24  0623 05/12/24  2055 05/12/24  1624 05/12/24  1042 05/12/24  0709 05/11/24  2108 05/11/24  1551 05/11/24  1040 05/11/24  0632 05/10/24  2121 05/10/24  1546 05/10/24  1207   POC GLUCOSE mg/dl 155* 205* 136 230* 158* 167* 182* 150* 135 136 195* 205*                Lines/Drains:  Invasive Devices       Peripheral Intravenous Line  Duration             Peripheral IV 05/12/24 Right;Ventral (anterior) Wrist <1 day                          Imaging: Reviewed radiology reports from this admission including: procedure reports    Recent Cultures (last 7 days):         Last 24 Hours Medication List:   Current Facility-Administered Medications   Medication Dose Route Frequency Provider Last Rate    vitamin C  1,000 mg Oral Daily Breezy Parameswaran, DO      aspirin  81 mg Oral Daily Breezy Parameswaran, DO      Evening Primrose Oil  1,000 mg Oral TID With Meals DANITA Herrera      heparin (porcine)  3-20 Units/kg/hr (Order-Specific) Intravenous Titrated Taras Dobson, DO 9.1 Units/kg/hr (05/13/24 0352)    heparin (porcine)  2,000 Units Intravenous Q6H PRN Taras Dobson, DO      heparin (porcine)  4,000 Units Intravenous Q6H PRN Taras Dobson, DO      insulin lispro  1-6 Units Subcutaneous TID AC DANITA Herrera      melatonin  3 mg Oral HS Rosalba Rosa PA-C      metoprolol succinate  25 mg Oral Daily Breezy Tierneyan, DO      multivitamin stress formula  1 tablet Oral Daily Breezy Tierneyan, DO      pantoprazole  40 mg Intravenous Q12H Rosalba Rosa PA-C      pravastatin  80 mg Oral Daily With Dinner Breezy Cheatham, DO      warfarin  5 mg Oral Daily (warfarin) DANITA Herrera          Today, Patient Was Seen By: Nancy Pascual PA-C    **Please Note: This note may have been constructed using a voice recognition system.**

## 2024-05-13 NOTE — CONSULTS
NEPHROLOGY CONSULTATION NOTE    Patient: Galen Carson               Sex: male          DOA: 5/9/2024  3:32 PM   YOB: 1950        Age:  73 y.o.        LOS:  LOS: 4 days     REFERRING PHYSICIAN: Dr. Dobson     REASON FOR THE REFERRAL / CONSULTATION:  ESDRAS on CKD IIIa    DATE OF CONSULTATION / SERVICE: 5/13/2024    ADMISSION DIAGNOSIS: Acute on chronic systolic congestive heart failure (HCC)     CHIEF COMPLAINT     Shortness of breath    HPI     This is a 73 y.o. M with PMH of HTN, prostate cancer, CKD IIIa, CHF with systolic dysfunction, DM-2, obesity, who presented to our facility on 5/9/24 with c/o volume overload and weight gain. Patient was started on treatment for CHF exacerbation. On 5/10, patient reported episodes of melena and noteworthy was drop in Hb from 11--6.3 in 24 hours. Patient was taken for EGD where 3 non bleeding ulcers noted in duodenum. Patient also underwent CT scan of abdomen with contrast on 5/9/24 after which noted was rise in serum creatinine to 2.5 mg/dl on 5/12/24. Patient was retaining 305 cc in bladder though was able to urinate all of it. This AM patient was noted to be diaphoretic and noted low Hb and is receiving PRBC transfusion. Although patient has lost 11 lbs since admission, still appears edematous and c/o LE swelling. Patient also has h/o prostate cancer and had undergone prostate biopsy 2 weeks ago. His diuretics have been held when ESDRAS occurred and creatinine peaked to 2.5. Labs obtained yesterday revealed Cr of 2.1 mg/dl and improving. 425 cc urine output noted in the past 24 hours.        PAST MEDICAL HISTORY     Past Medical History:   Diagnosis Date    Allergic     Anesthesia     pt wears a life vest (11/16).  should not be scheduled at [facility] until heart condition is stabilized    BPH (benign prostatic hypertrophy)     Cancer (HCC)     Diabetes mellitus (HCC)     Hyperlipidemia     Hypertension     Irregular heart beat     PAF    Prostate cancer (HCC)         PAST SURGICAL HISTORY     Past Surgical History:   Procedure Laterality Date    A-V CARDIAC PACEMAKER INSERTION      CARDIAC CATHETERIZATION N/A 10/04/2023    Procedure: Cardiac Coronary Angiogram;  Surgeon: Chris Lovell MD;  Location: [facility];  Service: Cardiology    CARDIAC CATHETERIZATION N/A 10/04/2023    Procedure: Cardiac RHC/LHC;  Surgeon: Chris Lovell MD;  Location: [facility];  Service: Cardiology    CARDIAC CATHETERIZATION  10/04/2023    Procedure: Cardiac catheterization;  Surgeon: Chris Lovell MD;  Location: [facility];  Service: Cardiology    CARDIAC ELECTROPHYSIOLOGY PROCEDURE N/A 2024    Procedure: Cardiac icd implant, Dual Chambers ICD;  Surgeon: Leo Whalen MD;  Location:  CARDIAC CATH LAB;  Service: Cardiology    EAR SURGERY      HERNIA REPAIR      WI COLONOSCOPY FLX DX W/COLLJ SPEC WHEN PFRMD N/A 2019    Procedure: COLONOSCOPY;  Surgeon: Winston Nielson III, MD;  Location: [facility];  Service: Gastroenterology       ALLERGIES     Allergies   Allergen Reactions    Penicillin V Other (See Comments)     As a child        SOCIAL HISTORY     Social History     Substance and Sexual Activity   Alcohol Use Never     Social History     Substance and Sexual Activity   Drug Use Never     Social History     Tobacco Use   Smoking Status Former    Current packs/day: 0.00    Average packs/day: 1 pack/day for 25.0 years (25.0 ttl pk-yrs)    Types: Cigarettes, Pipe, Cigars    Start date: 1980    Quit date: 2005    Years since quittin.2    Passive exposure: Past   Smokeless Tobacco Never       FAMILY HISTORY     Family History   Problem Relation Age of Onset    Stroke Father     No Known Problems Mother     Prostate cancer Brother     Arthritis Brother     No Known Problems Sister     Diabetes Sister     Diabetes Sister     No Known Problems Son     No Known Problems Daughter        CURRENT MEDICATIONS       Current  Facility-Administered Medications:     ascorbic acid (VITAMIN C) tablet 1,000 mg, 1,000 mg, Oral, Daily, Breezy Cheatham DO, 1,000 mg at 05/13/24 0909    aspirin chewable tablet 81 mg, 81 mg, Oral, Daily, Breezy Cheatham DO, 81 mg at 05/13/24 0909    Evening Primrose Oil CAPS 1,000 mg, 1,000 mg, Oral, TID With Meals, DANITA Herrera, 1,000 mg at 05/13/24 1154    heparin (porcine) 25,000 units in 0.45% NaCl 250 mL infusion (premix), 3-20 Units/kg/hr (Order-Specific), Intravenous, Titrated, Taras Dobson DO, Stopped at 05/13/24 0750    heparin (porcine) injection 2,000 Units, 2,000 Units, Intravenous, Q6H PRN, Taras Dobson DO    heparin (porcine) injection 4,000 Units, 4,000 Units, Intravenous, Q6H PRN, Taras Dobson DO    insulin lispro (HumALOG/ADMELOG) 100 units/mL subcutaneous injection 1-6 Units, 1-6 Units, Subcutaneous, TID AC, DANITA Herrera, 2 Units at 05/13/24 1154    melatonin tablet 3 mg, 3 mg, Oral, HS, Rosalba Rosa PA-C, 3 mg at 05/11/24 2140    metoprolol succinate (TOPROL-XL) 24 hr tablet 25 mg, 25 mg, Oral, Daily, Breezy Cheatham DO, 25 mg at 05/12/24 0809    multivitamin stress formula tablet 1 tablet, 1 tablet, Oral, Daily, Breezy Cheatham DO, 1 tablet at 05/13/24 0909    pantoprazole (PROTONIX) injection 40 mg, 40 mg, Intravenous, Q12H, Rosalba Rosa PA-C, 40 mg at 05/13/24 0534    pravastatin (PRAVACHOL) tablet 80 mg, 80 mg, Oral, Daily With Dinner, Breezy Cheatham DO, 80 mg at 05/12/24 1705    warfarin (COUMADIN) tablet 5 mg, 5 mg, Oral, Daily (warfarin), DANITA Herrera, 5 mg at 05/12/24 1705    REVIEW OF SYSTEMS     Review of Systems   Constitutional:  Positive for fatigue.   HENT: Negative.     Eyes: Negative.    Respiratory:  Positive for shortness of breath.    Cardiovascular:  Positive for leg swelling.   Gastrointestinal:  Positive for abdominal distention.   Endocrine: Negative.    Genitourinary: Negative.     Musculoskeletal: Negative.    Skin: Negative.    Allergic/Immunologic: Negative.    Neurological: Negative.    Hematological: Negative.    All other systems reviewed and are negative.        OBJECTIVE     Current Weight: Weight - Scale: 102 kg (224 lb 3.3 oz)  Vitals:    05/13/24 0906   BP: 111/64   Pulse: 91   Resp: 16   Temp: 99.1 °F (37.3 °C)   SpO2:      Body mass index is 32.17 kg/m².    Intake/Output Summary (Last 24 hours) at 5/13/2024 1240  Last data filed at 5/13/2024 0315  Gross per 24 hour   Intake 240 ml   Output 200 ml   Net 40 ml       PHYSICAL EXAMINATION     Physical Exam  Constitutional:       Appearance: He is obese.   HENT:      Head: Normocephalic and atraumatic.   Eyes:      Pupils: Pupils are equal, round, and reactive to light.   Neck:      Vascular: No JVD.   Cardiovascular:      Rate and Rhythm: Normal rate and regular rhythm.      Heart sounds: Normal heart sounds. No murmur heard.     No friction rub.   Pulmonary:      Effort: Pulmonary effort is normal.      Breath sounds: Rales present.   Abdominal:      General: Bowel sounds are normal. There is no distension.      Palpations: Abdomen is soft.      Tenderness: There is no abdominal tenderness. There is no rebound.   Musculoskeletal:         General: No tenderness.      Cervical back: Neck supple.      Right lower leg: Edema present.      Left lower leg: Edema present.   Skin:     General: Skin is dry.      Findings: No rash.   Neurological:      Mental Status: He is alert and oriented to person, place, and time.           LAB RESULTS        Results from last 7 days   Lab Units 05/13/24  0809 05/13/24  0013 05/12/24  1923 05/12/24  1347 05/12/24  0508 05/11/24  2213 05/11/24  1544 05/11/24  1454 05/11/24  0451 05/10/24  1159 05/10/24  0521 05/09/24  1558   WBC Thousand/uL  --   --   --  5.78 7.09  --   --   --  7.49  --  7.78 7.46   HEMOGLOBIN g/dL 6.9* 7.0*  --  7.0* 7.4*  7.4* 7.5* 7.4*  --  8.0*   < > 9.3* 11.0*   HEMATOCRIT %  22.8* 22.9*  --  22.0* 23.8*  23.7* 24.3* 23.4*  --  24.3*   < > 29.6* 34.1*   PLATELETS Thousands/uL  --   --   --  192 194  --   --   --  193  --  269 220   POTASSIUM mmol/L  --   --  4.8  --  5.3  --   --  5.2 4.8  --  5.2 4.8   CHLORIDE mmol/L  --   --  104  --  105  --   --  105 104  --  104 102   CO2 mmol/L  --   --  29  --  30  --   --  23 29  --  29 27   BUN mg/dL  --   --  68*  --  76*  --   --  78* 83*  --  60* 39*   CREATININE mg/dL  --   --  2.19*  --  2.53*  --   --  2.41* 2.76*  --  1.57* 1.46*   EGFR ml/min/1.73sq m  --   --  29  --  24  --   --  25 21  --  43 47   CALCIUM mg/dL  --   --  8.2*  --  8.2*  --   --  7.7* 8.0*  --  8.0* 8.3*   MAGNESIUM mg/dL  --   --   --   --  2.2  --   --   --  2.2  --  1.9  --     < > = values in this interval not displayed.       I have personally reviewed the old medical records and patient's previously known baseline creatinine level is ~ 1.4-1.5    RADIOLOGY RESULTS     IMPRESSION:        1. Findings consistent with acute duodenitis involving the pylorus and second portion of the duodenum. No evidence of perforation or abscess. GI consultation recommended.       PLAN / RECOMMENDATIONS      73 M with PMH of CKD IIIa, atrial fibrillation on AC, CHF with systolic dysfunction, DM-2, HTN, prostate cancer who p/w shortness of breath and admitted with CHF exacerbation    1) ESDRAS on CKD IIIa: baseline creatinine of 1.4-1.5  Presented at his baseline creatinine however noted to worsened during hospitalization and peaked at 2.5 mg/dl  Yesterday creatinine improved to 2.1 mg/dl with today's labs pending.  Likely etiology of ESDRAS in hospital in clearly contrast induced nephropathy  Patient is however in need of aggressive diuresis.  If creatinine improved further, will initiate aggressive diuresis with lasix gtt and Diuril.  Urinalysis is bland with no signs of proteinuria and/or hematuria.    2) Acute blood loss anemia: Hb dropped from 11--->6 g/dl likely due to bleeding from  duodenal ulcer. S/P EGD and findings of 3 non bleeding ulcers.  On PPI    3) Hypertension: on toprol XL. Hold medication for SBP < 120    4) DM-2: Blood glucose is acceptable while on sliding scale insulin    5) Atrial fibrillation: On Coumadin.     6) Volume overload: presented with weight gain, shortness of breath and edema in legs.  Initiated on diuresis with IV lasix however had to be held due to ESDRAS.  Will resume with lasix gtt/Diuril once Creatinine further improves on today's labs.     7) Bone mineral disorder; Obtain PTHi, 25 OH Vit D.       Thank you for the consultation to participate in patient's care. I have personally discussed my plan with the referring physician.     Va Esqueda MD    5/13/2024

## 2024-05-13 NOTE — ASSESSMENT & PLAN NOTE
History of, on toprol XL and AC with warfarin  Continue BB, Pulse: 74   Cleared by GI to restart warfarin and heparin drip  Heparin discontinue due to ongoing anemia; restarted warfarin 5/14

## 2024-05-13 NOTE — ASSESSMENT & PLAN NOTE
Rectal bleeding occurred on 5/10 at 5 AM, multiple bloody BMs  Warfarin stopped 5/10, INR greater than 2  Received: FFP 2 units 5/9, 1 unit PRBC 5/9 and additional 1 unit PRBC 5/13  GI consulted  EGD 5/11 - revealed 3 total ulcers including one large deep cratered ulcer with flat pigmented spot. This did not require endoscopic treatment   Cleared for anticoagulation

## 2024-05-13 NOTE — ASSESSMENT & PLAN NOTE
Blood Sugar Average: Last 72 hrs:(P) [id number]  Recommend discontinue metformin at discharge due to renal status   Diabetic diet

## 2024-05-13 NOTE — TREATMENT PLAN
EKG with later Tw inversion, Hgb 6.9-7.0, INR 1.34, PTT 81 this morning. Patient was diaphoretic earlier but without chest pain. Troponin now showing elevation 107. Start telemetry monitor and consult cardiology and nephrology given his tenuous cardiac and renal function.

## 2024-05-13 NOTE — ASSESSMENT & PLAN NOTE
Lab Results   Component Value Date    EGFR 31 05/15/2024    EGFR 33 05/14/2024    EGFR 32 05/13/2024    CREATININE 2.02 (H) 05/15/2024    CREATININE 1.93 (H) 05/14/2024    CREATININE 2.00 (H) 05/13/2024     Developed ESDRAS on 5/11  Multifactorial in the setting of acute blood loss from GI bleed and hypoperfusion from hypotension as well as contrast-induced from CT on 5/10  Nephrology consulted due to ESDRAS and need for aggressive diuresis   Stop metformin on discharge

## 2024-05-13 NOTE — ASSESSMENT & PLAN NOTE
Previous baseline appears to be normal range 12-13  Secondary to GI bleed, aguilar 6.3   Stable at 8.2   See above

## 2024-05-13 NOTE — ASSESSMENT & PLAN NOTE
Presents with worsening shortness of breath dyspnea on exertion lower extremity swelling and cough  NYHA Class III., Stage C  Daily standing weights, strict I/Os, sodium restriction 2g  Nephrology consulted for volume management  IV lasix and diuril regimen -- transition to torsemide 60 mg daily   Net neg 8.7 L, weight down 10 pounds  Cardiology consulted due to decline in cardiac function,  No acute interventions, likely secondary to CHF and anemia

## 2024-05-13 NOTE — PLAN OF CARE
Problem: PAIN - ADULT  Goal: Verbalizes/displays adequate comfort level or baseline comfort level  Description: Interventions:  - Encourage patient to monitor pain and request assistance  - Assess pain using appropriate pain scale  - Administer analgesics based on type and severity of pain and evaluate response  - Implement non-pharmacological measures as appropriate and evaluate response  - Consider cultural and social influences on pain and pain management  - Notify physician/advanced practitioner if interventions unsuccessful or patient reports new pain  Outcome: Progressing     Problem: INFECTION - ADULT  Goal: Absence or prevention of progression during hospitalization  Description: INTERVENTIONS:  - Assess and monitor for signs and symptoms of infection  - Monitor lab/diagnostic results  - Monitor all insertion sites, i.e. indwelling lines, tubes, and drains  - Monitor endotracheal if appropriate and nasal secretions for changes in amount and color  - [place] appropriate cooling/warming therapies per order  - Administer medications as ordered  - Instruct and encourage patient and family to use good hand hygiene technique  - Identify and instruct in appropriate isolation precautions for identified infection/condition  Outcome: Progressing

## 2024-05-14 LAB
25(OH)D3 SERPL-MCNC: 39 NG/ML (ref 30–100)
ABO GROUP BLD BPU: NORMAL
ANION GAP SERPL CALCULATED.3IONS-SCNC: 7 MMOL/L (ref 4–13)
BPU ID: NORMAL
BUN SERPL-MCNC: 57 MG/DL (ref 5–25)
CALCIUM SERPL-MCNC: 8.6 MG/DL (ref 8.4–10.2)
CHLORIDE SERPL-SCNC: 99 MMOL/L (ref 96–108)
CO2 SERPL-SCNC: 32 MMOL/L (ref 21–32)
CREAT SERPL-MCNC: 1.93 MG/DL (ref 0.6–1.3)
CROSSMATCH: NORMAL
GFR SERPL CREATININE-BSD FRML MDRD: 33 ML/MIN/1.73SQ M
GLUCOSE SERPL-MCNC: 151 MG/DL (ref 65–140)
GLUCOSE SERPL-MCNC: 166 MG/DL (ref 65–140)
GLUCOSE SERPL-MCNC: 171 MG/DL (ref 65–140)
GLUCOSE SERPL-MCNC: 237 MG/DL (ref 65–140)
GLUCOSE SERPL-MCNC: 242 MG/DL (ref 65–140)
HCT VFR BLD AUTO: 24.8 % (ref 36.5–49.3)
HGB BLD-MCNC: 7.8 G/DL (ref 12–17)
INR PPP: 1.24 (ref 0.84–1.19)
POTASSIUM SERPL-SCNC: 4.3 MMOL/L (ref 3.5–5.3)
PROTHROMBIN TIME: 16.3 SECONDS (ref 11.6–14.5)
PTH-INTACT SERPL-MCNC: 178.8 PG/ML (ref 12–88)
SODIUM SERPL-SCNC: 138 MMOL/L (ref 135–147)
UNIT DISPENSE STATUS: NORMAL
UNIT PRODUCT CODE: NORMAL
UNIT PRODUCT VOLUME: 350 ML
UNIT RH: NORMAL

## 2024-05-14 PROCEDURE — 85014 HEMATOCRIT: CPT | Performed by: PHYSICIAN ASSISTANT

## 2024-05-14 PROCEDURE — 80048 BASIC METABOLIC PNL TOTAL CA: CPT | Performed by: INTERNAL MEDICINE

## 2024-05-14 PROCEDURE — 82948 REAGENT STRIP/BLOOD GLUCOSE: CPT

## 2024-05-14 PROCEDURE — C9113 INJ PANTOPRAZOLE SODIUM, VIA: HCPCS | Performed by: PHYSICIAN ASSISTANT

## 2024-05-14 PROCEDURE — 99232 SBSQ HOSP IP/OBS MODERATE 35: CPT | Performed by: INTERNAL MEDICINE

## 2024-05-14 PROCEDURE — 82306 VITAMIN D 25 HYDROXY: CPT | Performed by: INTERNAL MEDICINE

## 2024-05-14 PROCEDURE — 83970 ASSAY OF PARATHORMONE: CPT | Performed by: INTERNAL MEDICINE

## 2024-05-14 PROCEDURE — 85018 HEMOGLOBIN: CPT | Performed by: PHYSICIAN ASSISTANT

## 2024-05-14 PROCEDURE — 99223 1ST HOSP IP/OBS HIGH 75: CPT | Performed by: INTERNAL MEDICINE

## 2024-05-14 PROCEDURE — 99232 SBSQ HOSP IP/OBS MODERATE 35: CPT | Performed by: PHYSICIAN ASSISTANT

## 2024-05-14 PROCEDURE — 85610 PROTHROMBIN TIME: CPT | Performed by: INTERNAL MEDICINE

## 2024-05-14 RX ORDER — FUROSEMIDE 10 MG/ML
60 INJECTION INTRAMUSCULAR; INTRAVENOUS
Status: DISCONTINUED | OUTPATIENT
Start: 2024-05-14 | End: 2024-05-16

## 2024-05-14 RX ORDER — WARFARIN SODIUM 5 MG/1
5 TABLET ORAL
Status: DISCONTINUED | OUTPATIENT
Start: 2024-05-14 | End: 2024-05-16 | Stop reason: HOSPADM

## 2024-05-14 RX ADMIN — Medication 1000 MG: at 12:13

## 2024-05-14 RX ADMIN — INSULIN LISPRO 1 UNITS: 100 INJECTION, SOLUTION INTRAVENOUS; SUBCUTANEOUS at 07:33

## 2024-05-14 RX ADMIN — ASPIRIN 81 MG: 81 TABLET, CHEWABLE ORAL at 09:03

## 2024-05-14 RX ADMIN — WARFARIN SODIUM 5 MG: 5 TABLET ORAL at 17:12

## 2024-05-14 RX ADMIN — PRAVASTATIN SODIUM 80 MG: 80 TABLET ORAL at 17:12

## 2024-05-14 RX ADMIN — OXYCODONE HYDROCHLORIDE AND ACETAMINOPHEN 1000 MG: 500 TABLET ORAL at 09:03

## 2024-05-14 RX ADMIN — PANTOPRAZOLE SODIUM 40 MG: 40 INJECTION, POWDER, FOR SOLUTION INTRAVENOUS at 17:11

## 2024-05-14 RX ADMIN — FUROSEMIDE 60 MG: 10 INJECTION, SOLUTION INTRAMUSCULAR; INTRAVENOUS at 17:11

## 2024-05-14 RX ADMIN — INSULIN LISPRO 1 UNITS: 100 INJECTION, SOLUTION INTRAVENOUS; SUBCUTANEOUS at 17:10

## 2024-05-14 RX ADMIN — INSULIN LISPRO 3 UNITS: 100 INJECTION, SOLUTION INTRAVENOUS; SUBCUTANEOUS at 12:12

## 2024-05-14 RX ADMIN — B-COMPLEX W/ C & FOLIC ACID TAB 1 TABLET: TAB at 09:03

## 2024-05-14 RX ADMIN — Medication 1000 MG: at 07:33

## 2024-05-14 RX ADMIN — Medication 1000 MG: at 17:11

## 2024-05-14 RX ADMIN — PANTOPRAZOLE SODIUM 40 MG: 40 INJECTION, POWDER, FOR SOLUTION INTRAVENOUS at 08:03

## 2024-05-14 RX ADMIN — CHLOROTHIAZIDE SODIUM 500 MG: 500 INJECTION, POWDER, LYOPHILIZED, FOR SOLUTION INTRAVENOUS at 12:52

## 2024-05-14 NOTE — PLAN OF CARE
Problem: CARDIOVASCULAR - ADULT  Goal: Maintains optimal cardiac output and hemodynamic stability  Description: INTERVENTIONS:  - Monitor I/O, vital signs and rhythm  - Monitor for S/S and trends of decreased cardiac output  - Administer and titrate ordered vasoactive medications to optimize hemodynamic stability  - Assess quality of pulses, skin color and temperature  - Assess for signs of decreased coronary artery perfusion  - Instruct patient to report change in severity of symptoms  Outcome: Progressing     Problem: CARDIOVASCULAR - ADULT  Goal: Absence of cardiac dysrhythmias or at baseline rhythm  Description: INTERVENTIONS:  - Continuous cardiac monitoring, vital signs, obtain 12 lead EKG if ordered  - Administer antiarrhythmic and heart rate control medications as ordered  - Monitor electrolytes and administer replacement therapy as ordered  Outcome: Progressing     Problem: RESPIRATORY - ADULT  Goal: Achieves optimal ventilation and oxygenation  Description: INTERVENTIONS:  - Assess for changes in respiratory status  - Assess for changes in mentation and behavior  - Position to facilitate oxygenation and minimize respiratory effort  - Oxygen administered by appropriate delivery if ordered  - Initiate smoking cessation education as indicated  - Encourage broncho-pulmonary hygiene including cough, deep breathe, Incentive Spirometry  - Assess the need for suctioning and aspirate as needed  - Assess and instruct to report SOB or any respiratory difficulty  - Respiratory Therapy support as indicated  Outcome: Progressing

## 2024-05-14 NOTE — PROGRESS NOTES
[facility]  Progress Note  Name: Galen Carson I MRN: [id number]  Unit/Bed#: -02I Date of Admission: 5/9/2024   Date of Service: 5/9/2024  I Hospital Day: 5    * Acute on chronic systolic congestive heart failure (HCC)  Assessment & Plan  Presents with worsening shortness of breath dyspnea on exertion lower extremity swelling and cough  NYHA Class III., Stage C  Daily standing weights, strict I/Os, sodium restriction 2g  Consult nutrition for dietary education  Echocardiogram showing LVEF worsening from 30% to 20%  s/p Dual chamber ICD Implantable Cardioverter Defibrillator Jan '24  Nephrology consulted for volume management  IV lasix drip started 5/13  Cardiology consulted due to decline in cardiac function     Acute renal failure superimposed on stage 3a chronic kidney disease (HCC)  Assessment & Plan  Lab Results   Component Value Date    EGFR 32 05/13/2024    EGFR 29 05/12/2024    EGFR 24 05/12/2024    CREATININE 2.00 (H) 05/13/2024    CREATININE 2.19 (H) 05/12/2024    CREATININE 2.53 (H) 05/12/2024     Developed ESDRAS on 5/11  Multifactorial in the setting of acute blood loss from GI bleed and hypoperfusion from hypotension as well as contrast-induced from CT on 5/10  Daily BMP, avoid hypotension and nephrotoxins  Nephrology consulted due to ESDRAS and need for aggressive diuresis     Type 2 diabetes mellitus with hyperglycemia, without long-term current use of insulin (HCC)  Assessment & Plan  Blood Sugar Average: Last 72 hrs:(P) [id number]  Hold metformin inpatient, consider d/c on discharge pending renal recovery   Diabetic diet  Insulin regimen  Glucose checks and Insulin correction ACHS  Goal -180 while admitted, adjusting insulin regimen as appropriate  Monitor for hypoglycemia and treat per protocol       Paroxysmal atrial fibrillation (HCC)  Assessment & Plan  History of, on toprol XL and AC with warfarin  Continue BB, Pulse: 92   Cleared by GI to restart  warfarin and heparin drip  Heparin discontinue due to ongoing anemia; will consider restarting warfarin today 5/14    Gastrointestinal hemorrhage associated with duodenitis  Assessment & Plan  Rectal bleeding occurred on 5/10 at 5 AM, multiple bloody BMs  Warfarin stopped 5/10, INR greater than 2  Received: FFP 2 units 5/9, 1 unit PRBC 5/9  GI consulted  EGD 5/11 - revealed 3 total ulcers including one large deep cratered ulcer with flat pigmented spot. This did not require endoscopic treatment   Okay to restart warfarin 5/11  Okay to restart heparin drip 5/12    Acute blood loss anemia  Assessment & Plan  Previous baseline appears to be normal range 12-13  Secondary to GI bleed, aguilar 6.3   Received FFP and PRBC, EGD as above  Additional PRBC transfusion 5/13          VTE Pharmacologic Prophylaxis: VTE Score: 6 High Risk (Score >/= 5) - Pharmacological DVT Prophylaxis Contraindicated. Sequential Compression Devices Ordered.    Mobility:   Basic Mobility Inpatient Raw Score: 22  JH-HLM Goal: 7: Walk 25 feet or more  JH-HLM Achieved: 6: Walk 10 steps or more  JH-HLM Goal NOT achieved. Continue with multidisciplinary rounding and encourage appropriate mobility to improve upon JH-HLM goals.    Patient Centered Rounds: I performed bedside rounds with nursing staff today.   Discussions with Specialists or Other Care Team Provider: nephro, CM; cards consult pending     Education and Discussions with Family / Patient: Patient declined call to .     Total Time Spent on Date of Encounter in care of patient: 45 mins. This time was spent on one or more of the following: performing physical exam; counseling and coordination of care; obtaining or reviewing history; documenting in the medical record; reviewing/ordering tests, medications or procedures; communicating with other healthcare professionals and discussing with patient's family/caregivers.    Current Length of Stay: 5 day(s)  Current Patient Status:  "Inpatient   Certification Statement: The patient will continue to require additional inpatient hospital stay due to ongoing need for diuresis, lab monitoring   Discharge Plan: Anticipate discharge in 48-72 hrs to home with home services.    Code Status: Level 1 - Full Code    Subjective:   Seen this am, out of bed in the chair. Reports significant urine output, but \"what do you expect when you finally give me meds after 3 days of none\"? He is pleasant, and wants to get up moving today. Does admittedly feel better, not as tired today. No chest pain or shortness of breath. No muscle cramps.     Objective:     Vitals:   Temp (24hrs), Av.4 °F (32.4 °C), Min:32 °F (0 °C), Max:99.4 °F (37.4 °C)    Temp:  [32 °F (0 °C)-99.4 °F (37.4 °C)] 98.1 °F (36.7 °C)  HR:  [91-99] 92  Resp:  [13-19] 17  BP: ()/(58-67) 105/58  Body mass index is 32.17 kg/m².     Input and Output Summary (last 24 hours):     Intake/Output Summary (Last 24 hours) at 2024 0819  Last data filed at 2024 0401  Gross per 24 hour   Intake 1250 ml   Output 3835 ml   Net -2585 ml       Physical Exam:   Physical Exam  Vitals and nursing note reviewed.   Constitutional:       General: He is awake. He is not in acute distress.     Appearance: Normal appearance. He is well-developed. He is obese. He is not ill-appearing, toxic-appearing or diaphoretic.   Cardiovascular:      Rate and Rhythm: Normal rate and regular rhythm.      Heart sounds: Normal heart sounds. No murmur heard.  Pulmonary:      Effort: Pulmonary effort is normal. No respiratory distress.      Breath sounds: Examination of the right-lower field reveals rales. Examination of the left-lower field reveals rales. Rales present.      Comments: Improved aeration today, now with minimal noted rales at bases compared to lack of significant movement at bases    Abdominal:      General: Bowel sounds are normal. There is no distension.      Palpations: Abdomen is soft. "   Musculoskeletal:      Right lower leg: No edema.      Left lower leg: Edema present.   Skin:     General: Skin is warm and dry.      Coloration: Skin is not pale.   Neurological:      Mental Status: He is alert and oriented to person, place, and time.   Psychiatric:         Mood and Affect: Mood normal.         Behavior: Behavior normal. Behavior is cooperative.           Additional Data:     Labs:  Results from last 7 days   Lab Units 05/14/24  0517 05/13/24  0013 05/12/24  1347 05/10/24  0521 05/09/24  1558   WBC Thousand/uL  --   --  5.78   < > 7.46   HEMOGLOBIN g/dL 7.8*   < > 7.0*   < > 11.0*   HEMATOCRIT % 24.8*   < > 22.0*   < > 34.1*   PLATELETS Thousands/uL  --   --  192   < > 220   LYMPHO PCT %  --   --   --   --  9*   MONO PCT %  --   --   --   --  8   EOS PCT %  --   --   --   --  2    < > = values in this interval not displayed.     Results from last 7 days   Lab Units 05/13/24  1318 05/11/24  0451 05/10/24  0521   SODIUM mmol/L 141   < > 141   POTASSIUM mmol/L 5.0   < > 5.2   CHLORIDE mmol/L 105   < > 104   CO2 mmol/L 29   < > 29   BUN mg/dL 58*   < > 60*   CREATININE mg/dL 2.00*   < > 1.57*   ANION GAP mmol/L 7   < > 8   CALCIUM mg/dL 8.0*   < > 8.0*   ALBUMIN g/dL  --   --  3.6   TOTAL BILIRUBIN mg/dL  --   --  0.35   ALK PHOS U/L  --   --  61   ALT U/L  --   --  25   AST U/L  --   --  20   GLUCOSE RANDOM mg/dL 202*   < > 160*    < > = values in this interval not displayed.     Results from last 7 days   Lab Units 05/14/24  0517   INR  1.24*     Results from last 7 days   Lab Units 05/14/24  0625 05/13/24  2036 05/13/24  1648 05/13/24  1115 05/13/24  0819 05/13/24  0623 05/12/24  2055 05/12/24  1624 05/12/24  1042 05/12/24  0709 05/11/24  2108 05/11/24  1551   POC GLUCOSE mg/dl 151* 208* 118 221* 151* 155* 205* 136 230* 158* 167* 182*               Lines/Drains:  Invasive Devices       Peripheral Intravenous Line  Duration             Peripheral IV 05/12/24 Right;Ventral (anterior) Wrist 1 day               Drain  Duration             External Urinary Catheter Medium <1 day                      Telemetry:  Telemetry Orders (From admission, onward)               24 Hour Telemetry Monitoring  Continuous x 24 Hours (Telem)        Expiring   Question:  Reason for 24 Hour Telemetry  Answer:  PCI/EP study (including pacer and ICD implementation), Cardiac surgery, MI, abnormal cardiac cath, and chest pain- rule out MI                     Telemetry Reviewed: Normal Sinus Rhythm  Indication for Continued Telemetry Use: No indication for continued use. Will discontinue.              Imaging: Reviewed radiology reports from this admission including: chest xray and Personally reviewed the following imaging: chest xray    Recent Cultures (last 7 days):         Last 24 Hours Medication List:   Current Facility-Administered Medications   Medication Dose Route Frequency Provider Last Rate    vitamin C  1,000 mg Oral Daily Breezy Monetan, DO      aspirin  81 mg Oral Daily Breezy Monetan, DO      Evening Primrose Oil  1,000 mg Oral TID With Meals DANITA Herrera      furosemide  10 mg/hr Intravenous Continuous Va Esqueda MD 10 mg/hr (05/13/24 1920)    insulin lispro  1-6 Units Subcutaneous TID AC DANITA Herrera      melatonin  3 mg Oral HS Rosalba Rosa PA-C      metoprolol succinate  25 mg Oral Daily Breezy Cheatham DO      multivitamin stress formula  1 tablet Oral Daily Breezy Cheatham DO      pantoprazole  40 mg Intravenous Q12H Rosalba Rosa PA-C      pravastatin  80 mg Oral Daily With Dinner Breezy Cheatham DO          Today, Patient Was Seen By: Nancy Pascual PA-C    **Please Note: This note may have been constructed using a voice recognition system.**

## 2024-05-14 NOTE — PLAN OF CARE
Problem: PAIN - ADULT  Goal: Verbalizes/displays adequate comfort level or baseline comfort level  Description: Interventions:  - Encourage patient to monitor pain and request assistance  - Assess pain using appropriate pain scale  - Administer analgesics based on type and severity of pain and evaluate response  - Implement non-pharmacological measures as appropriate and evaluate response  - Consider cultural and social influences on pain and pain management  - Notify physician/advanced practitioner if interventions unsuccessful or patient reports new pain  Outcome: Progressing     Problem: INFECTION - ADULT  Goal: Absence or prevention of progression during hospitalization  Description: INTERVENTIONS:  - Assess and monitor for signs and symptoms of infection  - Monitor lab/diagnostic results  - Monitor all insertion sites, i.e. indwelling lines, tubes, and drains  - Monitor endotracheal if appropriate and nasal secretions for changes in amount and color  - [place] appropriate cooling/warming therapies per order  - Administer medications as ordered  - Instruct and encourage patient and family to use good hand hygiene technique  - Identify and instruct in appropriate isolation precautions for identified infection/condition  Outcome: Progressing  Goal: Absence of fever/infection during neutropenic period  Description: INTERVENTIONS:  - Monitor WBC    Outcome: Progressing     Problem: SAFETY ADULT  Goal: Patient will remain free of falls  Description: INTERVENTIONS:  - Educate patient/family on patient safety including physical limitations  - Instruct patient to call for assistance with activity   - Consult OT/PT to assist with strengthening/mobility   - Keep Call bell within reach  - Keep bed low and locked with side rails adjusted as appropriate  - Keep care items and personal belongings within reach  - Initiate and maintain comfort rounds  - Make Fall Risk Sign visible to staff  - Offer Toileting every 2Hours,  in advance of need  - Initiate/Maintain bed alarm  - Obtain necessary fall risk management equipment: bed alarm nonskid socks  - Apply yellow socks and bracelet for high fall risk patients  - Consider moving patient to room near nurses station  Outcome: Progressing  Goal: Maintain or return to baseline ADL function  Description: INTERVENTIONS:  -  Assess patient's ability to carry out ADLs; assess patient's baseline for ADL function and identify physical deficits which impact ability to perform ADLs (bathing, care of mouth/teeth, toileting, grooming, dressing, etc.)  - Assess/evaluate cause of self-care deficits   - Assess range of motion  - Assess patient's mobility; develop plan if impaired  - Assess patient's need for assistive devices and provide as appropriate  - Encourage maximum independence but intervene and supervise when necessary  - Involve family in performance of ADLs  - Assess for home care needs following discharge   - Consider OT consult to assist with ADL evaluation and planning for discharge  - Provide patient education as appropriate  Outcome: Progressing  Goal: Maintains/Returns to pre admission functional level  Description: INTERVENTIONS:  - Perform AM-PAC 6 Click Basic Mobility/ Daily Activity assessment daily.  - Set and communicate daily mobility goal to care team and patient/family/caregiver.   - Collaborate with rehabilitation services on mobility goals if consulted  - Perform Range of Motion 3 times a day.  - Reposition patient every 3 hours.  - Dangle patient 3 times a day  - Stand patient 3 times a day  - Ambulate patient 3 times a day  - Out of bed to chair 3 times a day   - Out of bed for meals 3 times a day  - Out of bed for toileting  - Record patient progress and toleration of activity level   Outcome: Progressing

## 2024-05-14 NOTE — PROGRESS NOTES
NEPHROLOGY PROGRESS NOTE    Patient: Galen Carson               Sex: male          DOA: 5/9/2024  3:32 PM   YOB: 1950        Age:  73 y.o.        LOS:  LOS: 5 days   5/14/2024    REASON FOR THE CONSULTATION:      Volume overload/ESDRAS on CKD stage IIIb    SUBJECTIVE     Patient seen and examined next at the bedside.  Upset due to frequent blood draws.  Admits to improvement in breathing and urine output.    CURRENT MEDICATIONS       Current Facility-Administered Medications:     ascorbic acid (VITAMIN C) tablet 1,000 mg, 1,000 mg, Oral, Daily, Breezy Monetan, DO, 1,000 mg at 05/14/24 0903    Evening Primrose Oil CAPS 1,000 mg, 1,000 mg, Oral, TID With Meals, ADNITA Herrera, 1,000 mg at 05/14/24 0733    furosemide (LASIX) 500 mg infusion 50 mL, 10 mg/hr, Intravenous, Continuous, Va Esqueda MD, Last Rate: 1 mL/hr at 05/13/24 1920, 10 mg/hr at 05/13/24 1920    insulin lispro (HumALOG/ADMELOG) 100 units/mL subcutaneous injection 1-6 Units, 1-6 Units, Subcutaneous, TID AC, DANITA Herrera, 1 Units at 05/14/24 0733    melatonin tablet 3 mg, 3 mg, Oral, HS, Rosalba Rosa PA-C, 3 mg at 05/11/24 2140    metoprolol succinate (TOPROL-XL) 24 hr tablet 25 mg, 25 mg, Oral, Daily, Breezy Cheatham DO, 25 mg at 05/12/24 0809    multivitamin stress formula tablet 1 tablet, 1 tablet, Oral, Daily, Breezy Eckertesvalenciaan, DO, 1 tablet at 05/14/24 0903    pantoprazole (PROTONIX) injection 40 mg, 40 mg, Intravenous, Q12H, Rosalba Rosa PA-C, 40 mg at 05/14/24 0803    pravastatin (PRAVACHOL) tablet 80 mg, 80 mg, Oral, Daily With Dinner, Breezy Cheatham, DO, 80 mg at 05/13/24 1752    REVIEW OF SYSTEMS     Review of Systems   Constitutional: Negative.    HENT: Negative.     Eyes: Negative.    Respiratory:  Positive for shortness of breath.    Cardiovascular:  Positive for leg swelling.   Gastrointestinal: Negative.    Endocrine: Negative.    Genitourinary: Negative.    Musculoskeletal: Negative.     Skin: Negative.    Allergic/Immunologic: Negative.    Neurological: Negative.    Hematological: Negative.    All other systems reviewed and are negative.      OBJECTIVE     Current Weight: Weight - Scale: 102 kg (224 lb 3.3 oz)  Vitals:    05/14/24 0729   BP: 105/58   Pulse:    Resp:    Temp:    SpO2:      Body mass index is 32.17 kg/m².    Intake/Output Summary (Last 24 hours) at 5/14/2024 1200  Last data filed at 5/14/2024 0401  Gross per 24 hour   Intake 1070 ml   Output 3835 ml   Net -2765 ml       PHYSICAL EXAMINATION     Physical Exam  Constitutional:       Appearance: He is obese.   HENT:      Head: Normocephalic and atraumatic.   Eyes:      Pupils: Pupils are equal, round, and reactive to light.   Neck:      Vascular: No JVD.   Cardiovascular:      Rate and Rhythm: Normal rate and regular rhythm.      Heart sounds: Murmur heard.      No friction rub.   Pulmonary:      Effort: Pulmonary effort is normal.      Breath sounds: Normal breath sounds.   Abdominal:      General: Bowel sounds are normal. There is no distension.      Palpations: Abdomen is soft.      Tenderness: There is no abdominal tenderness. There is no rebound.   Musculoskeletal:         General: No tenderness.      Cervical back: Neck supple.      Right lower leg: Edema present.      Left lower leg: Edema present.   Skin:     General: Skin is dry.      Findings: No rash.   Neurological:      Mental Status: He is alert and oriented to person, place, and time.           LAB RESULTS     Results from last 7 days   Lab Units 05/14/24  1014 05/14/24  0517 05/13/24  1318 05/13/24  0809 05/13/24  0013 05/12/24  1923 05/12/24  1347 05/12/24  0508 05/11/24  2213 05/11/24  1544 05/11/24  1454 05/11/24  0451 05/10/24  1159 05/10/24  0521 05/09/24  1558   WBC Thousand/uL  --   --   --   --   --   --  5.78 7.09  --   --   --  7.49  --  7.78 7.46   HEMOGLOBIN g/dL  --  7.8*  --  6.9* 7.0*  --  7.0* 7.4*  7.4* 7.5* 7.4*  --  8.0*   < > 9.3* 11.0*    HEMATOCRIT %  --  24.8*  --  22.8* 22.9*  --  22.0* 23.8*  23.7* 24.3* 23.4*  --  24.3*   < > 29.6* 34.1*   PLATELETS Thousands/uL  --   --   --   --   --   --  192 194  --   --   --  193  --  269 220   POTASSIUM mmol/L 4.3  --  5.0  --   --  4.8  --  5.3  --   --  5.2 4.8  --  5.2 4.8   CHLORIDE mmol/L 99  --  105  --   --  104  --  105  --   --  105 104  --  104 102   CO2 mmol/L 32  --  29  --   --  29  --  30  --   --  23 29  --  29 27   BUN mg/dL 57*  --  58*  --   --  68*  --  76*  --   --  78* 83*  --  60* 39*   CREATININE mg/dL 1.93*  --  2.00*  --   --  2.19*  --  2.53*  --   --  2.41* 2.76*  --  1.57* 1.46*   EGFR ml/min/1.73sq m 33  --  32  --   --  29  --  24  --   --  25 21  --  43 47   CALCIUM mg/dL 8.6  --  8.0*  --   --  8.2*  --  8.2*  --   --  7.7* 8.0*  --  8.0* 8.3*   MAGNESIUM mg/dL  --   --   --   --   --   --   --  2.2  --   --   --  2.2  --  1.9  --     < > = values in this interval not displayed.           RADIOLOGY RESULTS        IMPRESSION:     Worsening vascular congestion and increased patchy density at the right lower lobe. This may represent asymmetric edema given worsening congestive changes versus atelectasis or pneumonia.       ASSESSMENT/PLAN     73 years old male with past medical history of chronic kidney disease stage IIIa, atrial fibrillation on anticoagulation, CHF systolic dysfunction, diabetes mellitus type 2, hypertension, prostate cancer who presents to our facility with shortness of breath and admitted with acute CHF exacerbation.    1.  Acute kidney injury on chronic kidney disease stage IIIa: Baseline serum creatinine of 1.4-1.5.  Presented to our facility at his baseline creatinine however worsened during this hospitalization and peaked up to 2.5 mg/dL and worse.  Etiology of initial rise in creatinine appears to be contrast-induced nephropathy, acute blood loss anemia along with renal venous congestion.  After serum creatinine improved to 2.0 noted yesterday,  initiated on aggressive diuresis with Lasix gtt. and Diuril.  Noted 3.8 L urine output since yesterday and slight improvement in serum creatinine to 1.9 mg/dL today.  Will DC Lasix drip and switch patient to IV Lasix 60 mg 3 times daily.  Administer another dose of Diuril 500 mg IV today.    2.  Anemia: Noted to have acute blood loss anemia and underwent EGD with findings of 3 ulcers in duodenum.  Hemoglobin has improved to 7.8 g/dL today.    3.  Hypertension: Blood pressure is 105/58 recorded this morning.  Metoprolol on hold for systolic blood pressure less than 110        Va Esqueda MD  Nephrology  5/14/2024

## 2024-05-14 NOTE — CONSULTS
Consultation - Cardiology   Galen Carson 73 y.o. male MRN: [id number]  Unit/Bed#: -02 Encounter: [id number]  05/14/24  5:01 PM    Assessment/ Plan:    Acute on chronic HFrEF  Intake/Output: + 1250 /-3835: -2585 mL.  Net -4 L since admission.  Weight: 224 pounds (Standing Scale). Dry weight: Approximately 230-235 pounds per chart review  Appears hypervolemic on exam  Medication Regiment Includes:   Diuretic: Continue diuresis per nephrology  GDMT:   Beta Blocker: Continue Toprol 25 mg daily  Further GDMT limited by soft Bps,  ESDRAS on CKD, and tendency for hyperkalemia  Recommend low-sodium diet, daily weights, strict I's and O's.  Recommend continuing to monitor and replete electrolytes as needed.      2.  NICM s/p MDT DC ICD (1/2024)-EF 20%  Worsening cardiomyopathy likely related to overall clinical picture in the setting of acute on chronic HFrEF, acute anemia with GI bleed, ESDRAS on CKD.  Would recommend repeating limited TTE in the outpatient setting in 2-3 months to reassess EF.  GDMT:   Beta Blocker: Continue Toprol 25 mg daily  Further GDMT limited by soft BPs and ESDRAS on CKD  Device therapy: Recommend continued follow-up with the device clinic in the outpatient setting.    3.  Elevated troponin-likely nonischemic myocardial injury  Patient denies chest pain.  EKG without acute ischemic changes.  Likely secondary to acute on chronic HFrEF, acute anemia with GI bleed, ESDRAS on CKD    4. Paroxysmal atrial fibrillation  Patient in sinus rhythm on telemetry  Continue Toprol 25 mg daily  [id number] 4.  Patient has been cleared to resume anticoagulation per GI.  Anticoagulation is still on hold per primary team given ongoing anemia.  Recommend resuming anticoagulation as tolerated per primary team.     5.  Junctional tachycardia, NSVT, PSVT, PVCs  Continue Toprol 25 mg daily    6.  GI bleed with acute blood loss anemia  S/p multiple units of FFP and PRBCs  Continue management per primary team    7.  ESDRAS on  CKD  Creatinine improving.  Continue management per nephrology    8.  Mild nonobstructive CAD  Aspirin discontinued in the setting of anemia and history of PAF warranting anticoagulation.  Continue statin, Toprol    9.  Mild pulmonary hypertension    10. Hypertension  BP soft.  Continue Toprol 25 mg daily.  Continue to monitor BPs closely.    11.  Hyperlipidemia  Continue statin    12.  DM 2 (A1c 6.9)  Continue management per primary team    History of Present Illness   Physician Requesting Consult: Sushil Becker MD    Reason for Consult / Principal Problem: Acute CHF, elevated troponin     HPI: Galen Carson is a 73 y.o. year old male with past medical history of NICM s/p MDT DC ICD placement (1/2024)-EF 20%, chronic HFrEF, paroxysmal atrial fibrillation, junctional tachycardia, NSVT, PSVT, PVCs, hypertension, hyperlipidemia, DM2 (A1c 6.9), pulmonary hypertension, mild nonobstructive CAD per cardiac cath 10/2023, CKD, history of prostate cancer, former smoker, who presented to the ER on 5/9 with progressively worsening shortness of breath x 3 days, orthopnea, worsening bilateral lower extremity edema, abdominal distention, and 10 pound weight gain over the past few days prior to presentation to the hospital.  He reported compliance with home Lasix.  He was diagnosed with acute on chronic HFrEF and was started on IV Lasix.  Patient additionally complained of melena and was found to have acute anemia.  He received 2 units FFP 5/9 and 1 unit PRBCs 5/9.  EGD 5/11 revealed duodenal ulcers with no active bleeding noted.  He was started on PPI and cleared to restart anticoagulation.  Patient did additionally receive PRBC transfusion 5/14.  Lasix was held starting on 5/11 due to ESDRAS on CKD.  Nephrology was consulted, likely ESDRAS in the setting of contrast-induced nephropathy.  Given volume overload status and improving creatinine, nephrology initiated Lasix drip and Diuril on 5/13.    Patient had an episode of diaphoresis on   without associated chest pain.  Troponins mildly elevated and flat, concern for EKG changes was noted per chart review.  Cardiology was consulted for further evaluation of CHF, elevated troponin, decrease in ejection fraction.  EF during admission 20%.  Previously 26% on limited TTE 2023, 30% on TTE 2023.    Patient does continue to endorse shortness of breath and lower extremity edema, but states he is overall feeling better.  He denies chest pain/pressure.      He follows with Dr. Hernandez as his outpatient cardiologist.    Inpatient consult to Cardiology  Consult performed by: Felix Suarez PA-C  Consult ordered by: aNncy aPscual PA-C          EK2024: Sinus rhythm with sinus arrhythmia, 1 paced ventricular beat, LVH with QRS widening, anterolateral ST-T wave abnormalities  Compared to previous EKG , no PACs or PVCs.  Anterolateral ST-T wave abnormalities have been noted on previous EKGs.  Tele: Sinus rhythm with frequent PVCs and PACs    Review of Systems   Constitutional:  Positive for unexpected weight change. Negative for diaphoresis and fever.   HENT:  Negative for ear pain and sore throat.    Eyes:  Negative for pain and redness.   Respiratory:  Positive for shortness of breath. Negative for cough.    Cardiovascular:  Positive for leg swelling. Negative for chest pain and palpitations.   Gastrointestinal:  Positive for abdominal distention and blood in stool.   Genitourinary:  Negative for dysuria.   Skin:  Negative for color change and rash.   Neurological:  Negative for dizziness, syncope and light-headedness.   Psychiatric/Behavioral:  Negative for agitation and behavioral problems.    All other systems reviewed and are negative.      Historical Information   Past Medical History:   Diagnosis Date    Allergic     Anesthesia     pt wears a life vest ().  should not be scheduled at [facility] until heart condition is stabilized    BPH (benign prostatic hypertrophy)     Cancer  (HCC)     Diabetes mellitus (HCC)     Hyperlipidemia     Hypertension     Irregular heart beat     PAF    Prostate cancer (HCC)      Past Surgical History:   Procedure Laterality Date    A-V CARDIAC PACEMAKER INSERTION      CARDIAC CATHETERIZATION N/A 10/04/2023    Procedure: Cardiac Coronary Angiogram;  Surgeon: Chris Lovell MD;  Location: [facility];  Service: Cardiology    CARDIAC CATHETERIZATION N/A 10/04/2023    Procedure: Cardiac RHC/LHC;  Surgeon: Chris Lovell MD;  Location: [facility];  Service: Cardiology    CARDIAC CATHETERIZATION  10/04/2023    Procedure: Cardiac catheterization;  Surgeon: Chris Lovell MD;  Location: [facility];  Service: Cardiology    CARDIAC ELECTROPHYSIOLOGY PROCEDURE N/A 2024    Procedure: Cardiac icd implant, Dual Chambers ICD;  Surgeon: Leo Whalen MD;  Location:  CARDIAC CATH LAB;  Service: Cardiology    EAR SURGERY      HERNIA REPAIR      SC COLONOSCOPY FLX DX W/COLLJ SPEC WHEN PFRMD N/A 2019    Procedure: COLONOSCOPY;  Surgeon: Winston Nielson III, MD;  Location: [facility];  Service: Gastroenterology     Social History     Substance and Sexual Activity   Alcohol Use Never     Social History     Substance and Sexual Activity   Drug Use Never     Social History     Tobacco Use   Smoking Status Former    Current packs/day: 0.00    Average packs/day: 1 pack/day for 25.0 years (25.0 ttl pk-yrs)    Types: Cigarettes, Pipe, Cigars    Start date: 1980    Quit date: 2005    Years since quittin.2    Passive exposure: Past   Smokeless Tobacco Never       Family History:   Family History   Problem Relation Age of Onset    Stroke Father     No Known Problems Mother     Prostate cancer Brother     Arthritis Brother     No Known Problems Sister     Diabetes Sister     Diabetes Sister     No Known Problems Son     No Known Problems Daughter        Meds/Allergies   all current active meds have been reviewed  Allergies  "  Allergen Reactions    Penicillin V Other (See Comments)     As a child        Objective   Vitals: Blood pressure 112/53, pulse 74, temperature 98.3 °F (36.8 °C), resp. rate 17, height 5' 10\" (1.778 m), weight 102 kg (224 lb 3.3 oz), SpO2 96%., Body mass index is 32.17 kg/m².,   Orthostatic Blood Pressures      Flowsheet Row Most Recent Value   Blood Pressure 112/53 filed at 2024 1516   Patient Position - Orthostatic VS Sitting filed at 05/10/2024 1100            Systolic (24hrs), Av , Min:88 , Max:126     Diastolic (24hrs), Av, Min:53, Max:73        Intake/Output Summary (Last 24 hours) at 2024 1701  Last data filed at 2024 1501  Gross per 24 hour   Intake 1020 ml   Output 5435 ml   Net -4415 ml       Invasive Devices       Peripheral Intravenous Line  Duration             Peripheral IV 24 Right;Ventral (anterior) Wrist 1 day              Drain  Duration             External Urinary Catheter Medium <1 day                        Physical Exam:    GEN: Alert and oriented x 3, in no acute distress.  Ill-appearing.  HEENT: Sclera anicteric, conjunctivae pink, mucous membranes moist. Oropharynx clear.   NECK: Supple, no significant JVD. Trachea midline, no thyromegaly.   HEART: Regular rhythm with skipped beats, normal S1 and S2, no murmurs, clicks, gallops or rubs. PMI nondisplaced, no thrills.   LUNGS: On 3 L O2 via NC.  Clear to auscultation bilaterally; no wheezes, rales, or rhonchi. No increased work of breathing or signs of respiratory distress.   ABDOMEN: Soft, nontender, non-distended.   EXTREMITIES: 1+ pitting edema to bilateral lower extremities.  Skin warm and well perfused, no clubbing, cyanosis.  NEURO: No focal findings. Normal speech. Mood and affect normal.   SKIN: Pallor noted, no suspicious lesions on exposed skin.      Lab Results:     Troponins:   Results from last 7 days   Lab Units 24  1318 24  1142 24  0809 24  1856 " 05/09/24  1558   HS TNI 0HR ng/L  --   --  107*  --   --  44   HS TNI 2HR ng/L  --  105*  --   --  38  --    HS TNI 4HR ng/L 95*  --   --  51*  --   --    HSTNI D4 ng/L -12  --   --  7  --   --        CBC with diff:   Results from last 7 days   Lab Units 05/14/24  0517 05/13/24  0809 05/13/24  0013 05/12/24  1347 05/12/24  0508 05/11/24  2213 05/11/24  1544 05/11/24  0451 05/10/24  1159 05/10/24  0521 05/09/24  1558   WBC Thousand/uL  --   --   --  5.78 7.09  --   --  7.49  --  7.78 7.46   HEMOGLOBIN g/dL 7.8* 6.9* 7.0* 7.0* 7.4*  7.4* 7.5* 7.4* 8.0*   < > 9.3* 11.0*   HEMATOCRIT % 24.8* 22.8* 22.9* 22.0* 23.8*  23.7* 24.3* 23.4* 24.3*   < > 29.6* 34.1*   MCV fL  --   --   --  92 93  --   --  88  --  90 89   PLATELETS Thousands/uL  --   --   --  192 194  --   --  193  --  269 220   RBC Million/uL  --   --   --  2.38* 2.56*  --   --  2.75*  --  3.28* 3.83*   MCH pg  --   --   --  29.4 28.9  --   --  29.1  --  28.4 28.7   MCHC g/dL  --   --   --  31.8 31.1*  --   --  32.9  --  31.4 32.3   RDW %  --   --   --  13.6 13.6  --   --  13.2  --  12.7 12.7   MPV fL  --   --   --  9.5 9.7  --   --  9.3  --  9.8 9.8    < > = values in this interval not displayed.         CMP:   Results from last 7 days   Lab Units 05/14/24  1014 05/13/24  1318 05/12/24  1923 05/12/24  0508 05/11/24  1454 05/11/24  0451 05/10/24  0521 05/09/24  1558   POTASSIUM mmol/L 4.3 5.0 4.8 5.3 5.2 4.8 5.2 4.8   CHLORIDE mmol/L 99 105 104 105 105 104 104 102   CO2 mmol/L 32 29 29 30 23 29 29 27   BUN mg/dL 57* 58* 68* 76* 78* 83* 60* 39*   CREATININE mg/dL 1.93* 2.00* 2.19* 2.53* 2.41* 2.76* 1.57* 1.46*   CALCIUM mg/dL 8.6 8.0* 8.2* 8.2* 7.7* 8.0* 8.0* 8.3*   AST U/L  --   --   --   --   --   --  20 29   ALT U/L  --   --   --   --   --   --  25 32   ALK PHOS U/L  --   --   --   --   --   --  61 79   EGFR ml/min/1.73sq m 33 32 29 24 25 21 43 47

## 2024-05-15 LAB
ANION GAP SERPL CALCULATED.3IONS-SCNC: 9 MMOL/L (ref 4–13)
BUN SERPL-MCNC: 67 MG/DL (ref 5–25)
CALCIUM SERPL-MCNC: 8.9 MG/DL (ref 8.4–10.2)
CHLORIDE SERPL-SCNC: 97 MMOL/L (ref 96–108)
CO2 SERPL-SCNC: 34 MMOL/L (ref 21–32)
CREAT SERPL-MCNC: 2.02 MG/DL (ref 0.6–1.3)
ERYTHROCYTE [DISTWIDTH] IN BLOOD BY AUTOMATED COUNT: 13.8 % (ref 11.6–15.1)
GFR SERPL CREATININE-BSD FRML MDRD: 31 ML/MIN/1.73SQ M
GLUCOSE SERPL-MCNC: 119 MG/DL (ref 65–140)
GLUCOSE SERPL-MCNC: 145 MG/DL (ref 65–140)
GLUCOSE SERPL-MCNC: 149 MG/DL (ref 65–140)
GLUCOSE SERPL-MCNC: 186 MG/DL (ref 65–140)
GLUCOSE SERPL-MCNC: 215 MG/DL (ref 65–140)
HCT VFR BLD AUTO: 25.8 % (ref 36.5–49.3)
HGB BLD-MCNC: 8.3 G/DL (ref 12–17)
INR PPP: 1.13 (ref 0.84–1.19)
MAGNESIUM SERPL-MCNC: 2.1 MG/DL (ref 1.9–2.7)
MCH RBC QN AUTO: 29 PG (ref 26.8–34.3)
MCHC RBC AUTO-ENTMCNC: 32.2 G/DL (ref 31.4–37.4)
MCV RBC AUTO: 90 FL (ref 82–98)
PLATELET # BLD AUTO: 262 THOUSANDS/UL (ref 149–390)
PMV BLD AUTO: 9.3 FL (ref 8.9–12.7)
POTASSIUM SERPL-SCNC: 4.2 MMOL/L (ref 3.5–5.3)
PROTHROMBIN TIME: 15.2 SECONDS (ref 11.6–14.5)
RBC # BLD AUTO: 2.86 MILLION/UL (ref 3.88–5.62)
SODIUM SERPL-SCNC: 140 MMOL/L (ref 135–147)
WBC # BLD AUTO: 8.27 THOUSAND/UL (ref 4.31–10.16)

## 2024-05-15 PROCEDURE — 82948 REAGENT STRIP/BLOOD GLUCOSE: CPT

## 2024-05-15 PROCEDURE — 80048 BASIC METABOLIC PNL TOTAL CA: CPT | Performed by: PHYSICIAN ASSISTANT

## 2024-05-15 PROCEDURE — 99232 SBSQ HOSP IP/OBS MODERATE 35: CPT | Performed by: INTERNAL MEDICINE

## 2024-05-15 PROCEDURE — C9113 INJ PANTOPRAZOLE SODIUM, VIA: HCPCS | Performed by: PHYSICIAN ASSISTANT

## 2024-05-15 PROCEDURE — 99232 SBSQ HOSP IP/OBS MODERATE 35: CPT | Performed by: PHYSICIAN ASSISTANT

## 2024-05-15 PROCEDURE — 83735 ASSAY OF MAGNESIUM: CPT | Performed by: PHYSICIAN ASSISTANT

## 2024-05-15 PROCEDURE — 85027 COMPLETE CBC AUTOMATED: CPT | Performed by: PHYSICIAN ASSISTANT

## 2024-05-15 PROCEDURE — 85610 PROTHROMBIN TIME: CPT | Performed by: INTERNAL MEDICINE

## 2024-05-15 RX ORDER — METOPROLOL SUCCINATE 25 MG/1
25 TABLET, EXTENDED RELEASE ORAL DAILY
Status: DISCONTINUED | OUTPATIENT
Start: 2024-05-16 | End: 2024-05-16 | Stop reason: HOSPADM

## 2024-05-15 RX ADMIN — Medication 1000 MG: at 08:30

## 2024-05-15 RX ADMIN — FUROSEMIDE 60 MG: 10 INJECTION, SOLUTION INTRAMUSCULAR; INTRAVENOUS at 06:21

## 2024-05-15 RX ADMIN — WARFARIN SODIUM 5 MG: 5 TABLET ORAL at 17:20

## 2024-05-15 RX ADMIN — Medication 1000 MG: at 17:20

## 2024-05-15 RX ADMIN — FUROSEMIDE 60 MG: 10 INJECTION, SOLUTION INTRAMUSCULAR; INTRAVENOUS at 12:44

## 2024-05-15 RX ADMIN — PANTOPRAZOLE SODIUM 40 MG: 40 INJECTION, POWDER, FOR SOLUTION INTRAVENOUS at 06:21

## 2024-05-15 RX ADMIN — B-COMPLEX W/ C & FOLIC ACID TAB 1 TABLET: TAB at 08:30

## 2024-05-15 RX ADMIN — INSULIN LISPRO 2 UNITS: 100 INJECTION, SOLUTION INTRAVENOUS; SUBCUTANEOUS at 12:45

## 2024-05-15 RX ADMIN — PANTOPRAZOLE SODIUM 40 MG: 40 INJECTION, POWDER, FOR SOLUTION INTRAVENOUS at 17:20

## 2024-05-15 RX ADMIN — CHLOROTHIAZIDE SODIUM 500 MG: 500 INJECTION, POWDER, LYOPHILIZED, FOR SOLUTION INTRAVENOUS at 12:44

## 2024-05-15 RX ADMIN — PRAVASTATIN SODIUM 80 MG: 80 TABLET ORAL at 17:21

## 2024-05-15 RX ADMIN — FUROSEMIDE 60 MG: 10 INJECTION, SOLUTION INTRAMUSCULAR; INTRAVENOUS at 17:20

## 2024-05-15 RX ADMIN — OXYCODONE HYDROCHLORIDE AND ACETAMINOPHEN 1000 MG: 500 TABLET ORAL at 08:30

## 2024-05-15 NOTE — PLAN OF CARE
Problem: PAIN - ADULT  Goal: Verbalizes/displays adequate comfort level or baseline comfort level  Description: Interventions:  - Encourage patient to monitor pain and request assistance  - Assess pain using appropriate pain scale  - Administer analgesics based on type and severity of pain and evaluate response  - Implement non-pharmacological measures as appropriate and evaluate response  - Consider cultural and social influences on pain and pain management  - Notify physician/advanced practitioner if interventions unsuccessful or patient reports new pain  Outcome: Progressing     Problem: INFECTION - ADULT  Goal: Absence or prevention of progression during hospitalization  Description: INTERVENTIONS:  - Assess and monitor for signs and symptoms of infection  - Monitor lab/diagnostic results  - Monitor all insertion sites, i.e. indwelling lines, tubes, and drains  - Monitor endotracheal if appropriate and nasal secretions for changes in amount and color  - [place] appropriate cooling/warming therapies per order  - Administer medications as ordered  - Instruct and encourage patient and family to use good hand hygiene technique  - Identify and instruct in appropriate isolation precautions for identified infection/condition  Outcome: Progressing  Goal: Absence of fever/infection during neutropenic period  Description: INTERVENTIONS:  - Monitor WBC    Outcome: Progressing     Problem: SAFETY ADULT  Goal: Patient will remain free of falls  Description: INTERVENTIONS:  - Educate patient/family on patient safety including physical limitations  - Instruct patient to call for assistance with activity   - Consult OT/PT to assist with strengthening/mobility   - Keep Call bell within reach  - Keep bed low and locked with side rails adjusted as appropriate  - Keep care items and personal belongings within reach  - Initiate and maintain comfort rounds  - Make Fall Risk Sign visible to staff  - Offer Toileting every 2 Hours,  in advance of need  - Initiate/Maintain bed alarm  - Obtain necessary fall risk management equipment: bed alarm   - Apply yellow socks and bracelet for high fall risk patients  - Consider moving patient to room near nurses station  Outcome: Progressing  Goal: Maintain or return to baseline ADL function  Description: INTERVENTIONS:  -  Assess patient's ability to carry out ADLs; assess patient's baseline for ADL function and identify physical deficits which impact ability to perform ADLs (bathing, care of mouth/teeth, toileting, grooming, dressing, etc.)  - Assess/evaluate cause of self-care deficits   - Assess range of motion  - Assess patient's mobility; develop plan if impaired  - Assess patient's need for assistive devices and provide as appropriate  - Encourage maximum independence but intervene and supervise when necessary  - Involve family in performance of ADLs  - Assess for home care needs following discharge   - Consider OT consult to assist with ADL evaluation and planning for discharge  - Provide patient education as appropriate  Outcome: Progressing  Goal: Maintains/Returns to pre admission functional level  Description: INTERVENTIONS:  - Perform AM-PAC 6 Click Basic Mobility/ Daily Activity assessment daily.  - Set and communicate daily mobility goal to care team and patient/family/caregiver.   - Collaborate with rehabilitation services on mobility goals if consulted  - Perform Range of Motion 3 times a day.  - Reposition patient every 3 hours.  - Dangle patient 3 times a day  - Stand patient 3 times a day  - Ambulate patient 3 times a day  - Out of bed to chair 3 times a day   - Out of bed for meals 3  Problem: CARDIOVASCULAR - ADULT  Goal: Maintains optimal cardiac output and hemodynamic stability  Description: INTERVENTIONS:  - Monitor I/O, vital signs and rhythm  - Monitor for S/S and trends of decreased cardiac output  - Administer and titrate ordered vasoactive medications to optimize hemodynamic  stability  - Assess quality of pulses, skin color and temperature  - Assess for signs of decreased coronary artery perfusion  - Instruct patient to report change in severity of symptoms  Outcome: Progressing  Goal: Absence of cardiac dysrhythmias or at baseline rhythm  Description: INTERVENTIONS:  - Continuous cardiac monitoring, vital signs, obtain 12 lead EKG if ordered  - Administer antiarrhythmic and heart rate control medications as ordered  - Monitor electrolytes and administer replacement therapy as ordered  Outcome: Progressing     Problem: RESPIRATORY - ADULT  Goal: Achieves optimal ventilation and oxygenation  Description: INTERVENTIONS:  - Assess for changes in respiratory status  - Assess for changes in mentation and behavior  - Position to facilitate oxygenation and minimize respiratory effort  - Oxygen administered by appropriate delivery if ordered  - Initiate smoking cessation education as indicated  - Encourage broncho-pulmonary hygiene including cough, deep breathe, Incentive Spirometry  - Assess the need for suctioning and aspirate as needed  - Assess and instruct to report SOB or any respiratory difficulty  - Respiratory Therapy support as indicated  Outcome: Progressing    times a day  - Out of bed for toileting  - Record patient progress and toleration of activity level   Outcome: Progressing

## 2024-05-15 NOTE — PROGRESS NOTES
Cardiology Progress Note - Galen Carson 73 y.o. male MRN: [id number]    Unit/Bed#: -02 Encounter: [id number]      Assessment/Plan:   Acute on chronic HFrEF  Intake/Output: + 780 /-3250: -2470mL.  Net -6.5 L since admission.  Appears hypervolemic on exam  Medication Regiment Includes:   Diuretic: Continue diuresis per nephrology  GDMT:   Beta Blocker: Continue Toprol 25 mg daily  Further GDMT limited by soft BPs, ESDRAS and CKD, and tendency for hyperkalemia  Recommend low-sodium diet, daily weights, strict I's and O's.  Recommend continuing to monitor and replete electrolytes as needed.    2.  NICM s/p MDT DC ICD (1/2024)-EF 20%  Further reduction in EF could be related to ongoing critical clinical issues during this admission.  Would recommend repeating limited TTE in the outpatient setting in 2-3 months to reassess.  GDMT:   Beta Blocker: Continue Toprol 25 mg daily  Further GDMT limited by soft BPs, ESDRAS and CKD, and tendency for hyperkalemia  Device therapy: Recommend continue to follow with the device clinic in the outpatient setting.    3.  Elevated troponin-likely nonischemic myocardial injury  Likely secondary to acute on chronic HFrEF, acute anemia with GI bleed, ESDRAS on CKD    4.  Paroxysmal atrial fibrillation  Continue Toprol 25 mg daily, hold parameters adjusted  Continue anticoagulation with warfarin.  [id number] 4.  Continue to monitor hemoglobin closely.    5.  Junctional tachycardia, NSVT, PSVT, PVCs  Continue Toprol 25 mg daily, hold parameters adjusted    6.  GI bleed with acute blood loss anemia  S/p multiple units of FFP and PRBCs  Continue management per primary team    7.  ESDRAS on CKD  Continue management per nephrology    8.  Mild nonobstructive CAD  Continue statin, Toprol.  Patient not on aspirin in the setting of increased bleeding risk on warfarin with recent GI bleed and anemia    9.  Hypertension  BP soft.  Continue Toprol 25 mg daily.  Continue to monitor BPs closely.    10.   "Hyperlipidemia  Continue statin    11.  DM 2 (A1c 6.9)  Continue management per primary team      Cardiology will sign-off. Please reach out with any further questions or concerns.      Subjective:   Patient seen and examined.  He does continue to endorse shortness of breath and lower extremity edema, but states he is continuing to feel better overall.    Objective:     Vitals: Blood pressure 96/54, pulse 74, temperature 97.8 °F (36.6 °C), resp. rate 18, height 5' 10\" (1.778 m), weight 102 kg (225 lb 1.4 oz), SpO2 96%., Body mass index is 32.3 kg/m².,   Orthostatic Blood Pressures      Flowsheet Row Most Recent Value   Blood Pressure 96/54 filed at 05/15/2024 1520   Patient Position - Orthostatic VS Sitting filed at 05/14/2024 1900              Intake/Output Summary (Last 24 hours) at 5/15/2024 1613  Last data filed at 5/15/2024 0835  Gross per 24 hour   Intake 960 ml   Output 2450 ml   Net -1490 ml         Physical Exam:   GEN: Alert and oriented x 3, in no acute distress.  Well nourished.   HEENT: Sclera anicteric, conjunctivae pink, mucous membranes moist. Oropharynx clear.   NECK: Supple, no significant JVD. Trachea midline, no thyromegaly.   HEART: Regular rhythm, normal S1 and S2, no murmurs, clicks, gallops or rubs. PMI nondisplaced, no thrills.   LUNGS: Decreased breath sound bilaterally; no wheezes, rales, or rhonchi. No increased work of breathing or signs of respiratory distress.   ABDOMEN: Soft, nontender, non-distended.   EXTREMITIES: 2+ pitting edema to bilateral lower extremities.  Skin warm and well perfused, no clubbing, cyanosis.   NEURO: No focal findings. Normal speech. Mood and affect normal.   SKIN: Pallor noted, no suspicious lesions on exposed skin.      Medications:      Current Facility-Administered Medications:     ascorbic acid (VITAMIN C) tablet 1,000 mg, 1,000 mg, Oral, Daily, Breezy Cheatham DO, 1,000 mg at 05/15/24 0830    Evening Primrose Oil CAPS 1,000 mg, 1,000 mg, Oral, TID " With Meals, DANITA Herrera, 1,000 mg at 05/15/24 0830    furosemide (LASIX) injection 60 mg, 60 mg, Intravenous, TID (diuretic), Va Esqueda MD, 60 mg at 05/15/24 1244    insulin lispro (HumALOG/ADMELOG) 100 units/mL subcutaneous injection 1-6 Units, 1-6 Units, Subcutaneous, TID AC, DANITA Herrera, 2 Units at 05/15/24 1245    melatonin tablet 3 mg, 3 mg, Oral, HS, Rosalba Rosa PA-C, 3 mg at 05/11/24 2140    metoprolol succinate (TOPROL-XL) 24 hr tablet 25 mg, 25 mg, Oral, Daily, Breezy hCeatham DO, 25 mg at 05/12/24 0809    multivitamin stress formula tablet 1 tablet, 1 tablet, Oral, Daily, Breezy Cheatham DO, 1 tablet at 05/15/24 0830    pantoprazole (PROTONIX) injection 40 mg, 40 mg, Intravenous, Q12H, Rosalba Rosa PA-C, 40 mg at 05/15/24 0621    pravastatin (PRAVACHOL) tablet 80 mg, 80 mg, Oral, Daily With Dinner, Breezy Cheatham DO, 80 mg at 05/14/24 1712    warfarin (COUMADIN) tablet 5 mg, 5 mg, Oral, Daily (warfarin), Nancy Pascual PA-C, 5 mg at 05/14/24 1712     Labs & Results:    Results from last 7 days   Lab Units 05/13/24  1318 05/13/24  1142 05/13/24  0809 05/09/24  2027 05/09/24  1856 05/09/24  1558   HS TNI 0HR ng/L  --   --  107*  --   --  44   HS TNI 2HR ng/L  --  105*  --   --  38  --    HS TNI 4HR ng/L 95*  --   --  51*  --   --    HSTNI D4 ng/L -12  --   --  7  --   --      Results from last 7 days   Lab Units 05/15/24  0639 05/14/24  0517 05/13/24  0809 05/13/24  0013 05/12/24  1347 05/12/24  0508   WBC Thousand/uL 8.27  --   --   --  5.78 7.09   HEMOGLOBIN g/dL 8.3* 7.8* 6.9*   < > 7.0* 7.4*  7.4*   HEMATOCRIT % 25.8* 24.8* 22.8*   < > 22.0* 23.8*  23.7*   PLATELETS Thousands/uL 262  --   --   --  192 194    < > = values in this interval not displayed.         Results from last 7 days   Lab Units 05/15/24  0639 05/14/24  1014 05/13/24  1318 05/11/24  0451 05/10/24  0521 05/09/24  1558   POTASSIUM mmol/L 4.2 4.3 5.0   < > 5.2 4.8   CHLORIDE mmol/L 97 99 105    < > 104 102   CO2 mmol/L 34* 32 29   < > 29 27   BUN mg/dL 67* 57* 58*   < > 60* 39*   CREATININE mg/dL 2.02* 1.93* 2.00*   < > 1.57* 1.46*   CALCIUM mg/dL 8.9 8.6 8.0*   < > 8.0* 8.3*   ALK PHOS U/L  --   --   --   --  61 79   ALT U/L  --   --   --   --  25 32   AST U/L  --   --   --   --  20 29    < > = values in this interval not displayed.     Results from last 7 days   Lab Units 05/15/24  0639 05/14/24  0517 05/13/24  0809 05/13/24  0236 05/12/24  1923   INR  1.13 1.24* 1.34*  --   --    PTT seconds  --   --  81* 110* 87*     Results from last 7 days   Lab Units 05/15/24  0639 05/12/24  0508 05/11/24  0451   MAGNESIUM mg/dL 2.1 2.2 2.2

## 2024-05-15 NOTE — PLAN OF CARE
Problem: PAIN - ADULT  Goal: Verbalizes/displays adequate comfort level or baseline comfort level  Description: Interventions:  - Encourage patient to monitor pain and request assistance  - Assess pain using appropriate pain scale  - Administer analgesics based on type and severity of pain and evaluate response  - Implement non-pharmacological measures as appropriate and evaluate response  - Consider cultural and social influences on pain and pain management  - Notify physician/advanced practitioner if interventions unsuccessful or patient reports new pain  Outcome: Progressing     Problem: INFECTION - ADULT  Goal: Absence or prevention of progression during hospitalization  Description: INTERVENTIONS:  - Assess and monitor for signs and symptoms of infection  - Monitor lab/diagnostic results  - Monitor all insertion sites, i.e. indwelling lines, tubes, and drains  - Monitor endotracheal if appropriate and nasal secretions for changes in amount and color  - [place] appropriate cooling/warming therapies per order  - Administer medications as ordered  - Instruct and encourage patient and family to use good hand hygiene technique  - Identify and instruct in appropriate isolation precautions for identified infection/condition  Outcome: Progressing  Goal: Absence of fever/infection during neutropenic period  Description: INTERVENTIONS:  - Monitor WBC    Outcome: Progressing     Problem: SAFETY ADULT  Goal: Patient will remain free of falls  Description: INTERVENTIONS:  - Educate patient/family on patient safety including physical limitations  - Instruct patient to call for assistance with activity   - Consult OT/PT to assist with strengthening/mobility   - Keep Call bell within reach  - Keep bed low and locked with side rails adjusted as appropriate  - Keep care items and personal belongings within reach  - Initiate and maintain comfort rounds  - Make Fall Risk Sign visible to staff  - Offer Toileting every 2 Hours,  in advance of need  - Initiate/Maintain alarm  - Obtain necessary fall risk management equipment  - Apply yellow socks and bracelet for high fall risk patients  - Consider moving patient to room near nurses station  Outcome: Progressing  Goal: Maintain or return to baseline ADL function  Description: INTERVENTIONS:  -  Assess patient's ability to carry out ADLs; assess patient's baseline for ADL function and identify physical deficits which impact ability to perform ADLs (bathing, care of mouth/teeth, toileting, grooming, dressing, etc.)  - Assess/evaluate cause of self-care deficits   - Assess range of motion  - Assess patient's mobility; develop plan if impaired  - Assess patient's need for assistive devices and provide as appropriate  - Encourage maximum independence but intervene and supervise when necessary  - Involve family in performance of ADLs  - Assess for home care needs following discharge   - Consider OT consult to assist with ADL evaluation and planning for discharge  - Provide patient education as appropriate  Outcome: Progressing  Goal: Maintains/Returns to pre admission functional level  Description: INTERVENTIONS:  - Perform AM-PAC 6 Click Basic Mobility/ Daily Activity assessment daily.  - Set and communicate daily mobility goal to care team and patient/family/caregiver.   - Collaborate with rehabilitation services on mobility goals if consulted  - Perform Range of Motion 3 times a day.  - Reposition patient every 2 hours.  - Dangle patient 3 times a day  - Stand patient 3 times a day  - Ambulate patient 3 times a day  - Out of bed to chair 3 times a day   - Out of bed for meals 3 times a day  - Out of bed for toileting  - Record patient progress and toleration of activity level   Outcome: Progressing     Problem: CARDIOVASCULAR - ADULT  Goal: Maintains optimal cardiac output and hemodynamic stability  Description: INTERVENTIONS:  - Monitor I/O, vital signs and rhythm  - Monitor for S/S and trends  of decreased cardiac output  - Administer and titrate ordered vasoactive medications to optimize hemodynamic stability  - Assess quality of pulses, skin color and temperature  - Assess for signs of decreased coronary artery perfusion  - Instruct patient to report change in severity of symptoms  Outcome: Progressing  Goal: Absence of cardiac dysrhythmias or at baseline rhythm  Description: INTERVENTIONS:  - Continuous cardiac monitoring, vital signs, obtain 12 lead EKG if ordered  - Administer antiarrhythmic and heart rate control medications as ordered  - Monitor electrolytes and administer replacement therapy as ordered  Outcome: Progressing     Problem: RESPIRATORY - ADULT  Goal: Achieves optimal ventilation and oxygenation  Description: INTERVENTIONS:  - Assess for changes in respiratory status  - Assess for changes in mentation and behavior  - Position to facilitate oxygenation and minimize respiratory effort  - Oxygen administered by appropriate delivery if ordered  - Initiate smoking cessation education as indicated  - Encourage broncho-pulmonary hygiene including cough, deep breathe, Incentive Spirometry  - Assess the need for suctioning and aspirate as needed  - Assess and instruct to report SOB or any respiratory difficulty  - Respiratory Therapy support as indicated  Outcome: Progressing

## 2024-05-15 NOTE — PROGRESS NOTES
[facility]  Progress Note  Name: Galen Carson I MRN: [id number]  Unit/Bed#: -02I Date of Admission: 5/9/2024   Date of Service: 5/9/2024  I Hospital Day: 6    * Acute on chronic systolic congestive heart failure (HCC)  Assessment & Plan  Presents with worsening shortness of breath dyspnea on exertion lower extremity swelling and cough  NYHA Class III., Stage C  Daily standing weights, strict I/Os, sodium restriction 2g  Consult nutrition for dietary education  Echocardiogram showing LVEF worsening from 30% to 20%  s/p Dual chamber ICD Implantable Cardioverter Defibrillator Jan '24  Nephrology consulted for volume management  IV lasix drip started 5/13  Cardiology consulted due to decline in cardiac function     Acute renal failure superimposed on stage 3a chronic kidney disease (HCC)  Assessment & Plan  Lab Results   Component Value Date    EGFR 31 05/15/2024    EGFR 33 05/14/2024    EGFR 32 05/13/2024    CREATININE 2.02 (H) 05/15/2024    CREATININE 1.93 (H) 05/14/2024    CREATININE 2.00 (H) 05/13/2024     Developed ESDRAS on 5/11  Multifactorial in the setting of acute blood loss from GI bleed and hypoperfusion from hypotension as well as contrast-induced from CT on 5/10  Daily BMP, avoid hypotension and nephrotoxins  Nephrology consulted due to ESDRAS and need for aggressive diuresis     Type 2 diabetes mellitus with hyperglycemia, without long-term current use of insulin (HCC)  Assessment & Plan  Blood Sugar Average: Last 72 hrs:(P) [id number]  Hold metformin inpatient, consider d/c on discharge pending renal recovery   Diabetic diet  Insulin regimen  Glucose checks and Insulin correction ACHS  Goal -180 while admitted, adjusting insulin regimen as appropriate  Monitor for hypoglycemia and treat per protocol       Paroxysmal atrial fibrillation (HCC)  Assessment & Plan  History of, on toprol XL and AC with warfarin  Continue BB, Pulse: 74   Cleared by GI to restart  warfarin and heparin drip  Heparin discontinue due to ongoing anemia; restarted warfarin 5/14    Gastrointestinal hemorrhage associated with duodenitis  Assessment & Plan  Rectal bleeding occurred on 5/10 at 5 AM, multiple bloody BMs  Warfarin stopped 5/10, INR greater than 2  Received: FFP 2 units 5/9, 1 unit PRBC 5/9  GI consulted  EGD 5/11 - revealed 3 total ulcers including one large deep cratered ulcer with flat pigmented spot. This did not require endoscopic treatment   Cleared for anticoagulation     Acute blood loss anemia  Assessment & Plan  Previous baseline appears to be normal range 12-13  Secondary to GI bleed, aguilar 6.3   Received FFP and PRBC, EGD as above  Additional PRBC transfusion 5/13          VTE Pharmacologic Prophylaxis: VTE Score: 6 High Risk (Score >/= 5) - Pharmacological DVT Prophylaxis Ordered: warfarin (Coumadin). Sequential Compression Devices Ordered.    Mobility:   Basic Mobility Inpatient Raw Score: 23  JH-HLM Goal: 7: Walk 25 feet or more  JH-HLM Achieved: 7: Walk 25 feet or more  JH-HLM Goal achieved. Continue to encourage appropriate mobility.    Patient Centered Rounds: I performed bedside rounds with nursing staff today.   Discussions with Specialists or Other Care Team Provider: CM, cards, nephro     Education and Discussions with Family / Patient: Updated  (by cell phone) at bedside.    Total Time Spent on Date of Encounter in care of patient: 45 mins. This time was spent on one or more of the following: performing physical exam; counseling and coordination of care; obtaining or reviewing history; documenting in the medical record; reviewing/ordering tests, medications or procedures; communicating with other healthcare professionals and discussing with patient's family/caregivers.    Current Length of Stay: 6 day(s)  Current Patient Status: Inpatient   Certification Statement: The patient will continue to require additional inpatient hospital stay due to IV  "diuresis   Discharge Plan: Anticipate discharge in 24-48 hrs to home. Declines services     Code Status: Level 1 - Full Code    Subjective:   Doing well this am, he did have some \"chest pressure\" but that is now gone. Feels tired again today, but is ambulating and wants to shower later today when his son is present. Endorses good urine output. Did have a BM this am, first since endoscopy - which was dark maroon and solid.     Objective:     Vitals:   Temp (24hrs), Av °F (36.7 °C), Min:97.7 °F (36.5 °C), Max:98.3 °F (36.8 °C)    Temp:  [97.7 °F (36.5 °C)-98.3 °F (36.8 °C)] 97.7 °F (36.5 °C)  Resp:  [16-18] 17  BP: ()/(52-73) 110/53  Body mass index is 32.3 kg/m².     Input and Output Summary (last 24 hours):     Intake/Output Summary (Last 24 hours) at 5/15/2024 0753  Last data filed at 2024 2100  Gross per 24 hour   Intake 780 ml   Output 3250 ml   Net -2470 ml       Physical Exam:   Physical Exam  Vitals and nursing note reviewed.   Constitutional:       General: He is awake. He is not in acute distress.     Appearance: Normal appearance. He is well-developed. He is obese. He is not ill-appearing or toxic-appearing.   HENT:      Head: Normocephalic and atraumatic.   Cardiovascular:      Rate and Rhythm: Normal rate and regular rhythm.      Heart sounds: Normal heart sounds. No murmur heard.  Pulmonary:      Effort: Pulmonary effort is normal. No respiratory distress.      Breath sounds: Rales (bilat bases) present.      Comments: Can hold conversation without dyspnea   Abdominal:      General: Bowel sounds are normal. There is no distension.      Palpations: Abdomen is soft.   Musculoskeletal:      Right lower leg: Edema present.      Left lower leg: Edema present.   Skin:     General: Skin is warm and dry.      Coloration: Skin is not pale.   Neurological:      Mental Status: He is alert and oriented to person, place, and time.   Psychiatric:         Mood and Affect: Mood normal.         Behavior: " Behavior normal. Behavior is cooperative.           Additional Data:     Labs:  Results from last 7 days   Lab Units 05/15/24  0639 05/10/24  0521 05/09/24  1558   WBC Thousand/uL 8.27   < > 7.46   HEMOGLOBIN g/dL 8.3*   < > 11.0*   HEMATOCRIT % 25.8*   < > 34.1*   PLATELETS Thousands/uL 262   < > 220   LYMPHO PCT %  --   --  9*   MONO PCT %  --   --  8   EOS PCT %  --   --  2    < > = values in this interval not displayed.     Results from last 7 days   Lab Units 05/15/24  0639 05/11/24  0451 05/10/24  0521   SODIUM mmol/L 140   < > 141   POTASSIUM mmol/L 4.2   < > 5.2   CHLORIDE mmol/L 97   < > 104   CO2 mmol/L 34*   < > 29   BUN mg/dL 67*   < > 60*   CREATININE mg/dL 2.02*   < > 1.57*   ANION GAP mmol/L 9   < > 8   CALCIUM mg/dL 8.9   < > 8.0*   ALBUMIN g/dL  --   --  3.6   TOTAL BILIRUBIN mg/dL  --   --  0.35   ALK PHOS U/L  --   --  61   ALT U/L  --   --  25   AST U/L  --   --  20   GLUCOSE RANDOM mg/dL 149*   < > 160*    < > = values in this interval not displayed.     Results from last 7 days   Lab Units 05/15/24  0639   INR  1.13     Results from last 7 days   Lab Units 05/15/24  0709 05/14/24  2105 05/14/24  1634 05/14/24  1121 05/14/24  0625 05/13/24  2036 05/13/24  1648 05/13/24  1115 05/13/24  0819 05/13/24  0623 05/12/24  2055 05/12/24  1624   POC GLUCOSE mg/dl 145* 166* 171* 237* 151* 208* 118 221* 151* 155* 205* 136               Lines/Drains:  Invasive Devices       Peripheral Intravenous Line  Duration             Peripheral IV 05/15/24 Distal;Dorsal (posterior);Right Forearm <1 day                      Telemetry:  Telemetry Orders (From admission, onward)               24 Hour Telemetry Monitoring  Continuous x 24 Hours (Telem)        Expiring   Question:  Reason for 24 Hour Telemetry  Answer:  PCI/EP study (including pacer and ICD implementation), Cardiac surgery, MI, abnormal cardiac cath, and chest pain- rule out MI                     Telemetry Reviewed: Normal Sinus Rhythm and PVCs  Indication  for Continued Telemetry Use: No indication for continued use. Will discontinue.              Imaging: No pertinent imaging reviewed.    Recent Cultures (last 7 days):         Last 24 Hours Medication List:   Current Facility-Administered Medications   Medication Dose Route Frequency Provider Last Rate    vitamin C  1,000 mg Oral Daily Breezy Eckertesvalenciaan, DO      Evening Primrose Oil  1,000 mg Oral TID With Meals DANITA Herrera      furosemide  60 mg Intravenous TID (diuretic) Va Esqueda MD      insulin lispro  1-6 Units Subcutaneous TID AC DANITA Herrera      melatonin  3 mg Oral HS Rosalba Rosa PA-C      metoprolol succinate  25 mg Oral Daily Breezy Cheatham, DO      multivitamin stress formula  1 tablet Oral Daily Breezy Cheatham, DO      pantoprazole  40 mg Intravenous Q12H Rosalba Rosa PA-C      pravastatin  80 mg Oral Daily With Dinner Breezy Cheatham, DO      warfarin  5 mg Oral Daily (warfarin) Nancy Pascual PA-C          Today, Patient Was Seen By: Nancy Pascual PA-C    **Please Note: This note may have been constructed using a voice recognition system.**

## 2024-05-15 NOTE — PROGRESS NOTES
NEPHROLOGY PROGRESS NOTE    Patient: Galen Carson               Sex: male          DOA: 5/9/2024  3:32 PM   YOB: 1950        Age:  73 y.o.        LOS:  LOS: 6 days   5/15/2024    REASON FOR THE CONSULTATION:      Volume overload/ESDRAS on CKD stage IIIb    SUBJECTIVE     Patient seen and examined next at the bedside.  Sitting up in chair and has no major complaints.  CURRENT MEDICATIONS       Current Facility-Administered Medications:     ascorbic acid (VITAMIN C) tablet 1,000 mg, 1,000 mg, Oral, Daily, Breezy Cheatham DO, 1,000 mg at 05/15/24 0830    Evening Primrose Oil CAPS 1,000 mg, 1,000 mg, Oral, TID With Meals, DANITA Herrera, 1,000 mg at 05/15/24 0830    furosemide (LASIX) injection 60 mg, 60 mg, Intravenous, TID (diuretic), Va Esqueda MD, 60 mg at 05/15/24 0621    insulin lispro (HumALOG/ADMELOG) 100 units/mL subcutaneous injection 1-6 Units, 1-6 Units, Subcutaneous, TID AC, DANITA Herrera, 1 Units at 05/14/24 1710    melatonin tablet 3 mg, 3 mg, Oral, HS, Rosalba Rosa PA-C, 3 mg at 05/11/24 2140    metoprolol succinate (TOPROL-XL) 24 hr tablet 25 mg, 25 mg, Oral, Daily, Breezy Cheatham DO, 25 mg at 05/12/24 0809    multivitamin stress formula tablet 1 tablet, 1 tablet, Oral, Daily, Breezy Cheatham DO, 1 tablet at 05/15/24 0830    pantoprazole (PROTONIX) injection 40 mg, 40 mg, Intravenous, Q12H, Rosalba Rosa PA-C, 40 mg at 05/15/24 0621    pravastatin (PRAVACHOL) tablet 80 mg, 80 mg, Oral, Daily With Dinner, Breezy Cheatham DO, 80 mg at 05/14/24 1712    warfarin (COUMADIN) tablet 5 mg, 5 mg, Oral, Daily (warfarin), Nancy Pascual PA-C, 5 mg at 05/14/24 1712    REVIEW OF SYSTEMS     Review of Systems   Constitutional: Negative.    HENT: Negative.     Eyes: Negative.    Respiratory:  Positive for shortness of breath.    Cardiovascular:  Positive for leg swelling.   Gastrointestinal: Negative.    Endocrine: Negative.    Genitourinary: Negative.     Musculoskeletal: Negative.    Skin: Negative.    Allergic/Immunologic: Negative.    Neurological: Negative.    Hematological: Negative.    All other systems reviewed and are negative.      OBJECTIVE     Current Weight: Weight - Scale: 102 kg (225 lb 1.4 oz)  Vitals:    05/15/24 0829   BP: 104/56   Pulse:    Resp:    Temp:    SpO2:      Body mass index is 32.3 kg/m².    Intake/Output Summary (Last 24 hours) at 5/15/2024 1104  Last data filed at 5/15/2024 0835  Gross per 24 hour   Intake 1260 ml   Output 3450 ml   Net -2190 ml       PHYSICAL EXAMINATION     Physical Exam  Constitutional:       Appearance: He is obese.   HENT:      Head: Normocephalic and atraumatic.   Eyes:      Pupils: Pupils are equal, round, and reactive to light.   Neck:      Vascular: No JVD.   Cardiovascular:      Rate and Rhythm: Normal rate and regular rhythm.      Heart sounds: Murmur heard.      No friction rub.   Pulmonary:      Effort: Pulmonary effort is normal.      Breath sounds: Normal breath sounds.   Abdominal:      General: Bowel sounds are normal. There is no distension.      Palpations: Abdomen is soft.      Tenderness: There is no abdominal tenderness. There is no rebound.   Musculoskeletal:         General: No tenderness.      Cervical back: Neck supple.      Right lower leg: Edema present.      Left lower leg: Edema present.   Skin:     General: Skin is dry.      Findings: No rash.   Neurological:      Mental Status: He is alert and oriented to person, place, and time.   Psychiatric:         Mood and Affect: Mood and affect normal.           LAB RESULTS     Results from last 7 days   Lab Units 05/15/24  0639 05/14/24  1014 05/14/24  0517 05/13/24  1318 05/13/24  0809 05/13/24  0013 05/12/24  1923 05/12/24  1347 05/12/24  0508 05/11/24  2213 05/11/24  1544 05/11/24  1454 05/11/24  0451 05/10/24  1159 05/10/24  0521 05/09/24  1558   WBC Thousand/uL 8.27  --   --   --   --   --   --  5.78 7.09  --   --   --  7.49  --  7.78 7.46    HEMOGLOBIN g/dL 8.3*  --  7.8*  --  6.9* 7.0*  --  7.0* 7.4*  7.4* 7.5*   < >  --  8.0*   < > 9.3* 11.0*   HEMATOCRIT % 25.8*  --  24.8*  --  22.8* 22.9*  --  22.0* 23.8*  23.7* 24.3*   < >  --  24.3*   < > 29.6* 34.1*   PLATELETS Thousands/uL 262  --   --   --   --   --   --  192 194  --   --   --  193  --  269 220   POTASSIUM mmol/L 4.2 4.3  --  5.0  --   --  4.8  --  5.3  --   --  5.2 4.8  --  5.2 4.8   CHLORIDE mmol/L 97 99  --  105  --   --  104  --  105  --   --  105 104  --  104 102   CO2 mmol/L 34* 32  --  29  --   --  29  --  30  --   --  23 29  --  29 27   BUN mg/dL 67* 57*  --  58*  --   --  68*  --  76*  --   --  78* 83*  --  60* 39*   CREATININE mg/dL 2.02* 1.93*  --  2.00*  --   --  2.19*  --  2.53*  --   --  2.41* 2.76*  --  1.57* 1.46*   EGFR ml/min/1.73sq m 31 33  --  32  --   --  29  --  24  --   --  25 21  --  43 47   CALCIUM mg/dL 8.9 8.6  --  8.0*  --   --  8.2*  --  8.2*  --   --  7.7* 8.0*  --  8.0* 8.3*   MAGNESIUM mg/dL 2.1  --   --   --   --   --   --   --  2.2  --   --   --  2.2  --  1.9  --     < > = values in this interval not displayed.           RADIOLOGY RESULTS        IMPRESSION:     Worsening vascular congestion and increased patchy density at the right lower lobe. This may represent asymmetric edema given worsening congestive changes versus atelectasis or pneumonia.       ASSESSMENT/PLAN     73 years old male with past medical history of chronic kidney disease stage IIIa, atrial fibrillation on anticoagulation, CHF systolic dysfunction, diabetes mellitus type 2, hypertension, prostate cancer who presents to our facility with shortness of breath and admitted with acute CHF exacerbation.    1.  Acute kidney injury on chronic kidney disease stage IIIa: Baseline serum creatinine of 1.4-1.5.  Presented to our facility at his baseline creatinine however worsened during this hospitalization and peaked up to 2.5 mg/dL and worse.  Etiology of initial rise in creatinine appears to be  contrast-induced nephropathy, acute blood loss anemia along with renal venous congestion.  Initiated aggressive diuresis after having seen improvement in creatinine.  Current serum creatinine is 2.0 mg/dL and stable.  Noted 3.2 L urine output yesterday with total negative fluid balance up to 6.7 L since admission.    2.  Anemia: Noted to have acute blood loss anemia and underwent EGD with findings of 3 ulcers in duodenum.  Hemoglobin has improved to 8.3 g/dL   Remains on proton pump inhibitor IV every 12    3.  Hypertension: Blood pressure is 104/56.    4.  Volume overload: Present with dyspnea on exertion and noted to be in significant hypervolemic state.  Currently undergoing aggressive diuresis with Lasix 60 mg IV 3 times daily along with Diuril 500 mg IV daily.  Will achieve -6.7 L and fluid balance over the past 48 hours        Va Esqueda MD  Nephrology  5/15/2024

## 2024-05-16 ENCOUNTER — TELEPHONE (OUTPATIENT)
Dept: CARDIOLOGY CLINIC | Facility: CLINIC | Age: 74
End: 2024-05-16

## 2024-05-16 VITALS
SYSTOLIC BLOOD PRESSURE: 105 MMHG | HEART RATE: 74 BPM | HEIGHT: 70 IN | WEIGHT: 225.75 LBS | TEMPERATURE: 98.3 F | BODY MASS INDEX: 32.32 KG/M2 | RESPIRATION RATE: 16 BRPM | OXYGEN SATURATION: 93 % | DIASTOLIC BLOOD PRESSURE: 57 MMHG

## 2024-05-16 LAB
ANION GAP SERPL CALCULATED.3IONS-SCNC: 9 MMOL/L (ref 4–13)
BUN SERPL-MCNC: 75 MG/DL (ref 5–25)
CALCIUM SERPL-MCNC: 8.9 MG/DL (ref 8.4–10.2)
CHLORIDE SERPL-SCNC: 96 MMOL/L (ref 96–108)
CO2 SERPL-SCNC: 35 MMOL/L (ref 21–32)
CREAT SERPL-MCNC: 1.98 MG/DL (ref 0.6–1.3)
ERYTHROCYTE [DISTWIDTH] IN BLOOD BY AUTOMATED COUNT: 13.4 % (ref 11.6–15.1)
GFR SERPL CREATININE-BSD FRML MDRD: 32 ML/MIN/1.73SQ M
GLUCOSE SERPL-MCNC: 153 MG/DL (ref 65–140)
GLUCOSE SERPL-MCNC: 153 MG/DL (ref 65–140)
GLUCOSE SERPL-MCNC: 275 MG/DL (ref 65–140)
HCT VFR BLD AUTO: 25.2 % (ref 36.5–49.3)
HGB BLD-MCNC: 8.2 G/DL (ref 12–17)
INR PPP: 1.23 (ref 0.84–1.19)
MCH RBC QN AUTO: 29 PG (ref 26.8–34.3)
MCHC RBC AUTO-ENTMCNC: 32.5 G/DL (ref 31.4–37.4)
MCV RBC AUTO: 89 FL (ref 82–98)
PLATELET # BLD AUTO: 276 THOUSANDS/UL (ref 149–390)
PMV BLD AUTO: 9.8 FL (ref 8.9–12.7)
POTASSIUM SERPL-SCNC: 3.8 MMOL/L (ref 3.5–5.3)
PROTHROMBIN TIME: 16.2 SECONDS (ref 11.6–14.5)
RBC # BLD AUTO: 2.83 MILLION/UL (ref 3.88–5.62)
SODIUM SERPL-SCNC: 140 MMOL/L (ref 135–147)
WBC # BLD AUTO: 7.12 THOUSAND/UL (ref 4.31–10.16)

## 2024-05-16 PROCEDURE — 85610 PROTHROMBIN TIME: CPT | Performed by: PHYSICIAN ASSISTANT

## 2024-05-16 PROCEDURE — 85027 COMPLETE CBC AUTOMATED: CPT | Performed by: PHYSICIAN ASSISTANT

## 2024-05-16 PROCEDURE — 80048 BASIC METABOLIC PNL TOTAL CA: CPT | Performed by: PHYSICIAN ASSISTANT

## 2024-05-16 PROCEDURE — C9113 INJ PANTOPRAZOLE SODIUM, VIA: HCPCS | Performed by: PHYSICIAN ASSISTANT

## 2024-05-16 PROCEDURE — 88342 IMHCHEM/IMCYTCHM 1ST ANTB: CPT | Performed by: PATHOLOGY

## 2024-05-16 PROCEDURE — 99239 HOSP IP/OBS DSCHRG MGMT >30: CPT | Performed by: PHYSICIAN ASSISTANT

## 2024-05-16 PROCEDURE — 82948 REAGENT STRIP/BLOOD GLUCOSE: CPT

## 2024-05-16 PROCEDURE — 99232 SBSQ HOSP IP/OBS MODERATE 35: CPT | Performed by: INTERNAL MEDICINE

## 2024-05-16 PROCEDURE — 88305 TISSUE EXAM BY PATHOLOGIST: CPT | Performed by: PATHOLOGY

## 2024-05-16 RX ORDER — PANTOPRAZOLE SODIUM 40 MG/1
40 TABLET, DELAYED RELEASE ORAL 2 TIMES DAILY
Qty: 60 TABLET | Refills: 1 | Status: SHIPPED | OUTPATIENT
Start: 2024-05-16

## 2024-05-16 RX ORDER — TORSEMIDE 20 MG/1
60 TABLET ORAL DAILY
Qty: 90 TABLET | Refills: 1 | Status: SHIPPED | OUTPATIENT
Start: 2024-05-16

## 2024-05-16 RX ADMIN — Medication 1000 MG: at 08:11

## 2024-05-16 RX ADMIN — PANTOPRAZOLE SODIUM 40 MG: 40 INJECTION, POWDER, FOR SOLUTION INTRAVENOUS at 05:26

## 2024-05-16 RX ADMIN — METOPROLOL SUCCINATE 25 MG: 25 TABLET, EXTENDED RELEASE ORAL at 08:47

## 2024-05-16 RX ADMIN — FUROSEMIDE 60 MG: 10 INJECTION, SOLUTION INTRAMUSCULAR; INTRAVENOUS at 05:26

## 2024-05-16 RX ADMIN — B-COMPLEX W/ C & FOLIC ACID TAB 1 TABLET: TAB at 08:47

## 2024-05-16 RX ADMIN — INSULIN LISPRO 4 UNITS: 100 INJECTION, SOLUTION INTRAVENOUS; SUBCUTANEOUS at 12:30

## 2024-05-16 RX ADMIN — INSULIN LISPRO 1 UNITS: 100 INJECTION, SOLUTION INTRAVENOUS; SUBCUTANEOUS at 08:11

## 2024-05-16 RX ADMIN — OXYCODONE HYDROCHLORIDE AND ACETAMINOPHEN 1000 MG: 500 TABLET ORAL at 08:47

## 2024-05-16 NOTE — DISCHARGE SUMMARY
[facility]  Discharge Summary  Name: Galen Carson I MRN: [id number]  Unit/Bed#: -02I Date of Admission: 5/9/2024   Date of Service: 5/9/2024  I Hospital Day: 7    * Acute on chronic systolic congestive heart failure (HCC)  Assessment & Plan  Presents with worsening shortness of breath dyspnea on exertion lower extremity swelling and cough  NYHA Class III., Stage C  Daily standing weights, strict I/Os, sodium restriction 2g  Nephrology consulted for volume management  IV lasix and diuril regimen -- transition to torsemide 60 mg daily   Net neg 8.7 L, weight down 10 pounds  Cardiology consulted due to decline in cardiac function,  No acute interventions, likely secondary to CHF and anemia     Acute renal failure superimposed on stage 3a chronic kidney disease (HCC)  Assessment & Plan  Lab Results   Component Value Date    EGFR 31 05/15/2024    EGFR 33 05/14/2024    EGFR 32 05/13/2024    CREATININE 2.02 (H) 05/15/2024    CREATININE 1.93 (H) 05/14/2024    CREATININE 2.00 (H) 05/13/2024     Developed ESDRAS on 5/11  Multifactorial in the setting of acute blood loss from GI bleed and hypoperfusion from hypotension as well as contrast-induced from CT on 5/10  Nephrology consulted due to ESDRAS and need for aggressive diuresis   Stop metformin on discharge    Type 2 diabetes mellitus with hyperglycemia, without long-term current use of insulin ([facility])  Assessment & Plan  Blood Sugar Average: Last 72 hrs:(P) [id number]  Recommend discontinue metformin at discharge due to renal status   Diabetic diet    Paroxysmal atrial fibrillation (HCC)  Assessment & Plan  History of, on toprol XL and AC with warfarin  Continue BB, Pulse: 74   Cleared by GI to restart warfarin and heparin drip  Heparin discontinue due to ongoing anemia; restarted warfarin 5/14    Gastrointestinal hemorrhage associated with duodenitis  Assessment & Plan  Rectal bleeding occurred on 5/10 at 5 AM, multiple bloody  BMs  Warfarin stopped 5/10, INR greater than 2  Received: FFP 2 units 5/9, 1 unit PRBC 5/9 and additional 1 unit PRBC 5/13  GI consulted  EGD 5/11 - revealed 3 total ulcers including one large deep cratered ulcer with flat pigmented spot. This did not require endoscopic treatment   Cleared for anticoagulation     Acute blood loss anemia  Assessment & Plan  Previous baseline appears to be normal range 12-13  Secondary to GI bleed, aguilar 6.3   Stable at 8.2   See above         Medical Problems       Resolved Problems  Date Reviewed: 5/15/2024   None       Discharging Physician / Practitioner: Nancy Pascual PA-C  PCP: DANITA Girard  Admission Date:   Admission Orders (From admission, onward)       Ordered        05/09/24 1844  INPATIENT ADMISSION  Once                          Discharge Date: 05/16/24    Consultations During Hospital Stay:  IP CONSULT TO NUTRITION SERVICES  IP CONSULT TO GASTROENTEROLOGY  IP CONSULT TO CARDIOLOGY  IP CONSULT TO NEPHROLOGY    Procedures Performed:   EGD      Significant Findings / Test Results:   XR chest portable    Result Date: 5/14/2024  Worsening vascular congestion and increased patchy density at the right lower lobe. This may represent asymmetric edema given worsening congestive changes versus atelectasis or pneumonia. The study was marked in EPIC for immediate notification. Workstation performed: [id number]     EGD    Result Date: 5/11/2024  3 total ulcers in the duodenum at the beginning of the duodenal sweep including one large deep cratered ulcer with flat pigmented spot.  This did not require endoscopic treatment. No signs of ongoing bleeding. The esophagus and duodenum appeared normal. Mild edematous and erythematous mucosa in the antrum Performed forceps biopsies in the body of the stomach, incisura and antrum to rule out H. pylori RECOMMENDATION:  PPI 40 mg twice daily for 8 weeks.  Okay to restart diet.  Okay to restart Coumadin today however if heparin drip is to  be started I would recommend waiting until tomorrow to start this.  Follow-up pathology to assess for H. pylori.  No further inpatient gastroenterology workup.  Gastroenterology will sign off.  Please call with questions.    Marquis Lau MD     XR chest 1 view portable    Result Date: 5/10/2024  No acute focal infiltrate or overt pulmonary edema. Borderline/mild pulmonary vascular congestion. Resident: TORI NATHAN I, the attending radiologist, have reviewed the images and agree with the final report above. Workstation performed: [id number]     CT abdomen pelvis with contrast    Result Date: 5/9/2024  1. Findings consistent with acute duodenitis involving the pylorus and second portion of the duodenum. No evidence of perforation or abscess. GI consultation recommended. Workstation performed: [id number]     Incidental Findings:        Test Results Pending at Discharge (will require follow up):        Outpatient Tests Requested:  BMP, CBC, INR monitoring     Complications:  acute anemia requiring GI consult/EGD     Reason for Admission: shortness of breath - CHF exacerbation     Hospital Course:   Galen Carson is a 73 y.o. male patient with  has a past medical history of Allergic, Anesthesia, BPH (benign prostatic hypertrophy), Cancer (HCC), Diabetes mellitus (HCC), Hyperlipidemia, Hypertension, Irregular heart beat, and Prostate cancer (HCC). who originally presented to the hospital on 5/9/2024 due to Shortness of Breath (Pt states he's had abdominal pain and SOB x3 days. Pt stated he was recent dx with Prostate cancer.).     Was initially started on treatment for acute CHF exacerbation. During course of hospitalization was found to have drop in hemoglobin and melena. GI consulted and underwent EGD with results as above. Will need course of PPI on discharge. No further bleeding and Hgb now stable after transfusions. Cleared to resume anticoagulation.     Nephrology and cardiology consulted for volume  "management and cardiac output management respectively. He is now close to euvolemic and respiratory status is improved. Ambulating in room and stable for discharge with close outpatient follow up.     Please see above list of diagnoses and related plan for additional information.     Condition at Discharge: fair    Discharge Day Visit / Exam:   Subjective: Seen this morning, feeling well.  No chest pain or shortness of breath presently.  Has been independently ambulatory in the room.  Legs are much better, still some swelling.  Do not feel as heavy.  Vitals: Blood Pressure: 105/57 (05/16/24 0706)  Pulse: 74 (05/14/24 0716)  Temperature: 98.3 °F (36.8 °C) (05/16/24 0706)  Temp Source: Oral (05/13/24 1241)  Respirations: 16 (05/16/24 0706)  Height: 5' 10\" (177.8 cm) (05/10/24 0930)  Weight - Scale: 107 kg (235 lb 10.8 oz) (05/16/24 0550)  SpO2: 96 % (05/13/24 0032)  Exam:   Physical Exam  Vitals and nursing note reviewed.   Constitutional:       General: He is awake. He is not in acute distress.     Appearance: Normal appearance. He is obese. He is not ill-appearing or toxic-appearing.   Cardiovascular:      Rate and Rhythm: Normal rate and regular rhythm.      Heart sounds: Normal heart sounds. No murmur heard.  Pulmonary:      Effort: Pulmonary effort is normal. No respiratory distress.      Breath sounds: Normal breath sounds. No wheezing or rales.   Abdominal:      General: Bowel sounds are normal. There is no distension.      Palpations: Abdomen is soft.   Musculoskeletal:      Right lower leg: Pitting Edema present.      Left lower leg: Pitting Edema present.   Neurological:      Mental Status: He is alert and oriented to person, place, and time.   Psychiatric:         Mood and Affect: Mood normal.         Behavior: Behavior normal. Behavior is cooperative.          Discussion with Family: Patient declined call to .     Discharge instructions/Information to patient and family:   See after visit " summary for information provided to patient and family.      Provisions for Follow-Up Care:  See after visit summary for information related to follow-up care and any pertinent home health orders.      Mobility at time of Discharge:   Basic Mobility Inpatient Raw Score: 23  JH-HLM Goal: 7: Walk 25 feet or more  JH-HLM Achieved: 7: Walk 25 feet or more  HLM Goal achieved. Continue to encourage appropriate mobility.     Disposition:   Home    Planned Readmission: no     Discharge Statement:  I spent 65 minutes discharging the patient. This time was spent on the day of discharge. I had direct contact with the patient on the day of discharge. Greater than 50% of the total time was spent examining patient, answering all patient questions, arranging and discussing plan of care with patient as well as directly providing post-discharge instructions.  Additional time then spent on discharge activities.    Discharge Medications:  See after visit summary for reconciled discharge medications provided to patient and/or family.      **Please Note: This note may have been constructed using a voice recognition system**

## 2024-05-16 NOTE — PROGRESS NOTES
NEPHROLOGY PROGRESS NOTE    Patient: Galen Carson               Sex: male          DOA: 5/9/2024  3:32 PM   YOB: 1950        Age:  73 y.o.        LOS:  LOS: 7 days   5/16/2024    REASON FOR THE CONSULTATION:      Volume overload/ESDRAS on CKD stage IIIb    SUBJECTIVE     Patient seen and examined next at the bedside.  Feels better today.  Notes significant improvement in lower extremity edema.    CURRENT MEDICATIONS       Current Facility-Administered Medications:     ascorbic acid (VITAMIN C) tablet 1,000 mg, 1,000 mg, Oral, Daily, Breezy Cheatham DO, 1,000 mg at 05/16/24 0847    Evening Primrose Oil CAPS 1,000 mg, 1,000 mg, Oral, TID With Meals, DANITA Herrera, 1,000 mg at 05/16/24 0811    furosemide (LASIX) injection 60 mg, 60 mg, Intravenous, TID (diuretic), Va Esqueda MD, 60 mg at 05/16/24 0526    insulin lispro (HumALOG/ADMELOG) 100 units/mL subcutaneous injection 1-6 Units, 1-6 Units, Subcutaneous, TID AC, DANITA Herrera, 1 Units at 05/16/24 0811    melatonin tablet 3 mg, 3 mg, Oral, HS, Rosalba Rosa PA-C, 3 mg at 05/11/24 2140    metoprolol succinate (TOPROL-XL) 24 hr tablet 25 mg, 25 mg, Oral, Daily, Felix Suarez PA-C, 25 mg at 05/16/24 0847    multivitamin stress formula tablet 1 tablet, 1 tablet, Oral, Daily, Breezy Cheatham DO, 1 tablet at 05/16/24 0847    pantoprazole (PROTONIX) injection 40 mg, 40 mg, Intravenous, Q12H, Rosalba Rosa PA-C, 40 mg at 05/16/24 0526    pravastatin (PRAVACHOL) tablet 80 mg, 80 mg, Oral, Daily With Dinner, Breezy Cheatham DO, 80 mg at 05/15/24 1721    warfarin (COUMADIN) tablet 5 mg, 5 mg, Oral, Daily (warfarin), Nancy Pascual PA-C, 5 mg at 05/15/24 1720    REVIEW OF SYSTEMS     Review of Systems   Constitutional: Negative.    HENT: Negative.     Eyes: Negative.    Respiratory: Negative.     Cardiovascular: Negative.    Gastrointestinal: Negative.    Endocrine: Negative.    Genitourinary: Negative.     Musculoskeletal: Negative.    Skin: Negative.    Allergic/Immunologic: Negative.    Neurological: Negative.    Hematological: Negative.    All other systems reviewed and are negative.      OBJECTIVE     Current Weight: Weight - Scale: 102 kg (225 lb 12 oz)  Vitals:    05/16/24 0900   BP:    Pulse:    Resp:    Temp:    SpO2: 93%     Body mass index is 32.39 kg/m².    Intake/Output Summary (Last 24 hours) at 5/16/2024 1132  Last data filed at 5/16/2024 0706  Gross per 24 hour   Intake 0 ml   Output 1950 ml   Net -1950 ml       PHYSICAL EXAMINATION     Physical Exam  HENT:      Head: Normocephalic and atraumatic.   Eyes:      Pupils: Pupils are equal, round, and reactive to light.   Neck:      Vascular: No JVD.   Cardiovascular:      Rate and Rhythm: Normal rate and regular rhythm.      Heart sounds: Murmur heard.      No friction rub.   Pulmonary:      Effort: Pulmonary effort is normal.      Breath sounds: Normal breath sounds.   Abdominal:      General: Bowel sounds are normal. There is no distension.      Palpations: Abdomen is soft.      Tenderness: There is no abdominal tenderness. There is no rebound.   Musculoskeletal:         General: No tenderness.      Cervical back: Neck supple.      Right lower leg: Edema present.      Left lower leg: Edema present.   Skin:     General: Skin is dry.      Findings: No rash.   Neurological:      Mental Status: He is alert and oriented to person, place, and time.   Psychiatric:         Mood and Affect: Mood and affect normal.           LAB RESULTS     Results from last 7 days   Lab Units 05/16/24  0519 05/15/24  0639 05/14/24  1014 05/14/24  0517 05/13/24  1318 05/13/24  0809 05/13/24  0013 05/12/24  1923 05/12/24  1347 05/12/24  0508 05/11/24  1544 05/11/24  1454 05/11/24  0451 05/10/24  1159 05/10/24  0521 05/09/24  1558   WBC Thousand/uL 7.12 8.27  --   --   --   --   --   --  5.78 7.09  --   --  7.49  --  7.78 7.46   HEMOGLOBIN g/dL 8.2* 8.3*  --  7.8*  --  6.9* 7.0*   --  7.0* 7.4*  7.4*   < >  --  8.0*   < > 9.3* 11.0*   HEMATOCRIT % 25.2* 25.8*  --  24.8*  --  22.8* 22.9*  --  22.0* 23.8*  23.7*   < >  --  24.3*   < > 29.6* 34.1*   PLATELETS Thousands/uL 276 262  --   --   --   --   --   --  192 194  --   --  193  --  269 220   POTASSIUM mmol/L 3.8 4.2 4.3  --  5.0  --   --  4.8  --  5.3  --  5.2 4.8  --  5.2 4.8   CHLORIDE mmol/L 96 97 99  --  105  --   --  104  --  105  --  105 104  --  104 102   CO2 mmol/L 35* 34* 32  --  29  --   --  29  --  30  --  23 29  --  29 27   BUN mg/dL 75* 67* 57*  --  58*  --   --  68*  --  76*  --  78* 83*  --  60* 39*   CREATININE mg/dL 1.98* 2.02* 1.93*  --  2.00*  --   --  2.19*  --  2.53*  --  2.41* 2.76*  --  1.57* 1.46*   EGFR ml/min/1.73sq m 32 31 33  --  32  --   --  29  --  24  --  25 21  --  43 47   CALCIUM mg/dL 8.9 8.9 8.6  --  8.0*  --   --  8.2*  --  8.2*  --  7.7* 8.0*  --  8.0* 8.3*   MAGNESIUM mg/dL  --  2.1  --   --   --   --   --   --   --  2.2  --   --  2.2  --  1.9  --     < > = values in this interval not displayed.           RADIOLOGY RESULTS        IMPRESSION:     Worsening vascular congestion and increased patchy density at the right lower lobe. This may represent asymmetric edema given worsening congestive changes versus atelectasis or pneumonia.       ASSESSMENT/PLAN     73 years old male with past medical history of chronic kidney disease stage IIIa, atrial fibrillation on anticoagulation, CHF systolic dysfunction, diabetes mellitus type 2, hypertension, prostate cancer who presents to our facility with shortness of breath and admitted with acute CHF exacerbation.    1.  Acute kidney injury on chronic kidney disease stage IIIa: Baseline serum creatinine of 1.4-1.5.  Presented to our facility at his baseline creatinine however worsened during this hospitalization and peaked up to 2.5 mg/dL and worse.  Etiology of initial rise in creatinine appears to be contrast-induced nephropathy, acute blood loss anemia along with  renal venous congestion.  Initiated aggressive diuresis after having seen improvement in creatinine.  Current serum creatinine is 1.9 mg/dL and slowly improving despite undergoing diuresis.    2.  Anemia: Noted to have acute blood loss anemia and underwent EGD with findings of 3 ulcers in duodenum.  Hemoglobin has improved to 8.2 g/dL   Remains on proton pump inhibitor IV every 12    3.  Hypertension: Blood pressure is 105/57.    4.  Volume overload: Present with dyspnea on exertion and noted to be in significant hypervolemic state.    Initiated on Lasix gtt. followed by Lasix 60 mg IV 3 times daily and Diuril resulting in patient being -8.7 L in fluid balance.  May DC IV Lasix and switch patient to torsemide 60 mg daily.    Recommended patient follow-up with nephrology as outpatient        Va Esqueda MD  Nephrology  5/16/2024

## 2024-05-16 NOTE — PLAN OF CARE
Problem: INFECTION - ADULT  Goal: Absence or prevention of progression during hospitalization  Description: INTERVENTIONS:  - Assess and monitor for signs and symptoms of infection  - Monitor lab/diagnostic results  - Monitor all insertion sites, i.e. indwelling lines, tubes, and drains  - Monitor endotracheal if appropriate and nasal secretions for changes in amount and color  - [place] appropriate cooling/warming therapies per order  - Administer medications as ordered  - Instruct and encourage patient and family to use good hand hygiene technique  - Identify and instruct in appropriate isolation precautions for identified infection/condition  Outcome: Progressing  Goal: Absence of fever/infection during neutropenic period  Description: INTERVENTIONS:  - Monitor WBC    Outcome: Progressing     Problem: SAFETY ADULT  Goal: Patient will remain free of falls  Description: INTERVENTIONS:  - Educate patient/family on patient safety including physical limitations  - Instruct patient to call for assistance with activity   - Consult OT/PT to assist with strengthening/mobility   - Keep Call bell within reach  - Keep bed low and locked with side rails adjusted as appropriate  - Keep care items and personal belongings within reach  - Initiate and maintain comfort rounds  - Make Fall Risk Sign visible to staff  - Offer Toileting every 2 Hours, in advance of need  -Initiate/Maintain bed alarm  - Obtain necessary fall risk management equipment: yellow socks  - Apply yellow socks and bracelet for high fall risk patients  - Consider moving patient to room near nurses station  Outcome: Progressing  Goal: Maintain or return to baseline ADL function  Description: INTERVENTIONS:  -  Assess patient's ability to carry out ADLs; assess patient's baseline for ADL function and identify physical deficits which impact ability to perform ADLs (bathing, care of mouth/teeth, toileting, grooming, dressing, etc.)  - Assess/evaluate cause of  self-care deficits   - Assess range of motion  - Assess patient's mobility; develop plan if impaired  - Assess patient's need for assistive devices and provide as appropriate  - Encourage maximum independence but intervene and supervise when necessary  - Involve family in performance of ADLs  - Assess for home care needs following discharge   - Consider OT consult to assist with ADL evaluation and planning for discharge  - Provide patient education as appropriate  Outcome: Progressing  Goal: Maintains/Returns to pre admission functional level  Description: INTERVENTIONS:  - Perform AM-PAC 6 Click Basic Mobility/ Daily Activity assessment daily.  - Set and communicate daily mobility goal to care team and patient/family/caregiver.   - Collaborate with rehabilitation services on mobility goals if consulted  - Perform Range of Motion 2 times a day.  - Reposition patient every 2 hours.  - Dangle patient 2 times a day  - Stand patient 2 times a day  - Ambulate patient 2 times a day  - Out of bed to chair 2 times a day   - Out of bed for meals 2 times a day  - Out of bed for toileting  - Record patient progress and toleration of activity level   Outcome: Progressing     Problem: CARDIOVASCULAR - ADULT  Goal: Maintains optimal cardiac output and hemodynamic stability  Description: INTERVENTIONS:  - Monitor I/O, vital signs and rhythm  - Monitor for S/S and trends of decreased cardiac output  - Administer and titrate ordered vasoactive medications to optimize hemodynamic stability  - Assess quality of pulses, skin color and temperature  - Assess for signs of decreased coronary artery perfusion  - Instruct patient to report change in severity of symptoms  Outcome: Progressing  Goal: Absence of cardiac dysrhythmias or at baseline rhythm  Description: INTERVENTIONS:  - Continuous cardiac monitoring, vital signs, obtain 12 lead EKG if ordered  - Administer antiarrhythmic and heart rate control medications as ordered  - Monitor  electrolytes and administer replacement therapy as ordered  Outcome: Progressing     Problem: RESPIRATORY - ADULT  Goal: Achieves optimal ventilation and oxygenation  Description: INTERVENTIONS:  - Assess for changes in respiratory status  - Assess for changes in mentation and behavior  - Position to facilitate oxygenation and minimize respiratory effort  - Oxygen administered by appropriate delivery if ordered  - Initiate smoking cessation education as indicated  - Encourage broncho-pulmonary hygiene including cough, deep breathe, Incentive Spirometry  - Assess the need for suctioning and aspirate as needed  - Assess and instruct to report SOB or any respiratory difficulty  - Respiratory Therapy support as indicated  Outcome: Progressing     Problem: Nutrition/Hydration-ADULT  Goal: Nutrient/Hydration intake appropriate for improving, restoring or maintaining nutritional needs  Description: Monitor and assess patient's nutrition/hydration status for malnutrition. Collaborate with interdisciplinary team and initiate plan and interventions as ordered.  Monitor patient's weight and dietary intake as ordered or per policy. Utilize nutrition screening tool and intervene as necessary. Determine patient's food preferences and provide high-protein, high-caloric foods as appropriate.     INTERVENTIONS:  - Monitor oral intake, urinary output, labs, and treatment plans  - Assess nutrition and hydration status and recommend course of action  - Evaluate amount of meals eaten  - Assist patient with eating if necessary   - Allow adequate time for meals  - Recommend/ encourage appropriate diets, oral nutritional supplements, and vitamin/mineral supplements  - Order, calculate, and assess calorie counts as needed  - Recommend, monitor, and adjust tube feedings and TPN/PPN based on assessed needs  - Assess need for intravenous fluids  - Provide specific nutrition/hydration education as appropriate  - Include patient/family/caregiver  in decisions related to nutrition  Outcome: Progressing

## 2024-05-16 NOTE — TELEPHONE ENCOUNTER
----- Message from Felix Suarez PA-C sent at 5/15/2024  4:15 PM EDT -----  Regarding: Hospital Follow-Up  Cardiology Follow-up:    Patient clinical visit in 24-72 hours at the Cardio location: [place] office. .    Schedule visit with Cardio Ede Providers: first available provider.    Type of Visit: VISIT TYPE: in-person office visit.    Additional Details: Acute HF follow-up

## 2024-05-16 NOTE — NURSING NOTE
Checked oxygen level on room air resting, 93% and while ambulating on room air, 91%. Notified Nancy Pascual, cleared to go home without oxygen.

## 2024-05-16 NOTE — CASE MANAGEMENT
Case Management Discharge Planning Note    Patient name Galen Carson  Location /-02 MRN [id number]  : 1950 Date 2024       Current Admission Date: 2024  Current Admission Diagnosis:Acute on chronic systolic congestive heart failure (HCC)   Patient Active Problem List    Diagnosis Date Noted Date Diagnosed    Acute blood loss anemia 2024     Gastrointestinal hemorrhage associated with duodenitis 05/10/2024     Acute on chronic systolic congestive heart failure (HCC) 2024     ICD (implantable cardioverter-defibrillator) in place 2024     Obesity 2023     Paroxysmal atrial fibrillation (HCC) 10/22/2023     Dilated cardiomyopathy (HCC) 10/02/2023     Essential hypertension 10/20/2022     Acute renal failure superimposed on stage 3a chronic kidney disease (HCC) 2022     Type 2 diabetes mellitus with hyperglycemia, without long-term current use of insulin (HCC) 2022     Encounter for monitoring androgen deprivation therapy 2019     Hot flashes 2019     Prostate cancer (HCC) 2019     Positive colorectal cancer screening using Cologuard test 2019     Mixed hyperlipidemia 2019     IFG (impaired fasting glucose) 2019     Upper respiratory tract infection 2019       LOS (days): 7  Geometric Mean LOS (GMLOS) (days): 3.9  Days to GMLOS:-2.7     OBJECTIVE:  Risk of Unplanned Readmission Score: 18.43         Current admission status: Inpatient   Preferred Pharmacy:   [facility] AT [place], [place] - [address] [facility]  [address] [facility] [address]  Phone: [phone] Fax: [phone]    Primary Care Provider: DANITA Girard    Primary Insurance: MEDICARE  Secondary Insurance: COMMERCIAL MISCELLANEOUS    DISCHARGE DETAILS:                                                                                        IMM Given (Date):: 24  IMM Given to:: Patient     Additional Comments: MARCO ANTONIO  met with pt at bedside to review IMM and obtained pt signature.

## 2024-05-17 ENCOUNTER — TELEPHONE (OUTPATIENT)
Dept: NEPHROLOGY | Facility: CLINIC | Age: 74
End: 2024-05-17

## 2024-05-17 ENCOUNTER — PATIENT OUTREACH (OUTPATIENT)
Dept: FAMILY MEDICINE CLINIC | Facility: CLINIC | Age: 74
End: 2024-05-17

## 2024-05-17 ENCOUNTER — TELEPHONE (OUTPATIENT)
Dept: FAMILY MEDICINE CLINIC | Facility: CLINIC | Age: 74
End: 2024-05-17

## 2024-05-17 ENCOUNTER — TELEPHONE (OUTPATIENT)
Dept: CARDIOLOGY CLINIC | Facility: CLINIC | Age: 74
End: 2024-05-17

## 2024-05-17 DIAGNOSIS — N18.30 STAGE 3 CHRONIC KIDNEY DISEASE, UNSPECIFIED WHETHER STAGE 3A OR 3B CKD (HCC): Primary | ICD-10-CM

## 2024-05-17 DIAGNOSIS — Z71.89 COMPLEX CARE COORDINATION: Primary | ICD-10-CM

## 2024-05-17 NOTE — PROGRESS NOTES
Spoke with Galen berkowitz he is tired. Resting a lot since coming home. Did not check weight or blood sugar today, too tired. Will try to check blood sugar later  Metformin was stopped will take blood sugar twice a day until Monday and give readings to PCP at appointment on 5/20/24  Will start weighing tomorrow. Reviewed if he gains 3 pounds in one day or 5 pounds in one week needs to call cardiology office.  Will go for INR blood work on Monday.  Discussed salty foods to avoid. States he does not salt his food.   Reviewed medications to stop and his new medications. Manages medications himself right out of the bottles.  Open to further outreaches.

## 2024-05-17 NOTE — TELEPHONE ENCOUNTER
Good Morning    Dr Esqueda patient was seen At the ER on 05/09/2024 by you. Siriat will need a HFU Can you please review chart and let me know when we can bring in patient.    Thank you

## 2024-05-17 NOTE — TELEPHONE ENCOUNTER
Self-management/education and teach back:  Primary learner: Patient  Following low sodium diet: Trying to follow.  Following fluid restriction: Yes  Hospital discharge weight: 235 lb 10.8 oz  Weighing daily: Did not weight today. Advised patient to weight daily.           Recording: Advised patient to record daily wt.  1st home weight  Did not weight today     Weight today:  Monitoring symptoms: Yes  Any current symptoms: Yes, YOLANDA. Tires easily, SOB on exertion.  Knows when to call provider: Yes  Medication reviewed and taking all as prescribed: Yes  Knows name of diuretic: Yes  Escalation plan:   HF education reviewed/reinforced including low sodium diet, 64 oz fluid restriction, activity, symptoms of decompensation and when and who to call. Yes, reviewed & reinforced.    Care Coordination:  Aware of cardiology follow up appointment: 5/21/24  Aware of PCP follow up appointment:  5/20/24  Transportation: Drives self.  Social Support:  Insurance/financial concerns:  Home health care:   Health literacy:  Engagement:  Personal Goal:  Additional comments:

## 2024-05-20 ENCOUNTER — OFFICE VISIT (OUTPATIENT)
Dept: FAMILY MEDICINE CLINIC | Facility: CLINIC | Age: 74
End: 2024-05-20
Payer: MEDICARE

## 2024-05-20 VITALS
DIASTOLIC BLOOD PRESSURE: 64 MMHG | SYSTOLIC BLOOD PRESSURE: 116 MMHG | HEIGHT: 70 IN | WEIGHT: 221.8 LBS | OXYGEN SATURATION: 99 % | HEART RATE: 77 BPM | BODY MASS INDEX: 31.75 KG/M2

## 2024-05-20 DIAGNOSIS — F33.9 DEPRESSION, RECURRENT (HCC): ICD-10-CM

## 2024-05-20 DIAGNOSIS — I48.0 PAROXYSMAL ATRIAL FIBRILLATION (HCC): ICD-10-CM

## 2024-05-20 DIAGNOSIS — E11.65 TYPE 2 DIABETES MELLITUS WITH HYPERGLYCEMIA, WITHOUT LONG-TERM CURRENT USE OF INSULIN (HCC): ICD-10-CM

## 2024-05-20 DIAGNOSIS — I50.23 ACUTE ON CHRONIC SYSTOLIC CONGESTIVE HEART FAILURE (HCC): Primary | ICD-10-CM

## 2024-05-20 DIAGNOSIS — I42.0 DILATED CARDIOMYOPATHY (HCC): ICD-10-CM

## 2024-05-20 DIAGNOSIS — K29.81 GASTROINTESTINAL HEMORRHAGE ASSOCIATED WITH DUODENITIS: ICD-10-CM

## 2024-05-20 DIAGNOSIS — I10 ESSENTIAL HYPERTENSION: ICD-10-CM

## 2024-05-20 PROCEDURE — 99496 TRANSJ CARE MGMT HIGH F2F 7D: CPT | Performed by: FAMILY MEDICINE

## 2024-05-20 RX ORDER — GLIMEPIRIDE 1 MG/1
1 TABLET ORAL
Qty: 30 TABLET | Refills: 5 | Status: SHIPPED | OUTPATIENT
Start: 2024-05-20 | End: 2024-11-16

## 2024-05-20 NOTE — PROGRESS NOTES
Transition of Care Visit  Name: Galen Carson      : 1950      MRN: [id number]  Encounter Provider: DANITA Girard  Encounter Date: 2024   Encounter department: [facility] [address]    Assessment & Plan   1. Acute on chronic systolic congestive heart failure (HCC)  Assessment & Plan:  Wt Readings from Last 3 Encounters:   24 101 kg (221 lb 12.8 oz)   24 102 kg (225 lb 12 oz)   24 108 kg (238 lb 12.8 oz)     Stable, asymptomatic presently, continue current diuretic therapy per cardiology maintain scheduled follow-up, encouraged home monitoring blood pressure daily weights        2. Dilated cardiomyopathy (HCC)  3. Paroxysmal atrial fibrillation (HCC)  Assessment & Plan:  Continue current beta-blocker chronic anticoagulation therapies  4. Gastrointestinal hemorrhage associated with duodenitis  Assessment & Plan:  Continue recommendations per GI continue present PPIs monitoring CBC  5. Type 2 diabetes mellitus with hyperglycemia, without long-term current use of insulin (HCC)  Assessment & Plan:  Discussed plan of care reviewed goals of therapy caution hypoglycemia start Amaryl 1 mg p.o. daily monitoring blood sugars fasting postprandial given parameters  We discussed the importance of controlling blood glucose (hemoglobin A1c less than 7.0)Premeal , even better <110  2hr after a meal <170, even better <140  A1C <7%, even better <6.5%.  blood pressure control, and cholesterol to lower the risk for complications. Encouraged advise to check home blood sugars discussed goals in range of therapy. Reviewed recommendations of annual eye exams (ophthalmology) on long foot exam (Podiatry)  Lab Results   Component Value Date    HGBA1C 6.9 (H) 2024     Orders:  -     Hemoglobin A1C; Future  -     Comprehensive metabolic panel; Future  -     glimepiride (AMARYL) 1 mg tablet; Take 1 tablet (1 mg total) by mouth daily with breakfast  6.  Depression, recurrent (HCC)  7. Essential hypertension  Assessment & Plan:  Continue current medications encouraged home monitoring         History of Present Illness     Transitional Care Management Review:   Galen Carson is a 73 y.o. male here for TCM follow up.     During the TCM phone call patient stated:  TCM Call       None          TCM Call       None          F/u admit er- hosp   is a 73 y.o. male patient with  has a past medical history of Allergic, Anesthesia, BPH (benign prostatic hypertrophy), Cancer (HCC), Diabetes mellitus (HCC), Hyperlipidemia, Hypertension, Irregular heart beat, and Prostate cancer (HCC). who originally presented to the hospital on 5/9/2024 due to Shortness of Breath (Pt states he's had abdominal pain and SOB   Diagnosis - chf, anemia , acute renal ,   [facility]   Presently monitoring blood pressures, denies any significant elevations  Weights not regularly monitoring  Diabetes, denies any hypoglycemic episodes negative polyuria polydipsia, has noted elevations in blood sugar since discharge hospital off Glucophage secondary to renal  Acute kidney, as of yet not gone for updated labs per renal has scheduled follow-up          Depression Screening Follow-up Plan: Patient's depression screening was positive with a PHQ-2 score of 0. Their PHQ-9 score was . Clinically patient does not have depression. No treatment is required.   Review of Systems   Constitutional:  Negative for appetite change, chills, fever and unexpected weight change.   HENT:  Negative for congestion, dental problem, ear pain, hearing loss, postnasal drip, rhinorrhea, sinus pressure, sinus pain, sneezing, sore throat, tinnitus and voice change.    Eyes:  Negative for visual disturbance.   Respiratory:  Negative for apnea, cough, chest tightness and shortness of breath.    Cardiovascular:  Negative for chest pain, palpitations and leg swelling.   Gastrointestinal:  Negative for abdominal  "pain, blood in stool, constipation, diarrhea, nausea and vomiting.   Endocrine: Negative for cold intolerance, heat intolerance, polydipsia, polyphagia and polyuria.   Genitourinary:  Negative for decreased urine volume, difficulty urinating, dysuria, frequency and hematuria.   Musculoskeletal:  Negative for arthralgias, back pain, gait problem, joint swelling and myalgias.   Skin:  Negative for color change, rash and wound.   Allergic/Immunologic: Negative for environmental allergies and food allergies.   Neurological:  Negative for dizziness, syncope, weakness, light-headedness, numbness and headaches.   Hematological:  Negative for adenopathy. Does not bruise/bleed easily.   Psychiatric/Behavioral:  Negative for sleep disturbance and suicidal ideas. The patient is not nervous/anxious.      Objective     /64   Pulse 77   Ht 5' 10\" (1.778 m)   Wt 101 kg (221 lb 12.8 oz)   SpO2 99%   BMI 31.82 kg/m²     Physical Exam  Constitutional:       Appearance: He is well-developed.   HENT:      Head: Normocephalic and atraumatic.   Cardiovascular:      Rate and Rhythm: Normal rate and regular rhythm.      Heart sounds: Normal heart sounds.   Pulmonary:      Effort: Pulmonary effort is normal.      Breath sounds: Normal breath sounds.   Abdominal:      General: Bowel sounds are normal.      Palpations: Abdomen is soft.   Musculoskeletal:         General: Normal range of motion.      Cervical back: Normal range of motion and neck supple.   Skin:     General: Skin is warm and dry.   Neurological:      Mental Status: He is alert and oriented to person, place, and time.      Deep Tendon Reflexes: Reflexes are normal and symmetric.   Psychiatric:         Behavior: Behavior normal.         Thought Content: Thought content normal.         Judgment: Judgment normal.       Medications have been reviewed by provider in current encounter          "

## 2024-05-20 NOTE — ASSESSMENT & PLAN NOTE
Wt Readings from Last 3 Encounters:   05/20/24 101 kg (221 lb 12.8 oz)   05/16/24 102 kg (225 lb 12 oz)   05/09/24 108 kg (238 lb 12.8 oz)     Stable, asymptomatic presently, continue current diuretic therapy per cardiology maintain scheduled follow-up, encouraged home monitoring blood pressure daily weights

## 2024-05-20 NOTE — ASSESSMENT & PLAN NOTE
Discussed plan of care reviewed goals of therapy caution hypoglycemia start Amaryl 1 mg p.o. daily monitoring blood sugars fasting postprandial given parameters  We discussed the importance of controlling blood glucose (hemoglobin A1c less than 7.0)Premeal , even better <110  2hr after a meal <170, even better <140  A1C <7%, even better <6.5%.  blood pressure control, and cholesterol to lower the risk for complications. Encouraged advise to check home blood sugars discussed goals in range of therapy. Reviewed recommendations of annual eye exams (ophthalmology) on long foot exam (Podiatry)  Lab Results   Component Value Date    HGBA1C 6.9 (H) 02/14/2024

## 2024-05-21 ENCOUNTER — ANTICOAG VISIT (OUTPATIENT)
Dept: CARDIOLOGY CLINIC | Facility: CLINIC | Age: 74
End: 2024-05-21

## 2024-05-21 ENCOUNTER — LAB (OUTPATIENT)
Dept: LAB | Facility: CLINIC | Age: 74
End: 2024-05-21
Payer: MEDICARE

## 2024-05-21 ENCOUNTER — OFFICE VISIT (OUTPATIENT)
Dept: CARDIOLOGY CLINIC | Facility: CLINIC | Age: 74
End: 2024-05-21
Payer: MEDICARE

## 2024-05-21 VITALS
BODY MASS INDEX: 31.64 KG/M2 | SYSTOLIC BLOOD PRESSURE: 118 MMHG | OXYGEN SATURATION: 96 % | DIASTOLIC BLOOD PRESSURE: 68 MMHG | HEART RATE: 91 BPM | WEIGHT: 221 LBS | RESPIRATION RATE: 16 BRPM | HEIGHT: 70 IN

## 2024-05-21 DIAGNOSIS — N18.30 STAGE 3 CHRONIC KIDNEY DISEASE, UNSPECIFIED WHETHER STAGE 3A OR 3B CKD (HCC): ICD-10-CM

## 2024-05-21 DIAGNOSIS — N18.31 ACUTE RENAL FAILURE SUPERIMPOSED ON STAGE 3A CHRONIC KIDNEY DISEASE, UNSPECIFIED ACUTE RENAL FAILURE TYPE (HCC): ICD-10-CM

## 2024-05-21 DIAGNOSIS — I10 ESSENTIAL HYPERTENSION: ICD-10-CM

## 2024-05-21 DIAGNOSIS — I50.23 ACUTE ON CHRONIC SYSTOLIC CONGESTIVE HEART FAILURE (HCC): ICD-10-CM

## 2024-05-21 DIAGNOSIS — I42.0 DILATED CARDIOMYOPATHY (HCC): Primary | ICD-10-CM

## 2024-05-21 DIAGNOSIS — I48.0 PAROXYSMAL ATRIAL FIBRILLATION (HCC): ICD-10-CM

## 2024-05-21 DIAGNOSIS — N17.9 ACUTE RENAL FAILURE SUPERIMPOSED ON STAGE 3A CHRONIC KIDNEY DISEASE, UNSPECIFIED ACUTE RENAL FAILURE TYPE (HCC): ICD-10-CM

## 2024-05-21 DIAGNOSIS — E11.65 TYPE 2 DIABETES MELLITUS WITH HYPERGLYCEMIA, WITHOUT LONG-TERM CURRENT USE OF INSULIN (HCC): ICD-10-CM

## 2024-05-21 LAB
25(OH)D3 SERPL-MCNC: 42.7 NG/ML (ref 30–100)
ALBUMIN SERPL BCP-MCNC: 4.4 G/DL (ref 3.5–5)
ALP SERPL-CCNC: 65 U/L (ref 34–104)
ALT SERPL W P-5'-P-CCNC: 27 U/L (ref 7–52)
ANION GAP SERPL CALCULATED.3IONS-SCNC: 12 MMOL/L (ref 4–13)
AST SERPL W P-5'-P-CCNC: 22 U/L (ref 13–39)
BACTERIA UR QL AUTO: ABNORMAL /HPF
BASOPHILS # BLD AUTO: 0.04 THOUSANDS/ÂΜL (ref 0–0.1)
BASOPHILS NFR BLD AUTO: 1 % (ref 0–1)
BILIRUB SERPL-MCNC: 0.45 MG/DL (ref 0.2–1)
BILIRUB UR QL STRIP: NEGATIVE
BUN SERPL-MCNC: 72 MG/DL (ref 5–25)
CALCIUM SERPL-MCNC: 9.5 MG/DL (ref 8.4–10.2)
CHLORIDE SERPL-SCNC: 94 MMOL/L (ref 96–108)
CLARITY UR: CLEAR
CO2 SERPL-SCNC: 33 MMOL/L (ref 21–32)
COLOR UR: ABNORMAL
CREAT SERPL-MCNC: 2.32 MG/DL (ref 0.6–1.3)
CREAT UR-MCNC: 83.7 MG/DL
EOSINOPHIL # BLD AUTO: 0.26 THOUSAND/ÂΜL (ref 0–0.61)
EOSINOPHIL NFR BLD AUTO: 5 % (ref 0–6)
ERYTHROCYTE [DISTWIDTH] IN BLOOD BY AUTOMATED COUNT: 14 % (ref 11.6–15.1)
GFR SERPL CREATININE-BSD FRML MDRD: 27 ML/MIN/1.73SQ M
GLUCOSE P FAST SERPL-MCNC: 147 MG/DL (ref 65–99)
GLUCOSE UR STRIP-MCNC: NEGATIVE MG/DL
HCT VFR BLD AUTO: 32 % (ref 36.5–49.3)
HGB BLD-MCNC: 9.9 G/DL (ref 12–17)
HGB UR QL STRIP.AUTO: NEGATIVE
IMM GRANULOCYTES # BLD AUTO: 0.07 THOUSAND/UL (ref 0–0.2)
IMM GRANULOCYTES NFR BLD AUTO: 1 % (ref 0–2)
INR PPP: 1.8 (ref 0.84–1.19)
KETONES UR STRIP-MCNC: NEGATIVE MG/DL
LEUKOCYTE ESTERASE UR QL STRIP: NEGATIVE
LYMPHOCYTES # BLD AUTO: 1.13 THOUSANDS/ÂΜL (ref 0.6–4.47)
LYMPHOCYTES NFR BLD AUTO: 21 % (ref 14–44)
MCH RBC QN AUTO: 28.8 PG (ref 26.8–34.3)
MCHC RBC AUTO-ENTMCNC: 30.9 G/DL (ref 31.4–37.4)
MCV RBC AUTO: 93 FL (ref 82–98)
MONOCYTES # BLD AUTO: 0.64 THOUSAND/ÂΜL (ref 0.17–1.22)
MONOCYTES NFR BLD AUTO: 12 % (ref 4–12)
NEUTROPHILS # BLD AUTO: 3.21 THOUSANDS/ÂΜL (ref 1.85–7.62)
NEUTS SEG NFR BLD AUTO: 60 % (ref 43–75)
NITRITE UR QL STRIP: NEGATIVE
NON-SQ EPI CELLS URNS QL MICRO: ABNORMAL /HPF
NRBC BLD AUTO-RTO: 0 /100 WBCS
PH UR STRIP.AUTO: 7 [PH]
PHOSPHATE SERPL-MCNC: 3.7 MG/DL (ref 2.3–4.1)
PLATELET # BLD AUTO: 349 THOUSANDS/UL (ref 149–390)
PMV BLD AUTO: 10.7 FL (ref 8.9–12.7)
POTASSIUM SERPL-SCNC: 4.5 MMOL/L (ref 3.5–5.3)
PROT SERPL-MCNC: 7.6 G/DL (ref 6.4–8.4)
PROT UR STRIP-MCNC: ABNORMAL MG/DL
PROT UR-MCNC: 12 MG/DL
PROT/CREAT UR: 0.14 MG/G{CREAT} (ref 0–0.1)
PROTHROMBIN TIME: 20.6 SECONDS (ref 11.6–14.5)
PTH-INTACT SERPL-MCNC: 131.8 PG/ML (ref 12–88)
RBC # BLD AUTO: 3.44 MILLION/UL (ref 3.88–5.62)
RBC #/AREA URNS AUTO: ABNORMAL /HPF
SODIUM SERPL-SCNC: 139 MMOL/L (ref 135–147)
SP GR UR STRIP.AUTO: 1.01 (ref 1–1.03)
UROBILINOGEN UR STRIP-ACNC: <2 MG/DL
WBC # BLD AUTO: 5.35 THOUSAND/UL (ref 4.31–10.16)
WBC #/AREA URNS AUTO: ABNORMAL /HPF

## 2024-05-21 PROCEDURE — 84156 ASSAY OF PROTEIN URINE: CPT

## 2024-05-21 PROCEDURE — 36415 COLL VENOUS BLD VENIPUNCTURE: CPT

## 2024-05-21 PROCEDURE — 82306 VITAMIN D 25 HYDROXY: CPT

## 2024-05-21 PROCEDURE — 81001 URINALYSIS AUTO W/SCOPE: CPT

## 2024-05-21 PROCEDURE — 80053 COMPREHEN METABOLIC PANEL: CPT

## 2024-05-21 PROCEDURE — 82570 ASSAY OF URINE CREATININE: CPT

## 2024-05-21 PROCEDURE — 85025 COMPLETE CBC W/AUTO DIFF WBC: CPT

## 2024-05-21 PROCEDURE — 85610 PROTHROMBIN TIME: CPT

## 2024-05-21 PROCEDURE — 83970 ASSAY OF PARATHORMONE: CPT

## 2024-05-21 PROCEDURE — 84100 ASSAY OF PHOSPHORUS: CPT

## 2024-05-21 PROCEDURE — 99214 OFFICE O/P EST MOD 30 MIN: CPT | Performed by: INTERNAL MEDICINE

## 2024-05-21 NOTE — PROGRESS NOTES
Cardiology Follow Up    Galen PACKER Alli  1950  [id number]  [facility] [place]  [address]  [facility], [place] [address] [phone]    Assessment/Plan:   Acute on chronic HFrEF  Appears Euvolemic on exam, feeling better, tolerating torsemide and warfarin.   No bleeding. Weight is stable, edema is down.   FU 3 months or sooner with symptoms.   Medication Regiment Includes:   Diuretic:   GDMT:   Beta Blocker: Continue Toprol 25 mg daily  Further GDMT limited by soft BPs, ESDRAS and CKD, and tendency for hyperkalemia  Recommend low-sodium diet, daily weights, strict I's and O's.  Recommend continuing to monitor and replete electrolytes as needed.     2.  NICM s/p MDT DC ICD (1/2024)-EF 20%  Further reduction in EF could be related to ongoing critical clinical issues during this admission.  Would recommend repeating limited TTE in the outpatient setting in 2-3 months to reassess prior to next ov.  GDMT:   Beta Blocker: Continue Toprol 25 mg daily  Further GDMT limited by soft BPs, ESDRAS and CKD, and tendency for hyperkalemia  Device therapy: Recommend continue to follow with the device clinic in the outpatient setting.     3.  Elevated troponin-likely nonischemic myocardial injury  Likely secondary to acute on chronic HFrEF, acute anemia with GI bleed, ESDRAS on CKD     4.  Paroxysmal atrial fibrillation  Continue Toprol 25 mg daily, hold parameters adjusted  Continue anticoagulation with warfarin.  [id number] 4.  Continue to monitor hemoglobin closely.     5.  Junctional tachycardia, NSVT, PSVT, PVCs  Continue Toprol 25 mg daily, hold parameters adjusted     6.  GI bleed with acute blood loss anemia  S/p multiple units of FFP and PRBCs, EGD - ulcers, no endoscopic treatment.      7.  ESDRAS on CKD, last cr 1.98.      8.  Mild nonobstructive CAD  Continue statin, Toprol.  Patient not on aspirin in the setting of increased  bleeding risk on warfarin with recent GI bleed and anemia     9.  Hypertension    Continue Toprol 25 mg daily.  Continue to monitor BPs closely.     10.  Hyperlipidemia  Continue statin, last LDL 88.      11.  DM 2 (A1c 6.9)  Continue management per primary team      Interval History: The patient is here for hospital follow-up status post admission for acute on chronic heart failure reduced ejection fraction.  He was discharged 516/2024 and admitted 5/9/2024 with shortness of breath, abdominal pain and weight gain for 3 days.  He was treated for acute CHF R EF exacerbation then developed a drop in hemoglobin and melena.  GI was consulted and he underwent EGD and was treated with PPI.  The hemoglobin was stabilized and he was cleared to resume warfarin.  Nephrology and cardiology were consulted.  He was felt to be euvolemic and discharged.    He now feels well.  His weights in the hospital went from 228 down to 221 which is where he is now. His edema is down and his weight is stable, checked daily.     Patient Active Problem List   Diagnosis    Mixed hyperlipidemia    IFG (impaired fasting glucose)    Upper respiratory tract infection    Positive colorectal cancer screening using Cologuard test    Prostate cancer (HCC)    Encounter for monitoring androgen deprivation therapy    Hot flashes    Type 2 diabetes mellitus with hyperglycemia, without long-term current use of insulin (HCC)    Acute renal failure superimposed on stage 3a chronic kidney disease (HCC)    Essential hypertension    Dilated cardiomyopathy (HCC)    Paroxysmal atrial fibrillation (HCC)    Obesity    ICD (implantable cardioverter-defibrillator) in place    Acute on chronic systolic congestive heart failure (HCC)    Gastrointestinal hemorrhage associated with duodenitis    Acute blood loss anemia     Past Medical History:   Diagnosis Date    Allergic     Anesthesia     pt wears a life vest (11/16).  should not be scheduled at [facility] until heart  condition is stabilized    BPH (benign prostatic hypertrophy)     Cancer (HCC)     Diabetes mellitus (HCC)     Hyperlipidemia     Hypertension     Irregular heart beat     PAF    Prostate cancer (HCC)      Social History     Socioeconomic History    Marital status:      Spouse name: Not on file    Number of children: Not on file    Years of education: Not on file    Highest education level: Not on file   Occupational History    Not on file   Tobacco Use    Smoking status: Former     Current packs/day: 0.00     Average packs/day: 1 pack/day for 25.0 years (25.0 ttl pk-yrs)     Types: Cigarettes, Pipe, Cigars     Start date: 1980     Quit date: 2005     Years since quittin.2     Passive exposure: Past    Smokeless tobacco: Never   Vaping Use    Vaping status: Never Used   Substance and Sexual Activity    Alcohol use: Never    Drug use: Never    Sexual activity: Not Currently   Other Topics Concern    Not on file   Social History Narrative    Not on file     Social Determinants of Health     Financial Resource Strain: Low Risk  (2024)    Overall Financial Resource Strain (CARDIA)     Difficulty of Paying Living Expenses: Not hard at all   Food Insecurity: No Food Insecurity (5/10/2024)    Hunger Vital Sign     Worried About Running Out of Food in the Last Year: Never true     Ran Out of Food in the Last Year: Never true   Transportation Needs: No Transportation Needs (5/10/2024)    PRAPARE - Transportation     Lack of Transportation (Medical): No     Lack of Transportation (Non-Medical): No   Physical Activity: Not on file   Stress: Not on file   Social Connections: Not on file   Intimate Partner Violence: Not on file   Housing Stability: Low Risk  (2024)    Housing Stability Vital Sign     Unable to Pay for Housing in the Last Year: No     Number of Times Moved in the Last Year: 0     Homeless in the Last Year: No      Family History   Problem Relation Age of Onset    Stroke Father      No Known Problems Mother     Prostate cancer Brother     Arthritis Brother     No Known Problems Sister     Diabetes Sister     Diabetes Sister     No Known Problems Son     No Known Problems Daughter      Past Surgical History:   Procedure Laterality Date    A-V CARDIAC PACEMAKER INSERTION      CARDIAC CATHETERIZATION N/A 10/04/2023    Procedure: Cardiac Coronary Angiogram;  Surgeon: Chris Lovell MD;  Location: [facility];  Service: Cardiology    CARDIAC CATHETERIZATION N/A 10/04/2023    Procedure: Cardiac RHC/LHC;  Surgeon: Chris Lovell MD;  Location: [facility];  Service: Cardiology    CARDIAC CATHETERIZATION  10/04/2023    Procedure: Cardiac catheterization;  Surgeon: Chris Lovell MD;  Location: [facility];  Service: Cardiology    CARDIAC ELECTROPHYSIOLOGY PROCEDURE N/A 01/04/2024    Procedure: Cardiac icd implant, Dual Chambers ICD;  Surgeon: Leo Whalen MD;  Location:  CARDIAC CATH LAB;  Service: Cardiology    EAR SURGERY      HERNIA REPAIR      DE COLONOSCOPY FLX DX W/COLLJ SPEC WHEN PFRMD N/A 05/06/2019    Procedure: COLONOSCOPY;  Surgeon: Winston Nielson III, MD;  Location: [facility];  Service: Gastroenterology       Current Outpatient Medications:     Ascorbic Acid (vitamin C) 1000 MG tablet, Take 1,000 mg by mouth daily, Disp: , Rfl:     cetirizine (ZyrTEC) 5 MG tablet, Take 5 mg by mouth daily, Disp: , Rfl:     EVENING PRIMROSE OIL PO, Take by mouth 3 (three) times a day, Disp: , Rfl:     glimepiride (AMARYL) 1 mg tablet, Take 1 tablet (1 mg total) by mouth daily with breakfast, Disp: 30 tablet, Rfl: 5    metoprolol succinate (TOPROL-XL) 25 mg 24 hr tablet, Take 1 tablet (25 mg total) by mouth daily, Disp: 30 tablet, Rfl: 3    multivitamin (THERAGRAN) TABS, Take 1 tablet by mouth daily, Disp: , Rfl:     pantoprazole (PROTONIX) 40 mg tablet, Take 1 tablet (40 mg total) by mouth 2 (two) times a day, Disp: 60 tablet, Rfl: 1    rosuvastatin (CRESTOR) 10 MG  tablet, Take 1 tablet (10 mg total) by mouth daily, Disp: 90 tablet, Rfl: 0    torsemide (DEMADEX) 20 mg tablet, Take 3 tablets (60 mg total) by mouth daily, Disp: 90 tablet, Rfl: 1    VITAMIN D, ERGOCALCIFEROL, PO, Take by mouth, Disp: , Rfl:     vitamin E, tocopherol, 400 units capsule, Take 400 Units by mouth daily, Disp: , Rfl:     warfarin (Coumadin) 5 mg tablet, Take one tablet daily or as directed, Disp: 90 tablet, Rfl: 3    Blood Glucose Monitoring Suppl (ONETOUCH VERIO) w/Device KIT, by Does not apply route once for 1 dose Test sugar in the morning, Disp: 1 kit, Rfl: 0  Allergies   Allergen Reactions    Penicillin V Other (See Comments)     As a child        Labs:  No results displayed because visit has over 200 results.      Lab on 05/08/2024   Component Date Value    Protime 05/08/2024 24.2 (H)     INR 05/08/2024 2.23 (H)    Procedure visit on 05/01/2024   Component Date Value    Case Report 05/01/2024                      Value:Surgical Pathology Report                         Case: [id number]                                  Authorizing Provider:  Estevan Waite DO  Collected:           05/01/2024 1031              Ordering Location:     [facility]        Received:            05/01/2024 1031                                     [facility] [place]                                                         Pathologist:           Delfin Uribe MD                                                           Specimens:   A) - Prostate, LLB                                                                                  B) - Prostate, LMB                                                                                  C) - Prostate, LLM                                                                                  D) - Prostate, LMM                                                                                  E) - Prostate, LLA                                                                                                             F) - Prostate, LMA                                                                                  G) - Prostate, RLB                                                                                  H) - Prostate, RMB                                                                                  I) - Prostate, RLM                                                                                  J) - Prostate, RMM                                                                                  K) - Prostate, RLA                                                                                  L) - Prostate, RMA                                                                         Final Diagnosis 05/01/2024                      Value:This result contains rich text formatting which cannot be displayed here.    Note 05/01/2024                      Value:This result contains rich text formatting which cannot be displayed here.    Additional Information 05/01/2024                      Value:This result contains rich text formatting which cannot be displayed here.    Gross Description 05/01/2024                      Value:This result contains rich text formatting which cannot be displayed here.   Appointment on 04/17/2024   Component Date Value    Protime 04/17/2024 26.6 (H)     INR 04/17/2024 2.51 (H)    Lab on 03/27/2024   Component Date Value    Protime 03/27/2024 27.4 (H)     INR 03/27/2024 2.61 (H)     PSA, Diagnostic 03/27/2024 8.14 (H)    Office Visit on 03/15/2024   Component Date Value    LEUKOCYTE ESTERASE,UA 03/15/2024 +     NITRITE,UA 03/15/2024 -     SL AMB POCT UROBILINOGEN 03/15/2024 0.2     POCT URINE PROTEIN 03/15/2024 15      PH,UA 03/15/2024 5.0     BLOOD,UA 03/15/2024 +     SPECIFIC GRAVITY,UA 03/15/2024 4.010     KETONES,UA 03/15/2024 -     BILIRUBIN,UA 03/15/2024 -     GLUCOSE, UA 03/15/2024 -      COLOR,UA 03/15/2024 light yellow     CLARITY,UA 03/15/2024  clear     POST-VOID RESIDUAL VOLUM* 03/15/2024 16    Lab on 03/13/2024   Component Date Value    Protime 03/13/2024 23.1 (H)     INR 03/13/2024 2.09 (H)     Sodium 03/13/2024 138     Potassium 03/13/2024 5.7 (H)     Chloride 03/13/2024 101     CO2 03/13/2024 28     ANION GAP 03/13/2024 9     BUN 03/13/2024 40 (H)     Creatinine 03/13/2024 1.37 (H)     Glucose, Fasting 03/13/2024 118 (H)     Calcium 03/13/2024 9.1     eGFR 03/13/2024 51    Lab on 02/28/2024   Component Date Value    Protime 02/28/2024 25.1 (H)     INR 02/28/2024 2.33 (H)    Lab on 02/14/2024   Component Date Value    Creatinine, Ur 02/14/2024 74.5     Albumin,U,Random 02/14/2024 120.1 (H)     Albumin Creat Ratio 02/14/2024 161 (H)     Sodium 02/14/2024 138     Potassium 02/14/2024 5.8 (H)     Chloride 02/14/2024 102     CO2 02/14/2024 28     ANION GAP 02/14/2024 8     BUN 02/14/2024 34 (H)     Creatinine 02/14/2024 1.34 (H)     Glucose, Fasting 02/14/2024 130 (H)     Calcium 02/14/2024 9.3     AST 02/14/2024 20     ALT 02/14/2024 19     Alkaline Phosphatase 02/14/2024 57     Total Protein 02/14/2024 7.0     Albumin 02/14/2024 4.4     Total Bilirubin 02/14/2024 0.56     eGFR 02/14/2024 52     Hemoglobin A1C 02/14/2024 6.9 (H)     EAG 02/14/2024 151     WBC 02/14/2024 5.95     RBC 02/14/2024 4.37     Hemoglobin 02/14/2024 12.9     Hematocrit 02/14/2024 39.7     MCV 02/14/2024 91     MCH 02/14/2024 29.5     MCHC 02/14/2024 32.5     RDW 02/14/2024 13.5     Platelets 02/14/2024 185     MPV 02/14/2024 10.6     Protime 02/14/2024 18.0 (H)     INR 02/14/2024 1.51 (H)    Lab on 01/31/2024   Component Date Value    Protime 01/31/2024 29.7 (H)     INR 01/31/2024 2.90 (H)    There may be more visits with results that are not included.     Imaging: XR chest portable    Result Date: 5/14/2024  Narrative: XR CHEST PORTABLE INDICATION: hypoxia. COMPARISON: 5/9/2024 FINDINGS: Left chest wall intracardiac device with intact lead(s). No abandoned lead(s). There is  worsening vascular congestion and mildly increased patchy density at the right lung base. No pneumothorax or pleural effusion. Enlarged cardiac silhouette, unchanged. Bones are unremarkable for age. Normal upper abdomen.     Impression: Worsening vascular congestion and increased patchy density at the right lower lobe. This may represent asymmetric edema given worsening congestive changes versus atelectasis or pneumonia. The study was marked in EPIC for immediate notification. Workstation performed: [id number]     EGD    Result Date: 5/11/2024  Narrative: Table formatting from the original result was not included.  [facility] [address] [facility] [address] [phone] DATE OF SERVICE: 5/11/24 PHYSICIAN(S): Attending: Marquis Lau MD Fellow: No Staff Documented INDICATION: Melena POST-OP DIAGNOSIS: See the impression below. PREPROCEDURE: Informed consent was obtained for the procedure, including sedation.  Risks of perforation, hemorrhage, adverse drug reaction and aspiration were discussed. The patient was placed in the left lateral decubitus position. Patient was explained about the risks and benefits of the procedure. Risks including but not limited to bleeding, infection, and perforation were explained in detail. Also explained about less than 100% sensitivity with the exam and other alternatives. PROCEDURE: EGD DETAILS OF PROCEDURE: Patient was taken to the procedure room where a time out was performed to confirm correct patient and correct procedure. The patient underwent monitored anesthesia care, which was administered by an anesthesia professional. The patient's blood pressure, heart rate, level of consciousness, respirations, oxygen, ECG and ETCO2 were monitored throughout the procedure. The scope was introduced through the mouth and advanced to the second part of the duodenum. Retroflexion was performed in the fundus. The patient experienced no blood loss. The procedure  was not difficult. The patient tolerated the procedure well. There were no apparent adverse events. ANESTHESIA INFORMATION: ASA: ASA status not filed in the log. Anesthesia Type: Anesthesia type not filed in the log. MEDICATIONS: No administrations occurring from 1303 to 1317 on 05/11/24 FINDINGS: The esophagus and duodenum appeared normal. 2 5 mm superficial ulcers in the 2nd part of the duodenum with clean base (Eran III) 20 mm large deep ulcer in the 2nd part of the duodenum with flat pigmented spot (Eran IIC) Mild edematous and erythematous mucosa in the antrum Performed multiple forceps biopsies in the body of the stomach, incisura and antrum to rule out H. pylori SPECIMENS: ID Type Source Tests Collected by Time Destination 1 :  Tissue Stomach TISSUE EXAM Marquis Lau MD 5/11/2024  1:15 PM      Impression: 3 total ulcers in the duodenum at the beginning of the duodenal sweep including one large deep cratered ulcer with flat pigmented spot.  This did not require endoscopic treatment. No signs of ongoing bleeding. The esophagus and duodenum appeared normal. Mild edematous and erythematous mucosa in the antrum Performed forceps biopsies in the body of the stomach, incisura and antrum to rule out H. pylori RECOMMENDATION:  PPI 40 mg twice daily for 8 weeks.  Okay to restart diet.  Okay to restart Coumadin today however if heparin drip is to be started I would recommend waiting until tomorrow to start this.  Follow-up pathology to assess for H. pylori.  No further inpatient gastroenterology workup.  Gastroenterology will sign off.  Please call with questions.    Marquis Lau MD     XR chest 1 view portable    Result Date: 5/10/2024  Narrative: XR CHEST PORTABLE INDICATION: Shortness of breath. COMPARISON: Chest radiograph 1/4/2024 FINDINGS: Left chest wall pacer/ICD with intact leads. No acute focal infiltrate. Unchanged appearance of left basilar atelectasis versus scarring. Borderline/mild pulmonary  vascular congestion. No pneumothorax or pleural effusion. Normal cardiomediastinal silhouette. Bones are unremarkable for age. Normal upper abdomen.     Impression: No acute focal infiltrate or overt pulmonary edema. Borderline/mild pulmonary vascular congestion. Resident: TORI NATHAN I, the attending radiologist, have reviewed the images and agree with the final report above. Workstation performed: [id number]     Echo complete    Result Date: 5/10/2024  Narrative:   Left Ventricle: Left ventricular cavity size is dilated. Wall thickness is normal. The left ventricular ejection fraction is 20%. Systolic function is severely reduced.  Asynchronous septal motion noted. There is severe global hypokinesis. Diastolic function is mildly abnormal, consistent with grade I (abnormal) relaxation.   Left Atrium: The atrium is mild to moderately dilated.   Mitral Valve: There is mild annular calcification.   Tricuspid Valve: There is trace regurgitation.  ICD lead noted.     CT abdomen pelvis with contrast    Result Date: 5/9/2024  Narrative: CT ABDOMEN AND PELVIS WITH IV CONTRAST INDICATION: Abdominal pain and distention. COMPARISON: 6/13/2019. TECHNIQUE: CT examination of the abdomen and pelvis was performed. Multiplanar 2D reformatted images were created from the source data. This examination, like all CT scans performed in the [facility], was performed utilizing techniques to minimize radiation dose exposure, including the use of iterative reconstruction and automated exposure control. Radiation dose length product (DLP) for this visit: 1550 mGy-cm IV Contrast: 100 mL of iohexol (OMNIPAQUE) Enteric Contrast: Not administered. FINDINGS: ABDOMEN LOWER CHEST: Minimal subsegmental atelectasis at the lung bases changes near. LIVER/BILIARY TREE: Unremarkable. GALLBLADDER: No calcified gallstones. No pericholecystic inflammatory change. SPLEEN: Unremarkable. PANCREAS: Minimal fat stranding adjacent to the  head of the pancreas likely secondary to duodenitis. Otherwise normal enhancement of the pancreas without evidence of ductal dilatation. ADRENAL GLANDS: Right adrenal 1.5 cm nodule.. KIDNEYS/URETERS: Symmetric nephrographic phase enhancement of the kidneys. Renal cysts measuring up to 2.2 cm. No obstructive uropathy. STOMACH AND BOWEL: Wall thickening and inflammation in the pylorus and second portion of the duodenum. Minimal adjacent mesenteric fat stranding. Diverticulosis without evidence of diverticulitis or colitis APPENDIX: Normal appendix. ABDOMINOPELVIC CAVITY: No ascites. No pneumoperitoneum. No lymphadenopathy. VESSELS: No abdominal aortic aneurysm. PELVIS REPRODUCTIVE ORGANS: Enlarged prostate. URINARY BLADDER: Unremarkable. ABDOMINAL WALL/INGUINAL REGIONS: Unremarkable. BONES: No acute fracture or suspicious osseous lesion.     Impression: 1. Findings consistent with acute duodenitis involving the pylorus and second portion of the duodenum. No evidence of perforation or abscess. GI consultation recommended. Workstation performed: [id number]     Cardiac EP device report    Result Date: 5/2/2024  Narrative: MDT DC ICD/ACTIVE SYSTEM IS MRI CONDITIONAL DEVICE INTERROGATED IN THE [place] OFFICE:  BATTERY VOLTAGE ADEQUATE (12.4 YR.).  AP 25.5%  3.2%.  ALL LEAD PARAMETERS WITHIN NORMAL LIMITS.  4 VT-NS EPISODES WITH NSVT ON 1 EGM (#5 - 15 @ 207 BPM), AND PAT ON REMAINING EGMS (8 @ 184 BPM, 10 @ 167 BPM, 15 @ 188 BPM).  DECREASE MADE TO AMPLITUDES TO PROMOTE DEVICE LONGEVITY WHILE MAINTAINING AN APPROPRIATE SAFETY MARGIN.  THRESHOLD TESTS NOT PRINTED.  NORMAL DEVICE FUNCTION.  RG       Review of Systems:  Review of SystemsNegative except for hpi    Physical Exam:  Physical Exam  GEN: Alert and oriented x 3, in no acute distress.  Well appearing and well nourished.   HEENT: Sclera anicteric, conjunctivae pink, mucous membranes moist. Oropharynx clear.   NECK: Supple, no carotid bruits, no significant  JVD. Trachea midline, no thyromegaly.   HEART: Regular rhythm, normal S1 and S2, no murmurs, clicks, gallops or rubs. PMI nondisplaced, no thrills.   LUNGS: Clear to auscultation bilaterally; no wheezes, rales, or rhonchi. No increased work of breathing or signs of respiratory distress.   ABDOMEN: Soft, nontender, nondistended, normoactive bowel sounds.   EXTREMITIES: Skin warm and well perfused, no clubbing, cyanosis, or edema.  NEURO: No focal findings. Normal speech. Mood and affect normal.   SKIN: Normal without suspicious lesions on exposed skin.      1. Dilated cardiomyopathy (HCC)        2. Paroxysmal atrial fibrillation (HCC)        3. Acute on chronic systolic congestive heart failure (HCC)        4. Essential hypertension        5. Type 2 diabetes mellitus with hyperglycemia, without long-term current use of insulin ([facility])        6. Acute renal failure superimposed on stage 3a chronic kidney disease, unspecified acute renal failure type ([facility])

## 2024-05-23 ENCOUNTER — PATIENT OUTREACH (OUTPATIENT)
Dept: FAMILY MEDICINE CLINIC | Facility: CLINIC | Age: 74
End: 2024-05-23

## 2024-05-23 NOTE — PROGRESS NOTES
Spoke with amy. He is checking blood sugars 1-2 times a day. Readings are in the 230's to 300's. Will check thru the weekend and then call PCP office next week with results. Does not drink sugar drinks or add sweetener to coffee  Offered to send diabetic education declines at this time.   Weight today is 213 pounds. Denies lower extremity swelling. He said he did not know how much to drink in a day. Reviewed no more than 60-64 ounces he states he drinks two 16 ounce water bottle a day.   Reports stool today was black no blood instructed him to keep an eye on his stool.  I will check back in one week.

## 2024-05-24 ENCOUNTER — OFFICE VISIT (OUTPATIENT)
Dept: UROLOGY | Facility: CLINIC | Age: 74
End: 2024-05-24
Payer: MEDICARE

## 2024-05-24 ENCOUNTER — TELEPHONE (OUTPATIENT)
Age: 74
End: 2024-05-24

## 2024-05-24 VITALS
BODY MASS INDEX: 30.49 KG/M2 | SYSTOLIC BLOOD PRESSURE: 120 MMHG | DIASTOLIC BLOOD PRESSURE: 80 MMHG | HEIGHT: 70 IN | HEART RATE: 92 BPM | WEIGHT: 213 LBS | OXYGEN SATURATION: 96 %

## 2024-05-24 DIAGNOSIS — R97.20 ELEVATED PSA: Primary | ICD-10-CM

## 2024-05-24 PROCEDURE — 99215 OFFICE O/P EST HI 40 MIN: CPT | Performed by: UROLOGY

## 2024-05-24 NOTE — Clinical Note
Tereza, Sounds like this pt was referred by your team to see Kike Sandhu for possible therapy. Phoebe wanted repeat bx which we got. We also started ADT. Sounds like other plan was PSMA scan and repeat PSA early 2025 and follow up with Phoebe. Pt said he does not have appt with [place]. If you are in contact with [place] team, can you see if Mr. Carson is looped in for follow up? Otherwise could you connect me with someone who is connected with [place] rad onc?  -Estevan

## 2024-05-24 NOTE — Clinical Note
Pt has lupron shot 10/22/24. He needs this changed to firmagon shot and will need monthly firmagon shots moving forward.

## 2024-05-24 NOTE — PROGRESS NOTES
UROLOGY FOLLOW-UP ENCOUNTER    Galen Carson is a 73 y.o. male with prostate cancer    Pertinent non-urologic PMH: DM (hemoglobin A1c 6.9 on 2/14/2024), HLD, HTN, CKD (creatinine 1.37, GFR 51 on 3/13/2024); a fib    Pertinent non-urologic PSH: Cardiac catheterization without stents, hernia repair, dual chamber ICD implant Jan 2024    Anticoagulation: ASA81, Warfarin    History of high risk prostate cancer treated with radiation in 2019 he completed 2-year course of ADT in February 2021    Pretreatment PSA was 22.4.  Morales PSA was 0.0.    Had PSA recurrence which prompted workup below    PSMA PET scan 9/25/2023: Heterogenous multifocal fairly intense tracer activity in the right greater than left prostate gland highly suspicious for locally recurrent intraprostatic PSMA positive malignancy; no focal tracer activity concerning for metastatic disease    Prostate MRI 10/13/2023: Tumor in anterior and posterior right peripheral zone apex and mid gland and right transition zone at apex corresponds to PSMA avid lesion; the lesion broadly abuts the capsule without visualized gross extraprostatic extension; no SVI/LAD/bony metastasis; prostate 29 g    Given the positive lesion seen on imaging, plan was for patient to undergo MRI fusion biopsy.  Unfortunately, the case was canceled by anesthesia due to the patient's cardiac arrhythmia requiring LifeVest.  Since he cannot undergo anesthesia, there is consideration for alternate therapy.    Patient had dual-chamber ICD implant 1/4/2024    PSA 8.48 on 1/24/2024    Saw Dr. Barreto of hematology oncology on 2/21/2024. Option for repeat local therapy versus hormonal therapy with LHRH antagonist along with closely watching PSA.    PSA 8.14 on 3/27/24     Patient declined OR for TP bx. Presented to office for TRUS Pbx.    Lupron q6 month on 5/1/24     TRUS Pbx 5/1/24: G 4+4 in 7 cores  Volume:  20 cm3     Admitted mid May 2024 with acute on chronic CHF, required diuretics, got ESDRAS. Cr  was down to around 2.0 by discharge (prev Bcr was about 1.4 in March 2024).    Assessment and plan:     Prostate cancer    Patient with prostate cancer history as noted above.  Of note, his initial high risk prostate cancer was treated with radiation in 2019.  He also completed a 2-year course of ADT in 2021.  His aguilar PSA got down to 0.0.    He had a PSA recurrence which prompted a workup.  He had a PSMA PET scan in September 2023 which demonstrated heterogenous multifocal fairly intense tracer activity in the prostate without any obvious findings of metastatic disease.  His prostate MRI in October 2023 demonstrated prostate lesion correlating with the PSMA avid lesion.  The patient was supposed to go to the OR for transperineal prostate biopsy, however, he was not able to undergo anesthesia due to the fact that he had a LifeVest.  He fortunately had a dual-chamber ICD implanted on 1//24.    We ultimately started him on ADT again with his Lupron shot on 5/1/2024.  We also opted to do a repeat office transrectal ultrasound-guided biopsy which demonstrated 7 cores of Elder 8 disease.    We went over the pathology results today.  I explained that it was not surprising that he had persistent prostate cancer previously diagnosed as well as his most recent imaging findings.    He unfortunately was hospitalized in mid May 2024 with acute on chronic CHF.  Due to his cardiac history on the hospitalization that was a few weeks after the Lupron shot, I explained to him that it was difficult to tell for sure, but sometimes Lupron injections can be associated with cardiac events.  I also explained that the alternative shot which would be Firmagon monthly, also has risk of cardiac events, but some studies show that it is decreased compared to Lupron.  I explained that overall, the data does not appear to be convincing either way, but since he has proven and apparent difficulty with the Lupron shots, it was worth switching him  "to monthly Firmagon.    We will therefore plan on his October 2024 shot being Firmagon.  Will then move forward with Firmagon shots after that monthly.    Additionally, he is following with [facility] radiation oncology.  The plan per latest documentation was for him to get a PSMA scan and repeat PSA in early 2025 assuming that he already started ADT, which she has with our department.    I will reach out to the radiation oncology team about making sure that this follow-up appointment is set up, since the patient does not believe he has follow-up scheduled with [place].    Additionally, the patient would like to repeat a PSA in a few months to see if it went down, I think this is reasonable.  I will therefore plan on seeing him in 2 to 3 months with a repeat PSA.        PLAN  -Has 10/22/24 appt for Lupron. Will plan for monthly firmagon instead due to recent CHF admission.  -PSA in about 2-3 months. See me in clinic to review results.  -Saw rad Dr. Sandhu at [place]. He recommended starting ADT (initiated) and getting PSA and PSMA around early 2025. Will be in touch with [facility] rad onc so they can reach out to [place] to ensure patient has proper follow up.      [facility].      Portions of the above record have been created with voice recognition software.  Occasional wrong word or \"sound alike\" substitution may have occurred due to the inherent limitations of voice recognition software.  Read the chart carefully and recognize, using context, where substitution may have occurred.      Estevan Waite,         Chief Complaint     Prostate cancer      History of Present Illness     See summary above    No fevers or chills          The following portions of the patient's history were reviewed and updated as appropriate: allergies, current medications, past family history, past medical history, past social history, past surgical history and problem " "list.        AUA SYMPTOM SCORE      Flowsheet Row Most Recent Value   AUA SYMPTOM SCORE    How often have you had a sensation of not emptying your bladder completely after you finished urinating? 0 (P)    How often have you had to urinate again less than two hours after you finished urinating? 1 (P)    How often have you found you stopped and started again several times when you urinate? 0 (P)    How often have you found it difficult to postpone urination? 1 (P)    How often have you had a weak urinary stream? 5 (P)    How often have you had to push or strain to begin urination? 1 (P)    How many times did you most typically get up to urinate from the time you went to bed at night until the time you got up in the morning? 1 (P)    Quality of Life: If you were to spend the rest of your life with your urinary condition just the way it is now, how would you feel about that? 1 (P)    AUA SYMPTOM SCORE 9 (P)               Review of Systems     Neg for fevers, chills, nausea, vomiting, SOB, abdominal pain, painful urination      Allergies     Allergies   Allergen Reactions    Penicillin V Other (See Comments)     As a child        Physical Exam     NAD, NCAT, no CVAT BL, ab soft and nontender, non-labored breathing        Vital Signs  Vitals:    05/24/24 1231   BP: 120/80   BP Location: Left arm   Patient Position: Sitting   Cuff Size: Adult   Pulse: 92   SpO2: 96%   Weight: 96.6 kg (213 lb)   Height: 5' 10\" (1.778 m)         Current Medications       Current Outpatient Medications:     Ascorbic Acid (vitamin C) 1000 MG tablet, Take 1,000 mg by mouth daily, Disp: , Rfl:     cetirizine (ZyrTEC) 5 MG tablet, Take 5 mg by mouth daily, Disp: , Rfl:     EVENING PRIMROSE OIL PO, Take by mouth 3 (three) times a day, Disp: , Rfl:     glimepiride (AMARYL) 1 mg tablet, Take 1 tablet (1 mg total) by mouth daily with breakfast, Disp: 30 tablet, Rfl: 5    metoprolol succinate (TOPROL-XL) 25 mg 24 hr tablet, Take 1 tablet (25 mg total) " by mouth daily, Disp: 30 tablet, Rfl: 3    multivitamin (THERAGRAN) TABS, Take 1 tablet by mouth daily, Disp: , Rfl:     pantoprazole (PROTONIX) 40 mg tablet, Take 1 tablet (40 mg total) by mouth 2 (two) times a day, Disp: 60 tablet, Rfl: 1    rosuvastatin (CRESTOR) 10 MG tablet, Take 1 tablet (10 mg total) by mouth daily, Disp: 90 tablet, Rfl: 0    torsemide (DEMADEX) 20 mg tablet, Take 3 tablets (60 mg total) by mouth daily, Disp: 90 tablet, Rfl: 1    VITAMIN D, ERGOCALCIFEROL, PO, Take by mouth, Disp: , Rfl:     vitamin E, tocopherol, 400 units capsule, Take 400 Units by mouth daily, Disp: , Rfl:     warfarin (Coumadin) 5 mg tablet, Take one tablet daily or as directed, Disp: 90 tablet, Rfl: 3    Blood Glucose Monitoring Suppl (ONETOUCH VERIO) w/Device KIT, by Does not apply route once for 1 dose Test sugar in the morning, Disp: 1 kit, Rfl: 0      Active Problems     Patient Active Problem List   Diagnosis    Mixed hyperlipidemia    IFG (impaired fasting glucose)    Upper respiratory tract infection    Positive colorectal cancer screening using Cologuard test    Prostate cancer (HCC)    Encounter for monitoring androgen deprivation therapy    Hot flashes    Type 2 diabetes mellitus with hyperglycemia, without long-term current use of insulin (HCC)    Acute renal failure superimposed on stage 3a chronic kidney disease (HCC)    Essential hypertension    Dilated cardiomyopathy (HCC)    Paroxysmal atrial fibrillation (HCC)    Obesity    ICD (implantable cardioverter-defibrillator) in place    Acute on chronic systolic congestive heart failure (HCC)    Gastrointestinal hemorrhage associated with duodenitis    Acute blood loss anemia         Past Medical History     Past Medical History:   Diagnosis Date    Allergic     Anesthesia     pt wears a life vest (11/16).  should not be scheduled at [facility] until heart condition is stabilized    BPH (benign prostatic hypertrophy)     Cancer (HCC)     Diabetes mellitus (HCC)      Hyperlipidemia     Hypertension     Irregular heart beat     PAF    Prostate cancer (HCC)          Surgical History     Past Surgical History:   Procedure Laterality Date    A-V CARDIAC PACEMAKER INSERTION      CARDIAC CATHETERIZATION N/A 10/04/2023    Procedure: Cardiac Coronary Angiogram;  Surgeon: Chris Lovell MD;  Location: [facility];  Service: Cardiology    CARDIAC CATHETERIZATION N/A 10/04/2023    Procedure: Cardiac RHC/LHC;  Surgeon: Chris Lovell MD;  Location: [facility];  Service: Cardiology    CARDIAC CATHETERIZATION  10/04/2023    Procedure: Cardiac catheterization;  Surgeon: Chris Lovell MD;  Location: [facility];  Service: Cardiology    CARDIAC ELECTROPHYSIOLOGY PROCEDURE N/A 2024    Procedure: Cardiac icd implant, Dual Chambers ICD;  Surgeon: Leo Whalen MD;  Location:  CARDIAC CATH LAB;  Service: Cardiology    EAR SURGERY      HERNIA REPAIR      MS COLONOSCOPY FLX DX W/COLLJ SPEC WHEN PFRMD N/A 2019    Procedure: COLONOSCOPY;  Surgeon: Winston Nielson III, MD;  Location: [facility] GI [facility];  Service: Gastroenterology         Family History     Family History   Problem Relation Age of Onset    Stroke Father     No Known Problems Mother     Prostate cancer Brother     Arthritis Brother     No Known Problems Sister     Diabetes Sister     Diabetes Sister     No Known Problems Son     No Known Problems Daughter          Social History     Social History     Social History     Tobacco Use   Smoking Status Former    Current packs/day: 0.00    Average packs/day: 1 pack/day for 25.0 years (25.0 ttl pk-yrs)    Types: Cigarettes, Pipe, Cigars    Start date: 1980    Quit date: 2005    Years since quittin.2    Passive exposure: Past   Smokeless Tobacco Never         Pertinent Lab Values     Lab Results   Component Value Date    CREATININE 2.32 (H) 2024       Lab Results   Component Value Date    PSA 8.14 (H) 2024    PSA 8.48 (H)  "01/24/2024    PSA 5.11 (H) 12/06/2023         Pertinent Imaging     The patient's images were reviewed by me personally and also in real time with them in the examination room using our PACS imaging system.      PSMA PET scan 9/25/2023: Heterogenous multifocal fairly intense tracer activity in the right greater than left prostate gland highly suspicious for locally recurrent intraprostatic PSMA positive malignancy; no focal tracer activity concerning for metastatic disease    Prostate MRI 10/13/2023: Tumor in anterior and posterior right peripheral zone apex and mid gland and right transition zone at apex corresponds to PSMA avid lesion; the lesion broadly abuts the capsule without visualized gross extraprostatic extension; no SVI/LAD/bony metastasis; prostate 29 g      Pertinent Pathology     TRUS Pbx 5/24/24: G 4+4 in 7 cores  Volume:  20 cm3   A. Prostate, \"Prostate, left lateral base,\" Core biopsy:  - Benign prostatic tissue, negative for carcinoma     B. Prostate, \"Prostate, left mid base,\" Core biopsy:  - Prostatic adenocarcinoma, [place] pattern 4 + 4, [place] score 8, Grade Group 4  - Largest focus of carcinoma measures 0.1 cm (5% of tissue core)  - Carcinoma involves 1 of 1 core biopsies  - No perineural invasion present     C. Prostate, \"Prostate, left lateral mid,\" Core biopsy:  - Benign prostatic tissue, negative for carcinoma     D. Prostate, \"Prostate, left medial mid,\" Core biopsy:  - Prostatic adenocarcinoma, [place] pattern 4 + 4, Elder score 8, Grade Group 4  - Multifocal, largest focus of carcinoma measures 0.5 cm (30% of tissue core)  - Carcinoma involves 1 of 1 core biopsies  - No perineural invasion present     E. Prostate, \"Prostate, left lateral apex,\" Core biopsy:  - Benign prostatic tissue, negative for carcinoma     F. Prostate, \"Prostate, left medial apex,\" Core biopsy:  - Prostatic adenocarcinoma, [place] pattern 4 + 4, Elder score 8, Grade Group 4  - Largest focus of carcinoma " "measures 0.1 cm (10% of tissue core)  - Carcinoma involves 1 of 1 core biopsies  - No perineural invasion present     G. Prostate, \"Prostate, right lateral base,\" Core biopsy:  - Benign prostatic tissue, negative for carcinoma     H. Prostate, \"Prostate, right medial base,\" Core biopsy:  - Benign prostatic tissue, negative for carcinoma     I. Prostate, \"Prostate, right lateral mid,\" Core biopsy:  - Prostatic adenocarcinoma, Stanton pattern 4 + 4, Stanton score 8, Grade Group 4  - Largest focus of carcinoma measures 0.4 cm (30% of tissue core)  - Carcinoma involves 1 of 1 core biopsies  - No perineural invasion present     J. Prostate, \"Prostate, right medial mid,\" Core biopsy:  - Prostatic adenocarcinoma, Stanton pattern 4 + 4, Elder score 8, Grade Group 4  - Largest focus of carcinoma measures 0.1 cm (5% of tissue core)  - Carcinoma involves 1 of 1 core biopsies  - No perineural invasion present     K. Prostate, \"Prostate, right lateral apex,\" Core biopsy:  - Prostatic adenocarcinoma, Stanton pattern 4 + 4, Stanton score 8, Grade Group 4  - Largest focus of carcinoma measures 1.2 cm  - Carcinoma involves 1 of 1 core biopsies  - No perineural invasion present     L. Prostate, \"Prostate, right medial apex,\" Core biopsy:  - Prostatic adenocarcinoma, Stanton pattern 4 + 4, Stanton score 8, Grade Group 4  - Largest focus of carcinoma measures 0.3 cm (15% of tissue core)  - Carcinoma involves 1 of 1 core biopsies  - No perineural invasion present  - Large nerve present  - Benign prostatic tissue, negative for carcinoma          I have spent 40 minutes with Patient and family today in which greater than 50% of this time was spent in counseling/coordination of care regarding Diagnostic results, Prognosis, Risks and benefits of tx options, Instructions for management, Patient and family education, Importance of tx compliance, Impressions, Counseling / Coordination of care, Documenting in the medical record, Reviewing / " ordering tests, medicine, procedures  , and Obtaining or reviewing history  .    Please note this time includes cumulative time on the day of encounter, including reviewing medical records and/or coordinating care among the patient's other specialists.

## 2024-05-24 NOTE — TELEPHONE ENCOUNTER
Pts report his blood sugars aren't going to down to an appropriate level, that pt and PCP discussing in office. Pt reports BS: 5/2164=056, 5/2223=533, 5/2353=704, 5/2457=114 TODAY. The sugars were all obtained Fasting sugar in the morning. Pt would like a call back from PCP to further discuss medications and possible changes if needed.  Pt denies any Hypoglycemia/hyperglycemia s/s at this time. Just the readings worry him, and he stated he would like his sugars to be in the 150s.   Please further advise patient @[phone].  Thank you

## 2024-05-28 ENCOUNTER — TELEPHONE (OUTPATIENT)
Dept: RADIATION ONCOLOGY | Facility: CLINIC | Age: 74
End: 2024-05-28

## 2024-05-28 ENCOUNTER — TELEPHONE (OUTPATIENT)
Age: 74
End: 2024-05-28

## 2024-05-28 NOTE — TELEPHONE ENCOUNTER
Pt called back today in regards to elevated blood sugar.  Said he increased the medication Glimeperide from 1 tablet to 2 tablets on Saturday and glucose was 222 and 241.  Pt increased to 3 tablets on Sunday and glucose was 201 in am and 139 in pm and took 3 tablets again today and glucose was 211 this am.      Please advise

## 2024-05-28 NOTE — TELEPHONE ENCOUNTER
RAD ONC - Faxed biopsy dated 5/1/24 to Dr. Blanco Sandhu at [facility].  Called Dr. Sandhu's office to confirm receipt of biopsy.  Spoke with Candida and advised that Mr. Carson is in need of a follow-up with Dr. Sandhu.  Asked that office reach out to Mr. Carson directly to schedule.  Called Mr. Carson and advised that Dr. Sandhu's office will contact him directly.  Patient verbalized understanding...KD

## 2024-05-30 ENCOUNTER — TELEPHONE (OUTPATIENT)
Dept: CARDIOLOGY CLINIC | Facility: CLINIC | Age: 74
End: 2024-05-30

## 2024-05-30 ENCOUNTER — OFFICE VISIT (OUTPATIENT)
Dept: FAMILY MEDICINE CLINIC | Facility: CLINIC | Age: 74
End: 2024-05-30
Payer: MEDICARE

## 2024-05-30 VITALS
HEART RATE: 65 BPM | OXYGEN SATURATION: 99 % | BODY MASS INDEX: 31.5 KG/M2 | DIASTOLIC BLOOD PRESSURE: 76 MMHG | HEIGHT: 70 IN | TEMPERATURE: 97.7 F | SYSTOLIC BLOOD PRESSURE: 122 MMHG | WEIGHT: 220 LBS

## 2024-05-30 DIAGNOSIS — E11.65 TYPE 2 DIABETES MELLITUS WITH HYPERGLYCEMIA, WITHOUT LONG-TERM CURRENT USE OF INSULIN (HCC): Primary | ICD-10-CM

## 2024-05-30 DIAGNOSIS — I48.0 PAROXYSMAL ATRIAL FIBRILLATION (HCC): ICD-10-CM

## 2024-05-30 DIAGNOSIS — I10 ESSENTIAL HYPERTENSION: ICD-10-CM

## 2024-05-30 LAB — SL AMB POCT HEMOGLOBIN AIC: 5.7 (ref ?–6.5)

## 2024-05-30 PROCEDURE — 83036 HEMOGLOBIN GLYCOSYLATED A1C: CPT | Performed by: FAMILY MEDICINE

## 2024-05-30 PROCEDURE — 99214 OFFICE O/P EST MOD 30 MIN: CPT | Performed by: FAMILY MEDICINE

## 2024-05-30 NOTE — TELEPHONE ENCOUNTER
2 Wk F/U call to patient who stated he continues with exertional dyspnea. Stated he walks 75 feet & feels his heart racing & is SOB & needs to stop. After resting, breathing & heart rate return to normal.    Weight today on home scale - Wt - 212.4 lb    Denies YOLANDA.    Taking all his cardiac medications as ordered.  Stated following a 2 g sodium diet is hard but is trying.

## 2024-05-31 ENCOUNTER — PATIENT OUTREACH (OUTPATIENT)
Dept: FAMILY MEDICINE CLINIC | Facility: CLINIC | Age: 74
End: 2024-05-31

## 2024-05-31 NOTE — PROGRESS NOTES
Spoke Galen he is doing good. Weigh today is 213 pounds. Saw PCP yesterday and he is now wearing a free style lisa for two weeks. Galen is really liking it  Blood sugar was 132 while we were talking  He would like one ordered. Plan will be for me to chck back in two weeks to make sure he still likes the Lisa and then ask for order to be placed I did say most patients who are approved are taking insulin but we can try to obtain one for him   No more black stools.   Watching salt intake. Tries to be as active as he can.

## 2024-06-02 NOTE — ASSESSMENT & PLAN NOTE
Due to variability in home bs reading , given cgm , rec new device , cautioned hypoglycemia , return to previous dosing glypizide , labs   Lab Results   Component Value Date    HGBA1C 5.7 05/30/2024

## 2024-06-05 ENCOUNTER — ANTICOAG VISIT (OUTPATIENT)
Dept: CARDIOLOGY CLINIC | Facility: CLINIC | Age: 74
End: 2024-06-05

## 2024-06-05 ENCOUNTER — LAB (OUTPATIENT)
Dept: LAB | Facility: CLINIC | Age: 74
End: 2024-06-05
Payer: MEDICARE

## 2024-06-05 DIAGNOSIS — I48.0 PAROXYSMAL ATRIAL FIBRILLATION (HCC): ICD-10-CM

## 2024-06-05 LAB
INR PPP: 2.3 (ref 0.84–1.19)
PROTHROMBIN TIME: 24.9 SECONDS (ref 11.6–14.5)

## 2024-06-05 PROCEDURE — 85610 PROTHROMBIN TIME: CPT

## 2024-06-05 PROCEDURE — 36415 COLL VENOUS BLD VENIPUNCTURE: CPT

## 2024-06-14 ENCOUNTER — PATIENT OUTREACH (OUTPATIENT)
Dept: FAMILY MEDICINE CLINIC | Facility: CLINIC | Age: 74
End: 2024-06-14

## 2024-06-14 NOTE — PROGRESS NOTES
Spoke with amy he really liked the CGM it ran out yesterday. [place] what food elevated his blood sugars FBS ranged 125-157 during the two week period.   He would like CGM ordered I will send message to PCP office.   Continues to be as active as he can be. Walking more.   I will check back after PCP appointment on 6/27/24  Amy has never attended diabetic nutrition classes

## 2024-06-18 DIAGNOSIS — E11.65 TYPE 2 DIABETES MELLITUS WITH HYPERGLYCEMIA, WITHOUT LONG-TERM CURRENT USE OF INSULIN (HCC): Primary | ICD-10-CM

## 2024-06-18 RX ORDER — BLOOD-GLUCOSE SENSOR
1 EACH MISCELLANEOUS
Qty: 6 EACH | Refills: 3 | Status: SHIPPED | OUTPATIENT
Start: 2024-06-18 | End: 2024-06-27 | Stop reason: SDUPTHER

## 2024-06-26 ENCOUNTER — ANTICOAG VISIT (OUTPATIENT)
Dept: CARDIOLOGY CLINIC | Facility: CLINIC | Age: 74
End: 2024-06-26

## 2024-06-26 ENCOUNTER — LAB (OUTPATIENT)
Dept: LAB | Facility: CLINIC | Age: 74
End: 2024-06-26
Payer: MEDICARE

## 2024-06-26 DIAGNOSIS — E11.65 TYPE 2 DIABETES MELLITUS WITH HYPERGLYCEMIA, WITHOUT LONG-TERM CURRENT USE OF INSULIN (HCC): ICD-10-CM

## 2024-06-26 DIAGNOSIS — I48.0 PAROXYSMAL ATRIAL FIBRILLATION (HCC): ICD-10-CM

## 2024-06-26 LAB
ALBUMIN SERPL BCG-MCNC: 4.3 G/DL (ref 3.5–5)
ALP SERPL-CCNC: 64 U/L (ref 34–104)
ALT SERPL W P-5'-P-CCNC: 19 U/L (ref 7–52)
ANION GAP SERPL CALCULATED.3IONS-SCNC: 18 MMOL/L (ref 4–13)
AST SERPL W P-5'-P-CCNC: 25 U/L (ref 13–39)
BILIRUB SERPL-MCNC: 0.37 MG/DL (ref 0.2–1)
BUN SERPL-MCNC: 62 MG/DL (ref 5–25)
CALCIUM SERPL-MCNC: 9.7 MG/DL (ref 8.4–10.2)
CHLORIDE SERPL-SCNC: 99 MMOL/L (ref 96–108)
CO2 SERPL-SCNC: 27 MMOL/L (ref 21–32)
CREAT SERPL-MCNC: 2.32 MG/DL (ref 0.6–1.3)
EST. AVERAGE GLUCOSE BLD GHB EST-MCNC: 131 MG/DL
GFR SERPL CREATININE-BSD FRML MDRD: 27 ML/MIN/1.73SQ M
GLUCOSE P FAST SERPL-MCNC: 131 MG/DL (ref 65–99)
HBA1C MFR BLD: 6.2 %
INR PPP: 2.3 (ref 0.84–1.19)
POTASSIUM SERPL-SCNC: 4.3 MMOL/L (ref 3.5–5.3)
PROT SERPL-MCNC: 7.4 G/DL (ref 6.4–8.4)
PROTHROMBIN TIME: 24.8 SECONDS (ref 11.6–14.5)
SODIUM SERPL-SCNC: 144 MMOL/L (ref 135–147)

## 2024-06-26 PROCEDURE — 83036 HEMOGLOBIN GLYCOSYLATED A1C: CPT

## 2024-06-26 PROCEDURE — 85610 PROTHROMBIN TIME: CPT

## 2024-06-26 PROCEDURE — 80053 COMPREHEN METABOLIC PANEL: CPT

## 2024-06-26 PROCEDURE — 36415 COLL VENOUS BLD VENIPUNCTURE: CPT

## 2024-06-27 ENCOUNTER — OFFICE VISIT (OUTPATIENT)
Dept: FAMILY MEDICINE CLINIC | Facility: CLINIC | Age: 74
End: 2024-06-27
Payer: MEDICARE

## 2024-06-27 VITALS
HEIGHT: 70 IN | HEART RATE: 81 BPM | DIASTOLIC BLOOD PRESSURE: 76 MMHG | SYSTOLIC BLOOD PRESSURE: 120 MMHG | BODY MASS INDEX: 31.35 KG/M2 | TEMPERATURE: 97.8 F | WEIGHT: 219 LBS | OXYGEN SATURATION: 98 %

## 2024-06-27 DIAGNOSIS — I10 ESSENTIAL HYPERTENSION: ICD-10-CM

## 2024-06-27 DIAGNOSIS — E11.65 TYPE 2 DIABETES MELLITUS WITH HYPERGLYCEMIA, WITHOUT LONG-TERM CURRENT USE OF INSULIN (HCC): ICD-10-CM

## 2024-06-27 DIAGNOSIS — I50.23 ACUTE ON CHRONIC SYSTOLIC CONGESTIVE HEART FAILURE (HCC): ICD-10-CM

## 2024-06-27 DIAGNOSIS — K29.81 GASTROINTESTINAL HEMORRHAGE ASSOCIATED WITH DUODENITIS: Primary | ICD-10-CM

## 2024-06-27 DIAGNOSIS — I48.0 PAROXYSMAL ATRIAL FIBRILLATION (HCC): ICD-10-CM

## 2024-06-27 DIAGNOSIS — E78.2 MIXED HYPERLIPIDEMIA: ICD-10-CM

## 2024-06-27 PROBLEM — D62 ACUTE BLOOD LOSS ANEMIA: Status: RESOLVED | Noted: 2024-05-11 | Resolved: 2024-06-27

## 2024-06-27 PROCEDURE — G2211 COMPLEX E/M VISIT ADD ON: HCPCS | Performed by: FAMILY MEDICINE

## 2024-06-27 PROCEDURE — 99214 OFFICE O/P EST MOD 30 MIN: CPT | Performed by: FAMILY MEDICINE

## 2024-06-27 RX ORDER — BLOOD-GLUCOSE SENSOR
1 EACH MISCELLANEOUS
Qty: 6 EACH | Refills: 3 | Status: SHIPPED | OUTPATIENT
Start: 2024-06-27

## 2024-06-27 NOTE — ASSESSMENT & PLAN NOTE
Stable asymptomatic rate controlled, continue current beta-blocker chronic anticoagulation therapy per Coumadin clinic maintain scheduled follow-up cardiology  
Stable within parameters  
Stable, within parameters, negative hypoglycemic nor hyperglycemic episodes noted with CGM, patient hesitant to fill out any paperwork requiring personal information, in order to obtain additional units, reviewed benefits current device encourage continued monitoring blood sugars  Lab Results   Component Value Date    HGBA1C 6.2 (H) 06/26/2024     
Tolerating statin to be continued  
Wt Readings from Last 3 Encounters:   06/27/24 99.3 kg (219 lb)   05/30/24 99.8 kg (220 lb)   05/24/24 96.6 kg (213 lb)   Stable, continue care per cardiology encouraged scheduled follow-up          
3
Complex Repair And Rotation Flap Text: The defect edges were debeveled with a #15 scalpel blade.  The primary defect was closed partially with a complex linear closure.  Given the location of the remaining defect, shape of the defect and the proximity to free margins a rotation flap was deemed most appropriate for complete closure of the defect.  Using a sterile surgical marker, an appropriate advancement flap was drawn incorporating the defect and placing the expected incisions within the relaxed skin tension lines where possible.    The area thus outlined was incised deep to adipose tissue with a #15 scalpel blade.  The skin margins were undermined to an appropriate distance in all directions utilizing iris scissors.

## 2024-06-27 NOTE — PROGRESS NOTES
Ambulatory Visit  Name: Galen Carson      : 1950      MRN: [id number]  Encounter Provider: DANITA Girard  Encounter Date: 2024   Encounter department: [facility] [address]    Assessment & Plan   1. Type 2 diabetes mellitus with hyperglycemia, without long-term current use of insulin (HCC)  -     Continuous Glucose Sensor (FreeStyle Lisa 3 Sensor) MISC; Use 1 each every 14 (fourteen) days       History of Present Illness     Doing well  Here to review CGM device/blood sugars  Negative hypoglycemia  Scheduled follow-up with nephrology  As of yet not scheduled follow-up with GI in regard to previous ulcerative disease  Continues with PPI per recommendation hospital discharge  Denies any chest pain palpitations difficulty breathing shortness of breath activity intolerance        Review of Systems   Constitutional:  Negative for appetite change, chills, fever and unexpected weight change.   HENT:  Negative for congestion, dental problem, ear pain, hearing loss, postnasal drip, rhinorrhea, sinus pressure, sinus pain, sneezing, sore throat, tinnitus and voice change.    Eyes:  Negative for visual disturbance.   Respiratory:  Negative for apnea, cough, chest tightness and shortness of breath.    Cardiovascular:  Negative for chest pain, palpitations and leg swelling.   Gastrointestinal:  Negative for abdominal pain, blood in stool, constipation, diarrhea, nausea and vomiting.   Endocrine: Negative for cold intolerance, heat intolerance, polydipsia, polyphagia and polyuria.   Genitourinary:  Negative for decreased urine volume, difficulty urinating, dysuria, frequency and hematuria.   Musculoskeletal:  Negative for arthralgias, back pain, gait problem, joint swelling and myalgias.   Skin:  Negative for color change, rash and wound.   Allergic/Immunologic: Negative for environmental allergies and food allergies.   Neurological:  Negative for dizziness, syncope,  weakness, light-headedness, numbness and headaches.   Hematological:  Negative for adenopathy. Does not bruise/bleed easily.   Psychiatric/Behavioral:  Negative for sleep disturbance and suicidal ideas. The patient is not nervous/anxious.      Pertinent Medical History   F/u diabetes         Medical History Reviewed by provider this encounter:       Current Outpatient Medications on File Prior to Visit   Medication Sig Dispense Refill    Ascorbic Acid (vitamin C) 1000 MG tablet Take 1,000 mg by mouth daily      cetirizine (ZyrTEC) 5 MG tablet Take 5 mg by mouth daily      EVENING PRIMROSE OIL PO Take by mouth 3 (three) times a day      glimepiride (AMARYL) 1 mg tablet Take 1 tablet (1 mg total) by mouth daily with breakfast 30 tablet 5    metoprolol succinate (TOPROL-XL) 25 mg 24 hr tablet Take 1 tablet (25 mg total) by mouth daily 30 tablet 3    multivitamin (THERAGRAN) TABS Take 1 tablet by mouth daily      pantoprazole (PROTONIX) 40 mg tablet Take 1 tablet (40 mg total) by mouth 2 (two) times a day 60 tablet 1    rosuvastatin (CRESTOR) 10 MG tablet Take 1 tablet (10 mg total) by mouth daily 90 tablet 0    torsemide (DEMADEX) 20 mg tablet Take 3 tablets (60 mg total) by mouth daily 90 tablet 1    VITAMIN D, ERGOCALCIFEROL, PO Take by mouth      vitamin E, tocopherol, 400 units capsule Take 400 Units by mouth daily      warfarin (Coumadin) 5 mg tablet Take one tablet daily or as directed 90 tablet 3    [DISCONTINUED] Continuous Glucose Sensor (FreeStyle Lisa 3 Sensor) [facility] [place] Use 1 each every 14 (fourteen) days 6 each 3    Blood Glucose Monitoring Suppl (ONETOUCH VERIO) w/Device KIT by Does not apply route once for 1 dose Test sugar in the morning 1 kit 0     No current facility-administered medications on file prior to visit.      Social History     Tobacco Use    Smoking status: Former     Current packs/day: 0.00     Average packs/day: 1 pack/day for 25.0 years (25.0 ttl pk-yrs)     Types: Cigarettes, Pipe, Cigars  "    Start date: 1980     Quit date: 2005     Years since quittin.3     Passive exposure: Past    Smokeless tobacco: Never   Vaping Use    Vaping status: Never Used   Substance and Sexual Activity    Alcohol use: Never    Drug use: Never    Sexual activity: Not Currently     Objective     /76 (BP Location: Left arm, Patient Position: Sitting)   Pulse 81   Temp 97.8 °F (36.6 °C)   Ht 5' 10\" (1.778 m)   Wt 99.3 kg (219 lb)   SpO2 98%   BMI 31.42 kg/m²     Physical Exam  Constitutional:       General: He is not in acute distress.     Appearance: He is well-developed. He is not ill-appearing.   HENT:      Head: Normocephalic and atraumatic.   Neck:      Vascular: No carotid bruit.   Cardiovascular:      Rate and Rhythm: Normal rate. Rhythm irregularly irregular.      Heart sounds: Normal heart sounds.   Pulmonary:      Effort: Pulmonary effort is normal.      Breath sounds: Normal breath sounds.   Musculoskeletal:         General: Normal range of motion.      Cervical back: Normal range of motion and neck supple.      Right lower leg: No edema.      Left lower leg: No edema.   Skin:     General: Skin is warm and dry.   Neurological:      Mental Status: He is alert and oriented to person, place, and time.      Deep Tendon Reflexes: Reflexes are normal and symmetric.   Psychiatric:         Behavior: Behavior normal.         Thought Content: Thought content normal.         Judgment: Judgment normal.       Administrative Statements           "

## 2024-06-28 ENCOUNTER — PATIENT OUTREACH (OUTPATIENT)
Dept: FAMILY MEDICINE CLINIC | Facility: CLINIC | Age: 74
End: 2024-06-28

## 2024-06-28 ENCOUNTER — OFFICE VISIT (OUTPATIENT)
Dept: NEPHROLOGY | Facility: CLINIC | Age: 74
End: 2024-06-28
Payer: MEDICARE

## 2024-06-28 VITALS
BODY MASS INDEX: 31.21 KG/M2 | DIASTOLIC BLOOD PRESSURE: 72 MMHG | HEART RATE: 95 BPM | TEMPERATURE: 97.7 F | OXYGEN SATURATION: 100 % | RESPIRATION RATE: 18 BRPM | WEIGHT: 218 LBS | SYSTOLIC BLOOD PRESSURE: 119 MMHG | HEIGHT: 70 IN

## 2024-06-28 DIAGNOSIS — R60.1 GENERALIZED EDEMA: Primary | ICD-10-CM

## 2024-06-28 DIAGNOSIS — N18.4 CKD (CHRONIC KIDNEY DISEASE) STAGE 4, GFR 15-29 ML/MIN (HCC): ICD-10-CM

## 2024-06-28 PROCEDURE — 99214 OFFICE O/P EST MOD 30 MIN: CPT | Performed by: INTERNAL MEDICINE

## 2024-06-28 PROCEDURE — G2211 COMPLEX E/M VISIT ADD ON: HCPCS | Performed by: INTERNAL MEDICINE

## 2024-06-28 RX ORDER — TORSEMIDE 20 MG/1
60 TABLET ORAL DAILY
Qty: 270 TABLET | Refills: 5 | Status: SHIPPED | OUTPATIENT
Start: 2024-06-28

## 2024-06-28 NOTE — PROGRESS NOTES
NEPHROLOGY PROGRESS NOTE    Patient: Galen Carson               Sex: male          DOA: No admission date for patient encounter.   YOB: 1950        Age:  73 y.o.         6/28/2024        BACKGROUND     73 years old male with past medical history of cardiomyopathy ejection fraction 20%, diabetes mellitus type 2, prostate cancer status post androgen deprivation therapy hypertension, atrial fibrillation on anticoagulation, development of chronic kidney disease stage IV, dyslipidemia, GI bleed who is being seen as part of hospital follow-up.    SUBJECTIVE     Patient presents to office after being discharged from the hospital in May 2024.  Patient was admitted to our hospital with volume overload and was treated with IV diuresis for CHF exacerbation.  Patient was also noted to have developed acute blood loss anemia with EGD revealing multiple ulcers.  Patient had been on Protonix twice daily.  He was discharged on torsemide 60 mg daily.  This morning patient feels well and notes resolution of edema and dyspnea.    REVIEW OF SYSTEMS     Review of Systems   Constitutional: Negative.    HENT: Negative.     Eyes: Negative.    Respiratory: Negative.     Cardiovascular: Negative.    Gastrointestinal: Negative.    Endocrine: Negative.    Genitourinary: Negative.    Musculoskeletal: Negative.    Skin: Negative.    Allergic/Immunologic: Negative.    Neurological: Negative.    Hematological: Negative.    All other systems reviewed and are negative.      OBJECTIVE     Current Weight: Weight - Scale: 98.9 kg (218 lb)  Vitals:    06/28/24 0831   BP: 119/72   Pulse: 95   Resp: 18   Temp: 97.7 °F (36.5 °C)   SpO2: 100%     Body mass index is 31.28 kg/m².      CURRENT MEDICATIONS       Current Outpatient Medications:     Ascorbic Acid (vitamin C) 1000 MG tablet, Take 1,000 mg by mouth daily, Disp: , Rfl:     Blood Glucose Monitoring Suppl (ONETOUCH VERIO) w/Device KIT, by Does not apply route once for 1 dose Test  sugar in the morning, Disp: 1 kit, Rfl: 0    cetirizine (ZyrTEC) 5 MG tablet, Take 5 mg by mouth daily, Disp: , Rfl:     Continuous Glucose Sensor (FreeStyle Lisa 3 Sensor) MISC, Use 1 each every 14 (fourteen) days, Disp: 6 each, Rfl: 3    EVENING PRIMROSE OIL PO, Take by mouth 3 (three) times a day, Disp: , Rfl:     glimepiride (AMARYL) 1 mg tablet, Take 1 tablet (1 mg total) by mouth daily with breakfast, Disp: 30 tablet, Rfl: 5    metoprolol succinate (TOPROL-XL) 25 mg 24 hr tablet, Take 1 tablet (25 mg total) by mouth daily, Disp: 30 tablet, Rfl: 3    multivitamin (THERAGRAN) TABS, Take 1 tablet by mouth daily, Disp: , Rfl:     pantoprazole (PROTONIX) 40 mg tablet, Take 1 tablet (40 mg total) by mouth 2 (two) times a day, Disp: 60 tablet, Rfl: 1    rosuvastatin (CRESTOR) 10 MG tablet, Take 1 tablet (10 mg total) by mouth daily, Disp: 90 tablet, Rfl: 0    torsemide (DEMADEX) 20 mg tablet, Take 3 tablets (60 mg total) by mouth daily, Disp: 270 tablet, Rfl: 5    VITAMIN D, ERGOCALCIFEROL, PO, Take by mouth, Disp: , Rfl:     vitamin E, tocopherol, 400 units capsule, Take 400 Units by mouth daily, Disp: , Rfl:     warfarin (Coumadin) 5 mg tablet, Take one tablet daily or as directed, Disp: 90 tablet, Rfl: 3      PHYSICAL EXAMINATION     Physical Exam  HENT:      Head: Normocephalic and atraumatic.   Eyes:      Pupils: Pupils are equal, round, and reactive to light.   Neck:      Vascular: No JVD.   Cardiovascular:      Rate and Rhythm: Normal rate and regular rhythm.      Heart sounds: Murmur heard.      No friction rub.   Pulmonary:      Effort: Pulmonary effort is normal.      Breath sounds: Normal breath sounds.   Abdominal:      General: Bowel sounds are normal. There is no distension.      Palpations: Abdomen is soft.      Tenderness: There is no abdominal tenderness. There is no rebound.   Musculoskeletal:         General: No tenderness or edema.      Cervical back: Neck supple.   Skin:     General: Skin is  dry.      Findings: No rash.   Neurological:      Mental Status: He is alert and oriented to person, place, and time.   Psychiatric:         Mood and Affect: Mood and affect normal.           LAB RESULTS     Results from last 7 days   Lab Units 06/26/24  0704   POTASSIUM mmol/L 4.3   CHLORIDE mmol/L 99   CO2 mmol/L 27   BUN mg/dL 62*   CREATININE mg/dL 2.32*   EGFR ml/min/1.73sq m 27   CALCIUM mg/dL 9.7           RADIOLOGY RESULTS        IMPRESSION:        1. Findings consistent with acute duodenitis involving the pylorus and second portion of the duodenum. No evidence of perforation or abscess. GI consultation recommended.    ASSESSMENT/PLAN     73 years old male with past medical history of CHF systolic dysfunction status post ICD, diabetes mellitus type 2, obesity, atrial fibrillation on anticoagulation, prostate cancer status post radiation therapy with recurrence in the year 2023 currently on androgen deprivation therapy, hypertension, development of chronic disease stage IV who is following up in the renal office after hospital follow-up.    1.  Chronic kidney disease stage IV: Etiology likely cardiorenal syndrome, episode of ESDRAS that occurred in May 2024 as a result of contrast-induced nephropathy plus acute blood loss anemia plus diabetic glomerulosclerosis plus age-related nephron loss.  Labs obtained revealed creatinine of 2.3 mg/dL EGFR of 27.  Suspected mild volume depletion and therefore have recommended patient to increase his free water intake up to 64 ounces daily.  Will establish patient's new renal function and baseline EGFR/serum creatinine in 4 to 5 months.  Avoidance of NSAIDs, contrast agents reiterated.    2.  Proteinuria: Trace protein noted in urinalysis.  Not on antiproteinuric agent at this time.    3.  Hypertension: Blood pressure within acceptable range at 119/72.    4.  CHF systolic dysfunction: Appears very much euvolemic and doing well on torsemide 60 mg p.o. daily.  Prescription was  refilled.    5.  Diabetes mellitus type 2: Hemoglobin A1c is 6.3 and within acceptable range.  He remains on glimepiride.    6.  Prostate cancer: Underwent relapse and had been on Lupron injections.  Due to side effects of Lupron, patient has been switched to Firmagon injections starting this fall.    7. Nutrition: Low salt, 64 oz of water intake recommended.          Va Esqueda MD  Nephrology  6/28/2024

## 2024-06-28 NOTE — PROGRESS NOTES
Spoke with Galen. Saw nephrology today. Will return in 6 months to see him with lab work to be done every 12 weeks until seen. Cannot get Cgm thru [facility] pharmacy Galen will be receiving information on how to order CGM thru a different company. He will get information do his part and then get ordering information to PCP office.   I will check back after GI appointment

## 2024-06-28 NOTE — LETTER
June 28, 2024     DANITA Girard  [address] [address]    Patient: Galen Carson   YOB: 1950   Date of Visit: 6/28/2024       Dear Dr. Bloom:    Thank you for referring Galen Carson to me for evaluation. Below are my notes for this consultation.    If you have questions, please do not hesitate to call me. I look forward to following your patient along with you.         Sincerely,        Va Esqueda MD        CC: No Recipients    Va Esqueda MD  6/28/2024  9:20 AM  Incomplete  NEPHROLOGY PROGRESS NOTE    Patient: Galen Carson               Sex: male          DOA: No admission date for patient encounter.   YOB: 1950        Age:  73 y.o.         6/28/2024        BACKGROUND     73 years old male with past medical history of cardiomyopathy ejection fraction 20%, diabetes mellitus type 2, prostate cancer status post androgen deprivation therapy hypertension, atrial fibrillation on anticoagulation, development of chronic kidney disease stage IV, dyslipidemia, GI bleed who is being seen as part of hospital follow-up.    SUBJECTIVE     Patient presents to office after being discharged from the hospital in May 2024.  Patient was admitted to our hospital with volume overload and was treated with IV diuresis for CHF exacerbation.  Patient was also noted to have developed acute blood loss anemia with EGD revealing multiple ulcers.  Patient had been on Protonix twice daily.  He was discharged on torsemide 60 mg daily.  This morning patient feels well and notes resolution of edema and dyspnea.    REVIEW OF SYSTEMS     Review of Systems   Constitutional: Negative.    HENT: Negative.     Eyes: Negative.    Respiratory: Negative.     Cardiovascular: Negative.    Gastrointestinal: Negative.    Endocrine: Negative.    Genitourinary: Negative.    Musculoskeletal: Negative.    Skin: Negative.    Allergic/Immunologic: Negative.    Neurological: Negative.     Hematological: Negative.    All other systems reviewed and are negative.      OBJECTIVE     Current Weight: Weight - Scale: 98.9 kg (218 lb)  Vitals:    06/28/24 0831   BP: 119/72   Pulse: 95   Resp: 18   Temp: 97.7 °F (36.5 °C)   SpO2: 100%     Body mass index is 31.28 kg/m².      CURRENT MEDICATIONS       Current Outpatient Medications:   •  Ascorbic Acid (vitamin C) 1000 MG tablet, Take 1,000 mg by mouth daily, Disp: , Rfl:   •  Blood Glucose Monitoring Suppl (ONETOUCH VERIO) w/Device KIT, by Does not apply route once for 1 dose Test sugar in the morning, Disp: 1 kit, Rfl: 0  •  cetirizine (ZyrTEC) 5 MG tablet, Take 5 mg by mouth daily, Disp: , Rfl:   •  Continuous Glucose Sensor (FreeStyle Lisa 3 Sensor) MISC, Use 1 each every 14 (fourteen) days, Disp: 6 each, Rfl: 3  •  EVENING PRIMROSE OIL PO, Take by mouth 3 (three) times a day, Disp: , Rfl:   •  glimepiride (AMARYL) 1 mg tablet, Take 1 tablet (1 mg total) by mouth daily with breakfast, Disp: 30 tablet, Rfl: 5  •  metoprolol succinate (TOPROL-XL) 25 mg 24 hr tablet, Take 1 tablet (25 mg total) by mouth daily, Disp: 30 tablet, Rfl: 3  •  multivitamin (THERAGRAN) TABS, Take 1 tablet by mouth daily, Disp: , Rfl:   •  pantoprazole (PROTONIX) 40 mg tablet, Take 1 tablet (40 mg total) by mouth 2 (two) times a day, Disp: 60 tablet, Rfl: 1  •  rosuvastatin (CRESTOR) 10 MG tablet, Take 1 tablet (10 mg total) by mouth daily, Disp: 90 tablet, Rfl: 0  •  torsemide (DEMADEX) 20 mg tablet, Take 3 tablets (60 mg total) by mouth daily, Disp: 270 tablet, Rfl: 5  •  VITAMIN D, ERGOCALCIFEROL, PO, Take by mouth, Disp: , Rfl:   •  vitamin E, tocopherol, 400 units capsule, Take 400 Units by mouth daily, Disp: , Rfl:   •  warfarin (Coumadin) 5 mg tablet, Take one tablet daily or as directed, Disp: 90 tablet, Rfl: 3      PHYSICAL EXAMINATION     Physical Exam  HENT:      Head: Normocephalic and atraumatic.   Eyes:      Pupils: Pupils are equal, round, and reactive to light.    Neck:      Vascular: No JVD.   Cardiovascular:      Rate and Rhythm: Normal rate and regular rhythm.      Heart sounds: Murmur heard.      No friction rub.   Pulmonary:      Effort: Pulmonary effort is normal.      Breath sounds: Normal breath sounds.   Abdominal:      General: Bowel sounds are normal. There is no distension.      Palpations: Abdomen is soft.      Tenderness: There is no abdominal tenderness. There is no rebound.   Musculoskeletal:         General: No tenderness or edema.      Cervical back: Neck supple.   Skin:     General: Skin is dry.      Findings: No rash.   Neurological:      Mental Status: He is alert and oriented to person, place, and time.   Psychiatric:         Mood and Affect: Mood and affect normal.           LAB RESULTS     Results from last 7 days   Lab Units 06/26/24  0704   POTASSIUM mmol/L 4.3   CHLORIDE mmol/L 99   CO2 mmol/L 27   BUN mg/dL 62*   CREATININE mg/dL 2.32*   EGFR ml/min/1.73sq m 27   CALCIUM mg/dL 9.7           RADIOLOGY RESULTS        IMPRESSION:        1. Findings consistent with acute duodenitis involving the pylorus and second portion of the duodenum. No evidence of perforation or abscess. GI consultation recommended.    ASSESSMENT/PLAN     73 years old male with past medical history of CHF systolic dysfunction status post ICD, diabetes mellitus type 2, obesity, atrial fibrillation on anticoagulation, prostate cancer status post radiation therapy with recurrence in the year 2023 currently on androgen deprivation therapy, hypertension, development of chronic disease stage IV who is following up in the renal office after hospital follow-up.    1.  Chronic kidney disease stage IV: Etiology likely cardiorenal syndrome, episode of ESDRAS that occurred in May 2024 as a result of contrast-induced nephropathy plus acute blood loss anemia plus diabetic glomerulosclerosis plus age-related nephron loss.  Labs obtained revealed creatinine of 2.3 mg/dL EGFR of 27.  Suspected  mild volume depletion and therefore have recommended patient to increase his free water intake up to 64 ounces daily.  Will establish patient's new renal function and baseline EGFR/serum creatinine in 4 to 5 months.  Avoidance of NSAIDs, contrast agents reiterated.    2.  Proteinuria: Trace protein noted in urinalysis.  Not on antiproteinuric agent at this time.    3.  Hypertension: Blood pressure within acceptable range at 119/72.    4.  CHF systolic dysfunction: Appears very much euvolemic and doing well on torsemide 60 mg p.o. daily.  Prescription was refilled.    5.  Diabetes mellitus type 2: Hemoglobin A1c is 6.3 and within acceptable range.  He remains on glimepiride.    6.  Prostate cancer: Underwent relapse and had been on Lupron injections.  Due to side effects of Lupron, patient has been switched to Firmagon injections starting this fall.    7. Nutrition: Low salt, 64 oz of water intake recommended.          Va Esqueda MD  Nephrology  6/28/2024

## 2024-07-01 DIAGNOSIS — I42.0 DILATED CARDIOMYOPATHY (HCC): ICD-10-CM

## 2024-07-01 DIAGNOSIS — I48.0 PAROXYSMAL ATRIAL FIBRILLATION (HCC): ICD-10-CM

## 2024-07-01 DIAGNOSIS — I49.3 PVC'S (PREMATURE VENTRICULAR CONTRACTIONS): ICD-10-CM

## 2024-07-02 RX ORDER — METOPROLOL SUCCINATE 25 MG/1
25 TABLET, EXTENDED RELEASE ORAL DAILY
Qty: 100 TABLET | Refills: 1 | Status: SHIPPED | OUTPATIENT
Start: 2024-07-02

## 2024-07-02 NOTE — TELEPHONE ENCOUNTER
Patient called to request a refill for their Metoprolol Succinate 25 MG tablets advised a refill was requested on 07/01/2024 and is pending approval. Patient verbalized understanding and is in agreement.

## 2024-07-08 DIAGNOSIS — E11.65 TYPE 2 DIABETES MELLITUS WITH HYPERGLYCEMIA, WITHOUT LONG-TERM CURRENT USE OF INSULIN (HCC): ICD-10-CM

## 2024-07-09 RX ORDER — BLOOD-GLUCOSE SENSOR
1 EACH MISCELLANEOUS
Qty: 6 EACH | Refills: 5 | Status: SHIPPED | OUTPATIENT
Start: 2024-07-09

## 2024-07-11 ENCOUNTER — CONSULT (OUTPATIENT)
Dept: GASTROENTEROLOGY | Facility: CLINIC | Age: 74
End: 2024-07-11
Payer: MEDICARE

## 2024-07-11 VITALS
BODY MASS INDEX: 31.3 KG/M2 | HEIGHT: 70 IN | TEMPERATURE: 96.3 F | SYSTOLIC BLOOD PRESSURE: 100 MMHG | HEART RATE: 73 BPM | OXYGEN SATURATION: 96 % | DIASTOLIC BLOOD PRESSURE: 70 MMHG | WEIGHT: 218.6 LBS

## 2024-07-11 DIAGNOSIS — K29.81 GASTROINTESTINAL HEMORRHAGE ASSOCIATED WITH DUODENITIS: ICD-10-CM

## 2024-07-11 DIAGNOSIS — K26.9 MULTIPLE DUODENAL ULCERS: Primary | ICD-10-CM

## 2024-07-11 PROCEDURE — 99214 OFFICE O/P EST MOD 30 MIN: CPT | Performed by: PHYSICIAN ASSISTANT

## 2024-07-11 RX ORDER — PANTOPRAZOLE SODIUM 40 MG/1
TABLET, DELAYED RELEASE ORAL
Qty: 120 TABLET | Refills: 2 | Status: SHIPPED | OUTPATIENT
Start: 2024-07-11

## 2024-07-11 NOTE — PROGRESS NOTES
[facility] - Outpatient Follow-up Note  Galen Carson 74 y.o. male MRN: [id number]  Encounter: [id number]          ASSESSMENT AND PLAN:      1. Multiple duodenal ulcers  2. Upper GI bleed    Patient presents for follow up from his hospitalization in May.  He was admitted with a UGIB.  He developed epigastric discomfort and melena.  EGD 5/11 with Dr. Lau showed 3 duodenal ulcers, one was a large deep ulcer with flat pigmented spot.  Biopsies were benign and negative for h pylori.  He required multiple units of PRBCs during his hospitalization.  He denies NSAID use.  He quit smoking over 15 years ago. He does not drink alcohol.  He is on Coumadin for PAF and has CHF with a EF of 20%.  He has been on Pantoprazole 40mg po BID since his admission.    Will continue Pantoprazole 40mg po BID for 3 months total (through 8/11) and then decrease to Pantoprazole 40mg po once a day.  Will update CBC to ensure improvement.  Follow up in 6 months or sooner if needed.    ______________________________________________________________________    SUBJECTIVE:  Patient is a pleasant 74 year old male with a PMH of CHF with EF of 20%, atrial fibrillation on Coumadin, COPD, HTN, prostate cancer who presents to the office for follow up from his prior hospitalization in May.  He was admitted with a UGIB.  He developed epigastric discomfort and melena. EGD 5/11 with Dr. Lau showed 3 duodenal ulcers, one was a large deep ulcer with flat pigmented spot.  Biopsies were benign and negative for h pylori. He required multiple units of PRBCs during his hospitalization.He denies NSAID use.  He quit smoking over 15 years ago. He does not drink alcohol. He is on Coumadin for PAF and has CHF with a EF of 20%. He has been on Pantoprazole 40mg po BID since his admission. No further epigastric pain.  No melena/blood in stool.      REVIEW OF SYSTEMS IS OTHERWISE NEGATIVE.      Historical Information   Past Medical  History:   Diagnosis Date    Allergic     Anesthesia     pt wears a life vest ().  should not be scheduled at [facility] until heart condition is stabilized    BPH (benign prostatic hypertrophy)     Cancer (HCC)     Chronic kidney disease     Coronary artery disease     Diabetes mellitus (HCC)     Hyperlipidemia     Hypertension     Irregular heart beat     PAF    Prostate cancer (HCC)      Past Surgical History:   Procedure Laterality Date    A-V CARDIAC PACEMAKER INSERTION      CARDIAC CATHETERIZATION N/A 10/04/2023    Procedure: Cardiac Coronary Angiogram;  Surgeon: Chris Lovell MD;  Location: [facility];  Service: Cardiology    CARDIAC CATHETERIZATION N/A 10/04/2023    Procedure: Cardiac RHC/LHC;  Surgeon: Chris Lovell MD;  Location: [facility];  Service: Cardiology    CARDIAC CATHETERIZATION  10/04/2023    Procedure: Cardiac catheterization;  Surgeon: Chris Lovell MD;  Location: [facility];  Service: Cardiology    CARDIAC ELECTROPHYSIOLOGY PROCEDURE N/A 2024    Procedure: Cardiac icd implant, Dual Chambers ICD;  Surgeon: Leo Whalen MD;  Location:  CARDIAC CATH LAB;  Service: Cardiology    EAR SURGERY      HERNIA REPAIR      OH COLONOSCOPY FLX DX W/COLLJ SPEC WHEN PFRMD N/A 2019    Procedure: COLONOSCOPY;  Surgeon: Winston Nielson III, MD;  Location: [facility];  Service: Gastroenterology     Social History   Social History     Substance and Sexual Activity   Alcohol Use Not Currently     Social History     Substance and Sexual Activity   Drug Use Not Currently     Social History     Tobacco Use   Smoking Status Former    Current packs/day: 0.00    Average packs/day: 1 pack/day for 25.0 years (25.0 ttl pk-yrs)    Types: Cigarettes, Pipe, Cigars    Start date: 1980    Quit date: 2005    Years since quittin.3    Passive exposure: Past   Smokeless Tobacco Never     Family History   Problem Relation Age of Onset    Stroke Father     No Known  "Problems Mother     Prostate cancer Brother     Arthritis Brother     No Known Problems Sister     Diabetes Sister     Diabetes Sister     No Known Problems Son     No Known Problems Daughter        Meds/Allergies       Current Outpatient Medications:     Ascorbic Acid (vitamin C) 1000 MG tablet    Blood Glucose Monitoring Suppl (ONETOUCH VERIO) w/Device KIT    cetirizine (ZyrTEC) 5 MG tablet    Continuous Glucose Sensor (FreeStyle Lisa 3 Sensor) MISC    EVENING PRIMROSE OIL PO    glimepiride (AMARYL) 1 mg tablet    metoprolol succinate (TOPROL-XL) 25 mg 24 hr tablet    multivitamin (THERAGRAN) TABS    pantoprazole (PROTONIX) 40 mg tablet    rosuvastatin (CRESTOR) 10 MG tablet    torsemide (DEMADEX) 20 mg tablet    VITAMIN D, ERGOCALCIFEROL, PO    vitamin E, tocopherol, 400 units capsule    warfarin (Coumadin) 5 mg tablet    Allergies   Allergen Reactions    Penicillin V Other (See Comments)     As a child            Objective     Blood pressure 100/70, pulse 73, temperature (!) 96.3 °F (35.7 °C), temperature source Temporal, height 5' 10\" (1.778 m), weight 99.2 kg (218 lb 9.6 oz), SpO2 96%. Body mass index is 31.37 kg/m².      PHYSICAL EXAM:      General Appearance:   Alert, cooperative, no distress   HEENT:   Normocephalic, atraumatic, anicteric.     Neck:  Supple, symmetrical, trachea midline   Lungs:   Clear to auscultation bilaterally; no rales, rhonchi or wheezing; respirations unlabored    Heart::   Regular rate and rhythm; no murmur, rub, or gallop.   Abdomen:   Soft, non-tender, non-distended; normal bowel sounds; no masses, no organomegaly    Genitalia:   Deferred    Rectal:   Deferred    Extremities:  No cyanosis, clubbing or edema    Pulses:  2+ and symmetric    Skin:  No jaundice, rashes, or lesions    Lymph nodes:  No palpable cervical lymphadenopathy        Lab Results:   No visits with results within 1 Day(s) from this visit.   Latest known visit with results is:   Lab on 06/26/2024   Component Date " Value    Protime 06/26/2024 24.8 (H)     INR 06/26/2024 2.30 (H)     Hemoglobin A1C 06/26/2024 6.2 (H)     EAG 06/26/2024 131     Sodium 06/26/2024 144     Potassium 06/26/2024 4.3     Chloride 06/26/2024 99     CO2 06/26/2024 27     ANION GAP 06/26/2024 18 (H)     BUN 06/26/2024 62 (H)     Creatinine 06/26/2024 2.32 (H)     Glucose, Fasting 06/26/2024 131 (H)     Calcium 06/26/2024 9.7     AST 06/26/2024 25     ALT 06/26/2024 19     Alkaline Phosphatase 06/26/2024 64     Total Protein 06/26/2024 7.4     Albumin 06/26/2024 4.3     Total Bilirubin 06/26/2024 0.37     eGFR 06/26/2024 27          Radiology Results:   No results found.

## 2024-07-15 ENCOUNTER — PATIENT OUTREACH (OUTPATIENT)
Dept: FAMILY MEDICINE CLINIC | Facility: CLINIC | Age: 74
End: 2024-07-15

## 2024-07-15 NOTE — PROGRESS NOTES
Spoke with Galen. Saw gastroenterologist will stay on pantoprazole twice a day until august 11 th then go to daily dose of 40mg.  Weight running 212 pounds for a few days. Blood sugar while we were talking was 142. Had a reading of 50 yesterday ate some cherries to bring sugar back up. Does not have any candy or drinks with sugar in them at home   Paid for CGM , PCP needs to fill out form so insurance will cover cost.   No other questions or concerns.   I will check back in one month

## 2024-07-17 ENCOUNTER — OFFICE VISIT (OUTPATIENT)
Dept: HEMATOLOGY ONCOLOGY | Facility: CLINIC | Age: 74
End: 2024-07-17
Payer: MEDICARE

## 2024-07-17 VITALS
WEIGHT: 220 LBS | HEART RATE: 62 BPM | BODY MASS INDEX: 31.5 KG/M2 | HEIGHT: 70 IN | SYSTOLIC BLOOD PRESSURE: 118 MMHG | DIASTOLIC BLOOD PRESSURE: 60 MMHG | TEMPERATURE: 97.6 F | OXYGEN SATURATION: 98 % | RESPIRATION RATE: 16 BRPM

## 2024-07-17 DIAGNOSIS — C61 PROSTATE CANCER (HCC): Primary | ICD-10-CM

## 2024-07-17 DIAGNOSIS — D50.0 IRON DEFICIENCY ANEMIA DUE TO CHRONIC BLOOD LOSS: Primary | ICD-10-CM

## 2024-07-17 DIAGNOSIS — D50.0 IRON DEFICIENCY ANEMIA DUE TO CHRONIC BLOOD LOSS: ICD-10-CM

## 2024-07-17 DIAGNOSIS — E11.65 TYPE 2 DIABETES MELLITUS WITH HYPERGLYCEMIA, WITHOUT LONG-TERM CURRENT USE OF INSULIN (HCC): ICD-10-CM

## 2024-07-17 PROCEDURE — 99214 OFFICE O/P EST MOD 30 MIN: CPT | Performed by: INTERNAL MEDICINE

## 2024-07-17 PROCEDURE — G2211 COMPLEX E/M VISIT ADD ON: HCPCS | Performed by: INTERNAL MEDICINE

## 2024-07-18 NOTE — PROGRESS NOTES
Hematology/Oncology Outpatient Follow- up Note  Galen Carson 74 y.o. male MRN: @ Encounter: [id number]        Date:  7/18/2024        Assessment / Plan:    1 prostate cancer rising PSA status post Lupron  2 nonischemic dilated cardiomyopathy with ICD placement ejection fraction estimated 20 to 25%  3 atrial fibrillation  4 duodenal ulcers upper GI bleed anemia  5 type 2 diabetes mellitus  6 renal insufficiency CKD stage IV  Plan: Patient will get a CBC iron iron-binding ferritin CMP.  He will also get a PSA.  He will be doing that shortly at his physician's office probably next week.  He will repeat a PSA iron a CBC CMP prior to his visit which will be in October post visit with urology.        HPI: 74-year-old gentleman here with history of prostate cancer with rising PSA.  He had core biopsies done at the office of Dr. Yu which were 6 out of 7 positive.  It was [place] grade 8 he received Lupron.  He is due back there in October.  Subsequently he was hospitalized.  He had an upper GI bleed with multiple duodenal ulcers his hemoglobin was in the 9 range.  He is on Coumadin.  He remains on Protonix 40 mg p.o. twice daily.  He has renal insufficiency CKD stage IV.  He has congestive heart failure.  He has chronic atrial fibrillation and he has diabetes mellitus.  Urology saw him posthospitalization.  His thinking was perhaps changing his Lupron to Firmagon.  He could also try the oral LHRH antagonist Effexor but obviously shorter and may have some benefit in patients with atherosclerotic heart disease/congestive heart failure.  In the meantime we will get a CBC iron iron-binding ferritin levels to see if he would benefit from iron infusions.  Apparently you will be getting a PSA level as well.  Return here for follow-up in 3 months we will await results of his testing.      Interval History: Note from 4/17/2024:  Assessment / Plan:    1 prostate cancer with rising PSA.  Initially treated with RT and ADT  in 10/18/2019  2 nonischemic dilated cardiomyopathy with ICD placement ejection fraction estimate 26%  3 atrial fibrillation nonsustained SVT  Plan: Patient will be seen by urology again.  He will be offered ADT.  This is suggestion [facility].  He was seen by cardiology.  They do need to make a comment whether he would be able to tolerate any interventions cardiac wise.  This will be particularly important if in fact the disease does not seem to be systemic and after treatment ADT he might be candidate for localized therapy.  Some of those treatments will require metastatic and I do not know if he will be candidate for that.  He will come back here in 3 months.  HPI: 73-year-old gentleman with history of PSA héctor to 8.7 and is now 8.4.  He was seen by telehealth at the [facility].  They had discussion was that they were recommendation was to consider hormonal therapy first with a prostate biopsy they would recommend ADT and repeat in 6 months and then in 9 months or so to repeat his PSMA scan see what his lesions look like.  He had a question of abnormality in the lymph nodes and we will see what they are there.  Depending on those results whether the consideration for any further local therapy would be will be discussed.  I spoke with urology Dr. Yu who will see the patient and make arranges for both biopsy and use of Lupron.  Otherwise the patient is stable.  He will return in 3 months here  Interval History: Note from 2/22/2024:  Assessment / Plan  1 prostate adenocarcinoma with rising PSA.  Initially treated with RT and ADT in 10/18/2019.  2 nonischemic cardiomyopathy with ICD placement done  3 atrial fibrillation/nonsustained SVT  Plan: Patient is to be seen by radiation therapy and by urology to see if he would be candidate for any kind of localized therapy.  He will come back here in 2 months.  If not a candidate for any localized therapy may be candidate for systemic  treatment with LHRH antagonist.  HPI: 73-year-old gentleman with a history of prostate cancer 2019.  Has a rising PSA.  PSMA and MRI of the prostate suggest disease localized to the prostate alone.  He may be candidate for localized therapy.  He has cardiac issues apparently they are stable at this time.  He is followed by cardiology.  I talked to him and he was being seen by radiation therapy and will be seen by urology as well.  They will decide about whether he would be candidate for local treatment if not consideration for hormonal treatment could be had.  I believe hormonal treatment with LHRH antagonist along and watch his PSA closely.  Interval History: Note from 120 6/2024:  Prostatic adenocarcinoma, [place] score 4 + 5 = 9. Initial Dx 5/2019, Stage JAIRO T2b N1 M0 PSA 22 grade group 5. S/p definitive radiation therapy with neoadjuvant and concurrent androgen deprivation. Completed RT on 10/18/19. Final Lupron was given 2/10/21.    During work-up for elevated PSA the patient underwent MRI prostate-- tumor in the anterior and posterior right peripheral zone apex and mid gland and right transition zone at apex corresponding to the PSMA avid lesion on prior PET/CT. The lesion broadly abuts the capsule without visualized gross extraprostatic extension. No seminal vesicle invasion, pelvic lymphadenopathy, or pelvic osseous metastatic disease. MRI fusion biopsy was cancelled due to anaesthesia concerns since the patient has a vest and significant cardiac issues--non-ischemic cardiomyopathy,Atrial fibrillation,Junctional tachycardia, NSVT, PSVT, PVCs. Scheduled for ICD placement on 1/4/23.  PSA on 12/6/23-- elevated at 5.11. Free testosterone 15, total testosterone 282. Scheduled for ICD placement this week. F/u in 2 weeks for treatment planning. The patient should be considered for focal cryotherapy by urology.   I have the patient coming back in 3 weeks.  I will try and finalize what we can offer him.  He will have  to decide if he wants local intervention that he would probably need to be done down at the main campus at [facility].  we will discuss with him and finalize that decision.  By his PSMA scan he appears to be a localized recurrence still.  HPI: 73-year-old gentleman with history of prostate adenocarcinoma.  He had a rising PSA level it is risen up to 8.  He had a PSMA scan which showed abnormality in the prostatic bed.  His initial treatment was in 2019 he was noted to have Elder's grade 9 carcinoma.  He has cardiac problems he is atrial fibs and was recently placed a device to a pacemaker to try and help that.  He no definitive evidence of disease outside the capsule.  The plan was to consider the possibility of whether he may be candidate for localized therapy such as cryotherapy.  I spoke with the patient who was not really aware of his situation.  He asked what he could do.  I explained we could offer him endocrine therapy or possible localized therapy with cryotherapy.  He asked where that would be done I explained it would probably have to be done down in the main part of [facility].  He was somewhat resistant to doing that.  However he will think about that again.  I spoke with radiation and felt he was not a candidate for any particular seeding of radiation.  I spoke with urology who will be able to see him and discuss whether he would be willing to go down to have his treatments done      Cancer Staging:  Cancer Staging   Prostate cancer (HCC)  Staging form: Prostate, AJCC 8th Edition  - Clinical: Stage JAIRO (cT2b, cN1, cM0, PSA: 22, Grade Group: 5) - Signed by Erik Elliott MD on 6/20/2019  Prostate specific antigen (PSA) range: 20 or greater  Histologic grading system: 5 grade system      Molecular Testing:     Previous Hematologic/ Oncologic History:    Oncology History   Prostate cancer (HCC)   5/15/2019 Initial Diagnosis    Prostate cancer (HCC)     5/15/2019 Biopsy    Final Diagnosis  A. Prostate,  right lateral base, core needle biopsy: Benign prostatic stroma. No malignancy is identified.     B. Prostate, right medial base, core needle biopsy:             - Prostatic adenocarcinoma, [place] score 4 + 5 = 9, Prognostic Grade Group V, involving nearly 100% of one core biopsy.     C. Prostate, right lateral mid, core needle biopsy:             - Prostatic adenocarcinoma, [place] score 5 + 4 = 9, Prognostic Grade Group V, involving nearly 100% one core biopsy.     D. Prostate, right medial mid, core needle biopsy:             - Prostatic adenocarcinoma, Elder score 4 + 5 = 9, Prognostic Grade Group V, involving nearly 100% of one core biopsy.     E. Prostate, right lateral apex, core needle biopsy:No malignancy is identified.     F. Prostate, right medial apex, core needle biopsy:             - Prostatic adenocarcinoma, Elder score 5 + 4 = 9, Prognostic Grade Group V, involving nearly 100% of one core biopsy.     G. Prostate, left lateral base, core needle biopsy:- Atypical prostate glands, indeterminate for prostatic adenocarcinoma.     H. Prostate, left medial base, core needle biopsy:             - Prostatic adenocarcinoma, [place] score 5 + 4 =9, Prognostic Grade Group V, involving 15% of one core biopsy.     I. Prostate, left lateral mid, core needle biopsy: Benign prostate glands. No malignancy is identified.  J. Prostate, left medial mid, core needle biopsy: Benign prostate glands. No malignancy is identified.     K. Prostate, left lateral apex, core needle biopsy:             - Prostatic adenocarcinoma, Elder score 5 + 4 = 9, Prognostic Grade Group V, involving 30% of one of 2 cores.     L. Prostate, left medial apex, core needle biopsy:             - Prostatic adenocarcinoma, Elder score 5 + 4 =  9, Prognostic Grade Group V, discontinuously involving approximately 50% of 1 core biopsy.         6/20/2019 -  Cancer Staged    Staging form: Prostate, AJCC 8th Edition  - Clinical: Stage JAIRO (cT2b,  "cN1, cM0, PSA: 22, Grade Group: 5) - Signed by Erik Elliott MD on 6/20/2019  Prostate specific antigen (PSA) range: 20 or greater  Histologic grading system: 5 grade system       6/28/2019 - 6/28/2019 Hormone Therapy    Firmagon     8/1/2019 - 5/10/2021 Hormone Therapy    Lupron for 2 years     8/19/2019 - 10/18/2019 Radiation    4500 centigray in 25 fractions to the prostate, seminal vesicles and regional pelvic lymphatics followed by an additional 1440 centigray in 8 fractions to the prostate, seminal vesicles, and gross lymphadenopathy with the final 11 fractions of an additional 1980 centigray to the prostate and proximal seminal vesicles for a total dose of 7920 centigray in 44 fractions.          Current Hematologic/ Oncologic Treatment:       Cycle 1         Test Results:    Imaging: No results found.          Labs:   Lab Results   Component Value Date    WBC 5.35 05/21/2024    HGB 9.9 (L) 05/21/2024    HCT 32.0 (L) 05/21/2024    MCV 93 05/21/2024     05/21/2024     Lab Results   Component Value Date    K 4.3 06/26/2024    CL 99 06/26/2024    CO2 27 06/26/2024    BUN 62 (H) 06/26/2024    CREATININE 2.32 (H) 06/26/2024    GLUF 131 (H) 06/26/2024    CALCIUM 9.7 06/26/2024    AST 25 06/26/2024    ALT 19 06/26/2024    ALKPHOS 64 06/26/2024    EGFR 27 06/26/2024         No results found for: \"SPEP\", \"UPEP\"    Lab Results   Component Value Date    PSA 8.14 (H) 03/27/2024    PSA 8.48 (H) 01/24/2024    PSA 5.11 (H) 12/06/2023       No results found for: \"CEA\"    No results found for: \"\"    No results found for: \"AFP\"    No results found for: \"IRON\", \"TIBC\", \"FERRITIN\"    No results found for: \"[id number]\"      ROS: Review of Systems   Constitutional:  Positive for fatigue.   HENT: Negative.     Eyes: Negative.    Respiratory:  Positive for shortness of breath.    Cardiovascular:  Positive for leg swelling.   Gastrointestinal: Negative.    Endocrine: Negative.    Genitourinary: Negative.  "   Musculoskeletal: Negative.    Skin: Negative.    Allergic/Immunologic: Negative.    Neurological: Negative.    Hematological: Negative.          Current Medications: Reviewed  Allergies: Reviewed  PMH/FH/SH:  Reviewed      Physical Exam:    Body surface area is 2.17 meters squared.    Wt Readings from Last 3 Encounters:   07/17/24 99.8 kg (220 lb)   07/11/24 99.2 kg (218 lb 9.6 oz)   06/28/24 98.9 kg (218 lb)        Temp Readings from Last 3 Encounters:   07/17/24 97.6 °F (36.4 °C) (Temporal)   07/11/24 (!) 96.3 °F (35.7 °C) (Temporal)   06/28/24 97.7 °F (36.5 °C) (Temporal)        BP Readings from Last 3 Encounters:   07/17/24 118/60   07/11/24 100/70   06/28/24 119/72         Pulse Readings from Last 3 Encounters:   07/17/24 62   07/11/24 73   06/28/24 95     @LASTSAO2(3)@      Physical Exam  Constitutional:       Appearance: Normal appearance. He is normal weight.   HENT:      Head: Normocephalic and atraumatic.   Eyes:      Extraocular Movements: Extraocular movements intact.      Conjunctiva/sclera: Conjunctivae normal.      Pupils: Pupils are equal, round, and reactive to light.   Cardiovascular:      Rate and Rhythm: Normal rate and regular rhythm.      Heart sounds: Normal heart sounds.   Pulmonary:      Effort: Pulmonary effort is normal.      Breath sounds: Normal breath sounds.   Abdominal:      General: Abdomen is flat. Bowel sounds are normal.      Palpations: Abdomen is soft.   Musculoskeletal:         General: Normal range of motion.      Cervical back: Normal range of motion and neck supple.   Skin:     General: Skin is warm and dry.   Neurological:      General: No focal deficit present.      Mental Status: He is alert and oriented to person, place, and time. Mental status is at baseline.     He has trace edema bilaterally.      Goals and Barriers:  Current Goal: Prolong Survival from Cancer.   Barriers: None.      Patient's Capacity to Self Care:  Patient is able to self care.    Code Status:  [unfilled]  Advance Directive and Living Will:      Power of :

## 2024-07-24 ENCOUNTER — APPOINTMENT (OUTPATIENT)
Dept: LAB | Facility: CLINIC | Age: 74
End: 2024-07-24
Payer: MEDICARE

## 2024-07-24 ENCOUNTER — ANTICOAG VISIT (OUTPATIENT)
Dept: CARDIOLOGY CLINIC | Facility: CLINIC | Age: 74
End: 2024-07-24

## 2024-07-24 ENCOUNTER — OFFICE VISIT (OUTPATIENT)
Dept: UROLOGY | Facility: CLINIC | Age: 74
End: 2024-07-24
Payer: MEDICARE

## 2024-07-24 VITALS
SYSTOLIC BLOOD PRESSURE: 110 MMHG | HEART RATE: 85 BPM | HEIGHT: 70 IN | DIASTOLIC BLOOD PRESSURE: 60 MMHG | BODY MASS INDEX: 31.21 KG/M2 | WEIGHT: 218 LBS | OXYGEN SATURATION: 94 %

## 2024-07-24 DIAGNOSIS — R97.20 ELEVATED PSA: ICD-10-CM

## 2024-07-24 DIAGNOSIS — C61 PROSTATE CANCER (HCC): ICD-10-CM

## 2024-07-24 DIAGNOSIS — C61 PROSTATE CANCER (HCC): Primary | ICD-10-CM

## 2024-07-24 DIAGNOSIS — D50.0 IRON DEFICIENCY ANEMIA DUE TO CHRONIC BLOOD LOSS: ICD-10-CM

## 2024-07-24 DIAGNOSIS — E11.65 TYPE 2 DIABETES MELLITUS WITH HYPERGLYCEMIA, WITHOUT LONG-TERM CURRENT USE OF INSULIN (HCC): ICD-10-CM

## 2024-07-24 DIAGNOSIS — K29.81 GASTROINTESTINAL HEMORRHAGE ASSOCIATED WITH DUODENITIS: ICD-10-CM

## 2024-07-24 DIAGNOSIS — N18.4 CKD (CHRONIC KIDNEY DISEASE) STAGE 4, GFR 15-29 ML/MIN (HCC): ICD-10-CM

## 2024-07-24 DIAGNOSIS — E11.8 TYPE 2 DIABETES MELLITUS WITH COMPLICATION, WITHOUT LONG-TERM CURRENT USE OF INSULIN (HCC): ICD-10-CM

## 2024-07-24 LAB
25(OH)D3 SERPL-MCNC: 41.2 NG/ML (ref 30–100)
ALBUMIN SERPL BCG-MCNC: 4.3 G/DL (ref 3.5–5)
ALP SERPL-CCNC: 69 U/L (ref 34–104)
ALT SERPL W P-5'-P-CCNC: 17 U/L (ref 7–52)
ANION GAP SERPL CALCULATED.3IONS-SCNC: 10 MMOL/L (ref 4–13)
AST SERPL W P-5'-P-CCNC: 19 U/L (ref 13–39)
BACTERIA UR QL AUTO: ABNORMAL /HPF
BASOPHILS # BLD AUTO: 0.03 THOUSANDS/ÂΜL (ref 0–0.1)
BASOPHILS NFR BLD AUTO: 1 % (ref 0–1)
BILIRUB SERPL-MCNC: 0.38 MG/DL (ref 0.2–1)
BILIRUB UR QL STRIP: NEGATIVE
BUN SERPL-MCNC: 53 MG/DL (ref 5–25)
CALCIUM SERPL-MCNC: 9.5 MG/DL (ref 8.4–10.2)
CHLORIDE SERPL-SCNC: 99 MMOL/L (ref 96–108)
CHOLEST SERPL-MCNC: 216 MG/DL
CLARITY UR: CLEAR
CO2 SERPL-SCNC: 31 MMOL/L (ref 21–32)
COLOR UR: ABNORMAL
CREAT SERPL-MCNC: 1.79 MG/DL (ref 0.6–1.3)
CREAT UR-MCNC: 70.4 MG/DL
EOSINOPHIL # BLD AUTO: 0.16 THOUSAND/ÂΜL (ref 0–0.61)
EOSINOPHIL NFR BLD AUTO: 4 % (ref 0–6)
ERYTHROCYTE [DISTWIDTH] IN BLOOD BY AUTOMATED COUNT: 13.3 % (ref 11.6–15.1)
EST. AVERAGE GLUCOSE BLD GHB EST-MCNC: 134 MG/DL
FERRITIN SERPL-MCNC: 23 NG/ML (ref 24–336)
GFR SERPL CREATININE-BSD FRML MDRD: 36 ML/MIN/1.73SQ M
GLUCOSE P FAST SERPL-MCNC: 123 MG/DL (ref 65–99)
GLUCOSE UR STRIP-MCNC: NEGATIVE MG/DL
HBA1C MFR BLD: 6.3 %
HCT VFR BLD AUTO: 33.9 % (ref 36.5–49.3)
HDLC SERPL-MCNC: 45 MG/DL
HGB BLD-MCNC: 10.7 G/DL (ref 12–17)
HGB UR QL STRIP.AUTO: NEGATIVE
IMM GRANULOCYTES # BLD AUTO: 0.04 THOUSAND/UL (ref 0–0.2)
IMM GRANULOCYTES NFR BLD AUTO: 1 % (ref 0–2)
IRON SATN MFR SERPL: 14 % (ref 15–50)
IRON SERPL-MCNC: 49 UG/DL (ref 50–212)
KETONES UR STRIP-MCNC: NEGATIVE MG/DL
LDLC SERPL CALC-MCNC: 123 MG/DL (ref 0–100)
LEUKOCYTE ESTERASE UR QL STRIP: NEGATIVE
LYMPHOCYTES # BLD AUTO: 1.09 THOUSANDS/ÂΜL (ref 0.6–4.47)
LYMPHOCYTES NFR BLD AUTO: 25 % (ref 14–44)
MCH RBC QN AUTO: 26.6 PG (ref 26.8–34.3)
MCHC RBC AUTO-ENTMCNC: 31.6 G/DL (ref 31.4–37.4)
MCV RBC AUTO: 84 FL (ref 82–98)
MICROALBUMIN UR-MCNC: 41.7 MG/L
MICROALBUMIN/CREAT 24H UR: 59 MG/G CREATININE (ref 0–30)
MONOCYTES # BLD AUTO: 0.44 THOUSAND/ÂΜL (ref 0.17–1.22)
MONOCYTES NFR BLD AUTO: 10 % (ref 4–12)
NEUTROPHILS # BLD AUTO: 2.66 THOUSANDS/ÂΜL (ref 1.85–7.62)
NEUTS SEG NFR BLD AUTO: 59 % (ref 43–75)
NITRITE UR QL STRIP: NEGATIVE
NON-SQ EPI CELLS URNS QL MICRO: ABNORMAL /HPF
NRBC BLD AUTO-RTO: 0 /100 WBCS
PH UR STRIP.AUTO: 7 [PH]
PHOSPHATE SERPL-MCNC: 4.2 MG/DL (ref 2.3–4.1)
PLATELET # BLD AUTO: 184 THOUSANDS/UL (ref 149–390)
PMV BLD AUTO: 10.3 FL (ref 8.9–12.7)
POST-VOID RESIDUAL VOLUME, ML POC: 58 ML
POTASSIUM SERPL-SCNC: 4.5 MMOL/L (ref 3.5–5.3)
PROT SERPL-MCNC: 7.1 G/DL (ref 6.4–8.4)
PROT UR STRIP-MCNC: ABNORMAL MG/DL
PSA SERPL-MCNC: 0.69 NG/ML (ref 0–4)
PTH-INTACT SERPL-MCNC: 76.2 PG/ML (ref 12–88)
RBC # BLD AUTO: 4.02 MILLION/UL (ref 3.88–5.62)
RBC #/AREA URNS AUTO: ABNORMAL /HPF
SODIUM SERPL-SCNC: 140 MMOL/L (ref 135–147)
SP GR UR STRIP.AUTO: 1.01 (ref 1–1.03)
TIBC SERPL-MCNC: 357 UG/DL (ref 250–450)
TRIGL SERPL-MCNC: 238 MG/DL
TSH SERPL DL<=0.05 MIU/L-ACNC: 3.87 UIU/ML (ref 0.45–4.5)
UIBC SERPL-MCNC: 308 UG/DL (ref 155–355)
UROBILINOGEN UR STRIP-ACNC: <2 MG/DL
WBC # BLD AUTO: 4.42 THOUSAND/UL (ref 4.31–10.16)
WBC #/AREA URNS AUTO: ABNORMAL /HPF

## 2024-07-24 PROCEDURE — 80061 LIPID PANEL: CPT

## 2024-07-24 PROCEDURE — 80053 COMPREHEN METABOLIC PANEL: CPT

## 2024-07-24 PROCEDURE — 85025 COMPLETE CBC W/AUTO DIFF WBC: CPT

## 2024-07-24 PROCEDURE — 84443 ASSAY THYROID STIM HORMONE: CPT

## 2024-07-24 PROCEDURE — 51798 US URINE CAPACITY MEASURE: CPT | Performed by: UROLOGY

## 2024-07-24 PROCEDURE — 83970 ASSAY OF PARATHORMONE: CPT

## 2024-07-24 PROCEDURE — 81001 URINALYSIS AUTO W/SCOPE: CPT

## 2024-07-24 PROCEDURE — 84100 ASSAY OF PHOSPHORUS: CPT

## 2024-07-24 PROCEDURE — 83540 ASSAY OF IRON: CPT

## 2024-07-24 PROCEDURE — 82043 UR ALBUMIN QUANTITATIVE: CPT

## 2024-07-24 PROCEDURE — 84153 ASSAY OF PSA TOTAL: CPT

## 2024-07-24 PROCEDURE — 83036 HEMOGLOBIN GLYCOSYLATED A1C: CPT

## 2024-07-24 PROCEDURE — 99214 OFFICE O/P EST MOD 30 MIN: CPT | Performed by: UROLOGY

## 2024-07-24 PROCEDURE — 82306 VITAMIN D 25 HYDROXY: CPT

## 2024-07-24 PROCEDURE — 82728 ASSAY OF FERRITIN: CPT

## 2024-07-24 PROCEDURE — 82570 ASSAY OF URINE CREATININE: CPT

## 2024-07-24 PROCEDURE — 83550 IRON BINDING TEST: CPT

## 2024-07-24 NOTE — PROGRESS NOTES
UROLOGY FOLLOW-UP ENCOUNTER    Galen Carson is a 74 y.o. male with prostate cancer    Pertinent non-urologic PMH: DM (hemoglobin A1c 6.9 on 2/14/2024), HLD, HTN, CKD (creatinine 1.37, GFR 51 on 3/13/2024); a fib    Pertinent non-urologic PSH: Cardiac catheterization without stents, hernia repair, dual chamber ICD implant Jan 2024    Anticoagulation: ASA81, Warfarin    History of high risk prostate cancer treated with radiation in 2019 he completed 2-year course of ADT in February 2021    Pretreatment PSA was 22.4.  [place] PSA was 0.0.    Had PSA recurrence which prompted workup below    PSMA PET scan 9/25/2023: Heterogenous multifocal fairly intense tracer activity in the right greater than left prostate gland highly suspicious for locally recurrent intraprostatic PSMA positive malignancy; no focal tracer activity concerning for metastatic disease    Prostate MRI 10/13/2023: Tumor in anterior and posterior right peripheral zone apex and mid gland and right transition zone at apex corresponds to PSMA avid lesion; the lesion broadly abuts the capsule without visualized gross extraprostatic extension; no SVI/LAD/bony metastasis; prostate 29 g    Given the positive lesion seen on imaging, plan was for patient to undergo MRI fusion biopsy.  Unfortunately, the case was canceled by anesthesia due to the patient's cardiac arrhythmia requiring LifeVest.  Since he cannot undergo anesthesia, there is consideration for alternate therapy.    Patient had dual-chamber ICD implant 1/4/2024    PSA 8.48 on 1/24/2024    Saw Dr. Barreto of hematology oncology on 2/21/2024. Option for repeat local therapy versus hormonal therapy with LHRH antagonist along with closely watching PSA.    PSA 8.14 on 3/27/24     Patient declined OR for TP bx. Presented to office for TRUS Pbx.    Lupron q6 month on 5/1/24     TRUS Pbx 5/1/24: G 4+4 in 7 cores  Volume:  20 cm3     Admitted mid May 2024 with acute on chronic CHF, required diuretics, got ESDRAS.  Cr was down to around 2.0 by discharge (prev Bcr was about 1.4 in March 2024).    PSA on 7/24/24 still pending    Random bladder scan 58 cc on 7/24/24    Overall voiding well. Some LUTS but taking diuretics.    Assessment and plan:     Prostate cancer    Patient with complex prostate cancer history as mentioned above.    In summary he completed his initial radiation in 2019 for high risk prostate cancer.  He received 2 years of ADT.    He had PSA recurrence which prompted workup.  Imaging demonstrated prostate lesions on PET scan as well as MRI.  He was planned for transperineal prostate biopsy with MRI fusion, however, due to his dual-chamber ICD, he was not able to have general anesthesia.  We therefore agreed to start him on Lupron preemptively on 5/1/2024 and do a standard office transrectal ultrasound-guided prostate biopsy.  This demonstrated [place] 4+4 cancer in 7 cores.    Since he was admitted in May 2024 with acute on chronic CHF requiring diuretics, he was planned for transition from Lupron to monthly Firmagon.    He got his PSA this morning, but the results are unfortunately not back yet.    Explained to him that the plan moving forward is still the same.  When he comes back to see me on 10/22/2024, we will start his monthly Firmagon at that point.  He will then get monthly nursing visits for the Firmagon injections.    He also follows with Dr. Barreto in medical oncology.    Regarding possible local therapy, he is going to see [place] radiation oncologist Dr. Sandhu in February.  The plan will be for him to get PSMA scan as well as PSA before he sees him.  We will help to arrange these studies during his next follow-up appointment with me in October.    Patient also getting hot flashes at this point.  He has been taking over-the-counter primrose and this has helped a fair amount.            PLAN  -Has appt with me on 10/22/24. Will start monthly firmagon at this appt. Will avoid Lupron due to his baseline  "cardiac issues.  -Sees Dr. Barreto of med onc on 10/17/24  -Saw rad Dr. Sandhu at [place]. He recommended starting ADT (initiated) and getting PSA and PSMA around early 2025. Appointment scheduled for 2/6/24.  -Fu PSA from today  -Cont primrose for hot flashes      [facility]      Portions of the above record have been created with voice recognition software.  Occasional wrong word or \"sound alike\" substitution may have occurred due to the inherent limitations of voice recognition software.  Read the chart carefully and recognize, using context, where substitution may have occurred.      Estevan Waite, DO        Chief Complaint     Prostate cancer      History of Present Illness     See summary above    No fevers or chills          The following portions of the patient's history were reviewed and updated as appropriate: allergies, current medications, past family history, past medical history, past social history, past surgical history and problem list.        AUA SYMPTOM SCORE      Flowsheet Row Most Recent Value   AUA SYMPTOM SCORE    How often have you had a sensation of not emptying your bladder completely after you finished urinating? 0 (P)    How often have you had to urinate again less than two hours after you finished urinating? 1 (P)    How often have you found you stopped and started again several times when you urinate? 0 (P)    How often have you found it difficult to postpone urination? 1 (P)    How often have you had a weak urinary stream? 5 (P)    How often have you had to push or strain to begin urination? 1 (P)    How many times did you most typically get up to urinate from the time you went to bed at night until the time you got up in the morning? 1 (P)    Quality of Life: If you were to spend the rest of your life with your urinary condition just the way it is now, how would you feel about that? 1 (P)    AUA SYMPTOM SCORE 9 (P)               Review " "of Systems     Denied fevers, chills nausea, vomiting, painful urination  Positive occasional hot flashes      Allergies     Allergies   Allergen Reactions    Penicillin V Other (See Comments)     As a child        Physical Exam     NACT, NAD, neg CVAT BL,ab soft and nontender      Vital Signs  Vitals:    07/24/24 1143   BP: 110/60   BP Location: Left arm   Patient Position: Sitting   Cuff Size: Standard   Pulse: 85   SpO2: 94%   Weight: 98.9 kg (218 lb)   Height: 5' 10\" (1.778 m)         Current Medications       Current Outpatient Medications:     Ascorbic Acid (vitamin C) 1000 MG tablet, Take 1,000 mg by mouth daily, Disp: , Rfl:     cetirizine (ZyrTEC) 5 MG tablet, Take 5 mg by mouth daily, Disp: , Rfl:     Continuous Glucose Sensor (FreeStyle Lisa 3 Sensor) MISC, Use 1 each every 14 (fourteen) days, Disp: 6 each, Rfl: 5    EVENING PRIMROSE OIL PO, Take by mouth 3 (three) times a day, Disp: , Rfl:     glimepiride (AMARYL) 1 mg tablet, Take 1 tablet (1 mg total) by mouth daily with breakfast, Disp: 30 tablet, Rfl: 5    metoprolol succinate (TOPROL-XL) 25 mg 24 hr tablet, Take 1 tablet (25 mg total) by mouth daily, Disp: 100 tablet, Rfl: 1    multivitamin (THERAGRAN) TABS, Take 1 tablet by mouth daily, Disp: , Rfl:     pantoprazole (PROTONIX) 40 mg tablet, 1 po BID for one additional month (through 8/11) and then decrease to once a day., Disp: 120 tablet, Rfl: 2    rosuvastatin (CRESTOR) 10 MG tablet, Take 1 tablet (10 mg total) by mouth daily, Disp: 90 tablet, Rfl: 0    torsemide (DEMADEX) 20 mg tablet, Take 3 tablets (60 mg total) by mouth daily, Disp: 270 tablet, Rfl: 5    VITAMIN D, ERGOCALCIFEROL, PO, Take by mouth, Disp: , Rfl:     vitamin E, tocopherol, 400 units capsule, Take 400 Units by mouth daily, Disp: , Rfl:     warfarin (Coumadin) 5 mg tablet, Take one tablet daily or as directed, Disp: 90 tablet, Rfl: 3    Blood Glucose Monitoring Suppl (ONETOUCH VERIO) w/Device KIT, by Does not apply route once " for 1 dose Test sugar in the morning, Disp: 1 kit, Rfl: 0      Active Problems     Patient Active Problem List   Diagnosis    Mixed hyperlipidemia    IFG (impaired fasting glucose)    Upper respiratory tract infection    Positive colorectal cancer screening using Cologuard test    Prostate cancer (HCC)    Encounter for monitoring androgen deprivation therapy    Hot flashes    Type 2 diabetes mellitus with hyperglycemia, without long-term current use of insulin (HCC)    Acute renal failure superimposed on stage 3a chronic kidney disease (HCC)    Essential hypertension    Dilated cardiomyopathy (HCC)    Paroxysmal atrial fibrillation (HCC)    Obesity    ICD (implantable cardioverter-defibrillator) in place    Acute on chronic systolic congestive heart failure (HCC)    Gastrointestinal hemorrhage associated with duodenitis    Iron deficiency anemia due to chronic blood loss         Past Medical History     Past Medical History:   Diagnosis Date    Allergic     Anesthesia     pt wears a life vest (11/16).  should not be scheduled at [facility] until heart condition is stabilized    BPH (benign prostatic hypertrophy)     Cancer (HCC)     Chronic kidney disease     Coronary artery disease     Diabetes mellitus (HCC)     Hyperlipidemia     Hypertension     Irregular heart beat     PAF    Prostate cancer (HCC)          Surgical History     Past Surgical History:   Procedure Laterality Date    A-V CARDIAC PACEMAKER INSERTION      CARDIAC CATHETERIZATION N/A 10/04/2023    Procedure: Cardiac Coronary Angiogram;  Surgeon: Chris Lovell MD;  Location: [facility];  Service: Cardiology    CARDIAC CATHETERIZATION N/A 10/04/2023    Procedure: Cardiac RHC/LHC;  Surgeon: Chris Lovell MD;  Location: [facility];  Service: Cardiology    CARDIAC CATHETERIZATION  10/04/2023    Procedure: Cardiac catheterization;  Surgeon: Chris Lovell MD;  Location: [facility];  Service: Cardiology    CARDIAC  ELECTROPHYSIOLOGY PROCEDURE N/A 2024    Procedure: Cardiac icd implant, Dual Chambers ICD;  Surgeon: Leo Whalen MD;  Location: [facility];  Service: Cardiology    EAR SURGERY      HERNIA REPAIR      NC COLONOSCOPY FLX DX W/COLLJ SPEC WHEN PFRMD N/A 2019    Procedure: COLONOSCOPY;  Surgeon: Winston Nielson III, MD;  Location: [facility];  Service: Gastroenterology         Family History     Family History   Problem Relation Age of Onset    Stroke Father     No Known Problems Mother     Prostate cancer Brother     Arthritis Brother     No Known Problems Sister     Diabetes Sister     Diabetes Sister     No Known Problems Son     No Known Problems Daughter          Social History     Social History     Social History     Tobacco Use   Smoking Status Former    Current packs/day: 0.00    Average packs/day: 1 pack/day for 25.0 years (25.0 ttl pk-yrs)    Types: Cigarettes, Pipe, Cigars    Start date: 1980    Quit date: 2005    Years since quittin.4    Passive exposure: Past   Smokeless Tobacco Never         Pertinent Lab Values     Lab Results   Component Value Date    CREATININE 2.32 (H) 2024       Lab Results   Component Value Date    PSA 8.14 (H) 2024    PSA 8.48 (H) 2024    PSA 5.11 (H) 2023         Pertinent Imaging     The patient's images were reviewed by me personally and also in real time with them in the examination room using our PACS imaging system.      PSMA PET scan 2023: Heterogenous multifocal fairly intense tracer activity in the right greater than left prostate gland highly suspicious for locally recurrent intraprostatic PSMA positive malignancy; no focal tracer activity concerning for metastatic disease    Prostate MRI 10/13/2023: Tumor in anterior and posterior right peripheral zone apex and mid gland and right transition zone at apex corresponds to PSMA avid lesion; the lesion broadly abuts the capsule without visualized gross  "extraprostatic extension; no SVI/LAD/bony metastasis; prostate 29 g      Pertinent Pathology     TRUS Pbx 5/24/24: G 4+4 in 7 cores  Volume:  20 cm3   A. Prostate, \"Prostate, left lateral base,\" Core biopsy:  - Benign prostatic tissue, negative for carcinoma     B. Prostate, \"Prostate, left mid base,\" Core biopsy:  - Prostatic adenocarcinoma, [place] pattern 4 + 4, Elder score 8, Grade Group 4  - Largest focus of carcinoma measures 0.1 cm (5% of tissue core)  - Carcinoma involves 1 of 1 core biopsies  - No perineural invasion present     C. Prostate, \"Prostate, left lateral mid,\" Core biopsy:  - Benign prostatic tissue, negative for carcinoma     D. Prostate, \"Prostate, left medial mid,\" Core biopsy:  - Prostatic adenocarcinoma, [place] pattern 4 + 4, Elder score 8, Grade Group 4  - Multifocal, largest focus of carcinoma measures 0.5 cm (30% of tissue core)  - Carcinoma involves 1 of 1 core biopsies  - No perineural invasion present     E. Prostate, \"Prostate, left lateral apex,\" Core biopsy:  - Benign prostatic tissue, negative for carcinoma     F. Prostate, \"Prostate, left medial apex,\" Core biopsy:  - Prostatic adenocarcinoma, [place] pattern 4 + 4, Elder score 8, Grade Group 4  - Largest focus of carcinoma measures 0.1 cm (10% of tissue core)  - Carcinoma involves 1 of 1 core biopsies  - No perineural invasion present     G. Prostate, \"Prostate, right lateral base,\" Core biopsy:  - Benign prostatic tissue, negative for carcinoma     H. Prostate, \"Prostate, right medial base,\" Core biopsy:  - Benign prostatic tissue, negative for carcinoma     I. Prostate, \"Prostate, right lateral mid,\" Core biopsy:  - Prostatic adenocarcinoma, [place] pattern 4 + 4, [place] score 8, Grade Group 4  - Largest focus of carcinoma measures 0.4 cm (30% of tissue core)  - Carcinoma involves 1 of 1 core biopsies  - No perineural invasion present     J. Prostate, \"Prostate, right medial mid,\" Core biopsy:  - Prostatic " "adenocarcinoma, [place] pattern 4 + 4, Elder score 8, Grade Group 4  - Largest focus of carcinoma measures 0.1 cm (5% of tissue core)  - Carcinoma involves 1 of 1 core biopsies  - No perineural invasion present     K. Prostate, \"Prostate, right lateral apex,\" Core biopsy:  - Prostatic adenocarcinoma, [place] pattern 4 + 4, Elder score 8, Grade Group 4  - Largest focus of carcinoma measures 1.2 cm  - Carcinoma involves 1 of 1 core biopsies  - No perineural invasion present     L. Prostate, \"Prostate, right medial apex,\" Core biopsy:  - Prostatic adenocarcinoma, [place] pattern 4 + 4, Elder score 8, Grade Group 4  - Largest focus of carcinoma measures 0.3 cm (15% of tissue core)  - Carcinoma involves 1 of 1 core biopsies  - No perineural invasion present  - Large nerve present  - Benign prostatic tissue, negative for carcinoma      I have spent 30 minutes with Patient  today in which greater than 50% of this time was spent in counseling/coordination of care regarding Diagnostic results, Prognosis, Risks and benefits of tx options, Instructions for management, Patient and family education, Importance of tx compliance, Impressions, Counseling / Coordination of care, Documenting in the medical record, Reviewing / ordering tests, medicine, procedures  , and Obtaining or reviewing history  .    Please note this time includes cumulative time on the day of encounter, including reviewing medical records and/or coordinating care among the patient's other specialists.    "

## 2024-07-24 NOTE — LETTER
July 24, 2024     Galen Barreto MD  [address] [facility] [address]    Patient: Galen Carson   YOB: 1950   Date of Visit: 7/24/2024       Dear Dr. Barreto:    Thank you for referring Galen Carson to me for evaluation. Below are my notes for this consultation.    If you have questions, please do not hesitate to call me. I look forward to following your patient along with you.         Sincerely,        Estevan Waite DO        CC: No Recipients    Estevan Waite DO  7/24/2024 12:53 PM  Sign when Signing Visit    UROLOGY FOLLOW-UP ENCOUNTER    Galen Carson is a 74 y.o. male with prostate cancer    Pertinent non-urologic PMH: DM (hemoglobin A1c 6.9 on 2/14/2024), HLD, HTN, CKD (creatinine 1.37, GFR 51 on 3/13/2024); a fib    Pertinent non-urologic PSH: Cardiac catheterization without stents, hernia repair, dual chamber ICD implant Jan 2024    Anticoagulation: ASA81, Warfarin    History of high risk prostate cancer treated with radiation in 2019 he completed 2-year course of ADT in February 2021    Pretreatment PSA was 22.4.  Morales PSA was 0.0.    Had PSA recurrence which prompted workup below    PSMA PET scan 9/25/2023: Heterogenous multifocal fairly intense tracer activity in the right greater than left prostate gland highly suspicious for locally recurrent intraprostatic PSMA positive malignancy; no focal tracer activity concerning for metastatic disease    Prostate MRI 10/13/2023: Tumor in anterior and posterior right peripheral zone apex and mid gland and right transition zone at apex corresponds to PSMA avid lesion; the lesion broadly abuts the capsule without visualized gross extraprostatic extension; no SVI/LAD/bony metastasis; prostate 29 g    Given the positive lesion seen on imaging, plan was for patient to undergo MRI fusion biopsy.  Unfortunately, the case was canceled by anesthesia due to the patient's cardiac arrhythmia requiring LifeVest.  Since he cannot  undergo anesthesia, there is consideration for alternate therapy.    Patient had dual-chamber ICD implant 1/4/2024    PSA 8.48 on 1/24/2024    Saw Dr. Barreto of hematology oncology on 2/21/2024. Option for repeat local therapy versus hormonal therapy with LHRH antagonist along with closely watching PSA.    PSA 8.14 on 3/27/24     Patient declined OR for TP bx. Presented to office for TRUS Pbx.    Lupron q6 month on 5/1/24     TRUS Pbx 5/1/24: G 4+4 in 7 cores  Volume:  20 cm3     Admitted mid May 2024 with acute on chronic CHF, required diuretics, got ESDRAS. Cr was down to around 2.0 by discharge (prev Bcr was about 1.4 in March 2024).    PSA on 7/24/24 still pending    Random bladder scan 58 cc on 7/24/24    Overall voiding well. Some LUTS but taking diuretics.    Assessment and plan:     Prostate cancer    Patient with complex prostate cancer history as mentioned above.    In summary he completed his initial radiation in 2019 for high risk prostate cancer.  He received 2 years of ADT.    He had PSA recurrence which prompted workup.  Imaging demonstrated prostate lesions on PET scan as well as MRI.  He was planned for transperineal prostate biopsy with MRI fusion, however, due to his dual-chamber ICD, he was not able to have general anesthesia.  We therefore agreed to start him on Lupron preemptively on 5/1/2024 and do a standard office transrectal ultrasound-guided prostate biopsy.  This demonstrated Elder 4+4 cancer in 7 cores.    Since he was admitted in May 2024 with acute on chronic CHF requiring diuretics, he was planned for transition from Lupron to monthly Firmagon.    He got his PSA this morning, but the results are unfortunately not back yet.    Explained to him that the plan moving forward is still the same.  When he comes back to see me on 10/22/2024, we will start his monthly Firmagon at that point.  He will then get monthly nursing visits for the Firmagon injections.    He also follows with Dr. Barreto in  "medical oncology.    Regarding possible local therapy, he is going to see [facility] radiation oncologist Dr. Sandhu in February.  The plan will be for him to get PSMA scan as well as PSA before he sees him.  We will help to arrange these studies during his next follow-up appointment with me in October.    Patient also getting hot flashes at this point.  He has been taking over-the-counter primrose and this has helped a fair amount.            PLAN  -Has appt with me on 10/22/24. Will start monthly firmagon at this appt. Will avoid Lupron due to his baseline cardiac issues.  -Sees Dr. Barreto of med onc on 10/17/24  -Saw rad Dr. Sandhu at [facility]. He recommended starting ADT (initiated) and getting PSA and PSMA around early 2025. Appointment scheduled for 2/6/24.  -Fu PSA from today  -Cont primrose for hot flashes      His friend is present at appt today assisted with history.      Portions of the above record have been created with voice recognition software.  Occasional wrong word or \"sound alike\" substitution may have occurred due to the inherent limitations of voice recognition software.  Read the chart carefully and recognize, using context, where substitution may have occurred.      Estevan Waite,         Chief Complaint     Prostate cancer      History of Present Illness     See summary above    No fevers or chills          The following portions of the patient's history were reviewed and updated as appropriate: allergies, current medications, past family history, past medical history, past social history, past surgical history and problem list.        AUA SYMPTOM SCORE      Flowsheet Row Most Recent Value   AUA SYMPTOM SCORE    How often have you had a sensation of not emptying your bladder completely after you finished urinating? 0 (P)    How often have you had to urinate again less than two hours after you finished urinating? 1 (P)    How often have you found you stopped and started again several times when you " "urinate? 0 (P)    How often have you found it difficult to postpone urination? 1 (P)    How often have you had a weak urinary stream? 5 (P)    How often have you had to push or strain to begin urination? 1 (P)    How many times did you most typically get up to urinate from the time you went to bed at night until the time you got up in the morning? 1 (P)    Quality of Life: If you were to spend the rest of your life with your urinary condition just the way it is now, how would you feel about that? 1 (P)    AUA SYMPTOM SCORE 9 (P)               Review of Systems     Denied fevers, chills nausea, vomiting, painful urination  Positive occasional hot flashes      Allergies     Allergies   Allergen Reactions   • Penicillin V Other (See Comments)     As a child        Physical Exam     NACT, NAD, neg CVAT BL,ab soft and nontender      Vital Signs  Vitals:    07/24/24 1143   BP: 110/60   BP Location: Left arm   Patient Position: Sitting   Cuff Size: Standard   Pulse: 85   SpO2: 94%   Weight: 98.9 kg (218 lb)   Height: 5' 10\" (1.778 m)         Current Medications       Current Outpatient Medications:   •  Ascorbic Acid (vitamin C) 1000 MG tablet, Take 1,000 mg by mouth daily, Disp: , Rfl:   •  cetirizine (ZyrTEC) 5 MG tablet, Take 5 mg by mouth daily, Disp: , Rfl:   •  Continuous Glucose Sensor (FreeStyle Lisa 3 Sensor) MISC, Use 1 each every 14 (fourteen) days, Disp: 6 each, Rfl: 5  •  EVENING PRIMROSE OIL PO, Take by mouth 3 (three) times a day, Disp: , Rfl:   •  glimepiride (AMARYL) 1 mg tablet, Take 1 tablet (1 mg total) by mouth daily with breakfast, Disp: 30 tablet, Rfl: 5  •  metoprolol succinate (TOPROL-XL) 25 mg 24 hr tablet, Take 1 tablet (25 mg total) by mouth daily, Disp: 100 tablet, Rfl: 1  •  multivitamin (THERAGRAN) TABS, Take 1 tablet by mouth daily, Disp: , Rfl:   •  pantoprazole (PROTONIX) 40 mg tablet, 1 po BID for one additional month (through 8/11) and then decrease to once a day., Disp: 120 tablet, " Rfl: 2  •  rosuvastatin (CRESTOR) 10 MG tablet, Take 1 tablet (10 mg total) by mouth daily, Disp: 90 tablet, Rfl: 0  •  torsemide (DEMADEX) 20 mg tablet, Take 3 tablets (60 mg total) by mouth daily, Disp: 270 tablet, Rfl: 5  •  VITAMIN D, ERGOCALCIFEROL, PO, Take by mouth, Disp: , Rfl:   •  vitamin E, tocopherol, 400 units capsule, Take 400 Units by mouth daily, Disp: , Rfl:   •  warfarin (Coumadin) 5 mg tablet, Take one tablet daily or as directed, Disp: 90 tablet, Rfl: 3  •  Blood Glucose Monitoring Suppl (ONETOUCH VERIO) w/Device KIT, by Does not apply route once for 1 dose Test sugar in the morning, Disp: 1 kit, Rfl: 0      Active Problems     Patient Active Problem List   Diagnosis   • Mixed hyperlipidemia   • IFG (impaired fasting glucose)   • Upper respiratory tract infection   • Positive colorectal cancer screening using Cologuard test   • Prostate cancer (HCC)   • Encounter for monitoring androgen deprivation therapy   • Hot flashes   • Type 2 diabetes mellitus with hyperglycemia, without long-term current use of insulin (HCC)   • Acute renal failure superimposed on stage 3a chronic kidney disease (HCC)   • Essential hypertension   • Dilated cardiomyopathy (HCC)   • Paroxysmal atrial fibrillation (HCC)   • Obesity   • ICD (implantable cardioverter-defibrillator) in place   • Acute on chronic systolic congestive heart failure (HCC)   • Gastrointestinal hemorrhage associated with duodenitis   • Iron deficiency anemia due to chronic blood loss         Past Medical History     Past Medical History:   Diagnosis Date   • Allergic    • Anesthesia     pt wears a life vest (11/16).  should not be scheduled at [facility] until heart condition is stabilized   • BPH (benign prostatic hypertrophy)    • Cancer (HCC)    • Chronic kidney disease    • Coronary artery disease    • Diabetes mellitus (HCC)    • Hyperlipidemia    • Hypertension    • Irregular heart beat     PAF   • Prostate cancer (HCC)          Surgical History      Past Surgical History:   Procedure Laterality Date   • A-V CARDIAC PACEMAKER INSERTION     • CARDIAC CATHETERIZATION N/A 10/04/2023    Procedure: Cardiac Coronary Angiogram;  Surgeon: Chris Lovell MD;  Location: [facility];  Service: Cardiology   • CARDIAC CATHETERIZATION N/A 10/04/2023    Procedure: Cardiac RHC/LHC;  Surgeon: Chris Lovell MD;  Location: [facility];  Service: Cardiology   • CARDIAC CATHETERIZATION  10/04/2023    Procedure: Cardiac catheterization;  Surgeon: Chris Lovell MD;  Location: [facility];  Service: Cardiology   • CARDIAC ELECTROPHYSIOLOGY PROCEDURE N/A 2024    Procedure: Cardiac icd implant, Dual Chambers ICD;  Surgeon: Leo Whalen MD;  Location:  CARDIAC CATH LAB;  Service: Cardiology   • EAR SURGERY     • HERNIA REPAIR     • WI COLONOSCOPY FLX DX W/COLLJ SPEC WHEN PFRMD N/A 2019    Procedure: COLONOSCOPY;  Surgeon: Winston Nielson III, MD;  Location: [facility];  Service: Gastroenterology         Family History     Family History   Problem Relation Age of Onset   • Stroke Father    • No Known Problems Mother    • Prostate cancer Brother    • Arthritis Brother    • No Known Problems Sister    • Diabetes Sister    • Diabetes Sister    • No Known Problems Son    • No Known Problems Daughter          Social History     Social History    Social History     Tobacco Use   Smoking Status Former   • Current packs/day: 0.00   • Average packs/day: 1 pack/day for 25.0 years (25.0 ttl pk-yrs)   • Types: Cigarettes, Pipe, Cigars   • Start date: 1980   • Quit date: 2005   • Years since quittin.4   • Passive exposure: Past   Smokeless Tobacco Never         Pertinent Lab Values     Lab Results   Component Value Date    CREATININE 2.32 (H) 2024       Lab Results   Component Value Date    PSA 8.14 (H) 2024    PSA 8.48 (H) 2024    PSA 5.11 (H) 2023         Pertinent Imaging     The patient's images were  "reviewed by me personally and also in real time with them in the examination room using our PACS imaging system.      PSMA PET scan 9/25/2023: Heterogenous multifocal fairly intense tracer activity in the right greater than left prostate gland highly suspicious for locally recurrent intraprostatic PSMA positive malignancy; no focal tracer activity concerning for metastatic disease    Prostate MRI 10/13/2023: Tumor in anterior and posterior right peripheral zone apex and mid gland and right transition zone at apex corresponds to PSMA avid lesion; the lesion broadly abuts the capsule without visualized gross extraprostatic extension; no SVI/LAD/bony metastasis; prostate 29 g      Pertinent Pathology     TRUS Pbx 5/24/24: G 4+4 in 7 cores  Volume:  20 cm3   A. Prostate, \"Prostate, left lateral base,\" Core biopsy:  - Benign prostatic tissue, negative for carcinoma     B. Prostate, \"Prostate, left mid base,\" Core biopsy:  - Prostatic adenocarcinoma, Elder pattern 4 + 4, Homer score 8, Grade Group 4  - Largest focus of carcinoma measures 0.1 cm (5% of tissue core)  - Carcinoma involves 1 of 1 core biopsies  - No perineural invasion present     C. Prostate, \"Prostate, left lateral mid,\" Core biopsy:  - Benign prostatic tissue, negative for carcinoma     D. Prostate, \"Prostate, left medial mid,\" Core biopsy:  - Prostatic adenocarcinoma, Homer pattern 4 + 4, Elder score 8, Grade Group 4  - Multifocal, largest focus of carcinoma measures 0.5 cm (30% of tissue core)  - Carcinoma involves 1 of 1 core biopsies  - No perineural invasion present     E. Prostate, \"Prostate, left lateral apex,\" Core biopsy:  - Benign prostatic tissue, negative for carcinoma     F. Prostate, \"Prostate, left medial apex,\" Core biopsy:  - Prostatic adenocarcinoma, Homer pattern 4 + 4, Homer score 8, Grade Group 4  - Largest focus of carcinoma measures 0.1 cm (10% of tissue core)  - Carcinoma involves 1 of 1 core biopsies  - No perineural " "invasion present     G. Prostate, \"Prostate, right lateral base,\" Core biopsy:  - Benign prostatic tissue, negative for carcinoma     H. Prostate, \"Prostate, right medial base,\" Core biopsy:  - Benign prostatic tissue, negative for carcinoma     I. Prostate, \"Prostate, right lateral mid,\" Core biopsy:  - Prostatic adenocarcinoma, [place] pattern 4 + 4, [place] score 8, Grade Group 4  - Largest focus of carcinoma measures 0.4 cm (30% of tissue core)  - Carcinoma involves 1 of 1 core biopsies  - No perineural invasion present     J. Prostate, \"Prostate, right medial mid,\" Core biopsy:  - Prostatic adenocarcinoma, Elder pattern 4 + 4, Elder score 8, Grade Group 4  - Largest focus of carcinoma measures 0.1 cm (5% of tissue core)  - Carcinoma involves 1 of 1 core biopsies  - No perineural invasion present     K. Prostate, \"Prostate, right lateral apex,\" Core biopsy:  - Prostatic adenocarcinoma, Elder pattern 4 + 4, [place] score 8, Grade Group 4  - Largest focus of carcinoma measures 1.2 cm  - Carcinoma involves 1 of 1 core biopsies  - No perineural invasion present     L. Prostate, \"Prostate, right medial apex,\" Core biopsy:  - Prostatic adenocarcinoma, Elder pattern 4 + 4, [place] score 8, Grade Group 4  - Largest focus of carcinoma measures 0.3 cm (15% of tissue core)  - Carcinoma involves 1 of 1 core biopsies  - No perineural invasion present  - Large nerve present  - Benign prostatic tissue, negative for carcinoma      I have spent 30 minutes with Patient  today in which greater than 50% of this time was spent in counseling/coordination of care regarding Diagnostic results, Prognosis, Risks and benefits of tx options, Instructions for management, Patient and family education, Importance of tx compliance, Impressions, Counseling / Coordination of care, Documenting in the medical record, Reviewing / ordering tests, medicine, procedures  , and Obtaining or reviewing history  .    Please note this time includes " cumulative time on the day of encounter, including reviewing medical records and/or coordinating care among the patient's other specialists.

## 2024-07-26 ENCOUNTER — TELEPHONE (OUTPATIENT)
Dept: HEMATOLOGY ONCOLOGY | Facility: CLINIC | Age: 74
End: 2024-07-26

## 2024-07-26 DIAGNOSIS — D50.0 IRON DEFICIENCY ANEMIA DUE TO CHRONIC BLOOD LOSS: Primary | ICD-10-CM

## 2024-07-26 RX ORDER — SODIUM CHLORIDE 9 MG/ML
20 INJECTION, SOLUTION INTRAVENOUS ONCE
Status: CANCELLED | OUTPATIENT
Start: 2024-08-02

## 2024-07-26 NOTE — PROGRESS NOTES
Dr. Barreto has added  iron plan for Venofer 300mg x 4 treatments for this patient for iron deficiency anemia and low ferritin. Pt was called and message sent to infusion to [facility].

## 2024-07-26 NOTE — TELEPHONE ENCOUNTER
Patient was called and made aware of lab results and recommendation from Dr. Barreto to start iron infusions. Patient is agreeable to these and he is made aware that he will get a total of 4 doses. Informed him that the infusion dept will be calling to coordinate appts. Pt verbalizes understanding.

## 2024-07-31 DIAGNOSIS — E78.2 MIXED HYPERLIPIDEMIA: ICD-10-CM

## 2024-07-31 RX ORDER — ROSUVASTATIN CALCIUM 10 MG/1
10 TABLET, COATED ORAL DAILY
Qty: 90 TABLET | Refills: 1 | Status: SHIPPED | OUTPATIENT
Start: 2024-07-31

## 2024-08-01 ENCOUNTER — TELEPHONE (OUTPATIENT)
Dept: INFUSION CENTER | Facility: CLINIC | Age: 74
End: 2024-08-01

## 2024-08-01 DIAGNOSIS — D50.0 IRON DEFICIENCY ANEMIA DUE TO CHRONIC BLOOD LOSS: Primary | ICD-10-CM

## 2024-08-01 RX ORDER — SODIUM CHLORIDE 9 MG/ML
20 INJECTION, SOLUTION INTRAVENOUS ONCE
OUTPATIENT
Start: 2024-08-05

## 2024-08-01 NOTE — TELEPHONE ENCOUNTER
Spoke with patient regarding upcoming first appointment, verified appointment date/time, discussed infusion centers policies and procedures including visitor policy.  patient verbalizes understanding and has no further questions at this time.

## 2024-08-05 ENCOUNTER — HOSPITAL ENCOUNTER (OUTPATIENT)
Dept: INFUSION CENTER | Facility: CLINIC | Age: 74
Discharge: HOME | End: 2024-08-05
Payer: MEDICARE

## 2024-08-05 VITALS
TEMPERATURE: 96.2 F | RESPIRATION RATE: 18 BRPM | DIASTOLIC BLOOD PRESSURE: 58 MMHG | HEART RATE: 92 BPM | SYSTOLIC BLOOD PRESSURE: 127 MMHG

## 2024-08-05 DIAGNOSIS — D50.0 IRON DEFICIENCY ANEMIA DUE TO CHRONIC BLOOD LOSS: Primary | ICD-10-CM

## 2024-08-05 PROCEDURE — 96365 THER/PROPH/DIAG IV INF INIT: CPT

## 2024-08-05 RX ORDER — SODIUM CHLORIDE 9 MG/ML
20 INJECTION, SOLUTION INTRAVENOUS ONCE
Status: COMPLETED | OUTPATIENT
Start: 2024-08-05 | End: 2024-08-05

## 2024-08-05 RX ORDER — SODIUM CHLORIDE 9 MG/ML
20 INJECTION, SOLUTION INTRAVENOUS ONCE
Status: CANCELLED | OUTPATIENT
Start: 2024-08-12

## 2024-08-05 RX ADMIN — IRON SUCROSE 300 MG: 20 INJECTION, SOLUTION INTRAVENOUS at 12:38

## 2024-08-05 RX ADMIN — SODIUM CHLORIDE 20 ML/HR: 9 INJECTION, SOLUTION INTRAVENOUS at 12:36

## 2024-08-07 ENCOUNTER — REMOTE DEVICE CLINIC VISIT (OUTPATIENT)
Dept: CARDIOLOGY CLINIC | Facility: CLINIC | Age: 74
End: 2024-08-07
Payer: MEDICARE

## 2024-08-07 DIAGNOSIS — Z95.810 ICD (IMPLANTABLE CARDIOVERTER-DEFIBRILLATOR) IN PLACE: Primary | ICD-10-CM

## 2024-08-07 PROCEDURE — 93296 REM INTERROG EVL PM/IDS: CPT | Performed by: INTERNAL MEDICINE

## 2024-08-07 PROCEDURE — 93295 DEV INTERROG REMOTE 1/2/MLT: CPT | Performed by: INTERNAL MEDICINE

## 2024-08-07 NOTE — PROGRESS NOTES
Results for orders placed or performed in visit on 08/07/24   Cardiac EP device report    Narrative    MDT DC ICD/ACTIVE SYSTEM IS MRI CONDITIONAL  CARELINK TRANSMISSION: BATTERY VOLTAGE ADEQUATE (12 YRS). AP 13.7%  0.4% (AAIR-DDDR 60PPM). 3 VT-NS EPISODES (7 @ 179 BPM, 6 @ 200 BPM, 12 @ 197 BPM). 1 AT/AF EPISODE WITH PAF ON EGM, DURATION 01:07 MINS. AT/AF BURDEN <0.1%. EF 20% (5/10/24 ECHO). PATIENT ON WARFARIN, METOPROLOL SUCC. OPTI-VOL WITHIN NORMAL LIMITS. NORMAL DEVICE FUNCTION.  ES

## 2024-08-12 ENCOUNTER — HOSPITAL ENCOUNTER (OUTPATIENT)
Dept: INFUSION CENTER | Facility: CLINIC | Age: 74
Discharge: HOME | End: 2024-08-12
Payer: MEDICARE

## 2024-08-12 VITALS
RESPIRATION RATE: 16 BRPM | DIASTOLIC BLOOD PRESSURE: 66 MMHG | SYSTOLIC BLOOD PRESSURE: 116 MMHG | HEART RATE: 92 BPM | TEMPERATURE: 97 F

## 2024-08-12 DIAGNOSIS — D50.0 IRON DEFICIENCY ANEMIA DUE TO CHRONIC BLOOD LOSS: Primary | ICD-10-CM

## 2024-08-12 PROCEDURE — 96365 THER/PROPH/DIAG IV INF INIT: CPT

## 2024-08-12 PROCEDURE — 96366 THER/PROPH/DIAG IV INF ADDON: CPT

## 2024-08-12 RX ORDER — SODIUM CHLORIDE 9 MG/ML
20 INJECTION, SOLUTION INTRAVENOUS ONCE
Status: COMPLETED | OUTPATIENT
Start: 2024-08-12 | End: 2024-08-12

## 2024-08-12 RX ORDER — SODIUM CHLORIDE 9 MG/ML
20 INJECTION, SOLUTION INTRAVENOUS ONCE
Status: CANCELLED | OUTPATIENT
Start: 2024-08-19

## 2024-08-12 RX ADMIN — IRON SUCROSE 300 MG: 20 INJECTION, SOLUTION INTRAVENOUS at 08:29

## 2024-08-12 RX ADMIN — SODIUM CHLORIDE 20 ML/HR: 0.9 INJECTION, SOLUTION INTRAVENOUS at 08:23

## 2024-08-12 NOTE — PROGRESS NOTES
Pt here today for an iron infusion, offers no complaints, infusion completed w/o complications, aware of there next appt on 8/19 at 9:30, AVS declined and pt D/C home

## 2024-08-15 ENCOUNTER — PATIENT OUTREACH (OUTPATIENT)
Dept: CASE MANAGEMENT | Facility: OTHER | Age: 74
End: 2024-08-15

## 2024-08-15 NOTE — PROGRESS NOTES
Spoke with Galen Overall he is doing good. Blood sugars up and down sometimes he wonders if its the sensors for his CGM. Blood sugars in 200's and sometimes drop to 50's but come right back up.A1C 6.3 last month.   Weight running 214 pounds. Tries to walk more.  Currently having iron infusions.   Had some chest pain yesterday checked blood pressure and pulse is was normal then pain went away. Knows to head to emergency room if pain would not go away.  No questions or concerns.

## 2024-08-19 ENCOUNTER — HOSPITAL ENCOUNTER (OUTPATIENT)
Dept: INFUSION CENTER | Facility: CLINIC | Age: 74
Discharge: HOME | End: 2024-08-19
Payer: MEDICARE

## 2024-08-19 VITALS
TEMPERATURE: 97.6 F | DIASTOLIC BLOOD PRESSURE: 81 MMHG | HEART RATE: 89 BPM | RESPIRATION RATE: 18 BRPM | SYSTOLIC BLOOD PRESSURE: 143 MMHG

## 2024-08-19 DIAGNOSIS — D50.0 IRON DEFICIENCY ANEMIA DUE TO CHRONIC BLOOD LOSS: Primary | ICD-10-CM

## 2024-08-19 PROCEDURE — 96365 THER/PROPH/DIAG IV INF INIT: CPT

## 2024-08-19 RX ORDER — SODIUM CHLORIDE 9 MG/ML
20 INJECTION, SOLUTION INTRAVENOUS ONCE
Status: COMPLETED | OUTPATIENT
Start: 2024-08-19 | End: 2024-08-19

## 2024-08-19 RX ORDER — SODIUM CHLORIDE 9 MG/ML
20 INJECTION, SOLUTION INTRAVENOUS ONCE
Status: CANCELLED | OUTPATIENT
Start: 2024-08-26

## 2024-08-19 RX ADMIN — IRON SUCROSE 300 MG: 20 INJECTION, SOLUTION INTRAVENOUS at 09:16

## 2024-08-19 RX ADMIN — SODIUM CHLORIDE 20 ML/HR: 0.9 INJECTION, SOLUTION INTRAVENOUS at 09:16

## 2024-08-19 NOTE — PROGRESS NOTES
Pt here venofer, offering no complaints.  Left arm IV placed with positive blood return noted.  Tolerated infusion without incident.  PIV removed.  AVS declined.  Aware of next appt 8/26 @ 1030 am.  Walked out in stable condition.

## 2024-08-21 ENCOUNTER — APPOINTMENT (OUTPATIENT)
Dept: LAB | Facility: CLINIC | Age: 74
End: 2024-08-21
Payer: MEDICARE

## 2024-08-21 ENCOUNTER — HOSPITAL ENCOUNTER (OUTPATIENT)
Dept: NON INVASIVE DIAGNOSTICS | Facility: CLINIC | Age: 74
Discharge: HOME | End: 2024-08-21
Payer: MEDICARE

## 2024-08-21 VITALS
BODY MASS INDEX: 31.21 KG/M2 | WEIGHT: 218 LBS | HEART RATE: 89 BPM | SYSTOLIC BLOOD PRESSURE: 143 MMHG | DIASTOLIC BLOOD PRESSURE: 81 MMHG | HEIGHT: 70 IN

## 2024-08-21 DIAGNOSIS — I42.0 DILATED CARDIOMYOPATHY (HCC): ICD-10-CM

## 2024-08-21 LAB
AORTIC ROOT: 4.1 CM
APICAL FOUR CHAMBER EJECTION FRACTION: 30 %
BSA FOR ECHO PROCEDURE: 2.17 M2
FRACTIONAL SHORTENING: 10 (ref 28–44)
INTERVENTRICULAR SEPTUM IN DIASTOLE (PARASTERNAL SHORT AXIS VIEW): 1.5 CM
INTERVENTRICULAR SEPTUM: 1.5 CM (ref 0.6–1.1)
LEFT ATRIUM SIZE: 5.6 CM
LEFT INTERNAL DIMENSION IN SYSTOLE: 5.3 CM (ref 2.1–4)
LEFT VENTRICLE DIASTOLIC VOLUME (MOD BIPLANE): 296 ML
LEFT VENTRICLE DIASTOLIC VOLUME INDEX (MOD BIPLANE): 136.4 ML/M2
LEFT VENTRICLE SYSTOLIC VOLUME (MOD BIPLANE): 202 ML
LEFT VENTRICLE SYSTOLIC VOLUME INDEX (MOD BIPLANE): 93.1 ML/M2
LEFT VENTRICULAR INTERNAL DIMENSION IN DIASTOLE: 5.9 CM (ref 3.5–6)
LEFT VENTRICULAR POSTERIOR WALL IN END DIASTOLE: 1.3 CM
LEFT VENTRICULAR STROKE VOLUME: 38 ML
LV EF: 32 %
LVSV (TEICH): 38 ML
SL CV LV EF: 31
SL CV PED ECHO LEFT VENTRICLE DIASTOLIC VOLUME (MOD BIPLANE) 2D: 176 ML
SL CV PED ECHO LEFT VENTRICLE SYSTOLIC VOLUME (MOD BIPLANE) 2D: 138 ML
TR MAX PG: 30 MMHG
TR PEAK VELOCITY: 2.7 M/S
TRICUSPID ANNULAR PLANE SYSTOLIC EXCURSION: 1.4 CM
TRICUSPID VALVE PEAK REGURGITATION VELOCITY: 2.73 M/S

## 2024-08-21 PROCEDURE — 93308 TTE F-UP OR LMTD: CPT

## 2024-08-21 PROCEDURE — 93321 DOPPLER ECHO F-UP/LMTD STD: CPT | Performed by: INTERNAL MEDICINE

## 2024-08-21 PROCEDURE — 93325 DOPPLER ECHO COLOR FLOW MAPG: CPT

## 2024-08-21 PROCEDURE — 93321 DOPPLER ECHO F-UP/LMTD STD: CPT

## 2024-08-21 PROCEDURE — 93308 TTE F-UP OR LMTD: CPT | Performed by: INTERNAL MEDICINE

## 2024-08-21 PROCEDURE — 93325 DOPPLER ECHO COLOR FLOW MAPG: CPT | Performed by: INTERNAL MEDICINE

## 2024-08-22 ENCOUNTER — ANTICOAG VISIT (OUTPATIENT)
Dept: CARDIOLOGY CLINIC | Facility: CLINIC | Age: 74
End: 2024-08-22

## 2024-08-26 ENCOUNTER — HOSPITAL ENCOUNTER (OUTPATIENT)
Dept: INFUSION CENTER | Facility: CLINIC | Age: 74
Discharge: HOME | End: 2024-08-26
Payer: MEDICARE

## 2024-08-26 VITALS
RESPIRATION RATE: 18 BRPM | DIASTOLIC BLOOD PRESSURE: 60 MMHG | HEART RATE: 97 BPM | TEMPERATURE: 96.1 F | SYSTOLIC BLOOD PRESSURE: 138 MMHG

## 2024-08-26 DIAGNOSIS — D50.0 IRON DEFICIENCY ANEMIA DUE TO CHRONIC BLOOD LOSS: Primary | ICD-10-CM

## 2024-08-26 PROCEDURE — 96365 THER/PROPH/DIAG IV INF INIT: CPT

## 2024-08-26 RX ORDER — SODIUM CHLORIDE 9 MG/ML
20 INJECTION, SOLUTION INTRAVENOUS ONCE
Status: COMPLETED | OUTPATIENT
Start: 2024-08-26 | End: 2024-08-26

## 2024-08-26 RX ORDER — SODIUM CHLORIDE 9 MG/ML
20 INJECTION, SOLUTION INTRAVENOUS ONCE
Status: CANCELLED | OUTPATIENT
Start: 2024-08-26

## 2024-08-26 RX ADMIN — SODIUM CHLORIDE 20 ML/HR: 0.9 INJECTION, SOLUTION INTRAVENOUS at 10:23

## 2024-08-26 RX ADMIN — IRON SUCROSE 300 MG: 20 INJECTION, SOLUTION INTRAVENOUS at 10:26

## 2024-08-26 NOTE — PROGRESS NOTES
Pt here venofer, offering no complaints.  Left arm IV placed with positive blood return noted.  Tolerated infusion without incident.  PIV removed.  AVS declined.  Aware to get his labs drawn before his next apt with dr head in [facility].  No other appts at this time.  Walked out in stable condition.

## 2024-08-29 ENCOUNTER — TELEPHONE (OUTPATIENT)
Dept: CARDIOLOGY CLINIC | Facility: CLINIC | Age: 74
End: 2024-08-29

## 2024-08-29 NOTE — TELEPHONE ENCOUNTER
Spoke with patient  and he states he read his device report online.     He concern with the report.     Can some PLEASE reach out to him and answer his question as per why     his heart was beating so fast.    He express the report said his heart  was beating in 200's .

## 2024-08-29 NOTE — TELEPHONE ENCOUNTER
----- Message from Chris Lovell MD sent at 8/28/2024  4:42 PM EDT -----  Please call patient and tell him echo is slightly better with EF 31%.  Continue the same meds.

## 2024-09-05 PROBLEM — Z79.01 ON CONTINUOUS ORAL ANTICOAGULATION: Status: ACTIVE | Noted: 2024-09-05

## 2024-09-05 NOTE — PROGRESS NOTES
Cardiology Follow Up    Galen Carson  1950  [id number]  [facility] [place]  [address]  [facility], [place], operated by [facility] [id number] [phone]    Assessment/Plan:   Acute on chronic HFrEF  Appears Euvolemic on exam, feeling better, tolerating torsemide and warfarin.   No bleeding. Weight is stable, edema is down.   FU 3 months or sooner with symptoms.   Medication Regiment Includes:   Diuretic:   GDMT:   Beta Blocker: Continue Toprol 25 mg daily  Further GDMT limited by soft BPs, ESDRAS and CKD, and tendency for hyperkalemia  Recommend low-sodium diet, daily weights, strict I's and O's.  Recommend continuing to monitor and replete electrolytes as needed.     2.  NICM s/p MDT DC ICD (1/2024)-EF 20%  EF improved to 31%.   .  GDMT:   Beta Blocker: Continue Toprol 25 mg daily  Conitnue torsemide, rosuvastatin and warfarin  Further GDMT limited by soft BPs, ESDRAS and CKD, and tendency for hyperkalemia  Device therapy: 3 VT-NS EPISODES (7 @ 179 BPM, 6 @ 200 BPM, 12 @ 197 BPM). 1 AT/AF EPISODE WITH PAF ON EGM, DURATION 01:07 MINS. AT/AF BURDEN <0.1%.      3.  Elevated troponin-likely nonischemic myocardial injury  Likely secondary to acute on chronic HFrEF, acute anemia with GI bleed, ESDRAS on CKD     4.  Paroxysmal atrial fibrillation  Continue Toprol 25 mg daily, hold parameters adjusted  Continue anticoagulation with warfarin.  [id number] 4.  Continue to monitor hemoglobin closely.  Has bled score is 4. We discussed that he is a watchman candidate and he is considering.      5.  Junctional tachycardia, NSVT, PSVT, PVCs  Continue Toprol 25 mg daily, hold parameters adjusted     6.  GI bleed with acute blood loss anemia  S/p multiple units of FFP and PRBCs, EGD - ulcers, no endoscopic treatment.      7.  ESDRAS on CKD, last cr 1.98.      8.  Mild nonobstructive CAD  Continue statin, Toprol.  Patient not on aspirin in the setting of  increased bleeding risk on warfarin with recent GI bleed and anemia     9.  Hypertension    Continue Toprol 25 mg daily.  Continue to monitor BPs closely.     10.  Hyperlipidemia  Continue statin, last LDL 88.      11.  DM 2 (A1c 6.9)  Continue management per primary team      Interval History: The patient was seen 5/12/24  for hospital follow-up status post admission for acute on chronic heart failure reduced ejection fraction.  He was discharged 516/2024 and admitted 5/9/2024 with shortness of breath, abdominal pain and weight gain for 3 days.  He was treated for acute CHF R EF exacerbation then developed a drop in hemoglobin and melena.  GI was consulted and he underwent EGD and was treated with PPI.  The hemoglobin was stabilized and he was cleared to resume warfarin.  Nephrology and cardiology were consulted. He had nonmi troponin elevation.  He was felt to be euvolemic and discharged.    He now feels well.  His weights in the hospital went from 228 down to 221 which is where he is now. His edema is down and his weight is stable, checked daily.  He has been walking up to a mile, some mild chronic montez.     Echo 8/21/2024:     Left Ventricle: Left ventricular cavity size is moderately dilated. Wall thickness is mildly increased. There is mild concentric hypertrophy. The left ventricular ejection fraction is 31%. Systolic function is severely reduced. There is severe global hypokinesis.    Left Atrium: The atrium is moderately dilated.    Mitral Valve: There is mild regurgitation.    Cardiac cath 10/4/23: Impression:   Nonobstructive coronary atherosclerosis.   Mild pulmonary hypertension with elevated pcw.     Echo 9/21/23:     Left Ventricle: Left ventricular cavity size is mildly dilated. Wall thickness is mildly increased. The left ventricular ejection fraction is 30%. Systolic function is severely reduced. There is severe global hypokinesis.    Right Ventricle: Systolic function is low normal.    Left Atrium:  The atrium is mildly dilated.    Mitral Valve: There is mild calcification. There is annular calcification. There is mild regurgitation.    Pulmonary Artery: The estimated pulmonary artery systolic pressure is 58.0 mmHg. The pulmonary artery systolic pressure is moderately increased.       Patient Active Problem List   Diagnosis    Mixed hyperlipidemia    IFG (impaired fasting glucose)    Upper respiratory tract infection    Positive colorectal cancer screening using Cologuard test    Prostate cancer (HCC)    Encounter for monitoring androgen deprivation therapy    Hot flashes    Type 2 diabetes mellitus with hyperglycemia, without long-term current use of insulin (HCC)    Acute renal failure superimposed on stage 3a chronic kidney disease (HCC)    Essential hypertension    Dilated cardiomyopathy (HCC)    Paroxysmal atrial fibrillation (HCC)    Obesity    ICD (implantable cardioverter-defibrillator) in place    Acute on chronic systolic congestive heart failure (HCC)    Gastrointestinal hemorrhage associated with duodenitis    Iron deficiency anemia due to chronic blood loss    On continuous oral anticoagulation     Past Medical History:   Diagnosis Date    Allergic     Anesthesia     pt wears a life vest (11/16).  should not be scheduled at [facility] until heart condition is stabilized    BPH (benign prostatic hypertrophy)     Cancer (HCC)     Chronic kidney disease     Coronary artery disease     Diabetes mellitus (HCC)     Hyperlipidemia     Hypertension     Irregular heart beat     PAF    Prostate cancer (HCC)      Social History     Socioeconomic History    Marital status:      Spouse name: Not on file    Number of children: Not on file    Years of education: Not on file    Highest education level: Not on file   Occupational History    Not on file   Tobacco Use    Smoking status: Former     Current packs/day: 0.00     Average packs/day: 1 pack/day for 25.0 years (25.0 ttl pk-yrs)     Types: Cigarettes, Pipe,  Cigars     Start date: 1980     Quit date: 2005     Years since quittin.5     Passive exposure: Past    Smokeless tobacco: Never   Vaping Use    Vaping status: Never Used   Substance and Sexual Activity    Alcohol use: Not Currently    Drug use: Not Currently    Sexual activity: Not Currently     Partners: Female   Other Topics Concern    Not on file   Social History Narrative    Not on file     Social Determinants of Health     Financial Resource Strain: Low Risk  (2024)    Overall Financial Resource Strain (CARDIA)     Difficulty of Paying Living Expenses: Not hard at all   Food Insecurity: No Food Insecurity (5/10/2024)    Hunger Vital Sign     Worried About Running Out of Food in the Last Year: Never true     Ran Out of Food in the Last Year: Never true   Transportation Needs: No Transportation Needs (5/10/2024)    PRAPARE - Transportation     Lack of Transportation (Medical): No     Lack of Transportation (Non-Medical): No   Physical Activity: Not on file   Stress: Not on file   Social Connections: Not on file   Intimate Partner Violence: Not on file   Housing Stability: Low Risk  (2024)    Housing Stability Vital Sign     Unable to Pay for Housing in the Last Year: No     Number of Times Moved in the Last Year: 0     Homeless in the Last Year: No      Family History   Problem Relation Age of Onset    Stroke Father     No Known Problems Mother     Prostate cancer Brother     Arthritis Brother     No Known Problems Sister     Diabetes Sister     Diabetes Sister     No Known Problems Son     No Known Problems Daughter      Past Surgical History:   Procedure Laterality Date    A-V CARDIAC PACEMAKER INSERTION      CARDIAC CATHETERIZATION N/A 10/04/2023    Procedure: Cardiac Coronary Angiogram;  Surgeon: Chris Lovell MD;  Location: [facility];  Service: Cardiology    CARDIAC CATHETERIZATION N/A 10/04/2023    Procedure: Cardiac RHC/LHC;  Surgeon: Chris Lovell MD;   Location: [facility];  Service: Cardiology    CARDIAC CATHETERIZATION  10/04/2023    Procedure: Cardiac catheterization;  Surgeon: Chris Lovell MD;  Location: [facility];  Service: Cardiology    CARDIAC ELECTROPHYSIOLOGY PROCEDURE N/A 01/04/2024    Procedure: Cardiac icd implant, Dual Chambers ICD;  Surgeon: Leo Whalen MD;  Location:  CARDIAC CATH LAB;  Service: Cardiology    EAR SURGERY      HERNIA REPAIR      SD COLONOSCOPY FLX DX W/COLLJ SPEC WHEN PFRMD N/A 05/06/2019    Procedure: COLONOSCOPY;  Surgeon: Winston Nielson III, MD;  Location: [facility];  Service: Gastroenterology       Current Outpatient Medications:     Ascorbic Acid (vitamin C) 1000 MG tablet, Take 1,000 mg by mouth daily, Disp: , Rfl:     Blood Glucose Monitoring Suppl (ONETOUCH VERIO) w/Device KIT, by Does not apply route once for 1 dose Test sugar in the morning, Disp: 1 kit, Rfl: 0    cetirizine (ZyrTEC) 5 MG tablet, Take 5 mg by mouth daily, Disp: , Rfl:     Continuous Glucose Sensor (FreeStyle Lisa 3 Sensor) MISC, Use 1 each every 14 (fourteen) days, Disp: 6 each, Rfl: 5    EVENING PRIMROSE OIL PO, Take by mouth 3 (three) times a day, Disp: , Rfl:     glimepiride (AMARYL) 1 mg tablet, Take 1 tablet (1 mg total) by mouth daily with breakfast, Disp: 30 tablet, Rfl: 5    metoprolol succinate (TOPROL-XL) 25 mg 24 hr tablet, Take 1 tablet (25 mg total) by mouth daily, Disp: 100 tablet, Rfl: 1    multivitamin (THERAGRAN) TABS, Take 1 tablet by mouth daily, Disp: , Rfl:     pantoprazole (PROTONIX) 40 mg tablet, 1 po BID for one additional month (through 8/11) and then decrease to once a day., Disp: 120 tablet, Rfl: 2    rosuvastatin (CRESTOR) 10 MG tablet, Take 1 tablet (10 mg total) by mouth daily, Disp: 90 tablet, Rfl: 1    torsemide (DEMADEX) 20 mg tablet, Take 3 tablets (60 mg total) by mouth daily, Disp: 270 tablet, Rfl: 5    VITAMIN D, ERGOCALCIFEROL, PO, Take by mouth, Disp: , Rfl:     vitamin E, tocopherol, 400  units capsule, Take 400 Units by mouth daily, Disp: , Rfl:     warfarin (Coumadin) 5 mg tablet, Take one tablet daily or as directed, Disp: 90 tablet, Rfl: 3  Allergies   Allergen Reactions    Penicillin V Other (See Comments)     As a child        Labs:  Hospital Outpatient Visit on 08/21/2024   Component Date Value    Triscuspid Valve Regurgi* 08/21/2024 30.0     LV Diastolic Volume (BP) 08/21/2024 296     LV Systolic Volume (BP) 08/21/2024 202     TR Peak Abdirizak 08/21/2024 2.7     A4C EF 08/21/2024 30     Tricuspid valve peak reg* 08/21/2024 2.73     Left ventricular stroke * 08/21/2024 38.00     IVSd 08/21/2024 1.50     Tricuspid annular plane * 08/21/2024 1.40     Ao root 08/21/2024 4.10     LVPWd 08/21/2024 1.30     LA size 08/21/2024 5.6     EF 08/21/2024 32     FS 08/21/2024 10     LVIDS 08/21/2024 5.30     IVS 08/21/2024 1.5     LVIDd 08/21/2024 5.90     LEFT VENTRICLE SYSTOLIC * 08/21/2024 138     LV DIASTOLIC VOLUME (MOD* 08/21/2024 176     LVSV, 2D 08/21/2024 38     BSA 08/21/2024 2.17     LV Diastolic Volume Inde* 08/21/2024 136.4     LV Systolic Volume Index* 08/21/2024 93.1     LV EF 08/21/2024 31    Ancillary Orders on 08/02/2024   Component Date Value    Protime 08/21/2024 28.4 (H)     INR 08/21/2024 2.68 (H)    Office Visit on 07/24/2024   Component Date Value    POST-VOID RESIDUAL VOLUM* 07/24/2024 58    Appointment on 07/24/2024   Component Date Value    Creatinine, Ur 07/24/2024 70.4     Albumin,U,Random 07/24/2024 41.7 (H)     Albumin Creat Ratio 07/24/2024 59 (H)     Sodium 07/24/2024 140     Potassium 07/24/2024 4.5     Chloride 07/24/2024 99     CO2 07/24/2024 31     ANION GAP 07/24/2024 10     BUN 07/24/2024 53 (H)     Creatinine 07/24/2024 1.79 (H)     Glucose, Fasting 07/24/2024 123 (H)     Calcium 07/24/2024 9.5     AST 07/24/2024 19     ALT 07/24/2024 17     Alkaline Phosphatase 07/24/2024 69     Total Protein 07/24/2024 7.1     Albumin 07/24/2024 4.3     Total Bilirubin 07/24/2024 0.38      eGFR 07/24/2024 36     Hemoglobin A1C 07/24/2024 6.3 (H)     EAG 07/24/2024 134     TSH 3RD GENERATON 07/24/2024 3.868     Cholesterol 07/24/2024 216 (H)     Triglycerides 07/24/2024 238 (H)     HDL, Direct 07/24/2024 45     LDL Calculated 07/24/2024 123 (H)     PSA, Diagnostic 07/24/2024 0.687     WBC 07/24/2024 4.42     RBC 07/24/2024 4.02     Hemoglobin 07/24/2024 10.7 (L)     Hematocrit 07/24/2024 33.9 (L)     MCV 07/24/2024 84     MCH 07/24/2024 26.6 (L)     MCHC 07/24/2024 31.6     RDW 07/24/2024 13.3     MPV 07/24/2024 10.3     Platelets 07/24/2024 184     nRBC 07/24/2024 0     Segmented % 07/24/2024 59     Immature Grans % 07/24/2024 1     Lymphocytes % 07/24/2024 25     Monocytes % 07/24/2024 10     Eosinophils Relative 07/24/2024 4     Basophils Relative 07/24/2024 1     Absolute Neutrophils 07/24/2024 2.66     Absolute Immature Grans 07/24/2024 0.04     Absolute Lymphocytes 07/24/2024 1.09     Absolute Monocytes 07/24/2024 0.44     Eosinophils Absolute 07/24/2024 0.16     Basophils Absolute 07/24/2024 0.03     Phosphorus 07/24/2024 4.2 (H)     PTH 07/24/2024 76.2     Color, UA 07/24/2024 Light Yellow     Clarity, UA 07/24/2024 Clear     Specific Gravity, UA 07/24/2024 1.012     pH, UA 07/24/2024 7.0     Leukocytes, UA 07/24/2024 Negative     Nitrite, UA 07/24/2024 Negative     Protein, UA 07/24/2024 Trace (A)     Glucose, UA 07/24/2024 Negative     Ketones, UA 07/24/2024 Negative     Urobilinogen, UA 07/24/2024 <2.0     Bilirubin, UA 07/24/2024 Negative     Occult Blood, UA 07/24/2024 Negative     RBC, UA 07/24/2024 None Seen     WBC, UA 07/24/2024 1-2     Epithelial Cells 07/24/2024 None Seen     Bacteria, UA 07/24/2024 None Seen     Vit D, 25-Hydroxy 07/24/2024 41.2     Iron Saturation 07/24/2024 14 (L)     TIBC 07/24/2024 357     Iron 07/24/2024 49 (L)     UIBC 07/24/2024 308     Ferritin 07/24/2024 23 (L)    Ancillary Orders on 07/19/2024   Component Date Value    Protime 07/24/2024 27.5 (H)      INR 07/24/2024 2.62 (H)    Lab on 06/26/2024   Component Date Value    Protime 06/26/2024 24.8 (H)     INR 06/26/2024 2.30 (H)     Hemoglobin A1C 06/26/2024 6.2 (H)     EAG 06/26/2024 131     Sodium 06/26/2024 144     Potassium 06/26/2024 4.3     Chloride 06/26/2024 99     CO2 06/26/2024 27     ANION GAP 06/26/2024 18 (H)     BUN 06/26/2024 62 (H)     Creatinine 06/26/2024 2.32 (H)     Glucose, Fasting 06/26/2024 131 (H)     Calcium 06/26/2024 9.7     AST 06/26/2024 25     ALT 06/26/2024 19     Alkaline Phosphatase 06/26/2024 64     Total Protein 06/26/2024 7.4     Albumin 06/26/2024 4.3     Total Bilirubin 06/26/2024 0.37     eGFR 06/26/2024 27    Lab on 06/05/2024   Component Date Value    Protime 06/05/2024 24.9 (H)     INR 06/05/2024 2.30 (H)    Office Visit on 05/30/2024   Component Date Value    Hemoglobin A1C 05/30/2024 5.7    Lab on 05/21/2024   Component Date Value    Protime 05/21/2024 20.6 (H)     INR 05/21/2024 1.80 (H)     WBC 05/21/2024 5.35     RBC 05/21/2024 3.44 (L)     Hemoglobin 05/21/2024 9.9 (L)     Hematocrit 05/21/2024 32.0 (L)     MCV 05/21/2024 93     MCH 05/21/2024 28.8     MCHC 05/21/2024 30.9 (L)     RDW 05/21/2024 14.0     MPV 05/21/2024 10.7     Platelets 05/21/2024 349     nRBC 05/21/2024 0     Segmented % 05/21/2024 60     Immature Grans % 05/21/2024 1     Lymphocytes % 05/21/2024 21     Monocytes % 05/21/2024 12     Eosinophils Relative 05/21/2024 5     Basophils Relative 05/21/2024 1     Absolute Neutrophils 05/21/2024 3.21     Absolute Immature Grans 05/21/2024 0.07     Absolute Lymphocytes 05/21/2024 1.13     Absolute Monocytes 05/21/2024 0.64     Eosinophils Absolute 05/21/2024 0.26     Basophils Absolute 05/21/2024 0.04     Sodium 05/21/2024 139     Potassium 05/21/2024 4.5     Chloride 05/21/2024 94 (L)     CO2 05/21/2024 33 (H)     ANION GAP 05/21/2024 12     BUN 05/21/2024 72 (H)     Creatinine 05/21/2024 2.32 (H)     Glucose, Fasting 05/21/2024 147 (H)     Calcium 05/21/2024  9.5     AST 05/21/2024 22     ALT 05/21/2024 27     Alkaline Phosphatase 05/21/2024 65     Total Protein 05/21/2024 7.6     Albumin 05/21/2024 4.4     Total Bilirubin 05/21/2024 0.45     eGFR 05/21/2024 27     Phosphorus 05/21/2024 3.7     Creatinine, Ur 05/21/2024 83.7     Protein Urine Random 05/21/2024 12     Prot/Creat Ratio, Ur 05/21/2024 0.14 (H)     PTH 05/21/2024 131.8 (H)     Color, UA 05/21/2024 Light Yellow     Clarity, UA 05/21/2024 Clear     Specific Gravity, UA 05/21/2024 1.014     pH, UA 05/21/2024 7.0     Leukocytes, UA 05/21/2024 Negative     Nitrite, UA 05/21/2024 Negative     Protein, UA 05/21/2024 Trace (A)     Glucose, UA 05/21/2024 Negative     Ketones, UA 05/21/2024 Negative     Urobilinogen, UA 05/21/2024 <2.0     Bilirubin, UA 05/21/2024 Negative     Occult Blood, UA 05/21/2024 Negative     RBC, UA 05/21/2024 None Seen     WBC, UA 05/21/2024 None Seen     Epithelial Cells 05/21/2024 None Seen     Bacteria, UA 05/21/2024 None Seen     Vit D, 25-Hydroxy 05/21/2024 42.7    No results displayed because visit has over 200 results.      There may be more visits with results that are not included.     Imaging: XR chest portable    Result Date: 5/14/2024  Narrative: XR CHEST PORTABLE INDICATION: hypoxia. COMPARISON: 5/9/2024 FINDINGS: Left chest wall intracardiac device with intact lead(s). No abandoned lead(s). There is worsening vascular congestion and mildly increased patchy density at the right lung base. No pneumothorax or pleural effusion. Enlarged cardiac silhouette, unchanged. Bones are unremarkable for age. Normal upper abdomen.     Impression: Worsening vascular congestion and increased patchy density at the right lower lobe. This may represent asymmetric edema given worsening congestive changes versus atelectasis or pneumonia. The study was marked in EPIC for immediate notification. Workstation performed: [id number]     EGD    Result Date: 5/11/2024  Narrative: Table formatting from the  original result was not included.  [facility] [address] [address] [phone] DATE OF SERVICE: 5/11/24 PHYSICIAN(S): Attending: Marquis Lau MD Fellow: No Staff Documented INDICATION: Melena POST-OP DIAGNOSIS: See the impression below. PREPROCEDURE: Informed consent was obtained for the procedure, including sedation.  Risks of perforation, hemorrhage, adverse drug reaction and aspiration were discussed. The patient was placed in the left lateral decubitus position. Patient was explained about the risks and benefits of the procedure. Risks including but not limited to bleeding, infection, and perforation were explained in detail. Also explained about less than 100% sensitivity with the exam and other alternatives. PROCEDURE: EGD DETAILS OF PROCEDURE: Patient was taken to the procedure room where a time out was performed to confirm correct patient and correct procedure. The patient underwent monitored anesthesia care, which was administered by an anesthesia professional. The patient's blood pressure, heart rate, level of consciousness, respirations, oxygen, ECG and ETCO2 were monitored throughout the procedure. The scope was introduced through the mouth and advanced to the second part of the duodenum. Retroflexion was performed in the fundus. The patient experienced no blood loss. The procedure was not difficult. The patient tolerated the procedure well. There were no apparent adverse events. ANESTHESIA INFORMATION: ASA: ASA status not filed in the log. Anesthesia Type: Anesthesia type not filed in the log. MEDICATIONS: No administrations occurring from 1303 to 1317 on 05/11/24 FINDINGS: The esophagus and duodenum appeared normal. 2 5 mm superficial ulcers in the 2nd part of the duodenum with clean base (Eran III) 20 mm large deep ulcer in the 2nd part of the duodenum with flat pigmented spot (Eran IIC) Mild edematous and erythematous mucosa in the antrum  Performed multiple forceps biopsies in the body of the stomach, incisura and antrum to rule out H. pylori SPECIMENS: ID Type Source Tests Collected by Time Destination 1 :  Tissue Stomach TISSUE EXAM Marquis Lau MD 5/11/2024  1:15 PM      Impression: 3 total ulcers in the duodenum at the beginning of the duodenal sweep including one large deep cratered ulcer with flat pigmented spot.  This did not require endoscopic treatment. No signs of ongoing bleeding. The esophagus and duodenum appeared normal. Mild edematous and erythematous mucosa in the antrum Performed forceps biopsies in the body of the stomach, incisura and antrum to rule out H. pylori RECOMMENDATION:  PPI 40 mg twice daily for 8 weeks.  Okay to restart diet.  Okay to restart Coumadin today however if heparin drip is to be started I would recommend waiting until tomorrow to start this.  Follow-up pathology to assess for H. pylori.  No further inpatient gastroenterology workup.  Gastroenterology will sign off.  Please call with questions.    Marquis Lau MD     XR chest 1 view portable    Result Date: 5/10/2024  Narrative: XR CHEST PORTABLE INDICATION: Shortness of breath. COMPARISON: Chest radiograph 1/4/2024 FINDINGS: Left chest wall pacer/ICD with intact leads. No acute focal infiltrate. Unchanged appearance of left basilar atelectasis versus scarring. Borderline/mild pulmonary vascular congestion. No pneumothorax or pleural effusion. Normal cardiomediastinal silhouette. Bones are unremarkable for age. Normal upper abdomen.     Impression: No acute focal infiltrate or overt pulmonary edema. Borderline/mild pulmonary vascular congestion. Resident: TORI NATHAN I, the attending radiologist, have reviewed the images and agree with the final report above. Workstation performed: [id number]     Echo complete    Result Date: 5/10/2024  Narrative:   Left Ventricle: Left ventricular cavity size is dilated. Wall thickness is normal. The left ventricular  ejection fraction is 20%. Systolic function is severely reduced.  Asynchronous septal motion noted. There is severe global hypokinesis. Diastolic function is mildly abnormal, consistent with grade I (abnormal) relaxation.   Left Atrium: The atrium is mild to moderately dilated.   Mitral Valve: There is mild annular calcification.   Tricuspid Valve: There is trace regurgitation.  ICD lead noted.     CT abdomen pelvis with contrast    Result Date: 5/9/2024  Narrative: CT ABDOMEN AND PELVIS WITH IV CONTRAST INDICATION: Abdominal pain and distention. COMPARISON: 6/13/2019. TECHNIQUE: CT examination of the abdomen and pelvis was performed. Multiplanar 2D reformatted images were created from the source data. This examination, like all CT scans performed in the [facility], was performed utilizing techniques to minimize radiation dose exposure, including the use of iterative reconstruction and automated exposure control. Radiation dose length product (DLP) for this visit: 1550 mGy-cm IV Contrast: 100 mL of iohexol (OMNIPAQUE) Enteric Contrast: Not administered. FINDINGS: ABDOMEN LOWER CHEST: Minimal subsegmental atelectasis at the lung bases changes near. LIVER/BILIARY TREE: Unremarkable. GALLBLADDER: No calcified gallstones. No pericholecystic inflammatory change. SPLEEN: Unremarkable. PANCREAS: Minimal fat stranding adjacent to the head of the pancreas likely secondary to duodenitis. Otherwise normal enhancement of the pancreas without evidence of ductal dilatation. ADRENAL GLANDS: Right adrenal 1.5 cm nodule.. KIDNEYS/URETERS: Symmetric nephrographic phase enhancement of the kidneys. Renal cysts measuring up to 2.2 cm. No obstructive uropathy. STOMACH AND BOWEL: Wall thickening and inflammation in the pylorus and second portion of the duodenum. Minimal adjacent mesenteric fat stranding. Diverticulosis without evidence of diverticulitis or colitis APPENDIX: Normal appendix. ABDOMINOPELVIC CAVITY: No  ascites. No pneumoperitoneum. No lymphadenopathy. VESSELS: No abdominal aortic aneurysm. PELVIS REPRODUCTIVE ORGANS: Enlarged prostate. URINARY BLADDER: Unremarkable. ABDOMINAL WALL/INGUINAL REGIONS: Unremarkable. BONES: No acute fracture or suspicious osseous lesion.     Impression: 1. Findings consistent with acute duodenitis involving the pylorus and second portion of the duodenum. No evidence of perforation or abscess. GI consultation recommended. Workstation performed: [id number]     Cardiac EP device report    Result Date: 5/2/2024  Narrative: MDT DC ICD/ACTIVE SYSTEM IS MRI CONDITIONAL DEVICE INTERROGATED IN THE [place] OFFICE:  BATTERY VOLTAGE ADEQUATE (12.4 YR.).  AP 25.5%  3.2%.  ALL LEAD PARAMETERS WITHIN NORMAL LIMITS.  4 VT-NS EPISODES WITH NSVT ON 1 EGM (#5 - 15 @ 207 BPM), AND PAT ON REMAINING EGMS (8 @ 184 BPM, 10 @ 167 BPM, 15 @ 188 BPM).  DECREASE MADE TO AMPLITUDES TO PROMOTE DEVICE LONGEVITY WHILE MAINTAINING AN APPROPRIATE SAFETY MARGIN.  THRESHOLD TESTS NOT PRINTED.  NORMAL DEVICE FUNCTION.  RG       Review of Systems:  Review of SystemsNegative except for hpi    Physical Exam:  Physical Exam  GEN: Alert and oriented x 3, in no acute distress.  Well appearing and well nourished.   HEENT: Sclera anicteric, conjunctivae pink, mucous membranes moist. Oropharynx clear.   NECK: Supple, no carotid bruits, no significant JVD. Trachea midline, no thyromegaly.   HEART: Regular rhythm, normal S1 and S2, no murmurs, clicks, gallops or rubs. PMI nondisplaced, no thrills.   LUNGS: Clear to auscultation bilaterally; no wheezes, rales, or rhonchi. No increased work of breathing or signs of respiratory distress.   ABDOMEN: Soft, nontender, nondistended, normoactive bowel sounds.   EXTREMITIES: Skin warm and well perfused, no clubbing, cyanosis, or edema.  NEURO: No focal findings. Normal speech. Mood and affect normal.   SKIN: Normal without suspicious lesions on exposed skin.      1. Dilated  cardiomyopathy (HCC)        2. Paroxysmal atrial fibrillation (HCC)        3. Essential hypertension        4. Acute on chronic systolic congestive heart failure (HCC)        5. Type 2 diabetes mellitus with hyperglycemia, without long-term current use of insulin (HCC)        6. Mixed hyperlipidemia        7. ICD (implantable cardioverter-defibrillator) in place        8. On continuous oral anticoagulation

## 2024-09-06 ENCOUNTER — OFFICE VISIT (OUTPATIENT)
Dept: CARDIOLOGY CLINIC | Facility: CLINIC | Age: 74
End: 2024-09-06
Payer: MEDICARE

## 2024-09-06 VITALS
HEIGHT: 70 IN | OXYGEN SATURATION: 97 % | SYSTOLIC BLOOD PRESSURE: 128 MMHG | DIASTOLIC BLOOD PRESSURE: 76 MMHG | HEART RATE: 75 BPM | BODY MASS INDEX: 31.64 KG/M2 | RESPIRATION RATE: 16 BRPM | WEIGHT: 221 LBS

## 2024-09-06 DIAGNOSIS — Z95.810 ICD (IMPLANTABLE CARDIOVERTER-DEFIBRILLATOR) IN PLACE: ICD-10-CM

## 2024-09-06 DIAGNOSIS — I48.0 PAROXYSMAL ATRIAL FIBRILLATION (HCC): ICD-10-CM

## 2024-09-06 DIAGNOSIS — E11.65 TYPE 2 DIABETES MELLITUS WITH HYPERGLYCEMIA, WITHOUT LONG-TERM CURRENT USE OF INSULIN (HCC): ICD-10-CM

## 2024-09-06 DIAGNOSIS — I10 ESSENTIAL HYPERTENSION: ICD-10-CM

## 2024-09-06 DIAGNOSIS — I50.23 ACUTE ON CHRONIC SYSTOLIC CONGESTIVE HEART FAILURE (HCC): ICD-10-CM

## 2024-09-06 DIAGNOSIS — E78.2 MIXED HYPERLIPIDEMIA: ICD-10-CM

## 2024-09-06 DIAGNOSIS — I42.0 DILATED CARDIOMYOPATHY (HCC): Primary | ICD-10-CM

## 2024-09-06 DIAGNOSIS — Z79.01 ON CONTINUOUS ORAL ANTICOAGULATION: ICD-10-CM

## 2024-09-06 PROCEDURE — 99214 OFFICE O/P EST MOD 30 MIN: CPT | Performed by: INTERNAL MEDICINE

## 2024-09-18 ENCOUNTER — APPOINTMENT (OUTPATIENT)
Dept: LAB | Facility: CLINIC | Age: 74
End: 2024-09-18
Payer: MEDICARE

## 2024-09-19 ENCOUNTER — ANTICOAG VISIT (OUTPATIENT)
Dept: CARDIOLOGY CLINIC | Facility: CLINIC | Age: 74
End: 2024-09-19

## 2024-10-02 ENCOUNTER — OFFICE VISIT (OUTPATIENT)
Dept: FAMILY MEDICINE CLINIC | Facility: CLINIC | Age: 74
End: 2024-10-02
Payer: MEDICARE

## 2024-10-02 VITALS
BODY MASS INDEX: 31.47 KG/M2 | OXYGEN SATURATION: 97 % | DIASTOLIC BLOOD PRESSURE: 72 MMHG | HEIGHT: 70 IN | WEIGHT: 219.8 LBS | SYSTOLIC BLOOD PRESSURE: 128 MMHG | HEART RATE: 62 BPM

## 2024-10-02 DIAGNOSIS — I10 ESSENTIAL HYPERTENSION: ICD-10-CM

## 2024-10-02 DIAGNOSIS — E11.65 TYPE 2 DIABETES MELLITUS WITH HYPERGLYCEMIA, WITHOUT LONG-TERM CURRENT USE OF INSULIN (HCC): ICD-10-CM

## 2024-10-02 DIAGNOSIS — I50.23 ACUTE ON CHRONIC SYSTOLIC CONGESTIVE HEART FAILURE (HCC): ICD-10-CM

## 2024-10-02 DIAGNOSIS — Z23 ENCOUNTER FOR IMMUNIZATION: ICD-10-CM

## 2024-10-02 DIAGNOSIS — C61 PROSTATE CANCER (HCC): Primary | ICD-10-CM

## 2024-10-02 LAB
LEFT EYE DIABETIC RETINOPATHY: NORMAL
LEFT EYE IMAGE QUALITY: NORMAL
LEFT EYE MACULAR EDEMA: NORMAL
LEFT EYE OTHER RETINOPATHY: NORMAL
RIGHT EYE DIABETIC RETINOPATHY: NORMAL
RIGHT EYE IMAGE QUALITY: NORMAL
RIGHT EYE MACULAR EDEMA: NORMAL
RIGHT EYE OTHER RETINOPATHY: NORMAL
SEVERITY (EYE EXAM): NORMAL

## 2024-10-02 PROCEDURE — 90662 IIV NO PRSV INCREASED AG IM: CPT | Performed by: FAMILY MEDICINE

## 2024-10-02 PROCEDURE — G0008 ADMIN INFLUENZA VIRUS VAC: HCPCS | Performed by: FAMILY MEDICINE

## 2024-10-02 PROCEDURE — 99214 OFFICE O/P EST MOD 30 MIN: CPT | Performed by: FAMILY MEDICINE

## 2024-10-02 RX ORDER — GLIMEPIRIDE 1 MG/1
1 TABLET ORAL
Qty: 90 TABLET | Refills: 1 | Status: SHIPPED | OUTPATIENT
Start: 2024-10-02 | End: 2025-03-31

## 2024-10-02 NOTE — ASSESSMENT & PLAN NOTE
Wt Readings from Last 3 Encounters:   10/02/24 99.7 kg (219 lb 12.8 oz)   09/06/24 100 kg (221 lb)   08/21/24 98.9 kg (218 lb)   Stable, asymptomatic, encouraged continued weight loss, monitoring of home weights, continue current beta-blocker, follow-up cardiology

## 2024-10-02 NOTE — PROGRESS NOTES
Ambulatory Visit  Name: Galen Carson      : 1950      MRN: [id number]  Encounter Provider: DANITA Girard  Encounter Date: 10/2/2024   Encounter department: [facility] [address] [facility]    Assessment & Plan  Prostate cancer (HCC)  Stable continue current care per urology, hematology oncology       Type 2 diabetes mellitus with hyperglycemia, without long-term current use of insulin (HCC)  Stable within parameters continue current care no reported episodes of hypoglycemia continued use of CGM continue current medications  Lab Results   Component Value Date    HGBA1C 6.3 (H) 2024       Orders:    Microalbumin, Random Urine (W/Creatinine) ([facility] ONLY); Future    Albumin / creatinine urine ratio; Future    Microalbumin, Random Urine (W/Creatinine) ([facility] ONLY)    glimepiride (AMARYL) 1 mg tablet; Take 1 tablet (1 mg total) by mouth daily with breakfast    Comprehensive metabolic panel; Future    CBC; Future    Hemoglobin A1C; Future    Lipid Panel with Direct LDL reflex; Future    CBC; Future    UA w Reflex to Microscopic w Reflex to Culture; Future    IRIS Diabetic eye exam    Albumin / creatinine urine ratio; Standing    Encounter for immunization    Orders:    influenza vaccine, high-dose, PF 0.5 mL (Fluzone High Dose)    Essential hypertension  Stable, within parameters continue present medications       Acute on chronic systolic congestive heart failure (HCC)  Wt Readings from Last 3 Encounters:   10/02/24 99.7 kg (219 lb 12.8 oz)   24 100 kg (221 lb)   24 98.9 kg (218 lb)   Stable, asymptomatic, encouraged continued weight loss, monitoring of home weights, continue current beta-blocker, follow-up cardiology                    History of Present Illness     f/u   Presently undercare for prostate ca ,   Evening primrose   Diabetes   Cgm , neg hypoglycemia   Diet working it , playing with the cgm   Chf ,Proximal A-fib  kayleen ,   Neg swelling to ext  ", monitoring weights   Ambulating , walks no issue           Review of Systems   Constitutional:  Negative for appetite change, chills, fever and unexpected weight change.   HENT:  Negative for congestion, dental problem, ear pain, hearing loss, postnasal drip, rhinorrhea, sinus pressure, sinus pain, sneezing, sore throat, tinnitus and voice change.    Eyes:  Negative for visual disturbance.   Respiratory:  Negative for apnea, cough, chest tightness and shortness of breath.    Cardiovascular:  Negative for chest pain, palpitations and leg swelling.   Gastrointestinal:  Negative for abdominal pain, blood in stool, constipation, diarrhea, nausea and vomiting.   Endocrine: Negative for cold intolerance, heat intolerance, polydipsia, polyphagia and polyuria.   Genitourinary:  Negative for decreased urine volume, difficulty urinating, dysuria, frequency and hematuria.   Musculoskeletal:  Negative for arthralgias, back pain, gait problem, joint swelling and myalgias.   Skin:  Negative for color change, rash and wound.   Allergic/Immunologic: Negative for environmental allergies and food allergies.   Neurological:  Negative for dizziness, syncope, weakness, light-headedness, numbness and headaches.   Hematological:  Negative for adenopathy. Does not bruise/bleed easily.   Psychiatric/Behavioral:  Negative for sleep disturbance and suicidal ideas. The patient is not nervous/anxious.            Objective     /72   Pulse 62   Ht 5' 10\" (1.778 m)   Wt 99.7 kg (219 lb 12.8 oz)   SpO2 97%   BMI 31.54 kg/m²     Physical Exam  Constitutional:       General: He is not in acute distress.     Appearance: He is well-developed. He is not ill-appearing or toxic-appearing.   HENT:      Head: Normocephalic and atraumatic.   Neck:      Vascular: No carotid bruit.   Cardiovascular:      Rate and Rhythm: Normal rate and regular rhythm.      Heart sounds: Normal heart sounds.      Comments: ICD  Pulmonary:      Effort: Pulmonary " effort is normal.      Breath sounds: Normal breath sounds.   Abdominal:      General: Bowel sounds are normal.      Palpations: Abdomen is soft.   Musculoskeletal:         General: Normal range of motion.      Cervical back: Normal range of motion and neck supple.      Right lower leg: No edema.      Left lower leg: No edema.   Lymphadenopathy:      Cervical: No cervical adenopathy.   Skin:     General: Skin is warm and dry.      Findings: No lesion or rash.   Neurological:      General: No focal deficit present.      Mental Status: He is alert and oriented to person, place, and time.      Deep Tendon Reflexes: Reflexes are normal and symmetric.   Psychiatric:         Behavior: Behavior normal.         Thought Content: Thought content normal.         Judgment: Judgment normal.

## 2024-10-02 NOTE — ASSESSMENT & PLAN NOTE
Stable within parameters continue current care no reported episodes of hypoglycemia continued use of CGM continue current medications  Lab Results   Component Value Date    HGBA1C 6.3 (H) 07/24/2024       Orders:    Microalbumin, Random Urine (W/Creatinine) ([facility] ONLY); Future    Albumin / creatinine urine ratio; Future    Microalbumin, Random Urine (W/Creatinine) ([facility] ONLY)    glimepiride (AMARYL) 1 mg tablet; Take 1 tablet (1 mg total) by mouth daily with breakfast    Comprehensive metabolic panel; Future    CBC; Future    Hemoglobin A1C; Future    Lipid Panel with Direct LDL reflex; Future    CBC; Future    UA w Reflex to Microscopic w Reflex to Culture; Future    IRIS Diabetic eye exam    Albumin / creatinine urine ratio; Standing

## 2024-10-04 DIAGNOSIS — D50.0 IRON DEFICIENCY ANEMIA DUE TO CHRONIC BLOOD LOSS: Primary | ICD-10-CM

## 2024-10-04 DIAGNOSIS — C61 PROSTATE CANCER (HCC): ICD-10-CM

## 2024-10-15 ENCOUNTER — TELEPHONE (OUTPATIENT)
Dept: UROLOGY | Facility: CLINIC | Age: 74
End: 2024-10-15

## 2024-10-15 NOTE — TELEPHONE ENCOUNTER
Called and spoke to the PT. PSA is needed PTV. PT stated will be getting it done tomorrow. PT verbalized understanding.

## 2024-10-16 ENCOUNTER — APPOINTMENT (OUTPATIENT)
Dept: LAB | Facility: CLINIC | Age: 74
End: 2024-10-16
Payer: MEDICARE

## 2024-10-16 ENCOUNTER — ANTICOAG VISIT (OUTPATIENT)
Dept: CARDIOLOGY CLINIC | Facility: CLINIC | Age: 74
End: 2024-10-16

## 2024-10-16 DIAGNOSIS — C61 PROSTATE CANCER (HCC): ICD-10-CM

## 2024-10-16 DIAGNOSIS — E11.65 TYPE 2 DIABETES MELLITUS WITH HYPERGLYCEMIA, WITHOUT LONG-TERM CURRENT USE OF INSULIN (HCC): ICD-10-CM

## 2024-10-16 DIAGNOSIS — D50.0 IRON DEFICIENCY ANEMIA DUE TO CHRONIC BLOOD LOSS: ICD-10-CM

## 2024-10-16 LAB
ALBUMIN SERPL BCG-MCNC: 4.1 G/DL (ref 3.5–5)
ALP SERPL-CCNC: 79 U/L (ref 34–104)
ALT SERPL W P-5'-P-CCNC: 20 U/L (ref 7–52)
ANION GAP SERPL CALCULATED.3IONS-SCNC: 13 MMOL/L (ref 4–13)
AST SERPL W P-5'-P-CCNC: 19 U/L (ref 13–39)
BASOPHILS # BLD AUTO: 0.04 THOUSANDS/ΜL (ref 0–0.1)
BASOPHILS NFR BLD AUTO: 1 % (ref 0–1)
BILIRUB SERPL-MCNC: 0.47 MG/DL (ref 0.2–1)
BUN SERPL-MCNC: 60 MG/DL (ref 5–25)
CALCIUM SERPL-MCNC: 9.3 MG/DL (ref 8.4–10.2)
CHLORIDE SERPL-SCNC: 101 MMOL/L (ref 96–108)
CHOLEST SERPL-MCNC: 236 MG/DL
CO2 SERPL-SCNC: 30 MMOL/L (ref 21–32)
CREAT SERPL-MCNC: 1.65 MG/DL (ref 0.6–1.3)
CREAT UR-MCNC: 68.7 MG/DL
EOSINOPHIL # BLD AUTO: 0.13 THOUSAND/ΜL (ref 0–0.61)
EOSINOPHIL NFR BLD AUTO: 3 % (ref 0–6)
ERYTHROCYTE [DISTWIDTH] IN BLOOD BY AUTOMATED COUNT: 15.9 % (ref 11.6–15.1)
EST. AVERAGE GLUCOSE BLD GHB EST-MCNC: 146 MG/DL
FERRITIN SERPL-MCNC: 215 NG/ML (ref 24–336)
GFR SERPL CREATININE-BSD FRML MDRD: 40 ML/MIN/1.73SQ M
GLUCOSE P FAST SERPL-MCNC: 131 MG/DL (ref 65–99)
HBA1C MFR BLD: 6.7 %
HCT VFR BLD AUTO: 33.8 % (ref 36.5–49.3)
HDLC SERPL-MCNC: 40 MG/DL
HGB BLD-MCNC: 11 G/DL (ref 12–17)
IMM GRANULOCYTES # BLD AUTO: 0.06 THOUSAND/UL (ref 0–0.2)
IMM GRANULOCYTES NFR BLD AUTO: 1 % (ref 0–2)
IRON SATN MFR SERPL: 24 % (ref 15–50)
IRON SERPL-MCNC: 72 UG/DL (ref 50–212)
LDLC SERPL CALC-MCNC: 138 MG/DL (ref 0–100)
LYMPHOCYTES # BLD AUTO: 1.08 THOUSANDS/ΜL (ref 0.6–4.47)
LYMPHOCYTES NFR BLD AUTO: 24 % (ref 14–44)
MCH RBC QN AUTO: 27.8 PG (ref 26.8–34.3)
MCHC RBC AUTO-ENTMCNC: 32.5 G/DL (ref 31.4–37.4)
MCV RBC AUTO: 86 FL (ref 82–98)
MICROALBUMIN UR-MCNC: 41.4 MG/L
MICROALBUMIN/CREAT 24H UR: 60 MG/G CREATININE (ref 0–30)
MONOCYTES # BLD AUTO: 0.43 THOUSAND/ΜL (ref 0.17–1.22)
MONOCYTES NFR BLD AUTO: 9 % (ref 4–12)
NEUTROPHILS # BLD AUTO: 2.83 THOUSANDS/ΜL (ref 1.85–7.62)
NEUTS SEG NFR BLD AUTO: 62 % (ref 43–75)
NRBC BLD AUTO-RTO: 0 /100 WBCS
PLATELET # BLD AUTO: 170 THOUSANDS/UL (ref 149–390)
PMV BLD AUTO: 10 FL (ref 8.9–12.7)
POTASSIUM SERPL-SCNC: 4.8 MMOL/L (ref 3.5–5.3)
PROT SERPL-MCNC: 7.3 G/DL (ref 6.4–8.4)
PSA SERPL-MCNC: 0.74 NG/ML (ref 0–4)
RBC # BLD AUTO: 3.95 MILLION/UL (ref 3.88–5.62)
SODIUM SERPL-SCNC: 144 MMOL/L (ref 135–147)
TIBC SERPL-MCNC: 300 UG/DL (ref 250–450)
TRIGL SERPL-MCNC: 289 MG/DL
UIBC SERPL-MCNC: 228 UG/DL (ref 155–355)
WBC # BLD AUTO: 4.57 THOUSAND/UL (ref 4.31–10.16)

## 2024-10-16 PROCEDURE — 82728 ASSAY OF FERRITIN: CPT

## 2024-10-16 PROCEDURE — 80053 COMPREHEN METABOLIC PANEL: CPT

## 2024-10-16 PROCEDURE — 83540 ASSAY OF IRON: CPT

## 2024-10-16 PROCEDURE — 83036 HEMOGLOBIN GLYCOSYLATED A1C: CPT

## 2024-10-16 PROCEDURE — 83550 IRON BINDING TEST: CPT

## 2024-10-16 PROCEDURE — 85025 COMPLETE CBC W/AUTO DIFF WBC: CPT

## 2024-10-16 PROCEDURE — 36415 COLL VENOUS BLD VENIPUNCTURE: CPT

## 2024-10-16 PROCEDURE — 80061 LIPID PANEL: CPT

## 2024-10-16 PROCEDURE — 82570 ASSAY OF URINE CREATININE: CPT

## 2024-10-16 PROCEDURE — 82043 UR ALBUMIN QUANTITATIVE: CPT

## 2024-10-16 PROCEDURE — 84153 ASSAY OF PSA TOTAL: CPT

## 2024-10-17 ENCOUNTER — OFFICE VISIT (OUTPATIENT)
Dept: HEMATOLOGY ONCOLOGY | Facility: CLINIC | Age: 74
End: 2024-10-17
Payer: MEDICARE

## 2024-10-17 VITALS
BODY MASS INDEX: 30.92 KG/M2 | DIASTOLIC BLOOD PRESSURE: 74 MMHG | WEIGHT: 216 LBS | RESPIRATION RATE: 18 BRPM | OXYGEN SATURATION: 95 % | HEIGHT: 70 IN | TEMPERATURE: 97.3 F | HEART RATE: 80 BPM | SYSTOLIC BLOOD PRESSURE: 118 MMHG

## 2024-10-17 DIAGNOSIS — I50.23 ACUTE ON CHRONIC SYSTOLIC CONGESTIVE HEART FAILURE (HCC): ICD-10-CM

## 2024-10-17 DIAGNOSIS — C61 PROSTATE CANCER (HCC): Primary | ICD-10-CM

## 2024-10-17 DIAGNOSIS — I10 ESSENTIAL HYPERTENSION: ICD-10-CM

## 2024-10-17 DIAGNOSIS — E11.65 TYPE 2 DIABETES MELLITUS WITH HYPERGLYCEMIA, WITHOUT LONG-TERM CURRENT USE OF INSULIN (HCC): ICD-10-CM

## 2024-10-17 DIAGNOSIS — Z95.810 ICD (IMPLANTABLE CARDIOVERTER-DEFIBRILLATOR) IN PLACE: ICD-10-CM

## 2024-10-17 DIAGNOSIS — K29.81 GASTROINTESTINAL HEMORRHAGE ASSOCIATED WITH DUODENITIS: ICD-10-CM

## 2024-10-17 DIAGNOSIS — R23.2 HOT FLASHES: ICD-10-CM

## 2024-10-17 DIAGNOSIS — D62 ACUTE BLOOD LOSS ANEMIA: ICD-10-CM

## 2024-10-17 PROCEDURE — G2211 COMPLEX E/M VISIT ADD ON: HCPCS | Performed by: INTERNAL MEDICINE

## 2024-10-17 PROCEDURE — 99214 OFFICE O/P EST MOD 30 MIN: CPT | Performed by: INTERNAL MEDICINE

## 2024-10-17 NOTE — PROGRESS NOTES
Hematology/Oncology Outpatient Follow- up Note  Galen Carson 74 y.o. male MRN: @ Encounter: [id number]        Date:  10/17/2024        Assessment / Plan:    1 prostate cancer rising PSA status post Lupron  2 nonischemic dilated cardiomyopathy with ICD placement ejection fraction estimated 20 to 25%  3 atrial fibrillation  4 duodenal ulcers upper GI bleed anemia  5 type 2 diabetes mellitus  6 renal insufficiency CKD stage IV  Plan: He will continue his Firmagon hopefully.  His PSA 0.7.  He will come back here in 6 months.  We are avoiding AR inhibitors.  We are concerned about his cardiac status.  If his PSA goes up they could be considered in the future.        HPI: 74 prostate cancer rising PSA that responded to Lupron.  He is going to be switched to Firmagon because of his ischemic cardiomyopathy in hopes of not making this any worse.  He also has a history of duodenal ulcers upper GI bleed with anemia that is somewhat improved he has atrial fibrillation.  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  He will also get a PSA.  He will be doing that shortly at his physician's office probably next week.  He will repeat a PSA iron a CBC CMP prior to his visit which will be in October post visit with urology.  HPI: 74-year-old gentleman here with history of prostate cancer with rising PSA.  He had core biopsies done at the office of Dr. Yu which were 6 out of 7 positive.  It was Elder grade 8 he received Lupron.  He is due back there in October.  Subsequently he was hospitalized.  He had an  upper GI bleed with multiple duodenal ulcers his hemoglobin was in the 9 range.  He is on Coumadin.  He remains on Protonix 40 mg p.o. twice daily.  He has renal insufficiency CKD stage IV.  He has congestive heart failure.  He has chronic atrial fibrillation and he has diabetes mellitus.  Urology saw him posthospitalization.  His thinking was perhaps changing his Lupron to Firmagon.  He could also try the oral LHRH antagonist regulorix but obviously quicker time to response and also shorter time to recovery when discontinued and may have some benefit in patients with atherosclerotic heart disease/congestive heart failure.  In the meantime we will get a CBC iron iron-binding ferritin levels to see if he would benefit from iron infusions.  Apparently you will be getting a PSA level as well.  Return here for follow-up in 3 months we will await results of his testing.  Interval History: Note from 4/17/2024:  Assessment / Plan:    1 prostate cancer with rising PSA.  Initially treated with RT and ADT in 10/18/2019  2 nonischemic dilated cardiomyopathy with ICD placement ejection fraction estimate 26%  3 atrial fibrillation nonsustained SVT  Plan: Patient will be seen by urology again.  He will be offered ADT.  This is suggestion [facility].  He was seen by cardiology.  They do need to make a comment whether he would be able to tolerate any interventions cardiac wise.  This will be particularly important if in fact the disease does not seem to be systemic and after treatment ADT he might be candidate for localized therapy.  Some of those treatments will require metastatic and I do not know if he will be candidate for that.  He will come back here in 3 months.  HPI: 73-year-old gentleman with history of PSA héctor to 8.7 and is now 8.4.  He was seen by telehealth at the [facility].  They had discussion was that they were recommendation was to consider hormonal therapy first with a prostate  biopsy they would recommend ADT and repeat in 6 months and then in 9 months or so to repeat his PSMA scan see what his lesions look like.  He had a question of abnormality in the lymph nodes and we will see what they are there.  Depending on those results whether the consideration for any further local therapy would be will be discussed.  I spoke with urology Dr. Yu who will see the patient and make arranges for both biopsy and use of Lupron.  Otherwise the patient is stable.  He will return in 3 months here  Interval History: Note from 2/22/2024:  Assessment / Plan  1 prostate adenocarcinoma with rising PSA.  Initially treated with RT and ADT in 10/18/2019.  2 nonischemic cardiomyopathy with ICD placement done  3 atrial fibrillation/nonsustained SVT  Plan: Patient is to be seen by radiation therapy and by urology to see if he would be candidate for any kind of localized therapy.  He will come back here in 2 months.  If not a candidate for any localized therapy may be candidate for systemic treatment with LHRH antagonist.  HPI: 73-year-old gentleman with a history of prostate cancer 2019.  Has a rising PSA.  PSMA and MRI of the prostate suggest disease localized to the prostate alone.  He may be candidate for localized therapy.  He has cardiac issues apparently they are stable at this time.  He is followed by cardiology.  I talked to him and he was being seen by radiation therapy and will be seen by urology as well.  They will decide about whether he would be candidate for local treatment if not consideration for hormonal treatment could be had.  I believe hormonal treatment with LHRH antagonist along and watch his PSA closely.  Interval History: Note from 120 6/2024:  Prostatic adenocarcinoma, Elder score 4 + 5 = 9. Initial Dx 5/2019, Stage JAIRO T2b N1 M0 PSA 22 grade group 5. S/p definitive radiation therapy with neoadjuvant and concurrent androgen deprivation. Completed RT on 10/18/19. Final Lupron was  given 2/10/21.    During work-up for elevated PSA the patient underwent MRI prostate-- tumor in the anterior and posterior right peripheral zone apex and mid gland and right transition zone at apex corresponding to the PSMA avid lesion on prior PET/CT. The lesion broadly abuts the capsule without visualized gross extraprostatic extension. No seminal vesicle invasion, pelvic lymphadenopathy, or pelvic osseous metastatic disease. MRI fusion biopsy was cancelled due to anaesthesia concerns since the patient has a vest and significant cardiac issues--non-ischemic cardiomyopathy,Atrial fibrillation,Junctional tachycardia, NSVT, PSVT, PVCs. Scheduled for ICD placement on 1/4/23.  PSA on 12/6/23-- elevated at 5.11. Free testosterone 15, total testosterone 282. Scheduled for ICD placement this week. F/u in 2 weeks for treatment planning. The patient should be considered for focal cryotherapy by urology.   I have the patient coming back in 3 weeks.  I will try and finalize what we can offer him.  He will have to decide if he wants local intervention that he would probably need to be done down at the main campus at [facility].  we will discuss with him and finalize that decision.  By his PSMA scan he appears to be a localized recurrence still.  HPI: 73-year-old gentleman with history of prostate adenocarcinoma.  He had a rising PSA level it is risen up to 8.  He had a PSMA scan which showed abnormality in the prostatic bed.  His initial treatment was in 2019 he was noted to have Elder's grade 9 carcinoma.  He has cardiac problems he is atrial fibs and was recently placed a device to a pacemaker to try and help that.  He no definitive evidence of disease outside the capsule.  The plan was to consider the possibility of whether he may be candidate for localized therapy such as cryotherapy.  I spoke with the patient who was not really aware of his situation.  He asked what he could do.  I explained we could offer him endocrine  therapy or possible localized therapy with cryotherapy.  He asked where that would be done I explained it would probably have to be done down in the main part of [facility].  He was somewhat resistant to doing that.  However he will think about that again.  I spoke with radiation and felt he was not a candidate for any particular seeding of radiation.  I spoke with urology who will be able to see him and discuss whether he would be willing to go down to have his treatments done      Cancer Staging:  Cancer Staging   Prostate cancer ([facility])  Staging form: Prostate, AJCC 8th Edition  - Clinical: Stage JAIRO (cT2b, cN1, cM0, PSA: 22, Grade Group: 5) - Signed by Erik Elliott MD on 6/20/2019  Prostate specific antigen (PSA) range: 20 or greater  Histologic grading system: 5 grade system      Molecular Testing:     Previous Hematologic/ Oncologic History:    Oncology History   Prostate cancer ([facility])   5/15/2019 Initial Diagnosis    Prostate cancer ([facility])     5/15/2019 Biopsy    Final Diagnosis  A. Prostate, right lateral base, core needle biopsy: Benign prostatic stroma. No malignancy is identified.     B. Prostate, right medial base, core needle biopsy:             - Prostatic adenocarcinoma, [place] score 4 + 5 = 9, Prognostic Grade Group V, involving nearly 100% of one core biopsy.     C. Prostate, right lateral mid, core needle biopsy:             - Prostatic adenocarcinoma, [place] score 5 + 4 = 9, Prognostic Grade Group V, involving nearly 100% one core biopsy.     D. Prostate, right medial mid, core needle biopsy:             - Prostatic adenocarcinoma, Elder score 4 + 5 = 9, Prognostic Grade Group V, involving nearly 100% of one core biopsy.     E. Prostate, right lateral apex, core needle biopsy:No malignancy is identified.     F. Prostate, right medial apex, core needle biopsy:             - Prostatic adenocarcinoma, [place] score 5 + 4 = 9, Prognostic Grade Group V, involving nearly 100% of one core biopsy.      G. Prostate, left lateral base, core needle biopsy:- Atypical prostate glands, indeterminate for prostatic adenocarcinoma.     H. Prostate, left medial base, core needle biopsy:             - Prostatic adenocarcinoma, Elder score 5 + 4 =9, Prognostic Grade Group V, involving 15% of one core biopsy.     I. Prostate, left lateral mid, core needle biopsy: Benign prostate glands. No malignancy is identified.  J. Prostate, left medial mid, core needle biopsy: Benign prostate glands. No malignancy is identified.     K. Prostate, left lateral apex, core needle biopsy:             - Prostatic adenocarcinoma, Elder score 5 + 4 = 9, Prognostic Grade Group V, involving 30% of one of 2 cores.     L. Prostate, left medial apex, core needle biopsy:             - Prostatic adenocarcinoma, [place] score 5 + 4 =  9, Prognostic Grade Group V, discontinuously involving approximately 50% of 1 core biopsy.         6/20/2019 -  Cancer Staged    Staging form: Prostate, AJCC 8th Edition  - Clinical: Stage JAIRO (cT2b, cN1, cM0, PSA: 22, Grade Group: 5) - Signed by Erik Elliott MD on 6/20/2019  Prostate specific antigen (PSA) range: 20 or greater  Histologic grading system: 5 grade system       6/28/2019 - 6/28/2019 Hormone Therapy    Firmagon     8/1/2019 - 5/10/2021 Hormone Therapy    Lupron for 2 years     8/19/2019 - 10/18/2019 Radiation    4500 centigray in 25 fractions to the prostate, seminal vesicles and regional pelvic lymphatics followed by an additional 1440 centigray in 8 fractions to the prostate, seminal vesicles, and gross lymphadenopathy with the final 11 fractions of an additional 1980 centigray to the prostate and proximal seminal vesicles for a total dose of 7920 centigray in 44 fractions.          Current Hematologic/ Oncologic Treatment:       Cycle 1         Test Results:    Imaging: No results found.          Labs:   Lab Results   Component Value Date    WBC 4.57 10/16/2024    HGB 11.0 (L) 10/16/2024    HCT  "33.8 (L) 10/16/2024    MCV 86 10/16/2024     10/16/2024     Lab Results   Component Value Date    K 4.8 10/16/2024     10/16/2024    CO2 30 10/16/2024    BUN 60 (H) 10/16/2024    CREATININE 1.65 (H) 10/16/2024    GLUF 131 (H) 10/16/2024    CALCIUM 9.3 10/16/2024    AST 19 10/16/2024    ALT 20 10/16/2024    ALKPHOS 79 10/16/2024    EGFR 40 10/16/2024         No results found for: \"SPEP\", \"UPEP\"    Lab Results   Component Value Date    PSA 0.741 10/16/2024    PSA 0.687 07/24/2024    PSA 8.14 (H) 03/27/2024       No results found for: \"CEA\"    No results found for: \"\"    No results found for: \"AFP\"    Lab Results   Component Value Date    IRON 72 10/16/2024    TIBC 300 10/16/2024    FERRITIN 215 10/16/2024       No results found for: \"XVZZZXZO21\"      ROS: Review of Systems   Constitutional:  Positive for fatigue.   HENT: Negative.     Eyes: Negative.    Respiratory:  Positive for cough.    Cardiovascular: Negative.    Gastrointestinal: Negative.    Endocrine: Negative.    Genitourinary: Negative.    Musculoskeletal: Negative.    Skin: Negative.    Allergic/Immunologic: Negative.    Neurological: Negative.    Hematological: Negative.      Planing of easy fatigue less active.    Current Medications: Reviewed  Allergies: Reviewed  PMH/FH/SH:  Reviewed      Physical Exam:    Body surface area is 2.16 meters squared.    Wt Readings from Last 3 Encounters:   10/17/24 98 kg (216 lb)   10/02/24 99.7 kg (219 lb 12.8 oz)   09/06/24 100 kg (221 lb)        Temp Readings from Last 3 Encounters:   10/17/24 (!) 97.3 °F (36.3 °C) (Temporal)   08/26/24 (!) 96.1 °F (35.6 °C) (Temporal)   08/19/24 97.6 °F (36.4 °C) (Oral)        BP Readings from Last 3 Encounters:   10/17/24 118/74   10/02/24 128/72   09/06/24 128/76         Pulse Readings from Last 3 Encounters:   10/17/24 80   10/02/24 62   09/06/24 75     @LASTSAO2(3)@      Physical Exam  Vitals reviewed.   Constitutional:       Appearance: Normal appearance. He is " obese.   HENT:      Head: Normocephalic and atraumatic.   Eyes:      Extraocular Movements: Extraocular movements intact.      Conjunctiva/sclera: Conjunctivae normal.      Pupils: Pupils are equal, round, and reactive to light.   Cardiovascular:      Rate and Rhythm: Normal rate. Rhythm irregular.   Pulmonary:      Effort: Pulmonary effort is normal.      Breath sounds: Normal breath sounds.   Abdominal:      General: Abdomen is flat. Bowel sounds are normal.      Palpations: Abdomen is soft.   Musculoskeletal:         General: Normal range of motion.      Cervical back: Normal range of motion and neck supple.   Skin:     General: Skin is warm and dry.   Neurological:      General: No focal deficit present.      Mental Status: He is alert and oriented to person, place, and time. Mental status is at baseline.           Goals and Barriers:  Current Goal: Prolong Survival from Cancer.   Barriers: None.      Patient's Capacity to Self Care:  Patient is able to self care.    Code Status: [unfilled]  Advance Directive and Living Will:      Power of :

## 2024-10-21 NOTE — PROGRESS NOTES
UROLOGY FOLLOW-UP ENCOUNTER    Galen Carson is a 74 y.o. male with prostate cancer    Pertinent non-urologic PMH: DM (hemoglobin A1c 6.9 on 2/14/2024), HLD, HTN, CKD (creatinine 1.37, GFR 51 on 3/13/2024); a fib    Pertinent non-urologic PSH: Cardiac catheterization without stents, hernia repair, dual chamber ICD implant Jan 2024    Anticoagulation: ASA81, Warfarin    History of high risk prostate cancer treated with radiation in 2019 he completed 2-year course of ADT in February 2021    Pretreatment PSA was 22.4.  Morales PSA was 0.0.    Had PSA recurrence which prompted workup below    PSMA PET scan 9/25/2023: Heterogenous multifocal fairly intense tracer activity in the right greater than left prostate gland highly suspicious for locally recurrent intraprostatic PSMA positive malignancy; no focal tracer activity concerning for metastatic disease    Prostate MRI 10/13/2023: Tumor in anterior and posterior right peripheral zone apex and mid gland and right transition zone at apex corresponds to PSMA avid lesion; the lesion broadly abuts the capsule without visualized gross extraprostatic extension; no SVI/LAD/bony metastasis; prostate 29 g    Given the positive lesion seen on imaging, plan was for patient to undergo MRI fusion biopsy.  Unfortunately, the case was canceled by anesthesia due to the patient's cardiac arrhythmia requiring LifeVest.  Since he cannot undergo anesthesia, there is consideration for alternate therapy.    Patient had dual-chamber ICD implant 1/4/2024    PSA 8.48 on 1/24/2024    Saw Dr. Barreto of hematology oncology on 2/21/2024. Option for repeat local therapy versus hormonal therapy with LHRH antagonist along with closely watching PSA.    PSA 8.14 on 3/27/24     Patient declined OR for TP bx    Lupron q6 month on 5/1/24     TRUS Pbx 5/1/24: G 4+4 in 7 cores  Volume:  20 cm3     Admitted mid May 2024 with acute on chronic CHF, required diuretics, got ESDRAS. Cr was down to around 2.0 by  discharge (prev Bcr was about 1.4 in March 2024).    PSA 0.687 on 7/24/24    Random bladder scan 58 cc on 7/24/24    Overall voiding well. Some LUTS but taking diuretics.    PSA 0.741 on 10/16/24    Firmagon 80 monthly started 10/22/24    Assessment and plan:     Prostate cancer    Patient with prostate cancer history as detailed above.    Of note, patient had prostate biopsy on 5/1/2024.  Confirmed to have [place] 8 disease.  He was given Lupron 6-month dosing at that time.    He unfortunately did not tolerate the Lupron particularly well and had bad heat flashes.  He additionally was admitted in May 2024 with acute on chronic CHF and required diuretics.    Fortunately, his PSA did respond to 0.687 on 7/24/2024.  He got repeat PSA on 10/16/2024 which was then 0.741.    I reviewed these results with him today.  I explained that his PSA responded well to the ADT.    Due to the presumed cardiac issues, patient was advised to switch to Firmagon rather than Lupron.  Additionally, we will see if he has less side effects with the Firmagon regarding the hot flashes.    He therefore got his Firmagon today.  We will do monthly Firmagon moving forward.    I will plan on seeing him in about 3 months with PSA beforehand.  He will also get his Firmagon at that time.    He is supposed to see [facility] oncology in February 2025.  Based on his PSA results and the PSMA scan that they will order, we will see if they believe that he needs definitive treatment at this time or if he can continue on intermittent ADT.        PLAN  -Firmagon 80 today. Will do monthly firmagon moving forward.  -I will see him in 3 months with PSA  -Cont fu with med onc. Sees Dr. Diaz on 4/17/24.  -Following rad onc Dr. Sandhu at [facility]. He recommended starting ADT (initiated) and getting PSA and PSMA around early 2025. Appointment scheduled for 2/6/25.  -Cont primrose for hot flashes      His friend is present at [facility] today assisted with  "history.      Portions of the above record have been created with voice recognition software.  Occasional wrong word or \"sound alike\" substitution may have occurred due to the inherent limitations of voice recognition software.  Read the chart carefully and recognize, using context, where substitution may have occurred.      Estevan Waite,         Chief Complaint     Prostate cancer      History of Present Illness     See summary above    No fevers or chills          The following portions of the patient's history were reviewed and updated as appropriate: allergies, current medications, past family history, past medical history, past social history, past surgical history and problem list.        AUA SYMPTOM SCORE      Flowsheet Row Most Recent Value   AUA SYMPTOM SCORE    How often have you had a sensation of not emptying your bladder completely after you finished urinating? 0 (P)    How often have you had to urinate again less than two hours after you finished urinating? 1 (P)    How often have you found you stopped and started again several times when you urinate? 0 (P)    How often have you found it difficult to postpone urination? 1 (P)    How often have you had a weak urinary stream? 5 (P)    How often have you had to push or strain to begin urination? 1 (P)    How many times did you most typically get up to urinate from the time you went to bed at night until the time you got up in the morning? 1 (P)    Quality of Life: If you were to spend the rest of your life with your urinary condition just the way it is now, how would you feel about that? 1 (P)    AUA SYMPTOM SCORE 9 (P)               Review of Systems     Negative for fevers, chills, nausea, vomiting, shortness of breath, painful urination, blood in urine      Allergies     Allergies   Allergen Reactions    Penicillin V Other (See Comments)     As a child        Physical Exam     No acute distress, normocephalic atraumatic, abdomen soft " "nontender, no CVA tenderness bilaterally, nonlabored breathing      Vital Signs  Vitals:    10/22/24 1123   BP: 120/78   BP Location: Left arm   Patient Position: Sitting   Cuff Size: Standard   Pulse: 90   Temp: 97.6 °F (36.4 °C)   TempSrc: Temporal   SpO2: 97%   Weight: 98.9 kg (218 lb)   Height: 5' 10\" (1.778 m)         Current Medications       Current Outpatient Medications:     Ascorbic Acid (vitamin C) 1000 MG tablet, Take 1,000 mg by mouth daily, Disp: , Rfl:     cetirizine (ZyrTEC) 5 MG tablet, Take 5 mg by mouth daily, Disp: , Rfl:     Continuous Glucose Sensor (FreeStyle Lisa 3 Sensor) MISC, Use 1 each every 14 (fourteen) days, Disp: 6 each, Rfl: 5    EVENING PRIMROSE OIL PO, Take by mouth 3 (three) times a day, Disp: , Rfl:     glimepiride (AMARYL) 1 mg tablet, Take 1 tablet (1 mg total) by mouth daily with breakfast, Disp: 90 tablet, Rfl: 1    metoprolol succinate (TOPROL-XL) 25 mg 24 hr tablet, Take 1 tablet (25 mg total) by mouth daily, Disp: 100 tablet, Rfl: 1    multivitamin (THERAGRAN) TABS, Take 1 tablet by mouth daily, Disp: , Rfl:     pantoprazole (PROTONIX) 40 mg tablet, 1 po BID for one additional month (through 8/11) and then decrease to once a day., Disp: 120 tablet, Rfl: 2    rosuvastatin (CRESTOR) 10 MG tablet, Take 1 tablet (10 mg total) by mouth daily, Disp: 90 tablet, Rfl: 1    torsemide (DEMADEX) 20 mg tablet, Take 3 tablets (60 mg total) by mouth daily, Disp: 270 tablet, Rfl: 5    VITAMIN D, ERGOCALCIFEROL, PO, Take by mouth, Disp: , Rfl:     vitamin E, tocopherol, 400 units capsule, Take 400 Units by mouth daily, Disp: , Rfl:     warfarin (Coumadin) 5 mg tablet, Take one tablet daily or as directed, Disp: 90 tablet, Rfl: 3    Blood Glucose Monitoring Suppl (ONETOUCH VERIO) w/Device KIT, by Does not apply route once for 1 dose Test sugar in the morning, Disp: 1 kit, Rfl: 0      Active Problems     Patient Active Problem List   Diagnosis    Mixed hyperlipidemia    IFG (impaired " fasting glucose)    Upper respiratory tract infection    Positive colorectal cancer screening using Cologuard test    Prostate cancer (HCC)    Encounter for monitoring androgen deprivation therapy    Hot flashes    Type 2 diabetes mellitus with hyperglycemia, without long-term current use of insulin (HCC)    Acute renal failure superimposed on stage 3a chronic kidney disease (HCC)    Essential hypertension    Dilated cardiomyopathy (HCC)    Paroxysmal atrial fibrillation (HCC)    Obesity    ICD (implantable cardioverter-defibrillator) in place    Acute on chronic systolic congestive heart failure (HCC)    Iron deficiency anemia due to chronic blood loss    On continuous oral anticoagulation         Past Medical History     Past Medical History:   Diagnosis Date    Allergic     Anesthesia     pt wears a life vest (11/16).  should not be scheduled at [facility] until heart condition is stabilized    BPH (benign prostatic hypertrophy)     Cancer (HCC)     Chronic kidney disease     Coronary artery disease     Diabetes mellitus (HCC)     Hyperlipidemia     Hypertension     Irregular heart beat     PAF    Prostate cancer (HCC)          Surgical History     Past Surgical History:   Procedure Laterality Date    A-V CARDIAC PACEMAKER INSERTION      CARDIAC CATHETERIZATION N/A 10/04/2023    Procedure: Cardiac Coronary Angiogram;  Surgeon: Chris Lovell MD;  Location: [facility];  Service: Cardiology    CARDIAC CATHETERIZATION N/A 10/04/2023    Procedure: Cardiac RHC/LHC;  Surgeon: Chris Lovell MD;  Location: [facility];  Service: Cardiology    CARDIAC CATHETERIZATION  10/04/2023    Procedure: Cardiac catheterization;  Surgeon: Chris Lovell MD;  Location: [facility];  Service: Cardiology    CARDIAC ELECTROPHYSIOLOGY PROCEDURE N/A 01/04/2024    Procedure: Cardiac icd implant, Dual Chambers ICD;  Surgeon: Leo Whalen MD;  Location:  CARDIAC CATH LAB;  Service: Cardiology    EAR SURGERY    "   HERNIA REPAIR      FL COLONOSCOPY FLX DX W/COLLJ SPEC WHEN PFRMD N/A 2019    Procedure: COLONOSCOPY;  Surgeon: Winston Nielson III, MD;  Location: [facility];  Service: Gastroenterology         Family History     Family History   Problem Relation Age of Onset    Stroke Father     No Known Problems Mother     Prostate cancer Brother     Arthritis Brother     No Known Problems Sister     Diabetes Sister     Diabetes Sister     No Known Problems Son     No Known Problems Daughter          Social History     Social History     Social History     Tobacco Use   Smoking Status Former    Current packs/day: 0.00    Average packs/day: 1 pack/day for 25.0 years (25.0 ttl pk-yrs)    Types: Cigarettes, Pipe, Cigars    Start date: 1980    Quit date: 2005    Years since quittin.6    Passive exposure: Past   Smokeless Tobacco Never         Pertinent Lab Values     Lab Results   Component Value Date    CREATININE 1.65 (H) 10/16/2024       Lab Results   Component Value Date    PSA 0.741 10/16/2024    PSA 0.687 2024    PSA 8.14 (H) 2024         Pertinent Imaging     The patient's images were reviewed by me personally and also in real time with them in the examination room using our PACS imaging system.      PSMA PET scan 2023: Heterogenous multifocal fairly intense tracer activity in the right greater than left prostate gland highly suspicious for locally recurrent intraprostatic PSMA positive malignancy; no focal tracer activity concerning for metastatic disease    Prostate MRI 10/13/2023: Tumor in anterior and posterior right peripheral zone apex and mid gland and right transition zone at apex corresponds to PSMA avid lesion; the lesion broadly abuts the capsule without visualized gross extraprostatic extension; no SVI/LAD/bony metastasis; prostate 29 g      Pertinent Pathology     TRUS Pbx 24: G 4+4 in 7 cores  Volume:  20 cm3   A. Prostate, \"Prostate, left lateral base,\" Core " "biopsy:  - Benign prostatic tissue, negative for carcinoma     B. Prostate, \"Prostate, left mid base,\" Core biopsy:  - Prostatic adenocarcinoma, Elder pattern 4 + 4, Elder score 8, Grade Group 4  - Largest focus of carcinoma measures 0.1 cm (5% of tissue core)  - Carcinoma involves 1 of 1 core biopsies  - No perineural invasion present     C. Prostate, \"Prostate, left lateral mid,\" Core biopsy:  - Benign prostatic tissue, negative for carcinoma     D. Prostate, \"Prostate, left medial mid,\" Core biopsy:  - Prostatic adenocarcinoma, [place] pattern 4 + 4, Elder score 8, Grade Group 4  - Multifocal, largest focus of carcinoma measures 0.5 cm (30% of tissue core)  - Carcinoma involves 1 of 1 core biopsies  - No perineural invasion present     E. Prostate, \"Prostate, left lateral apex,\" Core biopsy:  - Benign prostatic tissue, negative for carcinoma     F. Prostate, \"Prostate, left medial apex,\" Core biopsy:  - Prostatic adenocarcinoma, Elder pattern 4 + 4, [place] score 8, Grade Group 4  - Largest focus of carcinoma measures 0.1 cm (10% of tissue core)  - Carcinoma involves 1 of 1 core biopsies  - No perineural invasion present     G. Prostate, \"Prostate, right lateral base,\" Core biopsy:  - Benign prostatic tissue, negative for carcinoma     H. Prostate, \"Prostate, right medial base,\" Core biopsy:  - Benign prostatic tissue, negative for carcinoma     I. Prostate, \"Prostate, right lateral mid,\" Core biopsy:  - Prostatic adenocarcinoma, Elder pattern 4 + 4, [place] score 8, Grade Group 4  - Largest focus of carcinoma measures 0.4 cm (30% of tissue core)  - Carcinoma involves 1 of 1 core biopsies  - No perineural invasion present     J. Prostate, \"Prostate, right medial mid,\" Core biopsy:  - Prostatic adenocarcinoma, [place] pattern 4 + 4, Elder score 8, Grade Group 4  - Largest focus of carcinoma measures 0.1 cm (5% of tissue core)  - Carcinoma involves 1 of 1 core biopsies  - No perineural invasion " "present     K. Prostate, \"Prostate, right lateral apex,\" Core biopsy:  - Prostatic adenocarcinoma, Elder pattern 4 + 4, [place] score 8, Grade Group 4  - Largest focus of carcinoma measures 1.2 cm  - Carcinoma involves 1 of 1 core biopsies  - No perineural invasion present     L. Prostate, \"Prostate, right medial apex,\" Core biopsy:  - Prostatic adenocarcinoma, [place] pattern 4 + 4, [place] score 8, Grade Group 4  - Largest focus of carcinoma measures 0.3 cm (15% of tissue core)  - Carcinoma involves 1 of 1 core biopsies  - No perineural invasion present  - Large nerve present  - Benign prostatic tissue, negative for carcinoma      I have spent 30 minutes with Patient  today in which greater than 50% of this time was spent in counseling/coordination of care regarding Diagnostic results, Prognosis, Risks and benefits of tx options, Instructions for management, Patient and family education, Importance of tx compliance, Impressions, Counseling / Coordination of care, Documenting in the medical record, Reviewing / ordering tests, medicine, procedures  , and Obtaining or reviewing history  .    Please note this time includes cumulative time on the day of encounter, including reviewing medical records and/or coordinating care among the patient's other specialists.      "

## 2024-10-22 ENCOUNTER — OFFICE VISIT (OUTPATIENT)
Dept: UROLOGY | Facility: CLINIC | Age: 74
End: 2024-10-22
Payer: MEDICARE

## 2024-10-22 VITALS
WEIGHT: 218 LBS | SYSTOLIC BLOOD PRESSURE: 120 MMHG | DIASTOLIC BLOOD PRESSURE: 78 MMHG | BODY MASS INDEX: 31.21 KG/M2 | OXYGEN SATURATION: 97 % | HEART RATE: 90 BPM | TEMPERATURE: 97.6 F | HEIGHT: 70 IN

## 2024-10-22 DIAGNOSIS — C61 PROSTATE CANCER (HCC): Primary | ICD-10-CM

## 2024-10-22 PROCEDURE — 96402 CHEMO HORMON ANTINEOPL SQ/IM: CPT

## 2024-10-22 PROCEDURE — 99214 OFFICE O/P EST MOD 30 MIN: CPT | Performed by: UROLOGY

## 2024-10-22 NOTE — Clinical Note
Miah jarrett I saw mr maguire today Started him on monthly firmagon He is supposed to see roderick appiah in feb 2025 but he said he does not have the exact date (I have in my notes that it is 2/6/25, but not sure where I got that from) Can you ask

## 2024-10-30 DIAGNOSIS — I48.0 PAROXYSMAL ATRIAL FIBRILLATION (HCC): ICD-10-CM

## 2024-10-30 RX ORDER — WARFARIN SODIUM 5 MG/1
TABLET ORAL
Qty: 90 TABLET | Refills: 3 | Status: SHIPPED | OUTPATIENT
Start: 2024-10-30

## 2024-11-06 ENCOUNTER — REMOTE DEVICE CLINIC VISIT (OUTPATIENT)
Dept: CARDIOLOGY CLINIC | Facility: CLINIC | Age: 74
End: 2024-11-06
Payer: MEDICARE

## 2024-11-06 DIAGNOSIS — Z95.810 AICD (AUTOMATIC CARDIOVERTER/DEFIBRILLATOR) PRESENT: Primary | ICD-10-CM

## 2024-11-06 PROCEDURE — 93295 DEV INTERROG REMOTE 1/2/MLT: CPT | Performed by: INTERNAL MEDICINE

## 2024-11-06 PROCEDURE — 93296 REM INTERROG EVL PM/IDS: CPT | Performed by: INTERNAL MEDICINE

## 2024-11-06 NOTE — PROGRESS NOTES
Results for orders placed or performed in visit on 11/06/24   Cardiac EP device report    Narrative    MDT DC ICD/ACTIVE SYSTEM IS MRI CONDITIONAL  CARELINK TRANSMISSION: BATTERY VOLTAGE ADEQUATE (11.8 YRS). AP-25%, -0.9%. ALL AVAILABLE LEAD PARAMETERS WITHIN NORMAL LIMITS. 5 DEVICE CLASSIFIED NSVT EPISODES- PAT ON MOST RECENT; 26 BEAT NSVT, AVG CL~340MS ON #13; 7 BEAT NSVT, AVG CL~325MS ON #12; 23 BEAT NSVT, AVG CL~310MS ON #11; & 8 BEAT NSVT, AVG CL~295MS ON #10. PT ON WARFARIN & METOPROLOL. OPTI-VOL WITHIN NORMAL LIMITS. NORMAL DEVICE FUNCTION. GV

## 2024-11-13 ENCOUNTER — APPOINTMENT (OUTPATIENT)
Dept: LAB | Facility: CLINIC | Age: 74
End: 2024-11-13
Payer: MEDICARE

## 2024-11-13 DIAGNOSIS — R79.1 ABNORMAL INR: ICD-10-CM

## 2024-11-13 LAB
INR PPP: 2.55 (ref 0.85–1.19)
PROTHROMBIN TIME: 27.3 SECONDS (ref 12.3–15)

## 2024-11-13 PROCEDURE — 85610 PROTHROMBIN TIME: CPT

## 2024-11-13 PROCEDURE — 36415 COLL VENOUS BLD VENIPUNCTURE: CPT

## 2024-11-14 ENCOUNTER — ANTICOAG VISIT (OUTPATIENT)
Dept: CARDIOLOGY CLINIC | Facility: CLINIC | Age: 74
End: 2024-11-14

## 2024-11-14 ENCOUNTER — RESULTS FOLLOW-UP (OUTPATIENT)
Dept: CARDIOLOGY CLINIC | Facility: CLINIC | Age: 74
End: 2024-11-14

## 2024-11-14 DIAGNOSIS — Z79.01 LONG TERM (CURRENT) USE OF ANTICOAGULANTS: ICD-10-CM

## 2024-11-14 DIAGNOSIS — I48.0 PAROXYSMAL ATRIAL FIBRILLATION (HCC): Primary | ICD-10-CM

## 2024-11-22 ENCOUNTER — PROCEDURE VISIT (OUTPATIENT)
Dept: UROLOGY | Facility: CLINIC | Age: 74
End: 2024-11-22
Payer: MEDICARE

## 2024-11-22 VITALS
BODY MASS INDEX: 32.21 KG/M2 | SYSTOLIC BLOOD PRESSURE: 142 MMHG | WEIGHT: 225 LBS | DIASTOLIC BLOOD PRESSURE: 84 MMHG | HEART RATE: 103 BPM | OXYGEN SATURATION: 96 % | TEMPERATURE: 98.1 F | HEIGHT: 70 IN

## 2024-11-22 DIAGNOSIS — C61 PROSTATE CANCER (HCC): Primary | ICD-10-CM

## 2024-11-22 PROCEDURE — 96402 CHEMO HORMON ANTINEOPL SQ/IM: CPT

## 2024-11-25 NOTE — PROGRESS NOTES
"11/22/2024  Galen Carson is a 74 y.o. male  [id number]    Diagnosis:  Chief Complaint    Injections         Patient presents for monthly Firmagon 80 mg injection managed by Dr. Waite    Plan:  Patient to return for next Firmagon injection as scheduled.       Medication administration:    Site prepped with alcohol. Medication administered per 's guidelines. Injection given into Left abd region. Band-aid applied to site, patient tolerated well.         Vitals:    11/22/24 0834   BP: 142/84   Patient Position: Sitting   Cuff Size: Standard   Pulse: 103   Temp: 98.1 °F (36.7 °C)   TempSrc: Temporal   SpO2: 96%   Weight: 102 kg (225 lb)   Height: 5' 10\" (1.778 m)         Huma Fernandez LPN    "

## 2024-12-11 ENCOUNTER — APPOINTMENT (OUTPATIENT)
Dept: LAB | Facility: CLINIC | Age: 74
End: 2024-12-11
Payer: MEDICARE

## 2024-12-11 DIAGNOSIS — I48.0 PAROXYSMAL ATRIAL FIBRILLATION (HCC): ICD-10-CM

## 2024-12-11 DIAGNOSIS — N18.4 CKD (CHRONIC KIDNEY DISEASE) STAGE 4, GFR 15-29 ML/MIN (HCC): ICD-10-CM

## 2024-12-11 DIAGNOSIS — Z79.01 LONG TERM (CURRENT) USE OF ANTICOAGULANTS: ICD-10-CM

## 2024-12-11 LAB
25(OH)D3 SERPL-MCNC: 37.1 NG/ML (ref 30–100)
ALBUMIN SERPL BCG-MCNC: 4.3 G/DL (ref 3.5–5)
ALP SERPL-CCNC: 94 U/L (ref 34–104)
ALT SERPL W P-5'-P-CCNC: 21 U/L (ref 7–52)
ANION GAP SERPL CALCULATED.3IONS-SCNC: 10 MMOL/L (ref 4–13)
AST SERPL W P-5'-P-CCNC: 20 U/L (ref 13–39)
BASOPHILS # BLD AUTO: 0.05 THOUSANDS/ÂΜL (ref 0–0.1)
BASOPHILS NFR BLD AUTO: 1 % (ref 0–1)
BILIRUB SERPL-MCNC: 0.39 MG/DL (ref 0.2–1)
BUN SERPL-MCNC: 55 MG/DL (ref 5–25)
CALCIUM SERPL-MCNC: 9.6 MG/DL (ref 8.4–10.2)
CHLORIDE SERPL-SCNC: 104 MMOL/L (ref 96–108)
CO2 SERPL-SCNC: 27 MMOL/L (ref 21–32)
CREAT SERPL-MCNC: 1.82 MG/DL (ref 0.6–1.3)
CREAT UR-MCNC: 20 MG/DL
EOSINOPHIL # BLD AUTO: 0.14 THOUSAND/ÂΜL (ref 0–0.61)
EOSINOPHIL NFR BLD AUTO: 3 % (ref 0–6)
ERYTHROCYTE [DISTWIDTH] IN BLOOD BY AUTOMATED COUNT: 13.2 % (ref 11.6–15.1)
GFR SERPL CREATININE-BSD FRML MDRD: 36 ML/MIN/1.73SQ M
GLUCOSE P FAST SERPL-MCNC: 140 MG/DL (ref 65–99)
HCT VFR BLD AUTO: 34.6 % (ref 36.5–49.3)
HGB BLD-MCNC: 11.1 G/DL (ref 12–17)
IMM GRANULOCYTES # BLD AUTO: 0.06 THOUSAND/UL (ref 0–0.2)
IMM GRANULOCYTES NFR BLD AUTO: 1 % (ref 0–2)
INR PPP: 2.66 (ref 0.85–1.19)
LYMPHOCYTES # BLD AUTO: 1.4 THOUSANDS/ÂΜL (ref 0.6–4.47)
LYMPHOCYTES NFR BLD AUTO: 26 % (ref 14–44)
MCH RBC QN AUTO: 29.4 PG (ref 26.8–34.3)
MCHC RBC AUTO-ENTMCNC: 32.1 G/DL (ref 31.4–37.4)
MCV RBC AUTO: 92 FL (ref 82–98)
MICROALBUMIN UR-MCNC: 19.9 MG/L
MICROALBUMIN/CREAT 24H UR: 100 MG/G CREATININE (ref 0–30)
MONOCYTES # BLD AUTO: 0.53 THOUSAND/ÂΜL (ref 0.17–1.22)
MONOCYTES NFR BLD AUTO: 10 % (ref 4–12)
NEUTROPHILS # BLD AUTO: 3.21 THOUSANDS/ÂΜL (ref 1.85–7.62)
NEUTS SEG NFR BLD AUTO: 59 % (ref 43–75)
NRBC BLD AUTO-RTO: 0 /100 WBCS
PHOSPHATE SERPL-MCNC: 3.5 MG/DL (ref 2.3–4.1)
PLATELET # BLD AUTO: 186 THOUSANDS/UL (ref 149–390)
PMV BLD AUTO: 10.3 FL (ref 8.9–12.7)
POTASSIUM SERPL-SCNC: 4.6 MMOL/L (ref 3.5–5.3)
PROT SERPL-MCNC: 7.5 G/DL (ref 6.4–8.4)
PROTHROMBIN TIME: 28.2 SECONDS (ref 12.3–15)
PTH-INTACT SERPL-MCNC: 76.9 PG/ML (ref 12–88)
RBC # BLD AUTO: 3.77 MILLION/UL (ref 3.88–5.62)
SODIUM SERPL-SCNC: 141 MMOL/L (ref 135–147)
WBC # BLD AUTO: 5.39 THOUSAND/UL (ref 4.31–10.16)

## 2024-12-11 PROCEDURE — 85025 COMPLETE CBC W/AUTO DIFF WBC: CPT

## 2024-12-11 PROCEDURE — 36415 COLL VENOUS BLD VENIPUNCTURE: CPT

## 2024-12-11 PROCEDURE — 82570 ASSAY OF URINE CREATININE: CPT

## 2024-12-11 PROCEDURE — 84100 ASSAY OF PHOSPHORUS: CPT

## 2024-12-11 PROCEDURE — 80053 COMPREHEN METABOLIC PANEL: CPT

## 2024-12-11 PROCEDURE — 82043 UR ALBUMIN QUANTITATIVE: CPT

## 2024-12-11 PROCEDURE — 83970 ASSAY OF PARATHORMONE: CPT

## 2024-12-11 PROCEDURE — 85610 PROTHROMBIN TIME: CPT

## 2024-12-11 PROCEDURE — 82306 VITAMIN D 25 HYDROXY: CPT

## 2024-12-12 ENCOUNTER — RESULTS FOLLOW-UP (OUTPATIENT)
Dept: CARDIOLOGY CLINIC | Facility: CLINIC | Age: 74
End: 2024-12-12

## 2024-12-12 ENCOUNTER — ANTICOAG VISIT (OUTPATIENT)
Dept: CARDIOLOGY CLINIC | Facility: CLINIC | Age: 74
End: 2024-12-12

## 2024-12-20 ENCOUNTER — PROCEDURE VISIT (OUTPATIENT)
Dept: UROLOGY | Facility: CLINIC | Age: 74
End: 2024-12-20
Payer: MEDICARE

## 2024-12-20 VITALS
WEIGHT: 219 LBS | BODY MASS INDEX: 31.35 KG/M2 | OXYGEN SATURATION: 95 % | HEIGHT: 70 IN | SYSTOLIC BLOOD PRESSURE: 118 MMHG | HEART RATE: 98 BPM | DIASTOLIC BLOOD PRESSURE: 82 MMHG | TEMPERATURE: 97.8 F

## 2024-12-20 DIAGNOSIS — C61 PROSTATE CANCER (HCC): Primary | ICD-10-CM

## 2024-12-20 PROCEDURE — 96402 CHEMO HORMON ANTINEOPL SQ/IM: CPT

## 2024-12-20 NOTE — PROGRESS NOTES
"12/20/2024  Galen Carson is a 74 y.o. male  [id number]    Diagnosis:  Chief Complaint    Injections         Patient presents for monthly Firmagon 80 mg injection managed by Dr. Waite    Plan:  Patient will be seen by Dr. Waite next month for 3 month follow up and Firmagon injection. Patient has been instructed to have PSA done ptv.  Patient agreeable with this plan.       Medication administration:    Site prepped with alcohol. Medication administered per 's guidelines. Injection given into Right abd region. Band-aid applied to site, patient tolerated well.     Administrations This Visit       degarelix acetate (FIRMAGON) injection 80 mg       Admin Date  12/20/2024 Action  Given Dose  80 mg Route  Subcutaneous Documented By  Huma Fernandez LPN                    Vitals:    12/20/24 1027   BP: 118/82   Patient Position: Sitting   Cuff Size: Standard   Pulse: 98   Temp: 97.8 °F (36.6 °C)   TempSrc: Temporal   SpO2: 95%   Weight: 99.3 kg (219 lb)   Height: 5' 10\" (1.778 m)         Huma Fernandez LPN    "

## 2024-12-31 ENCOUNTER — OFFICE VISIT (OUTPATIENT)
Dept: NEPHROLOGY | Facility: CLINIC | Age: 74
End: 2024-12-31
Payer: MEDICARE

## 2024-12-31 VITALS
DIASTOLIC BLOOD PRESSURE: 70 MMHG | TEMPERATURE: 96.9 F | HEART RATE: 99 BPM | OXYGEN SATURATION: 97 % | SYSTOLIC BLOOD PRESSURE: 130 MMHG | BODY MASS INDEX: 32.07 KG/M2 | RESPIRATION RATE: 16 BRPM | WEIGHT: 224 LBS | HEIGHT: 70 IN

## 2024-12-31 DIAGNOSIS — I48.0 PAROXYSMAL ATRIAL FIBRILLATION (HCC): ICD-10-CM

## 2024-12-31 DIAGNOSIS — N18.32 STAGE 3B CHRONIC KIDNEY DISEASE (HCC): Primary | ICD-10-CM

## 2024-12-31 DIAGNOSIS — E11.65 TYPE 2 DIABETES MELLITUS WITH HYPERGLYCEMIA, WITHOUT LONG-TERM CURRENT USE OF INSULIN (HCC): ICD-10-CM

## 2024-12-31 DIAGNOSIS — E78.2 MIXED HYPERLIPIDEMIA: ICD-10-CM

## 2024-12-31 DIAGNOSIS — I10 ESSENTIAL HYPERTENSION: ICD-10-CM

## 2024-12-31 DIAGNOSIS — I42.0 DILATED CARDIOMYOPATHY (HCC): ICD-10-CM

## 2024-12-31 PROCEDURE — G2211 COMPLEX E/M VISIT ADD ON: HCPCS | Performed by: INTERNAL MEDICINE

## 2024-12-31 PROCEDURE — 99214 OFFICE O/P EST MOD 30 MIN: CPT | Performed by: INTERNAL MEDICINE

## 2024-12-31 NOTE — PROGRESS NOTES
Name: Galen Carson      : 1950      MRN: [id number]  Encounter Provider: Va Esqueda MD  Encounter Date: 2024   Encounter department: [facility]  :  Assessment & Plan  Stage 3b chronic kidney disease (HCC)  Lab Results   Component Value Date    EGFR 36 2024    EGFR 40 10/16/2024    EGFR 36 2024    CREATININE 1.82 (H) 2024    CREATININE 1.65 (H) 10/16/2024    CREATININE 1.79 (H) 2024       Orders:    Albumin; Standing    Albumin / creatinine urine ratio; Standing    Calcium; Standing    CBC and differential; Standing    Comprehensive metabolic panel; Standing    Urinalysis with microscopic; Future    Vitamin D 25 hydroxy; Standing    Phosphorus; Standing    PTH, intact; Standing    Etiology of CKD is cardiorenal syndrome type II, episode of ESDRAS secondary to contrast-induced nephropathy, hypertensive nephrosclerosis, diabetic glomerulosclerosis and age-related nephron loss.  Most recent serum creatinine is 1.8 mg/dL with a EGFR of 36 and stable  Essential hypertension  Blood pressure is 130/70 and acceptable.  Maintained on Toprol-XL.       Paroxysmal atrial fibrillation (HCC)  Rate and rhythm controlled at present time.  Currently on Coumadin for anticoagulation.       Type 2 diabetes mellitus with hyperglycemia, without long-term current use of insulin (HCC)    Lab Results   Component Value Date    HGBA1C 6.7 (H) 10/16/2024   Most recent hemoglobin A1c 6.7 and acceptable.  Given EGFR exceeds 30, patient may start to take metformin if needed.         Mixed hyperlipidemia  Noted to be on Crestor however lipid profile remains abnormal.  Recommended uptitrating Crestor dose.       Dilated cardiomyopathy (HCC)  Patient with history of cardiomyopathy with ICD placement.  Most recent EF is 31%.  Remains euvolemic on torsemide 60 mg daily           History of Present Illness   HPI  Galen Carson is a 74 y.o. male who presents to renal office  "for management of CKD.  Patient has been getting Firmagon injections for his prostate cancer.  Denies any shortness of breath, fatigue.  History obtained from: patient    Review of Systems   Constitutional: Negative.    HENT: Negative.     Eyes: Negative.    Respiratory: Negative.     Cardiovascular: Negative.    Gastrointestinal: Negative.    Endocrine: Negative.    Genitourinary: Negative.    Musculoskeletal: Negative.    Skin: Negative.    Allergic/Immunologic: Negative.    Neurological: Negative.    Hematological: Negative.    All other systems reviewed and are negative.    Medical History Reviewed by provider this encounter:     .     Objective   /70 (Patient Position: Sitting, Cuff Size: Standard)   Pulse 99   Temp (!) 96.9 °F (36.1 °C) (Temporal)   Resp 16   Ht 5' 10\" (1.778 m)   Wt 102 kg (224 lb)   SpO2 97%   BMI 32.14 kg/m²      Physical Exam  Constitutional:       Appearance: He is obese.   HENT:      Head: Normocephalic and atraumatic.   Eyes:      Pupils: Pupils are equal, round, and reactive to light.   Neck:      Vascular: No JVD.   Cardiovascular:      Rate and Rhythm: Normal rate and regular rhythm.      Heart sounds: Normal heart sounds. No murmur heard.     No friction rub.   Pulmonary:      Effort: Pulmonary effort is normal.      Breath sounds: Normal breath sounds.   Abdominal:      General: Bowel sounds are normal. There is no distension.      Palpations: Abdomen is soft.      Tenderness: There is no abdominal tenderness. There is no rebound.   Musculoskeletal:         General: No tenderness.      Cervical back: Neck supple.   Skin:     General: Skin is dry.      Findings: No rash.   Neurological:      Mental Status: He is alert and oriented to person, place, and time.         Administrative Statements   I have spent a total time of 31 minutes in caring for this patient on the day of the visit/encounter including .   "

## 2024-12-31 NOTE — ASSESSMENT & PLAN NOTE
Lab Results   Component Value Date    HGBA1C 6.7 (H) 10/16/2024   Most recent hemoglobin A1c 6.7 and acceptable.  Given EGFR exceeds 30, patient may start to take metformin if needed.

## 2024-12-31 NOTE — ASSESSMENT & PLAN NOTE
Noted to be on Crestor however lipid profile remains abnormal.  Recommended uptitrating Crestor dose.

## 2024-12-31 NOTE — ASSESSMENT & PLAN NOTE
Patient with history of cardiomyopathy with ICD placement.  Most recent EF is 31%.  Remains euvolemic on torsemide 60 mg daily

## 2025-01-07 ENCOUNTER — TELEPHONE (OUTPATIENT)
Dept: UROLOGY | Facility: CLINIC | Age: 75
End: 2025-01-07

## 2025-01-08 ENCOUNTER — APPOINTMENT (OUTPATIENT)
Dept: LAB | Facility: CLINIC | Age: 75
End: 2025-01-08
Payer: MEDICARE

## 2025-01-08 ENCOUNTER — RESULTS FOLLOW-UP (OUTPATIENT)
Dept: CARDIOLOGY CLINIC | Facility: CLINIC | Age: 75
End: 2025-01-08

## 2025-01-08 ENCOUNTER — ANTICOAG VISIT (OUTPATIENT)
Dept: CARDIOLOGY CLINIC | Facility: CLINIC | Age: 75
End: 2025-01-08

## 2025-01-08 DIAGNOSIS — I48.0 PAROXYSMAL ATRIAL FIBRILLATION (HCC): ICD-10-CM

## 2025-01-08 DIAGNOSIS — Z79.01 LONG TERM (CURRENT) USE OF ANTICOAGULANTS: ICD-10-CM

## 2025-01-08 DIAGNOSIS — I10 ESSENTIAL HYPERTENSION: ICD-10-CM

## 2025-01-08 DIAGNOSIS — Z95.810 ICD (IMPLANTABLE CARDIOVERTER-DEFIBRILLATOR) IN PLACE: ICD-10-CM

## 2025-01-08 DIAGNOSIS — R23.2 HOT FLASHES: ICD-10-CM

## 2025-01-08 DIAGNOSIS — K29.81 GASTROINTESTINAL HEMORRHAGE ASSOCIATED WITH DUODENITIS: ICD-10-CM

## 2025-01-08 DIAGNOSIS — I50.23 ACUTE ON CHRONIC SYSTOLIC CONGESTIVE HEART FAILURE (HCC): ICD-10-CM

## 2025-01-08 DIAGNOSIS — C61 PROSTATE CANCER (HCC): ICD-10-CM

## 2025-01-08 DIAGNOSIS — D62 ACUTE BLOOD LOSS ANEMIA: ICD-10-CM

## 2025-01-08 DIAGNOSIS — E11.65 TYPE 2 DIABETES MELLITUS WITH HYPERGLYCEMIA, WITHOUT LONG-TERM CURRENT USE OF INSULIN (HCC): ICD-10-CM

## 2025-01-08 LAB
ALBUMIN SERPL BCG-MCNC: 4.5 G/DL (ref 3.5–5)
ALP SERPL-CCNC: 89 U/L (ref 34–104)
ALT SERPL W P-5'-P-CCNC: 24 U/L (ref 7–52)
ANION GAP SERPL CALCULATED.3IONS-SCNC: 15 MMOL/L (ref 4–13)
AST SERPL W P-5'-P-CCNC: 23 U/L (ref 13–39)
BASOPHILS # BLD AUTO: 0.03 THOUSANDS/ΜL (ref 0–0.1)
BASOPHILS NFR BLD AUTO: 1 % (ref 0–1)
BILIRUB SERPL-MCNC: 0.48 MG/DL (ref 0.2–1)
BUN SERPL-MCNC: 54 MG/DL (ref 5–25)
CALCIUM SERPL-MCNC: 9.6 MG/DL (ref 8.4–10.2)
CHLORIDE SERPL-SCNC: 97 MMOL/L (ref 96–108)
CO2 SERPL-SCNC: 28 MMOL/L (ref 21–32)
CREAT SERPL-MCNC: 1.82 MG/DL (ref 0.6–1.3)
EOSINOPHIL # BLD AUTO: 0.22 THOUSAND/ΜL (ref 0–0.61)
EOSINOPHIL NFR BLD AUTO: 4 % (ref 0–6)
ERYTHROCYTE [DISTWIDTH] IN BLOOD BY AUTOMATED COUNT: 12.9 % (ref 11.6–15.1)
GFR SERPL CREATININE-BSD FRML MDRD: 36 ML/MIN/1.73SQ M
GLUCOSE P FAST SERPL-MCNC: 120 MG/DL (ref 65–99)
HCT VFR BLD AUTO: 34.6 % (ref 36.5–49.3)
HGB BLD-MCNC: 11.2 G/DL (ref 12–17)
IMM GRANULOCYTES # BLD AUTO: 0.07 THOUSAND/UL (ref 0–0.2)
IMM GRANULOCYTES NFR BLD AUTO: 1 % (ref 0–2)
LYMPHOCYTES # BLD AUTO: 1.39 THOUSANDS/ΜL (ref 0.6–4.47)
LYMPHOCYTES NFR BLD AUTO: 24 % (ref 14–44)
MCH RBC QN AUTO: 29.7 PG (ref 26.8–34.3)
MCHC RBC AUTO-ENTMCNC: 32.4 G/DL (ref 31.4–37.4)
MCV RBC AUTO: 92 FL (ref 82–98)
MONOCYTES # BLD AUTO: 0.64 THOUSAND/ΜL (ref 0.17–1.22)
MONOCYTES NFR BLD AUTO: 11 % (ref 4–12)
NEUTROPHILS # BLD AUTO: 3.41 THOUSANDS/ΜL (ref 1.85–7.62)
NEUTS SEG NFR BLD AUTO: 59 % (ref 43–75)
NRBC BLD AUTO-RTO: 0 /100 WBCS
PLATELET # BLD AUTO: 192 THOUSANDS/UL (ref 149–390)
PMV BLD AUTO: 10.6 FL (ref 8.9–12.7)
POTASSIUM SERPL-SCNC: 4.6 MMOL/L (ref 3.5–5.3)
PROT SERPL-MCNC: 7.6 G/DL (ref 6.4–8.4)
PSA SERPL-MCNC: 1.16 NG/ML (ref 0–4)
RBC # BLD AUTO: 3.77 MILLION/UL (ref 3.88–5.62)
SODIUM SERPL-SCNC: 140 MMOL/L (ref 135–147)
WBC # BLD AUTO: 5.76 THOUSAND/UL (ref 4.31–10.16)

## 2025-01-08 PROCEDURE — 84153 ASSAY OF PSA TOTAL: CPT

## 2025-01-08 PROCEDURE — 80053 COMPREHEN METABOLIC PANEL: CPT

## 2025-01-08 PROCEDURE — 85025 COMPLETE CBC W/AUTO DIFF WBC: CPT

## 2025-01-09 ENCOUNTER — OFFICE VISIT (OUTPATIENT)
Dept: GASTROENTEROLOGY | Facility: CLINIC | Age: 75
End: 2025-01-09
Payer: MEDICARE

## 2025-01-09 VITALS
DIASTOLIC BLOOD PRESSURE: 70 MMHG | OXYGEN SATURATION: 97 % | HEART RATE: 93 BPM | TEMPERATURE: 97.1 F | RESPIRATION RATE: 16 BRPM | HEIGHT: 70 IN | BODY MASS INDEX: 31.5 KG/M2 | WEIGHT: 220 LBS | SYSTOLIC BLOOD PRESSURE: 134 MMHG

## 2025-01-09 DIAGNOSIS — K21.9 GASTROESOPHAGEAL REFLUX DISEASE WITHOUT ESOPHAGITIS: Primary | ICD-10-CM

## 2025-01-09 DIAGNOSIS — K26.9 MULTIPLE DUODENAL ULCERS: ICD-10-CM

## 2025-01-09 PROCEDURE — 99214 OFFICE O/P EST MOD 30 MIN: CPT | Performed by: PHYSICIAN ASSISTANT

## 2025-01-09 RX ORDER — PANTOPRAZOLE SODIUM 20 MG/1
20 TABLET, DELAYED RELEASE ORAL DAILY
Qty: 90 TABLET | Refills: 1 | Status: SHIPPED | OUTPATIENT
Start: 2025-01-09

## 2025-01-09 NOTE — PROGRESS NOTES
Name: Galen Carson      : 1950      MRN: [id number]  Encounter Provider: Alyssa Cao PA-C  Encounter Date: 2025   Encounter department: [facility] [place]  :  Assessment & Plan  Gastroesophageal reflux disease without esophagitis    Multiple duodenal ulcers    Patient presents for follow up: he has a history of a UGIB from duodenal ulcers and GERD.  EGD 24 with Dr. Lau showed 3 duodenal ulcers, one was a large deep ulcer with flat pigmented spot.  Biopsies were benign and negative for h pylori. He required multiple units of PRBCs during that hospitalization.  He was on Pantoprazole 40mg BID x 3 months and then decreased to once a day.  He is on Coumadin for PAF and has CHF with a severe cardiomyopathy.  He is doing well: no heartburn, abdominal pain, or melena/blood in the stool.  He would like to try weaning down on the Pantoprazole.    Will decrease Pantoprazole to 20mg po daily.  He will contact us if reflux symptoms return.  Follow up in 6 months or sooner if needed.      History of Present Illness   HPI  Galen Carson is a 74 y.o. male who presents to the office for follow up.  He reports he is doing well.  He is on the Pantoprazole 40mg po daily.  No abdominal pain.  No heartburn. No melena or blood in the stool.  He denies NSAID use.  He quit smoking over 15 years ago. He does not drink alcohol.  He is on Coumadin.  He reports his last colonoscopy was 5 years ago and 2 benign polyps were removed.  He is not interested/declines a repeat colonoscopy given his other medical conditions.     History obtained from: patient      Medical History Reviewed by provider this encounter:     .  Past Medical History   Past Medical History:   Diagnosis Date    Allergic     Anesthesia     pt wears a life vest ().  should not be scheduled at [facility] until heart condition is stabilized    BPH (benign prostatic hypertrophy)     Cancer (HCC)     Chronic kidney  disease     Coronary artery disease     Diabetes mellitus (HCC)     Hyperlipidemia     Hypertension     Irregular heart beat     PAF    Prostate cancer (HCC)      Past Surgical History:   Procedure Laterality Date    A-V CARDIAC PACEMAKER INSERTION      CARDIAC CATHETERIZATION N/A 10/04/2023    Procedure: Cardiac Coronary Angiogram;  Surgeon: Chris Lovell MD;  Location: [facility] CARD[facility] C[facility];  Service: Cardiology    CARDIAC CATHETERIZATION N/A 10/04/2023    Procedure: Cardiac RHC/LHC;  Surgeon: Chris Lovell MD;  Location: [facility]ARD[facility];  Service: Cardiology    CARDIAC CATHETERIZATION  10/04/2023    Procedure: Cardiac catheterization;  Surgeon: Chris Lovell MD;  Location: [facility]ARD[facility];  Service: Cardiology    CARDIAC ELECTROPHYSIOLOGY PROCEDURE N/A 01/04/2024    Procedure: Cardiac icd implant, Dual Chambers ICD;  Surgeon: Leo Whalen MD;  Location:  CARDIAC CATH LAB;  Service: Cardiology    EAR SURGERY      HERNIA REPAIR      TX COLONOSCOPY FLX DX W/COLLJ SPEC WHEN PFRMD N/A 05/06/2019    Procedure: COLONOSCOPY;  Surgeon: Winston Nielson III, MD;  Location: [facility] GI LAB;  Service: Gastroenterology     Family History   Problem Relation Age of Onset    Stroke Father     No Known Problems Mother     Prostate cancer Brother     Arthritis Brother     No Known Problems Sister     Diabetes Sister     Diabetes Sister     No Known Problems Son     No Known Problems Daughter       reports that he quit smoking about 19 years ago. His smoking use included cigarettes, pipe, and cigars. He started smoking about 44 years ago. He has a 25 pack-year smoking history. He has been exposed to tobacco smoke. He has never used smokeless tobacco. He reports that he does not currently use alcohol. He reports that he does not currently use drugs.  Current Outpatient Medications on File Prior to Visit   Medication Sig Dispense Refill    Ascorbic Acid (vitamin C) 1000 MG tablet Take 1,000 mg by mouth daily       Blood Glucose Monitoring Suppl (ONETOUCH VERIO) w/Device KIT by Does not apply route once for 1 dose Test sugar in the morning 1 kit 0    cetirizine (ZyrTEC) 5 MG tablet Take 5 mg by mouth daily      Continuous Glucose Sensor (FreeStyle Lisa 3 Sensor) [facility] 1 each every 14 (fourteen) days 6 each 5    EVENING PRIMROSE OIL PO Take by mouth 3 (three) times a day      glimepiride (AMARYL) 1 mg tablet Take 1 tablet (1 mg total) by mouth daily with breakfast 90 tablet 1    metoprolol succinate (TOPROL-XL) 25 mg 24 hr tablet Take 1 tablet (25 mg total) by mouth daily 100 tablet 1    multivitamin (THERAGRAN) TABS Take 1 tablet by mouth daily      pantoprazole (PROTONIX) 40 mg tablet 1 po BID for one additional month (through 8/11) and then decrease to once a day. 120 tablet 2    rosuvastatin (CRESTOR) 10 MG tablet Take 1 tablet (10 mg total) by mouth daily 90 tablet 1    torsemide (DEMADEX) 20 mg tablet Take 3 tablets (60 mg total) by mouth daily 270 tablet 5    VITAMIN D, ERGOCALCIFEROL, PO Take by mouth      vitamin E, tocopherol, 400 units capsule Take 400 Units by mouth daily      warfarin (Coumadin) 5 mg tablet Take one tablet daily or as directed 90 tablet 3     No current facility-administered medications on file prior to visit.     Allergies   Allergen Reactions    Penicillin V Other (See Comments)     As a child       Current Outpatient Medications on File Prior to Visit   Medication Sig Dispense Refill    Ascorbic Acid (vitamin C) 1000 MG tablet Take 1,000 mg by mouth daily      Blood Glucose Monitoring Suppl (ONETOUCH VERIO) w/Device KIT by Does not apply route once for 1 dose Test sugar in the morning 1 kit 0    cetirizine (ZyrTEC) 5 MG tablet Take 5 mg by mouth daily      Continuous Glucose Sensor (FreeStyle Lisa 3 Sensor) [facility] [place] 1 each every 14 (fourteen) days 6 each 5    EVENING PRIMROSE OIL PO Take by mouth 3 (three) times a day      glimepiride (AMARYL) 1 mg tablet Take 1 tablet (1 mg total) by mouth  "daily with breakfast 90 tablet 1    metoprolol succinate (TOPROL-XL) 25 mg 24 hr tablet Take 1 tablet (25 mg total) by mouth daily 100 tablet 1    multivitamin (THERAGRAN) TABS Take 1 tablet by mouth daily      pantoprazole (PROTONIX) 40 mg tablet 1 po BID for one additional month (through ) and then decrease to once a day. 120 tablet 2    rosuvastatin (CRESTOR) 10 MG tablet Take 1 tablet (10 mg total) by mouth daily 90 tablet 1    torsemide (DEMADEX) 20 mg tablet Take 3 tablets (60 mg total) by mouth daily 270 tablet 5    VITAMIN D, ERGOCALCIFEROL, PO Take by mouth      vitamin E, tocopherol, 400 units capsule Take 400 Units by mouth daily      warfarin (Coumadin) 5 mg tablet Take one tablet daily or as directed 90 tablet 3     No current facility-administered medications on file prior to visit.      Social History     Tobacco Use    Smoking status: Former     Current packs/day: 0.00     Average packs/day: 1 pack/day for 25.0 years (25.0 ttl pk-yrs)     Types: Cigarettes, Pipe, Cigars     Start date: 1980     Quit date: 2005     Years since quittin.8     Passive exposure: Past    Smokeless tobacco: Never   Vaping Use    Vaping status: Never Used   Substance and Sexual Activity    Alcohol use: Not Currently    Drug use: Not Currently    Sexual activity: Not Currently     Partners: Female        Objective   /70 (Patient Position: Sitting, Cuff Size: Large)   Pulse 93   Temp (!) 97.1 °F (36.2 °C) (Temporal)   Resp 16   Ht 5' 10\" (1.778 m)   Wt 99.8 kg (220 lb)   SpO2 97%   BMI 31.57 kg/m²      Physical Exam  Constitutional:       General: He is not in acute distress.     Appearance: Normal appearance. He is not ill-appearing.   HENT:      Head: Normocephalic and atraumatic.   Cardiovascular:      Rate and Rhythm: Normal rate and regular rhythm.   Pulmonary:      Effort: Pulmonary effort is normal. No respiratory distress.      Breath sounds: Normal breath sounds.   Abdominal:      " General: Bowel sounds are normal.      Palpations: Abdomen is soft.      Tenderness: There is no abdominal tenderness.   Neurological:      Mental Status: He is alert and oriented to person, place, and time.   Psychiatric:         Mood and Affect: Mood normal.         Behavior: Behavior normal.

## 2025-01-13 NOTE — PROGRESS NOTES
UROLOGY FOLLOW-UP ENCOUNTER    Galen Carson is a 74 y.o. male with prostate cancer    Pertinent non-urologic PMH: DM (hemoglobin A1c 6.9 on 2/14/2024), HLD, HTN, CKD (creatinine 1.37, GFR 51 on 3/13/2024); a fib    Pertinent non-urologic PSH: Cardiac catheterization without stents, hernia repair, dual chamber ICD implant Jan 2024    Anticoagulation: ASA81, Warfarin    History of high risk prostate cancer treated with radiation in 2019 he completed 2-year course of ADT in February 2021    Pretreatment PSA was 22.4.  Morales PSA was 0.0.    Had PSA recurrence which prompted workup below    PSMA PET scan 9/25/2023: Heterogenous multifocal fairly intense tracer activity in the right greater than left prostate gland highly suspicious for locally recurrent intraprostatic PSMA positive malignancy; no focal tracer activity concerning for metastatic disease    Prostate MRI 10/13/2023: Tumor in anterior and posterior right peripheral zone apex and mid gland and right transition zone at apex corresponds to PSMA avid lesion; the lesion broadly abuts the capsule without visualized gross extraprostatic extension; no SVI/LAD/bony metastasis; prostate 29 g    Given the positive lesion seen on imaging, plan was for patient to undergo MRI fusion biopsy.  Unfortunately, the case was canceled by anesthesia due to the patient's cardiac arrhythmia requiring LifeVest.  Since he cannot undergo anesthesia, there is consideration for alternate therapy.    Patient had dual-chamber ICD implant 1/4/2024    PSA 8.48 on 1/24/2024    Saw Dr. Barreto of hematology oncology on 2/21/2024. Option for repeat local therapy versus hormonal therapy with LHRH antagonist along with closely watching PSA.    PSA 8.14 on 3/27/24     Patient declined OR for TP bx    Lupron q6 month on 5/1/24     TRUS Pbx 5/1/24: G 4+4 in 7 cores  Volume:  20 cm3     Admitted mid May 2024 with acute on chronic CHF, required diuretics, got ESDRAS. Cr was down to around 2.0 by  "discharge (prev Bcr was about 1.4 in March 2024).    PSA 0.687 on 7/24/24    Random bladder scan 58 cc on 7/24/24    Overall voiding well. Some LUTS but taking diuretics.    PSA 0.741 on 10/16/24    Firmagon 80 monthly started 10/22/24    PSA 1.159 on 1/8/25    Assessment and plan:     Prostate cancer    Patient with complex prostate cancer history as outlined above.  Initially had radiation in 2019, had recurrence of PSA.  Had prostate biopsy in May 2024 which confirmed Warrior 4+4 prostate cancer in 7 cores.    Has since been started on monthly Firmagon since October 2024.    Last PSA was 1.159 on 1/8/2025.    He is following with medical oncology at [facility] as well as radiation oncology at [facility].    He is scheduled to see Dr. Sandhu at [facility] in February 2025.  He was told to get PSA in early 2025, which she has, as well as PSMA scan prior to his visit with [facility] radiation oncology.    I therefore ordered him PSMA scan today.  He will get this done within the next few weeks.    He received his Firmagon today.  We will continue monthly Firmagon for now.    He will get his imaging done and he will follow-up for virtual visit with [facility] radiation oncology.  We will see if they want to pursue definitive treatment.    I will additionally plan on seeing him in about 4 months.    CKD, DM    Added for HCC purposes. Follows with PCP and nephrology.        PLAN  -Firmagon 80 today. Will continue monthly Firmagon.  -Appt with med onc Dr. Diaz on 4/17/25  -Following rad onc Dr. Sandhu at [facility]. He recommended starting ADT (initiated) and getting PSA and PSMA around early 2025. Appointment scheduled for 2/13/25.  -Cont primrose for hot flashes  -I will see him in around 4 months        Portions of the above record have been created with voice recognition software.  Occasional wrong word or \"sound alike\" substitution may have occurred due to the inherent limitations of voice recognition software.  Read the chart carefully and " recognize, using context, where substitution may have occurred.      Estevan Waite DO        Chief Complaint     Prostate cancer      History of Present Illness     See summary above    No fevers or chills          The following portions of the patient's history were reviewed and updated as appropriate: allergies, current medications, past family history, past medical history, past social history, past surgical history and problem list.        AUA SYMPTOM SCORE      Flowsheet Row Most Recent Value   AUA SYMPTOM SCORE    How often have you had a sensation of not emptying your bladder completely after you finished urinating? 0 (P)    How often have you had to urinate again less than two hours after you finished urinating? 1 (P)    How often have you found you stopped and started again several times when you urinate? 0 (P)    How often have you found it difficult to postpone urination? 1 (P)    How often have you had a weak urinary stream? 5 (P)    How often have you had to push or strain to begin urination? 1 (P)    How many times did you most typically get up to urinate from the time you went to bed at night until the time you got up in the morning? 1 (P)    Quality of Life: If you were to spend the rest of your life with your urinary condition just the way it is now, how would you feel about that? 1 (P)    AUA SYMPTOM SCORE 9 (P)               Review of Systems     , Chills, nausea, vomiting, shortness of breath, abdominal pain, painful urination, blood in urine      Allergies     Allergies   Allergen Reactions    Penicillin V Other (See Comments)     As a child        Physical Exam     No acute distress, normocephalic atraumatic, abdomen soft nontender, no CVA tenderness bilaterally, nonlabored breathing      Vital Signs  Vitals:    01/15/25 0958   BP: 122/76   BP Location: Left arm   Patient Position: Sitting   Cuff Size: Standard   Pulse: 104   Resp: 18   Temp: (!) 97.4 °F (36.3 °C)   TempSrc: Temporal  "  SpO2: 97%   Weight: 99.8 kg (220 lb)   Height: 5' 10\" (1.778 m)         Current Medications       Current Outpatient Medications:     Ascorbic Acid (vitamin C) 1000 MG tablet, Take 1,000 mg by mouth daily, Disp: , Rfl:     cetirizine (ZyrTEC) 5 MG tablet, Take 5 mg by mouth daily, Disp: , Rfl:     Continuous Glucose Sensor (FreeStyle Lisa 3 Sensor) MISC, Use 1 each every 14 (fourteen) days, Disp: 6 each, Rfl: 5    EVENING PRIMROSE OIL PO, Take by mouth 3 (three) times a day, Disp: , Rfl:     glimepiride (AMARYL) 1 mg tablet, Take 1 tablet (1 mg total) by mouth daily with breakfast, Disp: 90 tablet, Rfl: 1    metoprolol succinate (TOPROL-XL) 25 mg 24 hr tablet, Take 1 tablet (25 mg total) by mouth daily, Disp: 100 tablet, Rfl: 1    multivitamin (THERAGRAN) TABS, Take 1 tablet by mouth daily, Disp: , Rfl:     pantoprazole (PROTONIX) 20 mg tablet, Take 1 tablet (20 mg total) by mouth daily, Disp: 90 tablet, Rfl: 1    pantoprazole (PROTONIX) 40 mg tablet, 1 po BID for one additional month (through 8/11) and then decrease to once a day., Disp: 120 tablet, Rfl: 2    rosuvastatin (CRESTOR) 10 MG tablet, Take 1 tablet (10 mg total) by mouth daily, Disp: 90 tablet, Rfl: 1    torsemide (DEMADEX) 20 mg tablet, Take 3 tablets (60 mg total) by mouth daily, Disp: 270 tablet, Rfl: 5    VITAMIN D, ERGOCALCIFEROL, PO, Take by mouth, Disp: , Rfl:     vitamin E, tocopherol, 400 units capsule, Take 400 Units by mouth daily, Disp: , Rfl:     warfarin (Coumadin) 5 mg tablet, Take one tablet daily or as directed, Disp: 90 tablet, Rfl: 3    Blood Glucose Monitoring Suppl (ONETOUCH VERIO) w/Device KIT, by Does not apply route once for 1 dose Test sugar in the morning, Disp: 1 kit, Rfl: 0    Current Facility-Administered Medications:     degarelix acetate (FIRMAGON) injection 80 mg, 80 mg, Subcutaneous, Once,       Active Problems     Patient Active Problem List   Diagnosis    Mixed hyperlipidemia    IFG (impaired fasting glucose)    " Upper respiratory tract infection    Positive colorectal cancer screening using Cologuard test    Prostate cancer (HCC)    Encounter for monitoring androgen deprivation therapy    Hot flashes    Type 2 diabetes mellitus with hyperglycemia, without long-term current use of insulin (HCC)    Acute renal failure superimposed on stage 3a chronic kidney disease (HCC)    Essential hypertension    Dilated cardiomyopathy (HCC)    Paroxysmal atrial fibrillation (HCC)    Obesity    ICD (implantable cardioverter-defibrillator) in place    Acute on chronic systolic congestive heart failure (HCC)    Iron deficiency anemia due to chronic blood loss    On continuous oral anticoagulation    CKD (chronic kidney disease) stage 4, GFR 15-29 ml/min (HCC)    Type 2 diabetes mellitus with diabetic chronic kidney disease, unspecified CKD stage, unspecified whether long term insulin use (HCC)         Past Medical History     Past Medical History:   Diagnosis Date    Allergic     Anesthesia     pt wears a life vest (11/16).  should not be scheduled at [facility] until heart condition is stabilized    BPH (benign prostatic hypertrophy)     Cancer (HCC)     Chronic kidney disease     Coronary artery disease     Diabetes mellitus (HCC)     Hyperlipidemia     Hypertension     Irregular heart beat     PAF    Prostate cancer (HCC)          Surgical History     Past Surgical History:   Procedure Laterality Date    A-V CARDIAC PACEMAKER INSERTION      CARDIAC CATHETERIZATION N/A 10/04/2023    Procedure: Cardiac Coronary Angiogram;  Surgeon: Chris Lovell MD;  Location: [facility];  Service: Cardiology    CARDIAC CATHETERIZATION N/A 10/04/2023    Procedure: Cardiac RHC/LHC;  Surgeon: Chris Lovell MD;  Location: [facility];  Service: Cardiology    CARDIAC CATHETERIZATION  10/04/2023    Procedure: Cardiac catheterization;  Surgeon: Chris Lovell MD;  Location: [facility];  Service: Cardiology    CARDIAC  ELECTROPHYSIOLOGY PROCEDURE N/A 2024    Procedure: Cardiac icd implant, Dual Chambers ICD;  Surgeon: Leo Whalen MD;  Location: [facility] CARD[facility]AC C[facility];  Service: Cardiology    EAR SURGERY      HERNIA REPAIR      MN COLONOSCOPY FLX DX W/COLLJ SPEC WHEN PFRMD N/A 2019    Procedure: COLONOSCOPY;  Surgeon: Winston Nielson III, MD;  Location: [facility];  Service: Gastroenterology         Family History     Family History   Problem Relation Age of Onset    Stroke Father     No Known Problems Mother     Prostate cancer Brother     Arthritis Brother     No Known Problems Sister     Diabetes Sister     Diabetes Sister     No Known Problems Son     No Known Problems Daughter          Social History     Social History     Social History     Tobacco Use   Smoking Status Former    Current packs/day: 0.00    Average packs/day: 1 pack/day for 25.0 years (25.0 ttl pk-yrs)    Types: Cigarettes, Pipe, Cigars    Start date: 1980    Quit date: 2005    Years since quittin.8    Passive exposure: Past   Smokeless Tobacco Never         Pertinent Lab Values     Lab Results   Component Value Date    CREATININE 1.82 (H) 2025       Lab Results   Component Value Date    PSA 1.159 2025    PSA 0.741 10/16/2024    PSA 0.687 2024         Pertinent Imaging     The patient's images were reviewed by me personally and also in real time with them in the examination room using our PACS imaging system.      PSMA PET scan 2023: Heterogenous multifocal fairly intense tracer activity in the right greater than left prostate gland highly suspicious for locally recurrent intraprostatic PSMA positive malignancy; no focal tracer activity concerning for metastatic disease    Prostate MRI 10/13/2023: Tumor in anterior and posterior right peripheral zone apex and mid gland and right transition zone at apex corresponds to PSMA avid lesion; the lesion broadly abuts the capsule without visualized gross extraprostatic  "extension; no SVI/LAD/bony metastasis; prostate 29 g      Pertinent Pathology     TRUS Pbx 5/24/24: G 4+4 in 7 cores  Volume:  20 cm3   A. Prostate, \"Prostate, left lateral base,\" Core biopsy:  - Benign prostatic tissue, negative for carcinoma     B. Prostate, \"Prostate, left mid base,\" Core biopsy:  - Prostatic adenocarcinoma, Elder pattern 4 + 4, Elder score 8, Grade Group 4  - Largest focus of carcinoma measures 0.1 cm (5% of tissue core)  - Carcinoma involves 1 of 1 core biopsies  - No perineural invasion present     C. Prostate, \"Prostate, left lateral mid,\" Core biopsy:  - Benign prostatic tissue, negative for carcinoma     D. Prostate, \"Prostate, left medial mid,\" Core biopsy:  - Prostatic adenocarcinoma, Elder pattern 4 + 4, Wilson score 8, Grade Group 4  - Multifocal, largest focus of carcinoma measures 0.5 cm (30% of tissue core)  - Carcinoma involves 1 of 1 core biopsies  - No perineural invasion present     E. Prostate, \"Prostate, left lateral apex,\" Core biopsy:  - Benign prostatic tissue, negative for carcinoma     F. Prostate, \"Prostate, left medial apex,\" Core biopsy:  - Prostatic adenocarcinoma, Wilson pattern 4 + 4, Elder score 8, Grade Group 4  - Largest focus of carcinoma measures 0.1 cm (10% of tissue core)  - Carcinoma involves 1 of 1 core biopsies  - No perineural invasion present     G. Prostate, \"Prostate, right lateral base,\" Core biopsy:  - Benign prostatic tissue, negative for carcinoma     H. Prostate, \"Prostate, right medial base,\" Core biopsy:  - Benign prostatic tissue, negative for carcinoma     I. Prostate, \"Prostate, right lateral mid,\" Core biopsy:  - Prostatic adenocarcinoma, Elder pattern 4 + 4, Wilson score 8, Grade Group 4  - Largest focus of carcinoma measures 0.4 cm (30% of tissue core)  - Carcinoma involves 1 of 1 core biopsies  - No perineural invasion present     J. Prostate, \"Prostate, right medial mid,\" Core biopsy:  - Prostatic adenocarcinoma, Elder " "pattern 4 + 4, Elder score 8, Grade Group 4  - Largest focus of carcinoma measures 0.1 cm (5% of tissue core)  - Carcinoma involves 1 of 1 core biopsies  - No perineural invasion present     K. Prostate, \"Prostate, right lateral apex,\" Core biopsy:  - Prostatic adenocarcinoma, Elder pattern 4 + 4, Elder score 8, Grade Group 4  - Largest focus of carcinoma measures 1.2 cm  - Carcinoma involves 1 of 1 core biopsies  - No perineural invasion present     L. Prostate, \"Prostate, right medial apex,\" Core biopsy:  - Prostatic adenocarcinoma, Delevan pattern 4 + 4, Elder score 8, Grade Group 4  - Largest focus of carcinoma measures 0.3 cm (15% of tissue core)  - Carcinoma involves 1 of 1 core biopsies  - No perineural invasion present  - Large nerve present  - Benign prostatic tissue, negative for carcinoma      I have spent 30 minutes with Patient  today in which greater than 50% of this time was spent in counseling/coordination of care regarding Diagnostic results, Prognosis, Risks and benefits of tx options, Instructions for management, Patient and family education, Importance of tx compliance, Impressions, Counseling / Coordination of care, Documenting in the medical record, Reviewing / ordering tests, medicine, procedures  , and Obtaining or reviewing history  .    Please note this time includes cumulative time on the day of encounter, including reviewing medical records and/or coordinating care among the patient's other specialists.    "

## 2025-01-15 ENCOUNTER — OFFICE VISIT (OUTPATIENT)
Dept: UROLOGY | Facility: CLINIC | Age: 75
End: 2025-01-15
Payer: MEDICARE

## 2025-01-15 VITALS
RESPIRATION RATE: 18 BRPM | TEMPERATURE: 97.4 F | DIASTOLIC BLOOD PRESSURE: 76 MMHG | SYSTOLIC BLOOD PRESSURE: 122 MMHG | BODY MASS INDEX: 31.5 KG/M2 | OXYGEN SATURATION: 97 % | HEART RATE: 104 BPM | HEIGHT: 70 IN | WEIGHT: 220 LBS

## 2025-01-15 DIAGNOSIS — E11.65 TYPE 2 DIABETES MELLITUS WITH HYPERGLYCEMIA, WITHOUT LONG-TERM CURRENT USE OF INSULIN (HCC): ICD-10-CM

## 2025-01-15 DIAGNOSIS — E11.22 TYPE 2 DIABETES MELLITUS WITH DIABETIC CHRONIC KIDNEY DISEASE, UNSPECIFIED CKD STAGE, UNSPECIFIED WHETHER LONG TERM INSULIN USE (HCC): ICD-10-CM

## 2025-01-15 DIAGNOSIS — C61 PROSTATE CANCER (HCC): Primary | ICD-10-CM

## 2025-01-15 DIAGNOSIS — N18.4 CKD (CHRONIC KIDNEY DISEASE) STAGE 4, GFR 15-29 ML/MIN (HCC): ICD-10-CM

## 2025-01-15 PROCEDURE — 99214 OFFICE O/P EST MOD 30 MIN: CPT | Performed by: UROLOGY

## 2025-01-15 PROCEDURE — 96402 CHEMO HORMON ANTINEOPL SQ/IM: CPT

## 2025-01-20 DIAGNOSIS — I49.3 PVC'S (PREMATURE VENTRICULAR CONTRACTIONS): ICD-10-CM

## 2025-01-20 DIAGNOSIS — I48.0 PAROXYSMAL ATRIAL FIBRILLATION (HCC): ICD-10-CM

## 2025-01-20 DIAGNOSIS — I42.0 DILATED CARDIOMYOPATHY (HCC): ICD-10-CM

## 2025-01-21 RX ORDER — METOPROLOL SUCCINATE 25 MG/1
25 TABLET, EXTENDED RELEASE ORAL DAILY
Qty: 100 TABLET | Refills: 1 | Status: SHIPPED | OUTPATIENT
Start: 2025-01-21

## 2025-01-28 ENCOUNTER — HOSPITAL ENCOUNTER (OUTPATIENT)
Dept: RADIOLOGY | Age: 75
Discharge: HOME | End: 2025-01-28
Payer: MEDICARE

## 2025-01-28 DIAGNOSIS — C61 PROSTATE CANCER (HCC): ICD-10-CM

## 2025-01-28 DIAGNOSIS — R97.20 ELEVATED PROSTATE SPECIFIC ANTIGEN (PSA): ICD-10-CM

## 2025-01-28 PROCEDURE — A9595 HB PIFLUFOLASTAT F-18, DIAGNOSTIC, 1 MILLICURIE: HCPCS

## 2025-01-28 PROCEDURE — 78815 PET IMAGE W/CT SKULL-THIGH: CPT

## 2025-01-28 NOTE — LETTER
[facility]  [address] [place] [address]      February 5, 2025    MRN: [id number]     Phone: [phone]     Dear Mr. Bajwawer,    Sotero recently had a(n) Nuclear Medicine performed on 1/28/2025 at  [facility] that was requested by Estevan Waite DO. The study was reviewed by a radiologist, which is a physician who specializes in medical imaging. The radiologist issued a report describing his or her findings. In that report there was a finding that the radiologist felt warranted further discussion with your health care provider and that discussion would be beneficial to you.      The results were sent to Estevan Waite DO on 01/29/2025  7:12 AM. We recommend that you contact Estevan Waite DO at [phone] or set up an appointment to discuss the results of the imaging test. If you have already heard from Estevan Waite DO regarding the results of your study, you can disregard this letter.     This letter is not meant to alarm you, but intended to encourage you to follow-up on your results with the provider that sent you for the imaging study. In addition, we have enclosed answers to frequently asked questions by other patients who have also received a letter to review results with their health care provider (see page two).      Thank you for choosing [facility] for your medical imaging needs.                                                                                                                                                        FREQUENTLY ASKED QUESTIONS    Why am I receiving this letter?  [place] State Law requires us to notify patients who have findings on imaging exams that may require more testing or follow-up with a health professional within the next 3 months.        How serious is the finding on the imaging test?  This letter is sent to all patients who may need follow-up or more  testing within the next 3 months.  Receiving this letter does not necessarily mean you have a life-threatening imaging finding or disease.  Recommendations in the radiologist’s imaging report are general in nature and it is up to your healthcare provider to say whether those recommendations make sense for your situation.  You are strongly encouraged to talk to your health care provider about the results and ask whether additional steps need to be taken.    Where can I get a copy of the final report for my recent radiology exam?  To get a full copy of the report you can access your records online at [email or web address] or please contact [facility] Medical Records Department at [phone] Monday through Friday between 8 am and 6 pm.         What do I need to do now?           Please contact your health care provider who requested the imaging study to discuss what further actions (if any) are needed.  You may have already heard from (your ordering provider) in regard to this test in which case you can disregard this letter.        NOTICE IN ACCORDANCE WITH THE [place] STATE “PATIENT TEST RESULT INFORMATION ACT OF 2018”    You are receiving this notice as a result of a determination by your diagnostic imaging service that further discussions of your test results are warranted and would be beneficial to you.    The complete results of your test or tests have been or will be sent to the health care practitioner that ordered the test or tests. It is recommended that you contact your health care practitioner to discuss your results as soon as possible.

## 2025-02-05 ENCOUNTER — REMOTE DEVICE CLINIC VISIT (OUTPATIENT)
Dept: CARDIOLOGY CLINIC | Facility: CLINIC | Age: 75
End: 2025-02-05
Payer: MEDICARE

## 2025-02-05 ENCOUNTER — RESULTS FOLLOW-UP (OUTPATIENT)
Dept: CARDIOLOGY CLINIC | Facility: CLINIC | Age: 75
End: 2025-02-05

## 2025-02-05 ENCOUNTER — APPOINTMENT (OUTPATIENT)
Dept: LAB | Facility: CLINIC | Age: 75
End: 2025-02-05
Payer: MEDICARE

## 2025-02-05 DIAGNOSIS — I48.0 PAROXYSMAL ATRIAL FIBRILLATION (HCC): ICD-10-CM

## 2025-02-05 DIAGNOSIS — Z95.810 PRESENCE OF AUTOMATIC CARDIOVERTER/DEFIBRILLATOR (AICD): Primary | ICD-10-CM

## 2025-02-05 DIAGNOSIS — Z79.01 LONG TERM (CURRENT) USE OF ANTICOAGULANTS: ICD-10-CM

## 2025-02-05 LAB
INR PPP: 2.63 (ref 0.85–1.19)
PROTHROMBIN TIME: 27.9 SECONDS (ref 12.3–15)

## 2025-02-05 PROCEDURE — 93296 REM INTERROG EVL PM/IDS: CPT | Performed by: INTERNAL MEDICINE

## 2025-02-05 PROCEDURE — 85610 PROTHROMBIN TIME: CPT

## 2025-02-05 PROCEDURE — 93295 DEV INTERROG REMOTE 1/2/MLT: CPT | Performed by: INTERNAL MEDICINE

## 2025-02-05 PROCEDURE — 36415 COLL VENOUS BLD VENIPUNCTURE: CPT

## 2025-02-05 NOTE — PROGRESS NOTES
Results for orders placed or performed in visit on 02/05/25   Cardiac EP device report    Narrative    MDT DC ICD/ACTIVE SYSTEM IS MRI CONDITIONAL  CARELINK TRANSMISSION: BATTERY VOLTAGE ADEQUATE. (11.5 YRS) AP 31%  4%. ALL AVAILABLE LEAD PARAMETERS WITHIN NORMAL LIMITS. 1 NSVT EPISODE DETECTED 8 BEATS @ 300ms. OPTI-VOL WITHIN NORMAL LIMITS. NORMAL DEVICE FUNCTION.---STONER

## 2025-02-06 ENCOUNTER — ANTICOAG VISIT (OUTPATIENT)
Dept: CARDIOLOGY CLINIC | Facility: CLINIC | Age: 75
End: 2025-02-06

## 2025-02-06 ENCOUNTER — TELEPHONE (OUTPATIENT)
Dept: UROLOGY | Facility: CLINIC | Age: 75
End: 2025-02-06

## 2025-02-06 NOTE — TELEPHONE ENCOUNTER
This nurse spoke with patient to reschedule Firmagon injection from 2/17/25 to 2/18/25 9 am. Patient is agreeable with the reschedule.

## 2025-02-10 DIAGNOSIS — E78.2 MIXED HYPERLIPIDEMIA: ICD-10-CM

## 2025-02-11 RX ORDER — ROSUVASTATIN CALCIUM 10 MG/1
10 TABLET, COATED ORAL DAILY
Qty: 90 TABLET | Refills: 1 | Status: SHIPPED | OUTPATIENT
Start: 2025-02-11

## 2025-02-18 ENCOUNTER — PROCEDURE VISIT (OUTPATIENT)
Dept: UROLOGY | Facility: CLINIC | Age: 75
End: 2025-02-18
Payer: MEDICARE

## 2025-02-18 VITALS
TEMPERATURE: 97.8 F | WEIGHT: 222 LBS | OXYGEN SATURATION: 98 % | HEIGHT: 70 IN | BODY MASS INDEX: 31.78 KG/M2 | SYSTOLIC BLOOD PRESSURE: 122 MMHG | DIASTOLIC BLOOD PRESSURE: 72 MMHG | HEART RATE: 96 BPM

## 2025-02-18 DIAGNOSIS — C61 PROSTATE CANCER (HCC): Primary | ICD-10-CM

## 2025-02-18 PROCEDURE — 96402 CHEMO HORMON ANTINEOPL SQ/IM: CPT

## 2025-02-18 NOTE — PROGRESS NOTES
"2/18/2025  Galen Carson is a 74 y.o. male  [id number]    Diagnosis:  Chief Complaint    Injections         Patient presents for monthly Firmagon 80 mg injection managed by Dr. Waite    Plan:  Patient to return for monthly Firmagon injection as scheduled.       Medication administration:    Site prepped with alcohol. Medication administered per 's guidelines. Injection given into Right abd region. Band-aid applied to site, patient tolerated well.     Administrations This Visit       degarelix acetate (FIRMAGON) injection 80 mg       Admin Date  02/18/2025 Action  Given Dose  80 mg Route  Subcutaneous Documented By  Huma Fernandez LPN                    Vitals:    02/18/25 0925   BP: 122/72   Patient Position: Sitting   Cuff Size: Standard   Pulse: 96   Temp: 97.8 °F (36.6 °C)   TempSrc: Temporal   SpO2: 98%   Weight: 101 kg (222 lb)   Height: 5' 10\" (1.778 m)         Huma Fernandez LPN    "

## 2025-02-20 ENCOUNTER — TELEPHONE (OUTPATIENT)
Age: 75
End: 2025-02-20

## 2025-02-20 NOTE — TELEPHONE ENCOUNTER
Called patient in regards to rescheduling an appointment, appointment rescheduled during phone call. Verified new appointment date and time and location. Patient thanked me for the call.

## 2025-02-25 ENCOUNTER — TELEPHONE (OUTPATIENT)
Age: 75
End: 2025-02-25

## 2025-02-25 NOTE — TELEPHONE ENCOUNTER
Dr Sandhu from Radiation Oncology at [facility] calling to speak with Dr Waite regarding patient.     Dr Sandhu is requesting a call back to discuss further. Please review.    cb- [phone]

## 2025-02-25 NOTE — TELEPHONE ENCOUNTER
I spoke to Dr. Sandhu of radiation oncology at [facility] regarding the patient's care.  Patient sought outside opinion at [facility] regarding possible salvage treatment.    Of note, patient has been on ADT, monthly Firmagon.  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  Patient is therefore demonstrated progression of disease despite being on ADT.    Dr. Sandhu discussed with the patient the possibility of him going on some sort of androgen directed therapy in addition to his Lupron, such as abiraterone or enzalutamide.    However, he did also discuss a potential plan with the patient, should he not be able to tolerate additional medications from a cardiac standpoint.  This plan would include local small doses of radiation to the metastases at the right hilar node as well as the common iliac node, as well as external radiation to the prostate itself.    Dr. Sandhu and I discussed the above plan.  I explained the patient was scheduled to see medical oncology Dr. Diaz in April 2025.  We agreed that if the patient and Dr. Diaz agreed on starting an androgen directed therapy in addition to the ADT, we could monitor the patient's PSA and response to this change in treatment before considering any additional radiation.  However, if the patient was deemed unsafe for these medications from a cardiac standpoint, or if he had possible continued progression, Dr. Sandhu and the [facility] radiation oncology team could be reconsulted for consideration of therapy.

## 2025-03-05 ENCOUNTER — APPOINTMENT (OUTPATIENT)
Dept: LAB | Facility: CLINIC | Age: 75
End: 2025-03-05
Payer: MEDICARE

## 2025-03-05 DIAGNOSIS — I48.0 PAROXYSMAL ATRIAL FIBRILLATION (HCC): ICD-10-CM

## 2025-03-05 DIAGNOSIS — Z79.01 LONG TERM (CURRENT) USE OF ANTICOAGULANTS: ICD-10-CM

## 2025-03-05 LAB
INR PPP: 2.38 (ref 0.85–1.19)
PROTHROMBIN TIME: 25.9 SECONDS (ref 12.3–15)

## 2025-03-05 PROCEDURE — 36415 COLL VENOUS BLD VENIPUNCTURE: CPT

## 2025-03-05 PROCEDURE — 85610 PROTHROMBIN TIME: CPT

## 2025-03-06 ENCOUNTER — ANTICOAG VISIT (OUTPATIENT)
Dept: CARDIOLOGY CLINIC | Facility: CLINIC | Age: 75
End: 2025-03-06

## 2025-03-17 ENCOUNTER — HOSPITAL ENCOUNTER (OUTPATIENT)
Dept: RADIOLOGY | Facility: MEDICAL CENTER | Age: 75
Discharge: HOME | End: 2025-03-17
Payer: MEDICARE

## 2025-03-17 DIAGNOSIS — Z13.9 SCREENING DUE: ICD-10-CM

## 2025-03-17 PROCEDURE — 77080 DXA BONE DENSITY AXIAL: CPT

## 2025-03-18 ENCOUNTER — PROCEDURE VISIT (OUTPATIENT)
Dept: UROLOGY | Facility: CLINIC | Age: 75
End: 2025-03-18
Payer: MEDICARE

## 2025-03-18 VITALS
SYSTOLIC BLOOD PRESSURE: 142 MMHG | HEART RATE: 52 BPM | OXYGEN SATURATION: 98 % | DIASTOLIC BLOOD PRESSURE: 78 MMHG | HEIGHT: 70 IN | BODY MASS INDEX: 32.5 KG/M2 | WEIGHT: 227 LBS | TEMPERATURE: 97.8 F

## 2025-03-18 DIAGNOSIS — C61 PROSTATE CANCER (HCC): Primary | ICD-10-CM

## 2025-03-18 PROCEDURE — 96402 CHEMO HORMON ANTINEOPL SQ/IM: CPT

## 2025-03-18 RX ORDER — APALUTAMIDE 60 MG/1
4 TABLET, FILM COATED ORAL DAILY
COMMUNITY
Start: 2025-03-07

## 2025-03-18 NOTE — PROGRESS NOTES
"3/18/2025  Galen Carson is a 74 y.o. male  [id number]    Diagnosis:  Chief Complaint    Injections         Patient presents for monthly Firmagon 80 mg injection managed by Dr. Waite    Plan:  Patient to return for monthly Firmagon injection as scheduled.       Medication administration:    Site prepped with alcohol. Medication administered per 's guidelines. Injection given into Left abd region. Band-aid applied to site, patient tolerated well.     Administrations This Visit       degarelix acetate (FIRMAGON) injection 80 mg       Admin Date  03/18/2025 Action  Given Dose  80 mg Route  Subcutaneous Documented By  Huma Fernandez LPN                    Vitals:    03/18/25 0927   BP: 142/78   Patient Position: Sitting   Cuff Size: Standard   Pulse: (!) 52   Temp: 97.8 °F (36.6 °C)   TempSrc: Temporal   SpO2: 98%   Weight: 103 kg (227 lb)   Height: 5' 10\" (1.778 m)         Huma Fernandez LPN    "

## 2025-03-21 ENCOUNTER — TELEPHONE (OUTPATIENT)
Dept: CARDIOLOGY CLINIC | Facility: CLINIC | Age: 75
End: 2025-03-21

## 2025-03-21 DIAGNOSIS — E11.65 TYPE 2 DIABETES MELLITUS WITH HYPERGLYCEMIA, WITHOUT LONG-TERM CURRENT USE OF INSULIN (HCC): ICD-10-CM

## 2025-03-21 RX ORDER — GLIMEPIRIDE 1 MG/1
1 TABLET ORAL
Qty: 90 TABLET | Refills: 1 | Status: SHIPPED | OUTPATIENT
Start: 2025-03-21 | End: 2025-09-17

## 2025-03-21 NOTE — TELEPHONE ENCOUNTER
----- Message from Criss MOISE sent at 3/21/2025  2:38 PM EDT -----  Regarding: alert for optivol cross  Alert for optivol cross & ongoing. (+) torsemide with recheck 31 days. Thank you.       NON-BILLABLE CARELINK TRANSMISSION/ALERT: OPTI-VOL FLUID THRESHOLD CROSSED & ONGOING. PATIENT TAKING TORSEMIDE. EF 31% (8/21/24 ECHO). TASK TO HF CLINIC TEAM. RECHECK 31 DAYS. BATTERY VOLTAGE ADEQUATE (11.4 YR). AP 29.3%  6.5%. 2 VT EPISODES WITH MOST RECENT EGM SHOWING PROBABLE SVT, 7 SECS @  BPM. OTHER EPISODE #16 FOR NSVT, 7 BEATS @  BPM. NO THERAPY. PATIENT ALSO TAKING WARFARIN, METOPROLOL SUCC. NORMAL DEVICE FUNCTION.  ES

## 2025-03-21 NOTE — TELEPHONE ENCOUNTER
"Spoke with patient.  He states having had a 5 lb weight increase this week but he did not take his Torsemide 60mg daily last Saturday or Sunday due to a car ride to [place].  Admits to YOLANDA, bilateral, L>R, minimal, subsides by morning.  Discussed sodium intake and states he could \"do better.\"  He has continued SOB which frustrates him as he has been complaining of this for quite some time.    He is on new oral chemo medication - Erleada- for about one month.      Scheduled for cardiac clearance 3/31/25 in [facility] [place] office for upcoming eye surgery.  "

## 2025-03-24 DIAGNOSIS — I42.0 DILATED CARDIOMYOPATHY (HCC): Primary | ICD-10-CM

## 2025-03-24 NOTE — TELEPHONE ENCOUNTER
Spoke with patient and his weight, since Friday, has come back to baseline. He lost the 5 lb he had gained, no YOLANDA.      He will not be taking the additional torsemide at this time.    He still c/o SOB and fatigue which are ongoing symptoms for patient for about a year.  Is there any cardiac testing to be ordered prior to his 3/31/25 cardiac clearance appointment?

## 2025-03-24 NOTE — TELEPHONE ENCOUNTER
He should obtain the ordered labs at the end of this week. Need for further testing will be determined at his upcoming cardiology appointment.

## 2025-03-27 ENCOUNTER — APPOINTMENT (OUTPATIENT)
Dept: LAB | Facility: CLINIC | Age: 75
End: 2025-03-27
Payer: MEDICARE

## 2025-03-27 DIAGNOSIS — E11.65 TYPE 2 DIABETES MELLITUS WITH HYPERGLYCEMIA, WITHOUT LONG-TERM CURRENT USE OF INSULIN (HCC): ICD-10-CM

## 2025-03-27 DIAGNOSIS — I42.0 DILATED CARDIOMYOPATHY (HCC): ICD-10-CM

## 2025-03-27 LAB
ANION GAP SERPL CALCULATED.3IONS-SCNC: 14 MMOL/L (ref 4–13)
BACTERIA UR QL AUTO: NORMAL /HPF
BILIRUB UR QL STRIP: NEGATIVE
BNP SERPL-MCNC: 1079 PG/ML (ref 0–100)
BUN SERPL-MCNC: 46 MG/DL (ref 5–25)
CALCIUM SERPL-MCNC: 9.6 MG/DL (ref 8.4–10.2)
CHLORIDE SERPL-SCNC: 102 MMOL/L (ref 96–108)
CLARITY UR: CLEAR
CO2 SERPL-SCNC: 26 MMOL/L (ref 21–32)
COLOR UR: ABNORMAL
CREAT SERPL-MCNC: 1.64 MG/DL (ref 0.6–1.3)
CREAT UR-MCNC: 76.2 MG/DL
GFR SERPL CREATININE-BSD FRML MDRD: 40 ML/MIN/1.73SQ M
GLUCOSE P FAST SERPL-MCNC: 143 MG/DL (ref 65–99)
GLUCOSE UR STRIP-MCNC: NEGATIVE MG/DL
HGB UR QL STRIP.AUTO: NEGATIVE
KETONES UR STRIP-MCNC: NEGATIVE MG/DL
LEUKOCYTE ESTERASE UR QL STRIP: NEGATIVE
MICROALBUMIN UR-MCNC: 116.5 MG/L
MICROALBUMIN/CREAT 24H UR: 153 MG/G CREATININE (ref 0–30)
NITRITE UR QL STRIP: NEGATIVE
NON-SQ EPI CELLS URNS QL MICRO: NORMAL /HPF
PH UR STRIP.AUTO: 6.5 [PH]
POTASSIUM SERPL-SCNC: 4.8 MMOL/L (ref 3.5–5.3)
PROT UR STRIP-MCNC: ABNORMAL MG/DL
RBC #/AREA URNS AUTO: NORMAL /HPF
SODIUM SERPL-SCNC: 142 MMOL/L (ref 135–147)
SP GR UR STRIP.AUTO: 1.02 (ref 1–1.03)
UROBILINOGEN UR STRIP-ACNC: <2 MG/DL
WBC #/AREA URNS AUTO: NORMAL /HPF

## 2025-03-27 PROCEDURE — 36415 COLL VENOUS BLD VENIPUNCTURE: CPT

## 2025-03-27 PROCEDURE — 80048 BASIC METABOLIC PNL TOTAL CA: CPT

## 2025-03-27 PROCEDURE — 82570 ASSAY OF URINE CREATININE: CPT

## 2025-03-27 PROCEDURE — 81001 URINALYSIS AUTO W/SCOPE: CPT

## 2025-03-27 PROCEDURE — 82043 UR ALBUMIN QUANTITATIVE: CPT

## 2025-03-27 PROCEDURE — 83880 ASSAY OF NATRIURETIC PEPTIDE: CPT

## 2025-03-31 ENCOUNTER — TELEPHONE (OUTPATIENT)
Dept: CARDIOLOGY CLINIC | Facility: CLINIC | Age: 75
End: 2025-03-31

## 2025-03-31 ENCOUNTER — OFFICE VISIT (OUTPATIENT)
Dept: CARDIOLOGY CLINIC | Facility: CLINIC | Age: 75
End: 2025-03-31
Payer: MEDICARE

## 2025-03-31 VITALS
SYSTOLIC BLOOD PRESSURE: 136 MMHG | WEIGHT: 228 LBS | HEIGHT: 70 IN | HEART RATE: 101 BPM | DIASTOLIC BLOOD PRESSURE: 80 MMHG | RESPIRATION RATE: 16 BRPM | BODY MASS INDEX: 32.64 KG/M2 | OXYGEN SATURATION: 92 %

## 2025-03-31 DIAGNOSIS — I20.89 ANGINAL EQUIVALENT (HCC): ICD-10-CM

## 2025-03-31 DIAGNOSIS — I47.19 JUNCTIONAL TACHYCARDIA (HCC): ICD-10-CM

## 2025-03-31 DIAGNOSIS — I47.10 PSVT (PAROXYSMAL SUPRAVENTRICULAR TACHYCARDIA) (HCC): ICD-10-CM

## 2025-03-31 DIAGNOSIS — I25.10 CORONARY ARTERY DISEASE INVOLVING NATIVE CORONARY ARTERY OF NATIVE HEART WITHOUT ANGINA PECTORIS: ICD-10-CM

## 2025-03-31 DIAGNOSIS — I49.3 PVC'S (PREMATURE VENTRICULAR CONTRACTIONS): ICD-10-CM

## 2025-03-31 DIAGNOSIS — I50.23 ACUTE ON CHRONIC HFREF (HEART FAILURE WITH REDUCED EJECTION FRACTION) (HCC): ICD-10-CM

## 2025-03-31 DIAGNOSIS — I10 ESSENTIAL HYPERTENSION: ICD-10-CM

## 2025-03-31 DIAGNOSIS — E78.2 MIXED HYPERLIPIDEMIA: ICD-10-CM

## 2025-03-31 DIAGNOSIS — Z01.810 PREOPERATIVE CARDIOVASCULAR EXAMINATION: Primary | ICD-10-CM

## 2025-03-31 DIAGNOSIS — N18.32 CKD STAGE 3B, GFR 30-44 ML/MIN (HCC): ICD-10-CM

## 2025-03-31 DIAGNOSIS — I47.29 NSVT (NONSUSTAINED VENTRICULAR TACHYCARDIA) (HCC): ICD-10-CM

## 2025-03-31 DIAGNOSIS — I42.0 DILATED CARDIOMYOPATHY (HCC): ICD-10-CM

## 2025-03-31 DIAGNOSIS — I48.0 PAROXYSMAL ATRIAL FIBRILLATION (HCC): ICD-10-CM

## 2025-03-31 DIAGNOSIS — I27.20 MILD PULMONARY HYPERTENSION (HCC): ICD-10-CM

## 2025-03-31 PROCEDURE — 99214 OFFICE O/P EST MOD 30 MIN: CPT

## 2025-03-31 PROCEDURE — 93000 ELECTROCARDIOGRAM COMPLETE: CPT

## 2025-03-31 RX ORDER — METOPROLOL SUCCINATE 25 MG/1
37.5 TABLET, EXTENDED RELEASE ORAL DAILY
Start: 2025-03-31 | End: 2025-04-11 | Stop reason: SDUPTHER

## 2025-03-31 NOTE — PATIENT INSTRUCTIONS
-Echocardiogram  -Stress test: No caffeine/ coffee/ decaf the morning of  -Increase torsemide to 4 tablets daily  -Labs in one week   -Increase Toprol to 37.5mg daily (1.5 tablets)

## 2025-03-31 NOTE — TELEPHONE ENCOUNTER
[facility] eye [place] form  medical clearance need       Paper work scanned in system

## 2025-03-31 NOTE — ASSESSMENT & PLAN NOTE
NICM s/p MDT DC ICD (1/2024)-EF 31%  GDMT: Uptitrate Toprol to 37.5 mg daily.  Initiation of RAAS and MRA limited by tendency for hyperkalemia.  Recommend reassessment of GDMT once euvolemic.  Continue to follow with the device clinic.

## 2025-03-31 NOTE — PROGRESS NOTES
CARDIOLOGY OFFICE VISIT  [facility]  [address] [address]  Tel: [phone]      NAME: Galen Carson  AGE: 74 y.o.  SEX: male  : 1950  MRN: [id number]      Chief Complaint:  Chief Complaint   Patient presents with    Pre-op Clearance       Assessment and Plan:    Assessment & Plan  Preoperative cardiovascular examination  He is planned to undergo cardiac surgery in May.  His DASI score is 10.7, with calculated ability to achieve 4.06 METS.   Given his new onset CALDERON and chest discomfort, echocardiogram and pharmacologic MPI ordered for further evaluation. Sensitivity, specificity, and limitations of stress testing was discussed in detail.  He reports he would not be able to tolerate walking on the treadmill due to foot pain.  He was advised to continue on his current cardiac medications and to avoid caffeine/coffee/decaf the morning of his stress test.  He appears hypervolemic on exam.  OptiVol crossed.  BNP 1079.  Uptitrate torsemide to 80 mg daily.  BMP ordered to be completed in 1 week.  Message sent to device clinic to interrogate ICD in 1 week to reassess OptiVol.  EKG today demonstrates undetermined underlying rhythm, and he appears to be having PVCs in a pattern of bigeminy.  Recent device interrogations have demonstrated episodes of NSVT, as well as probable SVT.  Uptitrate Toprol to 37.5 mg daily, potential side effects reviewed.  Message sent to repeat device interrogation in 1 week to assess for underlying rhythm, recurrence of A-fib, NSVT, and SVT.  Will hold off on providing formal clearance pending echocardiogram and stress test results, and pending euvolemia.  Recommend continuing cardiac medications in the perioperative setting.      Dilated cardiomyopathy (HCC)  NICM s/p MDT DC ICD (2024)-EF 31%  GDMT: Uptitrate Toprol to 37.5 mg daily.  Initiation of RAAS and MRA limited by tendency for  hyperkalemia.  Recommend reassessment of GDMT once euvolemic.  Continue to follow with the device clinic.    Acute on chronic HFrEF (heart failure with reduced ejection fraction) ([facility])  Wt Readings from Last 3 Encounters:   03/31/25 103 kg (228 lb)   03/18/25 103 kg (227 lb)   02/18/25 101 kg (222 lb)   Appears hypervolemic on exam  Diuretic: Uptitrate torsemide to 80 mg daily.  GDMT: Per above  Recommend low-sodium diet and daily weights.  Patient was advised to call our office for weight gain, lower extremity edema and to proceed to the ER for dyspnea.  He was advised to call our office if he notes low blood pressures, lightheadedness/dizziness, or significant weight loss.   Recommend reassessment of diuretic regimen at next cardiology appointment.    Paroxysmal atrial fibrillation ([facility])  Underlying rhythm undetermined on EKG.  Message sent to device clinic to reinterrogate device to assess for recurrence of atrial fibrillation.  Rate control: Uptitrate Toprol to 37.5 mg daily per below.  Continue anticoagulation with warfarin.  Target INR 2-3.  [id number]. Risks, benefits, and alternative of anticoagulation to prevent thromboembolic risk from atrial fibrillation discussed at length. Patient advised to report any bleeding issues.     Anginal equivalent ([facility])  He endorses dyspnea on exertion and chest discomfort.  Plan for evaluation with echocardiogram and pharmacologic MPI as noted above    Coronary artery disease involving native coronary artery of native heart without angina pectoris  Mild nonobstructive CAD  Continue Crestor.  Will hold off on initiation of aspirin in the setting of increased bleeding risk on warfarin.  Continue Toprol.    NSVT (nonsustained ventricular tachycardia) ([facility])  Recent device interrogations have demonstrated episodes of NSVT.  Uptitrate Toprol to 37.5 mg daily.  Potential side effects reviewed.  Message sent to repeat device interrogation 1 week.    PVC's (premature ventricular  contractions)  He appears to have PVCs in a pattern of bigeminy on EKG today.  Uptitrate Toprol to 37.5 mg daily.  BMP & magnesium levels ordered    Junctional tachycardia (HCC)  Uptitrate Toprol    PSVT (paroxysmal supraventricular tachycardia) ([facility])  Recent device interrogation demonstrated episode of suspected SVT.  Uptitrate Toprol to 37.5 mg daily.  Message sent to repeat device interrogation in 1 week.    Mild pulmonary hypertension (HCC)  Continue torsemide per above  Echocardiogram ordered    Essential hypertension  Uptitrate Toprol and torsemide per above.  Ambulatory BP monitoring and low-sodium diet advised.  He was advised to notify our office of abnormal ambulatory BPs.    Mixed hyperlipidemia  Continue Crestor    CKD stage 3b, GFR 30-44 ml/min ([facility])  Lab Results   Component Value Date    EGFR 40 03/27/2025    EGFR 36 01/08/2025    EGFR 36 12/11/2024    CREATININE 1.64 (H) 03/27/2025    CREATININE 1.82 (H) 01/08/2025    CREATININE 1.82 (H) 12/11/2024   Continue to follow with nephrology           Follow-up: 2 weeks or sooner as needed.   All questions and concerns addressed.   Patient was advised to notify our office with onset of new or worsening cardiac symptoms.    1. Preoperative cardiovascular examination  POCT ECG      2. Dilated cardiomyopathy ([facility])  metoprolol succinate (TOPROL-XL) 25 mg 24 hr tablet      3. Acute on chronic HFrEF (heart failure with reduced ejection fraction) ([facility])  Basic metabolic panel      4. Paroxysmal atrial fibrillation ([facility])  metoprolol succinate (TOPROL-XL) 25 mg 24 hr tablet    CBC and Platelet      5. Anginal equivalent ([facility])  Echo complete w/ contrast if indicated      6. Coronary artery disease involving native coronary artery of native heart without angina pectoris  Echo complete w/ contrast if indicated    NM myocardial perfusion spect (rx stress and/or rest)      7. Junctional tachycardia ([facility])        8. PSVT (paroxysmal supraventricular tachycardia) ([facility])   Magnesium      9. NSVT (nonsustained ventricular tachycardia) ([facility])  Magnesium      10. PVC's (premature ventricular contractions)  metoprolol succinate (TOPROL-XL) 25 mg 24 hr tablet    Magnesium      11. Mild pulmonary hypertension ([facility])        12. Essential hypertension        13. Mixed hyperlipidemia        14. CKD stage 3b, GFR 30-44 ml/min ([facility])            History of Present Illness:     Galen Carson is a 74 y.o. male with PMHx of chronic HFrEF, nonischemic cardiomyopathy s/p MDT DC ICD (1/2024), paroxysmal atrial fibrillation, junctional tachycardia, NSVT, PSVT, PVCs, history of GI bleed, duodenal ulcers, GERD, CKD 3B (follows with nephrology), nonobstructive CAD, mild pulmonary hypertension, hypertension, hyperlipidemia, DM2, history of prostate cancer (follows with heme-onc) who presents for preoperative cardiovascular assessment prior to cataract surgery.  This patient is known to Dr. Lovell.    His cataract surgery is scheduled for May. He reports he has been experiencing CALDERON, which is new over the past several months. He reports he can walk down a flight of steps before experiencing dyspnea. He reports it has been quite bothersome for him lately. He notes he has difficulty sleeping at night, but is unsure if it is related to SOB or if something else is waking him up. Sometimes he sleeps better in the recliner but not consistently. He notes chest discomfort when waking up in the am and intermittently throughout the day, but denies exertional chest pain. He reports the chest heaviness first started about 6 months ago. He feels his symptoms are progressively worsening. He denies palpitations. He had one episode of lightheadedness about one month ago without recurrence, and denies pre-syncope or syncope. He notes some LE edema. He notes his home weight fluctuates quite a bit.     They report compliance with their home cardiac medications and deny side effects. They deny hematuria, hematochezia, melena,  "significant bruising/bleeding.    Recent labs and cardiac testing reviewed.   He reports his BP machine told him he was in A Fib yesterday.     Social History: He endorses remote history of smoking.     Recent Cardiac Work-Up:  Echocardiogram 8/2024: EF 31%, mild MR  Echocardiogram 5/2024: EF 20%, severe global hypokinesis, mild MAC  Cardiac catheterization 10/2023: Nonobstructive CAD, mild pulmonary hypertension      Review of Systems:   Review of Systems   Constitutional:  Negative for diaphoresis, fever and unexpected weight change.   HENT:  Negative for ear pain and sore throat.    Eyes:  Negative for pain and redness.   Respiratory:  Positive for shortness of breath. Negative for cough.    Cardiovascular:  Positive for chest pain and leg swelling. Negative for palpitations.   Gastrointestinal:  Negative for abdominal pain, nausea and vomiting.   Genitourinary:  Negative for dysuria.   Musculoskeletal:         Foot pain   Skin:  Negative for color change and rash.   Neurological:  Negative for dizziness, syncope and light-headedness.   Hematological:  Does not bruise/bleed easily.   Psychiatric/Behavioral:  Negative for agitation and behavioral problems.    All other systems reviewed and are negative.        Vitals:  Vitals:    03/31/25 1323   BP: 136/80   BP Location: Left arm   Patient Position: Sitting   Cuff Size: Standard   Pulse: 101   Resp: 16   SpO2: 92%   Weight: 103 kg (228 lb)   Height: 5' 10\" (1.778 m)        Body mass index is 32.71 kg/m².    Weight (last 2 days)       Date/Time Weight    03/31/25 1323 103 (228)              Physical Exam:   GEN: Alert and oriented x 3, in no acute distress.  Well appearing and well nourished.   HEENT: Sclera anicteric, conjunctivae pink, mucous membranes moist. Oropharynx clear.   NECK: Supple, no carotid bruits. Elevated JVP. Trachea midline.   HEART: Regular rate, irregular rhythm, normal S1 and S2, no murmurs, clicks, gallops or rubs. PMI nondisplaced, no " thrills.   LUNGS: Mild bibasilar crackles to auscultation. No increased work of breathing or signs of respiratory distress.   ABDOMEN: Soft, nontender, nondistended.   EXTREMITIES: Skin warm and well perfused, no clubbing, cyanosis, or edema.  NEURO: No focal findings. Normal speech. Mood and affect normal.   SKIN: Normal without suspicious lesions on exposed skin.      EKG Reviewed Personally: 3/31/2025: Undetermined underlying rhythm.  Nonspecific IVCD with PVCs in a pattern of bigeminy.    The 10-year ASCVD risk score (Scott BLANCO, et al., 2019) is: 52%    Values used to calculate the score:      Age: 74 years      Sex: Male      Is Non- : No      Diabetic: Yes      Tobacco smoker: No      Systolic Blood Pressure: 136 mmHg      Is BP treated: Yes      HDL Cholesterol: 40 mg/dL      Total Cholesterol: 236 mg/dL    Active Problems:  Patient Active Problem List   Diagnosis    Mixed hyperlipidemia    IFG (impaired fasting glucose)    Upper respiratory tract infection    Positive colorectal cancer screening using Cologuard test    Prostate cancer (HCC)    Encounter for monitoring androgen deprivation therapy    Hot flashes    Type 2 diabetes mellitus with hyperglycemia, without long-term current use of insulin (HCC)    Acute renal failure superimposed on stage 3a chronic kidney disease (HCC)    Essential hypertension    Dilated cardiomyopathy (HCC)    Paroxysmal atrial fibrillation (HCC)    Obesity    ICD (implantable cardioverter-defibrillator) in place    Acute on chronic systolic congestive heart failure (HCC)    Iron deficiency anemia due to chronic blood loss    On continuous oral anticoagulation    CKD (chronic kidney disease) stage 4, GFR 15-29 ml/min (HCC)    Type 2 diabetes mellitus with diabetic chronic kidney disease, unspecified CKD stage, unspecified whether long term insulin use (HCC)       Past Medical History:  Past Medical History:   Diagnosis Date    Allergic     Anesthesia      pt wears a life vest (11/16).  should not be scheduled at [facility] until heart condition is stabilized    BPH (benign prostatic hypertrophy)     Cancer (HCC)     Chronic kidney disease     Coronary artery disease     Diabetes mellitus (HCC)     Hyperlipidemia     Hypertension     Irregular heart beat     PAF    Prostate cancer (HCC)          Past Surgical History:  Past Surgical History:   Procedure Laterality Date    A-V CARDIAC PACEMAKER INSERTION      CARDIAC CATHETERIZATION N/A 10/04/2023    Procedure: Cardiac Coronary Angiogram;  Surgeon: Chris Lovell MD;  Location: [facility];  Service: Cardiology    CARDIAC CATHETERIZATION N/A 10/04/2023    Procedure: Cardiac RHC/LHC;  Surgeon: Chris Lovell MD;  Location: [facility];  Service: Cardiology    CARDIAC CATHETERIZATION  10/04/2023    Procedure: Cardiac catheterization;  Surgeon: Chris Lovell MD;  Location: [facility];  Service: Cardiology    CARDIAC ELECTROPHYSIOLOGY PROCEDURE N/A 01/04/2024    Procedure: Cardiac icd implant, Dual Chambers ICD;  Surgeon: Leo Whalen MD;  Location:  CARDIAC CATH LAB;  Service: Cardiology    EAR SURGERY      HERNIA REPAIR      ME COLONOSCOPY FLX DX W/COLLJ SPEC WHEN PFRMD N/A 05/06/2019    Procedure: COLONOSCOPY;  Surgeon: Winston Nielson III, MD;  Location: [facility];  Service: Gastroenterology         Family History:  Family History   Problem Relation Age of Onset    Stroke Father     No Known Problems Mother     Prostate cancer Brother     Arthritis Brother     No Known Problems Sister     Diabetes Sister     Diabetes Sister     No Known Problems Son     No Known Problems Daughter          Social History:  Social History     Socioeconomic History    Marital status:      Spouse name: None    Number of children: None    Years of education: None    Highest education level: None   Occupational History    None   Tobacco Use    Smoking status: Former     Current packs/day: 0.00      Average packs/day: 1 pack/day for 25.0 years (25.0 ttl pk-yrs)     Types: Cigarettes, Pipe, Cigars     Start date: 1980     Quit date: 2005     Years since quittin.1     Passive exposure: Past    Smokeless tobacco: Never   Vaping Use    Vaping status: Never Used   Substance and Sexual Activity    Alcohol use: Not Currently    Drug use: Not Currently    Sexual activity: Not Currently     Partners: Female   Other Topics Concern    None   Social History Narrative    None     Social Drivers of Health     Financial Resource Strain: Low Risk  (2024)    Overall Financial Resource Strain (CARDIA)     Difficulty of Paying Living Expenses: Not hard at all   Food Insecurity: No Food Insecurity (5/10/2024)    Nursing - Inadequate Food Risk Classification     Worried About Running Out of Food in the Last Year: Never true     Ran Out of Food in the Last Year: Never true     Ran Out of Food in the Last Year: Not on file   Transportation Needs: No Transportation Needs (5/10/2024)    PRAPARE - Transportation     Lack of Transportation (Medical): No     Lack of Transportation (Non-Medical): No   Physical Activity: Not on file   Stress: Not on file   Social Connections: Unknown (2024)    Received from [facility]     How often do you feel lonely or isolated from those around you? (Adult - for ages 18 years and over): Not on file   Intimate Partner Violence: Not on file   Housing Stability: Low Risk  (2024)    Housing Stability Vital Sign     Unable to Pay for Housing in the Last Year: No     Number of Times Moved in the Last Year: 0     Homeless in the Last Year: No           The following portions of the patient's history were reviewed and updated as appropriate: past medical history, past surgical history, past family history,  past social history, current medications, allergies and problem list.      Imaging:   No results found.      Laboratory Results:  CBC with diff:   Lab Results  "  Component Value Date    WBC 5.76 01/08/2025    RBC 3.77 (L) 01/08/2025    HGB 11.2 (L) 01/08/2025    HCT 34.6 (L) 01/08/2025    MCV 92 01/08/2025    MCH 29.7 01/08/2025    RDW 12.9 01/08/2025     01/08/2025       CMP:  Lab Results   Component Value Date    CREATININE 1.64 (H) 03/27/2025    BUN 46 (H) 03/27/2025    K 4.8 03/27/2025     03/27/2025    CO2 26 03/27/2025    ALKPHOS 89 01/08/2025    ALT 24 01/08/2025    AST 23 01/08/2025       Lab Results   Component Value Date    HGBA1C 6.7 (H) 10/16/2024    MG 2.1 05/15/2024    PHOS 3.5 12/11/2024       No results found for: \"TROPONINI\", \"CKMB\", \"CKTOTAL\"    Lipid Profile:   No results found for: \"CHOL\"  Lab Results   Component Value Date    HDL 40 10/16/2024    HDL 45 07/24/2024    HDL 41 08/08/2023     Lab Results   Component Value Date    LDLCALC 138 (H) 10/16/2024    LDLCALC 123 (H) 07/24/2024    LDLCALC 86 08/08/2023     Lab Results   Component Value Date    TRIG 289 (H) 10/16/2024    TRIG 238 (H) 07/24/2024    TRIG 271 (H) 08/08/2023       Cardiac testing:   Results for orders placed during the hospital encounter of 05/09/24    Echo complete    Interpretation Summary    Left Ventricle: Left ventricular cavity size is dilated. Wall thickness is normal. The left ventricular ejection fraction is 20%. Systolic function is severely reduced.  Asynchronous septal motion noted. There is severe global hypokinesis. Diastolic function is mildly abnormal, consistent with grade I (abnormal) relaxation.    Left Atrium: The atrium is mild to moderately dilated.    Mitral Valve: There is mild annular calcification.    Tricuspid Valve: There is trace regurgitation.  ICD lead noted.    Results for orders placed during the hospital encounter of 08/21/24    Echo follow up/limited w/ contrast if indicated    Interpretation Summary    Left Ventricle: Left ventricular cavity size is moderately dilated. Wall thickness is mildly increased. There is mild concentric " hypertrophy. The left ventricular ejection fraction is 31%. Systolic function is severely reduced. There is severe global hypokinesis.    Left Atrium: The atrium is moderately dilated.    Mitral Valve: There is mild regurgitation.    No results found for this or any previous visit.    No results found for this or any previous visit.    No results found for this or any previous visit.    No results found for this or any previous visit.    No results found for this or any previous visit.    No results found for this or any previous visit.        Medications:    Current Outpatient Medications:     Ascorbic Acid (vitamin C) 1000 MG tablet, Take 1,000 mg by mouth daily, Disp: , Rfl:     cetirizine (ZyrTEC) 5 MG tablet, Take 5 mg by mouth daily, Disp: , Rfl:     Continuous Glucose Sensor (FreeStyle Lisa 3 Sensor) MISC, Use 1 each every 14 (fourteen) days, Disp: 6 each, Rfl: 5    Erleada 60 MG TABS, Take 4 tablets by mouth daily, Disp: , Rfl:     EVENING PRIMROSE OIL PO, Take by mouth 3 (three) times a day, Disp: , Rfl:     glimepiride (AMARYL) 1 mg tablet, Take 1 tablet (1 mg total) by mouth daily with breakfast, Disp: 90 tablet, Rfl: 1    metoprolol succinate (TOPROL-XL) 25 mg 24 hr tablet, Take 1.5 tablets (37.5 mg total) by mouth daily, Disp: , Rfl:     multivitamin (THERAGRAN) TABS, Take 1 tablet by mouth daily, Disp: , Rfl:     pantoprazole (PROTONIX) 20 mg tablet, Take 1 tablet (20 mg total) by mouth daily, Disp: 90 tablet, Rfl: 1    rosuvastatin (CRESTOR) 10 MG tablet, Take 1 tablet (10 mg total) by mouth daily, Disp: 90 tablet, Rfl: 1    torsemide (DEMADEX) 20 mg tablet, Take 3 tablets (60 mg total) by mouth daily, Disp: 270 tablet, Rfl: 5    VITAMIN D, ERGOCALCIFEROL, PO, Take by mouth, Disp: , Rfl:     vitamin E, tocopherol, 400 units capsule, Take 400 Units by mouth daily, Disp: , Rfl:     warfarin (Coumadin) 5 mg tablet, Take one tablet daily or as directed, Disp: 90 tablet, Rfl: 3    Blood Glucose Monitoring  Suppl (ONETOUCH VERIO) w/Device KIT, by Does not apply route once for 1 dose Test sugar in the morning, Disp: 1 kit, Rfl: 0      Allergies:  Allergies   Allergen Reactions    Penicillin V Other (See Comments)     As a child            Recommend aggressive risk factor modification and therapeutic lifestyle changes.  Low-salt, low-calorie, low-fat, low-cholesterol diet with regular exercise and to optimize weight.    Discussed concepts of cardiovascular disease, including signs and symptoms of heart disease.    Previous studies were reviewed.    Safety measures were reviewed.  Questions were entertained and answered.  Patient was advised to report any problems requiring medical attention.    Follow-up with PCP and appropriate specialist and lab work/testing as discussed.    Return for follow up visit as scheduled or earlier, if needed.  Thank you for allowing me to participate in the care and evaluation of your patient.  Should you have any questions, please feel free to contact me.    Felix Suarez PA-C  3/31/2025,9:43 PM      ** Please Note: Fluency DirectDictation voice to text software may have been used in the creation of this document. **  '

## 2025-03-31 NOTE — ASSESSMENT & PLAN NOTE
Wt Readings from Last 3 Encounters:   03/18/25 103 kg (227 lb)   02/18/25 101 kg (222 lb)   01/15/25 99.8 kg (220 lb)

## 2025-04-01 NOTE — ASSESSMENT & PLAN NOTE
Uptitrate Toprol and torsemide per above.  Ambulatory BP monitoring and low-sodium diet advised.  He was advised to notify our office of abnormal ambulatory BPs.

## 2025-04-01 NOTE — ASSESSMENT & PLAN NOTE
Underlying rhythm undetermined on EKG.  Message sent to device clinic to reinterrogate device to assess for recurrence of atrial fibrillation.  Rate control: Uptitrate Toprol to 37.5 mg daily per below.  Continue anticoagulation with warfarin.  Target INR 2-3.  [id number]. Risks, benefits, and alternative of anticoagulation to prevent thromboembolic risk from atrial fibrillation discussed at length. Patient advised to report any bleeding issues.

## 2025-04-03 ENCOUNTER — APPOINTMENT (OUTPATIENT)
Dept: LAB | Facility: CLINIC | Age: 75
End: 2025-04-03
Payer: MEDICARE

## 2025-04-03 ENCOUNTER — ANTICOAG VISIT (OUTPATIENT)
Dept: CARDIOLOGY CLINIC | Facility: CLINIC | Age: 75
End: 2025-04-03

## 2025-04-03 DIAGNOSIS — Z79.01 LONG TERM (CURRENT) USE OF ANTICOAGULANTS: ICD-10-CM

## 2025-04-03 DIAGNOSIS — N18.4 CKD (CHRONIC KIDNEY DISEASE) STAGE 4, GFR 15-29 ML/MIN (HCC): ICD-10-CM

## 2025-04-03 DIAGNOSIS — I48.0 PAROXYSMAL ATRIAL FIBRILLATION (HCC): ICD-10-CM

## 2025-04-03 LAB
25(OH)D3 SERPL-MCNC: 46.7 NG/ML (ref 30–100)
ALBUMIN SERPL BCG-MCNC: 4.6 G/DL (ref 3.5–5)
ALP SERPL-CCNC: 80 U/L (ref 34–104)
ALT SERPL W P-5'-P-CCNC: 11 U/L (ref 7–52)
ANION GAP SERPL CALCULATED.3IONS-SCNC: 16 MMOL/L (ref 4–13)
AST SERPL W P-5'-P-CCNC: 16 U/L (ref 13–39)
BASOPHILS # BLD AUTO: 0.04 THOUSANDS/ÂΜL (ref 0–0.1)
BASOPHILS NFR BLD AUTO: 1 % (ref 0–1)
BILIRUB SERPL-MCNC: 0.39 MG/DL (ref 0.2–1)
BUN SERPL-MCNC: 65 MG/DL (ref 5–25)
CALCIUM SERPL-MCNC: 8.9 MG/DL (ref 8.4–10.2)
CHLORIDE SERPL-SCNC: 98 MMOL/L (ref 96–108)
CO2 SERPL-SCNC: 28 MMOL/L (ref 21–32)
CREAT SERPL-MCNC: 2.08 MG/DL (ref 0.6–1.3)
CREAT UR-MCNC: 75.5 MG/DL
EOSINOPHIL # BLD AUTO: 0.16 THOUSAND/ÂΜL (ref 0–0.61)
EOSINOPHIL NFR BLD AUTO: 3 % (ref 0–6)
ERYTHROCYTE [DISTWIDTH] IN BLOOD BY AUTOMATED COUNT: 13.4 % (ref 11.6–15.1)
GFR SERPL CREATININE-BSD FRML MDRD: 30 ML/MIN/1.73SQ M
GLUCOSE P FAST SERPL-MCNC: 149 MG/DL (ref 65–99)
HCT VFR BLD AUTO: 36.5 % (ref 36.5–49.3)
HGB BLD-MCNC: 11.6 G/DL (ref 12–17)
IMM GRANULOCYTES # BLD AUTO: 0.08 THOUSAND/UL (ref 0–0.2)
IMM GRANULOCYTES NFR BLD AUTO: 1 % (ref 0–2)
INR PPP: 1.69 (ref 0.85–1.19)
LYMPHOCYTES # BLD AUTO: 1.26 THOUSANDS/ÂΜL (ref 0.6–4.47)
LYMPHOCYTES NFR BLD AUTO: 23 % (ref 14–44)
MCH RBC QN AUTO: 29.4 PG (ref 26.8–34.3)
MCHC RBC AUTO-ENTMCNC: 31.8 G/DL (ref 31.4–37.4)
MCV RBC AUTO: 92 FL (ref 82–98)
MICROALBUMIN UR-MCNC: 43.4 MG/L
MICROALBUMIN/CREAT 24H UR: 57 MG/G CREATININE (ref 0–30)
MONOCYTES # BLD AUTO: 0.56 THOUSAND/ÂΜL (ref 0.17–1.22)
MONOCYTES NFR BLD AUTO: 10 % (ref 4–12)
NEUTROPHILS # BLD AUTO: 3.49 THOUSANDS/ÂΜL (ref 1.85–7.62)
NEUTS SEG NFR BLD AUTO: 62 % (ref 43–75)
NRBC BLD AUTO-RTO: 0 /100 WBCS
PHOSPHATE SERPL-MCNC: 3.9 MG/DL (ref 2.3–4.1)
PLATELET # BLD AUTO: 231 THOUSANDS/UL (ref 149–390)
PMV BLD AUTO: 10.8 FL (ref 8.9–12.7)
POTASSIUM SERPL-SCNC: 4.6 MMOL/L (ref 3.5–5.3)
PROT SERPL-MCNC: 7.7 G/DL (ref 6.4–8.4)
PROTHROMBIN TIME: 20 SECONDS (ref 12.3–15)
PTH-INTACT SERPL-MCNC: 241.8 PG/ML (ref 12–88)
RBC # BLD AUTO: 3.95 MILLION/UL (ref 3.88–5.62)
SODIUM SERPL-SCNC: 142 MMOL/L (ref 135–147)
WBC # BLD AUTO: 5.59 THOUSAND/UL (ref 4.31–10.16)

## 2025-04-03 PROCEDURE — 36415 COLL VENOUS BLD VENIPUNCTURE: CPT

## 2025-04-03 PROCEDURE — 82570 ASSAY OF URINE CREATININE: CPT

## 2025-04-03 PROCEDURE — 85025 COMPLETE CBC W/AUTO DIFF WBC: CPT

## 2025-04-03 PROCEDURE — 84100 ASSAY OF PHOSPHORUS: CPT

## 2025-04-03 PROCEDURE — 82043 UR ALBUMIN QUANTITATIVE: CPT

## 2025-04-03 PROCEDURE — 80053 COMPREHEN METABOLIC PANEL: CPT

## 2025-04-03 PROCEDURE — 85610 PROTHROMBIN TIME: CPT

## 2025-04-03 PROCEDURE — 83970 ASSAY OF PARATHORMONE: CPT

## 2025-04-03 PROCEDURE — 82306 VITAMIN D 25 HYDROXY: CPT

## 2025-04-04 ENCOUNTER — TELEPHONE (OUTPATIENT)
Dept: NEPHROLOGY | Facility: CLINIC | Age: 75
End: 2025-04-04

## 2025-04-04 NOTE — TELEPHONE ENCOUNTER
----- Message from Va Esqueda MD sent at 4/4/2025  1:48 PM EDT -----  Hi  Please call patient and inform that his creatinine is up to 2.0 with baseline being around 1.7. Ask him to hold his torsemide for 2 days and resume at 40 mg daily from Sunday.  Dr. Esqueda

## 2025-04-07 ENCOUNTER — REMOTE DEVICE CLINIC VISIT (OUTPATIENT)
Dept: CARDIOLOGY CLINIC | Facility: CLINIC | Age: 75
End: 2025-04-07

## 2025-04-07 ENCOUNTER — RESULTS FOLLOW-UP (OUTPATIENT)
Dept: CARDIOLOGY CLINIC | Facility: CLINIC | Age: 75
End: 2025-04-07

## 2025-04-07 DIAGNOSIS — Z95.810 PRESENCE OF IMPLANTABLE CARDIOVERTER-DEFIBRILLATOR (ICD): Primary | ICD-10-CM

## 2025-04-07 PROCEDURE — RECHECK: Performed by: INTERNAL MEDICINE

## 2025-04-07 NOTE — PROGRESS NOTES
Results for orders placed or performed in visit on 04/07/25   Cardiac EP device report    Narrative    MDT DC ICD/ACTIVE SYSTEM IS MRI CONDITIONAL  NON-BILLABLE CARELINK TRANSMISSION/1 WK EPISODE CK PER ARGENIS VO PA-C: BATTERY VOLTAGE ADEQUATE (11.3 YR). AP 33.6%   10%. AS/VS @ AVG 86 BPM ON CURRENT EGM. NO SIGNIFICANT HIGH RATE EPISODES. PATIENT TAKING WARFARIN, METOPROLOL SUCC. OPTI-VOL FLUID THRESHOLD REMAINS CROSSED & ONGOING. PATIENT ALSO TAKING TORSEMIDE. EF 31% (8/21/24 ECHO). PREVIOUSLY REPORTED TO HF CLINICAL TEAM 3/20/25 & RECHECK SCHEDULED 4/21/25. NORMAL DEVICE FUNCTION.  ES

## 2025-04-08 ENCOUNTER — OFFICE VISIT (OUTPATIENT)
Dept: FAMILY MEDICINE CLINIC | Facility: CLINIC | Age: 75
End: 2025-04-08
Payer: MEDICARE

## 2025-04-08 VITALS
WEIGHT: 225 LBS | BODY MASS INDEX: 32.21 KG/M2 | DIASTOLIC BLOOD PRESSURE: 62 MMHG | OXYGEN SATURATION: 97 % | SYSTOLIC BLOOD PRESSURE: 126 MMHG | HEIGHT: 70 IN | HEART RATE: 69 BPM

## 2025-04-08 DIAGNOSIS — C61 PROSTATE CANCER (HCC): ICD-10-CM

## 2025-04-08 DIAGNOSIS — I10 ESSENTIAL HYPERTENSION: ICD-10-CM

## 2025-04-08 DIAGNOSIS — Z00.00 MEDICARE ANNUAL WELLNESS VISIT, SUBSEQUENT: ICD-10-CM

## 2025-04-08 DIAGNOSIS — E11.65 TYPE 2 DIABETES MELLITUS WITH HYPERGLYCEMIA, WITHOUT LONG-TERM CURRENT USE OF INSULIN (HCC): Primary | ICD-10-CM

## 2025-04-08 DIAGNOSIS — Z95.810 ICD (IMPLANTABLE CARDIOVERTER-DEFIBRILLATOR) IN PLACE: ICD-10-CM

## 2025-04-08 LAB — SL AMB POCT HEMOGLOBIN AIC: 6.6 (ref ?–6.5)

## 2025-04-08 PROCEDURE — G2211 COMPLEX E/M VISIT ADD ON: HCPCS | Performed by: FAMILY MEDICINE

## 2025-04-08 PROCEDURE — 99214 OFFICE O/P EST MOD 30 MIN: CPT | Performed by: FAMILY MEDICINE

## 2025-04-08 PROCEDURE — G0439 PPPS, SUBSEQ VISIT: HCPCS | Performed by: FAMILY MEDICINE

## 2025-04-08 PROCEDURE — 83036 HEMOGLOBIN GLYCOSYLATED A1C: CPT | Performed by: FAMILY MEDICINE

## 2025-04-08 NOTE — ASSESSMENT & PLAN NOTE
Controlled, without complaints of hypoglycemia and negative polydipsia, scheduled for updated labs  Lab Results   Component Value Date    HGBA1C 6.6 (A) 04/08/2025       Orders:    POCT hemoglobin A1c

## 2025-04-08 NOTE — PROGRESS NOTES
Answers submitted by the patient for this visit:  Medicare Annual Wellness Visit (Submitted on 4/3/2025)  How would you rate your overall health?: good  Compared to last year, how is your physical health?: slightly worse  In general, how satisfied are you with your life?: satisfied  Compared to last year, how is your eyesight?: slightly worse  Compared to last year, how is your hearing?: slightly worse  Compared to last year, how is your emotional/mental health?: same  How often is anger a problem for you?: sometimes  How often do you feel unusually tired/fatigued?: often  In the past 7 days, how much pain have you experienced?: none  In the past 6 months, have you lost or gained 10 pounds without trying?: No  One or more falls in the last year: No  Do you have trouble with the stairs inside or outside your home?: No  Does your home have working smoke alarms?: Yes  Does your home have a carbon monoxide monitor?: Yes  Which safety hazards (if any) have you experienced in your home? Please select all that apply.: none  How would you describe your current diet? Please select all that apply.: Regular, No Added Salt, Limited junk food  In addition to prescription medications, are you taking any over-the-counter supplements?: Yes  If yes, what supplements are you taking?: Vitamins c,d,e, multivitamin, calcium  Can you manage your medications?: Yes  Are you currently taking any opioid medications?: No  Can you walk and transfer into and out of your bed and chair?: Yes  Can you dress and groom yourself?: Yes  Can you bathe or shower yourself?: Yes  Can you feed yourself?: Yes  Can you do your laundry/ housekeeping?: Yes  Can you manage your money, pay your bills, and track your expenses?: Yes  Can you make your own meals?: Yes  Can you do your own shopping?: Yes  Within the last 12 months, have you had any hospitalizations or Emergency Department visits?: Yes  If yes, how many times have you been hospitalized within the  past year?: 1-2  Do you have a living will?: Yes  Do you have a Durable POA (Power of ) for healthcare decisions?: Yes  Do you have an Advanced Directive for end of life decisions?: Yes  How often have you used an illegal drug (including marijuana) or a prescription medication for non-medical reasons in the past year?: never  What is the typical number of drinks you consume in a day?: 0  What is the typical number of drinks you consume in a week?: 0  How often did you have a drink containing alcohol in the past year?: never  How many drinks did you have on a typical day  when you were drinking in the past year?: 0  How often did you have 6 or more drinks on one occasion in the past year?: never  Diabetic Foot Exam    Patient's shoes and socks removed.    Right Foot/Ankle   Right Foot Inspection  Skin Exam: skin normal. Skin not intact, no dry skin, no warmth, no callus, no erythema, no maceration, no abnormal color, no pre-ulcer, no ulcer and no callus.     Toe Exam: ROM and strength within normal limits.     Sensory   Vibration: intact  Proprioception: intact  Monofilament testing: intact    Vascular  Capillary refills: < 3 seconds  The right DP pulse is 2+. The right PT pulse is 2+.     Left Foot/Ankle  Left Foot Inspection  Skin Exam: skin normal. Skin not intact, no dry skin, no warmth, no erythema, no maceration, normal color, no pre-ulcer, no ulcer and no callus.     Toe Exam: ROM and strength within normal limits.     Sensory   Vibration: intact  Proprioception: intact  Monofilament testing: intact    Vascular  Capillary refills: < 3 seconds  The left DP pulse is 2+. The left PT pulse is 2+.     Assign Risk Category  No deformity present  No loss of protective sensation  No weak pulses  Risk: 0

## 2025-04-08 NOTE — PROGRESS NOTES
Name: Galen Carson      : 1950      MRN: [id number]  Encounter Provider: DANITA Girard  Encounter Date: 2025   Encounter department: [facility] [address]  :  Assessment & Plan  Type 2 diabetes mellitus with hyperglycemia, without long-term current use of insulin (HCC)  Controlled, without complaints of hypoglycemia and negative polydipsia, scheduled for updated labs  Lab Results   Component Value Date    HGBA1C 6.6 (A) 2025       Orders:    POCT hemoglobin A1c    Medicare annual wellness visit, subsequent         Essential hypertension         Prostate cancer (HCC)         ICD (implantable cardioverter-defibrillator) in place           Depression Screening and Follow-up Plan: Patient was screened for depression during today's encounter. They screened negative with a PHQ-2 score of 2.        Preventive health issues were discussed with patient, and age appropriate screening tests were ordered as noted in patient's After Visit Summary. Personalized health advice and appropriate referrals for health education or preventive services given if needed, as noted in patient's After Visit Summary.    History of Present Illness     F/u scheduled for cataract 25 right ,  left   [facility]   Scheduled for preop clearance with cardiology upcoming   Urology = prostate ca , [place] ,   Oncology = [facility]        Patient Care Team:  DANITA Girard as PCP - General (Family Medicine)  Winston Nielson III, MD as Endoscopist  Kevin Burgess MD (Urology)  Angel Christianson MD (Radiation Oncology)  Ester Bruce RN as Registered Nurse  Va Esqueda MD (Nephrology)  Galen Barreto MD (Medical Oncology)    Review of Systems   Constitutional:  Negative for appetite change, chills, fever and unexpected weight change.   HENT:  Negative for congestion, dental problem, ear pain, hearing loss, postnasal drip, rhinorrhea, sinus  pressure, sinus pain, sneezing, sore throat, tinnitus and voice change.    Eyes:  Negative for visual disturbance.   Respiratory:  Negative for apnea, cough, chest tightness and shortness of breath.    Cardiovascular:  Negative for chest pain, palpitations and leg swelling.   Gastrointestinal:  Negative for abdominal pain, blood in stool, constipation, diarrhea, nausea and vomiting.   Endocrine: Negative for cold intolerance, heat intolerance, polydipsia, polyphagia and polyuria.   Genitourinary:  Negative for decreased urine volume, difficulty urinating, dysuria, frequency and hematuria.   Musculoskeletal:  Negative for arthralgias, back pain, gait problem, joint swelling and myalgias.   Skin:  Negative for color change, rash and wound.   Allergic/Immunologic: Negative for environmental allergies and food allergies.   Neurological:  Negative for dizziness, syncope, weakness, light-headedness, numbness and headaches.   Hematological:  Negative for adenopathy. Does not bruise/bleed easily.   Psychiatric/Behavioral:  Negative for sleep disturbance and suicidal ideas. The patient is not nervous/anxious.      Medical History Reviewed by provider this encounter:       Annual Wellness Visit Questionnaire   Galen is here for his Subsequent Wellness visit. Last Medicare Wellness visit information reviewed, patient interviewed, no change since last AWV.     Health Risk Assessment:   Patient rates overall health as good. Patient feels that their physical health rating is slightly worse. Patient is satisfied with their life. Eyesight was rated as slightly worse. Hearing was rated as slightly worse. Patient feels that their emotional and mental health rating is same. Patients states they are sometimes angry. Patient states they are often unusually tired/fatigued. Pain experienced in the last 7 days has been none. Patient states that he has experienced no weight loss or gain in last 6 months.     Depression Screening:   PHQ-2  Score: 2      Fall Risk Screening:   In the past year, patient has experienced: no history of falling in past year      Home Safety:  Patient does not have trouble with stairs inside or outside of their home. Patient has working smoke alarms and has working carbon monoxide detector. Home safety hazards include: none.     Nutrition:   Current diet is Regular, No Added Salt and Limited junk food.     Medications:   Patient is currently taking over-the-counter supplements. OTC medications include: see medication list. Patient is able to manage medications.     Activities of Daily Living (ADLs)/Instrumental Activities of Daily Living (IADLs):   Walk and transfer into and out of bed and chair?: Yes  Dress and groom yourself?: Yes    Bathe or shower yourself?: Yes    Feed yourself? Yes  Do your laundry/housekeeping?: Yes  Manage your money, pay your bills and track your expenses?: Yes  Make your own meals?: Yes    Do your own shopping?: Yes    Previous Hospitalizations:   Any hospitalizations or ED visits within the last 12 months?: Yes    How many hospitalizations have you had in the last year?: 1-2    Advance Care Planning:   Living will: Yes    Durable POA for healthcare: Yes    Advanced directive: Yes      Cognitive Screening:   Provider or family/friend/caregiver concerned regarding cognition?: No    Preventive Screenings      Cardiovascular Screening:    General: Screening Not Indicated and History Lipid Disorder      Diabetes Screening:     General: Screening Not Indicated and History Diabetes      Colorectal Cancer Screening:     General: Screening Current      Prostate Cancer Screening:    General: History Prostate Cancer      Abdominal Aortic Aneurysm (AAA) Screening:    Risk factors include: age between 65-74 yo and tobacco use        Lung Cancer Screening:     General: Screening Not Indicated      Hepatitis C Screening:    General: Screening Current    Screening, Brief Intervention, and Referral to Treatment  "(SBIRT)     Screening  Typical number of drinks in a day: 0  Typical number of drinks in a week: 0  Interpretation: Low risk drinking behavior.    AUDIT-C Screenin) How often did you have a drink containing alcohol in the past year? never  2) How many drinks did you have on a typical day when you were drinking in the past year? 0  3) How often did you have 6 or more drinks on one occasion in the past year? never    AUDIT-C Score: 0  Interpretation: Score 0-3 (male): Negative screen for alcohol misuse    Single Item Drug Screening:  How often have you used an illegal drug (including marijuana) or a prescription medication for non-medical reasons in the past year? never    Single Item Drug Screen Score: 0  Interpretation: Negative screen for possible drug use disorder    Social Drivers of Health     Financial Resource Strain: Low Risk  (2024)    Overall Financial Resource Strain (CARDIA)     Difficulty of Paying Living Expenses: Not hard at all   Food Insecurity: No Food Insecurity (2025)    Hunger Vital Sign     Worried About Running Out of Food in the Last Year: Never true     Ran Out of Food in the Last Year: Never true   Transportation Needs: No Transportation Needs (2025)    PRAPARE - Transportation     Lack of Transportation (Medical): No     Lack of Transportation (Non-Medical): No   Housing Stability: Low Risk  (2025)    Housing Stability Vital Sign     Unable to Pay for Housing in the Last Year: No     Number of Times Moved in the Last Year: 0     Homeless in the Last Year: No   Utilities: Not At Risk (2025)    [facility] Utilities     Threatened with loss of utilities: No     No results found.    Objective   /62   Pulse 69   Ht 5' 10\" (1.778 m)   Wt 102 kg (225 lb)   SpO2 97%   BMI 32.28 kg/m²     Physical Exam  Constitutional:       General: He is not in acute distress.     Appearance: He is well-developed. He is not ill-appearing or toxic-appearing.   HENT:      Head: " Normocephalic and atraumatic.      Nose: Nose normal.   Eyes:      Conjunctiva/sclera: Conjunctivae normal.   Cardiovascular:      Rate and Rhythm: Normal rate and regular rhythm.      Heart sounds: Normal heart sounds.   Pulmonary:      Effort: Pulmonary effort is normal.      Breath sounds: Normal breath sounds.   Musculoskeletal:         General: Normal range of motion.      Cervical back: Normal range of motion and neck supple.      Right lower leg: No edema.      Left lower leg: No edema.   Lymphadenopathy:      Cervical: No cervical adenopathy.   Skin:     General: Skin is warm and dry.      Findings: No lesion or rash.   Neurological:      Mental Status: He is alert and oriented to person, place, and time.      Deep Tendon Reflexes: Reflexes are normal and symmetric.   Psychiatric:         Behavior: Behavior normal.         Thought Content: Thought content normal.         Judgment: Judgment normal.

## 2025-04-08 NOTE — TELEPHONE ENCOUNTER
Called patient to follow up.  Patient saw Felix Freitasalba in the office 3/31.  Patient was instructed to take 4 tablets torsemide daily (total 80 mg daily) at that appointment.  Patient had CMP drawn 4/3 and was instructed by nephrology on 4/4 to hold torsemide x 2 days and then take torsemide 40 mg.  Patient took 40 mg torsemide 4/7 and 4/8.      Reports CALDERON and edema not too bad.  No weight gain.  Reports he has had difficulty sleeping.  Denies orthopnea/difficulty breathing at night, states he just doesn't feel tired.      Patient is going to have repeat labs drawn tomorrow 4/9, has a follow up with Dr. Li scheduled 4/11.

## 2025-04-08 NOTE — TELEPHONE ENCOUNTER
----- Message from Shannan Driscoll MD sent at 4/7/2025  7:31 PM EDT -----  OptiVol threshold crossed and still ongoing.  Please check and see.  Patient was seen by Felix recently.    Patient has an appointment on the 11th with

## 2025-04-09 ENCOUNTER — APPOINTMENT (OUTPATIENT)
Dept: LAB | Facility: CLINIC | Age: 75
End: 2025-04-09
Payer: MEDICARE

## 2025-04-09 DIAGNOSIS — I50.23 ACUTE ON CHRONIC HFREF (HEART FAILURE WITH REDUCED EJECTION FRACTION) (HCC): ICD-10-CM

## 2025-04-09 DIAGNOSIS — I48.0 PAROXYSMAL ATRIAL FIBRILLATION (HCC): ICD-10-CM

## 2025-04-09 LAB
ERYTHROCYTE [DISTWIDTH] IN BLOOD BY AUTOMATED COUNT: 14.1 % (ref 11.6–15.1)
HCT VFR BLD AUTO: 36.2 % (ref 36.5–49.3)
HGB BLD-MCNC: 11 G/DL (ref 12–17)
MCH RBC QN AUTO: 28.9 PG (ref 26.8–34.3)
MCHC RBC AUTO-ENTMCNC: 30.4 G/DL (ref 31.4–37.4)
MCV RBC AUTO: 95 FL (ref 82–98)
PLATELET # BLD AUTO: 205 THOUSANDS/UL (ref 149–390)
PMV BLD AUTO: 10.8 FL (ref 8.9–12.7)
RBC # BLD AUTO: 3.8 MILLION/UL (ref 3.88–5.62)
WBC # BLD AUTO: 5.02 THOUSAND/UL (ref 4.31–10.16)

## 2025-04-09 PROCEDURE — 80048 BASIC METABOLIC PNL TOTAL CA: CPT

## 2025-04-09 PROCEDURE — 36415 COLL VENOUS BLD VENIPUNCTURE: CPT

## 2025-04-09 PROCEDURE — 85027 COMPLETE CBC AUTOMATED: CPT

## 2025-04-10 LAB
ANION GAP SERPL CALCULATED.3IONS-SCNC: 15 MMOL/L (ref 4–13)
BUN SERPL-MCNC: 50 MG/DL (ref 5–25)
CALCIUM SERPL-MCNC: 8.8 MG/DL (ref 8.4–10.2)
CHLORIDE SERPL-SCNC: 104 MMOL/L (ref 96–108)
CO2 SERPL-SCNC: 26 MMOL/L (ref 21–32)
CREAT SERPL-MCNC: 1.54 MG/DL (ref 0.6–1.3)
GFR SERPL CREATININE-BSD FRML MDRD: 44 ML/MIN/1.73SQ M
GLUCOSE P FAST SERPL-MCNC: 138 MG/DL (ref 65–99)
POTASSIUM SERPL-SCNC: 5 MMOL/L (ref 3.5–5.3)
SODIUM SERPL-SCNC: 145 MMOL/L (ref 135–147)

## 2025-04-11 ENCOUNTER — OFFICE VISIT (OUTPATIENT)
Dept: CARDIOLOGY CLINIC | Facility: CLINIC | Age: 75
End: 2025-04-11
Payer: MEDICARE

## 2025-04-11 VITALS
WEIGHT: 227 LBS | DIASTOLIC BLOOD PRESSURE: 64 MMHG | HEIGHT: 70 IN | SYSTOLIC BLOOD PRESSURE: 112 MMHG | HEART RATE: 102 BPM | BODY MASS INDEX: 32.5 KG/M2 | OXYGEN SATURATION: 95 % | RESPIRATION RATE: 16 BRPM

## 2025-04-11 DIAGNOSIS — Z01.810 PREOP CARDIOVASCULAR EXAM: Primary | ICD-10-CM

## 2025-04-11 DIAGNOSIS — I42.0 DILATED CARDIOMYOPATHY (HCC): ICD-10-CM

## 2025-04-11 DIAGNOSIS — I48.0 PAROXYSMAL ATRIAL FIBRILLATION (HCC): ICD-10-CM

## 2025-04-11 DIAGNOSIS — R60.1 GENERALIZED EDEMA: ICD-10-CM

## 2025-04-11 PROCEDURE — 96374 THER/PROPH/DIAG INJ IV PUSH: CPT

## 2025-04-11 PROCEDURE — 99214 OFFICE O/P EST MOD 30 MIN: CPT | Performed by: INTERNAL MEDICINE

## 2025-04-11 RX ORDER — TORSEMIDE 20 MG/1
TABLET ORAL
Start: 2025-04-11

## 2025-04-11 RX ORDER — TORSEMIDE 20 MG/1
40 TABLET ORAL DAILY
Start: 2025-04-11 | End: 2025-04-11

## 2025-04-11 RX ORDER — FUROSEMIDE 10 MG/ML
40 INJECTION INTRAMUSCULAR; INTRAVENOUS ONCE
Status: COMPLETED | OUTPATIENT
Start: 2025-04-11 | End: 2025-04-11

## 2025-04-11 RX ORDER — METOPROLOL SUCCINATE 50 MG/1
50 TABLET, EXTENDED RELEASE ORAL DAILY
Qty: 90 TABLET | Refills: 3 | Status: SHIPPED | OUTPATIENT
Start: 2025-04-11

## 2025-04-11 RX ORDER — LOSARTAN POTASSIUM 25 MG/1
25 TABLET ORAL DAILY
Qty: 30 TABLET | Refills: 3 | Status: SHIPPED | OUTPATIENT
Start: 2025-04-11

## 2025-04-11 RX ADMIN — FUROSEMIDE 40 MG: 10 INJECTION INTRAMUSCULAR; INTRAVENOUS at 13:38

## 2025-04-11 NOTE — ASSESSMENT & PLAN NOTE
He is in atrial fibrillation today.  The dose of beta-blockers has been increased as described above.

## 2025-04-11 NOTE — ASSESSMENT & PLAN NOTE
- I have reviewed all the previous evaluations including the serum creatinine.  -The dose of the beta-blockers has been increased to 50 mg daily.  -Due to the symptoms of shortness of breath  in the office 40 mg of Lasix has been given.  -Started him today on losartan 25 mg with a plan to repeat the serum creatinine in 2 weeks time from now.  He has evidence of left ventricular systolic dysfunction.    -The dose of torsemide has been decreased.  He he will be taking 40 mg on 1 day and 20 mg the next day.  The dose of 20 mg can be increased to 40 mg in case he has a weight gain of more than 5 pounds or he becomes short of breath.  These changes has been done taking the serum creatinine values into account.    -This was a 2-week follow-up.  Please refer to cardiology consultation note dated March 31, 2025 for more details.

## 2025-04-11 NOTE — PROGRESS NOTES
[facility] [place]  [address]  [place] [address]  Cardiology Follow Up    Galen Carson  1950  [id number]    Assessment & Plan  Preop cardiovascular exam  - He has to undergo cataract procedure.  This is a low risk procedure.  He will be stopping the Coumadin prior to his surgery and from the cardiac standpoint of it is okay for him to proceed ahead.  Dilated cardiomyopathy (HCC)  - I have reviewed all the previous evaluations including the serum creatinine.  -The dose of the beta-blockers has been increased to 50 mg daily.  -Due to the symptoms of shortness of breath  in the office 40 mg of Lasix has been given.  -Started him today on losartan 25 mg with a plan to repeat the serum creatinine in 2 weeks time from now.  He has evidence of left ventricular systolic dysfunction.    -The dose of torsemide has been decreased.  He he will be taking 40 mg on 1 day and 20 mg the next day.  The dose of 20 mg can be increased to 40 mg in case he has a weight gain of more than 5 pounds or he becomes short of breath.  These changes has been done taking the serum creatinine values into account.    -This was a 2-week follow-up.  Please refer to cardiology consultation note dated March 31, 2025 for more details.  Paroxysmal atrial fibrillation (HCC)  He is in atrial fibrillation today.  The dose of beta-blockers has been increased as described above.  Generalized edema  He has a mild ankle edema.  Will continue with the changes as above.    Continue all medications. Previous studies reviewed with patient, medications reviewed and possible side effects discussed. Continue risk factor modification. Optimize weight, regular exercise and follow up with appropriate specialists and primary care physician as discussed.  All questions answered. Patient advised to report any problems prompting to medical attention. Return for follow up visit in 4 weeks or earlier if needed    Chief Complaint    Patient presents with   • Follow-up       Interval History: Galen Carson is a known case of nonischemic cardiomyopathy, paroxysmal atrial fibrillation, status post AICD, hypertension, hyperlipidemia was evaluated as a follow-up visit.  Please refer to cardiology visit   dated March 31, 2025.  So far he has been compliant with the medications.  The shortness of breath symptoms have improved to some degree.  Recently performed ICD evaluation has been noted that his indicative of fluid retention.  He denies any symptoms of orthopnea.      PMH:     Patient Active Problem List   Diagnosis   • Mixed hyperlipidemia   • IFG (impaired fasting glucose)   • Upper respiratory tract infection   • Positive colorectal cancer screening using Cologuard test   • Prostate cancer (HCC)   • Encounter for monitoring androgen deprivation therapy   • Hot flashes   • Type 2 diabetes mellitus with hyperglycemia, without long-term current use of insulin (HCC)   • Acute renal failure superimposed on stage 3a chronic kidney disease (HCC)   • Essential hypertension   • Dilated cardiomyopathy (HCC)   • Paroxysmal atrial fibrillation (HCC)   • Obesity   • ICD (implantable cardioverter-defibrillator) in place   • Acute on chronic systolic congestive heart failure (HCC)   • Iron deficiency anemia due to chronic blood loss   • On continuous oral anticoagulation   • CKD (chronic kidney disease) stage 4, GFR 15-29 ml/min (HCC)   • Type 2 diabetes mellitus with diabetic chronic kidney disease, unspecified CKD stage, unspecified whether long term insulin use (HCC)     Past Medical History:   Diagnosis Date   • Allergic    • Anesthesia     pt wears a life vest (11/16).  should not be scheduled at [facility] until heart condition is stabilized   • BPH (benign prostatic hypertrophy)    • Cancer (HCC)    • Chronic kidney disease    • Coronary artery disease    • Diabetes mellitus (HCC)    • Hyperlipidemia    • Hypertension    • Irregular heart beat     PAF    • Prostate cancer (HCC)      Social History     Socioeconomic History   • Marital status:      Spouse name: Not on file   • Number of children: Not on file   • Years of education: Not on file   • Highest education level: Not on file   Occupational History   • Not on file   Tobacco Use   • Smoking status: Former     Current packs/day: 0.00     Average packs/day: 1 pack/day for 25.0 years (25.0 ttl pk-yrs)     Types: Cigarettes, Pipe, Cigars     Start date: 1980     Quit date: 2005     Years since quittin.1     Passive exposure: Past   • Smokeless tobacco: Never   Vaping Use   • Vaping status: Never Used   Substance and Sexual Activity   • Alcohol use: Not Currently   • Drug use: Not Currently   • Sexual activity: Not Currently     Partners: Female   Other Topics Concern   • Not on file   Social History Narrative   • Not on file     Social Drivers of Health     Financial Resource Strain: Low Risk  (2024)    Overall Financial Resource Strain (CARDIA)    • Difficulty of Paying Living Expenses: Not hard at all   Food Insecurity: No Food Insecurity (2025)    Hunger Vital Sign    • Worried About Running Out of Food in the Last Year: Never true    • Ran Out of Food in the Last Year: Never true   Transportation Needs: No Transportation Needs (2025)    PRAPARE - Transportation    • Lack of Transportation (Medical): No    • Lack of Transportation (Non-Medical): No   Physical Activity: Not on file   Stress: Not on file   Social Connections: Unknown (2024)    Received from [employer]    Social Connections    • How often do you feel lonely or isolated from those around you? (Adult - for ages 18 years and over): Not on file   Intimate Partner Violence: Not on file   Housing Stability: Low Risk  (2025)    Housing Stability Vital Sign    • Unable to Pay for Housing in the Last Year: No    • Number of Times Moved in the Last Year: 0    • Homeless in the Last Year: No      Family History    Problem Relation Age of Onset   • Stroke Father    • No Known Problems Mother    • Prostate cancer Brother    • Arthritis Brother    • No Known Problems Sister    • Diabetes Sister    • Diabetes Sister    • No Known Problems Son    • No Known Problems Daughter      Past Surgical History:   Procedure Laterality Date   • CARDIAC CATHETERIZATION N/A 10/04/2023    Procedure: Cardiac Coronary Angiogram;  Surgeon: Chris Lovell MD;  Location: [facility];  Service: Cardiology   • CARDIAC CATHETERIZATION N/A 10/04/2023    Procedure: Cardiac RHC/LHC;  Surgeon: Chris Lovell MD;  Location: [facility];  Service: Cardiology   • CARDIAC CATHETERIZATION  10/04/2023    Procedure: Cardiac catheterization;  Surgeon: Chris Lovell MD;  Location: [facility];  Service: Cardiology   • CARDIAC DEFIBRILLATOR PLACEMENT  01/04/2024   • CARDIAC ELECTROPHYSIOLOGY PROCEDURE N/A 01/04/2024    Procedure: Cardiac icd implant, Dual Chambers ICD;  Surgeon: Leo Whalen MD;  Location:  [facility];  Service: Cardiology   • EAR SURGERY     • HERNIA REPAIR     • RI COLONOSCOPY FLX DX W/COLLJ SPEC WHEN PFRMD N/A 05/06/2019    Procedure: COLONOSCOPY;  Surgeon: Winston Nielson III, MD;  Location: [facility];  Service: Gastroenterology       Current Outpatient Medications:   •  Ascorbic Acid (vitamin C) 1000 MG tablet, Take 1,000 mg by mouth daily, Disp: , Rfl:   •  Blood Glucose Monitoring Suppl (ONETOUCH VERIO) w/Device KIT, by Does not apply route once for 1 dose Test sugar in the morning, Disp: 1 kit, Rfl: 0  •  cetirizine (ZyrTEC) 5 MG tablet, Take 5 mg by mouth daily, Disp: , Rfl:   •  Continuous Glucose Sensor (FreeStyle Lisa 3 Sensor) MISC, Use 1 each every 14 (fourteen) days, Disp: 6 each, Rfl: 5  •  Erleada 60 MG TABS, Take 4 tablets by mouth daily, Disp: , Rfl:   •  EVENING PRIMROSE OIL PO, Take by mouth 3 (three) times a day, Disp: , Rfl:   •  glimepiride (AMARYL) 1 mg tablet, Take 1 tablet  "(1 mg total) by mouth daily with breakfast, Disp: 90 tablet, Rfl: 1  •  losartan (COZAAR) 25 mg tablet, Take 1 tablet (25 mg total) by mouth daily, Disp: 30 tablet, Rfl: 3  •  metoprolol succinate (TOPROL-XL) 50 mg 24 hr tablet, Take 1 tablet (50 mg total) by mouth daily, Disp: 90 tablet, Rfl: 3  •  multivitamin (THERAGRAN) TABS, Take 1 tablet by mouth daily, Disp: , Rfl:   •  pantoprazole (PROTONIX) 20 mg tablet, Take 1 tablet (20 mg total) by mouth daily, Disp: 90 tablet, Rfl: 1  •  rosuvastatin (CRESTOR) 10 MG tablet, Take 1 tablet (10 mg total) by mouth daily, Disp: 90 tablet, Rfl: 1  •  torsemide (DEMADEX) 20 mg tablet, Take 40 mg and alternate with 20 mg., Disp: , Rfl:   •  VITAMIN D, ERGOCALCIFEROL, PO, Take by mouth, Disp: , Rfl:   •  vitamin E, tocopherol, 400 units capsule, Take 400 Units by mouth daily, Disp: , Rfl:   •  warfarin (Coumadin) 5 mg tablet, Take one tablet daily or as directed, Disp: 90 tablet, Rfl: 3  No current facility-administered medications for this visit.  Allergies   Allergen Reactions   • Penicillin G Benzathine Other (See Comments)   • Penicillin V Other (See Comments)     As a child            Review of Systems:  Review of Systems   Constitutional: Negative.  Negative for chills and fever.   Eyes:  Negative for visual disturbance.   Respiratory:  Positive for shortness of breath. Negative for cough.    Cardiovascular:  Negative for chest pain and palpitations.   Gastrointestinal:  Negative for abdominal pain and vomiting.   Skin:  Negative for color change and rash.   Neurological:  Negative for syncope.   All other systems reviewed and are negative.        /64   Pulse 102   Resp 16   Ht 5' 10\" (1.778 m)   Wt 103 kg (227 lb)   SpO2 95%   BMI 32.57 kg/m²     Physical Exam:  Physical Exam  Vitals reviewed.   Constitutional:       Appearance: Normal appearance.   HENT:      Head: Normocephalic.   Eyes:      Pupils: Pupils are equal, round, and reactive to light. "   Cardiovascular:      Rate and Rhythm: Rhythm irregular.      Heart sounds: Normal heart sounds.   Pulmonary:      Effort: Pulmonary effort is normal.      Breath sounds: Normal breath sounds. No wheezing.   Abdominal:      General: Abdomen is flat.      Palpations: Abdomen is soft.   Skin:     General: Skin is warm.   Neurological:      Mental Status: He is alert.     Time spent 45 minutes in reviewing outpatient notes, earlier inpatient hospitalization notes, reviewing and interpreting labs, reviewing chest x-ray report, reviewing interpreting EKG, discussion and educating patient, documentation.

## 2025-04-11 NOTE — PROGRESS NOTES
"IV started right hand using 23G x 3/4\" winged infusion set. 40 mg Lasix IV administered slowly over 5 minutes. Line flushed with normal saline and removed, needle intact. No s/s infiltration.  Tolerated procedure well.       Discussed 2 gram Na diet.  Instructed to avoid salty snacks, chips, pickles, canned foods, frozen vegetables in sauces.  Advised bread, soda, packaged foods are often high in sodium.  Instructed all fluids consumed should not exceed 64 oz daily.     Advised patient I will call him on Monday.  Verbalized understanding of oral medication changes today.  Aware he needs repeat blood work done to check kidney function.   "

## 2025-04-14 ENCOUNTER — TELEPHONE (OUTPATIENT)
Dept: CARDIOLOGY CLINIC | Facility: CLINIC | Age: 75
End: 2025-04-14

## 2025-04-14 NOTE — TELEPHONE ENCOUNTER
Patient received 40 mg Lasix IV in the office 4/11/25.  Called patient to follow up.  Reports he doesn't feel much different.  Weight at home today is 223 lb.  (227 lb in the office 4/11, 228 lb in the office 3/31).    Confirmed he is alternating torsemide 20 mg daily and torsemide 40 mg daily.  Confirmed he increased his metoprolol succinate to 50 mg daily and started the losartan 25 mg daily.      Echo and stress test scheduled 4/21 and next follow up in the office 5/13.      Instructed to call with worsening SOB, increased edema, weight gain.

## 2025-04-15 ENCOUNTER — TELEPHONE (OUTPATIENT)
Dept: CARDIOLOGY CLINIC | Facility: CLINIC | Age: 75
End: 2025-04-15

## 2025-04-15 ENCOUNTER — PROCEDURE VISIT (OUTPATIENT)
Dept: UROLOGY | Facility: CLINIC | Age: 75
End: 2025-04-15
Payer: MEDICARE

## 2025-04-15 VITALS
HEIGHT: 70 IN | SYSTOLIC BLOOD PRESSURE: 128 MMHG | WEIGHT: 225 LBS | HEART RATE: 89 BPM | BODY MASS INDEX: 32.21 KG/M2 | DIASTOLIC BLOOD PRESSURE: 64 MMHG | OXYGEN SATURATION: 97 % | TEMPERATURE: 97.6 F

## 2025-04-15 DIAGNOSIS — C61 PROSTATE CANCER (HCC): Primary | ICD-10-CM

## 2025-04-15 PROCEDURE — 96402 CHEMO HORMON ANTINEOPL SQ/IM: CPT

## 2025-04-15 NOTE — TELEPHONE ENCOUNTER
----- Message from Felix Suarez PA-C sent at 4/8/2025  5:20 PM EDT -----  Regarding: FW: remote as requested  Please let him know his device check did not show any episodes of atrial fibrillation with fast heart rate.  It is suggestive that he still may be retaining some fluid.  He should continue on the dose of torsemide that his nephrologist recommended, continue to monitor his symptoms and weights, obtain the updated labs, and follow-up at his appointment on 4/11.  He should let us know of any symptoms of concern.  ----- Message -----  From: [place]  Sent: 4/7/2025  11:59 AM EDT  To: Felix uSarez PA-C  Subject: remote as requested                              Please see narrative of report requested: episode check. Thank you.       NON-BILLABLE CARELINK TRANSMISSION/1 WK EPISODE CK PER ARGENIS SUAREZ PA-C: BATTERY VOLTAGE ADEQUATE (11.3 YR). AP 33.6%   10%. AS/VS @ AVG 86 BPM ON CURRENT EGM. NO SIGNIFICANT HIGH RATE EPISODES. PATIENT TAKING WARFARIN, METOPROLOL SUCC. OPTI-VOL FLUID THRESHOLD REMAINS CROSSED & ONGOING. PATIENT ALSO TAKING TORSEMIDE. EF 31% (8/21/24 ECHO). PREVIOUSLY REPORTED TO [facility] CLINICAL TEAM 3/20/25 & RECHECK SCHEDULED 4/21/25. NORMAL DEVICE FUNCTION.  ES

## 2025-04-15 NOTE — PROGRESS NOTES
"4/15/2025  Galen [place] is a 74 y.o. male  [id number]    Diagnosis:  Chief Complaint    Injections         Patient presents for monthly Firmagon 80 mg injection managed by Dr. Waite    Plan:  Patient to return for monthly Firmagon injection as scheduled.       Medication administration:    Site prepped with alcohol. Medication administered per 's guidelines. Injection given into Right abd region. Band-aid applied to site, patient tolerated well.     Administrations This Visit       degarelix acetate (FIRMAGON) injection 80 mg       Admin Date  04/15/2025 Action  Given Dose  80 mg Route  Subcutaneous Documented By  Huma Fernandez LPN                    Vitals:    04/15/25 1030   BP: 128/64   Patient Position: Sitting   Cuff Size: Large   Pulse: 89   Temp: 97.6 °F (36.4 °C)   TempSrc: Temporal   SpO2: 97%   Weight: 102 kg (225 lb)   Height: 5' 10\" (1.778 m)         Huma Fernandez LPN    "

## 2025-04-17 ENCOUNTER — APPOINTMENT (OUTPATIENT)
Dept: LAB | Facility: CLINIC | Age: 75
End: 2025-04-17
Payer: MEDICARE

## 2025-04-17 ENCOUNTER — TELEPHONE (OUTPATIENT)
Dept: CARDIOLOGY CLINIC | Facility: CLINIC | Age: 75
End: 2025-04-17

## 2025-04-17 DIAGNOSIS — Z79.01 LONG TERM (CURRENT) USE OF ANTICOAGULANTS: ICD-10-CM

## 2025-04-17 DIAGNOSIS — I47.10 PSVT (PAROXYSMAL SUPRAVENTRICULAR TACHYCARDIA) (HCC): ICD-10-CM

## 2025-04-17 DIAGNOSIS — I47.29 NSVT (NONSUSTAINED VENTRICULAR TACHYCARDIA) (HCC): ICD-10-CM

## 2025-04-17 DIAGNOSIS — R60.1 GENERALIZED EDEMA: ICD-10-CM

## 2025-04-17 DIAGNOSIS — I49.3 PVC'S (PREMATURE VENTRICULAR CONTRACTIONS): ICD-10-CM

## 2025-04-17 DIAGNOSIS — I42.0 DILATED CARDIOMYOPATHY (HCC): ICD-10-CM

## 2025-04-17 DIAGNOSIS — I48.0 PAROXYSMAL ATRIAL FIBRILLATION (HCC): ICD-10-CM

## 2025-04-17 LAB
ALBUMIN SERPL BCG-MCNC: 4.1 G/DL (ref 3.5–5)
ALP SERPL-CCNC: 76 U/L (ref 34–104)
ALT SERPL W P-5'-P-CCNC: 10 U/L (ref 7–52)
ANION GAP SERPL CALCULATED.3IONS-SCNC: 12 MMOL/L (ref 4–13)
AST SERPL W P-5'-P-CCNC: 15 U/L (ref 13–39)
BILIRUB SERPL-MCNC: 0.47 MG/DL (ref 0.2–1)
BUN SERPL-MCNC: 57 MG/DL (ref 5–25)
CALCIUM SERPL-MCNC: 9.3 MG/DL (ref 8.4–10.2)
CHLORIDE SERPL-SCNC: 101 MMOL/L (ref 96–108)
CO2 SERPL-SCNC: 28 MMOL/L (ref 21–32)
CREAT SERPL-MCNC: 1.97 MG/DL (ref 0.6–1.3)
GFR SERPL CREATININE-BSD FRML MDRD: 32 ML/MIN/1.73SQ M
GLUCOSE P FAST SERPL-MCNC: 145 MG/DL (ref 65–99)
INR PPP: 1.78 (ref 0.85–1.19)
MAGNESIUM SERPL-MCNC: 2.2 MG/DL (ref 1.9–2.7)
POTASSIUM SERPL-SCNC: 5 MMOL/L (ref 3.5–5.3)
PROT SERPL-MCNC: 7 G/DL (ref 6.4–8.4)
PROTHROMBIN TIME: 20.8 SECONDS (ref 12.3–15)
SODIUM SERPL-SCNC: 141 MMOL/L (ref 135–147)

## 2025-04-17 PROCEDURE — 80053 COMPREHEN METABOLIC PANEL: CPT

## 2025-04-17 PROCEDURE — 36415 COLL VENOUS BLD VENIPUNCTURE: CPT

## 2025-04-17 PROCEDURE — 83735 ASSAY OF MAGNESIUM: CPT

## 2025-04-17 PROCEDURE — 85610 PROTHROMBIN TIME: CPT

## 2025-04-17 NOTE — TELEPHONE ENCOUNTER
Received mail with pt records  [facility] clinical report  Scanned in pts chart

## 2025-04-18 ENCOUNTER — RESULTS FOLLOW-UP (OUTPATIENT)
Dept: CARDIOLOGY CLINIC | Facility: CLINIC | Age: 75
End: 2025-04-18

## 2025-04-18 ENCOUNTER — ANTICOAG VISIT (OUTPATIENT)
Dept: CARDIOLOGY CLINIC | Facility: CLINIC | Age: 75
End: 2025-04-18

## 2025-04-21 ENCOUNTER — HOSPITAL ENCOUNTER (OUTPATIENT)
Dept: NON INVASIVE DIAGNOSTICS | Facility: CLINIC | Age: 75
Discharge: HOME | End: 2025-04-21
Payer: MEDICARE

## 2025-04-21 ENCOUNTER — RESULTS FOLLOW-UP (OUTPATIENT)
Dept: CARDIOLOGY CLINIC | Facility: CLINIC | Age: 75
End: 2025-04-21

## 2025-04-21 ENCOUNTER — REMOTE DEVICE CLINIC VISIT (OUTPATIENT)
Dept: CARDIOLOGY CLINIC | Facility: CLINIC | Age: 75
End: 2025-04-21
Payer: MEDICARE

## 2025-04-21 VITALS
DIASTOLIC BLOOD PRESSURE: 98 MMHG | WEIGHT: 225 LBS | HEART RATE: 88 BPM | SYSTOLIC BLOOD PRESSURE: 122 MMHG | HEIGHT: 70 IN | BODY MASS INDEX: 32.21 KG/M2 | OXYGEN SATURATION: 95 %

## 2025-04-21 VITALS
HEART RATE: 89 BPM | SYSTOLIC BLOOD PRESSURE: 128 MMHG | HEIGHT: 70 IN | WEIGHT: 225 LBS | DIASTOLIC BLOOD PRESSURE: 64 MMHG | BODY MASS INDEX: 32.21 KG/M2

## 2025-04-21 DIAGNOSIS — I25.10 CORONARY ARTERY DISEASE INVOLVING NATIVE CORONARY ARTERY OF NATIVE HEART WITHOUT ANGINA PECTORIS: ICD-10-CM

## 2025-04-21 DIAGNOSIS — I20.89 ANGINAL EQUIVALENT (HCC): ICD-10-CM

## 2025-04-21 DIAGNOSIS — I42.0 DILATED CARDIOMYOPATHY (HCC): Primary | ICD-10-CM

## 2025-04-21 LAB
AORTIC ROOT: 3.7 CM
ASCENDING AORTA: 3.2 CM
E WAVE DECELERATION TIME: 133 MS
E/A RATIO: 1.23
FRACTIONAL SHORTENING: 9 (ref 28–44)
INTERVENTRICULAR SEPTUM IN DIASTOLE (PARASTERNAL SHORT AXIS VIEW): 1 CM
INTERVENTRICULAR SEPTUM: 1 CM (ref 0.6–1.1)
LAAS-AP2: 33.1 CM2
LAAS-AP4: 27 CM2
LEFT ATRIUM SIZE: 4.7 CM
LEFT ATRIUM VOLUME (MOD BIPLANE): 104 ML
LEFT ATRIUM VOLUME INDEX (MOD BIPLANE): 47.5 ML/M2
LEFT INTERNAL DIMENSION IN SYSTOLE: 5.9 CM (ref 2.1–4)
LEFT VENTRICULAR INTERNAL DIMENSION IN DIASTOLE: 6.5 CM (ref 3.5–6)
LEFT VENTRICULAR POSTERIOR WALL IN END DIASTOLE: 1 CM
LEFT VENTRICULAR STROKE VOLUME: 43 ML
LV EF US.2D.A4C+ESTIMATED: 26 %
LVSV (TEICH): 43 ML
MV E'TISSUE VEL-SEP: 4 CM/S
MV PEAK A VEL: 0.73 M/S
MV PEAK E VEL: 90 CM/S
MV STENOSIS PRESSURE HALF TIME: 39 MS
MV VALVE AREA P 1/2 METHOD: 5.64
RATE PRESSURE PRODUCT: NORMAL
RIGHT ATRIUM AREA SYSTOLE A4C: 22.1 CM2
RIGHT VENTRICLE ID DIMENSION: 4 CM
SL CV LEFT ATRIUM LENGTH A2C: 6.4 CM
SL CV LV EF: 25
SL CV PED ECHO LEFT VENTRICLE DIASTOLIC VOLUME (MOD BIPLANE) 2D: 216 ML
SL CV PED ECHO LEFT VENTRICLE SYSTOLIC VOLUME (MOD BIPLANE) 2D: 174 ML
SL CV REST NUCLEAR ISOTOPE DOSE: 10.7 MCI
SL CV STRESS NUCLEAR ISOTOPE DOSE: 32.6 MCI
SL CV STRESS RECOVERY BP: NORMAL MMHG
SL CV STRESS RECOVERY HR: 86 BPM
SL CV STRESS RECOVERY O2 SAT: 97 %
SPECT HRT GATED+EF W RNC IV: 20 %
STRESS ANGINA INDEX: 0
STRESS BASELINE BP: NORMAL MMHG
STRESS BASELINE HR: 88 BPM
STRESS O2 SAT REST: 95 %
STRESS PEAK HR: 92 BPM
STRESS POST O2 SAT PEAK: 96 %
STRESS POST PEAK BP: 144 MMHG
TR MAX PG: 54 MMHG
TR PEAK VELOCITY: 3.7 M/S
TRICUSPID ANNULAR PLANE SYSTOLIC EXCURSION: 1.7 CM
TRICUSPID VALVE PEAK REGURGITATION VELOCITY: 3.68 M/S

## 2025-04-21 PROCEDURE — 78452 HT MUSCLE IMAGE SPECT MULT: CPT | Performed by: INTERNAL MEDICINE

## 2025-04-21 PROCEDURE — 78452 HT MUSCLE IMAGE SPECT MULT: CPT

## 2025-04-21 PROCEDURE — 93306 TTE W/DOPPLER COMPLETE: CPT

## 2025-04-21 PROCEDURE — 93016 CV STRESS TEST SUPVJ ONLY: CPT | Performed by: INTERNAL MEDICINE

## 2025-04-21 PROCEDURE — 93306 TTE W/DOPPLER COMPLETE: CPT | Performed by: INTERNAL MEDICINE

## 2025-04-21 PROCEDURE — 93018 CV STRESS TEST I&R ONLY: CPT | Performed by: INTERNAL MEDICINE

## 2025-04-21 PROCEDURE — 93298 REM INTERROG DEV EVAL SCRMS: CPT | Performed by: INTERNAL MEDICINE

## 2025-04-21 PROCEDURE — A9502 TC99M TETROFOSMIN: HCPCS

## 2025-04-21 PROCEDURE — 93017 CV STRESS TEST TRACING ONLY: CPT

## 2025-04-21 RX ORDER — REGADENOSON 0.08 MG/ML
0.4 INJECTION, SOLUTION INTRAVENOUS ONCE
Status: COMPLETED | OUTPATIENT
Start: 2025-04-21 | End: 2025-04-21

## 2025-04-21 RX ADMIN — REGADENOSON 0.4 MG: 0.08 INJECTION, SOLUTION INTRAVENOUS at 08:35

## 2025-04-22 ENCOUNTER — RESULTS FOLLOW-UP (OUTPATIENT)
Dept: CARDIOLOGY CLINIC | Facility: CLINIC | Age: 75
End: 2025-04-22

## 2025-04-22 LAB
CHEST PAIN STATEMENT: NORMAL
MAX DIASTOLIC BP: 88 MMHG
MAX PREDICTED HEART RATE: 147 BPM
PROTOCOL NAME: NORMAL
REASON FOR TERMINATION: NORMAL
STRESS POST EXERCISE DUR MIN: 3 MIN
STRESS POST EXERCISE DUR SEC: 0 SEC
STRESS POST PEAK HR: 92 BPM
STRESS POST PEAK SYSTOLIC BP: 144 MMHG
TARGET HR FORMULA: NORMAL
TEST INDICATION: NORMAL

## 2025-04-22 NOTE — PROGRESS NOTES
Results for orders placed or performed in visit on 04/21/25   Cardiac EP device report    Narrative    MDT DC ICD/ACTIVE SYSTEM IS MRI CONDITIONAL  CARELINK TRANSMISSION - OPTI-VOL ONLY: OPTI-VOL FLUID THRESHOLD CROSSED ON 3/21/25 & ONGOING. PT SEEN ON 4/11/25 & TORSEMIDE DOSING DISCUSSED. WILL RECHECK IN 1 MONTH. BATTERY VOLTAGE ADEQUATE (11.3 YRS). AP-33%, -7%. ALL AVAILABLE LEAD PARAMETERS WITHIN NORMAL LIMITS. NO SIGNIFICANT HIGH RATE EPISODES. NORMAL DEVICE FUNCTION. GV

## 2025-04-22 NOTE — TELEPHONE ENCOUNTER
----- Message from Felix Suarez PA-C sent at 4/21/2025  6:14 PM EDT -----  Please let him know his echocardiogram shows that his ejection fraction is persistently low at 25%.  This does not appear to be a significant change compared to his last 2 echocardiograms.  He otherwise has mild leakiness of the mitral valve, and mild to moderate leakiness of the tricuspid valve.  Will discuss results further at his next cardiology appointment, he should report any symptoms of concern.

## 2025-04-22 NOTE — TELEPHONE ENCOUNTER
Spoke with pt relayed Felix PA-C response, pt verbally understood.     Both echo and stress test results relayed  to pt.

## 2025-04-29 ENCOUNTER — OFFICE VISIT (OUTPATIENT)
Dept: HEMATOLOGY ONCOLOGY | Facility: CLINIC | Age: 75
End: 2025-04-29
Payer: MEDICARE

## 2025-04-29 VITALS
DIASTOLIC BLOOD PRESSURE: 74 MMHG | HEART RATE: 89 BPM | BODY MASS INDEX: 32.35 KG/M2 | RESPIRATION RATE: 18 BRPM | SYSTOLIC BLOOD PRESSURE: 110 MMHG | OXYGEN SATURATION: 95 % | HEIGHT: 70 IN | TEMPERATURE: 97.3 F | WEIGHT: 226 LBS

## 2025-04-29 DIAGNOSIS — I48.0 PAROXYSMAL ATRIAL FIBRILLATION (HCC): ICD-10-CM

## 2025-04-29 DIAGNOSIS — E78.2 MIXED HYPERLIPIDEMIA: ICD-10-CM

## 2025-04-29 DIAGNOSIS — I10 ESSENTIAL HYPERTENSION: ICD-10-CM

## 2025-04-29 DIAGNOSIS — Z95.810 ICD (IMPLANTABLE CARDIOVERTER-DEFIBRILLATOR) IN PLACE: ICD-10-CM

## 2025-04-29 DIAGNOSIS — C61 PROSTATE CANCER (HCC): Primary | ICD-10-CM

## 2025-04-29 PROCEDURE — 99215 OFFICE O/P EST HI 40 MIN: CPT | Performed by: INTERNAL MEDICINE

## 2025-04-29 NOTE — PROGRESS NOTES
Name: Galen Carson      : 1950      MRN: [id number]  Encounter Provider: Nicolette Diaz MD  Encounter Date: 2025   Encounter department: [facility] [place]  :  Assessment & Plan  Prostate cancer (HCC)  Patient plans to continue treatment with Dr. Olivares.  On ADT, Erleada and denosumab.         Paroxysmal atrial fibrillation (HCC)  Follows with cardiology.         Essential hypertension  Management as per PCP and cardiology         ICD (implantable cardioverter-defibrillator) in place         Mixed hyperlipidemia  Continue statins as per PCP and cardiology.           Assessment & Plan  1. Metastatic prostate cancer.  The patient has stage IV prostate cancer with metastatic disease. He is currently under the care of Dr. Olivares, who has initiated treatment with Erleada. The goals of the treatment are to inhibit cancer growth and manage symptoms. The patient reports experiencing hot flashes, a known side effect of Erleada. He started Erleada two months ago and has not had a recent PSA test since 2025. A PET scan on 2025 indicated disease progression.     2. Osteoporosis.  A recent DEXA scan revealed osteoporosis.  Patient reports that he has been started on denosumab by Dr. Olivares.  He will continue to follow with Dr. Olivares.    3. Sleep apnea.  The patient underwent a sleep apnea test, which showed significant episodes of apnea. A CPAP machine has been ordered and is on its way to his house. This treatment is expected to alleviate some of his symptoms, including fatigue and stress on his heart.      No follow-ups on file.    History of Present Illness   Chief Complaint   Patient presents with    Follow-up     Galen Carson 1950 is a 74 y.o. male with stage metastatic prostate cancer. He was initially diagnosed with Elder 5+4=9 prostate cancer in May 2019. He  completed a course of definitive radiation therapy on 10/18/19 and  received 2 years  of androgen deprivation completed on 2/10/21   9/25/23  PSMA PET CT  1. Heterogeneous multifocal fairly intense tracer activity involving the right greater than left prostate gland highly suspicious for locally recurrent intraprostatic PSMA positive malignancy.   2. No focal tracer activity characteristic of extraprostatic PSMA positive metastatic disease. However, note is made of minimal tracer activity in the region of a suspected tiny nonpathologic in size right common iliac lymph node which has slightly   increased in size since 6/13/2019 and is of uncertain clinical significance. As early developing alisha metastatic disease in this location cannot be excluded, attention to this region on short interval follow-up imaging is recommended to ensure   stability.     10/24/23 Urology, [place]  1.  Prostate cancer, high risk status post radiation therapy and ADT  2. Hot flashes  - final Lupron was given 2/10/21   - PSA now 2.25 (8/24/23) from aguilar of 0.2  - PSMA PET (9/25/23) showing Heterogeneous multifocal fairly intense tracer activity involving the right greater than left prostate gland highly suspicious for locally recurrent intraprostatic PSMA positive malignancy   - mpMRI (10/13/23) showing tumor in the anterior and posterior right peripheral zone apex and mid glad and right transition zone at apex corresponds to PSMA avid lesion on prior PET. The lesion broadly abuts the capsule without visualized gross extraprostatic extension.   - Discussed case with Dr. Burgess with recommendations to proceed with fusion guided biopsy at this time after further mapping by radiology    - Medical oncology referral and also to follow up with rad onc. We called med onc together in office to schedule appointment. 11/27 appointment scheduled for 9AM. Rad onc would like follow up scheduled after fusion biopsy        10/13/23 mpMRI prostate  1. Tumor in the anterior and posterior right peripheral zone apex and mid gland and right  transition zone at apex corresponds to the PSMA avid lesion on prior PET/CT. The lesion broadly abuts the capsule without visualized gross extraprostatic extension.   2. No seminal vesicle invasion, pelvic lymphadenopathy, or pelvic osseous metastatic disease.   3. Calculated prostate volume of  29   cc.        11/16/23 Dr. Frye- Telephone encounter  scheduled fusion biopsy was canceled by anesthesia today due to his cardiac arrhythmia (now requiring a LifeVest)   As the patient is no longer a candidate for prostate biopsy due to his cardiac condition I recommend an alternative management strategies be considered   Of note, for nonmetastatic biochemically recurrent prostate cancer the standard of care would be for active surveillance.         1/4/24 Hospital admission- icd implant        2/21/24 Dr. Barreto  Patient is to be seen by radiation therapy and by urology to see if he would be candidate for any kind of localized therapy. He will come back here in 2 months. If not a candidate for any localized therapy may be candidate for systemic treatment with LHRH antagonist.         3/18/24 Dr. Waite  explained that if he just wanted his simple prostate biopsy done, we could have this done in the office.  I told him that I would have to run this by Dr. Harvey, since she would be managing the radiation therapy.   will see if the radiation oncology team is 1. Agreeable with repeat radiation and 2. Agreeable to pursue repeat radiation if we were to do standard 12 core transrectal ultrasound prostate biopsy in the office which demonstrates persistent prostate cancer.   If radiation oncology is not okay with repeat radiation, we could consider cryotherapy,      04/17/2024: Dr. Barreto:  Patient will be seen by urology again. He will be offered ADT. This is suggestion [facility]. He was seen by cardiology. They do need to make a comment whether he would be able to tolerate any interventions cardiac wise. This  will be particularly important if in fact the disease does not seem to be systemic and after treatment ADT he might be candidate for localized therapy. Some of those treatments will require metastatic and I do not know if he will be candidate for that. He will come back here in 3 months.     10/17/2024: Dr. Barreto  He will continue his Firmagon hopefully. His PSA 0.7. He will come back here in 6 months. We are avoiding AR inhibitors. We are concerned about his cardiac status. If his PSA goes up they could be considered in the future.   History of Present Illness  The patient is a 75-year-old male who presents for transfer of care from Dr. Barreto and follow-up of metastatic prostate cancer.    The initial diagnosis of prostate cancer was made in 2019, and radiation therapy was initiated at that time. Over the past 2 years, he has been treated at [facility] due to a recurrence of his prostate cancer but expresses dissatisfaction with the care received. He has been under the care of Dr. Olivares for the past 2 months, during which time he was prescribed Erleada, a medication provided free of charge. Dr. Olivares conducted a comprehensive bone scan and other tests, contrasting with his experience at [facility]. Additionally, Dr. Olivares administers his injections and has access to all his records from [facility].    Current symptoms include fatigue and hot flashes, which are attributed to hormone treatments and Erleada. He reports no leg swelling or chest pain. His weight has decreased from 228 pounds to 225 pounds as of yesterday, and he weighed 226 pounds this morning. He has been on Erleada for 2 months and has not had a recent PSA test.    Dissatisfaction is expressed over the  physician turnover and the number of providers that he had seen at [facility] for oncology and cardiology.    Sleep apnea is reported, with episodes of not sleeping for days at a time. A sleep apnea test was conducted, and he was informed that his  machine is on its way to his house. He stopped breathing 150 times in his sleep.  Oncology History   Cancer Staging   Prostate cancer ([facility])  Staging form: Prostate, AJCC 8th Edition  - Clinical: Stage JAIRO (cT2b, cN1, cM0, PSA: 22, Grade Group: 5) - Signed by Erik Elliott MD on 6/20/2019  Prostate specific antigen (PSA) range: 20 or greater  Histologic grading system: 5 grade system  Oncology History   Prostate cancer ([facility])   5/15/2019 Initial Diagnosis    Prostate cancer ([facility])     5/15/2019 Biopsy    Final Diagnosis  A. Prostate, right lateral base, core needle biopsy: Benign prostatic stroma. No malignancy is identified.     B. Prostate, right medial base, core needle biopsy:             - Prostatic adenocarcinoma, Elder score 4 + 5 = 9, Prognostic Grade Group V, involving nearly 100% of one core biopsy.     C. Prostate, right lateral mid, core needle biopsy:             - Prostatic adenocarcinoma, Elder score 5 + 4 = 9, Prognostic Grade Group V, involving nearly 100% one core biopsy.     D. Prostate, right medial mid, core needle biopsy:             - Prostatic adenocarcinoma, Elder score 4 + 5 = 9, Prognostic Grade Group V, involving nearly 100% of one core biopsy.     E. Prostate, right lateral apex, core needle biopsy:No malignancy is identified.     F. Prostate, right medial apex, core needle biopsy:             - Prostatic adenocarcinoma, [place] score 5 + 4 = 9, Prognostic Grade Group V, involving nearly 100% of one core biopsy.     G. Prostate, left lateral base, core needle biopsy:- Atypical prostate glands, indeterminate for prostatic adenocarcinoma.     H. Prostate, left medial base, core needle biopsy:             - Prostatic adenocarcinoma, [place] score 5 + 4 =9, Prognostic Grade Group V, involving 15% of one core biopsy.     I. Prostate, left lateral mid, core needle biopsy: Benign prostate glands. No malignancy is identified.  J. Prostate, left medial mid, core needle biopsy: Benign  "prostate glands. No malignancy is identified.     K. Prostate, left lateral apex, core needle biopsy:             - Prostatic adenocarcinoma, [place] score 5 + 4 = 9, Prognostic Grade Group V, involving 30% of one of 2 cores.     L. Prostate, left medial apex, core needle biopsy:             - Prostatic adenocarcinoma, [place] score 5 + 4 =  9, Prognostic Grade Group V, discontinuously involving approximately 50% of 1 core biopsy.         6/20/2019 -  Cancer Staged    Staging form: Prostate, AJCC 8th Edition  - Clinical: Stage JAIRO (cT2b, cN1, cM0, PSA: 22, Grade Group: 5) - Signed by Erik Elliott MD on 6/20/2019  Prostate specific antigen (PSA) range: 20 or greater  Histologic grading system: 5 grade system       6/28/2019 - 6/28/2019 Hormone Therapy    Firmagon     8/1/2019 - 5/10/2021 Hormone Therapy    Lupron for 2 years     8/19/2019 - 10/18/2019 Radiation    4500 centigray in 25 fractions to the prostate, seminal vesicles and regional pelvic lymphatics followed by an additional 1440 centigray in 8 fractions to the prostate, seminal vesicles, and gross lymphadenopathy with the final 11 fractions of an additional 1980 centigray to the prostate and proximal seminal vesicles for a total dose of 7920 centigray in 44 fractions.         Pertinent Medical History   04/29/25: reviewed     Review of Systems        Objective   /74 (BP Location: Left arm, Patient Position: Sitting, Cuff Size: Adult)   Pulse 89   Temp (!) 97.3 °F (36.3 °C) (Temporal)   Resp 18   Ht 5' 10\" (1.778 m)   Wt 103 kg (226 lb)   SpO2 95%   BMI 32.43 kg/m²     Pain Screening:  Pain Score: 0-No pain  ECOG   1  Physical Exam  Vitals and nursing note reviewed.   Constitutional:       General: He is not in acute distress.     Appearance: He is obese.   HENT:      Head: Normocephalic and atraumatic.      Mouth/Throat:      Mouth: Mucous membranes are moist.      Pharynx: Oropharynx is clear.   Eyes:      General: No scleral icterus.     " Extraocular Movements: Extraocular movements intact.      Conjunctiva/sclera: Conjunctivae normal.   Cardiovascular:      Rate and Rhythm: Normal rate and regular rhythm.      Pulses: Normal pulses.      Heart sounds: No murmur heard.  Pulmonary:      Effort: Pulmonary effort is normal.      Breath sounds: Normal breath sounds. No wheezing, rhonchi or rales.   Abdominal:      General: Bowel sounds are normal.      Palpations: Abdomen is soft.      Tenderness: There is no abdominal tenderness.   Musculoskeletal:         General: No swelling or tenderness. Normal range of motion.      Cervical back: Neck supple.   Lymphadenopathy:      Cervical: No cervical adenopathy.   Skin:     General: Skin is warm and dry.      Coloration: Skin is not pale.      Findings: No bruising, erythema or rash.   Neurological:      General: No focal deficit present.      Mental Status: He is alert and oriented to person, place, and time.      Motor: No weakness.   Psychiatric:         Mood and Affect: Mood normal.         Behavior: Behavior normal.       Physical Exam  Cardiovascular: No chest pain or swelling in the legs. Patient reports reduction in water retention.  Respiratory: Lungs clear to auscultation.    Results  Labs   - PSA test: 01/2025, Disease progression    Imaging   - PET scan: Disease progression   - DEXA scan: Osteoporosis  Labs: I have reviewed the following labs:  Lab Results   Component Value Date/Time    WBC 5.02 04/09/2025 07:10 AM    RBC 3.80 (L) 04/09/2025 07:10 AM    Hemoglobin 11.0 (L) 04/09/2025 07:10 AM    Hematocrit 36.2 (L) 04/09/2025 07:10 AM    MCV 95 04/09/2025 07:10 AM    MCH 28.9 04/09/2025 07:10 AM    RDW 14.1 04/09/2025 07:10 AM    Platelets 205 04/09/2025 07:10 AM    Segmented % 62 04/03/2025 07:19 AM    Lymphocytes % 23 04/03/2025 07:19 AM    Monocytes % 10 04/03/2025 07:19 AM    Eosinophils Relative 3 04/03/2025 07:19 AM    Basophils Relative 1 04/03/2025 07:19 AM    Immature Grans % 1 04/03/2025  07:19 AM    Absolute Neutrophils 3.49 04/03/2025 07:19 AM     Lab Results   Component Value Date/Time    Potassium 5.0 04/17/2025 07:41 AM    Chloride 101 04/17/2025 07:41 AM    CO2 28 04/17/2025 07:41 AM    BUN 57 (H) 04/17/2025 07:41 AM    Creatinine 1.97 (H) 04/17/2025 07:41 AM    Glucose, Fasting 145 (H) 04/17/2025 07:41 AM    Calcium 9.3 04/17/2025 07:41 AM    AST 15 04/17/2025 07:41 AM    ALT 10 04/17/2025 07:41 AM    Alkaline Phosphatase 76 04/17/2025 07:41 AM    Total Protein 7.0 04/17/2025 07:41 AM    Albumin 4.1 04/17/2025 07:41 AM    Total Bilirubin 0.47 04/17/2025 07:41 AM    eGFR 32 04/17/2025 07:41 AM             Disclaimer: This document was prepared using [employer] technology. If a word or phrase is confusing, or does not make sense, this is likely due to recognition error which was not discovered during this clinician's review. If you believe an error has occurred, please contact me through [facility] service line for miguel?cation.      Follow Up    All questions were answered to the patient's satisfaction during this encounter. The patient knows the contact information for our office and knows to reach out for any relevant concerns related to this encounter. They are to call for any temperature 100.4 or higher, new symptoms including but not restricted to shaking chills, decreased appetite, nausea, vomiting, diarrhea, increased fatigue, shortness of breath or chest pain, confusion, and not feeling the strength to come to the clinic. For all other listed problems and medical diagnosis in their chart - they are managed by PCP and/or other specialists, which the patient acknowledges.     I spent 43 minutes reviewing the records (labs, clinician notes, outside records, medical history, ordering medicine/tests/procedures, monitoring of anti-neoplastic toxicities, interpreting the imaging/labs previously done) and coordination of care as well as direct time with the patient today, of which greater  than 50% of the time was spent in counseling and coordination of care with the patient/family.

## 2025-05-02 ENCOUNTER — APPOINTMENT (OUTPATIENT)
Dept: LAB | Facility: CLINIC | Age: 75
End: 2025-05-02
Payer: MEDICARE

## 2025-05-02 DIAGNOSIS — N18.32 STAGE 3B CHRONIC KIDNEY DISEASE (HCC): ICD-10-CM

## 2025-05-02 DIAGNOSIS — Z79.01 LONG TERM (CURRENT) USE OF ANTICOAGULANTS: ICD-10-CM

## 2025-05-02 DIAGNOSIS — I48.0 PAROXYSMAL ATRIAL FIBRILLATION (HCC): ICD-10-CM

## 2025-05-02 LAB
BACTERIA UR QL AUTO: ABNORMAL /HPF
BILIRUB UR QL STRIP: NEGATIVE
CLARITY UR: CLEAR
COLOR UR: ABNORMAL
GLUCOSE UR STRIP-MCNC: NEGATIVE MG/DL
HGB UR QL STRIP.AUTO: NEGATIVE
INR PPP: 1.92 (ref 0.85–1.19)
KETONES UR STRIP-MCNC: NEGATIVE MG/DL
LEUKOCYTE ESTERASE UR QL STRIP: NEGATIVE
NITRITE UR QL STRIP: NEGATIVE
NON-SQ EPI CELLS URNS QL MICRO: ABNORMAL /HPF
PH UR STRIP.AUTO: 6.5 [PH]
PROT UR STRIP-MCNC: ABNORMAL MG/DL
PROTHROMBIN TIME: 22.1 SECONDS (ref 12.3–15)
RBC #/AREA URNS AUTO: ABNORMAL /HPF
SP GR UR STRIP.AUTO: 1.02 (ref 1–1.03)
UROBILINOGEN UR STRIP-ACNC: <2 MG/DL
WBC #/AREA URNS AUTO: ABNORMAL /HPF

## 2025-05-02 PROCEDURE — 36415 COLL VENOUS BLD VENIPUNCTURE: CPT

## 2025-05-02 PROCEDURE — 85610 PROTHROMBIN TIME: CPT

## 2025-05-02 PROCEDURE — 81001 URINALYSIS AUTO W/SCOPE: CPT

## 2025-05-05 ENCOUNTER — NURSE TRIAGE (OUTPATIENT)
Age: 75
End: 2025-05-05

## 2025-05-05 ENCOUNTER — ANTICOAG VISIT (OUTPATIENT)
Dept: CARDIOLOGY CLINIC | Facility: CLINIC | Age: 75
End: 2025-05-05
Payer: MEDICARE

## 2025-05-05 ENCOUNTER — RESULTS FOLLOW-UP (OUTPATIENT)
Dept: CARDIOLOGY CLINIC | Facility: CLINIC | Age: 75
End: 2025-05-05

## 2025-05-05 DIAGNOSIS — I48.0 PAROXYSMAL ATRIAL FIBRILLATION (HCC): ICD-10-CM

## 2025-05-05 DIAGNOSIS — Z79.01 LONG TERM (CURRENT) USE OF ANTICOAGULANTS: Primary | ICD-10-CM

## 2025-05-05 PROCEDURE — 93793 ANTICOAG MGMT PT WARFARIN: CPT

## 2025-05-05 NOTE — PROGRESS NOTES
Radha Leahy LPN to Me       5/5/25 11:08 AM  Note     Pt can stay on the same dose and retest in 2 weeks                    Spoke with pt relayed Radha GUTHRIE advised, pt verbally understood.    Pt stated that he is taking Erleada 60 mg for his cancer, and he feels that this medication is decreasing his INR level.     Pt takes 7.5 mg Mon & Fri, pt is inquiring if he can take 7.5 mg on Wed as well to increase his INR levels.     Spoke with Radha GUTHRIE and advised that it is ok.    Spoke with pt, pt is aware of the extra dosage he can take.

## 2025-05-05 NOTE — TELEPHONE ENCOUNTER
"Reports faxed as requested.        Reason for Disposition   Question about upcoming scheduled surgery, procedure or test, no triage required, and triager able to answer question     Anesthesia requesting additional information    Answer Assessment - Initial Assessment Questions  1. REASON FOR CALL: \"What is the main reason for your call?\" or \"How can I best help you?\"      Renu from [facility] [place] calling for additional information per anesthesia request: ICD interrogation, ECHO results    3. OTHER QUESTIONS: \"Do you have any other questions?\"      Can reports be sent today as patient has surgery scheduled for tomorrow.  Fax # [phone]    Protocols used: Information Only Call - No Triage-Adult-[place]    "

## 2025-05-12 PROBLEM — I25.10 NONOBSTRUCTIVE ATHEROSCLEROSIS OF CORONARY ARTERY: Status: ACTIVE | Noted: 2025-05-12

## 2025-05-12 PROBLEM — I50.22 CHRONIC HFREF (HEART FAILURE WITH REDUCED EJECTION FRACTION) (HCC): Status: ACTIVE | Noted: 2024-05-09

## 2025-05-12 PROBLEM — I42.8 NICM (NONISCHEMIC CARDIOMYOPATHY) (HCC): Status: ACTIVE | Noted: 2023-10-02

## 2025-05-12 PROBLEM — R73.01 IFG (IMPAIRED FASTING GLUCOSE): Status: RESOLVED | Noted: 2019-03-12 | Resolved: 2025-05-12

## 2025-05-12 PROBLEM — I47.29 NSVT (NONSUSTAINED VENTRICULAR TACHYCARDIA) (HCC): Status: ACTIVE | Noted: 2025-05-12

## 2025-05-12 NOTE — ASSESSMENT & PLAN NOTE
Hypervolemic on exam.  Has been taking torsemide 60 mg daily, plan to increase to 40 mg bid.  Plan for BMP in 1 week.  Encourage low-sodium diet, daily weights, LE compression stockings, and LE elevation.  Discussed visitation to the ED if patient experiences persistent or new/worsening symptoms for consideration of IV diuresis with close monitoring of labs.

## 2025-05-12 NOTE — ASSESSMENT & PLAN NOTE
Most recent LDL above goal.  Increase Crestor to 20 mg daily.  Continue dietary control and periodic surveillance.

## 2025-05-12 NOTE — ASSESSMENT & PLAN NOTE
HR well-controlled, continue Toprol-XL.  [id number] at least 5, continue Coumadin.  Follows with the Coumadin clinic.

## 2025-05-12 NOTE — PROGRESS NOTES
[facility] Cardiology   Office Visit    Galen Carson 74 y.o. male MRN: [id number]    05/13/25      Assessment & Plan  Acute on chronic HFrEF  Hypervolemic on exam.  Has been taking torsemide 60 mg daily, plan to increase to 40 mg bid.  Plan for BMP in 1 week.  Encourage low-sodium diet, daily weights, LE compression stockings, and LE elevation.  Discussed visitation to the ED if patient experiences persistent or new/worsening symptoms for consideration of IV diuresis with close monitoring of labs.  NICM with EF 25% s/p MDT DC ICD (1/4/2024)  Recent TTE and pharmacological MPI stress test reviewed.  Continue losartan and torsemide.  Discussed further optimization of GDMT such as consideration for SGLT2 inhibitors, however patient wishes to hold off at this time.  Follows with the device clinic.  Paroxysmal atrial fibrillation  HR well-controlled, continue Toprol-XL.  [id number] at least 5, continue Coumadin.  Follows with the Coumadin clinic.  History of NSVT, PSVT, PVCs  No significant high rate episodes on most recent device report.  Continue Toprol-XL.  Nonobstructive CAD  Continue Crestor and Toprol-XL.  Not on ASA due to increased bleeding risk with Coumadin.  Essential hypertension  BP is well-controlled.  Continue losartan and Toprol-XL.  Increase torsemide as per above.  Encourage ambulatory monitoring and low-sodium diet.  Mixed hyperlipidemia  Most recent LDL above goal.  Increase Crestor to 20 mg daily.  Continue dietary control and periodic surveillance.        Interval history: Galen Carson is a 74 y.o. year old male with history of chronic HFrEF, NICM with EF 25% s/p MDT DC ICD, paroxysmal atrial fibrillation on Coumadin, NSVT, PSVT, PVCs, nonobstructive CAD, hypertension, hyperlipidemia, T2DM, and CKD 3b who presents for office visit.  Since time of last visit with cardiology, patient continues to experience shortness of breath/orthopnea with associated lower extremity edema.  He has been  "taking torsemide 60 mg daily.  Otherwise, has been taking all medications as prescribed.  Denies adverse effects to current medications.  Patient monitors his weight on a regular basis and tries to maintain a low-sodium diet.  Reports difficulty sleeping, denies any additional complaints at this time.  Patient follows with nephrology.  He has previously been evaluated by Dr. Lovell and Dr. Li.     Review of Systems:  Review of Systems   Constitutional:  Negative for chills and fever.   HENT:  Negative for ear pain and sore throat.    Eyes:  Negative for pain and visual disturbance.   Respiratory:  Positive for shortness of breath. Negative for cough.    Cardiovascular:  Positive for leg swelling. Negative for chest pain and palpitations.   Gastrointestinal:  Negative for abdominal pain and vomiting.   Genitourinary:  Negative for dysuria and hematuria.   Musculoskeletal:  Negative for arthralgias and back pain.   Skin:  Negative for color change and rash.   Neurological:  Negative for seizures and syncope.   All other systems reviewed and are negative.      PHYSICAL EXAM:  Vitals:   Vitals:    05/13/25 1101   BP: 116/76   BP Location: Left arm   Patient Position: Sitting   Cuff Size: Standard   Pulse: 90   Resp: 17   SpO2: 96%   Weight: 103 kg (228 lb)   Height: 5' 10\" (1.778 m)        Physical Exam:  GEN: Alert and oriented x3, in no acute distress.  Well appearing and well nourished.   HEENT: Sclera anicteric, conjunctivae pink, mucous membranes moist. Oropharynx clear.   NECK: Supple, no carotid bruits, difficult to assess JVD. Trachea midline, no thyromegaly.   HEART: Regular rhythm, normal S1 and S2, no murmurs, clicks, gallops or rubs. PMI nondisplaced, no thrills.   LUNGS: Decreased breath sounds bilaterally; no wheezes, rales, or rhonchi. No increased work of breathing or signs of respiratory distress.   ABDOMEN: Soft, nontender, nondistended, normoactive bowel sounds.   EXTREMITIES: Skin warm and well " perfused, no clubbing or cyanosis, 1+ BL LE edema.  NEURO: No focal findings. Normal speech. Mood and affect normal.   SKIN: Normal without suspicious lesions on exposed skin.    Follow up: 4 weeks or sooner as needed    Allergies   Allergen Reactions    Penicillin G Benzathine Other (See Comments)    Penicillin V Other (See Comments)     As a child          Current Outpatient Medications:     Ascorbic Acid (vitamin C) 1000 MG tablet, Take 1,000 mg by mouth daily, Disp: , Rfl:     Blood Glucose Monitoring Suppl (ONETOUCH VERIO) w/Device KIT, by Does not apply route once for 1 dose Test sugar in the morning, Disp: 1 kit, Rfl: 0    cetirizine (ZyrTEC) 5 MG tablet, Take 5 mg by mouth daily, Disp: , Rfl:     Continuous Glucose Sensor (FreeStyle Lisa 3 Sensor) MISC, Use 1 each every 14 (fourteen) days, Disp: 6 each, Rfl: 5    Erleada 60 MG TABS, Take 4 tablets by mouth daily, Disp: , Rfl:     EVENING PRIMROSE OIL PO, Take by mouth 3 (three) times a day, Disp: , Rfl:     glimepiride (AMARYL) 1 mg tablet, Take 1 tablet (1 mg total) by mouth daily with breakfast, Disp: 90 tablet, Rfl: 1    losartan (COZAAR) 25 mg tablet, Take 1 tablet (25 mg total) by mouth daily, Disp: 90 tablet, Rfl: 1    metoprolol succinate (TOPROL-XL) 50 mg 24 hr tablet, Take 1 tablet (50 mg total) by mouth daily, Disp: 90 tablet, Rfl: 3    multivitamin (THERAGRAN) TABS, Take 1 tablet by mouth daily, Disp: , Rfl:     pantoprazole (PROTONIX) 20 mg tablet, Take 1 tablet (20 mg total) by mouth daily, Disp: 90 tablet, Rfl: 1    rosuvastatin (CRESTOR) 20 MG tablet, Take 1 tablet (20 mg total) by mouth daily, Disp: 90 tablet, Rfl: 1    torsemide (DEMADEX) 20 mg tablet, Take 2 tablets (40 mg total) by mouth 2 (two) times a day, Disp: , Rfl:     VITAMIN D, ERGOCALCIFEROL, PO, Take by mouth, Disp: , Rfl:     vitamin E, tocopherol, 400 units capsule, Take 400 Units by mouth daily, Disp: , Rfl:     warfarin (Coumadin) 5 mg tablet, Take one tablet daily or as  directed, Disp: 90 tablet, Rfl: 3    Past Medical History:   Diagnosis Date    Allergic     Anesthesia     pt wears a life vest (11/16).  should not be scheduled at [facility] until heart condition is stabilized    BPH (benign prostatic hypertrophy)     Cancer (HCC)     Chronic kidney disease     Coronary artery disease     Diabetes mellitus (HCC)     Hyperlipidemia     Hypertension     PAF (paroxysmal atrial fibrillation) (HCC)     Prostate cancer (HCC)        Family History   Problem Relation Age of Onset    Stroke Father     No Known Problems Mother     Prostate cancer Brother     Arthritis Brother     No Known Problems Sister     Diabetes Sister     Diabetes Sister     No Known Problems Son     No Known Problems Daughter        Past Medical History:   Diagnosis Date    Allergic     Anesthesia     pt wears a life vest (11/16).  should not be scheduled at [facility] until heart condition is stabilized    BPH (benign prostatic hypertrophy)     Cancer (HCC)     Chronic kidney disease     Coronary artery disease     Diabetes mellitus (HCC)     Hyperlipidemia     Hypertension     PAF (paroxysmal atrial fibrillation) (HCC)     Prostate cancer (HCC)        Past Surgical History:   Procedure Laterality Date    CARDIAC CATHETERIZATION N/A 10/04/2023    Procedure: Cardiac Coronary Angiogram;  Surgeon: Chris Lovell MD;  Location: [facility];  Service: Cardiology    CARDIAC CATHETERIZATION N/A 10/04/2023    Procedure: Cardiac RHC/LHC;  Surgeon: Chris Lovell MD;  Location: [facility];  Service: Cardiology    CARDIAC CATHETERIZATION  10/04/2023    Procedure: Cardiac catheterization;  Surgeon: Chris Lovell MD;  Location: [facility];  Service: Cardiology    CARDIAC DEFIBRILLATOR PLACEMENT  01/04/2024    CARDIAC ELECTROPHYSIOLOGY PROCEDURE N/A 01/04/2024    Procedure: Cardiac icd implant, Dual Chambers ICD;  Surgeon: Leo Whalen MD;  Location:  CARDIAC CATH LAB;  Service: Cardiology    EAR  SURGERY      HERNIA REPAIR      TX COLONOSCOPY FLX DX W/COLLJ SPEC WHEN PFRMD N/A 2019    Procedure: COLONOSCOPY;  Surgeon: Winston Nielson III, MD;  Location: [facility];  Service: Gastroenterology       Social History     Socioeconomic History    Marital status:      Spouse name: Not on file    Number of children: Not on file    Years of education: Not on file    Highest education level: Not on file   Occupational History    Not on file   Tobacco Use    Smoking status: Former     Current packs/day: 0.00     Average packs/day: 1 pack/day for 25.0 years (25.0 ttl pk-yrs)     Types: Cigarettes, Pipe, Cigars     Start date: 1980     Quit date: 2005     Years since quittin.2     Passive exposure: Past    Smokeless tobacco: Never   Vaping Use    Vaping status: Never Used   Substance and Sexual Activity    Alcohol use: Not Currently    Drug use: Not Currently    Sexual activity: Not Currently     Partners: Female   Other Topics Concern    Not on file   Social History Narrative    Not on file     Social Drivers of Health     Financial Resource Strain: Low Risk  (2024)    Overall Financial Resource Strain (CARDIA)     Difficulty of Paying Living Expenses: Not hard at all   Food Insecurity: No Food Insecurity (2025)    Hunger Vital Sign     Worried About Running Out of Food in the Last Year: Never true     Ran Out of Food in the Last Year: Never true   Transportation Needs: No Transportation Needs (2025)    PRAPARE - Transportation     Lack of Transportation (Medical): No     Lack of Transportation (Non-Medical): No   Physical Activity: Not on file   Stress: Not on file   Social Connections: Unknown (2024)    Received from [employer]    Social Connections     How often do you feel lonely or isolated from those around you? (Adult - for ages 18 years and over): Not on file   Intimate Partner Violence: Not on file   Housing Stability: Low Risk  (2025)    Housing Stability Vital  "Sign     Unable to Pay for Housing in the Last Year: No     Number of Times Moved in the Last Year: 0     Homeless in the Last Year: No         LABORATORY RESULTS:    Lab Results   Component Value Date    WBC 5.02 04/09/2025    HGB 11.0 (L) 04/09/2025    HCT 36.2 (L) 04/09/2025    MCV 95 04/09/2025     04/09/2025     Lab Results   Component Value Date    CALCIUM 9.3 04/17/2025    K 5.0 04/17/2025    CO2 28 04/17/2025     04/17/2025    BUN 57 (H) 04/17/2025    CREATININE 1.97 (H) 04/17/2025     Lab Results   Component Value Date    HGBA1C 6.6 (A) 04/08/2025       Lipid Profile:   No results found for: \"CHOL\"  Lab Results   Component Value Date    HDL 40 10/16/2024    HDL 45 07/24/2024    HDL 41 08/08/2023     Lab Results   Component Value Date    LDLCALC 138 (H) 10/16/2024    LDLCALC 123 (H) 07/24/2024    LDLCALC 86 08/08/2023     Lab Results   Component Value Date    TRIG 289 (H) 10/16/2024    TRIG 238 (H) 07/24/2024    TRIG 271 (H) 08/08/2023       The 10-year ASCVD risk score (Scott BLANCO, et al., 2019) is: 42.4%    Values used to calculate the score:      Age: 74 years      Sex: Male      Is Non- : No      Diabetic: Yes      Tobacco smoker: No      Systolic Blood Pressure: 116 mmHg      Is BP treated: Yes      HDL Cholesterol: 40 mg/dL      Total Cholesterol: 236 mg/dL    1. Acute on chronic HFrEF  torsemide (DEMADEX) 20 mg tablet    Basic metabolic panel      2. NICM with EF 25% s/p MDT DC ICD (1/4/2024)  losartan (COZAAR) 25 mg tablet      3. Paroxysmal atrial fibrillation  losartan (COZAAR) 25 mg tablet      4. History of NSVT, PSVT, PVCs        5. Nonobstructive CAD  Lipid Panel With Direct LDL      6. Essential hypertension        7. Mixed hyperlipidemia  rosuvastatin (CRESTOR) 20 MG tablet    Lipid Panel With Direct LDL          Imaging: I have reviewed all pertinent imaging studies.    Recommend aggressive risk factor modification and therapeutic lifestyle changes.  " Low-salt, low-calorie, low-fat, low-cholesterol diet with regular exercise and to optimize weight.    Discussed concepts of atherosclerosis, including signs and symptoms of cardiac disease.    Medications reviewed and possible side effects discussed.  Previous studies were reviewed.    Safety measures were reviewed.  All questions and concerns addressed.  Patient was advised to report any problems requiring medical attention.    Follow-up with PCP and appropriate specialist and lab work as discussed.    Return for follow up visit as scheduled or earlier, if needed.  Thank you for allowing me to participate in the care and evaluation of your patient.  Should you have any questions, please feel free to contact me.    Partha Colon PA-C  5/13/2025,11:56 AM

## 2025-05-12 NOTE — ASSESSMENT & PLAN NOTE
BP is well-controlled.  Continue losartan and Toprol-XL.  Increase torsemide as per above.  Encourage ambulatory monitoring and low-sodium diet.

## 2025-05-12 NOTE — ASSESSMENT & PLAN NOTE
Recent TTE and pharmacological MPI stress test reviewed.  Continue losartan and torsemide.  Discussed further optimization of GDMT such as consideration for SGLT2 inhibitors, however patient wishes to hold off at this time.  Follows with the device clinic.

## 2025-05-13 ENCOUNTER — OFFICE VISIT (OUTPATIENT)
Dept: CARDIOLOGY CLINIC | Facility: CLINIC | Age: 75
End: 2025-05-13
Payer: MEDICARE

## 2025-05-13 VITALS
WEIGHT: 228 LBS | BODY MASS INDEX: 32.64 KG/M2 | RESPIRATION RATE: 17 BRPM | SYSTOLIC BLOOD PRESSURE: 116 MMHG | HEIGHT: 70 IN | DIASTOLIC BLOOD PRESSURE: 76 MMHG | OXYGEN SATURATION: 96 % | HEART RATE: 90 BPM

## 2025-05-13 DIAGNOSIS — I50.23 ACUTE ON CHRONIC HFREF (HEART FAILURE WITH REDUCED EJECTION FRACTION) (HCC): Primary | ICD-10-CM

## 2025-05-13 DIAGNOSIS — I10 ESSENTIAL HYPERTENSION: ICD-10-CM

## 2025-05-13 DIAGNOSIS — I48.0 PAROXYSMAL ATRIAL FIBRILLATION (HCC): ICD-10-CM

## 2025-05-13 DIAGNOSIS — I25.10 NONOBSTRUCTIVE ATHEROSCLEROSIS OF CORONARY ARTERY: ICD-10-CM

## 2025-05-13 DIAGNOSIS — I47.29 NSVT (NONSUSTAINED VENTRICULAR TACHYCARDIA) (HCC): ICD-10-CM

## 2025-05-13 DIAGNOSIS — I42.8 NICM (NONISCHEMIC CARDIOMYOPATHY) (HCC): ICD-10-CM

## 2025-05-13 DIAGNOSIS — E78.2 MIXED HYPERLIPIDEMIA: ICD-10-CM

## 2025-05-13 PROCEDURE — 99214 OFFICE O/P EST MOD 30 MIN: CPT

## 2025-05-13 RX ORDER — LOSARTAN POTASSIUM 25 MG/1
25 TABLET ORAL DAILY
Qty: 90 TABLET | Refills: 1 | Status: SHIPPED | OUTPATIENT
Start: 2025-05-13

## 2025-05-13 RX ORDER — ROSUVASTATIN CALCIUM 20 MG/1
20 TABLET, COATED ORAL DAILY
Qty: 90 TABLET | Refills: 1 | Status: SHIPPED | OUTPATIENT
Start: 2025-05-13

## 2025-05-13 RX ORDER — TORSEMIDE 20 MG/1
40 TABLET ORAL 2 TIMES DAILY
Start: 2025-05-13

## 2025-05-14 ENCOUNTER — PROCEDURE VISIT (OUTPATIENT)
Dept: UROLOGY | Facility: CLINIC | Age: 75
End: 2025-05-14
Payer: MEDICARE

## 2025-05-14 VITALS
HEIGHT: 70 IN | SYSTOLIC BLOOD PRESSURE: 138 MMHG | BODY MASS INDEX: 32.35 KG/M2 | OXYGEN SATURATION: 95 % | HEART RATE: 90 BPM | DIASTOLIC BLOOD PRESSURE: 84 MMHG | WEIGHT: 226 LBS | TEMPERATURE: 97.6 F

## 2025-05-14 DIAGNOSIS — C61 PROSTATE CANCER (HCC): Primary | ICD-10-CM

## 2025-05-14 PROCEDURE — 96402 CHEMO HORMON ANTINEOPL SQ/IM: CPT

## 2025-05-14 NOTE — PROGRESS NOTES
"5/14/2025  Galen Carson is a 74 y.o. male  [id number]    Diagnosis:  Chief Complaint    Injections         Patient presents for monthly Firmagon 80 mg injection managed by Dr. Waite    Plan:  Patient is scheduled for 4 month follow up with Dr. Waite on 5/21/25  Patient to return for monthly Firmagon injection as scheduled.       Medication administration:    Site prepped with alcohol. Medication administered per 's guidelines. Injection given into Left abd region. Band-aid applied to site, patient tolerated well.     Administrations This Visit       degarelix acetate (FIRMAGON) injection 80 mg       Admin Date  05/14/2025 Action  Given Dose  80 mg Route  Subcutaneous Documented By  Huma Fernandez LPN                    Vitals:    05/14/25 1018   BP: 138/84   Patient Position: Sitting   Cuff Size: Large   Pulse: 90   Temp: 97.6 °F (36.4 °C)   TempSrc: Temporal   SpO2: 95%   Weight: 103 kg (226 lb)   Height: 5' 10\" (1.778 m)         Huma Fernandez LPN    "

## 2025-05-16 ENCOUNTER — APPOINTMENT (OUTPATIENT)
Dept: LAB | Facility: CLINIC | Age: 75
End: 2025-05-16
Payer: MEDICARE

## 2025-05-16 DIAGNOSIS — I50.23 ACUTE ON CHRONIC HFREF (HEART FAILURE WITH REDUCED EJECTION FRACTION) (HCC): ICD-10-CM

## 2025-05-16 DIAGNOSIS — I48.0 PAROXYSMAL ATRIAL FIBRILLATION (HCC): ICD-10-CM

## 2025-05-16 DIAGNOSIS — E78.2 MIXED HYPERLIPIDEMIA: ICD-10-CM

## 2025-05-16 DIAGNOSIS — Z79.01 LONG TERM (CURRENT) USE OF ANTICOAGULANTS: ICD-10-CM

## 2025-05-16 DIAGNOSIS — I25.10 NONOBSTRUCTIVE ATHEROSCLEROSIS OF CORONARY ARTERY: ICD-10-CM

## 2025-05-16 LAB
ANION GAP SERPL CALCULATED.3IONS-SCNC: 15 MMOL/L (ref 4–13)
BUN SERPL-MCNC: 69 MG/DL (ref 5–25)
CALCIUM SERPL-MCNC: 9 MG/DL (ref 8.4–10.2)
CHLORIDE SERPL-SCNC: 100 MMOL/L (ref 96–108)
CHOLEST SERPL-MCNC: 226 MG/DL (ref ?–200)
CO2 SERPL-SCNC: 27 MMOL/L (ref 21–32)
CREAT SERPL-MCNC: 1.92 MG/DL (ref 0.6–1.3)
GFR SERPL CREATININE-BSD FRML MDRD: 33 ML/MIN/1.73SQ M
GLUCOSE P FAST SERPL-MCNC: 135 MG/DL (ref 65–99)
HDLC SERPL-MCNC: 40 MG/DL
INR PPP: 2.47 (ref 0.85–1.19)
LDLC SERPL DIRECT ASSAY-MCNC: 132 MG/DL (ref 0–100)
POTASSIUM SERPL-SCNC: 4.9 MMOL/L (ref 3.5–5.3)
PROTHROMBIN TIME: 26.7 SECONDS (ref 12.3–15)
SODIUM SERPL-SCNC: 142 MMOL/L (ref 135–147)
TRIGL SERPL-MCNC: 401 MG/DL (ref ?–150)

## 2025-05-16 PROCEDURE — 36415 COLL VENOUS BLD VENIPUNCTURE: CPT

## 2025-05-16 PROCEDURE — 80061 LIPID PANEL: CPT

## 2025-05-16 PROCEDURE — 80048 BASIC METABOLIC PNL TOTAL CA: CPT

## 2025-05-16 PROCEDURE — 83721 ASSAY OF BLOOD LIPOPROTEIN: CPT

## 2025-05-16 PROCEDURE — 85610 PROTHROMBIN TIME: CPT

## 2025-05-19 ENCOUNTER — ANTICOAG VISIT (OUTPATIENT)
Dept: CARDIOLOGY CLINIC | Facility: CLINIC | Age: 75
End: 2025-05-19

## 2025-05-19 ENCOUNTER — RESULTS FOLLOW-UP (OUTPATIENT)
Dept: NON INVASIVE DIAGNOSTICS | Facility: HOSPITAL | Age: 75
End: 2025-05-19

## 2025-05-19 ENCOUNTER — RESULTS FOLLOW-UP (OUTPATIENT)
Dept: CARDIOLOGY CLINIC | Facility: CLINIC | Age: 75
End: 2025-05-19

## 2025-05-19 NOTE — PROGRESS NOTES
UROLOGY FOLLOW-UP ENCOUNTER    Galen Carson is a 74 y.o. male with prostate cancer    Pertinent non-urologic PMH: DM (hemoglobin A1c 6.9 on 2/14/2024), HLD, HTN, CKD (creatinine 1.37, GFR 51 on 3/13/2024); a fib    Pertinent non-urologic PSH: Cardiac catheterization without stents, hernia repair, dual chamber ICD implant Jan 2024    Anticoagulation: ASA81, Warfarin    History of high risk prostate cancer treated with radiation in 2019 he completed 2-year course of ADT in February 2021    Pretreatment PSA was 22.4.  Morales PSA was 0.0.    Had PSA recurrence which prompted workup below    PSMA PET scan 9/25/2023: Heterogenous multifocal fairly intense tracer activity in the right greater than left prostate gland highly suspicious for locally recurrent intraprostatic PSMA positive malignancy; no focal tracer activity concerning for metastatic disease    Prostate MRI 10/13/2023: Tumor in anterior and posterior right peripheral zone apex and mid gland and right transition zone at apex corresponds to PSMA avid lesion; the lesion broadly abuts the capsule without visualized gross extraprostatic extension; no SVI/LAD/bony metastasis; prostate 29 g    Given the positive lesion seen on imaging, plan was for patient to undergo MRI fusion biopsy.  Unfortunately, the case was canceled by anesthesia due to the patient's cardiac arrhythmia requiring LifeVest.  Since he cannot undergo anesthesia, there is consideration for alternate therapy.    Patient had dual-chamber ICD implant 1/4/2024    PSA 8.48 on 1/24/2024    Saw Dr. Barreto of hematology oncology on 2/21/2024. Option for repeat local therapy versus hormonal therapy with LHRH antagonist along with closely watching PSA.    PSA 8.14 on 3/27/24     Patient declined OR for TP bx    Lupron q6 month on 5/1/24     TRUS Pbx 5/1/24: G 4+4 in 7 cores  Volume:  20 cm3     Admitted mid May 2024 with acute on chronic CHF, required diuretics, got ESDRAS. Cr was down to around 2.0 by  discharge (prev Bcr was about 1.4 in March 2024).    PSA 0.687 on 7/24/24    Random bladder scan 58 cc on 7/24/24    Overall voiding well. Some LUTS but taking diuretics.    PSA 0.741 on 10/16/24    Firmagon 80 monthly started 10/22/24    PSA 1.159 on 1/8/25    PSMA scan on 1/28/2025 demonstrated increase in extent and avidity of the predominantly right sided intraprostatic PSMA positive malignancy, PSMA positive right hilar lymph node, tiny PSMA positive right common iliac node     Saw Dr. Vogt in Feb 2025 and started Erleada    Med onc Dr. Diaz 4/29/2025: started on Erleada in Feb 2025; CPAP for AKIL    Has been continued on monthly Firmagon    Assessment and plan:     Prostate cancer    Patient with complex prostate cancer history as detailed above.    In summary his high risk prostate cancer was initially treated with radiation in 2019 and he completed a 2-year course of ADT in February 2021.    He unfortunate had a PSA recurrence and was started on Lupron in May 2024.  Due to worsening cardiac issues, he was transition to monthly Firmagon in October 2024.  He has since been on monthly Firmagon.  He is overall tolerating the Firmagon well.    He additionally follows with Dr. Vogt medical oncology.  He was started on Erleada in February 2025.  He has transferred medical oncology care from [facility] to Dr. Vogt.    Per conversations with radiation oncologist Dr. Sandhu previously, I explained the patient that the plan for him will continue to be ADT (in this case Firmagon), continuing Erleada and trending PSA.  I explained that if the PSA does not respond properly to treatment, we would likely get repeat PSMA imaging.  Explained that based on this imaging, he may qualify for further radiation with Dr. Sandhu.    I ordered a PSA for him.  He will get this in the next few weeks.  He will make sure that the results is present for review by Dr. Vogt before his visit on 6/16/2025.    He is still scheduled for his  "next Firmagon injection 6/11/2025.  He will be continued on this monthly.    I will see him in 4 to 6 months.  He knows he can see me sooner if need be.        PLAN  -Per prev discussion with Dr. Sandhu of [facility] onc, will cont ADT, Erleada, and only consider XRT if demonstrates continued progression on this regimen  -He will get PSA within next few weeks. If goes up again, will need to decide if needs more recent PSMA.  -Firmagon injection 6/11/25. Will continue monthly injections.  -Scheduled to see Dr. Vogt on 6/16/25  -I will see him in 4-6 months.        Portions of the above record have been created with voice recognition software.  Occasional wrong word or \"sound alike\" substitution may have occurred due to the inherent limitations of voice recognition software.  Read the chart carefully and recognize, using context, where substitution may have occurred.      Estevan Waite, DO        Chief Complaint     Prostate cancer      History of Present Illness     See summary above    No fevers or chills          The following portions of the patient's history were reviewed and updated as appropriate: allergies, current medications, past family history, past medical history, past social history, past surgical history and problem list.        AUA SYMPTOM SCORE      Flowsheet Row Most Recent Value   AUA SYMPTOM SCORE    How often have you had a sensation of not emptying your bladder completely after you finished urinating? 0 (P)    How often have you had to urinate again less than two hours after you finished urinating? 1 (P)    How often have you found you stopped and started again several times when you urinate? 0 (P)    How often have you found it difficult to postpone urination? 1 (P)    How often have you had a weak urinary stream? 5 (P)    How often have you had to push or strain to begin urination? 1 (P)    How many times did you most typically get up to urinate from the time you went to bed at night " "until the time you got up in the morning? 1 (P)    Quality of Life: If you were to spend the rest of your life with your urinary condition just the way it is now, how would you feel about that? 1 (P)    AUA SYMPTOM SCORE 9 (P)               Review of Systems     Negative for fevers, chills, nausea, vomit, shortness of breath, painful urination, blood in urine    Allergies     Allergies   Allergen Reactions    Penicillin G Benzathine Other (See Comments)    Penicillin V Other (See Comments)     As a child        Physical Exam     NAD, NCAT, no CVAT BL, ab soft and nontender      Vital Signs  Vitals:    05/21/25 1137   BP: 120/70   BP Location: Left arm   Patient Position: Sitting   Cuff Size: Adult   Pulse: 64   SpO2: 93%   Weight: 103 kg (226 lb)   Height: 5' 10\" (1.778 m)         Current Medications       Current Outpatient Medications:     Ascorbic Acid (vitamin C) 1000 MG tablet, Take 1,000 mg by mouth in the morning., Disp: , Rfl:     cetirizine (ZyrTEC) 5 MG tablet, Take 5 mg by mouth in the morning., Disp: , Rfl:     Continuous Glucose Sensor (FreeStyle Lisa 3 Sensor) MISC, Use 1 each every 14 (fourteen) days, Disp: 6 each, Rfl: 5    Erleada 60 MG TABS, Take 4 tablets by mouth in the morning., Disp: , Rfl:     EVENING PRIMROSE OIL PO, Take by mouth in the morning and in the evening and before bedtime., Disp: , Rfl:     glimepiride (AMARYL) 1 mg tablet, Take 1 tablet (1 mg total) by mouth daily with breakfast, Disp: 90 tablet, Rfl: 1    losartan (COZAAR) 25 mg tablet, Take 1 tablet (25 mg total) by mouth daily, Disp: 90 tablet, Rfl: 1    metoprolol succinate (TOPROL-XL) 50 mg 24 hr tablet, Take 1 tablet (50 mg total) by mouth daily, Disp: 90 tablet, Rfl: 3    multivitamin (THERAGRAN) TABS, Take 1 tablet by mouth in the morning., Disp: , Rfl:     pantoprazole (PROTONIX) 20 mg tablet, Take 1 tablet (20 mg total) by mouth daily, Disp: 90 tablet, Rfl: 1    rosuvastatin (CRESTOR) 20 MG tablet, Take 1 tablet (20 mg " total) by mouth daily, Disp: 90 tablet, Rfl: 1    torsemide (DEMADEX) 20 mg tablet, Take 2 tablets (40 mg total) by mouth 2 (two) times a day, Disp: , Rfl:     VITAMIN D, ERGOCALCIFEROL, PO, Take by mouth, Disp: , Rfl:     vitamin E, tocopherol, 400 units capsule, Take 400 Units by mouth in the morning., Disp: , Rfl:     warfarin (Coumadin) 5 mg tablet, Take one tablet daily or as directed, Disp: 90 tablet, Rfl: 3    Blood Glucose Monitoring Suppl (ONETOUCH VERIO) w/Device KIT, by Does not apply route once for 1 dose Test sugar in the morning, Disp: 1 kit, Rfl: 0      Active Problems     Patient Active Problem List   Diagnosis    Mixed hyperlipidemia    Upper respiratory tract infection    Positive colorectal cancer screening using Cologuard test    Prostate cancer (HCC)    Encounter for monitoring androgen deprivation therapy    Hot flashes    Type 2 diabetes mellitus with hyperglycemia, without long-term current use of insulin (HCC)    Acute renal failure superimposed on stage 3a chronic kidney disease (HCC)    Essential hypertension    NICM with EF 26% s/p MDT DC ICD (1/4/2024)    Paroxysmal atrial fibrillation    Obesity    ICD (implantable cardioverter-defibrillator) in place    Chronic HFrEF    Iron deficiency anemia due to chronic blood loss    On continuous oral anticoagulation    CKD (chronic kidney disease) stage 4, GFR 15-29 ml/min ([facility])    Type 2 diabetes mellitus with diabetic chronic kidney disease, unspecified CKD stage, unspecified whether long term insulin use ([facility])    Nonobstructive CAD    History of NSVT, PSVT, PVCs         Past Medical History     Past Medical History:   Diagnosis Date    Allergic     Anesthesia     pt wears a life vest (11/16).  should not be scheduled at [facility] until heart condition is stabilized    BPH (benign prostatic hypertrophy)     Cancer (HCC)     Chronic kidney disease     Coronary artery disease     Diabetes mellitus (HCC)     Hyperlipidemia     Hypertension     PAF  (paroxysmal atrial fibrillation) (HCC)     Prostate cancer (HCC)          Surgical History     Past Surgical History:   Procedure Laterality Date    CARDIAC CATHETERIZATION N/A 10/04/2023    Procedure: Cardiac Coronary Angiogram;  Surgeon: Chris Lovell MD;  Location: [facility];  Service: Cardiology    CARDIAC CATHETERIZATION N/A 10/04/2023    Procedure: Cardiac RHC/LHC;  Surgeon: Chris Lovell MD;  Location: [facility];  Service: Cardiology    CARDIAC CATHETERIZATION  10/04/2023    Procedure: Cardiac catheterization;  Surgeon: Chris Lovell MD;  Location: [facility];  Service: Cardiology    CARDIAC DEFIBRILLATOR PLACEMENT  2024    CARDIAC ELECTROPHYSIOLOGY PROCEDURE N/A 2024    Procedure: Cardiac icd implant, Dual Chambers ICD;  Surgeon: Leo Whalen MD;  Location:  CARDIAC CATH LAB;  Service: Cardiology    EAR SURGERY      HERNIA REPAIR      TN COLONOSCOPY FLX DX W/COLLJ SPEC WHEN PFRMD N/A 2019    Procedure: COLONOSCOPY;  Surgeon: Winston Nielson III, MD;  Location: [facility];  Service: Gastroenterology         Family History     Family History   Problem Relation Name Age of Onset    Stroke Father Father     No Known Problems Mother      Prostate cancer Brother      Arthritis Brother      No Known Problems Sister      Diabetes Sister Brigitte     Diabetes Sister Fátima     No Known Problems Son      No Known Problems Daughter           Social History     Social History     Social History     Tobacco Use   Smoking Status Former    Current packs/day: 0.00    Average packs/day: 1 pack/day for 25.0 years (25.0 ttl pk-yrs)    Types: Cigarettes, Pipe, Cigars    Start date: 1980    Quit date: 2005    Years since quittin.2    Passive exposure: Past   Smokeless Tobacco Never         Pertinent Lab Values     Lab Results   Component Value Date    CREATININE 1.92 (H) 2025       Lab Results   Component Value Date    PSA 1.159 2025    PSA  "0.741 10/16/2024    PSA 0.687 07/24/2024         Pertinent Imaging     The patient's images were reviewed by me personally and also in real time with them in the examination room using our PACS imaging system.      PSMA PET scan 9/25/2023: Heterogenous multifocal fairly intense tracer activity in the right greater than left prostate gland highly suspicious for locally recurrent intraprostatic PSMA positive malignancy; no focal tracer activity concerning for metastatic disease    Prostate MRI 10/13/2023: Tumor in anterior and posterior right peripheral zone apex and mid gland and right transition zone at apex corresponds to PSMA avid lesion; the lesion broadly abuts the capsule without visualized gross extraprostatic extension; no SVI/LAD/bony metastasis; prostate 29 g      Pertinent Pathology     TRUS Pbx 5/24/24: G 4+4 in 7 cores  Volume:  20 cm3   A. Prostate, \"Prostate, left lateral base,\" Core biopsy:  - Benign prostatic tissue, negative for carcinoma     B. Prostate, \"Prostate, left mid base,\" Core biopsy:  - Prostatic adenocarcinoma, Elder pattern 4 + 4, Semora score 8, Grade Group 4  - Largest focus of carcinoma measures 0.1 cm (5% of tissue core)  - Carcinoma involves 1 of 1 core biopsies  - No perineural invasion present     C. Prostate, \"Prostate, left lateral mid,\" Core biopsy:  - Benign prostatic tissue, negative for carcinoma     D. Prostate, \"Prostate, left medial mid,\" Core biopsy:  - Prostatic adenocarcinoma, Elder pattern 4 + 4, Semora score 8, Grade Group 4  - Multifocal, largest focus of carcinoma measures 0.5 cm (30% of tissue core)  - Carcinoma involves 1 of 1 core biopsies  - No perineural invasion present     E. Prostate, \"Prostate, left lateral apex,\" Core biopsy:  - Benign prostatic tissue, negative for carcinoma     F. Prostate, \"Prostate, left medial apex,\" Core biopsy:  - Prostatic adenocarcinoma, Semora pattern 4 + 4, Elder score 8, Grade Group 4  - Largest focus of carcinoma " "measures 0.1 cm (10% of tissue core)  - Carcinoma involves 1 of 1 core biopsies  - No perineural invasion present     G. Prostate, \"Prostate, right lateral base,\" Core biopsy:  - Benign prostatic tissue, negative for carcinoma     H. Prostate, \"Prostate, right medial base,\" Core biopsy:  - Benign prostatic tissue, negative for carcinoma     I. Prostate, \"Prostate, right lateral mid,\" Core biopsy:  - Prostatic adenocarcinoma, [place] pattern 4 + 4, [place] score 8, Grade Group 4  - Largest focus of carcinoma measures 0.4 cm (30% of tissue core)  - Carcinoma involves 1 of 1 core biopsies  - No perineural invasion present     J. Prostate, \"Prostate, right medial mid,\" Core biopsy:  - Prostatic adenocarcinoma, [place] pattern 4 + 4, Elder score 8, Grade Group 4  - Largest focus of carcinoma measures 0.1 cm (5% of tissue core)  - Carcinoma involves 1 of 1 core biopsies  - No perineural invasion present     K. Prostate, \"Prostate, right lateral apex,\" Core biopsy:  - Prostatic adenocarcinoma, [place] pattern 4 + 4, [place] score 8, Grade Group 4  - Largest focus of carcinoma measures 1.2 cm  - Carcinoma involves 1 of 1 core biopsies  - No perineural invasion present     L. Prostate, \"Prostate, right medial apex,\" Core biopsy:  - Prostatic adenocarcinoma, [place] pattern 4 + 4, [place] score 8, Grade Group 4  - Largest focus of carcinoma measures 0.3 cm (15% of tissue core)  - Carcinoma involves 1 of 1 core biopsies  - No perineural invasion present  - Large nerve present  - Benign prostatic tissue, negative for carcinoma      I have spent a total time of 20 minutes in caring for this patient on the day of the visit/encounter including Diagnostic results, Prognosis, Risks and benefits of tx options, Instructions for management, Patient and family education, Importance of tx compliance, Impressions, Counseling / Coordination of care, Documenting in the medical record, Reviewing/placing orders in the medical record " (including tests, medications, and/or procedures), and Obtaining or reviewing history  .

## 2025-05-20 NOTE — TELEPHONE ENCOUNTER
----- Message from Partha Colon PA-C sent at 5/19/2025  3:16 PM EDT -----  Good afternoon, please call to advise cholesterol is elevated.  Anticipate future improvement since Crestor was just increased to 20 mg daily during recent office visit.  We will continue to monitor   periodically.  Thank you and have a wonderful evening.  ----- Message -----  From: Lab, Background User  Sent: 5/16/2025   8:06 PM EDT  To: Partha Colon PA-C

## 2025-05-21 ENCOUNTER — HOSPITAL ENCOUNTER (INPATIENT)
Facility: HOSPITAL | Age: 75
LOS: 1 days | Discharge: HOME | DRG: 309 | End: 2025-05-23
Attending: EMERGENCY MEDICINE | Admitting: STUDENT IN AN ORGANIZED HEALTH CARE EDUCATION/TRAINING PROGRAM
Payer: MEDICARE

## 2025-05-21 ENCOUNTER — OFFICE VISIT (OUTPATIENT)
Dept: UROLOGY | Facility: CLINIC | Age: 75
End: 2025-05-21
Payer: MEDICARE

## 2025-05-21 VITALS
HEART RATE: 64 BPM | DIASTOLIC BLOOD PRESSURE: 70 MMHG | BODY MASS INDEX: 32.35 KG/M2 | OXYGEN SATURATION: 93 % | HEIGHT: 70 IN | SYSTOLIC BLOOD PRESSURE: 120 MMHG | WEIGHT: 226 LBS

## 2025-05-21 DIAGNOSIS — R07.9 CHEST PAIN: Primary | ICD-10-CM

## 2025-05-21 DIAGNOSIS — I27.20 MILD PULMONARY HYPERTENSION (HCC): ICD-10-CM

## 2025-05-21 DIAGNOSIS — N18.32 CKD STAGE 3B, GFR 30-44 ML/MIN (HCC): ICD-10-CM

## 2025-05-21 DIAGNOSIS — I48.0 PAROXYSMAL ATRIAL FIBRILLATION (HCC): ICD-10-CM

## 2025-05-21 DIAGNOSIS — I42.0 DILATED CARDIOMYOPATHY (HCC): ICD-10-CM

## 2025-05-21 DIAGNOSIS — R06.02 SHORTNESS OF BREATH: ICD-10-CM

## 2025-05-21 DIAGNOSIS — I50.22 CHRONIC HFREF (HEART FAILURE WITH REDUCED EJECTION FRACTION) (HCC): ICD-10-CM

## 2025-05-21 DIAGNOSIS — I47.29 NSVT (NONSUSTAINED VENTRICULAR TACHYCARDIA) (HCC): ICD-10-CM

## 2025-05-21 DIAGNOSIS — Z95.810 ICD (IMPLANTABLE CARDIOVERTER-DEFIBRILLATOR) IN PLACE: ICD-10-CM

## 2025-05-21 DIAGNOSIS — I42.8 NICM (NONISCHEMIC CARDIOMYOPATHY) (HCC): ICD-10-CM

## 2025-05-21 DIAGNOSIS — C61 PROSTATE CANCER (HCC): Primary | ICD-10-CM

## 2025-05-21 DIAGNOSIS — R79.89 ELEVATED TROPONIN: ICD-10-CM

## 2025-05-21 PROCEDURE — 99285 EMERGENCY DEPT VISIT HI MDM: CPT | Performed by: EMERGENCY MEDICINE

## 2025-05-21 PROCEDURE — 99213 OFFICE O/P EST LOW 20 MIN: CPT | Performed by: UROLOGY

## 2025-05-21 PROCEDURE — 99285 EMERGENCY DEPT VISIT HI MDM: CPT

## 2025-05-21 PROCEDURE — 93005 ELECTROCARDIOGRAM TRACING: CPT

## 2025-05-22 ENCOUNTER — APPOINTMENT (EMERGENCY)
Dept: RADIOLOGY | Facility: HOSPITAL | Age: 75
DRG: 309 | End: 2025-05-22
Payer: MEDICARE

## 2025-05-22 PROBLEM — I47.19 JUNCTIONAL TACHYCARDIA (HCC): Status: ACTIVE | Noted: 2025-05-22

## 2025-05-22 PROBLEM — R06.00 DYSPNEA: Status: ACTIVE | Noted: 2025-05-22

## 2025-05-22 PROBLEM — I47.10 SVT (SUPRAVENTRICULAR TACHYCARDIA) (HCC): Status: ACTIVE | Noted: 2025-05-22

## 2025-05-22 PROBLEM — E78.5 DYSLIPIDEMIA: Status: ACTIVE | Noted: 2025-05-22

## 2025-05-22 PROBLEM — R79.89 ELEVATED TROPONIN: Status: ACTIVE | Noted: 2025-05-22

## 2025-05-22 PROBLEM — R07.9 CHEST PAIN: Status: ACTIVE | Noted: 2025-05-22

## 2025-05-22 PROBLEM — N18.32 CKD STAGE 3B, GFR 30-44 ML/MIN (HCC): Status: ACTIVE | Noted: 2022-05-11

## 2025-05-22 PROBLEM — I27.20 MILD PULMONARY HYPERTENSION (HCC): Status: ACTIVE | Noted: 2025-05-22

## 2025-05-22 PROBLEM — R94.31 ABNORMAL EKG: Status: ACTIVE | Noted: 2025-05-22

## 2025-05-22 LAB
2HR DELTA HS TROPONIN: -3 NG/L
4HR DELTA HS TROPONIN: -11 NG/L
ALBUMIN SERPL BCG-MCNC: 4.4 G/DL (ref 3.5–5)
ALP SERPL-CCNC: 78 U/L (ref 34–104)
ALT SERPL W P-5'-P-CCNC: 11 U/L (ref 7–52)
ANION GAP SERPL CALCULATED.3IONS-SCNC: 12 MMOL/L (ref 4–13)
APTT PPP: 30 SECONDS (ref 23–34)
APTT PPP: 31 SECONDS (ref 23–34)
APTT PPP: 54 SECONDS (ref 23–34)
APTT PPP: 74 SECONDS (ref 23–34)
AST SERPL W P-5'-P-CCNC: 16 U/L (ref 13–39)
BASOPHILS # BLD AUTO: 0.03 THOUSANDS/ÂΜL (ref 0–0.1)
BASOPHILS NFR BLD AUTO: 1 % (ref 0–1)
BILIRUB SERPL-MCNC: 0.31 MG/DL (ref 0.2–1)
BUN SERPL-MCNC: 70 MG/DL (ref 5–25)
CALCIUM SERPL-MCNC: 8.9 MG/DL (ref 8.4–10.2)
CARDIAC TROPONIN I PNL SERPL HS: 105 NG/L (ref ?–50)
CARDIAC TROPONIN I PNL SERPL HS: 108 NG/L (ref ?–50)
CARDIAC TROPONIN I PNL SERPL HS: 97 NG/L (ref ?–50)
CHLORIDE SERPL-SCNC: 101 MMOL/L (ref 96–108)
CHOLEST SERPL-MCNC: 210 MG/DL (ref ?–200)
CO2 SERPL-SCNC: 27 MMOL/L (ref 21–32)
CREAT SERPL-MCNC: 1.79 MG/DL (ref 0.6–1.3)
D DIMER PPP FEU-MCNC: 0.27 UG/ML FEU
EOSINOPHIL # BLD AUTO: 0.15 THOUSAND/ÂΜL (ref 0–0.61)
EOSINOPHIL NFR BLD AUTO: 3 % (ref 0–6)
ERYTHROCYTE [DISTWIDTH] IN BLOOD BY AUTOMATED COUNT: 13.6 % (ref 11.6–15.1)
GFR SERPL CREATININE-BSD FRML MDRD: 36 ML/MIN/1.73SQ M
GLUCOSE SERPL-MCNC: 111 MG/DL (ref 65–140)
GLUCOSE SERPL-MCNC: 114 MG/DL (ref 65–140)
GLUCOSE SERPL-MCNC: 137 MG/DL (ref 65–140)
GLUCOSE SERPL-MCNC: 155 MG/DL (ref 65–140)
GLUCOSE SERPL-MCNC: 171 MG/DL (ref 65–140)
HCT VFR BLD AUTO: 34.4 % (ref 36.5–49.3)
HDLC SERPL-MCNC: 37 MG/DL
HGB BLD-MCNC: 11.2 G/DL (ref 12–17)
IMM GRANULOCYTES # BLD AUTO: 0.09 THOUSAND/UL (ref 0–0.2)
IMM GRANULOCYTES NFR BLD AUTO: 2 % (ref 0–2)
INR PPP: 1.84 (ref 0.85–1.19)
LDLC SERPL CALC-MCNC: 101 MG/DL (ref 0–100)
LYMPHOCYTES # BLD AUTO: 1.11 THOUSANDS/ÂΜL (ref 0.6–4.47)
LYMPHOCYTES NFR BLD AUTO: 22 % (ref 14–44)
MCH RBC QN AUTO: 29 PG (ref 26.8–34.3)
MCHC RBC AUTO-ENTMCNC: 32.6 G/DL (ref 31.4–37.4)
MCV RBC AUTO: 89 FL (ref 82–98)
MONOCYTES # BLD AUTO: 0.56 THOUSAND/ÂΜL (ref 0.17–1.22)
MONOCYTES NFR BLD AUTO: 11 % (ref 4–12)
NEUTROPHILS # BLD AUTO: 3.01 THOUSANDS/ÂΜL (ref 1.85–7.62)
NEUTS SEG NFR BLD AUTO: 61 % (ref 43–75)
NRBC BLD AUTO-RTO: 0 /100 WBCS
PLATELET # BLD AUTO: 181 THOUSANDS/UL (ref 149–390)
PMV BLD AUTO: 10.4 FL (ref 8.9–12.7)
POTASSIUM SERPL-SCNC: 4.7 MMOL/L (ref 3.5–5.3)
PROT SERPL-MCNC: 7.3 G/DL (ref 6.4–8.4)
PROTHROMBIN TIME: 22 SECONDS (ref 12.3–15)
RBC # BLD AUTO: 3.86 MILLION/UL (ref 3.88–5.62)
SODIUM SERPL-SCNC: 140 MMOL/L (ref 135–147)
TRIGL SERPL-MCNC: 359 MG/DL (ref ?–150)
WBC # BLD AUTO: 4.95 THOUSAND/UL (ref 4.31–10.16)

## 2025-05-22 PROCEDURE — 36415 COLL VENOUS BLD VENIPUNCTURE: CPT | Performed by: EMERGENCY MEDICINE

## 2025-05-22 PROCEDURE — 85025 COMPLETE CBC W/AUTO DIFF WBC: CPT | Performed by: EMERGENCY MEDICINE

## 2025-05-22 PROCEDURE — 93005 ELECTROCARDIOGRAM TRACING: CPT

## 2025-05-22 PROCEDURE — 80061 LIPID PANEL: CPT | Performed by: NURSE PRACTITIONER

## 2025-05-22 PROCEDURE — 99223 1ST HOSP IP/OBS HIGH 75: CPT | Performed by: INTERNAL MEDICINE

## 2025-05-22 PROCEDURE — 85730 THROMBOPLASTIN TIME PARTIAL: CPT | Performed by: EMERGENCY MEDICINE

## 2025-05-22 PROCEDURE — 80053 COMPREHEN METABOLIC PANEL: CPT | Performed by: EMERGENCY MEDICINE

## 2025-05-22 PROCEDURE — 99223 1ST HOSP IP/OBS HIGH 75: CPT | Performed by: STUDENT IN AN ORGANIZED HEALTH CARE EDUCATION/TRAINING PROGRAM

## 2025-05-22 PROCEDURE — 85379 FIBRIN DEGRADATION QUANT: CPT | Performed by: EMERGENCY MEDICINE

## 2025-05-22 PROCEDURE — 85730 THROMBOPLASTIN TIME PARTIAL: CPT | Performed by: STUDENT IN AN ORGANIZED HEALTH CARE EDUCATION/TRAINING PROGRAM

## 2025-05-22 PROCEDURE — 96366 THER/PROPH/DIAG IV INF ADDON: CPT

## 2025-05-22 PROCEDURE — 82948 REAGENT STRIP/BLOOD GLUCOSE: CPT

## 2025-05-22 PROCEDURE — 84484 ASSAY OF TROPONIN QUANT: CPT | Performed by: EMERGENCY MEDICINE

## 2025-05-22 PROCEDURE — 96365 THER/PROPH/DIAG IV INF INIT: CPT

## 2025-05-22 PROCEDURE — 71046 X-RAY EXAM CHEST 2 VIEWS: CPT

## 2025-05-22 PROCEDURE — 85610 PROTHROMBIN TIME: CPT | Performed by: EMERGENCY MEDICINE

## 2025-05-22 RX ORDER — LOSARTAN POTASSIUM 25 MG/1
25 TABLET ORAL DAILY
Status: DISCONTINUED | OUTPATIENT
Start: 2025-05-22 | End: 2025-05-23

## 2025-05-22 RX ORDER — NITROGLYCERIN 0.1MG/HR
0.1 PATCH, TRANSDERMAL 24 HOURS TRANSDERMAL DAILY
Status: DISCONTINUED | OUTPATIENT
Start: 2025-05-22 | End: 2025-05-23 | Stop reason: HOSPADM

## 2025-05-22 RX ORDER — POLYETHYLENE GLYCOL 3350 17 G/17G
17 POWDER, FOR SOLUTION ORAL DAILY
Status: CANCELLED | OUTPATIENT
Start: 2025-05-22

## 2025-05-22 RX ORDER — SODIUM CHLORIDE 9 MG/ML
75 INJECTION, SOLUTION INTRAVENOUS CONTINUOUS
Status: DISCONTINUED | OUTPATIENT
Start: 2025-05-22 | End: 2025-05-22

## 2025-05-22 RX ORDER — POLYETHYLENE GLYCOL 3350 17 G/17G
17 POWDER, FOR SOLUTION ORAL DAILY
Status: DISCONTINUED | OUTPATIENT
Start: 2025-05-22 | End: 2025-05-23 | Stop reason: HOSPADM

## 2025-05-22 RX ORDER — SODIUM CHLORIDE 9 MG/ML
3 INJECTION INTRAVENOUS
Status: DISCONTINUED | OUTPATIENT
Start: 2025-05-22 | End: 2025-05-23 | Stop reason: HOSPADM

## 2025-05-22 RX ORDER — LORATADINE 10 MG/1
5 TABLET ORAL DAILY
Status: DISCONTINUED | OUTPATIENT
Start: 2025-05-22 | End: 2025-05-23 | Stop reason: HOSPADM

## 2025-05-22 RX ORDER — ATORVASTATIN CALCIUM 40 MG/1
40 TABLET, FILM COATED ORAL
Status: DISCONTINUED | OUTPATIENT
Start: 2025-05-22 | End: 2025-05-23 | Stop reason: HOSPADM

## 2025-05-22 RX ORDER — HEPARIN SODIUM 10000 [USP'U]/100ML
3-20 INJECTION, SOLUTION INTRAVENOUS
Status: DISCONTINUED | OUTPATIENT
Start: 2025-05-22 | End: 2025-05-23 | Stop reason: HOSPADM

## 2025-05-22 RX ORDER — TORSEMIDE 20 MG/1
40 TABLET ORAL 2 TIMES DAILY
Status: DISCONTINUED | OUTPATIENT
Start: 2025-05-22 | End: 2025-05-23

## 2025-05-22 RX ORDER — INSULIN LISPRO 100 [IU]/ML
1-6 INJECTION, SOLUTION INTRAVENOUS; SUBCUTANEOUS
Status: DISCONTINUED | OUTPATIENT
Start: 2025-05-22 | End: 2025-05-23 | Stop reason: HOSPADM

## 2025-05-22 RX ORDER — CALCIUM CARBONATE 500 MG/1
1000 TABLET, CHEWABLE ORAL DAILY PRN
Status: DISCONTINUED | OUTPATIENT
Start: 2025-05-22 | End: 2025-05-23 | Stop reason: HOSPADM

## 2025-05-22 RX ORDER — ACETAMINOPHEN 325 MG/1
650 TABLET ORAL EVERY 4 HOURS PRN
Status: DISCONTINUED | OUTPATIENT
Start: 2025-05-22 | End: 2025-05-23 | Stop reason: HOSPADM

## 2025-05-22 RX ORDER — PANTOPRAZOLE SODIUM 20 MG/1
20 TABLET, DELAYED RELEASE ORAL DAILY
Status: DISCONTINUED | OUTPATIENT
Start: 2025-05-22 | End: 2025-05-23 | Stop reason: HOSPADM

## 2025-05-22 RX ORDER — ASPIRIN 81 MG/1
81 TABLET, CHEWABLE ORAL DAILY
Status: DISCONTINUED | OUTPATIENT
Start: 2025-05-22 | End: 2025-05-23 | Stop reason: HOSPADM

## 2025-05-22 RX ORDER — CALCIUM CARBONATE 500 MG/1
1000 TABLET, CHEWABLE ORAL DAILY PRN
Status: CANCELLED | OUTPATIENT
Start: 2025-05-22

## 2025-05-22 RX ORDER — SODIUM CHLORIDE 9 MG/ML
75 INJECTION, SOLUTION INTRAVENOUS CONTINUOUS
Status: DISPENSED | OUTPATIENT
Start: 2025-05-23 | End: 2025-05-23

## 2025-05-22 RX ORDER — ACETAMINOPHEN 325 MG/1
650 TABLET ORAL EVERY 6 HOURS PRN
Status: CANCELLED | OUTPATIENT
Start: 2025-05-22

## 2025-05-22 RX ORDER — CHLORAL HYDRATE 500 MG
1000 CAPSULE ORAL DAILY
Status: DISCONTINUED | OUTPATIENT
Start: 2025-05-22 | End: 2025-05-23 | Stop reason: HOSPADM

## 2025-05-22 RX ORDER — METOPROLOL SUCCINATE 50 MG/1
50 TABLET, EXTENDED RELEASE ORAL DAILY
Status: DISCONTINUED | OUTPATIENT
Start: 2025-05-22 | End: 2025-05-23

## 2025-05-22 RX ADMIN — Medication 1 TABLET: at 08:39

## 2025-05-22 RX ADMIN — CALCIUM CARBONATE (ANTACID) CHEW TAB 500 MG 1000 MG: 500 CHEW TAB at 08:39

## 2025-05-22 RX ADMIN — INSULIN LISPRO 1 UNITS: 100 INJECTION, SOLUTION INTRAVENOUS; SUBCUTANEOUS at 17:15

## 2025-05-22 RX ADMIN — METOPROLOL SUCCINATE 50 MG: 50 TABLET, EXTENDED RELEASE ORAL at 08:24

## 2025-05-22 RX ADMIN — NITROGLYCERIN 0.1 MG: 0.1 PATCH TRANSDERMAL at 18:14

## 2025-05-22 RX ADMIN — ASPIRIN 81 MG: 81 TABLET, CHEWABLE ORAL at 17:15

## 2025-05-22 RX ADMIN — PANTOPRAZOLE SODIUM 20 MG: 20 TABLET, DELAYED RELEASE ORAL at 08:24

## 2025-05-22 RX ADMIN — TORSEMIDE 40 MG: 20 TABLET ORAL at 17:15

## 2025-05-22 RX ADMIN — LORATADINE 5 MG: 10 TABLET ORAL at 08:23

## 2025-05-22 RX ADMIN — APALUTAMIDE 4 TABLET: 60 TABLET, FILM COATED ORAL at 08:39

## 2025-05-22 RX ADMIN — HEPARIN SODIUM 11.1 UNITS/KG/HR: 10000 INJECTION, SOLUTION INTRAVENOUS at 02:53

## 2025-05-22 RX ADMIN — ATORVASTATIN CALCIUM 40 MG: 40 TABLET, FILM COATED ORAL at 15:48

## 2025-05-22 RX ADMIN — LOSARTAN POTASSIUM 25 MG: 25 TABLET, FILM COATED ORAL at 08:24

## 2025-05-22 RX ADMIN — HEPARIN SODIUM 13.1 UNITS/KG/HR: 10000 INJECTION, SOLUTION INTRAVENOUS at 23:21

## 2025-05-22 RX ADMIN — TORSEMIDE 40 MG: 20 TABLET ORAL at 08:23

## 2025-05-22 NOTE — ED PROVIDER NOTES
"Time reflects when diagnosis was documented in both MDM as applicable and the Disposition within this note       Time User Action Codes Description Comment    5/22/2025  4:30 AM Hans Woods Add [R07.9] Chest pain     5/22/2025  4:31 AM Hans Woods Add [R79.89] Elevated troponin     5/22/2025  3:33 PM CruzFátima williselyse Add [R06.02] Shortness of breath     5/22/2025  3:33 PM CruzFátima williselyse Add [N18.32] CKD stage 3b, GFR 30-44 ml/min ([facility])     5/22/2025  3:40 PM CruzanFátimaelyse Add [I27.20] Mild pulmonary hypertension ([facility])     5/22/2025  3:40 PM CruzFátima williselyse Add [I50.22] Chronic HFrEF     5/22/2025  3:42 PM ScarpaciKuldipAlyssa Modify [R79.89] Elevated troponin     5/22/2025  3:42 PM Kuldip Finkssica Modify [R06.02] Shortness of breath     5/22/2025  3:42 PM Scarpaci Alyssa Modify [I27.20] Mild pulmonary hypertension ([facility])     5/22/2025  3:42 PM Scarpaci Alyssa Add [I50.22] Chronic HFrEF (heart failure with reduced ejection fraction) ([facility])           ED Disposition       ED Disposition   Admit    Condition   Stable    Date/Time   Thu May 22, 2025  4:31 AM    Comment   Case was discussed with LUIS and the patient's admission status was agreed to be Admission Status: observation status to the service of Dr. Ellen Jackson               Assessment & Plan       Medical Decision Making  74 y.o. male presents with a chief complaint of chest pain.      Patient states that they gave him CPAP which he started using yesterday and \"I woke up and I thought I had a snorkel on\" though he was able to go back to sleep and upon awakening he noted chest pain.    Patient affirms one day of substernal chest pain without radiation. Patient describes heaviness that is present in the morning however it had resolved and returned tonight prior to the patient restarting his CPAP. Patient states nothing worsens the pain and nothing improves the pain.   Patient denies pleuritic component to chest pain. Patient denies exertional component to " the chest pain.    Patient denies fever/chills.  Patient denies diaphoresis.  Patient denies cough.  Patient denies hemoptysis.  Patient denies vomiting.  Patient denies leg pain or swelling.    Patient has NICM with an EF of 25%.    Patient denies any immobilization of at least 3 days or surgery in the past 4 weeks.    Patient denies any history of DVT or PE.    Patient denies any history or family history of thrombophilia.  Patient affirms any malignancy with treatment within the past 6 months, prostate cancer with immunotherapy.    Focused Objective.  CV:  Normal inspection with no rash, signs of infection, or trauma.  Regular rate and irregular rhythm. Peripheral pulses intact and equal.  Nontender to palpitation over area of patient's reported pain.  Respiratory:  Lungs with scattered rales.  Skin:  Warm and dry.  No rash or signs of herpes zoster over area of patient's reported pain.  Extremities:  Non-tender lower extremities without asymmetry; no clinical signs of DVT. Minimal lower extremity edema that is symmetric.      Medical Decision Making    Patient presenting with chest pain with a broad differential, including multiple emergent etiologies.  Patient does have scattered rales but overall appears clinically euvolemic.  Less likely patient CPAP is related to the symptoms that the patient is experiencing based on timing and history though the machine likely requires adjustments to ensure it is not providing excessive humidification which I discussed with the patient that he appears to have limited interest in continuing to use the device.    Patient has a history of NICM which increases the evaluation of the complexity of their presenting complaint.    External review of the medical record including prior cardiology notes.    EKG obtained and reviewed independently by myself, which was interpreted by myself likely atrial fibrillation with aberrancy with associated PVCs though this is quite changed from  the prior EKG.  Patient placed on cardiac monitoring.    Regarding the possibility for ACS, patient's risk stratification.      HEART score:    History 1=Moderate suspicious  ECG 2=Significant ST-depression  Age 2= > 65 years  Risk Factors 2= > 3 risk factors, or history of atherosclerotic disease  Troponin 2=Greater than or equal to 36 ng/L  Total 9       Score 0-3: 1.7% had a MACE risk    0.4% (1 patient)     36.4% of patients were in this low risk group    Score 4-6: 16.6% had a MACE risk    Score 7-10: 50.1% had a MACE risk       The patient's HEART score is 9.  CXR will be obtained to evaluate for alternative pathologies, including pneumothorax or pneumonia.  Laboratory analysis including troponin to evaluate for ACS, CBC to evaluate for anemia or leukocytosis, and BMP to evaluate renal function and electrolytes considering possibility of cardiac involvement.     Patient has symptoms and history concerning for possible pulmonary embolism.  Regarding this etiology, patient's risk stratification by the Wells' Criteria for Pulmonary Embolism (Two Tier Model):  no - clinical signs or symptoms of DVT (3)  no - PE most likely diagnosis (3)  no - Heart rate greater than 100 (1.5)  no - Immobilization at least 3 days OR surgery in the previous 4 weeks (1.5)  no - Previous, objectively diagnosed PE or DVT (1.5)  no - Hemoptysis (1)  yes - Malignancy with treatment within 6 months or palliative (1)    Patient's risk further evaluated by the PERC Criteria for Pulmonary Embolism:  yes - age is greater than or equal to 50  no - Heart rate greater than 100  yes - O2 sat on room air < 95%  no - Prior history of PE or DVT  no - Recent trauma or surgery  no - Hemoptysis  no - Use of exogenous estrogen  no - Unilateral leg swelling    Patient has a low risk Wells' criteria but is unable to be cleared via the PERC criteria.  As such, a D-Dimer will be ordered for the patient to evaluate for the potential for pulmonary  embolism.  If positive, CT imaging of the patient's chest will be obtained to evaluate for potential pulmonary embolism.    Patient is greater than 50 years old and in accordance with [facility] Clinical Policy on Venous Thrombosis (May 2018), as the patient is a low or intermediate risk for acute PE, an age-adjusted D-dimer will be utilized to screen for possibility of pulmonary embolism.    Patient placed on cardiac monitoring.  Will reassess and reevaluate with plan for admission considering high risk heart score.    Amount and/or Complexity of Data Reviewed  Labs: ordered.  Radiology: ordered.    Risk  Prescription drug management.  Decision regarding hospitalization.        ED Course as of 05/22/25 1643   Thu May 22, 2025   5058 Discussed with on-call cardiology due to the atypical EKG which likely represents A-fib with aberrancy with frequent PVCs.  Reviewed by cardiology who concurs no immediate intervention, suggested transitioning patient to heparin in case intervention was required.       Medications   sodium chloride (PF) 0.9 % injection 3 mL (has no administration in time range)   heparin (porcine) 25,000 units in 0.45% NaCl 250 mL infusion (premix) (13.1 Units/kg/hr × 90 kg (Order-Specific) Intravenous Rate/Dose Change 5/22/25 0953)   polyethylene glycol (MIRALAX) packet 17 g (17 g Oral Not Given 5/22/25 0815)   calcium carbonate (TUMS) chewable tablet 1,000 mg (1,000 mg Oral Given 5/22/25 0839)   acetaminophen (TYLENOL) tablet 650 mg (has no administration in time range)   loratadine (CLARITIN) tablet 5 mg (5 mg Oral Given 5/22/25 0823)   Apalutamide TABS 4 tablet (4 tablets Oral Given 5/22/25 0839)   fish oil capsule 1,000 mg (1,000 mg Oral Not Given 5/22/25 0814)   losartan (COZAAR) tablet 25 mg (25 mg Oral Given 5/22/25 0824)   metoprolol succinate (TOPROL-XL) 24 hr tablet 50 mg (50 mg Oral Given 5/22/25 0824)   multivitamin stress formula tablet 1 tablet (1 tablet Oral Given 5/22/25 0839)   pantoprazole  "(PROTONIX) EC tablet 20 mg (20 mg Oral Given 5/22/25 0824)   atorvastatin (LIPITOR) tablet 40 mg (has no administration in time range)   torsemide (DEMADEX) tablet 40 mg (40 mg Oral Given 5/22/25 0823)   insulin lispro (HumALOG/ADMELOG) 100 units/mL subcutaneous injection 1-6 Units ( Subcutaneous Not Given 5/22/25 1127)       ED Risk Strat Scores                    No data recorded        SBIRT 22yo+      Flowsheet Row Most Recent Value   Initial Alcohol Screen: US AUDIT-C     1. How often do you have a drink containing alcohol? 0 Filed at: 05/21/2025 2323   2. How many drinks containing alcohol do you have on a typical day you are drinking?  0 Filed at: 05/21/2025 2323   3a. Male UNDER 65: How often do you have five or more drinks on one occasion? 0 Filed at: 05/21/2025 2323   Audit-C Score 0 Filed at: 05/21/2025 2323          RAVEN Risk Score      Flowsheet Row Most Recent Value   Age >= 65 1 Filed at: 05/22/2025 0515   Known CAD (stenosis >= 50%) 0 Filed at: 05/22/2025 0515   Recent (<=24 hrs) Service Angina 1 Filed at: 05/22/2025 0515   ST Deviation >= 0.5 mm 0 Filed at: 05/22/2025 0515   3+ CAD Risk Factors (FHx, HTN, HLP, DM, Smoker) 1 Filed at: 05/22/2025 0515   Aspirin Use Past 7 Days 0 Filed at: 05/22/2025 0515   Elevated Cardiac Markers 1 Filed at: 05/22/2025 0515   RAVEN Risk Score (Calculated) 4 Filed at: 05/22/2025 0515                          History of Present Illness       Chief Complaint   Patient presents with    Chest Pain     Pt. To ED for \"chest pressure\" Pt. Started CPAP yesterday and thinks the unit may have released too much water in the mask. Denies N&V and states he did not vomit into mask. Pain was better with cold but doesn not radiate.       Past Medical History[1]   Past Surgical History[2]   Family History[3]   Social History[4]   E-Cigarette/Vaping    E-Cigarette Use Never User       E-Cigarette/Vaping Substances    Nicotine No     THC No     CBD No     Flavoring No     Other No     " Unknown No       I have reviewed and agree with the history as documented.     HPI    Review of Systems        Objective       ED Triage Vitals   Temperature Pulse Blood Pressure Respirations SpO2 Patient Position - Orthostatic VS   05/22/25 1031 05/21/25 2319 05/21/25 2319 05/21/25 2319 05/21/25 2319 05/21/25 2319   97.6 °F (36.4 °C) 89 134/67 18 95 % Sitting      Temp Source Heart Rate Source BP Location FiO2 (%) Pain Score    05/22/25 1031 05/21/25 2319 05/22/25 0030 -- 05/21/25 2319    Oral Monitor Left arm  5      Vitals      Date and Time Temp Pulse SpO2 Resp BP Pain Score FACES Pain Rating User   05/22/25 1520 -- 66 94 % -- 107/78 recheck -- -- [facility]   05/22/25 1514 -- 89 94 % -- 141/117 -- -- DII   05/22/25 1400 -- -- 93 % -- -- -- -- [facility]   05/22/25 1228 -- 88 93 % 18 133/74 No Pain -- [facility]   05/22/25 1227 -- 88 96 % -- 133/74 -- -- DII   05/22/25 1227 -- 88 96 % 18 133/74 No Pain -- [facility]   05/22/25 1200 97.6 °F (36.4 °C) 88 93 % 18 133/74 -- -- [facility]   05/22/25 1100 -- 86 94 % 22 120/71 -- --    05/22/25 1031 97.6 °F (36.4 °C) -- -- -- -- -- -- LM   05/22/25 1030 -- 85 94 % 22 121/67 -- --    05/22/25 0900 -- 84 93 % 22 111/65 -- --    05/22/25 0800 -- 87 93 % 22 110/59 -- --    05/22/25 0700 -- 82 92 % 21 113/80 -- --    05/22/25 0600 -- 82 93 % 21 104/73 -- --    05/22/25 0500 -- 79 95 % 20 113/69 -- --    05/22/25 0408 -- 80 95 % 16 107/68 -- -- [facility]   05/22/25 0200 -- 82 95 % 18 118/71 -- --    05/22/25 0100 -- 83 96 % 19 117/84 -- --    05/22/25 0030 -- 85 95 % 19 119/66 -- --    05/21/25 2330 -- 85 95 % 22 120/75 -- --    05/21/25 2319 -- 89 95 % 18 134/67 5 -- MR            Physical Exam    Results Reviewed       Procedure Component Value Units Date/Time    Fingerstick Glucose (POCT) [[id number]]  (Abnormal) Collected: 05/22/25 1127    Lab Status: Final result Specimen: Blood Updated: 05/22/25 1128     POC Glucose 155 mg/dl     APTT [[id number]]  (Abnormal) Collected: 05/22/25 0914    Lab  Status: Final result Specimen: Blood from Arm, Left Updated: 05/22/25 0942     PTT 54 seconds     Fingerstick Glucose (POCT) [[id number]]  (Normal) Collected: 05/22/25 0812    Lab Status: Final result Specimen: Blood Updated: 05/22/25 0813     POC Glucose 137 mg/dl     HS Troponin I 4hr [[id number]]  (Abnormal) Collected: 05/22/25 0444    Lab Status: Final result Specimen: Blood from Arm, Left Updated: 05/22/25 0514     hs TnI 4hr 97 ng/L      Delta 4hr hsTnI -11 ng/L     Lipid Panel with Direct LDL reflex [[id number]]  (Abnormal) Collected: 05/22/25 0243    Lab Status: Final result Specimen: Blood from Arm, Left Updated: 05/22/25 0504     Cholesterol 210 mg/dL      Triglycerides 359 mg/dL      HDL, Direct 37 mg/dL      LDL Calculated 101 mg/dL     HS Troponin I 2hr [[id number]]  (Abnormal) Collected: 05/22/25 0243    Lab Status: Final result Specimen: Blood from Arm, Left Updated: 05/22/25 0312     hs TnI 2hr 105 ng/L      Delta 2hr hsTnI -3 ng/L     APTT six (6) hours after Heparin bolus/drip initiation or dosing change [[id number]]  (Normal) Collected: 05/22/25 0243    Lab Status: Final result Specimen: Blood from Arm, Left Updated: 05/22/25 0309     PTT 31 seconds     D-dimer, quantitative [[id number]]  (Normal) Collected: 05/22/25 0020    Lab Status: Final result Specimen: Blood from Arm, Left Updated: 05/22/25 0131     D-Dimer, Quant 0.27 ug/ml FEU     Narrative:      In the evaluation for possible pulmonary embolism, in the appropriate (Well's Score of 4 or less) patient, the age adjusted d-dimer cutoff for this patient can be calculated as:    Age x 0.01 (in ug/mL) for Age-adjusted D-dimer exclusion threshold for a patient over 50 years.    HS Troponin 0hr (reflex protocol) [[id number]]  (Abnormal) Collected: 05/22/25 0020    Lab Status: Final result Specimen: Blood from Arm, Left Updated: 05/22/25 0052     hs TnI 0hr 108 ng/L     Protime-INR [[id number]]  (Abnormal) Collected: 05/22/25 0020    Lab Status: Final  result Specimen: Blood from Arm, Left Updated: 05/22/25 0048     Protime 22.0 seconds      INR 1.84    Narrative:      INR Therapeutic Range    Indication                                             INR Range      Atrial Fibrillation                                               2.0-3.0  Hypercoagulable State                                    2.0.2.3  Left Ventricular Asist Device                            2.0-3.0  Mechanical Heart Valve                                  -    Aortic(with afib, MI, embolism, HF, LA enlargement,    and/or coagulopathy)                                     2.0-3.0 (2.5-3.5)     Mitral                                                             2.5-3.5  Prosthetic/Bioprosthetic Heart Valve               2.0-3.0  Venous thromboembolism (VTE: VT, PE        2.0-3.0    APTT [[id number]]  (Normal) Collected: 05/22/25 0020    Lab Status: Final result Specimen: Blood from Arm, Left Updated: 05/22/25 0048     PTT 30 seconds     Comprehensive metabolic panel [[id number]]  (Abnormal) Collected: 05/22/25 0020    Lab Status: Final result Specimen: Blood from Arm, Left Updated: 05/22/25 0045     Sodium 140 mmol/L      Potassium 4.7 mmol/L      Chloride 101 mmol/L      CO2 27 mmol/L      ANION GAP 12 mmol/L      BUN 70 mg/dL      Creatinine 1.79 mg/dL      Glucose 114 mg/dL      Calcium 8.9 mg/dL      AST 16 U/L      ALT 11 U/L      Alkaline Phosphatase 78 U/L      Total Protein 7.3 g/dL      Albumin 4.4 g/dL      Total Bilirubin 0.31 mg/dL      eGFR 36 ml/min/1.73sq m     Narrative:      National Kidney Disease Foundation guidelines for Chronic Kidney Disease (CKD):     Stage 1 with normal or high GFR (GFR > 90 mL/min/1.73 square meters)    Stage 2 Mild CKD (GFR = 60-89 mL/min/1.73 square meters)    Stage 3A Moderate CKD (GFR = 45-59 mL/min/1.73 square meters)    Stage 3B Moderate CKD (GFR = 30-44 mL/min/1.73 square meters)    Stage 4 Severe CKD (GFR = 15-29 mL/min/1.73 square meters)    Stage 5 End  Stage CKD (GFR <15 mL/min/1.73 square meters)  Note: GFR calculation is accurate only with a steady state creatinine    CBC and differential [[id number]]  (Abnormal) Collected: 05/22/25 0020    Lab Status: Final result Specimen: Blood from Arm, Left Updated: 05/22/25 0026     WBC 4.95 Thousand/uL      RBC 3.86 Million/uL      Hemoglobin 11.2 g/dL      Hematocrit 34.4 %      MCV 89 fL      MCH 29.0 pg      MCHC 32.6 g/dL      RDW 13.6 %      MPV 10.4 fL      Platelets 181 Thousands/uL      nRBC 0 /100 WBCs      Segmented % 61 %      Immature Grans % 2 %      Lymphocytes % 22 %      Monocytes % 11 %      Eosinophils Relative 3 %      Basophils Relative 1 %      Absolute Neutrophils 3.01 Thousands/µL      Absolute Immature Grans 0.09 Thousand/uL      Absolute Lymphocytes 1.11 Thousands/µL      Absolute Monocytes 0.56 Thousand/µL      Eosinophils Absolute 0.15 Thousand/µL      Basophils Absolute 0.03 Thousands/µL             X-ray chest 2 views   Final Interpretation by David Justin MD (05/22 0647)      No acute cardiopulmonary disease.            Workstation performed: [id number]             Procedures    ED Medication and Procedure Management   Prior to Admission Medications   Prescriptions Last Dose Informant Patient Reported? Taking?   Ascorbic Acid (vitamin C) 1000 MG tablet 5/21/2025 Self Yes Yes   Sig: Take 1,000 mg by mouth in the morning.   Blood Glucose Monitoring Suppl (ONETOUCH VERIO) w/Device KIT  Self No No   Sig: by Does not apply route once for 1 dose Test sugar in the morning   Continuous Glucose Sensor (FreeStyle Lisa 3 Sensor) MISC  Self No No   Sig: Use 1 each every 14 (fourteen) days   EVENING PRIMROSE OIL PO 5/21/2025 Self Yes Yes   Sig: Take by mouth in the morning and in the evening and before bedtime.   Erleada 60 MG TABS 5/21/2025 Self Yes Yes   Sig: Take 4 tablets by mouth in the morning.   VITAMIN D, ERGOCALCIFEROL, PO 5/21/2025 Self Yes Yes   Sig: Take by mouth   cetirizine (ZyrTEC)  5 MG tablet 5/21/2025 Self Yes Yes   Sig: Take 5 mg by mouth in the morning.   glimepiride (AMARYL) 1 mg tablet 5/21/2025 Self No Yes   Sig: Take 1 tablet (1 mg total) by mouth daily with breakfast   losartan (COZAAR) 25 mg tablet 5/21/2025 Self No Yes   Sig: Take 1 tablet (25 mg total) by mouth daily   metoprolol succinate (TOPROL-XL) 50 mg 24 hr tablet 5/21/2025 Self No Yes   Sig: Take 1 tablet (50 mg total) by mouth daily   multivitamin (THERAGRAN) TABS 5/21/2025 Self Yes Yes   Sig: Take 1 tablet by mouth in the morning.   pantoprazole (PROTONIX) 20 mg tablet 5/21/2025 Self No Yes   Sig: Take 1 tablet (20 mg total) by mouth daily   rosuvastatin (CRESTOR) 20 MG tablet 5/21/2025 Self No Yes   Sig: Take 1 tablet (20 mg total) by mouth daily   torsemide (DEMADEX) 20 mg tablet 5/21/2025 Self No Yes   Sig: Take 2 tablets (40 mg total) by mouth 2 (two) times a day   vitamin E, tocopherol, 400 units capsule 5/21/2025 Self Yes Yes   Sig: Take 400 Units by mouth in the morning.   warfarin (Coumadin) 5 mg tablet 5/21/2025 Self No Yes   Sig: Take one tablet daily or as directed      Facility-Administered Medications: None     Current Discharge Medication List        CONTINUE these medications which have NOT CHANGED    Details   Ascorbic Acid (vitamin C) 1000 MG tablet Take 1,000 mg by mouth in the morning.      cetirizine (ZyrTEC) 5 MG tablet Take 5 mg by mouth in the morning.      Erleada 60 MG TABS Take 4 tablets by mouth in the morning.      EVENING PRIMROSE OIL PO Take by mouth in the morning and in the evening and before bedtime.      glimepiride (AMARYL) 1 mg tablet Take 1 tablet (1 mg total) by mouth daily with breakfast  Qty: 90 tablet, Refills: 1    Associated Diagnoses: Type 2 diabetes mellitus with hyperglycemia, without long-term current use of insulin (HCC)      losartan (COZAAR) 25 mg tablet Take 1 tablet (25 mg total) by mouth daily  Qty: 90 tablet, Refills: 1    Associated Diagnoses: NICM (nonischemic  cardiomyopathy) (HCC); Paroxysmal atrial fibrillation (HCC)      metoprolol succinate (TOPROL-XL) 50 mg 24 hr tablet Take 1 tablet (50 mg total) by mouth daily  Qty: 90 tablet, Refills: 3    Associated Diagnoses: Dilated cardiomyopathy (HCC); Paroxysmal atrial fibrillation (HCC)      multivitamin (THERAGRAN) TABS Take 1 tablet by mouth in the morning.      pantoprazole (PROTONIX) 20 mg tablet Take 1 tablet (20 mg total) by mouth daily  Qty: 90 tablet, Refills: 1    Associated Diagnoses: Gastroesophageal reflux disease without esophagitis; Multiple duodenal ulcers      rosuvastatin (CRESTOR) 20 MG tablet Take 1 tablet (20 mg total) by mouth daily  Qty: 90 tablet, Refills: 1    Associated Diagnoses: Mixed hyperlipidemia      torsemide (DEMADEX) 20 mg tablet Take 2 tablets (40 mg total) by mouth 2 (two) times a day    Associated Diagnoses: Acute on chronic HFrEF (heart failure with reduced ejection fraction) ([facility])      VITAMIN D, ERGOCALCIFEROL, PO Take by mouth      vitamin E, tocopherol, 400 units capsule Take 400 Units by mouth in the morning.      warfarin (Coumadin) 5 mg tablet Take one tablet daily or as directed  Qty: 90 tablet, Refills: 3    Associated Diagnoses: Paroxysmal atrial fibrillation ([facility])      Blood Glucose Monitoring Suppl (ONETOUCH VERIO) w/Device KIT by Does not apply route once for 1 dose Test sugar in the morning  Qty: 1 kit, Refills: 0    Associated Diagnoses: Type 2 diabetes mellitus with complication, without long-term current use of insulin ([facility])      Continuous Glucose Sensor (FreeStyle Lisa 3 Sensor) MISC Use 1 each every 14 (fourteen) days  Qty: 6 each, Refills: 5    Associated Diagnoses: Type 2 diabetes mellitus with hyperglycemia, without long-term current use of insulin ([facility])           No discharge procedures on file.  ED SEPSIS DOCUMENTATION   Time reflects when diagnosis was documented in both MDM as applicable and the Disposition within this note       Time User Action Codes  Description Comment    5/22/2025  4:30 AM Hans Woods Add [R07.9] Chest pain     5/22/2025  4:31 AM Hans Woods Add [R79.89] Elevated troponin     5/22/2025  3:33 PM Cruzan, Annelyse Add [R06.02] Shortness of breath     5/22/2025  3:33 PM Cruzan, Annelyse Add [N18.32] CKD stage 3b, GFR 30-44 ml/min (HCC)     5/22/2025  3:40 PM Cruzan, Annelyse Add [I27.20] Mild pulmonary hypertension (HCC)     5/22/2025  3:40 PM Cruzan, Annelyse Add [I50.22] Chronic HFrEF     5/22/2025  3:42 PM Scarpaci, Alyssa Modify [R79.89] Elevated troponin     5/22/2025  3:42 PM Scarpaci, Alyssa Modify [R06.02] Shortness of breath     5/22/2025  3:42 PM Scarpaci, Alyssa Modify [I27.20] Mild pulmonary hypertension (HCC)     5/22/2025  3:42 PM Scarpaci, Alyssa Add [I50.22] Chronic HFrEF (heart failure with reduced ejection fraction) ([facility])                      [1]   Past Medical History:  Diagnosis Date    Allergic     Anesthesia     pt wears a life vest (11/16).  should not be scheduled at [facility] until heart condition is stabilized    BPH (benign prostatic hypertrophy)     Cancer (HCC)     CHF (congestive heart failure) (HCC)     Chronic kidney disease     Coronary artery disease     Diabetes mellitus (HCC)     Hyperlipidemia     Hypertension     PAF (paroxysmal atrial fibrillation) (HCC)     Prostate cancer (HCC)    [2]   Past Surgical History:  Procedure Laterality Date    CARDIAC CATHETERIZATION N/A 10/04/2023    Procedure: Cardiac Coronary Angiogram;  Surgeon: Chris Lovell MD;  Location: [facility];  Service: Cardiology    CARDIAC CATHETERIZATION N/A 10/04/2023    Procedure: Cardiac RHC/LHC;  Surgeon: Chris Lovell MD;  Location: [facility];  Service: Cardiology    CARDIAC CATHETERIZATION  10/04/2023    Procedure: Cardiac catheterization;  Surgeon: Chris Lovell MD;  Location: [facility];  Service: Cardiology    CARDIAC DEFIBRILLATOR PLACEMENT  01/04/2024    CARDIAC ELECTROPHYSIOLOGY PROCEDURE  N/A 2024    Procedure: Cardiac icd implant, Dual Chambers ICD;  Surgeon: Leo Whalen MD;  Location:  CARDIAC CATH LAB;  Service: Cardiology    EAR SURGERY      HERNIA REPAIR      TN COLONOSCOPY FLX DX W/COLLJ SPEC WHEN PFRMD N/A 2019    Procedure: COLONOSCOPY;  Surgeon: Winston Nielson III, MD;  Location: [facility] GI LAB;  Service: Gastroenterology   [3]   Family History  Problem Relation Name Age of Onset    Stroke Father Father     No Known Problems Mother      Prostate cancer Brother      Arthritis Brother      No Known Problems Sister      Diabetes Sister Brigitte     Diabetes Sister Fátima     No Known Problems Son      No Known Problems Daughter     [4]   Social History  Tobacco Use    Smoking status: Former     Current packs/day: 0.00     Average packs/day: 1 pack/day for 25.0 years (25.0 ttl pk-yrs)     Types: Cigarettes, Pipe, Cigars     Start date: 1980     Quit date: 2005     Years since quittin.2     Passive exposure: Past    Smokeless tobacco: Never   Vaping Use    Vaping status: Never Used   Substance Use Topics    Alcohol use: Not Currently    Drug use: Not Currently        Hans Woods MD  [phone]

## 2025-05-22 NOTE — ASSESSMENT & PLAN NOTE
Patient presented with substernal chest pain/pressure without radiation along with SOB since yesterday after using CPAP for the first time  On admission troponins elevated, D-dimer 0.25, Chest X rays negative for any acute disease process  Patient has NICM with an EF of 25%   EKG showed A-fib rate controlled with aberrancy with frequent PVCs  RAVEN score 4 and HEART score 7+, indicates a high risk for ACS  Will admit the patient for observation with telemetry  Cardiology consulted  Patient is n.p.o. and started the on heparin drip due to pending possible cardiac cath

## 2025-05-22 NOTE — ASSESSMENT & PLAN NOTE
His EKG demonstrates wide QRS complexes with PVCs and intermittent pacing spikes.  Underlying rhythm difficult to determine.  Suspect predominantly paced rhythm with intermittent PVCs.  Plan for device interrogation for further evaluation

## 2025-05-22 NOTE — ASSESSMENT & PLAN NOTE
Will hold off the Coumadin, and start the patient on heparin drip due to pending possible cardiac cath

## 2025-05-22 NOTE — ASSESSMENT & PLAN NOTE
Lab Results   Component Value Date    HGBA1C 6.6 (A) 04/08/2025   Will hold home glimepiride for now  Will start on insulin sliding scale  ACHS  Maintain Hypoglycemia protocol

## 2025-05-22 NOTE — ASSESSMENT & PLAN NOTE
Underlying rhythm difficult to determine on EKG.  Telemetry ordered.  Plan for device interrogation.  Rate control: Continue Toprol 50 mg daily  Anticoagulation: Home Coumadin/warfarin placed on hold.  Continue heparin GTT. .  [id number] 5

## 2025-05-22 NOTE — ASSESSMENT & PLAN NOTE
Lab Results   Component Value Date    EGFR 36 05/22/2025    EGFR 33 05/16/2025    EGFR 32 04/17/2025    CREATININE 1.79 (H) 05/22/2025    CREATININE 1.92 (H) 05/16/2025    CREATININE 1.97 (H) 04/17/2025   Nephrology consult placed

## 2025-05-22 NOTE — CONSULTS
Consultation - Cardiology   Galen Carson 74 y.o. male MRN: [id number]  Unit/Bed#: 2 E 276-01 Encounter: [id number]  05/22/25  12:38 PM    Assessment/ Plan:   Assessment & Plan  Dyspnea  Etiology undifferentiated at this time.  He appears close to euvolemic on exam.  Continue torsemide  Plan for device interrogation to assess underlying rhythm and device function  Recent echocardiogram demonstrates EF of 25% without severe valvular abnormalities.  Plan for further evaluation with cardiac RHC/LHC tomorrow  Discussed the indications, alternatives, risks and benefit of cardiac catheterization and possible PCI. The procedure risks, benefits, and complications (including but not limited to bleeding, infection, arrhythmia, nephrotoxicity, vessel injury, myocardial infarction, CVA, and death) were reviewed.  Patient is alert and oriented x3 and wishes to proceed. All questions answered.   Nephrology consulted for fluid recommendations and clearance given CKD 3B  Begin aspirin 81 mg daily.  Continue Lipitor, Toprol.    Elevated troponin  Troponins mildly elevated with overall flat trend.  EKG without acute ischemic changes.  Given flat troponin trend, likely nonischemic myocardial injury.  However, due to his ongoing dyspnea he is planned to undergo cardiac RHC/LHC tomorrow.  Continue heparin gtt. begin aspirin 81 mg daily.  Continue Lipitor, Toprol    Abnormal EKG  His EKG demonstrates wide QRS complexes with PVCs and intermittent pacing spikes.  Underlying rhythm difficult to determine.  Suspect predominantly paced rhythm with intermittent PVCs.  Plan for device interrogation for further evaluation    NICM with EF 25% s/p MDT DC ICD (1/4/2024)  GDMT: Continue Toprol, losartan.  Will hold off on initiation of Aldactone in the setting of elevated potassium levels.  May be reasonable to consider initiation of SGLT2 inhibitor if patient is agreeable.  Plan for device interrogation later today.  Continue to follow with the  device clinic    Chronic HFrEF  Wt Readings from Last 3 Encounters:   05/21/25 103 kg (226 lb)   05/14/25 103 kg (226 lb)   05/13/25 103 kg (228 lb)   Appears close to euvolemic on exam  Diuretic: Continue torsemide 40 mg twice daily  GDMT: Per above  Recommend low-sodium diet, daily weights, strict I's and O's.  Recommend continuing to monitor and replete electrolytes as needed.    Mixed hyperlipidemia  Continue Lipitor    CKD stage 3b, GFR 30-44 ml/min ([facility])  Lab Results   Component Value Date    EGFR 36 05/22/2025    EGFR 33 05/16/2025    EGFR 32 04/17/2025    CREATININE 1.79 (H) 05/22/2025    CREATININE 1.92 (H) 05/16/2025    CREATININE 1.97 (H) 04/17/2025   Nephrology consult placed    Paroxysmal atrial fibrillation  Underlying rhythm difficult to determine on EKG.  Telemetry ordered.  Plan for device interrogation.  Rate control: Continue Toprol 50 mg daily  Anticoagulation: Home Coumadin/warfarin placed on hold.  Continue heparin GTT. .  [id number] 5  Nonobstructive CAD  Begin aspirin.  Continue Lipitor, Toprol    History of NSVT, PVCs  Continue Toprol    SVT (supraventricular tachycardia) (HCC)  Continue Toprol    Junctional tachycardia (HCC)  Continue Toprol    Mild pulmonary hypertension (HCC)  Plan for RHC/LHC tomorrow for further evaluation.  Continue torsemide    Essential hypertension  Continue losartan, Toprol, torsemide    Type 2 diabetes mellitus with hyperglycemia, without long-term current use of insulin (HCC)  Lab Results   Component Value Date    HGBA1C 6.6 (A) 04/08/2025       Recent Labs     05/22/25  0812 05/22/25  1127   POCGLU 137 155*       Blood Sugar Average: Last 72 hrs:  (P) 146  Management per primary team    Prostate cancer (HCC)  Management per primary team        History of Present Illness   Physician Requesting Consult: Saji Monsalve,*    Reason for Consult / Principal Problem: Chest pain, elevated troponin    HPI: Galen Carson is a 74 y.o. year old male with PMHx  of [facility] s/p MDT DC ICD (1/2024), chronic CHF, paroxysmal atrial fibrillation, mild nonobstructive CAD, NSVT, PVCs, junctional tachycardia, paroxysmal SVT, mild pulmonary hypertension, hypertension, hyperlipidemia, CKD 3B, history of GI bleed/duodenal ulcers, GERD, history of prostate cancer who presents with dyspnea.     He has been experiencing dyspnea at rest and with minimal exertion. He even notes dyspnea while talking on the phone. It has been gradually worsening over the past several months, but suddenly worsened yesterday. He denies chest discomfort. He does note palpitations as well with exertion. He does endorse 2-3 pillow orthopnea, denies PND. He notes stable LE edema and abdominal distention. He has been weighing himself at home and notes it fluctuates between 225-228 lbs. He denies lightheadedness, dizziness, or syncope. He reports the sudden onset of dyspnea occurred when using his CPAP. He does experience a chest tightness/odd feeling in chest. Denies wheezing or inhalers. He continues to experience significant SOB. He notes the diuretic is overall helping but continues to experience dyspnea.     He was noted to have elevated troponins: 108-105-97. BNP not obtained.     He has followed with Dr. Lovell and Dr. Li in the outpatient cardiology setting    Pharmacologic MPI 4/21/2025: Predominantly fixed defect involving the entire inferior wall, apex, and basal septal region.  No reversible defect suggestive of ischemia identified.  Echocardiogram 4/21/2025: EF 25%, systolic function severely reduced with severe global hypokinesis.  Biatrial dilation.  Mild MR.  Mild to moderate TR.  RVSP 50-55 mmHg  Echocardiogram 8/2024: EF 31%, mild MR  Echocardiogram 5/2024: EF 20%, severe global hypokinesis, mild MAC  Cardiac catheterization 10/2023: Nonobstructive CAD, mild pulmonary hypertension    Inpatient consult to Cardiology  Consult performed by: Felix Suarez PA-C  Consult ordered by: Yoan  DANITA Hernandez          EKG: Wide QRS rhythm with frequent PVCs.  Nonspecific IVCD.  Intermittent pacer spikes noted.    Review of Systems   Constitutional:  Negative for diaphoresis, fever and unexpected weight change.   HENT:  Negative for ear pain and sore throat.    Eyes:  Negative for pain and redness.   Respiratory:  Positive for chest tightness and shortness of breath. Negative for cough.    Cardiovascular:  Positive for palpitations and leg swelling (Stable). Negative for chest pain.   Gastrointestinal:  Positive for abdominal distention (Stable). Negative for vomiting.   Genitourinary:  Negative for dysuria.   Skin:  Negative for color change and rash.   Neurological:  Negative for dizziness, syncope and light-headedness.   Hematological:  Does not bruise/bleed easily.   Psychiatric/Behavioral:  Negative for agitation and behavioral problems.    All other systems reviewed and are negative.      Historical Information   Past Medical History[1]  Past Surgical History[2]  Social History     Substance and Sexual Activity   Alcohol Use Not Currently     Social History     Substance and Sexual Activity   Drug Use Not Currently     Tobacco Use History[3]    Family History: Family History[4]    Meds/Allergies   all current active meds have been reviewed  Allergies[5]    Objective   Vitals: Blood pressure 133/74, pulse 88, temperature 97.6 °F (36.4 °C), temperature source Oral, resp. rate 22, SpO2 93%., There is no height or weight on file to calculate BMI.,   Orthostatic Blood Pressures      Flowsheet Row Most Recent Value   Blood Pressure 133/74 filed at 2025 1228   Patient Position - Orthostatic VS Sitting filed at 2025 1100            Systolic (24hrs), Av , Min:104 , Max:134     Diastolic (24hrs), Av, Min:59, Max:84      No intake or output data in the 24 hours ending 25 1238    Invasive Devices       Peripheral Intravenous Line  Duration             Peripheral IV 25 Left Antecubital  <1 day    Peripheral IV 05/22/25 Dorsal (posterior);Left Hand <1 day                        Physical Exam:    GEN: Alert and oriented x 3, in no acute distress.  Well appearing and well nourished.   HEENT: Sclera anicteric, conjunctivae pink, mucous membranes moist. Oropharynx clear.   NECK: Supple, no significant JVD. Trachea midline.   HEART: Regular rhythm, normal S1 and S2, no murmurs, clicks, gallops or rubs. PMI nondisplaced, no thrills.   LUNGS: Clear to auscultation bilaterally; no wheezes, rales, or rhonchi. No increased work of breathing or signs of respiratory distress.   ABDOMEN: Soft, nontender, non-distended.   EXTREMITIES: Trace pitting edema is bilateral lower extremities.  Skin warm and well perfused, no clubbing, cyanosis.  NEURO: No focal findings. Normal speech. Mood and affect normal.   SKIN: Normal without suspicious lesions on exposed skin.      Lab Results:     Troponins:   Results from last 7 days   Lab Units 05/22/25  0444 05/22/25  0243 05/22/25  0020   HS TNI 0HR ng/L  --   --  108*   HS TNI 2HR ng/L  --  105*  --    HS TNI 4HR ng/L 97*  --   --    HSTNI D4 ng/L -11  --   --        CBC with diff:   Results from last 7 days   Lab Units 05/22/25  0020   WBC Thousand/uL 4.95   HEMOGLOBIN g/dL 11.2*   HEMATOCRIT % 34.4*   MCV fL 89   PLATELETS Thousands/uL 181   RBC Million/uL 3.86*   MCH pg 29.0   MCHC g/dL 32.6   RDW % 13.6   MPV fL 10.4   NRBC AUTO /100 WBCs 0         CMP:   Results from last 7 days   Lab Units 05/22/25  0020 05/16/25  0722   POTASSIUM mmol/L 4.7 4.9   CHLORIDE mmol/L 101 100   CO2 mmol/L 27 27   BUN mg/dL 70* 69*   CREATININE mg/dL 1.79* 1.92*   CALCIUM mg/dL 8.9 9.0   AST U/L 16  --    ALT U/L 11  --    ALK PHOS U/L 78  --    EGFR ml/min/1.73sq m 36 33       ** Please Note: Fluency DirectDictation voice to text software may have been used in the creation of this document. **         [1]   Past Medical History:  Diagnosis Date    Allergic     Anesthesia     pt wears a  life vest ().  should not be scheduled at [facility] until heart condition is stabilized    BPH (benign prostatic hypertrophy)     Cancer (HCC)     CHF (congestive heart failure) (HCC)     Chronic kidney disease     Coronary artery disease     Diabetes mellitus (HCC)     Hyperlipidemia     Hypertension     PAF (paroxysmal atrial fibrillation) (HCC)     Prostate cancer (HCC)    [2]   Past Surgical History:  Procedure Laterality Date    CARDIAC CATHETERIZATION N/A 10/04/2023    Procedure: Cardiac Coronary Angiogram;  Surgeon: Chris Lovell MD;  Location: [facility];  Service: Cardiology    CARDIAC CATHETERIZATION N/A 10/04/2023    Procedure: Cardiac RHC/LHC;  Surgeon: Chris Lovell MD;  Location: [facility];  Service: Cardiology    CARDIAC CATHETERIZATION  10/04/2023    Procedure: Cardiac catheterization;  Surgeon: Chris Lovell MD;  Location: [facility];  Service: Cardiology    CARDIAC DEFIBRILLATOR PLACEMENT  2024    CARDIAC ELECTROPHYSIOLOGY PROCEDURE N/A 2024    Procedure: Cardiac icd implant, Dual Chambers ICD;  Surgeon: Leo Whalen MD;  Location:  CARDIAC CATH LAB;  Service: Cardiology    EAR SURGERY      HERNIA REPAIR      AZ COLONOSCOPY FLX DX W/COLLJ SPEC WHEN PFRMD N/A 2019    Procedure: COLONOSCOPY;  Surgeon: Winston Nielson III, MD;  Location: [facility];  Service: Gastroenterology   [3]   Social History  Tobacco Use   Smoking Status Former    Current packs/day: 0.00    Average packs/day: 1 pack/day for 25.0 years (25.0 ttl pk-yrs)    Types: Cigarettes, Pipe, Cigars    Start date: 1980    Quit date: 2005    Years since quittin.2    Passive exposure: Past   Smokeless Tobacco Never   [4]   Family History  Problem Relation Name Age of Onset    Stroke Father Father     No Known Problems Mother      Prostate cancer Brother      Arthritis Brother      No Known Problems Sister      Diabetes Sister Brigitte     Diabetes Sister Fátima     No  Known Problems Son      No Known Problems Daughter     [5]   Allergies  Allergen Reactions    Penicillin G Benzathine Other (See Comments)    Penicillin V Other (See Comments)     As a child

## 2025-05-22 NOTE — ASSESSMENT & PLAN NOTE
Troponins mildly elevated with overall flat trend.  EKG without acute ischemic changes.  Given flat troponin trend, likely nonischemic myocardial injury.  However, due to his ongoing dyspnea he is planned to undergo cardiac RHC/LHC tomorrow.  Continue heparin gtt. begin aspirin 81 mg daily.  Continue Lipitor, Toprol

## 2025-05-22 NOTE — ASSESSMENT & PLAN NOTE
Wt Readings from Last 3 Encounters:   05/21/25 103 kg (226 lb)   05/14/25 103 kg (226 lb)   05/13/25 103 kg (228 lb)   Appears close to euvolemic on exam  Diuretic: Continue torsemide 40 mg twice daily  GDMT: Per above  Recommend low-sodium diet, daily weights, strict I's and O's.  Recommend continuing to monitor and replete electrolytes as needed.

## 2025-05-22 NOTE — H&P
H&P - Hospitalist   Name: Galen Carson 74 y.o. male I MRN: [id number]  Unit/Bed#: ED 24 I Date of Admission: 5/21/2025   Date of Service: 5/22/2025 I Hospital Day: 0     Assessment & Plan  Chest pain  Patient presented with substernal chest pain/pressure without radiation along with SOB since yesterday after using CPAP for the first time  On admission troponins elevated, D-dimer 0.25, Chest X rays negative for any acute disease process  Patient has NICM with an EF of 25%   EKG showed A-fib rate controlled with aberrancy with frequent PVCs  RAVEN score 4 and HEART score 7+, indicates a high risk for ACS  Will admit the patient for observation with telemetry  Cardiology consulted  Patient is n.p.o. and started the on heparin drip due to pending possible cardiac cath  Mixed hyperlipidemia  Continue Lipitor 40 mg which is a hospital substitute for crestor  Prostate cancer (HCC)  Patient is following with outpatient urology/oncology  Continue on home erleada   Type 2 diabetes mellitus with hyperglycemia, without long-term current use of insulin (Formerly [facility])  Lab Results   Component Value Date    HGBA1C 6.6 (A) 04/08/2025   Will hold home glimepiride for now  Will start on insulin sliding scale  ACHS  Maintain Hypoglycemia protocol    Acute renal failure superimposed on stage 3a chronic kidney disease (Formerly [facility])  Lab Results   Component Value Date    EGFR 36 05/22/2025    EGFR 33 05/16/2025    EGFR 32 04/17/2025    CREATININE 1.79 (H) 05/22/2025    CREATININE 1.92 (H) 05/16/2025    CREATININE 1.97 (H) 04/17/2025   Creatinine 1.79 on admission, seems around his baseline  Avoid nephrotoxins  Monitor I's and O's  Essential hypertension  Blood pressure stable  Continue losartan and metoprolol  Monitor vital signs  NICM with EF 26% s/p MDT DC ICD (1/4/2024)  History noted  Will monitor on telemetry  Cardiology consulted  Chronic HFrEF  Wt Readings from Last 3 Encounters:   05/21/25 103 kg (226 lb)   05/14/25 103 kg (226 lb)   05/13/25  103 kg (228 lb)   History noted  Continue torsemide 40 mg twice daily  Cardiology consulted  Daily weight  Monitor intake and output    On continuous oral anticoagulation  Will hold off the Coumadin, and start the patient on heparin drip due to pending possible cardiac cath    VTE Pharmacologic Prophylaxis:   Moderate Risk (Score 3-4) - Pharmacological DVT Prophylaxis Ordered: heparin drip.  Code Status: Level 1 - Full Code   Discussion with family: Patient declined call to . Patient updated his grandson.    Anticipated Length of Stay: Patient will be admitted on an observation basis with an anticipated length of stay of less than 2 midnights secondary to pending cardiology consult.    History of Present Illness   Chief Complaint: Chest pain, shortness of breath    Galen Carson is a 74 y.o. male with a PMH of hypertension, diabetes type 2, renal failure, NICM with EF 26% status post ICD (1/4/2024), chronic continuous warfarin , prostate cancer, hyperlipidemia, who presents with midsternal chest pressure and shortness of breath that started last night after using CPAP for the first time. Patient denies pleuritic or/and exertional components to the chest pain. Patient denies cough, vomiting, leg swelling or pain, diaphoresis. Patient has NICM with an EF of 25% status post ICD (1/4/2024) requiring chronic use of continuous warfarin. On admission troponins elevated, D-dimer 0.25, Chest X rays negative for any acute disease process. EKG showed A-fib rate controlled with aberrancy with frequent PVCs. RAVEN score 4 and HEART score 7+, indicates a high risk for ACS. Will admit the patient for observation with telemetry. Cardiology consulted. Will keep the Patient n.p.o. and started the on heparin drip due to pending possible cardiac cath.    Review of Systems   Constitutional:  Negative for activity change, chills and diaphoresis.   Respiratory:  Positive for shortness of breath. Negative for cough, chest  tightness and wheezing.    Cardiovascular:  Positive for chest pain. Negative for palpitations and leg swelling.   Gastrointestinal:  Negative for abdominal pain and blood in stool.   Endocrine: Negative for cold intolerance and heat intolerance.   Genitourinary:  Negative for difficulty urinating and hematuria.   Musculoskeletal:  Negative for gait problem and joint swelling.   Allergic/Immunologic: Negative for environmental allergies and food allergies.   Neurological:  Negative for dizziness, tremors, syncope and weakness.   Psychiatric/Behavioral:  Negative for agitation and behavioral problems.        Historical Information   Past Medical History[1]  Past Surgical History[2]  Social History[3]  E-Cigarette/Vaping    E-Cigarette Use Never User      E-Cigarette/Vaping Substances    Nicotine No     THC No     CBD No     Flavoring No     Other No     Unknown No      Social History:  Marital Status:    Patient Pre-hospital Living Situation: Home  Patient Pre-hospital Level of Mobility: walks  Patient Pre-hospital Diet Restrictions: none    Meds/Allergies   I have reviewed home medications with patient personally.  Prior to Admission medications    Medication Sig Start Date End Date Taking? Authorizing Provider   Ascorbic Acid (vitamin C) 1000 MG tablet Take 1,000 mg by mouth in the morning.    Historical Provider, MD   Blood Glucose Monitoring Suppl (ONETOUCH VERIO) w/Device KIT by Does not apply route once for 1 dose Test sugar in the morning 4/2/19 5/13/25  DANITA Girard   cetirizine (ZyrTEC) 5 MG tablet Take 5 mg by mouth in the morning.    Historical Provider, MD   Continuous Glucose Sensor (FreeStyle Lisa 3 Sensor) [facility] [place] [facility] Use 1 each every 14 (fourteen) days 7/9/24   DANITA Girard   Erleada 60 MG TABS Take 4 tablets by mouth in the morning. 3/7/25   Samuel Rosales MD   EVENING PRIMROSE OIL PO Take by mouth in the morning and in the evening and before bedtime.    Samuel Provider, MD    glimepiride (AMARYL) 1 mg tablet Take 1 tablet (1 mg total) by mouth daily with breakfast 3/21/25 9/17/25  DANITA Girard   losartan (COZAAR) 25 mg tablet Take 1 tablet (25 mg total) by mouth daily 5/13/25   Partha Colon PA-C   metoprolol succinate (TOPROL-XL) 50 mg 24 hr tablet Take 1 tablet (50 mg total) by mouth daily 4/11/25   Guillermo Li MD   multivitamin (THERAGRAN) TABS Take 1 tablet by mouth in the morning.    Historical Provider, MD   pantoprazole (PROTONIX) 20 mg tablet Take 1 tablet (20 mg total) by mouth daily 1/9/25   Alyssa Cao PA-C   rosuvastatin (CRESTOR) 20 MG tablet Take 1 tablet (20 mg total) by mouth daily 5/13/25   Partha Colon PA-C   torsemide (DEMADEX) 20 mg tablet Take 2 tablets (40 mg total) by mouth 2 (two) times a day 5/13/25   Partha Colon PA-C   VITAMIN D, ERGOCALCIFEROL, PO Take by mouth    Historical Provider, MD   vitamin E, tocopherol, 400 units capsule Take 400 Units by mouth in the morning.    Historical Provider, MD   warfarin (Coumadin) 5 mg tablet Take one tablet daily or as directed 10/30/24   DANITA Weber     Allergies   Allergen Reactions    Penicillin G Benzathine Other (See Comments)    Penicillin V Other (See Comments)     As a child        Objective :  HR:  [64-89] 79  BP: (107-134)/(66-84) 113/69  Resp:  [16-22] 20  SpO2:  [93 %-96 %] 95 %  O2 Device: None (Room air)    Physical Exam  Constitutional:       Appearance: Normal appearance.     Cardiovascular:      Rate and Rhythm: Rhythm irregular.   Pulmonary:      Effort: Pulmonary effort is normal.      Comments: SOB   Abdominal:      General: There is no distension.      Palpations: Abdomen is soft.     Musculoskeletal:         General: No swelling or tenderness. Normal range of motion.     Skin:     General: Skin is warm and dry.     Neurological:      General: No focal deficit present.      Mental Status: He is alert. Mental status is at baseline.     Psychiatric:          Mood and Affect: Mood normal.         Behavior: Behavior normal.              Lab Results: I have reviewed the following results:  Results from last 7 days   Lab Units 05/22/25  0020   WBC Thousand/uL 4.95   HEMOGLOBIN g/dL 11.2*   HEMATOCRIT % 34.4*   PLATELETS Thousands/uL 181   SEGS PCT % 61   LYMPHO PCT % 22   MONO PCT % 11   EOS PCT % 3     Results from last 7 days   Lab Units 05/22/25  0020   SODIUM mmol/L 140   POTASSIUM mmol/L 4.7   CHLORIDE mmol/L 101   CO2 mmol/L 27   BUN mg/dL 70*   CREATININE mg/dL 1.79*   ANION GAP mmol/L 12   CALCIUM mg/dL 8.9   ALBUMIN g/dL 4.4   TOTAL BILIRUBIN mg/dL 0.31   ALK PHOS U/L 78   ALT U/L 11   AST U/L 16   GLUCOSE RANDOM mg/dL 114     Results from last 7 days   Lab Units 05/22/25  0020   INR  1.84*         Lab Results   Component Value Date    HGBA1C 6.6 (A) 04/08/2025    HGBA1C 6.7 (H) 10/16/2024    HGBA1C 6.3 (H) 07/24/2024           Imaging Results Review: I reviewed radiology reports from this admission including: chest xray.  Other Study Results Review: EKG was reviewed.     Administrative Statements   I have spent a total time of 55 minutes in caring for this patient on the day of the visit/encounter including Diagnostic results, Prognosis, Risks and benefits of tx options, Importance of tx compliance, Risk factor reductions, Counseling / Coordination of care, Documenting in the medical record, Reviewing/placing orders in the medical record (including tests, medications, and/or procedures), Obtaining or reviewing history  , and Communicating with other healthcare professionals .    ** Please Note: This note has been constructed using a voice recognition system. **         [1]   Past Medical History:  Diagnosis Date    Allergic     Anesthesia     pt wears a life vest (11/16).  should not be scheduled at [facility] until heart condition is stabilized    BPH (benign prostatic hypertrophy)     Cancer (HCC)     CHF (congestive heart failure) (HCC)     Chronic kidney disease      Coronary artery disease     Diabetes mellitus (HCC)     Hyperlipidemia     Hypertension     PAF (paroxysmal atrial fibrillation) (HCC)     Prostate cancer (HCC)    [2]   Past Surgical History:  Procedure Laterality Date    CARDIAC CATHETERIZATION N/A 10/04/2023    Procedure: Cardiac Coronary Angiogram;  Surgeon: Chris Lovell MD;  Location: [facility];  Service: Cardiology    CARDIAC CATHETERIZATION N/A 10/04/2023    Procedure: Cardiac RHC/LHC;  Surgeon: Chris Lovell MD;  Location: [facility];  Service: Cardiology    CARDIAC CATHETERIZATION  10/04/2023    Procedure: Cardiac catheterization;  Surgeon: Chris Lovell MD;  Location: [facility];  Service: Cardiology    CARDIAC DEFIBRILLATOR PLACEMENT  2024    CARDIAC ELECTROPHYSIOLOGY PROCEDURE N/A 2024    Procedure: Cardiac icd implant, Dual Chambers ICD;  Surgeon: Leo Whalen MD;  Location:  CARDIAC CATH LAB;  Service: Cardiology    EAR SURGERY      HERNIA REPAIR      IN COLONOSCOPY FLX DX W/COLLJ SPEC WHEN PFRMD N/A 2019    Procedure: COLONOSCOPY;  Surgeon: Winston Nielson III, MD;  Location: [facility] GI [facility];  Service: Gastroenterology   [3]   Social History  Tobacco Use    Smoking status: Former     Current packs/day: 0.00     Average packs/day: 1 pack/day for 25.0 years (25.0 ttl pk-yrs)     Types: Cigarettes, Pipe, Cigars     Start date: 1980     Quit date: 2005     Years since quittin.2     Passive exposure: Past    Smokeless tobacco: Never   Vaping Use    Vaping status: Never Used   Substance and Sexual Activity    Alcohol use: Not Currently    Drug use: Not Currently    Sexual activity: Not Currently     Partners: Female

## 2025-05-22 NOTE — ASSESSMENT & PLAN NOTE
Lab Results   Component Value Date    HGBA1C 6.6 (A) 04/08/2025       Recent Labs     05/22/25  0812 05/22/25  1127   POCGLU 137 155*       Blood Sugar Average: Last 72 hrs:  (P) 146  Management per primary team

## 2025-05-22 NOTE — ASSESSMENT & PLAN NOTE
GDMT: Continue Toprol, losartan.  Will hold off on initiation of Aldactone in the setting of elevated potassium levels.  May be reasonable to consider initiation of SGLT2 inhibitor if patient is agreeable.  Plan for device interrogation later today.  Continue to follow with the device clinic

## 2025-05-22 NOTE — ASSESSMENT & PLAN NOTE
Wt Readings from Last 3 Encounters:   05/21/25 103 kg (226 lb)   05/14/25 103 kg (226 lb)   05/13/25 103 kg (228 lb)   History noted  Continue torsemide 40 mg twice daily  Cardiology consulted  Daily weight  Monitor intake and output

## 2025-05-22 NOTE — ASSESSMENT & PLAN NOTE
Lab Results   Component Value Date    HGBA1C 6.6 (A) 04/08/2025       Recent Labs     05/22/25  0812   POCGLU 137       Blood Sugar Average: Last 72 hrs:  (P) 137

## 2025-05-22 NOTE — ASSESSMENT & PLAN NOTE
Lab Results   Component Value Date    EGFR 36 05/22/2025    EGFR 33 05/16/2025    EGFR 32 04/17/2025    CREATININE 1.79 (H) 05/22/2025    CREATININE 1.92 (H) 05/16/2025    CREATININE 1.97 (H) 04/17/2025

## 2025-05-22 NOTE — ASSESSMENT & PLAN NOTE
Lab Results   Component Value Date    EGFR 36 05/22/2025    EGFR 33 05/16/2025    EGFR 32 04/17/2025    CREATININE 1.79 (H) 05/22/2025    CREATININE 1.92 (H) 05/16/2025    CREATININE 1.97 (H) 04/17/2025   Creatinine 1.79 on admission, seems around his baseline  Avoid nephrotoxins  Monitor I's and O's

## 2025-05-22 NOTE — ASSESSMENT & PLAN NOTE
Wt Readings from Last 3 Encounters:   05/21/25 103 kg (226 lb)   05/14/25 103 kg (226 lb)   05/13/25 103 kg (228 lb)

## 2025-05-22 NOTE — ASSESSMENT & PLAN NOTE
Etiology undifferentiated at this time.  He appears close to euvolemic on exam.  Continue torsemide  Plan for device interrogation to assess underlying rhythm and device function  Recent echocardiogram demonstrates EF of 25% without severe valvular abnormalities.  Plan for further evaluation with cardiac RHC/LHC tomorrow  Discussed the indications, alternatives, risks and benefit of cardiac catheterization and possible PCI. The procedure risks, benefits, and complications (including but not limited to bleeding, infection, arrhythmia, nephrotoxicity, vessel injury, myocardial infarction, CVA, and death) were reviewed.  Patient is alert and oriented x3 and wishes to proceed. All questions answered.   Nephrology consulted for fluid recommendations and clearance given CKD 3B  Begin aspirin 81 mg daily.  Continue Lipitor, Toprol.

## 2025-05-23 ENCOUNTER — TELEPHONE (OUTPATIENT)
Dept: CARDIOLOGY CLINIC | Facility: CLINIC | Age: 75
End: 2025-05-23

## 2025-05-23 ENCOUNTER — PREP FOR PROCEDURE (OUTPATIENT)
Dept: NON INVASIVE DIAGNOSTICS | Facility: HOSPITAL | Age: 75
End: 2025-05-23

## 2025-05-23 ENCOUNTER — PREP FOR PROCEDURE (OUTPATIENT)
Dept: CARDIOLOGY CLINIC | Facility: CLINIC | Age: 75
End: 2025-05-23

## 2025-05-23 VITALS
SYSTOLIC BLOOD PRESSURE: 151 MMHG | TEMPERATURE: 97.7 F | HEIGHT: 70 IN | DIASTOLIC BLOOD PRESSURE: 90 MMHG | OXYGEN SATURATION: 95 % | RESPIRATION RATE: 18 BRPM | HEART RATE: 94 BPM | BODY MASS INDEX: 32.43 KG/M2

## 2025-05-23 DIAGNOSIS — I47.29 NSVT (NONSUSTAINED VENTRICULAR TACHYCARDIA) (HCC): Primary | ICD-10-CM

## 2025-05-23 DIAGNOSIS — I42.8 NICM (NONISCHEMIC CARDIOMYOPATHY) (HCC): ICD-10-CM

## 2025-05-23 DIAGNOSIS — I48.0 PAROXYSMAL ATRIAL FIBRILLATION (HCC): ICD-10-CM

## 2025-05-23 LAB
ANION GAP SERPL CALCULATED.3IONS-SCNC: 11 MMOL/L (ref 4–13)
ANION GAP SERPL CALCULATED.3IONS-SCNC: 13 MMOL/L (ref 4–13)
APTT PPP: 72 SECONDS (ref 23–34)
APTT PPP: 82 SECONDS (ref 23–34)
APTT PPP: 99 SECONDS (ref 23–34)
ATRIAL RATE: 44 BPM
ATRIAL RATE: 82 BPM
ATRIAL RATE: 89 BPM
BUN SERPL-MCNC: 66 MG/DL (ref 5–25)
BUN SERPL-MCNC: 72 MG/DL (ref 5–25)
CALCIUM SERPL-MCNC: 8.9 MG/DL (ref 8.4–10.2)
CALCIUM SERPL-MCNC: 8.9 MG/DL (ref 8.4–10.2)
CHLORIDE SERPL-SCNC: 100 MMOL/L (ref 96–108)
CHLORIDE SERPL-SCNC: 103 MMOL/L (ref 96–108)
CO2 SERPL-SCNC: 25 MMOL/L (ref 21–32)
CO2 SERPL-SCNC: 28 MMOL/L (ref 21–32)
CREAT SERPL-MCNC: 1.63 MG/DL (ref 0.6–1.3)
CREAT SERPL-MCNC: 1.89 MG/DL (ref 0.6–1.3)
ERYTHROCYTE [DISTWIDTH] IN BLOOD BY AUTOMATED COUNT: 13.7 % (ref 11.6–15.1)
GFR SERPL CREATININE-BSD FRML MDRD: 34 ML/MIN/1.73SQ M
GFR SERPL CREATININE-BSD FRML MDRD: 41 ML/MIN/1.73SQ M
GLUCOSE SERPL-MCNC: 133 MG/DL (ref 65–140)
GLUCOSE SERPL-MCNC: 140 MG/DL (ref 65–140)
GLUCOSE SERPL-MCNC: 142 MG/DL (ref 65–140)
GLUCOSE SERPL-MCNC: 154 MG/DL (ref 65–140)
GLUCOSE SERPL-MCNC: 171 MG/DL (ref 65–140)
HCT VFR BLD AUTO: 33.7 % (ref 36.5–49.3)
HGB BLD-MCNC: 10.8 G/DL (ref 12–17)
INR PPP: 1.94 (ref 0.85–1.19)
MAGNESIUM SERPL-MCNC: 2.4 MG/DL (ref 1.9–2.7)
MCH RBC QN AUTO: 28.8 PG (ref 26.8–34.3)
MCHC RBC AUTO-ENTMCNC: 32 G/DL (ref 31.4–37.4)
MCV RBC AUTO: 90 FL (ref 82–98)
PLATELET # BLD AUTO: 154 THOUSANDS/UL (ref 149–390)
PMV BLD AUTO: 10.1 FL (ref 8.9–12.7)
POTASSIUM SERPL-SCNC: 4.4 MMOL/L (ref 3.5–5.3)
POTASSIUM SERPL-SCNC: 4.6 MMOL/L (ref 3.5–5.3)
PROTHROMBIN TIME: 22.9 SECONDS (ref 12.3–15)
QRS AXIS: 70 DEGREES
QRS AXIS: 77 DEGREES
QRS AXIS: 84 DEGREES
QRSD INTERVAL: 142 MS
QRSD INTERVAL: 148 MS
QRSD INTERVAL: 150 MS
QT INTERVAL: 458 MS
QT INTERVAL: 490 MS
QT INTERVAL: 490 MS
QTC INTERVAL: 557 MS
QTC INTERVAL: 589 MS
QTC INTERVAL: 592 MS
RBC # BLD AUTO: 3.75 MILLION/UL (ref 3.88–5.62)
SODIUM SERPL-SCNC: 139 MMOL/L (ref 135–147)
SODIUM SERPL-SCNC: 141 MMOL/L (ref 135–147)
T WAVE AXIS: -88 DEGREES
T WAVE AXIS: -89 DEGREES
T WAVE AXIS: 269 DEGREES
VENTRICULAR RATE: 87 BPM
VENTRICULAR RATE: 88 BPM
VENTRICULAR RATE: 89 BPM
WBC # BLD AUTO: 4.19 THOUSAND/UL (ref 4.31–10.16)

## 2025-05-23 PROCEDURE — 85610 PROTHROMBIN TIME: CPT

## 2025-05-23 PROCEDURE — 85027 COMPLETE CBC AUTOMATED: CPT

## 2025-05-23 PROCEDURE — 82948 REAGENT STRIP/BLOOD GLUCOSE: CPT

## 2025-05-23 PROCEDURE — 80048 BASIC METABOLIC PNL TOTAL CA: CPT

## 2025-05-23 PROCEDURE — 93010 ELECTROCARDIOGRAM REPORT: CPT | Performed by: INTERNAL MEDICINE

## 2025-05-23 PROCEDURE — 80048 BASIC METABOLIC PNL TOTAL CA: CPT | Performed by: STUDENT IN AN ORGANIZED HEALTH CARE EDUCATION/TRAINING PROGRAM

## 2025-05-23 PROCEDURE — 85730 THROMBOPLASTIN TIME PARTIAL: CPT

## 2025-05-23 PROCEDURE — 99239 HOSP IP/OBS DSCHRG MGMT >30: CPT | Performed by: STUDENT IN AN ORGANIZED HEALTH CARE EDUCATION/TRAINING PROGRAM

## 2025-05-23 PROCEDURE — 83735 ASSAY OF MAGNESIUM: CPT | Performed by: STUDENT IN AN ORGANIZED HEALTH CARE EDUCATION/TRAINING PROGRAM

## 2025-05-23 PROCEDURE — 85730 THROMBOPLASTIN TIME PARTIAL: CPT | Performed by: INTERNAL MEDICINE

## 2025-05-23 PROCEDURE — 93005 ELECTROCARDIOGRAM TRACING: CPT

## 2025-05-23 PROCEDURE — 99223 1ST HOSP IP/OBS HIGH 75: CPT | Performed by: INTERNAL MEDICINE

## 2025-05-23 PROCEDURE — 99232 SBSQ HOSP IP/OBS MODERATE 35: CPT | Performed by: INTERNAL MEDICINE

## 2025-05-23 RX ORDER — LOSARTAN POTASSIUM 25 MG/1
25 TABLET ORAL DAILY
Status: DISCONTINUED | OUTPATIENT
Start: 2025-05-23 | End: 2025-05-23 | Stop reason: HOSPADM

## 2025-05-23 RX ORDER — METOPROLOL SUCCINATE 50 MG/1
100 TABLET, EXTENDED RELEASE ORAL DAILY
Qty: 90 TABLET | Refills: 0 | Status: SHIPPED | OUTPATIENT
Start: 2025-05-23

## 2025-05-23 RX ORDER — TORSEMIDE 20 MG/1
40 TABLET ORAL 2 TIMES DAILY
Status: DISCONTINUED | OUTPATIENT
Start: 2025-05-23 | End: 2025-05-23 | Stop reason: HOSPADM

## 2025-05-23 RX ORDER — SODIUM CHLORIDE 9 MG/ML
75 INJECTION, SOLUTION INTRAVENOUS CONTINUOUS
OUTPATIENT
Start: 2025-05-23 | End: 2025-05-23

## 2025-05-23 RX ORDER — ASPIRIN 81 MG/1
81 TABLET, CHEWABLE ORAL DAILY
Qty: 30 TABLET | Refills: 1 | Status: SHIPPED | OUTPATIENT
Start: 2025-05-24

## 2025-05-23 RX ORDER — METOPROLOL SUCCINATE 100 MG/1
100 TABLET, EXTENDED RELEASE ORAL DAILY
Status: DISCONTINUED | OUTPATIENT
Start: 2025-05-24 | End: 2025-05-23 | Stop reason: HOSPADM

## 2025-05-23 RX ADMIN — SODIUM CHLORIDE 154.5 ML: 0.9 INJECTION, SOLUTION INTRAVENOUS at 04:34

## 2025-05-23 RX ADMIN — OMEGA-3 FATTY ACIDS CAP 1000 MG 1000 MG: 1000 CAP at 09:28

## 2025-05-23 RX ADMIN — SODIUM CHLORIDE 75 ML/HR: 0.9 INJECTION, SOLUTION INTRAVENOUS at 05:36

## 2025-05-23 RX ADMIN — PANTOPRAZOLE SODIUM 20 MG: 20 TABLET, DELAYED RELEASE ORAL at 09:27

## 2025-05-23 RX ADMIN — APALUTAMIDE 4 TABLET: 60 TABLET, FILM COATED ORAL at 09:30

## 2025-05-23 RX ADMIN — LOSARTAN POTASSIUM 25 MG: 25 TABLET, FILM COATED ORAL at 12:26

## 2025-05-23 RX ADMIN — LORATADINE 5 MG: 10 TABLET ORAL at 09:26

## 2025-05-23 RX ADMIN — Medication 1 TABLET: at 09:26

## 2025-05-23 RX ADMIN — NITROGLYCERIN 0.1 MG: 0.1 PATCH TRANSDERMAL at 09:30

## 2025-05-23 RX ADMIN — ASPIRIN 81 MG: 81 TABLET, CHEWABLE ORAL at 09:28

## 2025-05-23 RX ADMIN — TORSEMIDE 40 MG: 20 TABLET ORAL at 12:25

## 2025-05-23 NOTE — ASSESSMENT & PLAN NOTE
Wt Readings from Last 3 Encounters:   05/21/25 103 kg (226 lb)   05/14/25 103 kg (226 lb)   05/13/25 103 kg (228 lb)   Appears euvolemic on exam, however OptiVol is crossed.  Plan for RHC on Tuesday for further evaluation.  Diuretic: Continue torsemide 40 mg twice daily  GDMT: Per above  Recommend low-sodium diet, daily weights, strict I's and O's.  Recommend continuing to monitor and replete electrolytes as needed.

## 2025-05-23 NOTE — CONSULTS
"NEPHROLOGY CONSULTATION NOTE    Patient: Galen Carson               Sex: male          DOA: 5/21/2025 11:14 PM   YOB: 1950         Age: 74 y.o.         LOS:  LOS: 1 day   Encounter Date: 5/23/2025    REFERRING PHYSICIAN: Felix Suarez PA-C     REASON FOR THE REFERRAL / CONSULTATION: Further management of CKD stage 3/renal optimization strategy prior to undergoing cardiac cath.    DATE OF CONSULTATION / SERVICE: 5/23/2025    ADMISSION DIAGNOSIS: Dyspnea     Chief Complaint   Patient presents with    Chest Pain     Pt. To ED for \"chest pressure\" Pt. Started CPAP yesterday and thinks the unit may have released too much water in the mask. Denies N&V and states he did not vomit into mask. Pain was better with cold but doesn not radiate.        ASSESSMENT / PLAN     Assessment & Plan  CKD stage 3b, GFR 30-44 ml/min (HCC)    Upon review of old medical records from [facility] chart, patient has underlying CKD stage III and currently been followed by Dr. Esqueda in nephrology office and previously known baseline creatinine is thought to be around 1.6-1.8.  Current creatinine is close to baseline at 1.89 with EGFR of 34.    Patient was admitted with dyspnea and now been followed by cardiology team who is planning to proceed to right and left cardiac cath today.    Discussed contrast-induced nephropathy/ESDRAS risk along with risk reduction strategy in length with patient today.  Patient understand the risk of ESDRAS/contrast-induced nephropathy and willing to proceed to cardiac cath.    As a part of renal optimization strategy, would recommend use of IV fluid per cardiac cath protocol.  Recommend to hold losartan and torsemide today.  Will plan to recheck renal function with a.m. lab.  Essential hypertension    Blood pressure is currently acceptable.  Losartan and torsemide is currently on hold as a part of renal optimization strategy prior to undergoing cardiac cath.  Recommend monitoring hypertension with " amlodipine 50 mg p.o. daily today.    Overall above mentioned plan and recommendations were also d/w current [facility] physician and they agreed with my plan of monitoring renal function while in the hospital.    Milady Hernandez MD  Nephrology  5/23/2025    HPI     This is 74-year-old male with underlying chronic kidney disease, presented to ER with chief complaint of dyspnea.  Nephrology were consulted for further management of CKD stage 3/renal optimization strategy prior to undergoing cardiac cath.    Upon [facility] chart review, previously known baseline creatinine is thought to be around 1.6-1.8.  Patient was admitted for further management of dyspnea and currently been followed by cardiology team with planning to proceed to left and right cardiac cath today.  Patient's current creatinine is 1.89 with EGFR of 34.    Patient has underlying hypertension and losartan and torsemide are currently on hold as a part of renal optimization strategy.  According to patient he has been compliant with his antihypertensive medication as outpatient.    Patient does have underlying history of prostate cancer and had underwent radiation in 2019 and currently been followed by urology and medical oncologist.  Reviewed outpatient oncologist office note and patient had underwent PET scan on 2/28/2025 which was concerning for possible metastatic cancer.  Patient has been receiving outpatient Erleada and denosumab     Currently patient denies nausea, vomiting, headache, dizziness, abdominal pain, constipation or rash.    PAST MEDICAL HISTORY     Past Medical History:   Diagnosis Date    Allergic     Anesthesia     pt wears a life vest (11/16).  should not be scheduled at [facility] until heart condition is stabilized    BPH (benign prostatic hypertrophy)     Cancer (HCC)     CHF (congestive heart failure) (HCC)     Chronic kidney disease     Coronary artery disease     Diabetes mellitus (HCC)     Hyperlipidemia     Hypertension     PAF (paroxysmal  atrial fibrillation) (HCC)     Prostate cancer (HCC)        PAST SURGICAL HISTORY     Past Surgical History:   Procedure Laterality Date    CARDIAC CATHETERIZATION N/A 10/04/2023    Procedure: Cardiac Coronary Angiogram;  Surgeon: Chris Lovell MD;  Location: [facility];  Service: Cardiology    CARDIAC CATHETERIZATION N/A 10/04/2023    Procedure: Cardiac RHC/LHC;  Surgeon: Chris Lovell MD;  Location: [facility];  Service: Cardiology    CARDIAC CATHETERIZATION  10/04/2023    Procedure: Cardiac catheterization;  Surgeon: Chris Lovell MD;  Location: [facility];  Service: Cardiology    CARDIAC DEFIBRILLATOR PLACEMENT  01/04/2024    CARDIAC ELECTROPHYSIOLOGY PROCEDURE N/A 01/04/2024    Procedure: Cardiac icd implant, Dual Chambers ICD;  Surgeon: Leo Whalen MD;  Location:  CARDIAC CATH LAB;  Service: Cardiology    EAR SURGERY      HERNIA REPAIR      NC COLONOSCOPY FLX DX W/COLLJ SPEC WHEN PFRMD N/A 05/06/2019    Procedure: COLONOSCOPY;  Surgeon: Winston Nielson III, MD;  Location: [facility];  Service: Gastroenterology       ALLERGIES     Allergies   Allergen Reactions    Penicillin G Benzathine Other (See Comments)    Penicillin V Other (See Comments)     As a child        SOCIAL HISTORY     Social History     Substance and Sexual Activity   Alcohol Use Not Currently     Social History     Substance and Sexual Activity   Drug Use Not Currently     Tobacco Use History[1]    FAMILY HISTORY     Family History   Problem Relation Name Age of Onset    Stroke Father Father     No Known Problems Mother      Prostate cancer Brother      Arthritis Brother      No Known Problems Sister      Diabetes Sister Brigitte     Diabetes Sister Fátima     No Known Problems Son      No Known Problems Daughter         CURRENT MEDICATIONS       Current Facility-Administered Medications:     acetaminophen (TYLENOL) tablet 650 mg, 650 mg, Oral, Q4H PRN, DANITA Ely    Apalutamide TABS 4 tablet, 4  tablet, Oral, Daily, Yoan Hernandez, CRNP, 4 tablet at 05/22/25 0839    aspirin chewable tablet 81 mg, 81 mg, Oral, Daily, Felix Suarez PA-C, 81 mg at 05/22/25 1715    atorvastatin (LIPITOR) tablet 40 mg, 40 mg, Oral, Daily With Dinner, Yoan Hernandez, CRNP, 40 mg at 05/22/25 1548    calcium carbonate (TUMS) chewable tablet 1,000 mg, 1,000 mg, Oral, Daily PRN, Yoan Hernandez, CRNP, 1,000 mg at 05/22/25 0839    fish oil capsule 1,000 mg, 1,000 mg, Oral, Daily, Yoan Hernandez, CRNP    heparin (porcine) 25,000 units in 0.45% NaCl 250 mL infusion (premix), 3-20 Units/kg/hr (Order-Specific), Intravenous, Titrated, Hans Woods MD, Last Rate: 10 mL/hr at 05/23/25 0625, 11.1 Units/kg/hr at 05/23/25 0625    insulin lispro (HumALOG/ADMELOG) 100 units/mL subcutaneous injection 1-6 Units, 1-6 Units, Subcutaneous, TID AC, 1 Units at 05/22/25 1715 **AND** Fingerstick Glucose (POCT), , , TID AC, Yoan Hernandez, CRNP    loratadine (CLARITIN) tablet 5 mg, 5 mg, Oral, Daily, Yoan Hernandez, CRNP, 5 mg at 05/22/25 0823    [Held by provider] losartan (COZAAR) tablet 25 mg, 25 mg, Oral, Daily, Yoan Hernandez, CRNP, 25 mg at 05/22/25 0824    metoprolol succinate (TOPROL-XL) 24 hr tablet 50 mg, 50 mg, Oral, Daily, Yoan Hernandez, CRNP, 50 mg at 05/22/25 0824    multivitamin stress formula tablet 1 tablet, 1 tablet, Oral, Daily, Yoan Hernandez, CRNP, 1 tablet at 05/22/25 0839    nitroglycerin (NITRODUR) 0.1 mg/hr TD 24 hr patch, 0.1 mg, Transdermal, Daily, Kenji Woods MD, 0.1 mg at 05/22/25 1814    pantoprazole (PROTONIX) EC tablet 20 mg, 20 mg, Oral, Daily, DANITA Ely, 20 mg at 05/22/25 0824    polyethylene glycol (MIRALAX) packet 17 g, 17 g, Oral, Daily, DANITA Ely    Insert peripheral IV, , , Once **AND** sodium chloride (PF) 0.9 % injection 3 mL, 3 mL, Intravenous, Q1H PRN, Hans Woods MD    [COMPLETED] sodium chloride 0.9 % bolus 154.5 mL, 1.5 mL/kg, Intravenous, Once, Last Rate: 77.3 mL/hr at 05/23/25 0434, 154.5 mL  "at 05/23/25 0434 **FOLLOWED BY** sodium chloride 0.9 % infusion, 75 mL/hr, Intravenous, Continuous, Felix Suarez PA-C, Last Rate: 75 mL/hr at 05/23/25 0536, 75 mL/hr at 05/23/25 0536    [Held by provider] torsemide (DEMADEX) tablet 40 mg, 40 mg, Oral, BID, DANITA Ely, 40 mg at 05/22/25 1715    REVIEW OF SYSTEMS     Complete 10 points of review of systems were obtained and discussed in length with patient today.  Complete 10 points of review of systems were negative/unremarkable except mentioned in the HPI section.      OBJECTIVE     Current Weight:    /73   Pulse 80   Temp 97.7 °F (36.5 °C)   Resp 18   Ht 5' 10\" (1.778 m)   SpO2 95%   BMI 32.43 kg/m²   Vitals:    05/23/25 0709   BP: 115/73   Pulse: 80   Resp: 18   Temp: 97.7 °F (36.5 °C)   SpO2: 95%     Body mass index is 32.43 kg/m².    Intake/Output Summary (Last 24 hours) at 5/23/2025 0840  Last data filed at 5/22/2025 1711  Gross per 24 hour   Intake 506.14 ml   Output 400 ml   Net 106.14 ml       PHYSICAL EXAMINATION     Physical Exam  Constitutional:       General: He is not in acute distress.  HENT:      Right Ear: External ear normal.     Eyes:      Conjunctiva/sclera:      Right eye: No hemorrhage.    Neck:      Thyroid: No thyromegaly.   Pulmonary:      Effort: No accessory muscle usage or respiratory distress.   Abdominal:      General: There is no distension.     Skin:     Coloration: Skin is not jaundiced.     Psychiatric:         Behavior: Behavior is not combative.           LAB RESULTS        Results from last 7 days   Lab Units 05/23/25  0459 05/22/25  0020   WBC Thousand/uL 4.19* 4.95   HEMOGLOBIN g/dL 10.8* 11.2*   HEMATOCRIT % 33.7* 34.4*   PLATELETS Thousands/uL 154 181   SODIUM mmol/L 141 140   POTASSIUM mmol/L 4.6 4.7   CHLORIDE mmol/L 103 101   CO2 mmol/L 25 27   BUN mg/dL 72* 70*   CREATININE mg/dL 1.89* 1.79*   EGFR ml/min/1.73sq m 34 36   CALCIUM mg/dL 8.9 8.9       I have personally reviewed the old medical " "records and patient's previously known baseline creatinine level is ~ 1.6-1.8    RADIOLOGY RESULTS       X-ray chest 2 views   Final Result by David Justin MD ([id number])      No acute cardiopulmonary disease.            Workstation performed: [id number]             Portions of the record may have been created with voice recognition software. Occasional wrong word or \"sound a like\" substitutions may have occurred due to the inherent limitations of voice recognition software. Read the chart carefully and recognize, using context, where substitutions have occurred.         [1]   Social History  Tobacco Use   Smoking Status Former    Current packs/day: 0.00    Average packs/day: 1 pack/day for 25.0 years (25.0 ttl pk-yrs)    Types: Cigarettes, Pipe, Cigars    Start date: 1980    Quit date: 2005    Years since quittin.2    Passive exposure: Past   Smokeless Tobacco Never     "

## 2025-05-23 NOTE — ASSESSMENT & PLAN NOTE
Continue Toprol  EP referral placed for assistance in managing frequent PVCs in the setting of cardiomyopathy.

## 2025-05-23 NOTE — DISCHARGE INSTR - AVS FIRST PAGE
Please keep holding coumadin til after cath on Tuesday and hold losartan and torsemide Tuesday morning of cath.   Please start taking Toprol- mg daily  Please follow-up with cardiology other providers as per schedule  Restart taking aspirin 81 mg daily

## 2025-05-23 NOTE — ASSESSMENT & PLAN NOTE
Troponins mildly elevated with overall flat trend.  EKG without acute ischemic changes.  Given flat troponin trend, suspect nonischemic myocardial injury in the setting of frequent ectopy, chronic HFrEF, CKD 3B.   May discontinue heparin gtt. from a cardiac standpoint.

## 2025-05-23 NOTE — PLAN OF CARE
Problem: Potential for Falls  Goal: Patient will remain free of falls  Description: INTERVENTIONS:  - Educate patient/family on patient safety including physical limitations  - Instruct patient to call for assistance with activity   - Consider consulting OT/PT to assist with strengthening/mobility based on AM PAC & JH-HLM score  - Consult OT/PT to assist with strengthening/mobility   - Keep Call bell within reach  - Keep bed low and locked with side rails adjusted as appropriate  - Keep care items and personal belongings within reach  - Initiate and maintain comfort rounds  - Make Fall Risk Sign visible to staff  - Offer Toileting every  Hours, in advance of need  - Initiate/Maintain alarm  - Obtain necessary fall risk management equipment:   Problem: PAIN - ADULT  Goal: Verbalizes/displays adequate comfort level or baseline comfort level  Description: Interventions:  - Encourage patient to monitor pain and request assistance  - Assess pain using appropriate pain scale  - Administer analgesics as ordered based on type and severity of pain and evaluate response  - Implement non-pharmacological measures as appropriate and evaluate response  - Consider cultural and social influences on pain and pain management  - Notify physician/advanced practitioner if interventions unsuccessful or patient reports new pain  - Educate patient/family on pain management process including their role and importance of  reporting pain   - Provide non-pharmacologic/complimentary pain relief interventions  Outcome: Progressing     Problem: INFECTION - ADULT  Goal: Absence or prevention of progression during hospitalization  Description: INTERVENTIONS:  - Assess and monitor for signs and symptoms of infection  - Monitor lab/diagnostic results  - Monitor all insertion sites, i.e. indwelling lines, tubes, and drains  - Monitor endotracheal if appropriate and nasal secretions for changes in amount and color  - [place] appropriate  cooling/warming therapies per order  - Administer medications as ordered  - Instruct and encourage patient and family to use good hand hygiene technique  - Identify and instruct in appropriate isolation precautions for identified infection/condition  Outcome: Progressing  Goal: Absence of fever/infection during neutropenic period  Description: INTERVENTIONS:  - Monitor WBC  - Perform strict hand hygiene  - Limit to healthy visitors only  - No plants, dried, fresh or silk flowers with gifford in patient room  Outcome: Progressing     Problem: DISCHARGE PLANNING  Goal: Discharge to home or other facility with appropriate resources  Description: INTERVENTIONS:  - Identify barriers to discharge w/patient and caregiver  - Arrange for needed discharge resources and transportation as appropriate  - Identify discharge learning needs (meds, wound care, etc.)  - Arrange for interpretive services to assist at discharge as needed  - Refer to Case Management Department for coordinating discharge planning if the patient needs post-hospital services based on physician/advanced practitioner order or complex needs related to functional status, cognitive ability, or social support system  Outcome: Progressing     Problem: Knowledge Deficit  Goal: Patient/family/caregiver demonstrates understanding of disease process, treatment plan, medications, and discharge instructions  Description: Complete learning assessment and assess knowledge base.  Interventions:  - Provide teaching at level of understanding  - Provide teaching via preferred learning methods  Outcome: Progressing     - Apply yellow socks and bracelet for high fall risk patients  - Consider moving patient to room near nurses station  Outcome: Progressing

## 2025-05-23 NOTE — H&P (VIEW-ONLY)
Cardiology Progress Note - Galen Carson 74 y.o. male MRN: [id number]    Unit/Bed#: 2 E 276-01 Encounter: [id number]      Assessment/Plan:  Assessment & Plan  Dyspnea  Etiology undifferentiated at this time.  He appears close to euvolemic on exam despite OptiVol crossed.  Continue torsemide  He has frequent PVCs on telemetry.  EKGs and telemetry strips reviewed with EP, we note his rhythm is consistent with sinus rhythm versus V paced rhythm with frequent PVCs.  1 episode of NSVT noted on device interrogation.  Continue Toprol.  EP referral placed.  He has known NICM, and recent echocardiogram demonstrates persistent cardiomyopathy with EF of 25% without severe valvular abnormalities.  Recent stress test demonstrated fixed defect.  Unable to complete RHC/LHC today due to elevated INR.  Plan for outpatient RHC/LHC on Tuesday.  Message sent to schedulers.  Discussed with nephrology, who report patient is cleared for RHC/LHC on Tuesday as long as kidney function remained stable.  They recommend routine fluid protocol.  Discussed the indications, alternatives, risks and benefit of cardiac catheterization and possible PCI. The procedure risks, benefits, and complications (including but not limited to bleeding, infection, arrhythmia, nephrotoxicity, vessel injury, myocardial infarction, CVA, and death) were reviewed.  Patient is alert and oriented x3 and wishes to proceed. All questions answered.   Continue to hold Coumadin for cardiac catheterization on Tuesday.  Hold torsemide and losartan the morning of cardiac catheterization.  Continue aspirin 81 mg daily, Lipitor, Toprol.    Elevated troponin  Troponins mildly elevated with overall flat trend.  EKG without acute ischemic changes.  Given flat troponin trend, suspect nonischemic myocardial injury in the setting of frequent ectopy, chronic HFrEF, CKD 3B.   May discontinue heparin gtt. from a cardiac standpoint.    Abnormal EKG  His EKG demonstrates wide QRS  complexes with PVCs and intermittent pacing spikes.    Device interrogation demonstrated 1 episode of NSVT.  EKG and telemetry strips reviewed with EP, who suspect his underlying rhythm is sinus and V paced with frequent PVCs  Continue Toprol     NICM with EF 25% s/p MDT DC ICD (1/4/2024)  GDMT: Continue Toprol, losartan.  Will hold off on initiation of Aldactone in the setting of elevated potassium levels.  May be reasonable to consider initiation of SGLT2 inhibitor if patient is agreeable.  Continue to follow with the device clinic  Plan for cardiac RHC/LHC on Tuesday.  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    Chronic HFrEF  Wt Readings from Last 3 Encounters:   05/21/25 103 kg (226 lb)   05/14/25 103 kg (226 lb)   05/13/25 103 kg (228 lb)   Appears euvolemic on exam, however OptiVol is crossed.  Plan for RHC on Tuesday for further evaluation.  Diuretic: Continue torsemide 40 mg twice daily  GDMT: Per above  Recommend low-sodium diet, daily weights, strict I's and O's.  Recommend continuing to monitor and replete electrolytes as needed.    History of NSVT, PVCs  Continue Toprol  EP referral placed for assistance in managing frequent PVCs in the setting of cardiomyopathy.    Paroxysmal atrial fibrillation  Device interrogation demonstrates <0.1% burden of AT/AF.  Rate control: Continue Toprol 50 mg daily  Anticoagulation: Continue to hold home Coumadin/warfarin given plans for cardiac catheterization on Tuesday.  [id number] 5    Mixed hyperlipidemia  Continue Lipitor    CKD stage 3b, GFR 30-44 ml/min ([facility])  Lab Results   Component Value Date    EGFR 34 05/23/2025    EGFR 36 05/22/2025    EGFR 33 05/16/2025    CREATININE 1.89 (H) 05/23/2025    CREATININE 1.79 (H) 05/22/2025    CREATININE 1.92 (H) 05/16/2025   Management per nephrology    Nonobstructive CAD  Continue aspirin, Lipitor, Toprol    SVT (supraventricular tachycardia) ([facility])  Continue  "Toprol    Junctional tachycardia (HCC)  Continue Toprol    Mild pulmonary hypertension (HCC)  Plan for RHC/LHC on Tuesday for further evaluation.  Continue torsemide.    Essential hypertension  Continue losartan, Toprol, torsemide    Type 2 diabetes mellitus with hyperglycemia, without long-term current use of insulin (HCC)  Lab Results   Component Value Date    HGBA1C 6.6 (A) 04/08/2025       Recent Labs     05/22/25  1640 05/22/25  2044 05/23/25  0709 05/23/25  1111   POCGLU 171* 111 140 142*       Blood Sugar Average: Last 72 hrs:  (P) [id number]  Management per primary team    Prostate cancer (HCC)  Management per primary team      Cardiology will sign-off. Please reach out with any further questions or concerns.      Subjective:   Patient seen and examined.  He does note some improvement in his dyspnea compared to yesterday, although overall he still experiencing shortness of breath.  Reports no chest discomfort today.  He is very eager to go home.  We did discuss that cardiac catheterization was unable to be completed today due to elevated INR.  Options of remaining inpatient and completing cardiac catheterization inpatient on Tuesday versus discharge with outpatient cardiac catheterization on Tuesday were discussed.  Patient reports he will not be staying inpatient any longer, but would like to proceed with outpatient cardiac catheterization.  Recommend strict return precautions.    Objective:     Vitals: Blood pressure 139/100, pulse 90, temperature 98.2 °F (36.8 °C), temperature source Oral, resp. rate 18, height 5' 10\" (1.778 m), SpO2 91%., Body mass index is 32.43 kg/m².,   Orthostatic Blood Pressures      Flowsheet Row Most Recent Value   Blood Pressure 139/100 filed at 05/23/2025 1200   Patient Position - Orthostatic VS Lying filed at 05/23/2025 1100              Intake/Output Summary (Last 24 hours) at 5/23/2025 1410  Last data filed at 5/23/2025 1254  Gross per 24 hour   Intake 1166.14 ml "   Output 400 ml   Net 766.14 ml         Physical Exam:   GEN: Alert and oriented x 3, in no acute distress.  Well appearing and well nourished.   HEENT: Sclera anicteric, conjunctivae pink, mucous membranes moist. Oropharynx clear.   NECK: Supple, no significant JVD. Trachea midline.   HEART: Regular rate, irregular rhythm, normal S1 and S2, no murmurs.  LUNGS: Clear to auscultation bilaterally; no wheezes, rales, or rhonchi. No increased work of breathing or signs of respiratory distress.   ABDOMEN: Soft, nontender, non-distended.   EXTREMITIES: Skin warm and well perfused, no clubbing, cyanosis, or edema.  NEURO: No focal findings. Normal speech. Mood and affect normal.   SKIN: Normal without suspicious lesions on exposed skin.      Telemetry:  Telemetry Orders (From admission, onward)               24 Hour Telemetry Monitoring  Continuous x 24 Hours (Telem)        Expiring   Question:  Reason for 24 Hour Telemetry  Answer:  Arrhythmias requiring acute medical intervention / PPM or ICD malfunction                     Telemetry Reviewed: Predominantly V paced rhythm with frequent PVCs      Medications:    Current Medications[1]     Labs & Results:    Results from last 7 days   Lab Units 05/22/25  0444 05/22/25  0243 05/22/25  0020   HS TNI 0HR ng/L  --   --  108*   HS TNI 2HR ng/L  --  105*  --    HS TNI 4HR ng/L 97*  --   --    HSTNI D4 ng/L -11  --   --      Results from last 7 days   Lab Units 05/23/25  0459 05/22/25  0020   WBC Thousand/uL 4.19* 4.95   HEMOGLOBIN g/dL 10.8* 11.2*   HEMATOCRIT % 33.7* 34.4*   PLATELETS Thousands/uL 154 181     Results from last 7 days   Lab Units 05/22/25  0243   TRIGLYCERIDES mg/dL 359*   HDL mg/dL 37*     Results from last 7 days   Lab Units 05/23/25  0459 05/22/25  0020   POTASSIUM mmol/L 4.6 4.7   CHLORIDE mmol/L 103 101   CO2 mmol/L 25 27   BUN mg/dL 72* 70*   CREATININE mg/dL 1.89* 1.79*   CALCIUM mg/dL 8.9 8.9   ALK PHOS U/L  --  78   ALT U/L  --  11   AST U/L  --  16      Results from last 7 days   Lab Units 05/23/25  1153 05/23/25  0459 05/22/25  2317 05/22/25  0243 05/22/25  0020   INR   --  1.94*  --   --  1.84*   PTT seconds 72* 99* 82*   < > 30    < > = values in this interval not displayed.           ** Please Note: Fluency DirectDictation voice to text software may have been used in the creation of this document. **           [1]   Current Facility-Administered Medications:     acetaminophen (TYLENOL) tablet 650 mg, 650 mg, Oral, Q4H PRN, Yoan Hernandez, CRNP    Apalutamide TABS 4 tablet, 4 tablet, Oral, Daily, Yoan Hernandez, CRNP, 4 tablet at 05/23/25 0930    aspirin chewable tablet 81 mg, 81 mg, Oral, Daily, Felix Suarez PA-C, 81 mg at 05/23/25 0928    atorvastatin (LIPITOR) tablet 40 mg, 40 mg, Oral, Daily With Dinner, Yoan Hernandez, CRNP, 40 mg at 05/22/25 1548    calcium carbonate (TUMS) chewable tablet 1,000 mg, 1,000 mg, Oral, Daily PRN, Yaon Hernandez, CRNP, 1,000 mg at 05/22/25 0839    fish oil capsule 1,000 mg, 1,000 mg, Oral, Daily, Yoan Hernandez, CRNP, 1,000 mg at 05/23/25 0928    heparin (porcine) 25,000 units in 0.45% NaCl 250 mL infusion (premix), 3-20 Units/kg/hr (Order-Specific), Intravenous, Titrated, Hans Woods MD, Last Rate: 10 mL/hr at 05/23/25 0625, 11.1 Units/kg/hr at 05/23/25 0625    insulin lispro (HumALOG/ADMELOG) 100 units/mL subcutaneous injection 1-6 Units, 1-6 Units, Subcutaneous, TID AC, 1 Units at 05/22/25 1715 **AND** Fingerstick Glucose (POCT), , , TID AC, Yoan Hernandez, CRNP    loratadine (CLARITIN) tablet 5 mg, 5 mg, Oral, Daily, Yoan Hernandez, CRNP, 5 mg at 05/23/25 0926    losartan (COZAAR) tablet 25 mg, 25 mg, Oral, Daily, Felix Suarez PA-C, 25 mg at 05/23/25 1226    metoprolol succinate (TOPROL-XL) 24 hr tablet 50 mg, 50 mg, Oral, Daily, Yoan Hernandez, RAFFYNP, 50 mg at 05/22/25 0824    multivitamin stress formula tablet 1 tablet, 1 tablet, Oral, Daily, DANITA Ely, 1 tablet at 05/23/25 0926    nitroglycerin  (NITRODUR) 0.1 mg/hr TD 24 hr patch, 0.1 mg, Transdermal, Daily, Kenji Woods MD, 0.1 mg at 05/23/25 0930    pantoprazole (PROTONIX) EC tablet 20 mg, 20 mg, Oral, Daily, DANITA Ely, 20 mg at 05/23/25 0927    polyethylene glycol (MIRALAX) packet 17 g, 17 g, Oral, Daily, DANITA Ely    Insert peripheral IV, , , Once **AND** sodium chloride (PF) 0.9 % injection 3 mL, 3 mL, Intravenous, Q1H PRN, Hans Woods MD    torsemide (DEMADEX) tablet 40 mg, 40 mg, Oral, BID, Felix Suarez PA-C, 40 mg at 05/23/25 1229

## 2025-05-23 NOTE — ASSESSMENT & PLAN NOTE
Device interrogation demonstrates <0.1% burden of AT/AF.  Rate control: Continue Toprol 50 mg daily  Anticoagulation: Continue to hold home Coumadin/warfarin given plans for cardiac catheterization on Tuesday.  [id number] 5

## 2025-05-23 NOTE — ASSESSMENT & PLAN NOTE
His EKG demonstrates wide QRS complexes with PVCs and intermittent pacing spikes.    Device interrogation demonstrated 1 episode of NSVT.  EKG and telemetry strips reviewed with EP, who suspect his underlying rhythm is sinus and V paced with frequent PVCs  Continue Toprol

## 2025-05-23 NOTE — ASSESSMENT & PLAN NOTE
Blood pressure is currently acceptable.  Losartan and torsemide is currently on hold as a part of renal optimization strategy prior to undergoing cardiac cath.  Recommend monitoring hypertension with amlodipine 50 mg p.o. daily today.

## 2025-05-23 NOTE — CASE MANAGEMENT
Case Management Discharge Planning Note    Patient name Galen Carson  Location [address] [place] [address]/2  276-01 MRN [id number]  : 1950 Date 2025       Current Admission Date: 2025  Current Admission Diagnosis:Dyspnea   Patient Active Problem List    Diagnosis Date Noted    Dyspnea 2025    Elevated troponin 2025    SVT (supraventricular tachycardia) ([facility]) 2025    Junctional tachycardia ([facility]) 2025    Mild pulmonary hypertension ([facility]) 2025    Dyslipidemia 2025    Abnormal EKG 2025    Nonobstructive CAD 2025    History of NSVT, PVCs 2025    CKD (chronic kidney disease) stage 4, GFR 15-29 ml/min ([facility]) 01/15/2025    Type 2 diabetes mellitus with diabetic chronic kidney disease, unspecified CKD stage, unspecified whether long term insulin use ([facility]) 01/15/2025    On continuous oral anticoagulation 2024    Iron deficiency anemia due to chronic blood loss 2024    Chronic HFrEF 2024    ICD (implantable cardioverter-defibrillator) in place 2024    Obesity 2023    Paroxysmal atrial fibrillation 10/22/2023    NICM with EF 25% s/p MDT DC ICD (2024) 10/02/2023    Essential hypertension 10/20/2022    CKD stage 3b, GFR 30-44 ml/min ([facility]) 2022    Type 2 diabetes mellitus with hyperglycemia, without long-term current use of insulin ([facility]) 2022    Encounter for monitoring androgen deprivation therapy 2019    Hot flashes 2019    Prostate cancer ([facility]) 2019    Positive colorectal cancer screening using Cologuard test 2019    Mixed hyperlipidemia 2019    Upper respiratory tract infection 2019      LOS (days): 1  Geometric Mean LOS (GMLOS) (days): 1.7  Days to GMLOS:0.6     OBJECTIVE:  Risk of Unplanned Readmission Score: 20.69         Current admission status: Inpatient   Preferred Pharmacy:   [facility] - [place], [place] - [address]  [address] [facility] [place]  [address]  Phone: [phone] Fax: [phone]    [id number] - [place] - [address]  [address]  [place] [address]  Phone: [phone] Fax: [phone]    Primary Care Provider: DANITA Girard    Primary Insurance: MEDICARE  Secondary Insurance: COMMERCIAL MISCELLANEOUS    DISCHARGE DETAILS: LATE ENTRY-[facility]  Chart reviewed and case discussed in SLIM rounds.-no identified needs for CM    [facility] 24.      Patient is insured , has a PCP, PTA lived alone in a ranch home -[facility] with rails, He has supportive family in the area.  He was independent in ADLS, ambulation and drove.  He had no prior STR or HHC history.  Patient declined any need for CM intervention for support services.

## 2025-05-23 NOTE — TELEPHONE ENCOUNTER
S/w pt. Advised of card cath scheduled for 5/27. Pt advised to hold Losartan and Torsemide morning of procedure. Pt obtained labs. Message sent to pt via [employer] with instructions provided over the phone. Pt verbalized understanding

## 2025-05-23 NOTE — ASSESSMENT & PLAN NOTE
Lab Results   Component Value Date    HGBA1C 6.6 (A) 04/08/2025       Recent Labs     05/22/25  1640 05/22/25  2044 05/23/25  0709 05/23/25  1111   POCGLU 171* 111 140 142*       Blood Sugar Average: Last 72 hrs:  (P) [id number]  Management per primary team

## 2025-05-23 NOTE — ASSESSMENT & PLAN NOTE
GDMT: Continue Toprol, losartan.  Will hold off on initiation of Aldactone in the setting of elevated potassium levels.  May be reasonable to consider initiation of SGLT2 inhibitor if patient is agreeable.  Continue to follow with the device clinic  Plan for cardiac RHC/LHC on Tuesday.  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

## 2025-05-23 NOTE — ASSESSMENT & PLAN NOTE
Upon review of old medical records from [facility] chart, patient has underlying CKD stage III and currently been followed by Dr. Esqueda in nephrology office and previously known baseline creatinine is thought to be around 1.6-1.8.  Current creatinine is close to baseline at 1.89 with EGFR of 34.    Patient was admitted with dyspnea and now been followed by cardiology team who is planning to proceed to right and left cardiac cath today.    Discussed contrast-induced nephropathy/ESDRAS risk along with risk reduction strategy in length with patient today.  Patient understand the risk of ESDRAS/contrast-induced nephropathy and willing to proceed to cardiac cath.    As a part of renal optimization strategy, would recommend use of IV fluid per cardiac cath protocol.  Recommend to hold losartan and torsemide today.  Will plan to recheck renal function with a.m. lab.

## 2025-05-23 NOTE — ASSESSMENT & PLAN NOTE
Lab Results   Component Value Date    EGFR 34 05/23/2025    EGFR 36 05/22/2025    EGFR 33 05/16/2025    CREATININE 1.89 (H) 05/23/2025    CREATININE 1.79 (H) 05/22/2025    CREATININE 1.92 (H) 05/16/2025   Management per nephrology

## 2025-05-23 NOTE — PLAN OF CARE
Problem: Potential for Falls  Goal: Patient will remain free of falls  Description: INTERVENTIONS:  - Educate patient/family on patient safety including physical limitations  - Instruct patient to call for assistance with activity   - Consider consulting OT/PT to assist with strengthening/mobility based on AM PAC & JH-HLM score  - Consult OT/PT to assist with strengthening/mobility   - Keep Call bell within reach  - Keep bed low and locked with side rails adjusted as appropriate  - Keep care items and personal belongings within reach  - Initiate and maintain comfort rounds  - Make Fall Risk Sign visible to staff  - Offer Toileti  - Apply yellow socks and bracelet for high fall risk patients  - Consider moving patient to room near nurses station  Outcome: Progressing

## 2025-05-23 NOTE — PROGRESS NOTES
Cardiology Progress Note - Galen Carson 74 y.o. male MRN: [id number]    Unit/Bed#: 2 E 276-01 Encounter: [id number]      Assessment/Plan:  Assessment & Plan  Dyspnea  Etiology undifferentiated at this time.  He appears close to euvolemic on exam despite OptiVol crossed.  Continue torsemide  He has frequent PVCs on telemetry.  EKGs and telemetry strips reviewed with EP, we note his rhythm is consistent with sinus rhythm versus V paced rhythm with frequent PVCs.  1 episode of NSVT noted on device interrogation.  Continue Toprol.  EP referral placed.  He has known NICM, and recent echocardiogram demonstrates persistent cardiomyopathy with EF of 25% without severe valvular abnormalities.  Recent stress test demonstrated fixed defect.  Unable to complete RHC/LHC today due to elevated INR.  Plan for outpatient RHC/LHC on Tuesday.  Message sent to schedulers.  Discussed with nephrology, who report patient is cleared for RHC/LHC on Tuesday as long as kidney function remained stable.  They recommend routine fluid protocol.  Discussed the indications, alternatives, risks and benefit of cardiac catheterization and possible PCI. The procedure risks, benefits, and complications (including but not limited to bleeding, infection, arrhythmia, nephrotoxicity, vessel injury, myocardial infarction, CVA, and death) were reviewed.  Patient is alert and oriented x3 and wishes to proceed. All questions answered.   Continue to hold Coumadin for cardiac catheterization on Tuesday.  Hold torsemide and losartan the morning of cardiac catheterization.  Continue aspirin 81 mg daily, Lipitor, Toprol.    Elevated troponin  Troponins mildly elevated with overall flat trend.  EKG without acute ischemic changes.  Given flat troponin trend, suspect nonischemic myocardial injury in the setting of frequent ectopy, chronic HFrEF, CKD 3B.   May discontinue heparin gtt. from a cardiac standpoint.    Abnormal EKG  His EKG demonstrates wide QRS  complexes with PVCs and intermittent pacing spikes.    Device interrogation demonstrated 1 episode of NSVT.  EKG and telemetry strips reviewed with EP, who suspect his underlying rhythm is sinus and V paced with frequent PVCs  Continue Toprol     NICM with EF 25% s/p MDT DC ICD (1/4/2024)  GDMT: Continue Toprol, losartan.  Will hold off on initiation of Aldactone in the setting of elevated potassium levels.  May be reasonable to consider initiation of SGLT2 inhibitor if patient is agreeable.  Continue to follow with the device clinic  Plan for cardiac RHC/LHC on Tuesday.  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    Chronic HFrEF  Wt Readings from Last 3 Encounters:   05/21/25 103 kg (226 lb)   05/14/25 103 kg (226 lb)   05/13/25 103 kg (228 lb)   Appears euvolemic on exam, however OptiVol is crossed.  Plan for RHC on Tuesday for further evaluation.  Diuretic: Continue torsemide 40 mg twice daily  GDMT: Per above  Recommend low-sodium diet, daily weights, strict I's and O's.  Recommend continuing to monitor and replete electrolytes as needed.    History of NSVT, PVCs  Continue Toprol  EP referral placed for assistance in managing frequent PVCs in the setting of cardiomyopathy.    Paroxysmal atrial fibrillation  Device interrogation demonstrates <0.1% burden of AT/AF.  Rate control: Continue Toprol 50 mg daily  Anticoagulation: Continue to hold home Coumadin/warfarin given plans for cardiac catheterization on Tuesday.  [id number]    Mixed hyperlipidemia  Continue Lipitor    CKD stage 3b, GFR 30-44 ml/min ([facility])  Lab Results   Component Value Date    EGFR 34 05/23/2025    EGFR 36 05/22/2025    EGFR 33 05/16/2025    CREATININE 1.89 (H) 05/23/2025    CREATININE 1.79 (H) 05/22/2025    CREATININE 1.92 (H) 05/16/2025   Management per nephrology    Nonobstructive CAD  Continue aspirin, Lipitor, Toprol    SVT (supraventricular tachycardia) ([facility])  Continue  "Toprol    Junctional tachycardia (HCC)  Continue Toprol    Mild pulmonary hypertension (HCC)  Plan for RHC/LHC on Tuesday for further evaluation.  Continue torsemide.    Essential hypertension  Continue losartan, Toprol, torsemide    Type 2 diabetes mellitus with hyperglycemia, without long-term current use of insulin (HCC)  Lab Results   Component Value Date    HGBA1C 6.6 (A) 04/08/2025       Recent Labs     05/22/25  1640 05/22/25  2044 05/23/25  0709 05/23/25  1111   POCGLU 171* 111 140 142*       Blood Sugar Average: Last 72 hrs:  (P) [id number]  Management per primary team    Prostate cancer (HCC)  Management per primary team      Cardiology will sign-off. Please reach out with any further questions or concerns.      Subjective:   Patient seen and examined.  He does note some improvement in his dyspnea compared to yesterday, although overall he still experiencing shortness of breath.  Reports no chest discomfort today.  He is very eager to go home.  We did discuss that cardiac catheterization was unable to be completed today due to elevated INR.  Options of remaining inpatient and completing cardiac catheterization inpatient on Tuesday versus discharge with outpatient cardiac catheterization on Tuesday were discussed.  Patient reports he will not be staying inpatient any longer, but would like to proceed with outpatient cardiac catheterization.  Recommend strict return precautions.    Objective:     Vitals: Blood pressure 139/100, pulse 90, temperature 98.2 °F (36.8 °C), temperature source Oral, resp. rate 18, height 5' 10\" (1.778 m), SpO2 91%., Body mass index is 32.43 kg/m².,   Orthostatic Blood Pressures      Flowsheet Row Most Recent Value   Blood Pressure 139/100 filed at 05/23/2025 1200   Patient Position - Orthostatic VS Lying filed at 05/23/2025 1100              Intake/Output Summary (Last 24 hours) at 5/23/2025 1410  Last data filed at 5/23/2025 1254  Gross per 24 hour   Intake 1166.14 ml "   Output 400 ml   Net 766.14 ml         Physical Exam:   GEN: Alert and oriented x 3, in no acute distress.  Well appearing and well nourished.   HEENT: Sclera anicteric, conjunctivae pink, mucous membranes moist. Oropharynx clear.   NECK: Supple, no significant JVD. Trachea midline.   HEART: Regular rate, irregular rhythm, normal S1 and S2, no murmurs.  LUNGS: Clear to auscultation bilaterally; no wheezes, rales, or rhonchi. No increased work of breathing or signs of respiratory distress.   ABDOMEN: Soft, nontender, non-distended.   EXTREMITIES: Skin warm and well perfused, no clubbing, cyanosis, or edema.  NEURO: No focal findings. Normal speech. Mood and affect normal.   SKIN: Normal without suspicious lesions on exposed skin.      Telemetry:  Telemetry Orders (From admission, onward)               24 Hour Telemetry Monitoring  Continuous x 24 Hours ([place])        Expiring   Question:  Reason for 24 Hour Telemetry  Answer:  Arrhythmias requiring acute medical intervention / PPM or ICD malfunction                     Telemetry Reviewed: Predominantly V paced rhythm with frequent PVCs      Medications:    Current Medications[1]     Labs & Results:    Results from last 7 days   Lab Units 05/22/25  0444 05/22/25  0243 05/22/25  0020   HS TNI 0HR ng/L  --   --  108*   HS TNI 2HR ng/L  --  105*  --    HS TNI 4HR ng/L 97*  --   --    HSTNI D4 ng/L -11  --   --      Results from last 7 days   Lab Units 05/23/25  0459 05/22/25  0020   WBC Thousand/uL 4.19* 4.95   HEMOGLOBIN g/dL 10.8* 11.2*   HEMATOCRIT % 33.7* 34.4*   PLATELETS Thousands/uL 154 181     Results from last 7 days   Lab Units 05/22/25  0243   TRIGLYCERIDES mg/dL 359*   HDL mg/dL 37*     Results from last 7 days   Lab Units 05/23/25  0459 05/22/25  0020   POTASSIUM mmol/L 4.6 4.7   CHLORIDE mmol/L 103 101   CO2 mmol/L 25 27   BUN mg/dL 72* 70*   CREATININE mg/dL 1.89* 1.79*   CALCIUM mg/dL 8.9 8.9   ALK PHOS U/L  --  78   ALT U/L  --  11   AST U/L  --  16      Results from last 7 days   Lab Units 05/23/25  1153 05/23/25  0459 05/22/25  2317 05/22/25  0243 05/22/25  0020   INR   --  1.94*  --   --  1.84*   PTT seconds 72* 99* 82*   < > 30    < > = values in this interval not displayed.           ** Please Note: Fluency DirectDictation voice to text software may have been used in the creation of this document. **           [1]   Current Facility-Administered Medications:     acetaminophen (TYLENOL) tablet 650 mg, 650 mg, Oral, Q4H PRN, Yoan Hernandez, CRNP    Apalutamide TABS 4 tablet, 4 tablet, Oral, Daily, Yoan Hernandez, CRNP, 4 tablet at 05/23/25 0930    aspirin chewable tablet 81 mg, 81 mg, Oral, Daily, Felix Suarez PA-C, 81 mg at 05/23/25 0928    atorvastatin (LIPITOR) tablet 40 mg, 40 mg, Oral, Daily With Dinner, Yoan Hernandez, CRNP, 40 mg at 05/22/25 1548    calcium carbonate (TUMS) chewable tablet 1,000 mg, 1,000 mg, Oral, Daily PRN, Yoan Hernandez, CRNP, 1,000 mg at 05/22/25 0839    fish oil capsule 1,000 mg, 1,000 mg, Oral, Daily, Yoan Hernandez, CRNP, 1,000 mg at 05/23/25 0928    heparin (porcine) 25,000 units in 0.45% NaCl 250 mL infusion (premix), 3-20 Units/kg/hr (Order-Specific), Intravenous, Titrated, Hans Woods MD, Last Rate: 10 mL/hr at 05/23/25 0625, 11.1 Units/kg/hr at 05/23/25 0625    insulin lispro (HumALOG/ADMELOG) 100 units/mL subcutaneous injection 1-6 Units, 1-6 Units, Subcutaneous, TID AC, 1 Units at 05/22/25 1715 **AND** Fingerstick Glucose (POCT), , , TID AC, Yoan Hernandez, CRNP    loratadine (CLARITIN) tablet 5 mg, 5 mg, Oral, Daily, Yoan Hernandez, CRNP, 5 mg at 05/23/25 0926    losartan (COZAAR) tablet 25 mg, 25 mg, Oral, Daily, Felix Suarez PA-C, 25 mg at 05/23/25 1226    metoprolol succinate (TOPROL-XL) 24 hr tablet 50 mg, 50 mg, Oral, Daily, Yoan Hernandez, RAFFYNP, 50 mg at 05/22/25 0824    multivitamin stress formula tablet 1 tablet, 1 tablet, Oral, Daily, DANITA Ely, 1 tablet at 05/23/25 0926    nitroglycerin  (NITRODUR) 0.1 mg/hr TD 24 hr patch, 0.1 mg, Transdermal, Daily, Kenji Woods MD, 0.1 mg at 05/23/25 0930    pantoprazole (PROTONIX) EC tablet 20 mg, 20 mg, Oral, Daily, DANITA Ely, 20 mg at 05/23/25 0927    polyethylene glycol (MIRALAX) packet 17 g, 17 g, Oral, Daily, DANITA Ely    Insert peripheral IV, , , Once **AND** sodium chloride (PF) 0.9 % injection 3 mL, 3 mL, Intravenous, Q1H PRN, Hans Woods MD    torsemide (DEMADEX) tablet 40 mg, 40 mg, Oral, BID, Felix Suarez PA-C, 40 mg at 05/23/25 1223

## 2025-05-23 NOTE — DISCHARGE SUMMARY
"Medical Problems       Resolved Problems  Date Reviewed: 5/21/2025   None       Discharging Physician / Practitioner: Kenji Woods MD  PCP: DANITA Girard  Admission Date:   Admission Orders (From admission, onward)       Ordered        05/22/25 1431  INPATIENT ADMISSION  Once            05/22/25 0431  Place in Observation  Once                          Discharge Date: 05/23/25    Consultations During Hospital Stay:  Cardiology    Procedures Performed:   None    Significant Findings / Test Results:   None    Incidental Findings:   None      Test Results Pending at Discharge (will require follow up):   Left heart cath on Tuesday     Outpatient Tests Requested:  None    Complications: Chest pain    Reason for Admission: Chest pain    Hospital Course:   Galen Carson is a 74 y.o. male patient who originally presented to the hospital on 5/21/2025 due to chest pain.  Patient was started on IV heparin.  He was planned for the left heart cath but postponed until next Tuesday because of his increased INR.  Patient had some wide-complex rhythm on telemetry.  Cardiology has been reached out to consult with EP  Full suggested patient has sinus and V-paced underlying with frequent PVCs. EP did not feel consistent with NSVT. Continue beta blockers.  Patient remained asymptomatic during these episodes      Condition at Discharge: good    Discharge Day Visit / Exam:   Subjective: Patient wants to go home  Vitals: Blood Pressure: 151/90 (05/23/25 1600)  Pulse: 94 (05/23/25 1600)  Temperature: 97.7 °F (36.5 °C) (05/23/25 1600)  Temp Source: Oral (05/23/25 1600)  Respirations: 18 (05/23/25 1600)  Height: 5' 10\" (177.8 cm) (05/22/25 1228)  SpO2: 95 % (05/23/25 1600)  Exam:   Physical Exam   S1 plus S2  Normal vascular breath  No pedal edema  Abdomen soft, nontender    Discussion with Family: Updated  (son) at bedside.    Discharge instructions/Information to patient and family:   See after visit summary for " information provided to patient and family.      Provisions for Follow-Up Care:  See after visit summary for information related to follow-up care and any pertinent home health orders.      Mobility at time of Discharge:   Basic Mobility Inpatient Raw Score: 24  JH-HLM Goal: 8: Walk 250 feet or more  JH-HLM Achieved: 7: Walk 25 feet or more  HLM Goal achieved. Continue to encourage appropriate mobility.     Disposition:   Home    Planned Readmission: none     Discharge Statement:  I spent 37 minutes discharging the patient. This time was spent on the day of discharge. I had direct contact with the patient on the day of discharge. Greater than 50% of the total time was spent examining patient, answering all patient questions, arranging and discussing plan of care with patient as well as directly providing post-discharge instructions.  Additional time then spent on discharge activities.    Discharge Medications:  See after visit summary for reconciled discharge medications provided to patient and/or family.      **Please Note: This note may have been constructed using a voice recognition system**

## 2025-05-23 NOTE — TELEPHONE ENCOUNTER
----- Message from Felix Suarez PA-C sent at 5/23/2025  2:34 PM EDT -----  Matheus Sears,Can you please schedule this patient for RHC/LHC on Tuesday 5/27?He does take Coumadin, it has been on hold since 5/22.  Will have him continue to hold his Coumadin on discharge given plans for procedure on 5/27.  He will need to hold losartan and torsemide the morning of his procedure.He was evaluated and cleared by nephrology for cardiac catheterization during his hospital stay.  They recommended we order fluids per our cardiac catheterization protocol and that he hold his diuretics and losartan the morning of his catheterization.  Please let me know when his cath orders have been placed, so that I can order fluids per protocol, otherwise Brian will need to order over the weekend.1.Is this for pre-valve or open heart surgery request? NO2.Did the pt have a stress test? YES3.Is pt on maximal antianginal medications?  YES           4.Is pt symptomatic?  YES5.Does pt having ischemic symptoms?  YES              If yes, is what type?  Anginal equivalentThank so much,Felix

## 2025-05-23 NOTE — ASSESSMENT & PLAN NOTE
Etiology undifferentiated at this time.  He appears close to euvolemic on exam despite OptiVol crossed.  Continue torsemide  He has frequent PVCs on telemetry.  EKGs and telemetry strips reviewed with EP, we note his rhythm is consistent with sinus rhythm versus V paced rhythm with frequent PVCs.  1 episode of NSVT noted on device interrogation.  Continue Toprol.  EP referral placed.  He has known NICM, and recent echocardiogram demonstrates persistent cardiomyopathy with EF of 25% without severe valvular abnormalities.  Recent stress test demonstrated fixed defect.  Unable to complete RHC/LHC today due to elevated INR.  Plan for outpatient RHC/LHC on Tuesday.  Message sent to schedulers.  Discussed with nephrology, who report patient is cleared for RHC/LHC on Tuesday as long as kidney function remained stable.  They recommend routine fluid protocol.  Discussed the indications, alternatives, risks and benefit of cardiac catheterization and possible PCI. The procedure risks, benefits, and complications (including but not limited to bleeding, infection, arrhythmia, nephrotoxicity, vessel injury, myocardial infarction, CVA, and death) were reviewed.  Patient is alert and oriented x3 and wishes to proceed. All questions answered.   Continue to hold Coumadin for cardiac catheterization on Tuesday.  Hold torsemide and losartan the morning of cardiac catheterization.  Continue aspirin 81 mg daily, Lipitor, Toprol.

## 2025-05-27 ENCOUNTER — PATIENT OUTREACH (OUTPATIENT)
Dept: CASE MANAGEMENT | Facility: OTHER | Age: 75
End: 2025-05-27

## 2025-05-27 ENCOUNTER — HOSPITAL ENCOUNTER (OUTPATIENT)
Facility: HOSPITAL | Age: 75
Setting detail: OUTPATIENT SURGERY
Discharge: HOME | End: 2025-05-27
Attending: INTERNAL MEDICINE | Admitting: INTERNAL MEDICINE
Payer: MEDICARE

## 2025-05-27 ENCOUNTER — RESULTS FOLLOW-UP (OUTPATIENT)
Dept: CARDIOLOGY CLINIC | Facility: CLINIC | Age: 75
End: 2025-05-27

## 2025-05-27 ENCOUNTER — TRANSITIONAL CARE MANAGEMENT (OUTPATIENT)
Dept: FAMILY MEDICINE CLINIC | Facility: CLINIC | Age: 75
End: 2025-05-27

## 2025-05-27 VITALS
HEART RATE: 78 BPM | SYSTOLIC BLOOD PRESSURE: 110 MMHG | RESPIRATION RATE: 20 BRPM | OXYGEN SATURATION: 92 % | TEMPERATURE: 98.4 F | DIASTOLIC BLOOD PRESSURE: 55 MMHG

## 2025-05-27 DIAGNOSIS — Z98.890 S/P CARDIAC CATHETERIZATION: ICD-10-CM

## 2025-05-27 DIAGNOSIS — R79.89 ELEVATED TROPONIN: ICD-10-CM

## 2025-05-27 DIAGNOSIS — I42.0 DILATED CARDIOMYOPATHY (HCC): ICD-10-CM

## 2025-05-27 DIAGNOSIS — I20.89 ANGINAL EQUIVALENT (HCC): Primary | ICD-10-CM

## 2025-05-27 DIAGNOSIS — I42.8 NICM (NONISCHEMIC CARDIOMYOPATHY) (HCC): ICD-10-CM

## 2025-05-27 DIAGNOSIS — I47.29 NSVT (NONSUSTAINED VENTRICULAR TACHYCARDIA) (HCC): ICD-10-CM

## 2025-05-27 DIAGNOSIS — R06.00 DYSPNEA, UNSPECIFIED TYPE: ICD-10-CM

## 2025-05-27 DIAGNOSIS — I48.0 PAROXYSMAL ATRIAL FIBRILLATION (HCC): ICD-10-CM

## 2025-05-27 LAB
ANION GAP SERPL CALCULATED.3IONS-SCNC: 11 MMOL/L (ref 4–13)
APTT PPP: 25 SECONDS (ref 23–34)
BUN SERPL-MCNC: 70 MG/DL (ref 5–25)
CALCIUM SERPL-MCNC: 9.9 MG/DL (ref 8.4–10.2)
CHLORIDE SERPL-SCNC: 102 MMOL/L (ref 96–108)
CO2 SERPL-SCNC: 28 MMOL/L (ref 21–32)
CREAT SERPL-MCNC: 1.9 MG/DL (ref 0.6–1.3)
ERYTHROCYTE [DISTWIDTH] IN BLOOD BY AUTOMATED COUNT: 13.4 % (ref 11.6–15.1)
GFR SERPL CREATININE-BSD FRML MDRD: 34 ML/MIN/1.73SQ M
GLUCOSE P FAST SERPL-MCNC: 141 MG/DL (ref 65–99)
GLUCOSE SERPL-MCNC: 141 MG/DL (ref 65–140)
HCT VFR BLD AUTO: 34.6 % (ref 36.5–49.3)
HGB BLD-MCNC: 11.3 G/DL (ref 12–17)
INR PPP: 0.93 (ref 0.85–1.19)
MCH RBC QN AUTO: 29.4 PG (ref 26.8–34.3)
MCHC RBC AUTO-ENTMCNC: 32.7 G/DL (ref 31.4–37.4)
MCV RBC AUTO: 90 FL (ref 82–98)
PLATELET # BLD AUTO: 170 THOUSANDS/UL (ref 149–390)
PMV BLD AUTO: 9.8 FL (ref 8.9–12.7)
POTASSIUM SERPL-SCNC: 5.1 MMOL/L (ref 3.5–5.3)
PROTHROMBIN TIME: 13.2 SECONDS (ref 12.3–15)
RBC # BLD AUTO: 3.85 MILLION/UL (ref 3.88–5.62)
SODIUM SERPL-SCNC: 141 MMOL/L (ref 135–147)
WBC # BLD AUTO: 5.63 THOUSAND/UL (ref 4.31–10.16)

## 2025-05-27 PROCEDURE — 82810 BLOOD GASES O2 SAT ONLY: CPT | Performed by: INTERNAL MEDICINE

## 2025-05-27 PROCEDURE — 82948 REAGENT STRIP/BLOOD GLUCOSE: CPT

## 2025-05-27 PROCEDURE — 76937 US GUIDE VASCULAR ACCESS: CPT | Performed by: INTERNAL MEDICINE

## 2025-05-27 PROCEDURE — 99152 MOD SED SAME PHYS/QHP 5/>YRS: CPT | Performed by: INTERNAL MEDICINE

## 2025-05-27 PROCEDURE — 85730 THROMBOPLASTIN TIME PARTIAL: CPT

## 2025-05-27 PROCEDURE — 85610 PROTHROMBIN TIME: CPT

## 2025-05-27 PROCEDURE — C1894 INTRO/SHEATH, NON-LASER: HCPCS | Performed by: INTERNAL MEDICINE

## 2025-05-27 PROCEDURE — 93460 R&L HRT ART/VENTRICLE ANGIO: CPT | Performed by: INTERNAL MEDICINE

## 2025-05-27 PROCEDURE — C1751 CATH, INF, PER/CENT/MIDLINE: HCPCS | Performed by: INTERNAL MEDICINE

## 2025-05-27 PROCEDURE — 85027 COMPLETE CBC AUTOMATED: CPT

## 2025-05-27 PROCEDURE — 80048 BASIC METABOLIC PNL TOTAL CA: CPT

## 2025-05-27 PROCEDURE — 99153 MOD SED SAME PHYS/QHP EA: CPT | Performed by: INTERNAL MEDICINE

## 2025-05-27 PROCEDURE — 93005 ELECTROCARDIOGRAM TRACING: CPT

## 2025-05-27 PROCEDURE — C1769 GUIDE WIRE: HCPCS | Performed by: INTERNAL MEDICINE

## 2025-05-27 RX ORDER — SODIUM CHLORIDE 9 MG/ML
75 INJECTION, SOLUTION INTRAVENOUS CONTINUOUS
Status: DISPENSED | OUTPATIENT
Start: 2025-05-27 | End: 2025-05-27

## 2025-05-27 RX ORDER — VERAPAMIL HCL 2.5 MG/ML
AMPUL (ML) INTRAVENOUS CODE/TRAUMA/SEDATION MEDICATION
Status: DISCONTINUED | OUTPATIENT
Start: 2025-05-27 | End: 2025-05-27 | Stop reason: HOSPADM

## 2025-05-27 RX ORDER — IODIXANOL 320 MG/ML
INJECTION, SOLUTION INTRAVASCULAR CODE/TRAUMA/SEDATION MEDICATION
Status: DISCONTINUED | OUTPATIENT
Start: 2025-05-27 | End: 2025-05-27 | Stop reason: HOSPADM

## 2025-05-27 RX ORDER — MIDAZOLAM HYDROCHLORIDE 2 MG/2ML
INJECTION, SOLUTION INTRAMUSCULAR; INTRAVENOUS CODE/TRAUMA/SEDATION MEDICATION
Status: DISCONTINUED | OUTPATIENT
Start: 2025-05-27 | End: 2025-05-27 | Stop reason: HOSPADM

## 2025-05-27 RX ORDER — LIDOCAINE WITH 8.4% SOD BICARB 0.9%(10ML)
SYRINGE (ML) INJECTION CODE/TRAUMA/SEDATION MEDICATION
Status: DISCONTINUED | OUTPATIENT
Start: 2025-05-27 | End: 2025-05-27 | Stop reason: HOSPADM

## 2025-05-27 RX ORDER — NITROGLYCERIN 20 MG/100ML
INJECTION INTRAVENOUS CODE/TRAUMA/SEDATION MEDICATION
Status: DISCONTINUED | OUTPATIENT
Start: 2025-05-27 | End: 2025-05-27 | Stop reason: HOSPADM

## 2025-05-27 RX ORDER — FENTANYL CITRATE 50 UG/ML
INJECTION, SOLUTION INTRAMUSCULAR; INTRAVENOUS CODE/TRAUMA/SEDATION MEDICATION
Status: DISCONTINUED | OUTPATIENT
Start: 2025-05-27 | End: 2025-05-27 | Stop reason: HOSPADM

## 2025-05-27 RX ORDER — HEPARIN SODIUM 1000 [USP'U]/ML
INJECTION, SOLUTION INTRAVENOUS; SUBCUTANEOUS CODE/TRAUMA/SEDATION MEDICATION
Status: DISCONTINUED | OUTPATIENT
Start: 2025-05-27 | End: 2025-05-27 | Stop reason: HOSPADM

## 2025-05-27 NOTE — INTERVAL H&P NOTE
H&P reviewed. After examining the patient I find no changes in the patients condition since the H&P had been written.    Discussed the indications, alternatives, risks and benefit of cardiac catheterization and possible PCI. The procedure risks, benefits, and complications (including but not limited to bleeding, infection, arrhythmia, nephrotoxicity, vessel injury, myocardial infarction, CVA, and death) were reviewed.  Patient is alert and oriented x3 and wishes to proceed. All questions answered.       Vitals:    05/27/25 0830   BP: 133/94   Pulse: 83   Resp: (!) 24   Temp:    SpO2: 94%

## 2025-05-27 NOTE — DISCHARGE INSTR - AVS FIRST PAGE
1. Please see the post cardiac catheterization dishcarge instructions.   No heavy lifting greater than 5 lbs for 1 week, no strenuous activity for 48 hrs.    2. Remove band aid tomorrow.  Shower and wash area- wrist gently with soap and water- beginning tomorrow. Rinse and pat dry.  Apply new water seal band aid.  Repeat this process for 5 days. No powders, creams lotions or antibiotic ointments  for 5 days.  No tub baths, hot tubs or swimming for 5 days.     3. Please call our office ([phone]) if you have any fever, redness, swelling, discharge from your wrist access site.    4.No driving for 1 day

## 2025-05-28 ENCOUNTER — PATIENT OUTREACH (OUTPATIENT)
Dept: CASE MANAGEMENT | Facility: OTHER | Age: 75
End: 2025-05-28

## 2025-05-28 LAB
ATRIAL RATE: 90 BPM
P AXIS: 64 DEGREES
PR INTERVAL: 186 MS
QRS AXIS: -63 DEGREES
QRSD INTERVAL: 160 MS
QT INTERVAL: 458 MS
QTC INTERVAL: 560 MS
T WAVE AXIS: 84 DEGREES
VENTRICULAR RATE: 90 BPM

## 2025-05-28 PROCEDURE — 93010 ELECTROCARDIOGRAM REPORT: CPT | Performed by: INTERNAL MEDICINE

## 2025-05-28 NOTE — PROGRESS NOTES
Spoke with Galen post discharge from hospital for cardiac cath and dyspnea  Galen is upset with his experience for his cardiac cath was not notified when to report to hospital and had to make additional phone calls to find out   Galen does not have any questions about discharged instructions did not want to review medications. Felt he has a good knowledge of them and keep them organized  He does want help to make cardiology appointment with electrophysiologist I will call and make appointment

## 2025-05-28 NOTE — PROGRESS NOTES
Three way call between myself patient and cardiology Appointment made for [place] office on 10/16/25 at 9am for electrophysiologist

## 2025-05-29 ENCOUNTER — RESULTS FOLLOW-UP (OUTPATIENT)
Dept: CARDIOLOGY CLINIC | Facility: CLINIC | Age: 75
End: 2025-05-29

## 2025-05-29 ENCOUNTER — IN-CLINIC DEVICE VISIT (OUTPATIENT)
Dept: CARDIOLOGY CLINIC | Facility: CLINIC | Age: 75
End: 2025-05-29
Payer: MEDICARE

## 2025-05-29 DIAGNOSIS — I48.91 ATRIAL FIBRILLATION, UNSPECIFIED TYPE (HCC): ICD-10-CM

## 2025-05-29 DIAGNOSIS — I47.20 VENTRICULAR TACHYCARDIA (HCC): Primary | ICD-10-CM

## 2025-05-29 LAB
ATRIAL RATE: 97 BPM
QRS AXIS: 90 DEGREES
QRSD INTERVAL: 144 MS
QT INTERVAL: 448 MS
QTC INTERVAL: 539 MS
T WAVE AXIS: -87 DEGREES
VENTRICULAR RATE: 87 BPM

## 2025-05-29 PROCEDURE — 93010 ELECTROCARDIOGRAM REPORT: CPT | Performed by: INTERNAL MEDICINE

## 2025-05-29 PROCEDURE — 93283 PRGRMG EVAL IMPLANTABLE DFB: CPT | Performed by: INTERNAL MEDICINE

## 2025-05-29 NOTE — PROGRESS NOTES
Results for orders placed or performed in visit on 05/29/25   Cardiac EP device report    Narrative    MDT DC ICD/ACTIVE SYSTEM IS MRI CONDITIONAL  DEVICE INTERROGATED IN THE [place] OFFICE: BATTERY VOLTAGE ADEQUATE (11.2 YRS). AP: 32.5%. : 9.1% (MVP-ON). ALL LEAD PARAMETERS WITHIN NORMAL LIMITS. NO SIGNIFICANT HIGH RATE EPISODES. PT HAD FF RA OS, PTS PVAB WAS PROGRAMMED TO ABSOLUTE. RA SENSING 2.4mV, REPROGRAMMED RA SENSING FROM O.30mv TO 0.60 mV. OPTI-VOL WITHIN NORMAL LIMITS. NORMAL DEVICE FUNCTION. CH

## 2025-06-02 LAB — GLUCOSE SERPL-MCNC: 137 MG/DL (ref 65–140)

## 2025-06-05 ENCOUNTER — CONSULT (OUTPATIENT)
Dept: FAMILY MEDICINE CLINIC | Facility: CLINIC | Age: 75
End: 2025-06-05
Payer: MEDICARE

## 2025-06-05 VITALS
BODY MASS INDEX: 33.47 KG/M2 | DIASTOLIC BLOOD PRESSURE: 70 MMHG | HEART RATE: 69 BPM | RESPIRATION RATE: 18 BRPM | HEIGHT: 70 IN | OXYGEN SATURATION: 93 % | SYSTOLIC BLOOD PRESSURE: 122 MMHG | WEIGHT: 233.8 LBS

## 2025-06-05 DIAGNOSIS — Z01.818 PRE-OP EXAMINATION: Primary | ICD-10-CM

## 2025-06-05 PROCEDURE — 99214 OFFICE O/P EST MOD 30 MIN: CPT | Performed by: FAMILY MEDICINE

## 2025-06-05 NOTE — PROGRESS NOTES
Pre-operative Clearance  Name: Galen Carson      : 1950      MRN: [id number]  Encounter Provider: DANITA Girard  Encounter Date: 2025   Encounter department: [facility] [address]    :  Assessment & Plan  Pre-op examination             Pre-operative Clearance:     Revised Cardiac Risk Index:  RCI RISK CLASS III (2 risk factors, risk of major cardiac complications approximately 3.6%)    Clearance:  Patient is medically optimized (CLEARED) for proposed surgery without any additional cardiac testing.      Medication Instructions:   - Avoid herbs or non-directed vitamins one week prior to surgery    - ACE Inhibitors or ARBs: Continue this medication up to the evening before surgery/procedure, but do not take the morning of the day of surgery.  - Beta blockers:  Continue to take this medication on your normal schedule.  - Diuretics: Continue taking this medication up to the evening before surgery/procedure, but do not take in the morning of the day of surgery/procedure.  - Hyperlipidemia meds: Continue to take this medication on your normal schedule.  - Warfarin: continue      Medicine Instructions for Adults with Diabetes (NO Bowel Prep)    Follow these instructions when a BOWEL PREP is NOT required for your procedure or surgery!    NOTE:  GLP Agonists taken weekly: do not take in the 7 days before your procedure. **Bariatric surgery: do not take 4 weeks prior to your procedure.    SGLT-2 Inhibitors: do not take in the 4 days before your procedure    On the Day Before Surgery/Procedure  If you are having a procedure (e.g., Colonoscopy) or surgery which DOES NOT require a bowel prep, follow the directions below based on the type of medicine you take for your diabetes.  Type of Medicine You Take Examples What to Do   Pre-Mixed Insulin Intermediate  Bvhlgwa19/25, Tcdfzdm30/30, Novolog 70/30, Regular Insulin Take 1/2 your regular dose the evening before our  procedure.   Rapid/Fast Acting  Insulin and/or Long-Acting Insulin Humalog U200, NovoLog, Apidra,  Lantus, Levemir, Tresiba, Toujeo,  Fias, Basaglar Take your FULL regular dose the day before procedure.   Oral Diabetic Medicines (sulfonylurea) Glipizide/Glimepiride/  Glucotrol Take your regular dose with dinner the evening before your procedure.   Other Oral Diabetic Medicines Metformin, Glucophage, Glucophage  XR, Riomet, Glumetza, Actose,  Avandia, Gl set, Prandin Take your regular dose with dinner the evening before your procedure   GLP Agonists Adlyxin, Byetta, Bydureon,  Ozempic, Soliqua, Tanzeum,  Trulicity, Victoza, Saxenda,  Rybelsus, Wegovy, Mounjaro, Zepbound If taken daily, take as normal  If taken weekly, do not take this medicine for 7 days before your procedure including the day of the procedure (resume taking after the procedure). **Bariatric surgery: do not take 4 weeks prior to procedure   SGLT-2 Inhibitors Jardiance, Invokana, Farxiga, Steglatro, Brenzavvy, Qtern, Segluromet Glyxambi, Synjardy, Synjardy XR, Invokamet, InvokametXR, Trijary XR, Xigduo X Do not take for 4 days before your procedure including the day of the procedure (resume taking after the procedure)   This educational material has been approved by the Patient Education Advisory Committee.    On the Day of Surgery/Procedure  Follow the directions below based on the type of medicine you take for your diabetes.  Type of Medicine You Take  Examples What to Do   Long-Acting Insulin Lantus, Levemir, Tresiba,  Toujeo, Basaglar, Semglee If you normally take your Long Acting Insulin in the morning, take the full dose as scheduled.   GLP-I Agonists Adlyxin, Byetta, Bydureon,  Ozempic, Soliqua, Tanzeum,  Trulicity, Victoza, Saxenda,  Rybelsus, Mounjaro Do NOT take this medicine on the day of your procedure (resume taking after the procedure)   Except for the morning Long-Acting Insulin, DO NOT take ANY diabetic medicine on the day of your  procedure unless you were instructed by the doctor who manages your diabetes medicines.  Continue to check your blood sugars.  If you have an insulin pump, ask your endocrinologist for instructions at least 3 days before your procedure. NOTE: If you are not able to ask your endocrinologist in advance, on the day of the procedure set your insulin pump to your basal rate only. Bring your insulin pump supplies to the hospital.      Depression Screening and Follow-up Plan: Patient was screened for depression during today's encounter. They screened negative with a PHQ-2 score of 0.      History of Present Illness     Pre-Op Examination     Surgery: cataract phaco with iol od , os   Anticipated Date of Surgery: 6/17/25, 7/1/2025   Surgeon: young     Previous history of bleeding disorders or clots?: No    Previous Anesthesia reaction?: No    Prolonged steroid use in the last 6 months?: No      Assessment of Cardiac Risk:   - Unstable or severe angina or MI in the last 6 weeks or history of stent placement in the last year?: No    - Decompensated heart failure (e.g. New onset heart failure, NYHA  Class IV heart failure, or worsening existing heart failure)?: No    - Significant arrhythmias such as high grade AV block, symptomatic ventricular arrhythmia, newly recognized ventricular tachycardia, supraventricular tachycardia with resting heart rate >100, or symptomatic bradycardia?: No    - Severe heart valve disease including aortic stenosis or symptomatic mitral stenosis?: No      Pre-operative Risk Factors:  - Elevated-risk surgery: No    - History of cerebrovascular disease: No    - History of ischemic heart disease: Yes    - History of congestive heart failure: Yes    - Pre-operative treatment with insulin: No    - Pre-operative creatinine >2 mg/dL: No      Medications of Perioperative Concern:    Anti-coagulants (Coumadin, Xarelto, Pradaxa, Eliquis, Lixiana)    Review of Systems   Constitutional:  Negative for appetite  "change, chills, fever and unexpected weight change.   HENT:  Negative for congestion, dental problem, ear pain, hearing loss, postnasal drip, rhinorrhea, sinus pressure, sinus pain, sneezing, sore throat, tinnitus and voice change.    Eyes:  Negative for visual disturbance.   Respiratory:  Negative for apnea, cough, chest tightness and shortness of breath.    Cardiovascular:  Negative for chest pain, palpitations and leg swelling.   Gastrointestinal:  Negative for abdominal pain, blood in stool, constipation, diarrhea, nausea and vomiting.   Endocrine: Negative for cold intolerance, heat intolerance, polydipsia, polyphagia and polyuria.   Genitourinary:  Negative for decreased urine volume, difficulty urinating, dysuria, frequency and hematuria.   Musculoskeletal:  Negative for arthralgias, back pain, gait problem, joint swelling and myalgias.   Skin:  Negative for color change, rash and wound.   Allergic/Immunologic: Negative for environmental allergies and food allergies.   Neurological:  Negative for dizziness, syncope, weakness, light-headedness, numbness and headaches.   Hematological:  Negative for adenopathy. Does not bruise/bleed easily.   Psychiatric/Behavioral:  Negative for sleep disturbance and suicidal ideas. The patient is not nervous/anxious.      Past Medical History   Past Medical History[1]  Past Surgical History[2]  Family History[3]  Social History[4]  Medications[5]  Allergies   Allergen Reactions   • Penicillin G Benzathine Other (See Comments)   • Penicillin V Other (See Comments)     As a child      Objective   /70 (BP Location: Left arm, Patient Position: Sitting)   Pulse 69   Resp 18   Ht 5' 10\" (1.778 m)   Wt 106 kg (233 lb 12.8 oz)   SpO2 93%   BMI 33.55 kg/m²     Physical Exam  Constitutional:       General: He is not in acute distress.     Appearance: He is well-developed. He is not ill-appearing or toxic-appearing.   HENT:      Head: Normocephalic and atraumatic. "     Cardiovascular:      Rate and Rhythm: Normal rate and regular rhythm.      Heart sounds: Normal heart sounds.   Pulmonary:      Effort: Pulmonary effort is normal.      Breath sounds: Normal breath sounds.   Abdominal:      General: Bowel sounds are normal.      Palpations: Abdomen is soft.     Musculoskeletal:         General: Normal range of motion.      Cervical back: Normal range of motion and neck supple.     Skin:     General: Skin is warm and dry.      Findings: Bruising present.     Neurological:      Mental Status: He is alert and oriented to person, place, and time.      Deep Tendon Reflexes: Reflexes are normal and symmetric.     Psychiatric:         Behavior: Behavior normal.         Thought Content: Thought content normal.         Judgment: Judgment normal.         DANITA Girard           [1]  Past Medical History:  Diagnosis Date   • Allergic    • Anesthesia     pt wears a life vest (11/16).  should not be scheduled at [facility] until heart condition is stabilized   • BPH (benign prostatic hypertrophy)    • Cancer ([facility])    • CHF (congestive heart failure) ([facility])    • Chronic kidney disease    • Coronary artery disease    • Diabetes mellitus ([facility])    • Hyperlipidemia    • Hypertension    • PAF (paroxysmal atrial fibrillation) ([facility])    • Prostate cancer ([facility])    [2]  Past Surgical History:  Procedure Laterality Date   • CARDIAC CATHETERIZATION N/A 10/04/2023    Procedure: Cardiac Coronary Angiogram;  Surgeon: Chris Lovell MD;  Location: [facility];  Service: Cardiology   • CARDIAC CATHETERIZATION N/A 10/04/2023    Procedure: Cardiac RHC/LHC;  Surgeon: Chris Lovell MD;  Location: [facility];  Service: Cardiology   • CARDIAC CATHETERIZATION  10/04/2023    Procedure: Cardiac catheterization;  Surgeon: Chris Lovell MD;  Location: [facility];  Service: Cardiology   • CARDIAC CATHETERIZATION Left 5/27/2025    Procedure: Cardiac Left Heart Cath;  Surgeon: Guillermo  MD Guillermo;  Location: [facility];  Service: Cardiology   • CARDIAC CATHETERIZATION N/A 2025    Procedure: Cardiac RHC/LHC;  Surgeon: Guillermo Li MD;  [facility]: [facility];  Service: Cardiology   • CARDIAC CATHETERIZATION  2025    Procedure: Cardiac Catheterization;  Surgeon: Guillermo Li MD;  [facility]: [facility];  Service: Cardiology   • CARDIAC CATHETERIZATION N/A 2025    Procedure: Cardiac Coronary Angiogram;  Surgeon: Guillermo Li MD;  Location: [facility];  Service: Cardiology   • CARDIAC DEFIBRILLATOR PLACEMENT  2024   • CARDIAC ELECTROPHYSIOLOGY PROCEDURE N/A 2024    Procedure: Cardiac icd implant, Dual Chambers ICD;  Surgeon: Leo Whalen MD;  Location:  CARDIAC CATH LAB;  Service: Cardiology   • EAR SURGERY     • HERNIA REPAIR     • ID COLONOSCOPY FLX DX W/COLLJ SPEC WHEN PFRMD N/A 2019    Procedure: COLONOSCOPY;  Surgeon: Winston Nielson III, MD;  Location: [facility];  Service: Gastroenterology   [3]  Family History  Problem Relation Name Age of Onset   • Stroke Father Father    • No Known Problems Mother     • Prostate cancer Brother     • Arthritis Brother     • No Known Problems Sister     • Diabetes Sister Brigitte    • Diabetes Sister Fátima    • No Known Problems Son     • No Known Problems Daughter     [4]  Social History  Tobacco Use   • Smoking status: Former     Current packs/day: 0.00     Average packs/day: 1 pack/day for 25.0 years (25.0 ttl pk-yrs)     Types: Cigarettes, Pipe, Cigars     Start date: 1980     Quit date: 2005     Years since quittin.2     Passive exposure: Past   • Smokeless tobacco: Never   Vaping Use   • Vaping status: Never Used   Substance and Sexual Activity   • Alcohol use: Not Currently   • Drug use: Not Currently   • Sexual activity: Not Currently     Partners: Female   [5]  Current Outpatient Medications on File Prior to Visit   Medication Sig   • Ascorbic Acid (vitamin C) 1000 MG  tablet Take 1,000 mg by mouth in the morning.   • aspirin 81 mg chewable tablet Chew 1 tablet (81 mg total) daily   • cetirizine (ZyrTEC) 5 MG tablet Take 5 mg by mouth in the morning.   • Continuous Glucose Sensor (FreeStyle Lisa 3 Sensor) [facility] Use 1 each every 14 (fourteen) days   • Erleada 60 MG TABS Take 4 tablets by mouth in the morning.   • EVENING PRIMROSE OIL PO Take by mouth in the morning and in the evening and before bedtime.   • glimepiride (AMARYL) 1 mg tablet Take 1 tablet (1 mg total) by mouth daily with breakfast   • losartan (COZAAR) 25 mg tablet Take 1 tablet (25 mg total) by mouth daily   • metoprolol succinate (TOPROL-XL) 50 mg 24 hr tablet Take 2 tablets (100 mg total) by mouth daily   • multivitamin (THERAGRAN) TABS Take 1 tablet by mouth in the morning.   • pantoprazole (PROTONIX) 20 mg tablet Take 1 tablet (20 mg total) by mouth daily   • rosuvastatin (CRESTOR) 20 MG tablet Take 1 tablet (20 mg total) by mouth daily   • torsemide (DEMADEX) 20 mg tablet Take 2 tablets (40 mg total) by mouth 2 (two) times a day   • VITAMIN D, ERGOCALCIFEROL, PO Take by mouth   • vitamin E, tocopherol, 400 units capsule Take 400 Units by mouth in the morning.   • warfarin (Coumadin) 5 mg tablet Take one tablet daily or as directed   • Blood Glucose Monitoring Suppl (ONETOUCH VERIO) w/Device KIT by Does not apply route once for 1 dose Test sugar in the morning

## 2025-06-06 ENCOUNTER — PATIENT OUTREACH (OUTPATIENT)
Dept: CASE MANAGEMENT | Facility: OTHER | Age: 75
End: 2025-06-06

## 2025-06-06 ENCOUNTER — TELEPHONE (OUTPATIENT)
Dept: CARDIOLOGY CLINIC | Facility: CLINIC | Age: 75
End: 2025-06-06

## 2025-06-06 NOTE — PROGRESS NOTES
Spoke with Galen. He reports he has had 4 pound weight gain in two days.   Weight today was 228 pounds yesterday 226 pounds. Reports increased swelling in feet.   Taking Torsemide 3 tablets in morning and one tablet at lunch time. Does not want to take it later or he wont sleep  Feels he drinks less than 64 ounces in a day.  States he watches salt intake  I will send message to cardiology office  Reviewed symptoms when to call cardiology and reminded him there is always a nurse to speak to after hours and on weekend

## 2025-06-06 NOTE — TELEPHONE ENCOUNTER
Yes please for the next 3 days. then we proceed for S/C injection next week.  Per Dr. Li via Secure chat.

## 2025-06-06 NOTE — TELEPHONE ENCOUNTER
Please advise on pt weight gain below, and thank you.      Olga Saxena RN to Cardiology [facility]  (Selected Message)        6/6/25  1:23 PM  Patient has had 4 pound weight gain in 2 days. Also having increased swelling in feet . Please advise. Thank you  Olga Saxena RN      6/6/25  1:23 PM  Note     Spoke with Galen. He reports he has had 4 pound weight gain in two days.   Weight today was 228 pounds yesterday 226 pounds. Reports increased swelling in feet.   Taking Torsemide 3 tablets in morning and one tablet at lunch time. Does not want to take it later or he wont sleep  Feels he drinks less than 64 ounces in a day.  States he watches salt intake  I will send message to cardiology office  Reviewed symptoms when to call cardiology and reminded him there is always a nurse to speak to after hours and on weekend

## 2025-06-09 ENCOUNTER — TELEPHONE (OUTPATIENT)
Dept: CARDIOLOGY CLINIC | Facility: CLINIC | Age: 75
End: 2025-06-09

## 2025-06-09 NOTE — TELEPHONE ENCOUNTER
"Spoke with patient to update how he is feeling as per Dr Li. States his scale needs a new battery but he got 227lbs today. Still has \"a bit of fluid in his legs\", breathing feels better but he did sound SOB while talking to this CTS; sometimes sleeps in his recliner part of the night then moves to the bed.    OV 6/20/25  "

## 2025-06-09 NOTE — TELEPHONE ENCOUNTER
Spoke with pt . Wt today 227 lbs. His blle edema mostly gone and breathing improved.  Will call pt again tomorrow for update.    He did eat out over the weekend boneless chicken wings and cheese steak.    Does [place] office have any available appts soon?

## 2025-06-09 NOTE — TELEPHONE ENCOUNTER
Called and lvm for pt relaying  response and to give the office a call back to see how he has been doing on the new dose of his diuretics.

## 2025-06-10 ENCOUNTER — PATIENT OUTREACH (OUTPATIENT)
Dept: CASE MANAGEMENT | Facility: OTHER | Age: 75
End: 2025-06-10

## 2025-06-10 NOTE — TELEPHONE ENCOUNTER
Spoke with pt. Wt 227. Advised to be evaluated in ER. He said he would not go. Advised to go if things get worse.    He does have f/u on 6/20/25.

## 2025-06-10 NOTE — PROGRESS NOTES
"Spoke with Galen. Weight today 227 pounds Reports his legs are the same as far as swelling  Feels he could use oxygen at times. Get short of breath with walking. Has pulse ox at home sometimes drops to 89%, he does report this to his doctors he states  \"I am getting my eye surgery done before I do anything else or see cardiology\"  To have cataract surgery 6/17/25.   To see cardiology on 6/20/25  I will check back in 1-2 weeks  "

## 2025-06-11 ENCOUNTER — PROCEDURE VISIT (OUTPATIENT)
Dept: UROLOGY | Facility: CLINIC | Age: 75
End: 2025-06-11
Payer: MEDICARE

## 2025-06-11 VITALS
DIASTOLIC BLOOD PRESSURE: 84 MMHG | HEART RATE: 83 BPM | WEIGHT: 233 LBS | BODY MASS INDEX: 33.36 KG/M2 | OXYGEN SATURATION: 98 % | SYSTOLIC BLOOD PRESSURE: 136 MMHG | HEIGHT: 70 IN | TEMPERATURE: 98.1 F

## 2025-06-11 DIAGNOSIS — C61 PROSTATE CANCER (HCC): Primary | ICD-10-CM

## 2025-06-11 PROCEDURE — 96402 CHEMO HORMON ANTINEOPL SQ/IM: CPT

## 2025-06-11 NOTE — PROGRESS NOTES
"6/11/2025  Galen Carson is a 74 y.o. male  [id number]    Diagnosis:      Patient presents for monthly [employer] managed by our office    Plan:  To follow up as scheduled      Medication administration:    Medication prepared per package instructions. Site prepped with alcohol. Medication administered per protocol into Right abdomen. Band aid applied to injection site. Patient tolerated well. No immediate reaction noted.    Administrations This Visit       degarelix acetate (FIRMAGON) injection 80 mg       Admin Date  06/11/2025 Action  Given Dose  80 mg Route  Subcutaneous Documented By  Mikaela Bowles RN                    Vitals:    06/11/25 1031   BP: 136/84   BP Location: Left arm   Patient Position: Sitting   Cuff Size: Standard   Pulse: 83   Temp: 98.1 °F (36.7 °C)   SpO2: 98%   Weight: 106 kg (233 lb)   Height: 5' 10\" (1.778 m)         Mikaela Bowles RN   "

## 2025-06-16 ENCOUNTER — APPOINTMENT (OUTPATIENT)
Dept: LAB | Facility: CLINIC | Age: 75
End: 2025-06-16
Payer: MEDICARE

## 2025-06-16 DIAGNOSIS — C61 PROSTATE CANCER (HCC): ICD-10-CM

## 2025-06-16 DIAGNOSIS — Z79.01 LONG TERM (CURRENT) USE OF ANTICOAGULANTS: ICD-10-CM

## 2025-06-16 DIAGNOSIS — I48.0 PAROXYSMAL ATRIAL FIBRILLATION (HCC): ICD-10-CM

## 2025-06-16 LAB
INR PPP: 1.86 (ref 0.85–1.19)
PROTHROMBIN TIME: 21.5 SECONDS (ref 12.3–15)
PSA SERPL-MCNC: 0.25 NG/ML (ref 0–4)

## 2025-06-16 PROCEDURE — 84153 ASSAY OF PSA TOTAL: CPT

## 2025-06-16 PROCEDURE — 85610 PROTHROMBIN TIME: CPT

## 2025-06-16 PROCEDURE — 36415 COLL VENOUS BLD VENIPUNCTURE: CPT

## 2025-06-17 ENCOUNTER — ANTICOAG VISIT (OUTPATIENT)
Dept: CARDIOLOGY CLINIC | Facility: CLINIC | Age: 75
End: 2025-06-17

## 2025-06-17 ENCOUNTER — RESULTS FOLLOW-UP (OUTPATIENT)
Dept: CARDIOLOGY CLINIC | Facility: CLINIC | Age: 75
End: 2025-06-17

## 2025-06-17 NOTE — PROGRESS NOTES
S/w pt. States the  only thing different was he ate pickled beats. Advised to stay on the same dose and retest in 2 weeks

## 2025-06-23 ENCOUNTER — TELEPHONE (OUTPATIENT)
Dept: CARDIOLOGY CLINIC | Facility: CLINIC | Age: 75
End: 2025-06-23

## 2025-06-23 ENCOUNTER — PATIENT OUTREACH (OUTPATIENT)
Dept: CASE MANAGEMENT | Facility: OTHER | Age: 75
End: 2025-06-23

## 2025-06-23 NOTE — TELEPHONE ENCOUNTER
Patient reports he is down to 226 lb.  Instructed patient he can reduce the torsemide dose to 40 mg (2 tablets) in the morning and 20 mg (1 tablet) in the evening. Discussed Furoscix with patient.  Advised patient will submit his information to [employer] to check benefits.

## 2025-06-23 NOTE — PROGRESS NOTES
"Spoke with Galen cataract surgery went well. He will have the other eye done on 7/1/25  Weight today 226 pounds. Swelling in feet are down since he is taking extra dose of diuretic. \"My feet finally look normal and its easier to get my socks and shoes on\"  Galen states he was told to take 5 tablets total a day of diuretic 3 in morning and 2 in afternoon. I informed Galen that increase was to be for 3 days only.He said well I am going to keep taking it that way because it worked.   I will check back after next eye surgery   "

## 2025-06-23 NOTE — TELEPHONE ENCOUNTER
Guillermo Li MD to Olga Saxena RN  Cardiology [facility]  Vanessa Pacheco RN (Selected Message)  RD      6/23/25  3:07 PM  If the weight gain has come back to the baseline then he can reduce the torsemide dose to 40 mg in the morning and 1 tablet in the evening.  The further adjustments can only be done when he is seen in the office.  I think this patient may benefit in the future with a subcutaneous dose of Furoscix   Guillermo Saxena RN to Cardiology [facility]  Guillermo Li MD        6/23/25  2:31 PM  I spoke with patient on 6/6 he had weight gain routed message to  your office and reached out to him to increase torsemide 20mg to 3 tablet in am and 2 tablet in pm for 3 days. Galen is still be taking medication this way. Reports his ankle swelling is improved and weight today is 226 pounds. Next cardiology appointment is 7/22/25 he was to be seen 6/20/25 but provider was ill.   Nephrology normally manages his torsemide.

## 2025-06-24 DIAGNOSIS — K26.9 MULTIPLE DUODENAL ULCERS: ICD-10-CM

## 2025-06-24 DIAGNOSIS — K21.9 GASTROESOPHAGEAL REFLUX DISEASE WITHOUT ESOPHAGITIS: ICD-10-CM

## 2025-06-24 RX ORDER — PANTOPRAZOLE SODIUM 20 MG/1
20 TABLET, DELAYED RELEASE ORAL DAILY
Qty: 90 TABLET | Refills: 1 | Status: SHIPPED | OUTPATIENT
Start: 2025-06-24

## 2025-06-27 NOTE — TELEPHONE ENCOUNTER
For the furoscix, you can call the rep if the pt can not afford it and he will bring samples for the pt .    Rep Carol Ann Hoover      E-mail   [email or web address]

## 2025-06-27 NOTE — PROGRESS NOTES
Spoke with pt wt today is 225 lbs. He is back to torsemide 40 mg am/ 20 mg pm.  Please call pt if you can give him samples of furoscix. Not sure if you have any in the office. The rep said to call him if pt can not afford and he will come give the office samples.    Thank you

## 2025-06-30 ENCOUNTER — APPOINTMENT (OUTPATIENT)
Dept: LAB | Facility: CLINIC | Age: 75
End: 2025-06-30
Payer: MEDICARE

## 2025-06-30 DIAGNOSIS — I48.0 PAROXYSMAL ATRIAL FIBRILLATION (HCC): ICD-10-CM

## 2025-06-30 DIAGNOSIS — Z79.01 LONG TERM (CURRENT) USE OF ANTICOAGULANTS: ICD-10-CM

## 2025-06-30 DIAGNOSIS — C61 PROSTATE CANCER (HCC): ICD-10-CM

## 2025-06-30 DIAGNOSIS — D50.0 IRON DEFICIENCY ANEMIA DUE TO CHRONIC BLOOD LOSS: ICD-10-CM

## 2025-06-30 DIAGNOSIS — E11.65 TYPE 2 DIABETES MELLITUS WITH HYPERGLYCEMIA, WITHOUT LONG-TERM CURRENT USE OF INSULIN (HCC): ICD-10-CM

## 2025-06-30 LAB
INR PPP: 1.85 (ref 0.85–1.19)
PROTHROMBIN TIME: 21.4 SECONDS (ref 12.3–15)

## 2025-06-30 PROCEDURE — 85610 PROTHROMBIN TIME: CPT

## 2025-06-30 PROCEDURE — 36415 COLL VENOUS BLD VENIPUNCTURE: CPT

## 2025-07-01 ENCOUNTER — ANTICOAG VISIT (OUTPATIENT)
Dept: CARDIOLOGY CLINIC | Facility: CLINIC | Age: 75
End: 2025-07-01

## 2025-07-05 ENCOUNTER — NURSE TRIAGE (OUTPATIENT)
Dept: OTHER | Facility: OTHER | Age: 75
End: 2025-07-05

## 2025-07-05 DIAGNOSIS — I50.23 ACUTE ON CHRONIC HFREF (HEART FAILURE WITH REDUCED EJECTION FRACTION) (HCC): ICD-10-CM

## 2025-07-05 NOTE — TELEPHONE ENCOUNTER
"Regarding: Weight Gain/ insomnia  ----- Message from Coty RODRIGUEZ sent at 7/5/2025 10:10 AM EDT -----  Patient stated, \" I have had a 4 pound weight gain  and I can't sleep ever since my medication torsemide dosage was changed.\"    "

## 2025-07-05 NOTE — TELEPHONE ENCOUNTER
REASON FOR CONVERSATION: Weight Gain and Heart Problem    SYMPTOMS: Patient noted he has gained 2 pounds per day for the past 2 days, totaling 4 pound weight gain despite following the cardiologist instructions. He noted that he is short of breath with activity and noted with activity his SpO2 is 89% and HR is 102. Patient noted a heaviness on his chest when he tries to lay flat to sleep, denied any active chest pain at this time. He noted his bilateral legs are swollen and his feet are puffy.     OTHER HEALTH INFORMATION: NA    PROTOCOL DISPOSITION: Go to ED Now, Go to ED Now (or PCP Triage)    CARE ADVICE PROVIDED: Per protocol patient was advised to go to ED now for evaluation. Patient noted he did not want to go to ED. On-call provider advised that patient should be evaluated in ED now, but if he refuses, he can take an extra 40 mg of Torsemide now, instead of taking 20 mg of Torsemide tonight. He stated if symptoms worsen or do not improve patient she be evaluated in ED- patient updated on providers recommendations and he verbalized understanding.   Patient questioned RN why he should go to ER and RN educated him that the concern is if the fluid is backing up into his lungs and will strain his heart and ability to breath. In the ED they can run labs, do tests that may be needed, administer IV diuretics if needed which work faster than oral doses, and provide supplemental oxygen if he is short of breath and has a low oxygen level- patient verbalized understanding, he stated he is going to try the extra dose of Torsemide 40 mg now.   RN encouraged patient to call back if symptoms change or worsen- he verbalized understanding.     PRACTICE FOLLOW-UP: Yes, please follow up with patient regarding his diuretic dose and symptoms.       Reason for Disposition   MODERATE difficulty breathing (e.g., speaks in phrases, SOB even at rest, pulse 100-120)   Oxygen level (e.g., pulse oximetry) 90% or lower    Answer Assessment  "- Initial Assessment Questions  1. MAIN CONCERN OR SYMPTOM: \"What is your main concern right now?\" \"What's the main symptom you're worried about?\" (e.g., breathing difficulty, ankle swelling, weight gain).        Water weight gaining, affecting heart, cannot sleep  Lay down feels pressure in his chest- cannot breath at night, got a CPAP put in hospital for 2.5 days in past. Checked heart again, blood vessels all open.   \"Hear crackling of water in lungs\" last time in hospital.     2 pounds since yesterday, 2 pounds day before.       2. ONSET: \"When did the  symptoms  start?\"        Worsening    3. BREATHING DIFFICULTY: \"Are you having any difficulty breathing?\" If Yes, ask: \"How bad is it?\"  (e.g., none, mild, moderate, severe)  \"Is this worse than usual for you?\"        Shortness of breath is present.     4. EDEMA - FOOT-LEG SWELLING: \"Do you have swelling of your ankles, feet or legs?\" If Yes, ask: \"How bad is the swelling?\" (e.g., localized; mild, moderate, severe)        Present, he noted that he can feel it.     5. WEIGHT - CURRENT: \"What is your weight today?\"        230    6. WEIGHT - TARGET RANGE: \"Do you try to keep your weight in a target (goal) range?\" If Yes, ask: \"What is that range?\"        7. WEIGHT - CHANGE: \"Have you gained (or lost) weight in the past 24 hours? Past week (7 days)?\" If Yes, ask:  \"How much weight?\"        4 pounds    8. OTHER SYMPTOMS: \"Do you have any other symptoms?\" (e.g., depression, weakness or fatigue, abdomen bloating, hacky cough)        Swelling in feet and toes    9. DIURETICS: \"Are you currently taking water pills?\" (e.g., furosemide [Lasix], hydrochlorothiazide [HCTZ], bumetanide [Bumex], metolazone [Zaroxolyn]) If Yes, ask: \"What medicine are you taking, and how often?\"  \"Any recent change in dose?\"         Taking 3 tablets of torsemide  BID    10. O2 SATURATION MONITOR: \"Do you use an oxygen saturation monitor (pulse oximeter) at home?\" If Yes, ask: \"What is your " "reading (oxygen level) today?\" \"What is your usual oxygen saturation reading?\" (e.g., 95%)          Couldn't sleep- it was at 89%  HR with activity 102  Up walking around 89%    11. HEART FAILURE HCP: \"Who treats your heart failure?\"  (e.g., cardiologist or heart specialist, heart failure clinic or center, primary care doctor)          Cardiologist    12. FLUID and SODIUM RESTRICTIONS: \"Have you been instructed to restrict your daily fluid intake or sodium (salt) intake?\" If Yes, ask: \"What are your daily limits?\"    Protocols used: Heart Failure on Treatment Follow-up Call-Adult-    "

## 2025-07-07 ENCOUNTER — PATIENT OUTREACH (OUTPATIENT)
Dept: CASE MANAGEMENT | Facility: OTHER | Age: 75
End: 2025-07-07

## 2025-07-07 DIAGNOSIS — I50.23 ACUTE ON CHRONIC HFREF (HEART FAILURE WITH REDUCED EJECTION FRACTION) (HCC): ICD-10-CM

## 2025-07-07 RX ORDER — TORSEMIDE 20 MG/1
40 TABLET ORAL 2 TIMES DAILY
Qty: 360 TABLET | Refills: 0 | Status: CANCELLED | OUTPATIENT
Start: 2025-07-07

## 2025-07-07 NOTE — PROGRESS NOTES
"Spoke with Galen second cataract surgery went well. \" I don't need my glasses now\" To see eye surgeon again on 7/9/25  We discussed Shmuel call to Connect to care.   \" I have lost two pounds since Saturday\"   Weight today 228 pounds yesterday was 229 pounds.  \"My right foot is more swollen than left foot\". Felt he was symptom free at 226 pounds. He did not change his diet recently still watches salt carefully  \" My breathing is hard at times\" sometimes pulse ox drops to low 90\"s or upper 80\"s per Galen  Needs refill on Torsemide. Taking two tablets or 40mg in am and then two more tablets 40mg after lunch time. I will send message to cardiology office for refill  "

## 2025-07-07 NOTE — TELEPHONE ENCOUNTER
Called patient to discuss.  On 6/23, was instructed to decrease torsemide dose to 40 mg in the morning and 20 mg in the evening. Had been taking 60 mg in the AM and 40 in the PM for increased weight and swelling prior to that.  Patient states he is currently taking Torsemide 40 in the AM and 40 mg in the PM.  Reports the swelling in his feet has decreased but is still present.  States he has been SOB x 1 year, it is about the same.      Weights:  228 lb 6/6  227 lb 6/9  226 lb 6/23  229 lb 7/6  228 lb 7/7    Patient has a follow up with Dr. Li 7/22.  Offered patient to come to the office for dose of IV Lasix.  States he has done that in the past and it didn't work, he did not have increased urination from it.

## 2025-07-08 ENCOUNTER — PATIENT OUTREACH (OUTPATIENT)
Dept: CASE MANAGEMENT | Facility: OTHER | Age: 75
End: 2025-07-08

## 2025-07-08 DIAGNOSIS — I50.22 CHRONIC HFREF (HEART FAILURE WITH REDUCED EJECTION FRACTION) (HCC): Primary | ICD-10-CM

## 2025-07-08 DIAGNOSIS — I50.23 ACUTE ON CHRONIC HFREF (HEART FAILURE WITH REDUCED EJECTION FRACTION) (HCC): ICD-10-CM

## 2025-07-08 RX ORDER — TORSEMIDE 20 MG/1
40 TABLET ORAL 2 TIMES DAILY
Qty: 180 TABLET | Refills: 3 | Status: SHIPPED | OUTPATIENT
Start: 2025-07-08 | End: 2025-07-16 | Stop reason: SDUPTHER

## 2025-07-08 NOTE — PROGRESS NOTES
Spoke with [facility] cardiology urgent message will be sent to office about refill of Torsemide  Also spoke with Vanessa office nurse to let her know patient gained 3 pounds since yesterday   Vanessa will speak with provider about refill and weight gain.

## 2025-07-08 NOTE — PROGRESS NOTES
Spoke with Galen he would like medication refill to go to [facility] pharmacy. Reports he ate watermelon yesterday. Galen is on his way home informed him cardiology office working on refill. He should check later with pharmacy to see if medication is there. I will let him know if I see that the refill was sent. Also informed him office will get back to him on what to do about weight gain from yesterday

## 2025-07-08 NOTE — TELEPHONE ENCOUNTER
"Discussed with Felix Suarez PA-C via [employer] Secure Chat.  Per Felix, \"ok with him increasing for up to 3-5 days until his weight returns to baseline. He should obtain the BMP I ordered one week after resuming his normal dose. If his symptoms/weight do no improve after 3-5 days or if he continues to experience weight gain/worsening LE edema/worsening SOB despite increasing torsemide, he should reach out to our office and let us know.\"    Called patient to discuss Felix's recommendations and make him aware refill was sent.  Verbalized understanding.  He will have the BMP drawn at the same time as his INR next week.    "

## 2025-07-08 NOTE — TELEPHONE ENCOUNTER
Spoke to care management nurse Olga.  Reports patient is upset because he went to [facility] pharmacy for his refill and it was not available.  He is out of his torsemide.  Also reports weight yesterday was 228 lb, today 231 lb. Ate watermelon yesterday.  Currently taking Torsemide 40 in the AM and 40 mg in the PM.  Ok to increase to 60 mg in the AM and 40 mg in the PM until weight returns to baseline?  Has an appointment with Dr. Li 7/22.

## 2025-07-08 NOTE — PROGRESS NOTES
Galen called very upset he is at pharmacy to  Torsemide and prescription is not there. Galen states he has gained 3 pounds since yesterday went from 228 pounds to 231 pounds. I will place call to cardiology

## 2025-07-08 NOTE — TELEPHONE ENCOUNTER
Caller: Wilma- patient care manager     Doctor: Dr. Guillermo Li MD     Reason for call: Wilma called in for an update on the refill request. Patient was at the pharmacy and she thinks he has none left.     Call back#: [phone]

## 2025-07-09 ENCOUNTER — PROCEDURE VISIT (OUTPATIENT)
Dept: UROLOGY | Facility: CLINIC | Age: 75
End: 2025-07-09
Payer: MEDICARE

## 2025-07-09 VITALS
OXYGEN SATURATION: 95 % | HEART RATE: 84 BPM | BODY MASS INDEX: 33.36 KG/M2 | DIASTOLIC BLOOD PRESSURE: 86 MMHG | SYSTOLIC BLOOD PRESSURE: 134 MMHG | HEIGHT: 70 IN | TEMPERATURE: 97.6 F | WEIGHT: 233 LBS

## 2025-07-09 DIAGNOSIS — C61 PROSTATE CANCER (HCC): Primary | ICD-10-CM

## 2025-07-09 PROCEDURE — 96402 CHEMO HORMON ANTINEOPL SQ/IM: CPT

## 2025-07-10 ENCOUNTER — OFFICE VISIT (OUTPATIENT)
Dept: GASTROENTEROLOGY | Facility: CLINIC | Age: 75
End: 2025-07-10
Payer: MEDICARE

## 2025-07-10 VITALS
TEMPERATURE: 97.5 F | WEIGHT: 230 LBS | SYSTOLIC BLOOD PRESSURE: 120 MMHG | BODY MASS INDEX: 32.93 KG/M2 | HEIGHT: 70 IN | OXYGEN SATURATION: 93 % | HEART RATE: 88 BPM | DIASTOLIC BLOOD PRESSURE: 70 MMHG

## 2025-07-10 DIAGNOSIS — K21.9 GASTROESOPHAGEAL REFLUX DISEASE WITHOUT ESOPHAGITIS: ICD-10-CM

## 2025-07-10 DIAGNOSIS — K26.9 MULTIPLE DUODENAL ULCERS: Primary | ICD-10-CM

## 2025-07-10 PROCEDURE — 99213 OFFICE O/P EST LOW 20 MIN: CPT | Performed by: PHYSICIAN ASSISTANT

## 2025-07-10 RX ORDER — DIFLUPREDNATE OPHTHALMIC 0.5 MG/ML
1 EMULSION OPHTHALMIC 4 TIMES DAILY
Status: ON HOLD | COMMUNITY
Start: 2025-06-09

## 2025-07-10 NOTE — PROGRESS NOTES
Name: Galen Carson      : 1950      MRN: [id number]  Encounter Provider: Alyssa Cao PA-C  Encounter Date: 7/10/2025   Encounter department: [facility] [place]  :  Assessment & Plan  Multiple duodenal ulcers    Gastroesophageal reflux disease without esophagitis    Patient presents for follow up: he has a history of a UGIB from duodenal ulcers and GERD.  EGD 24 with Dr. Lau showed 3 duodenal ulcers, one was a large deep ulcer with flat pigmented spot.  Biopsies were benign and negative for h pylori. He required multiple units of PRBCs during that hospitalization.  He was on Pantoprazole 40mg BID x 3 months and then decreased to once a day. In January, his Pantoprazole was decreased to 20mg po daily.  He is on Coumadin for PAF and has CHF with a severe cardiomyopathy and has CKD.  He reports he is doing well. No melena/blood in the stool or abdominal pain.  Once in a while he has reflux and will take an extra Pantoprazole with relief.    Will continue current regimen with Pantoprazole 20mg po daily.  GERD modifications.  Patient was instructed to contact us if any return of symptoms occur in the interim (can increase Pantoprazole to 40mg po daily) otherwise if continuing to do well, patient will follow up in 6 months.      History of Present Illness   HPI  Galen Carson is a 75 y.o. male who presents to the office for follow up.  He is doing well from a GI standpoint at this time.  He is on Pantoprazole 20mg po daily.  No melena or blood in the stool.  No abdominal pain. No vomiting.  He reports once in a while he will have some reflux and just takes an extra Pantoprazole with relief.      History obtained from: patient      Medical History Reviewed by provider this encounter:  Meds     .  Past Medical History   Past Medical History[1]  Past Surgical History[2]  Family History[3]   reports that he quit smoking about 20 years ago. His smoking use included  "cigarettes, pipe, and cigars. He started smoking about 45 years ago. He has a 25 pack-year smoking history. He has been exposed to tobacco smoke. He has never used smokeless tobacco. He reports that he does not currently use alcohol. He reports that he does not currently use drugs.  Current Outpatient Medications   Medication Instructions    aspirin 81 mg, Oral, Daily    Blood Glucose Monitoring Suppl (ONETOUCH VERIO) w/Device KIT Does not apply, Once, Test sugar in the morning    cetirizine (ZYRTEC) 5 mg, Daily    Continuous Glucose Sensor (FreeStyle Lisa 3 Sensor) MISC 1 each, Does not apply, Every 14 days    Difluprednate 0.05 % EMUL 1 drop, 4 times daily    Erleada 60 MG TABS 4 tablets, Daily    EVENING PRIMROSE OIL PO 3 times daily    glimepiride (AMARYL) 1 mg, Oral, Daily with breakfast    losartan (COZAAR) 25 mg, Oral, Daily    metoprolol succinate (TOPROL-XL) 100 mg, Oral, Daily    multivitamin (THERAGRAN) TABS 1 tablet, Daily    pantoprazole (PROTONIX) 20 mg, Oral, Daily    rosuvastatin (CRESTOR) 20 mg, Oral, Daily    torsemide (DEMADEX) 40 mg, Oral, 2 times daily    vitamin C 1,000 mg, Daily    VITAMIN D, ERGOCALCIFEROL, PO Take by mouth    vitamin E (tocopherol) 400 Units, Daily    warfarin (Coumadin) 5 mg tablet Take one tablet daily or as directed   Allergies[4]   Medications Ordered Prior to Encounter[5]   Social History[6]     Objective   /70 (BP Location: Left arm, Patient Position: Sitting, Cuff Size: Standard)   Pulse 88   Temp 97.5 °F (36.4 °C) (Temporal)   Ht 5' 10\" (1.778 m)   Wt 104 kg (230 lb)   SpO2 93%   BMI 33.00 kg/m²      Physical Exam  Constitutional:       General: He is not in acute distress.     Appearance: Normal appearance. He is not ill-appearing.   HENT:      Head: Normocephalic and atraumatic.     Cardiovascular:      Rate and Rhythm: Normal rate and regular rhythm.   Pulmonary:      Effort: Pulmonary effort is normal. No respiratory distress.      Comments: " Decreased at bases.    Musculoskeletal:         General: Swelling present.     Skin:     General: Skin is warm and dry.     Neurological:      Mental Status: He is alert and oriented to person, place, and time.                [1]   Past Medical History:  Diagnosis Date    Allergic     Anesthesia     pt wears a life vest (11/16).  should not be scheduled at [facility] until heart condition is stabilized    BPH (benign prostatic hypertrophy)     Cancer (HCC)     CHF (congestive heart failure) (HCC)     Chronic kidney disease     Coronary artery disease     Diabetes mellitus (HCC)     Hyperlipidemia     Hypertension     PAF (paroxysmal atrial fibrillation) (HCC)     Prostate cancer (HCC)    [2]   Past Surgical History:  Procedure Laterality Date    CARDIAC CATHETERIZATION N/A 10/04/2023    Procedure: Cardiac Coronary Angiogram;  Surgeon: Chris Lovell MD;  Location: [facility];  Service: Cardiology    CARDIAC CATHETERIZATION N/A 10/04/2023    Procedure: Cardiac RHC/LHC;  Surgeon: Chris Lovell MD;  Location: [facility];  Service: Cardiology    CARDIAC CATHETERIZATION  10/04/2023    Procedure: Cardiac catheterization;  Surgeon: Chris Lovell MD;  Location: [facility];  Service: Cardiology    CARDIAC CATHETERIZATION Left 5/27/2025    Procedure: Cardiac Left Heart Cath;  Surgeon: Guillermo Li MD;  Location: [facility];  Service: Cardiology    CARDIAC CATHETERIZATION N/A 5/27/2025    Procedure: Cardiac RHC/LHC;  Surgeon: Guillermo Li MD;  Location: [facility];  Service: Cardiology    CARDIAC CATHETERIZATION  5/27/2025    Procedure: Cardiac Catheterization;  Surgeon: Guillermo Li MD;  Location: [facility];  Service: Cardiology    CARDIAC CATHETERIZATION N/A 5/27/2025    Procedure: Cardiac Coronary Angiogram;  Surgeon: Guillermo Li MD;  Location: [facility];  Service: Cardiology    CARDIAC DEFIBRILLATOR PLACEMENT  01/04/2024    CARDIAC  ELECTROPHYSIOLOGY PROCEDURE N/A 01/04/2024    Procedure: Cardiac icd implant, Dual Chambers ICD;  Surgeon: Leo Whalen MD;  Location: [facility];  Service: Cardiology    EAR SURGERY      HERNIA REPAIR      ME COLONOSCOPY FLX DX W/COLLJ SPEC WHEN PFRMD N/A 05/06/2019    Procedure: COLONOSCOPY;  Surgeon: Winston Nielson III, MD;  Location: [facility];  Service: Gastroenterology   [3]   Family History  Problem Relation Name Age of Onset    Stroke Father Father     No Known Problems Mother      Prostate cancer Brother      Arthritis Brother      No Known Problems Sister      Diabetes Sister Brigitte     Diabetes Sister Fátima     No Known Problems Son      No Known Problems Daughter     [4]   Allergies  Allergen Reactions    Penicillin G Benzathine Other (See Comments)    Penicillin V Other (See Comments)     As a child    [5]   Current Outpatient Medications on File Prior to Visit   Medication Sig Dispense Refill    Ascorbic Acid (vitamin C) 1000 MG tablet Take 1,000 mg by mouth in the morning.      aspirin 81 mg chewable tablet Chew 1 tablet (81 mg total) daily 30 tablet 1    Blood Glucose Monitoring Suppl (ONETOUCH VERIO) w/Device KIT by Does not apply route once for 1 dose Test sugar in the morning 1 kit 0    cetirizine (ZyrTEC) 5 MG tablet Take 5 mg by mouth in the morning.      Continuous Glucose Sensor (FreeStyle Lisa 3 Sensor) [facility] Use 1 each every 14 (fourteen) days 6 each 5    Difluprednate 0.05 % EMUL Apply 1 drop to eye 4 (four) times a day      Erleada 60 MG TABS Take 4 tablets by mouth in the morning.      EVENING PRIMROSE OIL PO Take by mouth in the morning and in the evening and before bedtime.      glimepiride (AMARYL) 1 mg tablet Take 1 tablet (1 mg total) by mouth daily with breakfast 90 tablet 1    losartan (COZAAR) 25 mg tablet Take 1 tablet (25 mg total) by mouth daily 90 tablet 1    metoprolol succinate (TOPROL-XL) 50 mg 24 hr tablet Take 2 tablets (100 mg total) by mouth daily 90 tablet 0     multivitamin (THERAGRAN) TABS Take 1 tablet by mouth in the morning.      pantoprazole (PROTONIX) 20 mg tablet Take 1 tablet (20 mg total) by mouth daily 90 tablet 1    rosuvastatin (CRESTOR) 20 MG tablet Take 1 tablet (20 mg total) by mouth daily 90 tablet 1    torsemide (DEMADEX) 20 mg tablet Take 2 tablets (40 mg total) by mouth 2 (two) times a day 180 tablet 3    VITAMIN D, ERGOCALCIFEROL, PO Take by mouth      vitamin E, tocopherol, 400 units capsule Take 400 Units by mouth in the morning.      warfarin (Coumadin) 5 mg tablet Take one tablet daily or as directed 90 tablet 3     Current Facility-Administered Medications on File Prior to Visit   Medication Dose Route Frequency Provider Last Rate Last Admin    [COMPLETED] degarelix acetate (FIRMAGON) injection 80 mg  80 mg Subcutaneous Once    80 mg at 25 1201   [6]   Social History  Tobacco Use    Smoking status: Former     Current packs/day: 0.00     Average packs/day: 1 pack/day for 25.0 years (25.0 ttl pk-yrs)     Types: Cigarettes, Pipe, Cigars     Start date: 1980     Quit date: 2005     Years since quittin.3     Passive exposure: Past    Smokeless tobacco: Never   Vaping Use    Vaping status: Never Used   Substance and Sexual Activity    Alcohol use: Not Currently    Drug use: Not Currently    Sexual activity: Not Currently     Partners: Female

## 2025-07-14 ENCOUNTER — PATIENT OUTREACH (OUTPATIENT)
Dept: CASE MANAGEMENT | Facility: OTHER | Age: 75
End: 2025-07-14

## 2025-07-14 RX ORDER — TORSEMIDE 20 MG/1
40 TABLET ORAL 2 TIMES DAILY
Qty: 360 TABLET | Refills: 3 | Status: SHIPPED | OUTPATIENT
Start: 2025-07-14 | End: 2025-07-21

## 2025-07-14 NOTE — PROGRESS NOTES
"Spoke with amy weight today is 227 pounds was 228 pounds yesterday  \"My feet are still swollen some days more than others\" He does elevate legs throughout the day  \" I am sleeping a lot lately but I am able to sleep in my bed now\"  Expressed he thinks oxygen at night would be helpful. He tried CPAP machine but will not use it again. The machine caused him to inhale water and he had to go to the hospital.   He will discuss need for  oxygen with cardiologist. Appointment  scheduled for 7/22/25  I will check back after appointment   FBS range from 130-150.  Had some low blood pressure last week 96/60 and 100/64  "

## 2025-07-15 ENCOUNTER — TELEPHONE (OUTPATIENT)
Dept: UROLOGY | Facility: AMBULATORY SURGERY CENTER | Age: 75
End: 2025-07-15

## 2025-07-15 ENCOUNTER — TELEPHONE (OUTPATIENT)
Dept: NON INVASIVE DIAGNOSTICS | Facility: HOSPITAL | Age: 75
End: 2025-07-15

## 2025-07-15 ENCOUNTER — APPOINTMENT (OUTPATIENT)
Dept: LAB | Facility: CLINIC | Age: 75
DRG: 291 | End: 2025-07-15
Payer: MEDICARE

## 2025-07-15 DIAGNOSIS — I50.22 CHRONIC HFREF (HEART FAILURE WITH REDUCED EJECTION FRACTION) (HCC): ICD-10-CM

## 2025-07-15 DIAGNOSIS — I48.0 PAROXYSMAL ATRIAL FIBRILLATION (HCC): ICD-10-CM

## 2025-07-15 DIAGNOSIS — E87.5 HYPERKALEMIA: Primary | ICD-10-CM

## 2025-07-15 DIAGNOSIS — Z79.01 LONG TERM (CURRENT) USE OF ANTICOAGULANTS: ICD-10-CM

## 2025-07-15 LAB
ANION GAP SERPL CALCULATED.3IONS-SCNC: 10 MMOL/L (ref 4–13)
BUN SERPL-MCNC: 63 MG/DL (ref 5–25)
CALCIUM SERPL-MCNC: 9.2 MG/DL (ref 8.4–10.2)
CHLORIDE SERPL-SCNC: 99 MMOL/L (ref 96–108)
CO2 SERPL-SCNC: 31 MMOL/L (ref 21–32)
CREAT SERPL-MCNC: 1.86 MG/DL (ref 0.6–1.3)
FERRITIN SERPL-MCNC: 143 NG/ML (ref 30–336)
GFR SERPL CREATININE-BSD FRML MDRD: 34 ML/MIN/1.73SQ M
GLUCOSE P FAST SERPL-MCNC: 135 MG/DL (ref 65–99)
INR PPP: 2.24 (ref 0.85–1.19)
IRON SATN MFR SERPL: 19 % (ref 15–50)
IRON SERPL-MCNC: 67 UG/DL (ref 50–212)
POTASSIUM SERPL-SCNC: 5.7 MMOL/L (ref 3.5–5.3)
PROTHROMBIN TIME: 24.8 SECONDS (ref 12.3–15)
SODIUM SERPL-SCNC: 140 MMOL/L (ref 135–147)
TIBC SERPL-MCNC: 344.4 UG/DL (ref 250–450)
TRANSFERRIN SERPL-MCNC: 246 MG/DL (ref 203–362)
UIBC SERPL-MCNC: 277 UG/DL (ref 155–355)

## 2025-07-15 PROCEDURE — 36415 COLL VENOUS BLD VENIPUNCTURE: CPT

## 2025-07-15 PROCEDURE — 85610 PROTHROMBIN TIME: CPT

## 2025-07-15 PROCEDURE — 83540 ASSAY OF IRON: CPT

## 2025-07-15 PROCEDURE — 80048 BASIC METABOLIC PNL TOTAL CA: CPT

## 2025-07-15 PROCEDURE — 83550 IRON BINDING TEST: CPT

## 2025-07-15 PROCEDURE — 82728 ASSAY OF FERRITIN: CPT

## 2025-07-16 ENCOUNTER — ANTICOAG VISIT (OUTPATIENT)
Dept: CARDIOLOGY CLINIC | Facility: CLINIC | Age: 75
End: 2025-07-16

## 2025-07-16 ENCOUNTER — PATIENT OUTREACH (OUTPATIENT)
Dept: CASE MANAGEMENT | Facility: OTHER | Age: 75
End: 2025-07-16

## 2025-07-16 ENCOUNTER — HOSPITAL ENCOUNTER (INPATIENT)
Facility: HOSPITAL | Age: 75
LOS: 5 days | Discharge: HOME | DRG: 291 | End: 2025-07-21
Attending: EMERGENCY MEDICINE
Payer: MEDICARE

## 2025-07-16 ENCOUNTER — APPOINTMENT (EMERGENCY)
Dept: RADIOLOGY | Facility: HOSPITAL | Age: 75
DRG: 291 | End: 2025-07-16
Payer: MEDICARE

## 2025-07-16 ENCOUNTER — TELEPHONE (OUTPATIENT)
Dept: NEPHROLOGY | Facility: CLINIC | Age: 75
End: 2025-07-16

## 2025-07-16 DIAGNOSIS — G47.34 NOCTURNAL HYPOXIA: ICD-10-CM

## 2025-07-16 DIAGNOSIS — I50.9 ACUTE EXACERBATION OF CHF (CONGESTIVE HEART FAILURE) (HCC): Primary | ICD-10-CM

## 2025-07-16 DIAGNOSIS — G47.30 SLEEP APNEA, UNSPECIFIED TYPE: ICD-10-CM

## 2025-07-16 DIAGNOSIS — I42.8 NICM (NONISCHEMIC CARDIOMYOPATHY) (HCC): ICD-10-CM

## 2025-07-16 PROBLEM — I50.43 ACUTE ON CHRONIC COMBINED SYSTOLIC AND DIASTOLIC CONGESTIVE HEART FAILURE (HCC): Status: ACTIVE | Noted: 2025-07-16

## 2025-07-16 LAB
2HR DELTA HS TROPONIN: 7 NG/L
4HR DELTA HS TROPONIN: 1 NG/L
ALBUMIN SERPL BCG-MCNC: 4.6 G/DL (ref 3.5–5)
ALP SERPL-CCNC: 91 U/L (ref 34–104)
ALT SERPL W P-5'-P-CCNC: 9 U/L (ref 7–52)
ANION GAP SERPL CALCULATED.3IONS-SCNC: 11 MMOL/L (ref 4–13)
AST SERPL W P-5'-P-CCNC: 18 U/L (ref 13–39)
BASOPHILS # BLD AUTO: 0.05 THOUSANDS/ÂΜL (ref 0–0.1)
BASOPHILS NFR BLD AUTO: 1 % (ref 0–1)
BILIRUB DIRECT SERPL-MCNC: 0.04 MG/DL (ref 0–0.2)
BILIRUB SERPL-MCNC: 0.34 MG/DL (ref 0.2–1)
BNP SERPL-MCNC: 1311 PG/ML (ref 0–100)
BUN SERPL-MCNC: 65 MG/DL (ref 5–25)
CALCIUM SERPL-MCNC: 9 MG/DL (ref 8.4–10.2)
CARDIAC TROPONIN I PNL SERPL HS: 83 NG/L (ref ?–50)
CARDIAC TROPONIN I PNL SERPL HS: 84 NG/L (ref ?–50)
CARDIAC TROPONIN I PNL SERPL HS: 90 NG/L (ref ?–50)
CHLORIDE SERPL-SCNC: 102 MMOL/L (ref 96–108)
CO2 SERPL-SCNC: 29 MMOL/L (ref 21–32)
CREAT SERPL-MCNC: 2.03 MG/DL (ref 0.6–1.3)
D DIMER PPP FEU-MCNC: 0.29 UG/ML FEU
EOSINOPHIL # BLD AUTO: 0.21 THOUSAND/ÂΜL (ref 0–0.61)
EOSINOPHIL NFR BLD AUTO: 4 % (ref 0–6)
ERYTHROCYTE [DISTWIDTH] IN BLOOD BY AUTOMATED COUNT: 14.1 % (ref 11.6–15.1)
GFR SERPL CREATININE-BSD FRML MDRD: 31 ML/MIN/1.73SQ M
GLUCOSE SERPL-MCNC: 96 MG/DL (ref 65–140)
HCT VFR BLD AUTO: 35 % (ref 36.5–49.3)
HGB BLD-MCNC: 11.7 G/DL (ref 12–17)
IMM GRANULOCYTES # BLD AUTO: 0.1 THOUSAND/UL (ref 0–0.2)
IMM GRANULOCYTES NFR BLD AUTO: 2 % (ref 0–2)
INR PPP: 2.12 (ref 0.85–1.19)
LYMPHOCYTES # BLD AUTO: 1.01 THOUSANDS/ÂΜL (ref 0.6–4.47)
LYMPHOCYTES NFR BLD AUTO: 18 % (ref 14–44)
MCH RBC QN AUTO: 30.1 PG (ref 26.8–34.3)
MCHC RBC AUTO-ENTMCNC: 33.4 G/DL (ref 31.4–37.4)
MCV RBC AUTO: 90 FL (ref 82–98)
MONOCYTES # BLD AUTO: 0.73 THOUSAND/ÂΜL (ref 0.17–1.22)
MONOCYTES NFR BLD AUTO: 13 % (ref 4–12)
NEUTROPHILS # BLD AUTO: 3.67 THOUSANDS/ÂΜL (ref 1.85–7.62)
NEUTS SEG NFR BLD AUTO: 62 % (ref 43–75)
NRBC BLD AUTO-RTO: 0 /100 WBCS
PLATELET # BLD AUTO: 207 THOUSANDS/UL (ref 149–390)
PMV BLD AUTO: 9.7 FL (ref 8.9–12.7)
POTASSIUM SERPL-SCNC: 5 MMOL/L (ref 3.5–5.3)
PROT SERPL-MCNC: 7.8 G/DL (ref 6.4–8.4)
PROTHROMBIN TIME: 24.5 SECONDS (ref 12.3–15)
RBC # BLD AUTO: 3.89 MILLION/UL (ref 3.88–5.62)
SODIUM SERPL-SCNC: 142 MMOL/L (ref 135–147)
WBC # BLD AUTO: 5.77 THOUSAND/UL (ref 4.31–10.16)

## 2025-07-16 PROCEDURE — 83036 HEMOGLOBIN GLYCOSYLATED A1C: CPT

## 2025-07-16 PROCEDURE — 83880 ASSAY OF NATRIURETIC PEPTIDE: CPT | Performed by: EMERGENCY MEDICINE

## 2025-07-16 PROCEDURE — 71046 X-RAY EXAM CHEST 2 VIEWS: CPT

## 2025-07-16 PROCEDURE — 80048 BASIC METABOLIC PNL TOTAL CA: CPT | Performed by: EMERGENCY MEDICINE

## 2025-07-16 PROCEDURE — 85610 PROTHROMBIN TIME: CPT | Performed by: EMERGENCY MEDICINE

## 2025-07-16 PROCEDURE — 85025 COMPLETE CBC W/AUTO DIFF WBC: CPT | Performed by: EMERGENCY MEDICINE

## 2025-07-16 PROCEDURE — 99285 EMERGENCY DEPT VISIT HI MDM: CPT

## 2025-07-16 PROCEDURE — 85379 FIBRIN DEGRADATION QUANT: CPT | Performed by: EMERGENCY MEDICINE

## 2025-07-16 PROCEDURE — 84484 ASSAY OF TROPONIN QUANT: CPT | Performed by: EMERGENCY MEDICINE

## 2025-07-16 PROCEDURE — 96374 THER/PROPH/DIAG INJ IV PUSH: CPT

## 2025-07-16 PROCEDURE — 99285 EMERGENCY DEPT VISIT HI MDM: CPT | Performed by: EMERGENCY MEDICINE

## 2025-07-16 PROCEDURE — 93005 ELECTROCARDIOGRAM TRACING: CPT

## 2025-07-16 PROCEDURE — 36415 COLL VENOUS BLD VENIPUNCTURE: CPT | Performed by: EMERGENCY MEDICINE

## 2025-07-16 PROCEDURE — 99223 1ST HOSP IP/OBS HIGH 75: CPT

## 2025-07-16 PROCEDURE — 80076 HEPATIC FUNCTION PANEL: CPT | Performed by: EMERGENCY MEDICINE

## 2025-07-16 RX ORDER — WARFARIN SODIUM 5 MG/1
5 TABLET ORAL
Status: DISCONTINUED | OUTPATIENT
Start: 2025-07-17 | End: 2025-07-21 | Stop reason: HOSPADM

## 2025-07-16 RX ORDER — ASCORBIC ACID 500 MG
1000 TABLET ORAL DAILY
Status: DISCONTINUED | OUTPATIENT
Start: 2025-07-17 | End: 2025-07-21 | Stop reason: HOSPADM

## 2025-07-16 RX ORDER — DIFLUPREDNATE OPHTHALMIC 0.5 MG/ML
1 EMULSION OPHTHALMIC 4 TIMES DAILY
Status: DISCONTINUED | OUTPATIENT
Start: 2025-07-16 | End: 2025-07-17

## 2025-07-16 RX ORDER — LOSARTAN POTASSIUM 25 MG/1
25 TABLET ORAL DAILY
Status: DISCONTINUED | OUTPATIENT
Start: 2025-07-17 | End: 2025-07-21 | Stop reason: HOSPADM

## 2025-07-16 RX ORDER — PANTOPRAZOLE SODIUM 20 MG/1
20 TABLET, DELAYED RELEASE ORAL DAILY
Status: DISCONTINUED | OUTPATIENT
Start: 2025-07-17 | End: 2025-07-21 | Stop reason: HOSPADM

## 2025-07-16 RX ORDER — METOPROLOL SUCCINATE 100 MG/1
100 TABLET, EXTENDED RELEASE ORAL DAILY
Status: DISCONTINUED | OUTPATIENT
Start: 2025-07-17 | End: 2025-07-21 | Stop reason: HOSPADM

## 2025-07-16 RX ORDER — WARFARIN SODIUM 7.5 MG/1
7.5 TABLET ORAL
Status: DISCONTINUED | OUTPATIENT
Start: 2025-07-18 | End: 2025-07-21 | Stop reason: HOSPADM

## 2025-07-16 RX ORDER — INSULIN LISPRO 100 [IU]/ML
1-6 INJECTION, SOLUTION INTRAVENOUS; SUBCUTANEOUS
Status: DISCONTINUED | OUTPATIENT
Start: 2025-07-17 | End: 2025-07-21 | Stop reason: HOSPADM

## 2025-07-16 RX ORDER — BUMETANIDE 0.25 MG/ML
2 INJECTION, SOLUTION INTRAMUSCULAR; INTRAVENOUS 2 TIMES DAILY
Status: DISCONTINUED | OUTPATIENT
Start: 2025-07-16 | End: 2025-07-20

## 2025-07-16 RX ORDER — ASPIRIN 81 MG/1
81 TABLET, CHEWABLE ORAL DAILY
Status: DISCONTINUED | OUTPATIENT
Start: 2025-07-17 | End: 2025-07-21 | Stop reason: HOSPADM

## 2025-07-16 RX ORDER — ATORVASTATIN CALCIUM 40 MG/1
40 TABLET, FILM COATED ORAL
Status: DISCONTINUED | OUTPATIENT
Start: 2025-07-17 | End: 2025-07-21 | Stop reason: HOSPADM

## 2025-07-16 RX ORDER — FUROSEMIDE 10 MG/ML
40 INJECTION INTRAMUSCULAR; INTRAVENOUS ONCE
Status: COMPLETED | OUTPATIENT
Start: 2025-07-16 | End: 2025-07-16

## 2025-07-16 RX ADMIN — FUROSEMIDE 40 MG: 10 INJECTION, SOLUTION INTRAMUSCULAR; INTRAVENOUS at 18:10

## 2025-07-16 RX ADMIN — BUMETANIDE 2 MG: 0.25 INJECTION INTRAMUSCULAR; INTRAVENOUS at 20:26

## 2025-07-16 NOTE — ED PROVIDER NOTES
Time reflects when diagnosis was documented in both MDM as applicable and the Disposition within this note       Time User Action Codes Description Comment    7/16/2025  6:32 PM Antoine Gustafson Add [I50.9] Acute exacerbation of CHF (congestive heart failure) ([facility])           ED Disposition       ED Disposition   Admit    Condition   Stable    Date/Time   Wed Jul 16, 2025  6:32 PM    Comment   Case was discussed with hospitalist and the patient's admission status was agreed to be Admission Status: inpatient status to the service of Dr. catherine .               Assessment & Plan       Medical Decision Making  Amount and/or Complexity of Data Reviewed  Labs: ordered.  Radiology: ordered.    Risk  Prescription drug management.  Decision regarding hospitalization.    Medical decision making 75-year-old male presents emergency department with shortness of breath with minimal exertion.  Chest x-ray consistent with congestive heart failure.  Patient has worsening renal failure.  Apparently had recent medication changes due to elevated potassium.  Potassium was 5.0 tonight.  Discussed with patient I believe he needs further intervention to adjust his diuretics and also his medications.  Advised admission he agreed.  Discussed with the hospitalist service.  Patient was marginally hypoxic had a pulse oximetry of 90 at rest but desaturation with exertion.         Medications   ascorbic acid (VITAMIN C) tablet 1,000 mg (has no administration in time range)   aspirin chewable tablet 81 mg (has no administration in time range)   Difluprednate 0.05 % EMUL 1 drop (1 drop Ophthalmic Not Given 7/16/25 2227)   Apalutamide TABS 4 tablet (has no administration in time range)   losartan (COZAAR) tablet 25 mg (has no administration in time range)   metoprolol succinate (TOPROL-XL) 24 hr tablet 100 mg (has no administration in time range)   pantoprazole (PROTONIX) EC tablet 20 mg (has no administration in time range)   atorvastatin (LIPITOR)  tablet 40 mg (has no administration in time range)   vitamin E (TOCOPHEROL) capsule 400 Units (has no administration in time range)   bumetanide (BUMEX) injection 2 mg (2 mg Intravenous Given 7/16/25 2026)   warfarin (COUMADIN) tablet 7.5 mg (has no administration in time range)   warfarin (COUMADIN) tablet 5 mg (has no administration in time range)   insulin lispro (HumALOG/ADMELOG) 100 units/mL subcutaneous injection 1-6 Units (has no administration in time range)   furosemide (LASIX) injection 40 mg (40 mg Intravenous Given 7/16/25 1810)       ED Risk Strat Scores          Diagnostic testing showed a elevated cardiac troponin and 83, BUN was 65 creatinine was 2.03.  Previous creatinine was I believe 1.8.  Patient has some renal insufficiency possibly making it difficult for him to diurese.  Dimer was negative no sign of pulmonary embolism  White count was normal at 5.7 no sign of inflammation hemoglobin was 11 no sign of anemia.  Electrolytes were within normal limits other than the BUN and creatinine as above  Cardiac troponin was positive due to probable failed excretion.  Chest x-ray showed increased cardiac silhouette, increased markings bilaterally consistent with congestive heart failure pulmonary edema.          No data recorded        SBIRT 20yo+      Flowsheet Row Most Recent Value   Initial Alcohol Screen: US AUDIT-C     1. How often do you have a drink containing alcohol? 0 Filed at: 07/16/2025 1550   2. How many drinks containing alcohol do you have on a typical day you are drinking?  0 Filed at: 07/16/2025 1550   3a. Male UNDER 65: How often do you have five or more drinks on one occasion? 0 Filed at: 07/16/2025 1550   Audit-C Score 0 Filed at: 07/16/2025 1550   RONDA: How many times in the past year have you...    Used an illegal drug or used a prescription medication for non-medical reasons? Never Filed at: 07/16/2025 1550        Chest x-ray shows increased cardiac silhouette, increased markings  "bilaterally, similar to past but consistent with congestive heart failure.                    History of Present Illness       Chief Complaint   Patient presents with    Shortness of Breath     SOB and lightheaded today. PT states \"they took me off of losartan because my potassium was too high, and now I'm taking extra metoprolol.\" -CP.        Past Medical History[1]   Past Surgical History[2]   Family History[3]   Social History[4]   E-Cigarette/Vaping    E-Cigarette Use Never User       E-Cigarette/Vaping Substances    Nicotine No     THC No     CBD No     Flavoring No     Other No     Unknown No       I have reviewed and agree with the history as documented.     HPI patient is a 75-year-old male history of congestive heart failure history of ejection fraction of 25% on echo.  Patient reports that he has had his medications adjusted recently because his kidney function was not doing so well.  He reports his potassium was apparently high and so they discontinued his losartan and increased his metoprolol.  Patient reports since that time he had difficulty walking he reports shortness of breath with minimal exertion.  Patient reports he can barely walk across the room without getting short of breath.  He reports he cannot lie down without getting short of breath.  He reports that his kidney function has been impaired and seems to be getting worse.  Patient denies any chest pain.  Past medical history of congestive heart failure chronic kidney disease coronary disease diabetes  Family hist noncontributory  Social history, former smoker no history of drug use    Review of Systems   Constitutional:  Negative for fever.   HENT:  Negative for congestion.    Eyes:  Negative for pain and redness.   Respiratory:  Negative for cough and shortness of breath.    Cardiovascular:  Positive for leg swelling. Negative for chest pain.   Gastrointestinal:  Negative for abdominal pain and vomiting.           Objective       ED Triage " Vitals   Temperature Pulse Blood Pressure Respirations SpO2 Patient Position - Orthostatic VS   07/16/25 1547 07/16/25 1547 07/16/25 1547 07/16/25 1547 07/16/25 1547 07/16/25 1900   98.4 °F (36.9 °C) 87 94/65 20 97 % Sitting      Temp Source Heart Rate Source BP Location FiO2 (%) Pain Score    07/16/25 1547 07/16/25 1547 07/16/25 1547 -- 07/16/25 1800    Temporal Monitor Left arm  No Pain      Vitals      Date and Time Temp Pulse SpO2 Resp BP Pain Score FACES Pain Rating User   07/16/25 2130 -- 95 91 % 25 150/65 -- -- NN   07/16/25 2100 -- 92 90 % 18 134/53 -- -- BS   07/16/25 2025 -- 84 92 % 18 137/64 -- -- BS   07/16/25 1900 -- 91 92 % 26 119/70 No Pain -- SM   07/16/25 1800 -- 91 92 % 27 130/79 No Pain -- SM   07/16/25 1547 98.4 °F (36.9 °C) 87 97 % 20 94/65 -- -- MR            Physical Exam  Vitals and nursing note reviewed.   Constitutional:       Appearance: He is well-developed.   HENT:      Head: Normocephalic.      Right Ear: External ear normal.      Left Ear: External ear normal.      Nose: Nose normal.      Mouth/Throat:      Mouth: Mucous membranes are moist.      Pharynx: Oropharynx is clear.     Eyes:      General: Lids are normal.      Extraocular Movements: Extraocular movements intact.      Pupils: Pupils are equal, round, and reactive to light.       Cardiovascular:      Rate and Rhythm: Normal rate and regular rhythm.   Pulmonary:      Effort: Pulmonary effort is normal.      Breath sounds: Rales present.      Comments: Bilateral rales    Musculoskeletal:         General: No deformity. Normal range of motion.      Cervical back: Normal range of motion and neck supple.     Skin:     General: Skin is warm and dry.     Neurological:      Mental Status: He is alert and oriented to person, place, and time.     Psychiatric:         Mood and Affect: Mood normal.         Results Reviewed       Procedure Component Value Units Date/Time    HS Troponin I 4hr [[id number]]  (Abnormal) Collected: 07/16/25 2108     Lab Status: Final result Specimen: Blood from Arm, Left Updated: 07/16/25 2344     hs TnI 4hr 84 ng/L      Delta 4hr hsTnI 1 ng/L     Hemoglobin A1c w/EAG Estimation (Prechecked if no A1C within 90 days) [[id number]] Collected: 07/16/25 2233    Lab Status: In process Specimen: Blood from Arm, Right Updated: 07/16/25 2240    HS Troponin I 2hr [[id number]]  (Abnormal) Collected: 07/16/25 1917    Lab Status: Final result Specimen: Blood from Arm, Left Updated: 07/16/25 1957     hs TnI 2hr 90 ng/L      Delta 2hr hsTnI 7 ng/L     B-Type Natriuretic Peptide(BNP) [[id number]]  (Abnormal) Collected: 07/16/25 1703    Lab Status: Final result Specimen: Blood from Arm, Left Updated: 07/16/25 1752     BNP 1,311 pg/mL     D-dimer, quantitative [[id number]]  (Normal) Collected: 07/16/25 1715    Lab Status: Final result Specimen: Blood from Arm, Right Updated: 07/16/25 1737     D-Dimer, Quant 0.29 ug/ml FEU     Narrative:      In the evaluation for possible pulmonary embolism, in the appropriate (Well's Score of 4 or less) patient, the age adjusted d-dimer cutoff for this patient can be calculated as:    Age x 0.01 (in ug/mL) for Age-adjusted D-dimer exclusion threshold for a patient over 50 years.    HS Troponin 0hr (reflex protocol) [[id number]]  (Abnormal) Collected: 07/16/25 1703    Lab Status: Final result Specimen: Blood from Arm, Left Updated: 07/16/25 1733     hs TnI 0hr 83 ng/L     Basic metabolic panel [[id number]]  (Abnormal) Collected: 07/16/25 1703    Lab Status: Final result Specimen: Blood from Arm, Left Updated: 07/16/25 1727     Sodium 142 mmol/L      Potassium 5.0 mmol/L      Chloride 102 mmol/L      CO2 29 mmol/L      ANION GAP 11 mmol/L      BUN 65 mg/dL      Creatinine 2.03 mg/dL      Glucose 96 mg/dL      Calcium 9.0 mg/dL      eGFR 31 ml/min/1.73sq m     Narrative:      National Kid[employer] Disease Foundation guidelines for Chronic Kidney Disease (CKD):     Stage 1 with normal or high GFR (GFR > 90 mL/min/1.73  square meters)    Stage 2 Mild CKD (GFR = 60-89 mL/min/1.73 square meters)    Stage 3A Moderate CKD (GFR = 45-59 mL/min/1.73 square meters)    Stage 3B Moderate CKD (GFR = 30-44 mL/min/1.73 square meters)    Stage 4 Severe CKD (GFR = 15-29 mL/min/1.73 square meters)    Stage 5 End Stage CKD (GFR <15 mL/min/1.73 square meters)  Note: GFR calculation is accurate only with a steady state creatinine    Hepatic function panel [[id number]]  (Normal) Collected: 07/16/25 1703    Lab Status: Final result Specimen: Blood from Arm, Left Updated: 07/16/25 1727     Total Bilirubin 0.34 mg/dL      Bilirubin, Direct 0.04 mg/dL      Alkaline Phosphatase 91 U/L      AST 18 U/L      ALT 9 U/L      Total Protein 7.8 g/dL      Albumin 4.6 g/dL     Protime-INR [[id number]]  (Abnormal) Collected: 07/16/25 1703    Lab Status: Final result Specimen: Blood from Arm, Left Updated: 07/16/25 1723     Protime 24.5 seconds      INR 2.12    Narrative:      INR Therapeutic Range    Indication                                             INR Range      Atrial Fibrillation                                               2.0-3.0  Hypercoagulable State                                    2.0.2.3  Left Ventricular Asist Device                            2.0-3.0  Mechanical Heart Valve                                  -    Aortic(with afib, MI, embolism, HF, LA enlargement,    and/or coagulopathy)                                     2.0-3.0 (2.5-3.5)     Mitral                                                             2.5-3.5  Prosthetic/Bioprosthetic Heart Valve               2.0-3.0  Venous thromboembolism (VTE: VT, PE        2.0-3.0    CBC and differential [[id number]]  (Abnormal) Collected: 07/16/25 1703    Lab Status: Final result Specimen: Blood from Arm, Left Updated: 07/16/25 1710     WBC 5.77 Thousand/uL      RBC 3.89 Million/uL      Hemoglobin 11.7 g/dL      Hematocrit 35.0 %      MCV 90 fL      MCH 30.1 pg      MCHC 33.4 g/dL      RDW 14.1 %       MPV 9.7 fL      Platelets 207 Thousands/uL      nRBC 0 /100 WBCs      Segmented % 62 %      Immature Grans % 2 %      Lymphocytes % 18 %      Monocytes % 13 %      Eosinophils Relative 4 %      Basophils Relative 1 %      Absolute Neutrophils 3.67 Thousands/µL      Absolute Immature Grans 0.10 Thousand/uL      Absolute Lymphocytes 1.01 Thousands/µL      Absolute Monocytes 0.73 Thousand/µL      Eosinophils Absolute 0.21 Thousand/µL      Basophils Absolute 0.05 Thousands/µL             XR chest pa and lateral   Final Interpretation by Bonnie Cabrera MD (07/16 1645)   No acute consolidation or congestion   Stable chest x-ray         Workstation performed: [id number]             Procedures    ED Medication and Procedure Management   Prior to Admission Medications   Prescriptions Last Dose Informant Patient Reported? Taking?   Ascorbic Acid (vitamin C) 1000 MG tablet 7/16/2025 Morning Self Yes Yes   Sig: Take 1,000 mg by mouth in the morning.   Blood Glucose Monitoring Suppl (ONETOUCH VERIO) w/Device KIT  Self No No   Sig: by Does not apply route once for 1 dose Test sugar in the morning   Continuous Glucose Sensor (FreeStyle Lisa 3 Sensor) MISC 7/15/2025 Self No Yes   Sig: Use 1 each every 14 (fourteen) days   Difluprednate 0.05 % EMUL 7/16/2025 Morning  Yes Yes   Sig: Apply 1 drop to eye 4 (four) times a day   EVENING PRIMROSE OIL PO  Self Yes No   Sig: Take by mouth in the morning and in the evening and before bedtime.   Erleada 60 MG TABS 7/16/2025 Morning Self Yes Yes   Sig: Take 4 tablets by mouth in the morning.   VITAMIN D, ERGOCALCIFEROL, PO 7/16/2025 Morning Self Yes Yes   Sig: Take by mouth   aspirin 81 mg chewable tablet 7/16/2025 Morning  No Yes   Sig: Chew 1 tablet (81 mg total) daily   cetirizine (ZyrTEC) 5 MG tablet  Self Yes No   Sig: Take 5 mg by mouth in the morning.   glimepiride (AMARYL) 1 mg tablet 7/16/2025 Morning Self No Yes   Sig: Take 1 tablet (1 mg total) by mouth daily with breakfast    losartan (COZAAR) 25 mg tablet 7/16/2025 Morning Self No Yes   Sig: Take 1 tablet (25 mg total) by mouth daily   metoprolol succinate (TOPROL-XL) 50 mg 24 hr tablet 7/16/2025 Morning  No Yes   Sig: Take 2 tablets (100 mg total) by mouth daily   multivitamin (THERAGRAN) TABS 7/16/2025 Morning Self Yes Yes   Sig: Take 1 tablet by mouth in the morning.   pantoprazole (PROTONIX) 20 mg tablet 7/16/2025 Morning  No Yes   Sig: Take 1 tablet (20 mg total) by mouth daily   rosuvastatin (CRESTOR) 20 MG tablet 7/16/2025 Morning Self No Yes   Sig: Take 1 tablet (20 mg total) by mouth daily   torsemide (DEMADEX) 20 mg tablet 7/16/2025 Morning  No Yes   Sig: Take 2 tablets (40 mg total) by mouth 2 (two) times a day   vitamin E, tocopherol, 400 units capsule 7/16/2025 Morning Self Yes Yes   Sig: Take 400 Units by mouth in the morning.   warfarin (Coumadin) 5 mg tablet 7/16/2025 Morning Self No Yes   Sig: Take one tablet daily or as directed   Patient taking differently: 7.5 mg Take one tablet daily or as directed Monday, Wednesday, Friday, Saturday  Take 5 mg Tuesday, Thursday and Sunday      Facility-Administered Medications: None     Patient's Medications   Discharge Prescriptions    No medications on file     No discharge procedures on file.  ED SEPSIS DOCUMENTATION   Time reflects when diagnosis was documented in both MDM as applicable and the Disposition within this note       Time User Action Codes Description Comment    7/16/2025  6:32 PM Antoine Gustafson Add [I50.9] Acute exacerbation of CHF (congestive heart failure) (HCC)                      [1]   Past Medical History:  Diagnosis Date    Allergic     Anesthesia     pt wears a life vest (11/16).  should not be scheduled at [facility] until heart condition is stabilized    BPH (benign prostatic hypertrophy)     Cancer (HCC)     CHF (congestive heart failure) (HCC)     Chronic kidney disease     Coronary artery disease     Diabetes mellitus (HCC)     Hyperlipidemia      Hypertension     PAF (paroxysmal atrial fibrillation) (HCC)     Prostate cancer (HCC)    [2]   Past Surgical History:  Procedure Laterality Date    CARDIAC CATHETERIZATION N/A 10/04/2023    Procedure: Cardiac Coronary Angiogram;  Surgeon: Chris Lovell MD;  Location: [facility];  Service: Cardiology    CARDIAC CATHETERIZATION N/A 10/04/2023    Procedure: Cardiac RHC/LHC;  Surgeon: Chris Lovell MD;  Location: [facility];  Service: Cardiology    CARDIAC CATHETERIZATION  10/04/2023    Procedure: Cardiac catheterization;  Surgeon: Chris Lovell MD;  Location: [facility];  Service: Cardiology    CARDIAC CATHETERIZATION Left 5/27/2025    Procedure: Cardiac Left Heart Cath;  Surgeon: Guillermo Li MD;  Location: [facility];  Service: Cardiology    CARDIAC CATHETERIZATION N/A 5/27/2025    Procedure: Cardiac RHC/LHC;  Surgeon: Guillermo Li MD;  Location: [facility];  Service: Cardiology    CARDIAC CATHETERIZATION  5/27/2025    Procedure: Cardiac Catheterization;  Surgeon: Guillermo Li MD;  [facility]: [facility];  Service: Cardiology    CARDIAC CATHETERIZATION N/A 5/27/2025    Procedure: Cardiac Coronary Angiogram;  Surgeon: Guillermo Li MD;  Location: [facility];  Service: Cardiology    CARDIAC DEFIBRILLATOR PLACEMENT  01/04/2024    CARDIAC ELECTROPHYSIOLOGY PROCEDURE N/A 01/04/2024    Procedure: Cardiac icd implant, Dual Chambers ICD;  Surgeon: Leo Whalen MD;  Location: [facility];  Service: Cardiology    EAR SURGERY      HERNIA REPAIR      CT COLONOSCOPY FLX DX W/COLLJ SPEC WHEN PFRMD N/A 05/06/2019    Procedure: COLONOSCOPY;  Surgeon: Winston Nielson III, MD;  Location: [facility];  Service: Gastroenterology   [3]   Family History  Problem Relation Name Age of Onset    Stroke Father Father     No Known Problems Mother      Prostate cancer Brother      Arthritis Brother      No Known Problems Sister      Diabetes Sister Brigitte      Diabetes Sister Fátima     No Known Problems Son      No Known Problems Daughter     [4]   Social History  Tobacco Use    Smoking status: Former     Current packs/day: 0.00     Average packs/day: 1 pack/day for 25.0 years (25.0 ttl pk-yrs)     Types: Cigarettes, Pipe, Cigars     Start date: 1980     Quit date: 2005     Years since quittin.4     Passive exposure: Past    Smokeless tobacco: Never   Vaping Use    Vaping status: Never Used   Substance Use Topics    Alcohol use: Not Currently    Drug use: Not Currently        Antoine Gustafson MD  25 0055

## 2025-07-16 NOTE — TELEPHONE ENCOUNTER
Patient currently at [facility] ED.    Called [facility] to advised patient to get labs done 1 week prior to 08/01/25 scheduled follow-up appointment with our nephrology provider .    Reminder also sent via [employer].

## 2025-07-16 NOTE — TELEPHONE ENCOUNTER
"Received call from pt stating he stopped his losartan and increased his metoprolol to 125mg per instruction from Felix Suarez PA-C. He stated today however around lunchtime he started feeling \"strange\" and sat in his recliner. His BP's are as follows: 86/76, 80/69, 93/79, 91/76, 94/62, 93/60. HR's have been 70, 88, 69, 89, and 60. He stated he can't quite describe how he is feeling but states he feels \"off\" and lightheaded. Denies chest pain. Advised pt to be evaluated in the ED for further evaluation. He voiced understanding. Advised pt to have someone take him to the ED and to not drive himself. He voiced understanding.   "

## 2025-07-16 NOTE — PROGRESS NOTES
ADT alert for [facility] patient currently at emergency room for shortness of breath Will monitor for discharge

## 2025-07-17 LAB
ANION GAP SERPL CALCULATED.3IONS-SCNC: 10 MMOL/L (ref 4–13)
BASOPHILS # BLD AUTO: 0.03 THOUSANDS/ÂΜL (ref 0–0.1)
BASOPHILS NFR BLD AUTO: 1 % (ref 0–1)
BUN SERPL-MCNC: 72 MG/DL (ref 5–25)
CALCIUM SERPL-MCNC: 8.8 MG/DL (ref 8.4–10.2)
CHLORIDE SERPL-SCNC: 105 MMOL/L (ref 96–108)
CO2 SERPL-SCNC: 29 MMOL/L (ref 21–32)
CREAT SERPL-MCNC: 2.05 MG/DL (ref 0.6–1.3)
EOSINOPHIL # BLD AUTO: 0.21 THOUSAND/ÂΜL (ref 0–0.61)
EOSINOPHIL NFR BLD AUTO: 4 % (ref 0–6)
ERYTHROCYTE [DISTWIDTH] IN BLOOD BY AUTOMATED COUNT: 14.1 % (ref 11.6–15.1)
EST. AVERAGE GLUCOSE BLD GHB EST-MCNC: 163 MG/DL
GFR SERPL CREATININE-BSD FRML MDRD: 30 ML/MIN/1.73SQ M
GLUCOSE SERPL-MCNC: 130 MG/DL (ref 65–140)
GLUCOSE SERPL-MCNC: 130 MG/DL (ref 65–140)
GLUCOSE SERPL-MCNC: 148 MG/DL (ref 65–140)
GLUCOSE SERPL-MCNC: 163 MG/DL (ref 65–140)
GLUCOSE SERPL-MCNC: 165 MG/DL (ref 65–140)
HBA1C MFR BLD: 7.3 %
HCT VFR BLD AUTO: 34.1 % (ref 36.5–49.3)
HGB BLD-MCNC: 10.9 G/DL (ref 12–17)
IMM GRANULOCYTES # BLD AUTO: 0.11 THOUSAND/UL (ref 0–0.2)
IMM GRANULOCYTES NFR BLD AUTO: 2 % (ref 0–2)
LYMPHOCYTES # BLD AUTO: 0.96 THOUSANDS/ÂΜL (ref 0.6–4.47)
LYMPHOCYTES NFR BLD AUTO: 20 % (ref 14–44)
MCH RBC QN AUTO: 29.1 PG (ref 26.8–34.3)
MCHC RBC AUTO-ENTMCNC: 32 G/DL (ref 31.4–37.4)
MCV RBC AUTO: 91 FL (ref 82–98)
MONOCYTES # BLD AUTO: 0.58 THOUSAND/ÂΜL (ref 0.17–1.22)
MONOCYTES NFR BLD AUTO: 12 % (ref 4–12)
NEUTROPHILS # BLD AUTO: 2.97 THOUSANDS/ÂΜL (ref 1.85–7.62)
NEUTS SEG NFR BLD AUTO: 61 % (ref 43–75)
NRBC BLD AUTO-RTO: 0 /100 WBCS
PLATELET # BLD AUTO: 185 THOUSANDS/UL (ref 149–390)
PMV BLD AUTO: 9.8 FL (ref 8.9–12.7)
POTASSIUM SERPL-SCNC: 4.7 MMOL/L (ref 3.5–5.3)
RBC # BLD AUTO: 3.75 MILLION/UL (ref 3.88–5.62)
SODIUM SERPL-SCNC: 144 MMOL/L (ref 135–147)
WBC # BLD AUTO: 4.86 THOUSAND/UL (ref 4.31–10.16)

## 2025-07-17 PROCEDURE — 82948 REAGENT STRIP/BLOOD GLUCOSE: CPT

## 2025-07-17 PROCEDURE — 99232 SBSQ HOSP IP/OBS MODERATE 35: CPT | Performed by: NURSE PRACTITIONER

## 2025-07-17 PROCEDURE — 85025 COMPLETE CBC W/AUTO DIFF WBC: CPT

## 2025-07-17 PROCEDURE — 80048 BASIC METABOLIC PNL TOTAL CA: CPT

## 2025-07-17 PROCEDURE — 36415 COLL VENOUS BLD VENIPUNCTURE: CPT

## 2025-07-17 RX ORDER — DIFLUPREDNATE OPHTHALMIC 0.5 MG/ML
1 EMULSION OPHTHALMIC 2 TIMES DAILY
Status: DISCONTINUED | OUTPATIENT
Start: 2025-07-17 | End: 2025-07-21 | Stop reason: HOSPADM

## 2025-07-17 RX ADMIN — APALUTAMIDE 240 MG: 60 TABLET, FILM COATED ORAL at 09:28

## 2025-07-17 RX ADMIN — PANTOPRAZOLE SODIUM 20 MG: 20 TABLET, DELAYED RELEASE ORAL at 09:05

## 2025-07-17 RX ADMIN — ASPIRIN 81 MG: 81 TABLET, CHEWABLE ORAL at 09:05

## 2025-07-17 RX ADMIN — DIFLUPREDNATE OPHTHALMIC 1 DROP: 0.5 EMULSION OPHTHALMIC at 09:29

## 2025-07-17 RX ADMIN — WARFARIN SODIUM 5 MG: 5 TABLET ORAL at 12:41

## 2025-07-17 RX ADMIN — OXYCODONE HYDROCHLORIDE AND ACETAMINOPHEN 1000 MG: 500 TABLET ORAL at 09:05

## 2025-07-17 RX ADMIN — BUMETANIDE 2 MG: 0.25 INJECTION INTRAMUSCULAR; INTRAVENOUS at 18:05

## 2025-07-17 RX ADMIN — ATORVASTATIN CALCIUM 40 MG: 40 TABLET, FILM COATED ORAL at 18:05

## 2025-07-17 RX ADMIN — INSULIN LISPRO 1 UNITS: 100 INJECTION, SOLUTION INTRAVENOUS; SUBCUTANEOUS at 17:30

## 2025-07-17 RX ADMIN — DIFLUPREDNATE OPHTHALMIC 1 DROP: 0.5 EMULSION OPHTHALMIC at 18:06

## 2025-07-17 RX ADMIN — Medication 400 UNITS: at 09:06

## 2025-07-17 RX ADMIN — BUMETANIDE 2 MG: 0.25 INJECTION INTRAMUSCULAR; INTRAVENOUS at 09:07

## 2025-07-17 RX ADMIN — METOPROLOL SUCCINATE 100 MG: 100 TABLET, EXTENDED RELEASE ORAL at 09:06

## 2025-07-17 NOTE — ASSESSMENT & PLAN NOTE
Lab Results   Component Value Date    EGFR 31 07/16/2025    EGFR 34 07/15/2025    EGFR 34 05/27/2025    CREATININE 2.03 (H) 07/16/2025    CREATININE 1.86 (H) 07/15/2025    CREATININE 1.90 (H) 05/27/2025     Elevated creatinine to 2.03.  Above patient's baseline which is 1.6-1.9.  Suspected to be in the setting of volume overload.  Started on IV Bumex 2 mg twice a day  If kidney function to worsen consider consulting nephrology team.

## 2025-07-17 NOTE — ASSESSMENT & PLAN NOTE
Patient takes warfarin 7.5 mg on Monday, Wednesday, Friday, Saturday.  The rest of the day patient takes 5 mg.  INR 2.12 on admission.  Continue current regimen

## 2025-07-17 NOTE — ASSESSMENT & PLAN NOTE
Wt Readings from Last 3 Encounters:   07/16/25 106 kg (233 lb 11 oz)   07/10/25 104 kg (230 lb)   07/09/25 106 kg (233 lb)         Presented with ongoing shortness of breath, and lightheadedness.  History of heart failure with low according to last echo done in April 2025 showing EF 25% with systolic function severely reduced  Patient admits to taking GDMT medications as tolerated including torsemide 40 mg twice a day, losartan 25 mg daily, metoprolol 100 mg daily.  No significant weight gain.  BNP on admission 1311 higher than patient's baseline.  Chest x-ray with no pulmonary edema.    Plan:  Patient took both doses of torsemide at home and a dose of IV Lasix in the ED.  Continue IV Bumex 2 mg twice a day  Strict I's/O  Daily weight  Sodium restriction

## 2025-07-17 NOTE — ASSESSMENT & PLAN NOTE
Lab Results   Component Value Date    EGFR 32 07/18/2025    EGFR 30 07/17/2025    EGFR 31 07/16/2025    CREATININE 1.97 (H) 07/18/2025    CREATININE 2.05 (H) 07/17/2025    CREATININE 2.03 (H) 07/16/2025     Elevated creatinine to 2.03.  Above patient's baseline which is 1.6-1.9.  Suspected to be in the setting of volume overload.  Started on IV Bumex 2 mg twice a day

## 2025-07-17 NOTE — ASSESSMENT & PLAN NOTE
According to last echo EF of 25%.  S/p ICD  Currently on GDMT as tolerated.  Follows with cardiology.  And nephrology

## 2025-07-17 NOTE — ASSESSMENT & PLAN NOTE
Lab Results   Component Value Date    HGBA1C 7.3 (H) 07/16/2025       Recent Labs     07/17/25  0714   POCGLU 148*       Blood Sugar Average: Last 72 hrs:  (P) 148  Patient takes Amaryl 1 mg daily.  Will do insulin sliding scale and Accu-Cheks 4 times a day.

## 2025-07-17 NOTE — PROGRESS NOTES
Progress Note - Hospitalist   Name: Galen Carson 75 y.o. male I MRN: [id number]  Unit/Bed#: ED 24 I Date of Admission: 7/16/2025   Date of Service: 7/17/2025 I Hospital Day: 1    Assessment & Plan  Acute on chronic combined systolic and diastolic congestive heart failure (HCC)  Wt Readings from Last 3 Encounters:   07/16/25 106 kg (233 lb 11 oz)   07/10/25 104 kg (230 lb)   07/09/25 106 kg (233 lb)         Presented with ongoing shortness of breath, and lightheadedness.  History of heart failure with low according to last echo done in April 2025 showing EF 25% with systolic function severely reduced  Patient admits to taking GDMT medications as tolerated including torsemide 40 mg twice a day, losartan 25 mg daily, metoprolol 100 mg daily.  No significant weight gain.  BNP on admission 1311 higher than patient's baseline.  Chest x-ray with no pulmonary edema.    Plan:  Patient took both doses of torsemide at home and a dose of IV Lasix in the ED.  Continue IV Bumex 2 mg twice a day  Strict I's/O  Daily weight  Sodium restriction    Type 2 diabetes mellitus with hyperglycemia, without long-term current use of insulin (Formerly [facility])  Lab Results   Component Value Date    HGBA1C 7.3 (H) 07/16/2025       Recent Labs     07/17/25  0714   POCGLU 148*       Blood Sugar Average: Last 72 hrs:  (P) 148  Patient takes Amaryl 1 mg daily.  Will do insulin sliding scale and Accu-Cheks 4 times a day.    NICM with EF 25% s/p MDT DC ICD (1/4/2024)  According to last echo 4/21/25 EF of 25%.  S/p ICD  Currently on GDMT as tolerated.  Follows with cardiology.  And nephrology  Paroxysmal atrial fibrillation  Patient takes warfarin 7.5 mg on Monday, Wednesday, Friday, Saturday.  The rest of the day patient takes 5 mg.  INR 2.12 on admission.  Continue current regimen  ICD (implantable cardioverter-defibrillator) in place  Noted  CKD (chronic kidney disease) stage 4, GFR 15-29 ml/min (Formerly [facility])  Lab Results   Component Value Date    EGFR 30  07/17/2025    EGFR 31 07/16/2025    EGFR 34 07/15/2025    CREATININE 2.05 (H) 07/17/2025    CREATININE 2.03 (H) 07/16/2025    CREATININE 1.86 (H) 07/15/2025     Elevated creatinine to 2.03.  Above patient's baseline which is 1.6-1.9.  Suspected to be in the setting of volume overload.  Started on IV Bumex 2 mg twice a day  If kidney function to worsen consider consulting nephrology team.    VTE Pharmacologic Prophylaxis:   Moderate Risk (Score 3-4) - Pharmacological DVT Prophylaxis Ordered: warfarin (Coumadin).    Mobility:      Not assessed    Patient Centered Rounds: I performed bedside rounds with nursing staff today.   Discussions with Specialists or Other Care Team Provider: See previous providers notes    Education and Discussions with Family / Patient: Updated  (son) via phone.  Voicemail at [phone]    Current Length of Stay: 1 day(s)  Current Patient Status: Inpatient   Certification Statement: The patient will continue to require additional inpatient hospital stay due to IV diuresing monitoring of blood pressure monitoring of renal function  Discharge Plan: Anticipate discharge in 48 hrs to home.    Code Status: Level 1 - Full Code    Subjective   Patient sitting on the side of the stretcher eating his breakfast does not appear to be in any acute distress he denies any chest pain or shortness of breath but does still report dyspnea with exertion    Objective :  Temp:  [98.4 °F (36.9 °C)] 98.4 °F (36.9 °C)  HR:  [81-95] 91  BP: ()/(53-98) 134/64  Resp:  [18-27] 20  SpO2:  [90 %-97 %] 92 %  O2 Device: None (Room air)    Body mass index is 33.53 kg/m².     Input and Output Summary (last 24 hours):   No intake or output data in the 24 hours ending 07/17/25 0854    Physical Exam  Vitals reviewed.   Constitutional:       General: He is not in acute distress.     Appearance: He is obese. He is ill-appearing.     Cardiovascular:      Rate and Rhythm: Normal rate.   Pulmonary:      Effort: No  respiratory distress.     Skin:     General: Skin is warm.      Coloration: Skin is pale.     Neurological:      General: No focal deficit present.      Mental Status: He is alert. Mental status is at baseline.       Lines/Drains:        Telemetry:  Telemetry Orders (From admission, onward)               24 Hour Telemetry Monitoring  Continuous x 24 Hours (Telem)        Expiring   Question:  Reason for 24 Hour Telemetry  Answer:  Decompensated CHF- and any one of the following: continuous diuretic infusion or total diuretic dose >200 mg daily, associated electrolyte derangement (I.e. K < 3.0), inotropic drip (continuous infusion), hx of ventricular arrhythmia, or new EF < 35%                       Lab Results: I have reviewed the following results:   Results from last 7 days   Lab Units 07/17/25  0605   WBC Thousand/uL 4.86   HEMOGLOBIN g/dL 10.9*   HEMATOCRIT % 34.1*   PLATELETS Thousands/uL 185   SEGS PCT % 61   LYMPHO PCT % 20   MONO PCT % 12   EOS PCT % 4     Results from last 7 days   Lab Units 07/17/25  0605 07/16/25  1703   SODIUM mmol/L 144 142   POTASSIUM mmol/L 4.7 5.0   CHLORIDE mmol/L 105 102   CO2 mmol/L 29 29   BUN mg/dL 72* 65*   CREATININE mg/dL 2.05* 2.03*   ANION GAP mmol/L 10 11   CALCIUM mg/dL 8.8 9.0   ALBUMIN g/dL  --  4.6   TOTAL BILIRUBIN mg/dL  --  0.34   ALK PHOS U/L  --  91   ALT U/L  --  9   AST U/L  --  18   GLUCOSE RANDOM mg/dL 130 96     Results from last 7 days   Lab Units 07/16/25  1703   INR  2.12*     Results from last 7 days   Lab Units 07/17/25  0714   POC GLUCOSE mg/dl 148*     Results from last 7 days   Lab Units 07/16/25  2233   HEMOGLOBIN A1C % 7.3*           Recent Cultures (last 7 days):         Imaging Results Review: No pertinent imaging studies reviewed.  Other Study Results Review: No additional pertinent studies reviewed.    Last 24 Hours Medication List:     Current Facility-Administered Medications:     Apalutamide TABS 4 tablet, Daily    ascorbic acid (VITAMIN C)  tablet 1,000 mg, Daily    aspirin chewable tablet 81 mg, Daily    atorvastatin (LIPITOR) tablet 40 mg, Daily With Dinner    bumetanide (BUMEX) injection 2 mg, BID    Difluprednate 0.05 % EMUL 1 drop, 4x Daily    insulin lispro (HumALOG/ADMELOG) 100 units/mL subcutaneous injection 1-6 Units, TID AC **AND** Fingerstick Glucose (POCT), 4x Daily AC and at bedtime    losartan (COZAAR) tablet 25 mg, Daily    metoprolol succinate (TOPROL-XL) 24 hr tablet 100 mg, Daily    pantoprazole (PROTONIX) EC tablet 20 mg, Daily    vitamin E (TOCOPHEROL) capsule 400 Units, Daily    warfarin (COUMADIN) tablet 5 mg, Once per day on Sunday Tuesday Thursday    [START ON 7/18/2025] warfarin (COUMADIN) tablet 7.5 mg, Once per day on Monday Wednesday Friday Saturday    Administrative Statements   Today, Patient Was Seen By: DANITA Herrera  I have spent a total time of 45 minutes in caring for this patient on the day of the visit/encounter including Diagnostic results, Risks and benefits of tx options, Patient and family education, Importance of tx compliance, Impressions, Documenting in the medical record, and Obtaining or reviewing history  .    **Please Note: This note may have been constructed using a voice recognition system.**

## 2025-07-17 NOTE — PLAN OF CARE
Problem: PAIN - ADULT  Goal: Verbalizes/displays adequate comfort level or baseline comfort level  Description: Interventions:  - Encourage patient to monitor pain and request assistance  - Assess pain using appropriate pain scale  - Administer analgesics as ordered based on type and severity of pain and evaluate response  - Implement non-pharmacological measures as appropriate and evaluate response  - Consider cultural and social influences on pain and pain management  - Notify physician/advanced practitioner if interventions unsuccessful or patient reports new pain  - Educate patient/family on pain management process including their role and importance of  reporting pain   - Provide non-pharmacologic/complimentary pain relief interventions  Outcome: Progressing     Problem: INFECTION - ADULT  Goal: Absence or prevention of progression during hospitalization  Description: INTERVENTIONS:  - Assess and monitor for signs and symptoms of infection  - Monitor lab/diagnostic results  - Monitor all insertion sites, i.e. indwelling lines, tubes, and drains  - Monitor endotracheal if appropriate and nasal secretions for changes in amount and color  - [place] appropriate cooling/warming therapies per order  - Administer medications as ordered  - Instruct and encourage patient and family to use good hand hygiene technique  - Identify and instruct in appropriate isolation precautions for identified infection/condition  Outcome: Progressing  Goal: Absence of fever/infection during neutropenic period  Description: INTERVENTIONS:  - Monitor WBC  - Perform strict hand hygiene  - Limit to healthy visitors only  - No plants, dried, fresh or silk flowers with gifford in patient room  Outcome: Progressing     Problem: SAFETY ADULT  Goal: Patient will remain free of falls  Description: INTERVENTIONS:  - Educate patient/family on patient safety including physical limitations  - Instruct patient to call for assistance with activity   -  Consider consulting OT/PT to assist with strengthening/mobility based on AM PAC & JH-HLM score  - Consult OT/PT to assist with strengthening/mobility   - Keep Call bell within reach  - Keep bed low and locked with side rails adjusted as appropriate  - Keep care items and personal belongings within reach  - Initiate and maintain comfort rounds  - Make Fall Risk Sign visible to staff  - Offer Toileting every 2 Hours, in advance of need  - Initiate/Maintain bed/chair alarm  - Obtain necessary fall risk management equipment:   - Apply yellow socks and bracelet for high fall risk patients  - Consider moving patient to room near nurses station  Outcome: Progressing  Goal: Maintain or return to baseline ADL function  Description: INTERVENTIONS:  -  Assess patient's ability to carry out ADLs; assess patient's baseline for ADL function and identify physical deficits which impact ability to perform ADLs (bathing, care of mouth/teeth, toileting, grooming, dressing, etc.)  - Assess/evaluate cause of self-care deficits   - Assess range of motion  - Assess patient's mobility; develop plan if impaired  - Assess patient's need for assistive devices and provide as appropriate  - Encourage maximum independence but intervene and supervise when necessary  - Involve family in performance of ADLs  - Assess for home care needs following discharge   - Consider OT consult to assist with ADL evaluation and planning for discharge  - Provide patient education as appropriate  - Monitor functional capacity and physical performance, use of AM PAC & JH-HLM   - Monitor gait, balance and fatigue with ambulation    Outcome: Progressing  Goal: Maintains/Returns to pre admission functional level  Description: INTERVENTIONS:  - Perform AM-PAC 6 Click Basic Mobility/ Daily Activity assessment daily.  - Set and communicate daily mobility goal to care team and patient/family/caregiver.   - Collaborate with rehabilitation services on mobility goals if  consulted  - Perform Range of Motion  times a day.  - Reposition patient every  hours.  - Dangle patient  times a day  - Stand patient  times a day  - Ambulate patient  times a day  - Out of bed to chair  times a day   - Out of bed for meals  times a day  - Out of bed for toileting  - Record patient progress and toleration of activity level   Outcome: Progressing     Problem: DISCHARGE PLANNING  Goal: Discharge to home or other facility with appropriate resources  Description: INTERVENTIONS:  - Identify barriers to discharge w/patient and caregiver  - Arrange for needed discharge resources and transportation as appropriate  - Identify discharge learning needs (meds, wound care, etc.)  - Arrange for interpretive services to assist at discharge as needed  - Refer to Case Management Department for coordinating discharge planning if the patient needs post-hospital services based on physician/advanced practitioner order or complex needs related to functional status, cognitive ability, or social support system  Outcome: Progressing

## 2025-07-17 NOTE — H&P
"H&P - Hospitalist   Name: Galen Carson 75 y.o. male I MRN: [id number]  Unit/Bed#: ED 24 I Date of Admission: 7/16/2025   Date of Service: 7/16/2025 I Hospital Day: 0     Assessment & Plan  Acute on chronic combined systolic and diastolic congestive heart failure (HCC)  Wt Readings from Last 3 Encounters:   07/16/25 106 kg (233 lb 11 oz)   07/10/25 104 kg (230 lb)   07/09/25 106 kg (233 lb)         Patient on presentation due to ongoing shortness of breath, lightheadedness.  History of heart failure with low according to last echo done in April 2025 showing EF 25% with systolic function severely reduced  Patient admits to taking GDMT medications as tolerated including torsemide 40 mg twice a day, losartan 25 mg daily, metoprolol 100 mg daily.  Patient admits that overall he is not feeling well and constantly short of breath.  No significant weight gain.  BNP on admission 1311 higher than patient's baseline.  Chest x-ray with no pulmonary edema.  Plan:  Patient took both doses of torsemide at home and a dose of IV Lasix in the ED.  Will initiate on IV Bumex 2 mg twice a day.  Patient might benefit from more aggressive diuresis.  Can consider nephrology input if poor response to current regimen.  Reassess volume status, I's and O's and symptoms.  Cardiac diet with volume restriction    Mixed hyperlipidemia  Continue with atorvastatin 40 mg daily  Type 2 diabetes mellitus with hyperglycemia, without long-term current use of insulin ([facility])  Lab Results   Component Value Date    HGBA1C 6.6 (A) 04/08/2025       No results for input(s): \"POCGLU\" in the last 72 hours.    Blood Sugar Average: Last 72 hrs:    Patient takes Amaryl 1 mg daily.  Will do insulin sliding scale and Accu-Cheks 4 times a day.    NICM with EF 25% s/p MDT DC ICD (1/4/2024)  According to last echo EF of 25%.  S/p ICD  Currently on GDMT as tolerated.  Follows with cardiology.  And nephrology  Paroxysmal atrial fibrillation  Patient takes warfarin 7.5 " mg on Monday, Wednesday, Friday, Saturday.  The rest of the day patient takes 5 mg.  INR 2.12 on admission.  Continue current regimen  ICD (implantable cardioverter-defibrillator) in place  Noted  CKD (chronic kidney disease) stage 4, GFR 15-29 ml/min ([facility])  Lab Results   Component Value Date    EGFR 31 07/16/2025    EGFR 34 07/15/2025    EGFR 34 05/27/2025    CREATININE 2.03 (H) 07/16/2025    CREATININE 1.86 (H) 07/15/2025    CREATININE 1.90 (H) 05/27/2025     Elevated creatinine to 2.03.  Above patient's baseline which is 1.6-1.9.  Suspected to be in the setting of volume overload.  Started on IV Bumex 2 mg twice a day  If kidney function to worsen consider consulting nephrology team.      VTE Pharmacologic Prophylaxis:   High Risk (Score >/= 5) - Pharmacological DVT Prophylaxis Ordered: warfarin (Coumadin). Sequential Compression Devices Ordered.  Code Status: Level 1 - Full Code patient  Discussion with family: Patient declined call to .     Anticipated Length of Stay: Patient will be admitted on an inpatient basis with an anticipated length of stay of greater than 2 midnights secondary to CHF exacerbation.    History of Present Illness   Chief Complaint: Shortness of breath, lightheadedness    Galen Carson is a 75 y.o. male with a PMH of cardiomyopathy with EF of 25%, s/p ICD, hypertension, hyperlipidemia, CKD stage IV, type 2 diabetes mellitus who presents with persistent shortness of breath for months.  Patient also admits having episodes of lightheadedness.  Patient reports that he has been complaining for the same symptoms over an hour to the cardiology and nephrology doctor however medications are always adjusted and patient never feels better.  Patient is frustrated with having same symptoms without improvement.  Patient denies weight gain however admits lower extremity edema.  On admission suspicion for CHF exacerbation.    Review of Systems   Constitutional:  Negative for chills and  fever.   HENT:  Negative for ear pain and sore throat.    Eyes:  Negative for pain and visual disturbance.   Respiratory:  Positive for shortness of breath. Negative for cough.    Cardiovascular:  Positive for leg swelling. Negative for chest pain and palpitations.   Gastrointestinal:  Negative for abdominal pain and vomiting.   Genitourinary:  Negative for dysuria and hematuria.   Musculoskeletal:  Negative for arthralgias and back pain.   Skin:  Negative for color change and rash.   Neurological:  Positive for light-headedness. Negative for seizures and syncope.   All other systems reviewed and are negative.      Historical Information   Past Medical History[1]  Past Surgical History[2]  Social History[3]  E-Cigarette/Vaping    E-Cigarette Use Never User      E-Cigarette/Vaping Substances    Nicotine No     THC No     CBD No     Flavoring No     Other No     Unknown No        Social History:  Marital Status:    Occupation:   Patient Pre-hospital Living Situation: Home  Patient Pre-hospital Level of Mobility: walks  Patient Pre-hospital Diet Restrictions:     Meds/Allergies   I have reviewed home medications with patient personally.  Prior to Admission medications    Medication Sig Start Date End Date Taking? Authorizing Provider   Ascorbic Acid (vitamin C) 1000 MG tablet Take 1,000 mg by mouth in the morning.   Yes Historical Provider, MD   aspirin 81 mg chewable tablet Chew 1 tablet (81 mg total) daily 5/24/25  Yes Kenji Woods MD   Continuous Glucose Sensor (FreeStyle Lisa 3 Sensor) [facility] Use 1 each every 14 (fourteen) days 7/9/24  Yes DANITA Girard   Difluprednate 0.05 % EMUL Apply 1 drop to eye 4 (four) times a day 6/9/25  Yes Historical Provider, MD   Erleada 60 MG TABS Take 4 tablets by mouth in the morning. 3/7/25  Yes Historical Provider, MD   glimepiride (AMARYL) 1 mg tablet Take 1 tablet (1 mg total) by mouth daily with breakfast 3/21/25 9/17/25 Yes DANITA Girard   losartan (COZAAR)  25 mg tablet Take 1 tablet (25 mg total) by mouth daily 5/13/25  Yes Partha Colon PA-C   metoprolol succinate (TOPROL-XL) 50 mg 24 hr tablet Take 2 tablets (100 mg total) by mouth daily 5/23/25  Yes Kenji Woods MD   multivitamin (THERAGRAN) TABS Take 1 tablet by mouth in the morning.   Yes Historical Provider, MD   pantoprazole (PROTONIX) 20 mg tablet Take 1 tablet (20 mg total) by mouth daily 6/24/25  Yes Alyssa Cao PA-C   rosuvastatin (CRESTOR) 20 MG tablet Take 1 tablet (20 mg total) by mouth daily 5/13/25  Yes Partha Colon PA-C   torsemide (DEMADEX) 20 mg tablet Take 2 tablets (40 mg total) by mouth 2 (two) times a day 7/14/25  Yes Guillermo Li MD   VITAMIN D, ERGOCALCIFEROL, PO Take by mouth   Yes Historical Provider, MD   vitamin E, tocopherol, 400 units capsule Take 400 Units by mouth in the morning.   Yes Historical Provider, MD   warfarin (Coumadin) 5 mg tablet Take one tablet daily or as directed  Patient taking differently: 7.5 mg Take one tablet daily or as directed Monday, Wednesday, Friday, Saturday  Take 5 mg Tuesday, Thursday and Trey 10/30/24  Yes DANITA Weber   Blood Glucose Monitoring Suppl (ONETOUCH VERIO) w/Device KIT by Does not apply route once for 1 dose Test sugar in the morning 4/2/19 7/10/25  DANITA Girard   cetirizine (ZyrTEC) 5 MG tablet Take 5 mg by mouth in the morning.    Historical Provider, MD   EVENING PRIMROSE OIL PO Take by mouth in the morning and in the evening and before bedtime.    Historical Provider, MD   torsemide (DEMADEX) 20 mg tablet Take 2 tablets (40 mg total) by mouth 2 (two) times a day 7/8/25 7/16/25  Felix Suarez PA-C     Allergies   Allergen Reactions    Penicillin G Benzathine Other (See Comments)    Penicillin V Other (See Comments)     As a child        Objective :  Temp:  [98.4 °F (36.9 °C)] 98.4 °F (36.9 °C)  HR:  [84-95] 95  BP: ()/(53-79) 150/65  Resp:  [18-27] 25  SpO2:  [90 %-97 %] 91 %  O2 Device:  None (Room air)    Physical Exam  Vitals and nursing note reviewed.   Constitutional:       General: He is not in acute distress.     Appearance: He is well-developed. He is ill-appearing.   HENT:      Head: Normocephalic and atraumatic.     Eyes:      Conjunctiva/sclera: Conjunctivae normal.       Cardiovascular:      Rate and Rhythm: Normal rate and regular rhythm.      Heart sounds: No murmur heard.  Pulmonary:      Effort: Pulmonary effort is normal. No respiratory distress.      Breath sounds: Rales present. No wheezing.   Abdominal:      Palpations: Abdomen is soft.      Tenderness: There is no abdominal tenderness.     Musculoskeletal:         General: No swelling.      Cervical back: Neck supple.      Right lower leg: Edema present.      Left lower leg: Edema present.     Skin:     General: Skin is warm and dry.      Capillary Refill: Capillary refill takes less than 2 seconds.     Neurological:      Mental Status: He is alert. Mental status is at baseline.     Psychiatric:         Mood and Affect: Mood normal.          Lines/Drains:            Lab Results: I have reviewed the following results:  Results from last 7 days   Lab Units 07/16/25  1703   WBC Thousand/uL 5.77   HEMOGLOBIN g/dL 11.7*   HEMATOCRIT % 35.0*   PLATELETS Thousands/uL 207   SEGS PCT % 62   LYMPHO PCT % 18   MONO PCT % 13*   EOS PCT % 4     Results from last 7 days   Lab Units 07/16/25  1703   SODIUM mmol/L 142   POTASSIUM mmol/L 5.0   CHLORIDE mmol/L 102   CO2 mmol/L 29   BUN mg/dL 65*   CREATININE mg/dL 2.03*   ANION GAP mmol/L 11   CALCIUM mg/dL 9.0   ALBUMIN g/dL 4.6   TOTAL BILIRUBIN mg/dL 0.34   ALK PHOS U/L 91   ALT U/L 9   AST U/L 18   GLUCOSE RANDOM mg/dL 96     Results from last 7 days   Lab Units 07/16/25  1703   INR  2.12*         Lab Results   Component Value Date    HGBA1C 6.6 (A) 04/08/2025    HGBA1C 6.7 (H) 10/16/2024    HGBA1C 6.3 (H) 07/24/2024           Imaging Results Review: I reviewed radiology reports from this  admission including: chest xray.  Other Study Results Review: EKG was reviewed.     Administrative Statements       ** Please Note: This note has been constructed using a voice recognition system. **         [1]   Past Medical History:  Diagnosis Date    Allergic     Anesthesia     pt wears a life vest (11/16).  should not be scheduled at [facility] until heart condition is stabilized    BPH (benign prostatic hypertrophy)     Cancer (HCC)     CHF (congestive heart failure) (HCC)     Chronic kidney disease     Coronary artery disease     Diabetes mellitus (HCC)     Hyperlipidemia     Hypertension     PAF (paroxysmal atrial fibrillation) (HCC)     Prostate cancer (HCC)    [2]   Past Surgical History:  Procedure Laterality Date    CARDIAC CATHETERIZATION N/A 10/04/2023    Procedure: Cardiac Coronary Angiogram;  Surgeon: Chris Lovell MD;  Location: [facility];  Service: Cardiology    CARDIAC CATHETERIZATION N/A 10/04/2023    Procedure: Cardiac RHC/LHC;  Surgeon: Chris Lovell MD;  Location: [facility];  Service: Cardiology    CARDIAC CATHETERIZATION  10/04/2023    Procedure: Cardiac catheterization;  Surgeon: Chris Lovell MD;  Location: [facility];  Service: Cardiology    CARDIAC CATHETERIZATION Left 5/27/2025    Procedure: Cardiac Left Heart Cath;  Surgeon: Guillermo Li MD;  Location: [facility];  Service: Cardiology    CARDIAC CATHETERIZATION N/A 5/27/2025    Procedure: Cardiac RHC/LHC;  Surgeon: Guillermo Li MD;  Location: [facility];  Service: Cardiology    CARDIAC CATHETERIZATION  5/27/2025    Procedure: Cardiac Catheterization;  Surgeon: Guillermo Li MD;  [facility]: [facility];  Service: Cardiology    CARDIAC CATHETERIZATION N/A 5/27/2025    Procedure: Cardiac Coronary Angiogram;  Surgeon: Guillermo Li MD;  [facility]: [facility];  Service: Cardiology    CARDIAC DEFIBRILLATOR PLACEMENT  01/04/2024    CARDIAC ELECTROPHYSIOLOGY PROCEDURE N/A  2024    Procedure: Cardiac icd implant, Dual Chambers ICD;  Surgeon: Leo Whalen MD;  Location: [facility];  Service: Cardiology    EAR SURGERY      HERNIA REPAIR      VT COLONOSCOPY FLX DX W/COLLJ SPEC WHEN PFRMD N/A 2019    Procedure: COLONOSCOPY;  Surgeon: Winston Nielson III, MD;  Location: [facility];  Service: Gastroenterology   [3]   Social History  Tobacco Use    Smoking status: Former     Current packs/day: 0.00     Average packs/day: 1 pack/day for 25.0 years (25.0 ttl pk-yrs)     Types: Cigarettes, Pipe, Cigars     Start date: 1980     Quit date: 2005     Years since quittin.3     Passive exposure: Past    Smokeless tobacco: Never   Vaping Use    Vaping status: Never Used   Substance and Sexual Activity    Alcohol use: Not Currently    Drug use: Not Currently    Sexual activity: Not Currently     Partners: Female

## 2025-07-17 NOTE — ASSESSMENT & PLAN NOTE
Wt Readings from Last 3 Encounters:   07/16/25 106 kg (233 lb 11 oz)   07/10/25 104 kg (230 lb)   07/09/25 106 kg (233 lb)         Patient on presentation due to ongoing shortness of breath, lightheadedness.  History of heart failure with low according to last echo done in April 2025 showing EF 25% with systolic function severely reduced  Patient admits to taking GDMT medications as tolerated including torsemide 40 mg twice a day, losartan 25 mg daily, metoprolol 100 mg daily.  Patient admits that overall he is not feeling well and constantly short of breath.  No significant weight gain.  BNP on admission 1311 higher than patient's baseline.  Chest x-ray with no pulmonary edema.  Plan:  Patient took both doses of torsemide at home and a dose of IV Lasix in the ED.  Will initiate on IV Bumex 2 mg twice a day.  Patient might benefit from more aggressive diuresis.  Can consider nephrology input if poor response to current regimen.  Reassess volume status, I's and O's and symptoms.  Cardiac diet with volume restriction

## 2025-07-17 NOTE — ASSESSMENT & PLAN NOTE
Lab Results   Component Value Date    HGBA1C 7.3 (H) 07/16/2025       Recent Labs     07/17/25  1102 07/17/25  1556 07/17/25  2115 07/18/25  0744   POCGLU 130 165* 163* 145*   Blood Sugar Average: Last 72 hrs:  (P) 150.2  Patient takes Amaryl 1 mg daily.  Sliding scale  Carb controlled diet

## 2025-07-17 NOTE — ASSESSMENT & PLAN NOTE
According to last echo 4/21/25 EF of 25%.  S/p ICD  Currently on GDMT as tolerated.  Follows with cardiology.  And nephrology

## 2025-07-17 NOTE — ASSESSMENT & PLAN NOTE
According to last echo 4/21/25 EF of 25%.  S/p ICD  Currently on GDMT as tolerated.  Follows with cardiology And nephrology

## 2025-07-17 NOTE — ASSESSMENT & PLAN NOTE
Lab Results   Component Value Date    EGFR 30 07/17/2025    EGFR 31 07/16/2025    EGFR 34 07/15/2025    CREATININE 2.05 (H) 07/17/2025    CREATININE 2.03 (H) 07/16/2025    CREATININE 1.86 (H) 07/15/2025     Elevated creatinine to 2.03.  Above patient's baseline which is 1.6-1.9.  Suspected to be in the setting of volume overload.  Started on IV Bumex 2 mg twice a day  If kidney function to worsen consider consulting nephrology team.

## 2025-07-17 NOTE — ASSESSMENT & PLAN NOTE
Patient takes warfarin 7.5 mg on Monday, Wednesday, Friday, Saturday.  The rest of the day patient takes 5 mg.  INR 2.12 on admission.  Continue current regimen     Lab Results   Component Value Date    INR 2.29 (H) 07/18/2025

## 2025-07-17 NOTE — ASSESSMENT & PLAN NOTE
Wt Readings from Last 3 Encounters:   07/18/25 103 kg (227 lb 6.4 oz)   07/10/25 104 kg (230 lb)   07/09/25 106 kg (233 lb)     Presented with ongoing shortness of breath, and lightheadedness.  History of heart failure with low according to last echo done in April 2025 showing EF 25% with systolic function severely reduced  Patient admits to taking GDMT medications as tolerated including torsemide 40 mg twice a day, losartan 25 mg daily, metoprolol 100 mg daily.  No significant weight gain.  BNP on admission marginally 1311 higher than patient's baseline.  Chest x-ray with no pulmonary edema.    Plan:  Patient took both doses of torsemide at home and a dose of IV Lasix in the ED.  Continue IV Bumex 2 mg twice a day  Strict I's/O  Daily weight  Sodium restriction  Patient still complains of shortness of breath and is still experiencing some lower extremity edema reports poor urine output at home on Lasix  Will consult cardiology for further medication recommendations

## 2025-07-17 NOTE — ASSESSMENT & PLAN NOTE
"Lab Results   Component Value Date    HGBA1C 6.6 (A) 04/08/2025       No results for input(s): \"POCGLU\" in the last 72 hours.    Blood Sugar Average: Last 72 hrs:    Patient takes Amaryl 1 mg daily.  Will do insulin sliding scale and Accu-Cheks 4 times a day.    "

## 2025-07-18 LAB
ANION GAP SERPL CALCULATED.3IONS-SCNC: 8 MMOL/L (ref 4–13)
BUN SERPL-MCNC: 73 MG/DL (ref 5–25)
CALCIUM SERPL-MCNC: 8.9 MG/DL (ref 8.4–10.2)
CHLORIDE SERPL-SCNC: 104 MMOL/L (ref 96–108)
CO2 SERPL-SCNC: 30 MMOL/L (ref 21–32)
CREAT SERPL-MCNC: 1.97 MG/DL (ref 0.6–1.3)
ERYTHROCYTE [DISTWIDTH] IN BLOOD BY AUTOMATED COUNT: 14.2 % (ref 11.6–15.1)
GFR SERPL CREATININE-BSD FRML MDRD: 32 ML/MIN/1.73SQ M
GLUCOSE SERPL-MCNC: 114 MG/DL (ref 65–140)
GLUCOSE SERPL-MCNC: 142 MG/DL (ref 65–140)
GLUCOSE SERPL-MCNC: 145 MG/DL (ref 65–140)
GLUCOSE SERPL-MCNC: 150 MG/DL (ref 65–140)
GLUCOSE SERPL-MCNC: 204 MG/DL (ref 65–140)
HCT VFR BLD AUTO: 31.6 % (ref 36.5–49.3)
HGB BLD-MCNC: 10.6 G/DL (ref 12–17)
INR PPP: 2.29 (ref 0.85–1.19)
MAGNESIUM SERPL-MCNC: 2.7 MG/DL (ref 1.9–2.7)
MCH RBC QN AUTO: 30.2 PG (ref 26.8–34.3)
MCHC RBC AUTO-ENTMCNC: 33.5 G/DL (ref 31.4–37.4)
MCV RBC AUTO: 90 FL (ref 82–98)
PLATELET # BLD AUTO: 174 THOUSANDS/UL (ref 149–390)
PMV BLD AUTO: 9.7 FL (ref 8.9–12.7)
POTASSIUM SERPL-SCNC: 4.5 MMOL/L (ref 3.5–5.3)
PROTHROMBIN TIME: 25.9 SECONDS (ref 12.3–15)
RBC # BLD AUTO: 3.51 MILLION/UL (ref 3.88–5.62)
SODIUM SERPL-SCNC: 142 MMOL/L (ref 135–147)
WBC # BLD AUTO: 4.49 THOUSAND/UL (ref 4.31–10.16)

## 2025-07-18 PROCEDURE — 83735 ASSAY OF MAGNESIUM: CPT | Performed by: NURSE PRACTITIONER

## 2025-07-18 PROCEDURE — 80048 BASIC METABOLIC PNL TOTAL CA: CPT | Performed by: NURSE PRACTITIONER

## 2025-07-18 PROCEDURE — 82948 REAGENT STRIP/BLOOD GLUCOSE: CPT

## 2025-07-18 PROCEDURE — 85027 COMPLETE CBC AUTOMATED: CPT | Performed by: NURSE PRACTITIONER

## 2025-07-18 PROCEDURE — 99232 SBSQ HOSP IP/OBS MODERATE 35: CPT | Performed by: NURSE PRACTITIONER

## 2025-07-18 PROCEDURE — 85610 PROTHROMBIN TIME: CPT | Performed by: NURSE PRACTITIONER

## 2025-07-18 RX ADMIN — PANTOPRAZOLE SODIUM 20 MG: 20 TABLET, DELAYED RELEASE ORAL at 08:43

## 2025-07-18 RX ADMIN — WARFARIN SODIUM 7.5 MG: 7.5 TABLET ORAL at 12:35

## 2025-07-18 RX ADMIN — ATORVASTATIN CALCIUM 40 MG: 40 TABLET, FILM COATED ORAL at 15:47

## 2025-07-18 RX ADMIN — OXYCODONE HYDROCHLORIDE AND ACETAMINOPHEN 1000 MG: 500 TABLET ORAL at 08:43

## 2025-07-18 RX ADMIN — BUMETANIDE 2 MG: 0.25 INJECTION INTRAMUSCULAR; INTRAVENOUS at 17:30

## 2025-07-18 RX ADMIN — ASPIRIN 81 MG: 81 TABLET, CHEWABLE ORAL at 08:43

## 2025-07-18 RX ADMIN — Medication 400 UNITS: at 08:50

## 2025-07-18 RX ADMIN — DIFLUPREDNATE OPHTHALMIC 1 DROP: 0.5 EMULSION OPHTHALMIC at 08:48

## 2025-07-18 RX ADMIN — APALUTAMIDE 240 MG: 60 TABLET, FILM COATED ORAL at 08:48

## 2025-07-18 RX ADMIN — BUMETANIDE 2 MG: 0.25 INJECTION INTRAMUSCULAR; INTRAVENOUS at 08:43

## 2025-07-18 RX ADMIN — LOSARTAN POTASSIUM 25 MG: 25 TABLET, FILM COATED ORAL at 08:43

## 2025-07-18 RX ADMIN — DIFLUPREDNATE OPHTHALMIC 1 DROP: 0.5 EMULSION OPHTHALMIC at 17:31

## 2025-07-18 RX ADMIN — METOPROLOL SUCCINATE 100 MG: 100 TABLET, EXTENDED RELEASE ORAL at 08:43

## 2025-07-18 NOTE — PLAN OF CARE
Problem: PAIN - ADULT  Goal: Verbalizes/displays adequate comfort level or baseline comfort level  Description: Interventions:  - Encourage patient to monitor pain and request assistance  - Assess pain using appropriate pain scale  - Administer analgesics as ordered based on type and severity of pain and evaluate response  - Implement non-pharmacological measures as appropriate and evaluate response  - Consider cultural and social influences on pain and pain management  - Notify physician/advanced practitioner if interventions unsuccessful or patient reports new pain  - Educate patient/family on pain management process including their role and importance of  reporting pain   - Provide non-pharmacologic/complimentary pain relief interventions  Outcome: Progressing     Problem: INFECTION - ADULT  Goal: Absence or prevention of progression during hospitalization  Description: INTERVENTIONS:  - Assess and monitor for signs and symptoms of infection  - Monitor lab/diagnostic results  - Monitor all insertion sites, i.e. indwelling lines, tubes, and drains  - Monitor endotracheal if appropriate and nasal secretions for changes in amount and color  - [place] appropriate cooling/warming therapies per order  - Administer medications as ordered  - Instruct and encourage patient and family to use good hand hygiene technique  - Identify and instruct in appropriate isolation precautions for identified infection/condition  Outcome: Progressing  Goal: Absence of fever/infection during neutropenic period  Description: INTERVENTIONS:  - Monitor WBC  - Perform strict hand hygiene  - Limit to healthy visitors only  - No plants, dried, fresh or silk flowers with gifford in patient room  Outcome: Progressing     Problem: SAFETY ADULT  Goal: Patient will remain free of falls  Description: INTERVENTIONS:  - Educate patient/family on patient safety including physical limitations  - Instruct patient to call for assistance with activity   -  Consider consulting OT/PT to assist with strengthening/mobility based on AM PAC & JH-HLM score  - Consult OT/PT to assist with strengthening/mobility   - Keep Call bell within reach  - Keep bed low and locked with side rails adjusted as appropriate  - Keep care items and personal belongings within reach  - Initiate and maintain comfort rounds  - Make Fall Risk Sign visible to staff  - Offer Toileting every 2 Hours, in advance of need  - Initiate/Maintain alarm  - Obtain necessary fall risk management equipment  - Apply yellow socks and bracelet for high fall risk patients  - Consider moving patient to room near nurses station  Outcome: Progressing  Goal: Maintain or return to baseline ADL function  Description: INTERVENTIONS:  -  Assess patient's ability to carry out ADLs; assess patient's baseline for ADL function and identify physical deficits which impact ability to perform ADLs (bathing, care of mouth/teeth, toileting, grooming, dressing, etc.)  - Assess/evaluate cause of self-care deficits   - Assess range of motion  - Assess patient's mobility; develop plan if impaired  - Assess patient's need for assistive devices and provide as appropriate  - Encourage maximum independence but intervene and supervise when necessary  - Involve family in performance of ADLs  - Assess for home care needs following discharge   - Consider OT consult to assist with ADL evaluation and planning for discharge  - Provide patient education as appropriate  - Monitor functional capacity and physical performance, use of AM PAC & JH-HLM   - Monitor gait, balance and fatigue with ambulation    Outcome: Progressing  Goal: Maintains/Returns to pre admission functional level  Description: INTERVENTIONS:  - Perform AM-PAC 6 Click Basic Mobility/ Daily Activity assessment daily.  - Set and communicate daily mobility goal to care team and patient/family/caregiver.   - Collaborate with rehabilitation services on mobility goals if consulted  -  Perform Range of Motion 3 times a day.  - Reposition patient every 3 hours.  - Dangle patient 3 times a day  - Stand patient 3 times a day  - Ambulate patient 3 times a day  - Out of bed to chair 3 times a day   - Out of bed for meals 3 times a day  - Out of bed for toileting  - Record patient progress and toleration of activity level   Outcome: Progressing     Problem: DISCHARGE PLANNING  Goal: Discharge to home or other facility with appropriate resources  Description: INTERVENTIONS:  - Identify barriers to discharge w/patient and caregiver  - Arrange for needed discharge resources and transportation as appropriate  - Identify discharge learning needs (meds, wound care, etc.)  - Arrange for interpretive services to assist at discharge as needed  - Refer to Case Management Department for coordinating discharge planning if the patient needs post-hospital services based on physician/advanced practitioner order or complex needs related to functional status, cognitive ability, or social support system  Outcome: Progressing     Problem: Knowledge Deficit  Goal: Patient/family/caregiver demonstrates understanding of disease process, treatment plan, medications, and discharge instructions  Description: Complete learning assessment and assess knowledge base.  Interventions:  - Provide teaching at level of understanding  - Provide teaching via preferred learning methods  Outcome: Progressing

## 2025-07-18 NOTE — PROGRESS NOTES
Progress Note - Hospitalist   Name: Galen Carson 75 y.o. male I MRN: [id number]  Unit/Bed#: 2 E 264-01 I Date of Admission: 7/16/2025   Date of Service: 7/18/2025 I Hospital Day: 2    Assessment & Plan  Acute on chronic combined systolic and diastolic congestive heart failure (HCC)  Wt Readings from Last 3 Encounters:   07/18/25 103 kg (227 lb 6.4 oz)   07/10/25 104 kg (230 lb)   07/09/25 106 kg (233 lb)     Presented with ongoing shortness of breath, and lightheadedness.  History of heart failure with low according to last echo done in April 2025 showing EF 25% with systolic function severely reduced  Patient admits to taking GDMT medications as tolerated including torsemide 40 mg twice a day, losartan 25 mg daily, metoprolol 100 mg daily.  No significant weight gain.  BNP on admission marginally 1311 higher than patient's baseline.  Chest x-ray with no pulmonary edema.    Plan:  Patient took both doses of torsemide at home and a dose of IV Lasix in the ED.  Continue IV Bumex 2 mg twice a day  Strict I's/O  Daily weight  Sodium restriction  Patient still complains of shortness of breath and is still experiencing some lower extremity edema reports poor urine output at home on Lasix  Will consult cardiology for further medication recommendations    Type 2 diabetes mellitus with hyperglycemia, without long-term current use of insulin ([facility])  Lab Results   Component Value Date    HGBA1C 7.3 (H) 07/16/2025       Recent Labs     07/17/25  1102 07/17/25  1556 07/17/25  2115 07/18/25  0744   POCGLU 130 165* 163* 145*   Blood Sugar Average: Last 72 hrs:  (P) 150.2  Patient takes Amaryl 1 mg daily.  Sliding scale  Carb controlled diet    NICM with EF 25% s/p MDT DC ICD (1/4/2024)  According to last echo 4/21/25 EF of 25%.  S/p ICD  Currently on GDMT as tolerated.  Follows with cardiology And nephrology  Paroxysmal atrial fibrillation  Patient takes warfarin 7.5 mg on Monday, Wednesday, Friday, Saturday.  The rest of the  day patient takes 5 mg.  INR 2.12 on admission.  Continue current regimen     Lab Results   Component Value Date    INR 2.29 (H) 07/18/2025      CKD (chronic kidney disease) stage 4, GFR 15-29 ml/min ([facility])  Lab Results   Component Value Date    EGFR 32 07/18/2025    EGFR 30 07/17/2025    EGFR 31 07/16/2025    CREATININE 1.97 (H) 07/18/2025    CREATININE 2.05 (H) 07/17/2025    CREATININE 2.03 (H) 07/16/2025     Elevated creatinine to 2.03.  Above patient's baseline which is 1.6-1.9.  Suspected to be in the setting of volume overload.  Started on IV Bumex 2 mg twice a day    VTE Pharmacologic Prophylaxis:   Moderate Risk (Score 3-4) - Pharmacological DVT Prophylaxis Ordered: warfarin (Coumadin).    Mobility:   Basic Mobility Inpatient Raw Score: 24  JH-HLM Goal: 8: Walk 250 feet or more  JH-HLM Achieved: 6: Walk 10 steps or more  JH-HLM Goal NOT achieved. Continue with multidisciplinary rounding and encourage appropriate mobility to improve upon JH-HLM goals.    Patient Centered Rounds: I performed bedside rounds with nursing staff today.   Discussions with Specialists or Other Care Team Provider: Discussed with cardiology reviewed notes discussed with case management    Education and Discussions with Family / Patient: Patient declined call to .     Current Length of Stay: 2 day(s)  Current Patient Status: Inpatient   Certification Statement: The patient will continue to require additional inpatient hospital stay due to ongoing diuresing and cardiology evaluation  Discharge Plan: Anticipate discharge in 24-48 hrs to home.    Code Status: Level 1 - Full Code    Subjective   Patient still complains of some shortness of breath but he reports he is always short of breath and he cannot be clear regarding if this is his baseline or not.  He does also report some swelling which he claims is his baseline as well he reports that he has had decreased urine output on his Lasix at home he refuses to use his CPAP he  thinks that having oxygen at night would help him we will do an overnight pulse ox    Objective :  Temp:  [94.4 °F (34.7 °C)-98 °F (36.7 °C)] 98 °F (36.7 °C)  HR:  [41-92] 54  BP: (107-136)/(60-73) 115/63  Resp:  [17-22] 17  SpO2:  [90 %-95 %] 91 %  O2 Device: None (Room air)    Body mass index is 32.63 kg/m².     Input and Output Summary (last 24 hours):     Intake/Output Summary (Last 24 hours) at 7/18/2025 1041  Last data filed at 7/17/2025 1900  Gross per 24 hour   Intake 960 ml   Output 500 ml   Net 460 ml       Physical Exam  Vitals reviewed.   Constitutional:       General: He is not in acute distress.     Appearance: He is normal weight.     Cardiovascular:      Rate and Rhythm: Normal rate.      Heart sounds: No murmur heard.  Pulmonary:      Effort: No respiratory distress.     Musculoskeletal:         General: Swelling present.      Cervical back: Normal range of motion.     Skin:     General: Skin is warm and dry.      Coloration: Skin is pale.     Neurological:      General: No focal deficit present.      Mental Status: He is alert.         Lines/Drains:      Lab Results: I have reviewed the following results:   Results from last 7 days   Lab Units 07/18/25  0546 07/17/25  0605   WBC Thousand/uL 4.49 4.86   HEMOGLOBIN g/dL 10.6* 10.9*   HEMATOCRIT % 31.6* 34.1*   PLATELETS Thousands/uL 174 185   SEGS PCT %  --  61   LYMPHO PCT %  --  20   MONO PCT %  --  12   EOS PCT %  --  4     Results from last 7 days   Lab Units 07/18/25  0546 07/17/25  0605 07/16/25  1703   SODIUM mmol/L 142   < > 142   POTASSIUM mmol/L 4.5   < > 5.0   CHLORIDE mmol/L 104   < > 102   CO2 mmol/L 30   < > 29   BUN mg/dL 73*   < > 65*   CREATININE mg/dL 1.97*   < > 2.03*   ANION GAP mmol/L 8   < > 11   CALCIUM mg/dL 8.9   < > 9.0   ALBUMIN g/dL  --   --  4.6   TOTAL BILIRUBIN mg/dL  --   --  0.34   ALK PHOS U/L  --   --  91   ALT U/L  --   --  9   AST U/L  --   --  18   GLUCOSE RANDOM mg/dL 142*   < > 96    < > = values in this  interval not displayed.     Results from last 7 days   Lab Units 07/18/25  0546   INR  2.29*     Results from last 7 days   Lab Units 07/18/25  0744 07/17/25  2115 07/17/25  1556 07/17/25  1102 07/17/25  0714   POC GLUCOSE mg/dl 145* 163* 165* 130 148*     Results from last 7 days   Lab Units 07/16/25  2233   HEMOGLOBIN A1C % 7.3*           Recent Cultures (last 7 days):               Last 24 Hours Medication List:     Current Facility-Administered Medications:     Apalutamide TABS 240 mg, Daily    ascorbic acid (VITAMIN C) tablet 1,000 mg, Daily    aspirin chewable tablet 81 mg, Daily    atorvastatin (LIPITOR) tablet 40 mg, Daily With Dinner    bumetanide (BUMEX) injection 2 mg, BID    Difluprednate 0.05 % EMUL 1 drop, BID    insulin lispro (HumALOG/ADMELOG) 100 units/mL subcutaneous injection 1-6 Units, TID AC **AND** Fingerstick Glucose (POCT), 4x Daily AC and at bedtime    losartan (COZAAR) tablet 25 mg, Daily    metoprolol succinate (TOPROL-XL) 24 hr tablet 100 mg, Daily    pantoprazole (PROTONIX) EC tablet 20 mg, Daily    vitamin E (TOCOPHEROL) capsule 400 Units, Daily    warfarin (COUMADIN) tablet 5 mg, Once per day on Sunday Tuesday Thursday    warfarin (COUMADIN) tablet 7.5 mg, Once per day on Monday Wednesday Friday Saturday    Administrative Statements   Today, Patient Was Seen By: DANITA Herrera  I have spent a total time of 45 minutes in caring for this patient on the day of the visit/encounter including Diagnostic results, Prognosis, Instructions for management, Importance of tx compliance, Counseling / Coordination of care, Reviewing/placing orders in the medical record (including tests, medications, and/or procedures), and Communicating with other healthcare professionals .    **Please Note: This note may have been constructed using a voice recognition system.**

## 2025-07-18 NOTE — PLAN OF CARE
Problem: PAIN - ADULT  Goal: Verbalizes/displays adequate comfort level or baseline comfort level  Description: Interventions:  - Encourage patient to monitor pain and request assistance  - Assess pain using appropriate pain scale  - Administer analgesics as ordered based on type and severity of pain and evaluate response  - Implement non-pharmacological measures as appropriate and evaluate response  - Consider cultural and social influences on pain and pain management  - Notify physician/advanced practitioner if interventions unsuccessful or patient reports new pain  - Educate patient/family on pain management process including their role and importance of  reporting pain   - Provide non-pharmacologic/complimentary pain relief interventions  Outcome: Progressing     Problem: INFECTION - ADULT  Goal: Absence or prevention of progression during hospitalization  Description: INTERVENTIONS:  - Assess and monitor for signs and symptoms of infection  - Monitor lab/diagnostic results  - Monitor all insertion sites, i.e. indwelling lines, tubes, and drains  - Monitor endotracheal if appropriate and nasal secretions for changes in amount and color  - [place] appropriate cooling/warming therapies per order  - Administer medications as ordered  - Instruct and encourage patient and family to use good hand hygiene technique  - Identify and instruct in appropriate isolation precautions for identified infection/condition  Outcome: Progressing  Goal: Absence of fever/infection during neutropenic period  Description: INTERVENTIONS:  - Monitor WBC  - Perform strict hand hygiene  - Limit to healthy visitors only  - No plants, dried, fresh or silk flowers with gifford in patient room  Outcome: Progressing     Problem: SAFETY ADULT  Goal: Patient will remain free of falls  Description: INTERVENTIONS:  - Educate patient/family on patient safety including physical limitations  - Instruct patient to call for assistance with activity   -  Consider consulting OT/PT to assist with strengthening/mobility based on AM PAC & JH-HLM score  - Consult OT/PT to assist with strengthening/mobility   - Keep Call bell within reach  - Keep bed low and locked with side rails adjusted as appropriate  - Keep care items and personal belongings within reach  - Initiate and maintain comfort rounds  - Make Fall Risk Sign visible to staff  - Offer Toileting every 2 Hours, in advance of need  - Initiate/Maintain bed/chair alarm  - Obtain necessary fall risk management equipment:   - Apply yellow socks and bracelet for high fall risk patients  - Consider moving patient to room near nurses station  Outcome: Progressing  Goal: Maintain or return to baseline ADL function  Description: INTERVENTIONS:  -  Assess patient's ability to carry out ADLs; assess patient's baseline for ADL function and identify physical deficits which impact ability to perform ADLs (bathing, care of mouth/teeth, toileting, grooming, dressing, etc.)  - Assess/evaluate cause of self-care deficits   - Assess range of motion  - Assess patient's mobility; develop plan if impaired  - Assess patient's need for assistive devices and provide as appropriate  - Encourage maximum independence but intervene and supervise when necessary  - Involve family in performance of ADLs  - Assess for home care needs following discharge   - Consider OT consult to assist with ADL evaluation and planning for discharge  - Provide patient education as appropriate  - Monitor functional capacity and physical performance, use of AM PAC & JH-HLM   - Monitor gait, balance and fatigue with ambulation    Outcome: Progressing  Goal: Maintains/Returns to pre admission functional level  Description: INTERVENTIONS:  - Perform AM-PAC 6 Click Basic Mobility/ Daily Activity assessment daily.  - Set and communicate daily mobility goal to care team and patient/family/caregiver.   - Collaborate with rehabilitation services on mobility goals if  consulted  - Perform Range of Motion  times a day.  - Reposition patient every  hours.  - Dangle patient  times a day  - Stand patient  times a day  - Ambulate patient  times a day  - Out of bed to chair  times a day   - Out of bed for meals  times a day  - Out of bed for toileting  - Record patient progress and toleration of activity level   Outcome: Progressing     Problem: DISCHARGE PLANNING  Goal: Discharge to home or other facility with appropriate resources  Description: INTERVENTIONS:  - Identify barriers to discharge w/patient and caregiver  - Arrange for needed discharge resources and transportation as appropriate  - Identify discharge learning needs (meds, wound care, etc.)  - Arrange for interpretive services to assist at discharge as needed  - Refer to Case Management Department for coordinating discharge planning if the patient needs post-hospital services based on physician/advanced practitioner order or complex needs related to functional status, cognitive ability, or social support system  Outcome: Progressing

## 2025-07-18 NOTE — CASE MANAGEMENT
Case Management Discharge Planning Note    Patient name Galen Carson  Location 2 [facility] 264/2 E 264-01 MRN [id number]  : 1950 Date 2025       Current Admission Date: 2025  Current Admission Diagnosis:Acute on chronic combined systolic and diastolic congestive heart failure (HCC)   Patient Active Problem List    Diagnosis Date Noted    Acute on chronic combined systolic and diastolic congestive heart failure ([facility]) 2025    Dyspnea 2025    Elevated troponin 2025    SVT (supraventricular tachycardia) ([facility]) 2025    Junctional tachycardia ([facility]) 2025    Mild pulmonary hypertension ([facility]) 2025    Dyslipidemia 2025    Abnormal EKG 2025    Nonobstructive CAD 2025    History of NSVT, PVCs 2025    CKD (chronic kidney disease) stage 4, GFR 15-29 ml/min ([facility]) 01/15/2025    Type 2 diabetes mellitus with diabetic chronic kidney disease, unspecified CKD stage, unspecified whether long term insulin use ([facility]) 01/15/2025    On continuous oral anticoagulation 2024    Iron deficiency anemia due to chronic blood loss 2024    Chronic HFrEF 2024    ICD (implantable cardioverter-defibrillator) in place 2024    Obesity 2023    Paroxysmal atrial fibrillation 10/22/2023    NICM with EF 25% s/p MDT DC ICD (2024) 10/02/2023    Essential hypertension 10/20/2022    CKD stage 3b, GFR 30-44 ml/min ([facility]) 2022    Type 2 diabetes mellitus with hyperglycemia, without long-term current use of insulin ([facility]) 2022    Encounter for monitoring androgen deprivation therapy 2019    Hot flashes 2019    Prostate cancer ([facility]) 2019    Positive colorectal cancer screening using Cologuard test 2019    Mixed hyperlipidemia 2019    Upper respiratory tract infection 2019      LOS (days): 2  Geometric Mean LOS (GMLOS) (days): 3.9  Days to GMLOS:2     OBJECTIVE:  Risk of Unplanned Readmission Score: 23.04          Current admission status: Inpatient   Preferred Pharmacy:   [facility] - [place], [place] - [address]  [address] [place] [address]  Phone: [phone] Fax: [phone]    [id number] - [place] - [address]  [address]  [place] [address]  Phone: [phone] Fax: [phone]    Primary Care Provider: DANITA Girard    Primary Insurance: MEDICARE  Secondary Insurance: COMMERCIAL MISCELLANEOUS    DISCHARGE DETAILS:     As per SLIM rounds, Cardiology following, patient has ICD and pulse ox. OPCM referral made in Epic Basket.

## 2025-07-19 PROBLEM — I50.23 ACUTE ON CHRONIC HFREF (HEART FAILURE WITH REDUCED EJECTION FRACTION) (HCC): Status: ACTIVE | Noted: 2025-07-16

## 2025-07-19 PROBLEM — I49.3 FREQUENT PVCS: Status: ACTIVE | Noted: 2025-07-19

## 2025-07-19 LAB
ANION GAP SERPL CALCULATED.3IONS-SCNC: 9 MMOL/L (ref 4–13)
ATRIAL RATE: 46 BPM
BUN SERPL-MCNC: 75 MG/DL (ref 5–25)
CALCIUM SERPL-MCNC: 9.3 MG/DL (ref 8.4–10.2)
CHLORIDE SERPL-SCNC: 103 MMOL/L (ref 96–108)
CO2 SERPL-SCNC: 29 MMOL/L (ref 21–32)
CREAT SERPL-MCNC: 1.81 MG/DL (ref 0.6–1.3)
ERYTHROCYTE [DISTWIDTH] IN BLOOD BY AUTOMATED COUNT: 14 % (ref 11.6–15.1)
GFR SERPL CREATININE-BSD FRML MDRD: 36 ML/MIN/1.73SQ M
GLUCOSE SERPL-MCNC: 136 MG/DL (ref 65–140)
GLUCOSE SERPL-MCNC: 145 MG/DL (ref 65–140)
GLUCOSE SERPL-MCNC: 153 MG/DL (ref 65–140)
GLUCOSE SERPL-MCNC: 159 MG/DL (ref 65–140)
GLUCOSE SERPL-MCNC: 164 MG/DL (ref 65–140)
HCT VFR BLD AUTO: 33.2 % (ref 36.5–49.3)
HGB BLD-MCNC: 10.9 G/DL (ref 12–17)
MAGNESIUM SERPL-MCNC: 2.8 MG/DL (ref 1.9–2.7)
MCH RBC QN AUTO: 29.9 PG (ref 26.8–34.3)
MCHC RBC AUTO-ENTMCNC: 32.8 G/DL (ref 31.4–37.4)
MCV RBC AUTO: 91 FL (ref 82–98)
PLATELET # BLD AUTO: 182 THOUSANDS/UL (ref 149–390)
PMV BLD AUTO: 10 FL (ref 8.9–12.7)
POTASSIUM SERPL-SCNC: 4.7 MMOL/L (ref 3.5–5.3)
QRS AXIS: 92 DEGREES
QRSD INTERVAL: 142 MS
QT INTERVAL: 450 MS
QTC INTERVAL: 562 MS
RBC # BLD AUTO: 3.64 MILLION/UL (ref 3.88–5.62)
SODIUM SERPL-SCNC: 141 MMOL/L (ref 135–147)
T WAVE AXIS: -86 DEGREES
VENTRICULAR RATE: 94 BPM
WBC # BLD AUTO: 4.59 THOUSAND/UL (ref 4.31–10.16)

## 2025-07-19 PROCEDURE — 85027 COMPLETE CBC AUTOMATED: CPT | Performed by: NURSE PRACTITIONER

## 2025-07-19 PROCEDURE — 83735 ASSAY OF MAGNESIUM: CPT | Performed by: NURSE PRACTITIONER

## 2025-07-19 PROCEDURE — 80048 BASIC METABOLIC PNL TOTAL CA: CPT | Performed by: NURSE PRACTITIONER

## 2025-07-19 PROCEDURE — 82948 REAGENT STRIP/BLOOD GLUCOSE: CPT

## 2025-07-19 PROCEDURE — 99232 SBSQ HOSP IP/OBS MODERATE 35: CPT | Performed by: NURSE PRACTITIONER

## 2025-07-19 PROCEDURE — 99222 1ST HOSP IP/OBS MODERATE 55: CPT | Performed by: INTERNAL MEDICINE

## 2025-07-19 PROCEDURE — 93010 ELECTROCARDIOGRAM REPORT: CPT | Performed by: INTERNAL MEDICINE

## 2025-07-19 RX ADMIN — ATORVASTATIN CALCIUM 40 MG: 40 TABLET, FILM COATED ORAL at 16:58

## 2025-07-19 RX ADMIN — WARFARIN SODIUM 7.5 MG: 7.5 TABLET ORAL at 12:47

## 2025-07-19 RX ADMIN — METOPROLOL SUCCINATE 100 MG: 100 TABLET, EXTENDED RELEASE ORAL at 08:13

## 2025-07-19 RX ADMIN — DIFLUPREDNATE OPHTHALMIC 1 DROP: 0.5 EMULSION OPHTHALMIC at 08:14

## 2025-07-19 RX ADMIN — LOSARTAN POTASSIUM 25 MG: 25 TABLET, FILM COATED ORAL at 08:13

## 2025-07-19 RX ADMIN — INSULIN LISPRO 1 UNITS: 100 INJECTION, SOLUTION INTRAVENOUS; SUBCUTANEOUS at 16:59

## 2025-07-19 RX ADMIN — PANTOPRAZOLE SODIUM 20 MG: 20 TABLET, DELAYED RELEASE ORAL at 08:13

## 2025-07-19 RX ADMIN — OXYCODONE HYDROCHLORIDE AND ACETAMINOPHEN 1000 MG: 500 TABLET ORAL at 08:13

## 2025-07-19 RX ADMIN — APALUTAMIDE 240 MG: 60 TABLET, FILM COATED ORAL at 08:14

## 2025-07-19 RX ADMIN — ASPIRIN 81 MG: 81 TABLET, CHEWABLE ORAL at 08:13

## 2025-07-19 RX ADMIN — Medication 400 UNITS: at 08:14

## 2025-07-19 RX ADMIN — DIFLUPREDNATE OPHTHALMIC 1 DROP: 0.5 EMULSION OPHTHALMIC at 17:00

## 2025-07-19 RX ADMIN — BUMETANIDE 2 MG: 0.25 INJECTION INTRAMUSCULAR; INTRAVENOUS at 17:00

## 2025-07-19 RX ADMIN — BUMETANIDE 2 MG: 0.25 INJECTION INTRAMUSCULAR; INTRAVENOUS at 08:13

## 2025-07-19 NOTE — ASSESSMENT & PLAN NOTE
GDMT:   Beta Blocker: Continue Toprol 100 mg daily  ACEI/ARB/ARNI: Continue losartan 25 mg daily  MRA: Will hold off on initiating in the setting of CKD 3b.   SGLT2i: Jardiance sent for price check  Device therapy: He is s/p ICD. Continue to follow with the device clinic.    Advanced HF referral placed

## 2025-07-19 NOTE — ASSESSMENT & PLAN NOTE
Wt Readings from Last 3 Encounters:   07/19/25 103 kg (227 lb)   07/10/25 104 kg (230 lb)   07/09/25 106 kg (233 lb)     Presented with ongoing shortness of breath, and lightheadedness.  History of heart failure with low according to last echo done in April 2025 showing EF 25% with systolic function severely reduced  Patient admits to taking GDMT medications as tolerated including torsemide 40 mg twice a day, losartan 25 mg daily, metoprolol 100 mg daily.  No significant weight gain.  BNP on admission marginally 1311 higher than patient's baseline.  Chest x-ray with no pulmonary edema.    Plan:  Continue IV Bumex 2 mg twice a day  Strict I's/O  Daily weight  Patient confirms that he is standing for his daily weights and his rate has remained the same 2 days in a row at 227 unfortunately  Sodium restriction  Patient still complains of shortness of breath and is still experiencing some lower extremity edema reports poor urine output at home on Lasix  Cardiology consulted  Awaiting further recommendations  Patient does have some significant concerns regarding home oxygen however there is been no documentation of hypoxia he is supposed to wear CPAP at home which he refuses to be compliant with attempting to do a home oxygen evaluation tonight 7/19

## 2025-07-19 NOTE — PROGRESS NOTES
Progress Note - Hospitalist   Name: Galen Carson 75 y.o. male I MRN: [id number]  Unit/Bed#: 2 E 264-01 I Date of Admission: 7/16/2025   Date of Service: 7/19/2025 I Hospital Day: 3    Assessment & Plan  Acute on chronic HFrEF (heart failure with reduced ejection fraction) ([facility])  Wt Readings from Last 3 Encounters:   07/19/25 103 kg (227 lb)   07/10/25 104 kg (230 lb)   07/09/25 106 kg (233 lb)     Presented with ongoing shortness of breath, and lightheadedness.  History of heart failure with low according to last echo done in April 2025 showing EF 25% with systolic function severely reduced  Patient admits to taking GDMT medications as tolerated including torsemide 40 mg twice a day, losartan 25 mg daily, metoprolol 100 mg daily.  No significant weight gain.  BNP on admission marginally 1311 higher than patient's baseline.  Chest x-ray with no pulmonary edema.    Plan:  Continue IV Bumex 2 mg twice a day  Strict I's/O  Daily weight  Patient confirms that he is standing for his daily weights and his rate has remained the same 2 days in a row at 227 unfortunately  Sodium restriction  Patient still complains of shortness of breath and is still experiencing some lower extremity edema reports poor urine output at home on Lasix  Cardiology consulted  Awaiting further recommendations  Patient does have some significant concerns regarding home oxygen however there is been no documentation of hypoxia he is supposed to wear CPAP at home which he refuses to be compliant with attempting to do a home oxygen evaluation tonight 7/19    Type 2 diabetes mellitus with hyperglycemia, without long-term current use of insulin ([facility])  Lab Results   Component Value Date    HGBA1C 7.3 (H) 07/16/2025     Recent Labs     07/18/25  1109 07/18/25  1648 07/18/25  2022 07/19/25  0740   POCGLU 150* 114 204* 153*   Blood Sugar Average: Last 72 hrs:  (P) [id number]    Patient takes Amaryl 1 mg daily.  Sliding scale  Carb controlled  diet    NICM with EF 25% s/p MDT DC ICD (1/4/2024)  According to last echo 4/21/25 EF of 25%.  S/p ICD  Currently on GDMT as tolerated.  Follows with cardiology And nephrology  Paroxysmal atrial fibrillation  Patient takes warfarin 7.5 mg on Monday, Wednesday, Friday, Saturday.  The rest of the day patient takes 5 mg.  INR 2.12 on admission.  Continue current regimen     Lab Results   Component Value Date    INR 2.29 (H) 07/18/2025      CKD (chronic kidney disease) stage 4, GFR 15-29 ml/min ([facility])  Lab Results   Component Value Date    EGFR 36 07/19/2025    EGFR 32 07/18/2025    EGFR 30 07/17/2025    CREATININE 1.81 (H) 07/19/2025    CREATININE 1.97 (H) 07/18/2025    CREATININE 2.05 (H) 07/17/2025     Elevated creatinine to 2.03.  Above patient's baseline which is 1.6-1.9.  Suspected to be in the setting of volume overload.  Started on IV Bumex 2 mg twice a day  Primary hypertension    Nonobstructive CAD    Elevated troponin    Paroxysmal SVT (supraventricular tachycardia) (HCC)    Mild pulmonary hypertension (HCC)    Dyslipidemia    Frequent PVCs      VTE Pharmacologic Prophylaxis: VTE Score: 4 Moderate Risk (Score 3-4) - Pharmacological DVT Prophylaxis Ordered: warfarin (Coumadin).    Mobility:   Basic Mobility Inpatient Raw Score: 24  JH-HLM Goal: 8: Walk 250 feet or more  JH-HLM Achieved: 8: Walk 250 feet ot more  JH-HLM Goal NOT achieved. Continue with multidisciplinary rounding and encourage appropriate mobility to improve upon JH-HLM goals.    Patient Centered Rounds: I performed bedside rounds with nursing staff today.   Discussions with Specialists or Other Care Team Provider: Discussed with cardiology reviewed notes discussed with case management    Education and Discussions with Family / Patient: 2 family members at bedside during rounding with cardiology this morning updates provided    Current Length of Stay: 3 day(s)  Current Patient Status: Inpatient   Certification Statement: The patient will continue  to require additional inpatient hospital stay due to ongoing diuresing and cardiology recommendations  Discharge Plan: Anticipate discharge in 24-48 hrs to home.    Code Status: Level 1 - Full Code    Subjective   Patient is certainly more agitated and irritable this morning, he has significant health frustrations and he is also frustrated regarding his inability to get oxygen at home he has not required any oxygen here in the hospital and has not been hypoxic but he does think that oxygen would help him at home he has been wearing oxygen at night for comfort he does have a CPAP machine but he refuses to wear it here in the hospital and at home he does not appear to be in any distress he did ambulate down the entire length of the hallway from his room to the elevators with out any observed difficulty or hypoxia at that time continue to provide education and emotional support    Objective :  Temp:  [97.6 °F (36.4 °C)-98.3 °F (36.8 °C)] 97.6 °F (36.4 °C)  HR:  [37-57] 57  BP: ()/(51-55) 116/51  Resp:  [16] 16  SpO2:  [91 %-97 %] 91 %  O2 Device: None (Room air)    Body mass index is 32.57 kg/m².     Input and Output Summary (last 24 hours):     Intake/Output Summary (Last 24 hours) at 7/19/2025 1023  Last data filed at 7/18/2025 2030  Gross per 24 hour   Intake 240 ml   Output --   Net 240 ml       Physical Exam  Vitals reviewed.   Constitutional:       General: He is not in acute distress.     Appearance: He is normal weight.     Cardiovascular:      Rate and Rhythm: Normal rate.      Heart sounds: No murmur heard.  Pulmonary:      Effort: No respiratory distress.     Musculoskeletal:         General: No tenderness.      Cervical back: Normal range of motion.      Right lower leg: Edema present.      Left lower leg: Edema present.     Skin:     General: Skin is warm and dry.      Coloration: Skin is pale.     Neurological:      General: No focal deficit present.      Mental Status: He is alert.          Lines/Drains:      Lab Results: I have reviewed the following results:   Results from last 7 days   Lab Units 07/19/25  0552 07/18/25  0546 07/17/25  0605   WBC Thousand/uL 4.59   < > 4.86   HEMOGLOBIN g/dL 10.9*   < > 10.9*   HEMATOCRIT % 33.2*   < > 34.1*   PLATELETS Thousands/uL 182   < > 185   SEGS PCT %  --   --  61   LYMPHO PCT %  --   --  20   MONO PCT %  --   --  12   EOS PCT %  --   --  4    < > = values in this interval not displayed.     Results from last 7 days   Lab Units 07/19/25  0552 07/17/25  0605 07/16/25  1703   SODIUM mmol/L 141   < > 142   POTASSIUM mmol/L 4.7   < > 5.0   CHLORIDE mmol/L 103   < > 102   CO2 mmol/L 29   < > 29   BUN mg/dL 75*   < > 65*   CREATININE mg/dL 1.81*   < > 2.03*   ANION GAP mmol/L 9   < > 11   CALCIUM mg/dL 9.3   < > 9.0   ALBUMIN g/dL  --   --  4.6   TOTAL BILIRUBIN mg/dL  --   --  0.34   ALK PHOS U/L  --   --  91   ALT U/L  --   --  9   AST U/L  --   --  18   GLUCOSE RANDOM mg/dL 136   < > 96    < > = values in this interval not displayed.     Results from last 7 days   Lab Units 07/18/25  0546   INR  2.29*     Results from last 7 days   Lab Units 07/19/25  0740 07/18/25  2022 07/18/25  1648 07/18/25  1109 07/18/25  0744 07/17/25  2115 07/17/25  1556 07/17/25  1102 07/17/25  0714   POC GLUCOSE mg/dl 153* 204* 114 150* 145* 163* 165* 130 148*     Results from last 7 days   Lab Units 07/16/25  2233   HEMOGLOBIN A1C % 7.3*           Recent Cultures (last 7 days):               Last 24 Hours Medication List:     Current Facility-Administered Medications:     Apalutamide TABS 240 mg, Daily    ascorbic acid (VITAMIN C) tablet 1,000 mg, Daily    aspirin chewable tablet 81 mg, Daily    atorvastatin (LIPITOR) tablet 40 mg, Daily With Dinner    bumetanide (BUMEX) injection 2 mg, BID    Difluprednate 0.05 % EMUL 1 drop, BID    insulin lispro (HumALOG/ADMELOG) 100 units/mL subcutaneous injection 1-6 Units, TID AC **AND** Fingerstick Glucose (POCT), 4x Daily AC and at  bedtime    losartan (COZAAR) tablet 25 mg, Daily    metoprolol succinate (TOPROL-XL) 24 hr tablet 100 mg, Daily    pantoprazole (PROTONIX) EC tablet 20 mg, Daily    vitamin E (TOCOPHEROL) capsule 400 Units, Daily    warfarin (COUMADIN) tablet 5 mg, Once per day on Sunday Tuesday Thursday    warfarin (COUMADIN) tablet 7.5 mg, Once per day on Monday Wednesday Friday Saturday    Administrative Statements   Today, Patient Was Seen By: DANITA Herrera  I have spent a total time of 45 minutes in caring for this patient on the day of the visit/encounter including Diagnostic results, Prognosis, Instructions for management, Importance of tx compliance, Counseling / Coordination of care, Reviewing/placing orders in the medical record (including tests, medications, and/or procedures), and Communicating with other healthcare professionals .    **Please Note: This note may have been constructed using a voice recognition system.**

## 2025-07-19 NOTE — CASE MANAGEMENT
Case Management Progress Note    Patient name Galen Carson  Location 2 [facility] 264/2 E 264-01 MRN [id number]  : 1950 Date 2025       LOS (days): 3  Geometric Mean LOS (GMLOS) (days): 3.9  Days to GMLOS:1.2        OBJECTIVE:        Current admission status: Inpatient  Preferred Pharmacy:   [facility] - [place], [place] - [address]  [address]  [facility] [address]  Phone: [phone] Fax: [phone]    [id number] - [place], [place] - [address]  [address]  [place] [address]  Phone: [phone] Fax: [phone]    Primary Care Provider: DANITA Girard    Primary Insurance: MEDICARE  Secondary Insurance: COMMERCIAL MISCELLANEOUS    PROGRESS NOTE:  Price check requested by cardiology.  CM contacted pharmacy ([facility] [phone]), pharmacy is closed until Monday.  Per chart review, [facility]  Cards aware.  CM will [facility] check [facility] once script is re-routed.     @ 7959 - CM contacted [facility] for [facility] price-check.  ([phone])  Per tech, med is available for  but they do not have any ins on file for patient.  Unable to price-check.  CM to obtain secondary insurance info.  (CM reviewed media tab for ins card image.  Secondary noted as CSI but there is no BIN # which is needed for pharmacy to run.)

## 2025-07-19 NOTE — ASSESSMENT & PLAN NOTE
EKG demonstrates rate-controlled atrial fibrillation.   Rate control: Continue Toprol 100 mg daily   Continue anticoagulation with warfarin. Target INR 2-3.  [id number] 5.  Continue tele

## 2025-07-19 NOTE — CONSULTS
Consultation - Cardiology   Galen Carson 75 y.o. male MRN: [id number]  Unit/Bed#: 2 E 264-01 Encounter: [id number]  07/19/25  8:43 AM    Assessment/ Plan:   Assessment & Plan  Acute on chronic HFrEF (heart failure with reduced ejection fraction) ([facility])  Wt Readings from Last 3 Encounters:   07/19/25 103 kg (227 lb)   07/10/25 104 kg (230 lb)   07/09/25 106 kg (233 lb)   Intake/Output: Suspect inaccurate documentation  Weight: 227 pounds (Standing Scale). Dry weight: Approximately 126 lbs per chart review.   Appears hypervolemic on exam  Diuretic: Continue IV Bumex 2mg BID  GDMT: Per below   Recommend low-sodium diet, daily weights, strict I's and O's.  Recommend continuing to monitor and replete electrolytes as needed.  Continue tele   He felt that his home torsemide was not working well for him. Recommend reassessment of home diuretic regimen prior to discharge.   Advanced HF referral placed    NICM with EF 25% s/p MDT DC ICD (1/4/2024)  GDMT:   Beta Blocker: Continue Toprol 100 mg daily  ACEI/ARB/ARNI: Continue losartan 25 mg daily  MRA: Will hold off on initiating in the setting of CKD 3b.   SGLT2i: Jardiance sent for price check  Device therapy: He is s/p ICD. Continue to follow with the device clinic.    Advanced HF referral placed    Elevated troponin  EKG without acute ischemic changes. Troponins mildly elevated with flat trend. He denies chest pain.  Suspect nonischemic myocardial injury in the setting of acute CHF exacerbation    Frequent PVCs  NSVT/frequent PVCs  He is pending outpatient EP evaluation  Continue Toprol 100 mg daily  Recommend keeping K>4 and Mag >2  Continue tele    Paroxysmal atrial fibrillation  EKG demonstrates rate-controlled atrial fibrillation.   Rate control: Continue Toprol 100 mg daily   Continue anticoagulation with warfarin. Target INR 2-3.  [id number] 5.  Continue tele     Nonobstructive CAD  Continue aspirin, Lipitor, Toprol     CKD (chronic kidney disease) stage 4, GFR  15-29 ml/min ([facility])  Lab Results   Component Value Date    EGFR 36 07/19/2025    EGFR 32 07/18/2025    EGFR 30 07/17/2025    CREATININE 1.81 (H) 07/19/2025    CREATININE 1.97 (H) 07/18/2025    CREATININE 2.05 (H) 07/17/2025   Continue to monitor kidney function closely. Further management per primary team    Paroxysmal SVT (supraventricular tachycardia) ([facility])  Continue Toprol    Mild pulmonary hypertension (HCC)  Per [facility] 5/2025. Continue diuretics per above.     Primary hypertension  Continue losartan 25 mg daily, Toprol 100 mg daily, diuretics per above    Dyslipidemia  Continue Lipitor 40 mg daily     Type 2 diabetes mellitus with hyperglycemia, without long-term current use of insulin ([facility])  Lab Results   Component Value Date    HGBA1C 7.3 (H) 07/16/2025       Recent Labs     07/18/25  1109 07/18/25  1648 07/18/25  2022 07/19/25  0740   POCGLU 150* 114 204* 153*       Blood Sugar Average: Last 72 hrs:  (P) [id number]  Management per primary team        History of Present Illness   Physician Requesting Consult: Breezy Cheatham DO    Reason for Consult / Principal Problem: Acute on chronic HFrEF    HPI: Galen Carson is a 75 y.o. year old male with PMHx of NICM s/p MDT DC ICD (1/2024), chronic CHF, paroxysmal atrial fibrillation, mild nonobstructive CAD, NSVT, frequent PVCs, junctional tachycardia, paroxysmal SVT, mild pulmonary hypertension, hypertension, hyperlipidemia, CKD 3B, history of GI bleed/duodenal ulcers, GERD, history of prostate cancer, DM2, AKIL (not compliant with CPAP) who presents with shortness of breath, lower extremity edema, and lightheadedness. He has been experiencing persistent SOB with recent worsening, and LE edema. He reports he was compliant with home torsemide, but feels it is not working well for him at this point. He endorses compliance with low sodium diet. He reports he weighs himself, and did not note weight gain at home, although he appears to have gained  approximately 7-8 lbs per chart review. His dry weight was previously noted to be around 226 lbs, and weight was 233 lbs upon presentation. Troponins mildly elevated: 83-90-84.  BNP elevated at 1311.  EKG demonstrates rate-controlled Atrial fibrillation with controlled HR.   He was given a dose of IV Lasix 40 mg and subsequently initiated on IV Bumex 2 mg twice daily due to concerns for CHF exacerbation. He notes he received oxygen overnight last night, which he felt helped with his dyspnea.  He overall notes improvement in his dyspnea and LE edema, although these symptoms are still present.     He has followed with Dr. Lovell and Dr. Li in the outpatient cardiology setting  Cardiac LHC/RHC 5/2025: Mild coronary epicardial atherosclerosis, mildly elevated LVEDP, mild pulmonary hypertension  Pharmacologic MPI 4/21/2025: Predominantly fixed defect involving the entire inferior wall, apex, and basal septal region.  No reversible defect suggestive of ischemia identified.  Echocardiogram 4/21/2025: EF 25%, systolic function severely reduced with severe global hypokinesis.  Biatrial dilation.  Mild MR.  Mild to moderate TR.  RVSP 50-55 mmHg  Echocardiogram 8/2024: EF 31%, mild MR  Echocardiogram 5/2024: EF 20%, severe global hypokinesis, mild MAC  Cardiac catheterization 10/2023: Nonobstructive CAD, mild pulmonary hypertension      Inpatient consult to Cardiology  Consult performed by: Felix Suarez PA-C  Consult ordered by: DANITA Herrera          EKG: Rate- controlled Atrial fibrillation, nonspecific IVCD, frequent PVCs,       Review of Systems   Constitutional:  Negative for diaphoresis, fever and unexpected weight change.   HENT:  Negative for ear pain and sore throat.    Eyes:  Negative for pain and redness.   Respiratory:  Positive for shortness of breath. Negative for cough.    Cardiovascular:  Positive for leg swelling. Negative for chest pain and palpitations.   Gastrointestinal:  Negative  "for abdominal pain and vomiting.   Genitourinary:  Negative for dysuria.   Skin:  Negative for color change and rash.   Neurological:  Positive for light-headedness. Negative for syncope.   Hematological:  Does not bruise/bleed easily.   Psychiatric/Behavioral:  Negative for agitation and behavioral problems.    All other systems reviewed and are negative.      Historical Information   Past Medical History[1]  Past Surgical History[2]  Social History     Substance and Sexual Activity   Alcohol Use Not Currently     Social History     Substance and Sexual Activity   Drug Use Not Currently     Tobacco Use History[3]    Family History: Family History[4]    Meds/Allergies   all current active meds have been reviewed  Allergies[5]    Objective   Vitals: Blood pressure 116/51, pulse 57, temperature 97.6 °F (36.4 °C), resp. rate 16, height 5' 10\" (1.778 m), weight 103 kg (227 lb), SpO2 91%., Body mass index is 32.57 kg/m².,   Orthostatic Blood Pressures      Flowsheet Row Most Recent Value   Blood Pressure 116/51 filed at 2025 0738   Patient Position - Orthostatic VS Lying filed at 2025 2222            Systolic (24hrs), Av , Min:97 , Max:116     Diastolic (24hrs), Av, Min:51, Max:63        Intake/Output Summary (Last 24 hours) at 2025 0843  Last data filed at 2025 2030  Gross per 24 hour   Intake 240 ml   Output --   Net 240 ml       Invasive Devices       Peripheral Intravenous Line  Duration             Peripheral IV 25 Right Antecubital 2 days                        Physical Exam:    GEN: Alert and oriented x 3, in no acute distress.  Well appearing and well nourished.   HEENT: Sclera anicteric, conjunctivae pink, mucous membranes moist. Oropharynx clear.   NECK: Supple, no significant JVD. Trachea midline.   HEART: Regular rate, irregular rhythm, normal S1 and S2, no murmurs.   LUNGS: Decreased breath sounds bilaterally; no wheezes, rales, or rhonchi. No increased work of breathing " or signs of respiratory distress.   ABDOMEN: Soft, nontender, non-distended.   EXTREMITIES: Trace-1+ pitting edema to bilateral lower extremities skin warm and well perfused, no clubbing, cyanosis  NEURO: No focal findings. Normal speech.  SKIN: Normal without suspicious lesions on exposed skin.      Lab Results:     Troponins:   Results from last 7 days   Lab Units 07/16/25  2108 07/16/25  1917 07/16/25  1703   HS TNI 0HR ng/L  --   --  83*   HS TNI 2HR ng/L  --  90*  --    HS TNI 4HR ng/L 84*  --   --    HSTNI D4 ng/L 1  --   --        CBC with diff:   Results from last 7 days   Lab Units 07/19/25 0552 07/18/25  0546 07/17/25  0605 07/16/25  1703   WBC Thousand/uL 4.59 4.49 4.86 5.77   HEMOGLOBIN g/dL 10.9* 10.6* 10.9* 11.7*   HEMATOCRIT % 33.2* 31.6* 34.1* 35.0*   MCV fL 91 90 91 90   PLATELETS Thousands/uL 182 174 185 207   RBC Million/uL 3.64* 3.51* 3.75* 3.89   MCH pg 29.9 30.2 29.1 30.1   MCHC g/dL 32.8 33.5 32.0 33.4   RDW % 14.0 14.2 14.1 14.1   MPV fL 10.0 9.7 9.8 9.7   NRBC AUTO /100 WBCs  --   --  0 0         CMP:   Results from last 7 days   Lab Units 07/19/25  0552 07/18/25  0546 07/17/25  0605 07/16/25  1703 07/15/25  0712   POTASSIUM mmol/L 4.7 4.5 4.7 5.0 5.7*   CHLORIDE mmol/L 103 104 105 102 99   CO2 mmol/L 29 30 29 29 31   BUN mg/dL 75* 73* 72* 65* 63*   CREATININE mg/dL 1.81* 1.97* 2.05* 2.03* 1.86*   CALCIUM mg/dL 9.3 8.9 8.8 9.0 9.2   AST U/L  --   --   --  18  --    ALT U/L  --   --   --  9  --    ALK PHOS U/L  --   --   --  91  --    EGFR ml/min/1.73sq m 36 32 30 31 34       ** Please Note: Fluency DirectDictation voice to text software may have been used in the creation of this document. **         [1]   Past Medical History:  Diagnosis Date    Allergic     Anesthesia     pt wears a life vest (11/16).  should not be scheduled at [facility] until heart condition is stabilized    BPH (benign prostatic hypertrophy)     Cancer (HCC)     CHF (congestive heart failure) (HCC)     Chronic kidney disease      Coronary artery disease     Diabetes mellitus (HCC)     Hyperlipidemia     Hypertension     PAF (paroxysmal atrial fibrillation) (HCC)     Prostate cancer (HCC)    [2]   Past Surgical History:  Procedure Laterality Date    CARDIAC CATHETERIZATION N/A 10/04/2023    Procedure: Cardiac Coronary Angiogram;  Surgeon: Chris Lovell MD;  Location: [facility]ARD[facility];  Service: Cardiology    CARDIAC CATHETERIZATION N/A 10/04/2023    Procedure: Cardiac RHC/LHC;  Surgeon: Chris Lovell MD;  Location: [facility];  Service: Cardiology    CARDIAC CATHETERIZATION  10/04/2023    Procedure: Cardiac catheterization;  Surgeon: Chris Lovell MD;  Location: [facility];  Service: Cardiology    CARDIAC CATHETERIZATION Left 5/27/2025    Procedure: Cardiac Left Heart Cath;  Surgeon: Guillermo Li MD;  Location: [facility]ARDIAC [facility];  Service: Cardiology    CARDIAC CATHETERIZATION N/A 5/27/2025    Procedure: Cardiac RHC/LHC;  Surgeon: Guillermo Li MD;  Location: [facility];  Service: Cardiology    CARDIAC CATHETERIZATION  5/27/2025    Procedure: Cardiac Catheterization;  Surgeon: Guillermo Li MD;  Location: [facility];  Service: Cardiology    CARDIAC CATHETERIZATION N/A 5/27/2025    Procedure: Cardiac Coronary Angiogram;  Surgeon: Guillermo Li MD;  Location: [facility];  Service: Cardiology    CARDIAC DEFIBRILLATOR PLACEMENT  01/04/2024    CARDIAC ELECTROPHYSIOLOGY PROCEDURE N/A 01/04/2024    Procedure: Cardiac icd implant, Dual Chambers ICD;  Surgeon: Leo Whalen MD;  Location:  CARDIAC CATH LAB;  Service: Cardiology    EAR SURGERY      HERNIA REPAIR      MI COLONOSCOPY FLX DX W/COLLJ SPEC WHEN PFRMD N/A 05/06/2019    Procedure: COLONOSCOPY;  Surgeon: Winston Nielson III, MD;  Location: [facility];  Service: Gastroenterology   [3]   Social History  Tobacco Use   Smoking Status Former    Current packs/day: 0.00    Average packs/day: 1 pack/day for 25.0 years (25.0 ttl  pk-yrs)    Types: Cigarettes, Pipe, Cigars    Start date: 1980    Quit date: 2005    Years since quittin.4    Passive exposure: Past   Smokeless Tobacco Never   [4]   Family History  Problem Relation Name Age of Onset    Stroke Father Father     No Known Problems Mother      Prostate cancer Brother      Arthritis Brother      No Known Problems Sister      Diabetes Sister Brigitte     Diabetes Sister Fátima     No Known Problems Son      No Known Problems Daughter     [5]   Allergies  Allergen Reactions    Penicillin G Benzathine Other (See Comments)    Penicillin V Other (See Comments)     As a child

## 2025-07-19 NOTE — ASSESSMENT & PLAN NOTE
Wt Readings from Last 3 Encounters:   07/19/25 103 kg (227 lb)   07/10/25 104 kg (230 lb)   07/09/25 106 kg (233 lb)   Intake/Output: Suspect inaccurate documentation  Weight: 227 pounds (Standing Scale). Dry weight: Approximately 126 lbs per chart review.   Appears hypervolemic on exam  Diuretic: Continue IV Bumex 2mg BID  GDMT: Per below   Recommend low-sodium diet, daily weights, strict I's and O's.  Recommend continuing to monitor and replete electrolytes as needed.  Continue tele   He felt that his home torsemide was not working well for him. Recommend reassessment of home diuretic regimen prior to discharge.   Advanced HF referral placed

## 2025-07-19 NOTE — PLAN OF CARE
Problem: PAIN - ADULT  Goal: Verbalizes/displays adequate comfort level or baseline comfort level  Description: Interventions:  - Encourage patient to monitor pain and request assistance  - Assess pain using appropriate pain scale  - Administer analgesics as ordered based on type and severity of pain and evaluate response  - Implement non-pharmacological measures as appropriate and evaluate response  - Consider cultural and social influences on pain and pain management  - Notify physician/advanced practitioner if interventions unsuccessful or patient reports new pain  - Educate patient/family on pain management process including their role and importance of  reporting pain   - Provide non-pharmacologic/complimentary pain relief interventions  Outcome: Progressing     Problem: INFECTION - ADULT  Goal: Absence or prevention of progression during hospitalization  Description: INTERVENTIONS:  - Assess and monitor for signs and symptoms of infection  - Monitor lab/diagnostic results  - Monitor all insertion sites, i.e. indwelling lines, tubes, and drains  - Monitor endotracheal if appropriate and nasal secretions for changes in amount and color  - [place] appropriate cooling/warming therapies per order  - Administer medications as ordered  - Instruct and encourage patient and family to use good hand hygiene technique  - Identify and instruct in appropriate isolation precautions for identified infection/condition  Outcome: Progressing  Goal: Absence of fever/infection during neutropenic period  Description: INTERVENTIONS:  - Monitor WBC  - Perform strict hand hygiene  - Limit to healthy visitors only  - No plants, dried, fresh or silk flowers with gifford in patient room  Outcome: Progressing     Problem: SAFETY ADULT  Goal: Patient will remain free of falls  Description: INTERVENTIONS:  - Educate patient/family on patient safety including physical limitations  - Instruct patient to call for assistance with activity   -  Consider consulting OT/PT to assist with strengthening/mobility based on AM PAC & JH-HLM score  - Consult OT/PT to assist with strengthening/mobility   - Keep Call bell within reach  - Keep bed low and locked with side rails adjusted as appropriate  - Keep care items and personal belongings within reach  - Initiate and maintain comfort rounds  - Make Fall Risk Sign visible to staff  - Offer Toileting every 2 Hours, in advance of need  - Initiate/Maintain alarm  - Obtain necessary fall risk management equipment  - Apply yellow socks and bracelet for high fall risk patients  - Consider moving patient to room near nurses station  Outcome: Progressing  Goal: Maintain or return to baseline ADL function  Description: INTERVENTIONS:  -  Assess patient's ability to carry out ADLs; assess patient's baseline for ADL function and identify physical deficits which impact ability to perform ADLs (bathing, care of mouth/teeth, toileting, grooming, dressing, etc.)  - Assess/evaluate cause of self-care deficits   - Assess range of motion  - Assess patient's mobility; develop plan if impaired  - Assess patient's need for assistive devices and provide as appropriate  - Encourage maximum independence but intervene and supervise when necessary  - Involve family in performance of ADLs  - Assess for home care needs following discharge   - Consider OT consult to assist with ADL evaluation and planning for discharge  - Provide patient education as appropriate  - Monitor functional capacity and physical performance, use of AM PAC & JH-HLM   - Monitor gait, balance and fatigue with ambulation    Outcome: Progressing  Goal: Maintains/Returns to pre admission functional level  Description: INTERVENTIONS:  - Perform AM-PAC 6 Click Basic Mobility/ Daily Activity assessment daily.  - Set and communicate daily mobility goal to care team and patient/family/caregiver.   - Collaborate with rehabilitation services on mobility goals if consulted  -  Perform Range of Motion 3 times a day.  - Reposition patient every 3 hours.  - Dangle patient 3 times a day  - Stand patient 3 times a day  - Ambulate patient 3 times a day  - Out of bed to chair 3 times a day   - Out of bed for meals 3 times a day  - Out of bed for toileting  - Record patient progress and toleration of activity level   Outcome: Progressing     Problem: DISCHARGE PLANNING  Goal: Discharge to home or other facility with appropriate resources  Description: INTERVENTIONS:  - Identify barriers to discharge w/patient and caregiver  - Arrange for needed discharge resources and transportation as appropriate  - Identify discharge learning needs (meds, wound care, etc.)  - Arrange for interpretive services to assist at discharge as needed  - Refer to Case Management Department for coordinating discharge planning if the patient needs post-hospital services based on physician/advanced practitioner order or complex needs related to functional status, cognitive ability, or social support system  Outcome: Progressing     Problem: Knowledge Deficit  Goal: Patient/family/caregiver demonstrates understanding of disease process, treatment plan, medications, and discharge instructions  Description: Complete learning assessment and assess knowledge base.  Interventions:  - Provide teaching at level of understanding  - Provide teaching via preferred learning methods  Outcome: Progressing

## 2025-07-19 NOTE — ASSESSMENT & PLAN NOTE
Lab Results   Component Value Date    HGBA1C 7.3 (H) 07/16/2025     Recent Labs     07/18/25  1109 07/18/25  1648 07/18/25 2022 07/19/25  0740   POCGLU 150* 114 204* 153*   Blood Sugar Average: Last 72 hrs:  (P) [id number]    Patient takes Amaryl 1 mg daily.  Sliding scale  Carb controlled diet

## 2025-07-19 NOTE — ASSESSMENT & PLAN NOTE
Lab Results   Component Value Date    EGFR 36 07/19/2025    EGFR 32 07/18/2025    EGFR 30 07/17/2025    CREATININE 1.81 (H) 07/19/2025    CREATININE 1.97 (H) 07/18/2025    CREATININE 2.05 (H) 07/17/2025     Elevated creatinine to 2.03.  Above patient's baseline which is 1.6-1.9.  Suspected to be in the setting of volume overload.  Started on IV Bumex 2 mg twice a day

## 2025-07-19 NOTE — PLAN OF CARE
Problem: PAIN - ADULT  Goal: Verbalizes/displays adequate comfort level or baseline comfort level  Description: Interventions:  - Encourage patient to monitor pain and request assistance  - Assess pain using appropriate pain scale  - Administer analgesics as ordered based on type and severity of pain and evaluate response  - Implement non-pharmacological measures as appropriate and evaluate response  - Consider cultural and social influences on pain and pain management  - Notify physician/advanced practitioner if interventions unsuccessful or patient reports new pain  - Educate patient/family on pain management process including their role and importance of  reporting pain   - Provide non-pharmacologic/complimentary pain relief interventions  7/19/2025 0608 by Katie Jeffery LPN  Outcome: Progressing  7/18/2025 2201 by Katie Jeffery LPN  Outcome: Progressing     Problem: INFECTION - ADULT  Goal: Absence or prevention of progression during hospitalization  Description: INTERVENTIONS:  - Assess and monitor for signs and symptoms of infection  - Monitor lab/diagnostic results  - Monitor all insertion sites, i.e. indwelling lines, tubes, and drains  - Monitor endotracheal if appropriate and nasal secretions for changes in amount and color  - [place] appropriate cooling/warming therapies per order  - Administer medications as ordered  - Instruct and encourage patient and family to use good hand hygiene technique  - Identify and instruct in appropriate isolation precautions for identified infection/condition  7/19/2025 0608 by Katie Jeffery LPN  Outcome: Progressing  7/18/2025 2201 by Katie Jeffery LPN  Outcome: Progressing  Goal: Absence of fever/infection during neutropenic period  Description: INTERVENTIONS:  - Monitor WBC  - Perform strict hand hygiene  - Limit to healthy visitors only  - No plants, dried, fresh or silk flowers with gifford in patient room  7/19/2025 0608 by Katie Jeffery LPN  Outcome:  Progressing  7/18/2025 2201 by Katie Jeffery LPN  Outcome: Progressing     Problem: SAFETY ADULT  Goal: Patient will remain free of falls  Description: INTERVENTIONS:  - Educate patient/family on patient safety including physical limitations  - Instruct patient to call for assistance with activity   - Consider consulting OT/PT to assist with strengthening/mobility based on AM PAC & JH-HLM score  - Consult OT/PT to assist with strengthening/mobility   - Keep Call bell within reach  - Keep bed low and locked with side rails adjusted as appropriate  - Keep care items and personal belongings within reach  - Initiate and maintain comfort rounds  - Make Fall Risk Sign visible to staff  - Offer Toileting every 2 Hours, in advance of need  - Initiate/Maintain alarm  - Obtain necessary fall risk management equipment  - Apply yellow socks and bracelet for high fall risk patients  - Consider moving patient to room near nurses station  7/19/2025 0608 by Katie Jeffery LPN  Outcome: Progressing  7/18/2025 2201 by Katie Jeffery LPN  Outcome: Progressing  Goal: Maintain or return to baseline ADL function  Description: INTERVENTIONS:  -  Assess patient's ability to carry out ADLs; assess patient's baseline for ADL function and identify physical deficits which impact ability to perform ADLs (bathing, care of mouth/teeth, toileting, grooming, dressing, etc.)  - Assess/evaluate cause of self-care deficits   - Assess range of motion  - Assess patient's mobility; develop plan if impaired  - Assess patient's need for assistive devices and provide as appropriate  - Encourage maximum independence but intervene and supervise when necessary  - Involve family in performance of ADLs  - Assess for home care needs following discharge   - Consider OT consult to assist with ADL evaluation and planning for discharge  - Provide patient education as appropriate  - Monitor functional capacity and physical performance, use of AM PAC & JH-HLM   -  Monitor gait, balance and fatigue with ambulation    7/19/2025 0608 by Katie Jeffery LPN  Outcome: Progressing  7/18/2025 2201 by Katie Jeffery LPN  Outcome: Progressing  Goal: Maintains/Returns to pre admission functional level  Description: INTERVENTIONS:  - Perform AM-PAC 6 Click Basic Mobility/ Daily Activity assessment daily.  - Set and communicate daily mobility goal to care team and patient/family/caregiver.   - Collaborate with rehabilitation services on mobility goals if consulted  - Perform Range of Motion 3 times a day.  - Reposition patient every 3 hours.  - Dangle patient 3 times a day  - Stand patient 3 times a day  - Ambulate patient 3 times a day  - Out of bed to chair 3 times a day   - Out of bed for meals 3 times a day  - Out of bed for toileting  - Record patient progress and toleration of activity level   7/19/2025 0608 by Katie Jeffery LPN  Outcome: Progressing  7/18/2025 2201 by Katie Jeffery LPN  Outcome: Progressing     Problem: DISCHARGE PLANNING  Goal: Discharge to home or other facility with appropriate resources  Description: INTERVENTIONS:  - Identify barriers to discharge w/patient and caregiver  - Arrange for needed discharge resources and transportation as appropriate  - Identify discharge learning needs (meds, wound care, etc.)  - Arrange for interpretive services to assist at discharge as needed  - Refer to Case Management Department for coordinating discharge planning if the patient needs post-hospital services based on physician/advanced practitioner order or complex needs related to functional status, cognitive ability, or social support system  7/19/2025 0608 by Katie Jeffery LPN  Outcome: Progressing  7/18/2025 2201 by Katie Jeffery LPN  Outcome: Progressing     Problem: Knowledge Deficit  Goal: Patient/family/caregiver demonstrates understanding of disease process, treatment plan, medications, and discharge instructions  Description: Complete learning assessment  and assess knowledge base.  Interventions:  - Provide teaching at level of understanding  - Provide teaching via preferred learning methods  7/19/2025 0608 by Katie Jeffery LPN  Outcome: Progressing  7/18/2025 2201 by Katie Jeffery LPN  Outcome: Progressing

## 2025-07-19 NOTE — PLAN OF CARE
Problem: PAIN - ADULT  Goal: Verbalizes/displays adequate comfort level or baseline comfort level  Description: Interventions:  - Encourage patient to monitor pain and request assistance  - Assess pain using appropriate pain scale  - Administer analgesics as ordered based on type and severity of pain and evaluate response  - Implement non-pharmacological measures as appropriate and evaluate response  - Consider cultural and social influences on pain and pain management  - Notify physician/advanced practitioner if interventions unsuccessful or patient reports new pain  - Educate patient/family on pain management process including their role and importance of  reporting pain   - Provide non-pharmacologic/complimentary pain relief interventions  Outcome: Progressing     Problem: INFECTION - ADULT  Goal: Absence or prevention of progression during hospitalization  Description: INTERVENTIONS:  - Assess and monitor for signs and symptoms of infection  - Monitor lab/diagnostic results  - Monitor all insertion sites, i.e. indwelling lines, tubes, and drains  - Monitor endotracheal if appropriate and nasal secretions for changes in amount and color  - [place] appropriate cooling/warming therapies per order  - Administer medications as ordered  - Instruct and encourage patient and family to use good hand hygiene technique  - Identify and instruct in appropriate isolation precautions for identified infection/condition  Outcome: Progressing  Goal: Absence of fever/infection during neutropenic period  Description: INTERVENTIONS:  - Monitor WBC  - Perform strict hand hygiene  - Limit to healthy visitors only  - No plants, dried, fresh or silk flowers with gifford in patient room  Outcome: Progressing     Problem: SAFETY ADULT  Goal: Patient will remain free of falls  Description: INTERVENTIONS:  - Educate patient/family on patient safety including physical limitations  - Instruct patient to call for assistance with activity   -  Consider consulting OT/PT to assist with strengthening/mobility based on AM PAC & JH-HLM score  - Consult OT/PT to assist with strengthening/mobility   - Keep Call bell within reach  - Keep bed low and locked with side rails adjusted as appropriate  - Keep care items and personal belongings within reach  - Initiate and maintain comfort rounds  - Make Fall Risk Sign visible to staff  - Offer Toileting every  Hours, in advance of need  - Initiate/Maintain alarm  - Obtain necessary fall risk management equipment:   - Apply yellow socks and bracelet for high fall risk patients  - Consider moving patient to room near nurses station  Outcome: Progressing  Goal: Maintain or return to baseline ADL function  Description: INTERVENTIONS:  -  Assess patient's ability to carry out ADLs; assess patient's baseline for ADL function and identify physical deficits which impact ability to perform ADLs (bathing, care of mouth/teeth, toileting, grooming, dressing, etc.)  - Assess/evaluate cause of self-care deficits   - Assess range of motion  - Assess patient's mobility; develop plan if impaired  - Assess patient's need for assistive devices and provide as appropriate  - Encourage maximum independence but intervene and supervise when necessary  - Involve family in performance of ADLs  - Assess for home care needs following discharge   - Consider OT consult to assist with ADL evaluation and planning for discharge  - Provide patient education as appropriate  - Monitor functional capacity and physical performance, use of AM PAC & JH-HLM   - Monitor gait, balance and fatigue with ambulation    Outcome: Progressing  Goal: Maintains/Returns to pre admission functional level  Description: INTERVENTIONS:  - Perform AM-PAC 6 Click Basic Mobility/ Daily Activity assessment daily.  - Set and communicate daily mobility goal to care team and patient/family/caregiver.   - Collaborate with rehabilitation services on mobility goals if consulted  -  Perform Range of Motion  times a day.  - Reposition patient every  hours.  - Dangle patient  times a day  - Stand patient  times a day  - Ambulate patient  times a day  - Out of bed to chair  times a day   - Out of bed for meals  times a day  - Out of bed for toileting  - Record patient progress and toleration of activity level   Outcome: Progressing     Problem: DISCHARGE PLANNING  Goal: Discharge to home or other facility with appropriate resources  Description: INTERVENTIONS:  - Identify barriers to discharge w/patient and caregiver  - Arrange for needed discharge resources and transportation as appropriate  - Identify discharge learning needs (meds, wound care, etc.)  - Arrange for interpretive services to assist at discharge as needed  - Refer to Case Management Department for coordinating discharge planning if the patient needs post-hospital services based on physician/advanced practitioner order or complex needs related to functional status, cognitive ability, or social support system  Outcome: Progressing     Problem: Knowledge Deficit  Goal: Patient/family/caregiver demonstrates understanding of disease process, treatment plan, medications, and discharge instructions  Description: Complete learning assessment and assess knowledge base.  Interventions:  - Provide teaching at level of understanding  - Provide teaching via preferred learning methods  Outcome: Progressing

## 2025-07-19 NOTE — ASSESSMENT & PLAN NOTE
Lab Results   Component Value Date    EGFR 36 07/19/2025    EGFR 32 07/18/2025    EGFR 30 07/17/2025    CREATININE 1.81 (H) 07/19/2025    CREATININE 1.97 (H) 07/18/2025    CREATININE 2.05 (H) 07/17/2025   Continue to monitor kidney function closely. Further management per primary team

## 2025-07-19 NOTE — ASSESSMENT & PLAN NOTE
Lab Results   Component Value Date    HGBA1C 7.3 (H) 07/16/2025       Recent Labs     07/18/25  1109 07/18/25  1648 07/18/25 2022 07/19/25  0740   POCGLU 150* 114 204* 153*       Blood Sugar Average: Last 72 hrs:  (P) [id number]  Management per primary team

## 2025-07-19 NOTE — ASSESSMENT & PLAN NOTE
NSVT/frequent PVCs  He is pending outpatient EP evaluation  Continue Toprol 100 mg daily  Recommend keeping K>4 and Mag >2  Continue tele     Pre-Surgery Instructions:   Medication Instructions   • allopurinol (ZYLOPRIM) 100 mg tablet Take day of surgery. • ASPIRIN 81 PO Instructions provided by MD Already stopped   • atorvastatin (LIPITOR) 20 mg tablet Take day of surgery. • candesartan-hydrochlorothiazide (ATACAND HCT) 16-12.5 MG per tablet Hold day of surgery. • Cholecalciferol (VITAMIN D3 PO) Stop taking    • metoprolol tartrate (LOPRESSOR) 25 mg tablet Take day of surgery. Review with patient via phone medications and showering instructions. He says only going to take metoprolol morning DOS. Instructed to avoid all ASA and OTC Vit/Supp prior to surgery and to avoid NSAIDs 3 days prior to surgery per anesthesia instructions. Tylenol ok to take prn. [facility] call with surgery schedule time, nothing eat or drink after midnight. Verbalized understanding.

## 2025-07-19 NOTE — ASSESSMENT & PLAN NOTE
EKG without acute ischemic changes. Troponins mildly elevated with flat trend. He denies chest pain.  Suspect nonischemic myocardial injury in the setting of acute CHF exacerbation

## 2025-07-20 PROBLEM — G47.30 SLEEP APNEA: Status: ACTIVE | Noted: 2025-07-20

## 2025-07-20 LAB
ANION GAP SERPL CALCULATED.3IONS-SCNC: 9 MMOL/L (ref 4–13)
BUN SERPL-MCNC: 73 MG/DL (ref 5–25)
CALCIUM SERPL-MCNC: 9.7 MG/DL (ref 8.4–10.2)
CHLORIDE SERPL-SCNC: 105 MMOL/L (ref 96–108)
CO2 SERPL-SCNC: 27 MMOL/L (ref 21–32)
CREAT SERPL-MCNC: 1.66 MG/DL (ref 0.6–1.3)
ERYTHROCYTE [DISTWIDTH] IN BLOOD BY AUTOMATED COUNT: 13.9 % (ref 11.6–15.1)
GFR SERPL CREATININE-BSD FRML MDRD: 39 ML/MIN/1.73SQ M
GLUCOSE SERPL-MCNC: 126 MG/DL (ref 65–140)
GLUCOSE SERPL-MCNC: 138 MG/DL (ref 65–140)
GLUCOSE SERPL-MCNC: 146 MG/DL (ref 65–140)
GLUCOSE SERPL-MCNC: 167 MG/DL (ref 65–140)
GLUCOSE SERPL-MCNC: 170 MG/DL (ref 65–140)
HCT VFR BLD AUTO: 33.5 % (ref 36.5–49.3)
HGB BLD-MCNC: 11.1 G/DL (ref 12–17)
MAGNESIUM SERPL-MCNC: 2.7 MG/DL (ref 1.9–2.7)
MCH RBC QN AUTO: 30.2 PG (ref 26.8–34.3)
MCHC RBC AUTO-ENTMCNC: 33.1 G/DL (ref 31.4–37.4)
MCV RBC AUTO: 91 FL (ref 82–98)
PLATELET # BLD AUTO: 170 THOUSANDS/UL (ref 149–390)
PMV BLD AUTO: 9.8 FL (ref 8.9–12.7)
POTASSIUM SERPL-SCNC: 4.9 MMOL/L (ref 3.5–5.3)
RBC # BLD AUTO: 3.68 MILLION/UL (ref 3.88–5.62)
SODIUM SERPL-SCNC: 141 MMOL/L (ref 135–147)
WBC # BLD AUTO: 4.72 THOUSAND/UL (ref 4.31–10.16)

## 2025-07-20 PROCEDURE — 82948 REAGENT STRIP/BLOOD GLUCOSE: CPT

## 2025-07-20 PROCEDURE — 80048 BASIC METABOLIC PNL TOTAL CA: CPT | Performed by: NURSE PRACTITIONER

## 2025-07-20 PROCEDURE — 99232 SBSQ HOSP IP/OBS MODERATE 35: CPT | Performed by: INTERNAL MEDICINE

## 2025-07-20 PROCEDURE — 99232 SBSQ HOSP IP/OBS MODERATE 35: CPT | Performed by: NURSE PRACTITIONER

## 2025-07-20 PROCEDURE — 83735 ASSAY OF MAGNESIUM: CPT | Performed by: NURSE PRACTITIONER

## 2025-07-20 PROCEDURE — 85027 COMPLETE CBC AUTOMATED: CPT | Performed by: NURSE PRACTITIONER

## 2025-07-20 PROCEDURE — 94762 N-INVAS EAR/PLS OXIMTRY CONT: CPT

## 2025-07-20 RX ORDER — BUMETANIDE 0.25 MG/ML
2 INJECTION, SOLUTION INTRAMUSCULAR; INTRAVENOUS 3 TIMES DAILY
Status: DISCONTINUED | OUTPATIENT
Start: 2025-07-20 | End: 2025-07-21

## 2025-07-20 RX ORDER — INSULIN LISPRO 100 [IU]/ML
1-6 INJECTION, SOLUTION INTRAVENOUS; SUBCUTANEOUS
Status: DISCONTINUED | OUTPATIENT
Start: 2025-07-20 | End: 2025-07-21 | Stop reason: HOSPADM

## 2025-07-20 RX ADMIN — BUMETANIDE 2 MG: 0.25 INJECTION INTRAMUSCULAR; INTRAVENOUS at 21:04

## 2025-07-20 RX ADMIN — PANTOPRAZOLE SODIUM 20 MG: 20 TABLET, DELAYED RELEASE ORAL at 09:22

## 2025-07-20 RX ADMIN — ASPIRIN 81 MG: 81 TABLET, CHEWABLE ORAL at 09:22

## 2025-07-20 RX ADMIN — WARFARIN SODIUM 5 MG: 5 TABLET ORAL at 13:47

## 2025-07-20 RX ADMIN — INSULIN LISPRO 1 UNITS: 100 INJECTION, SOLUTION INTRAVENOUS; SUBCUTANEOUS at 21:04

## 2025-07-20 RX ADMIN — BUMETANIDE 2 MG: 0.25 INJECTION INTRAMUSCULAR; INTRAVENOUS at 09:23

## 2025-07-20 RX ADMIN — LOSARTAN POTASSIUM 25 MG: 25 TABLET, FILM COATED ORAL at 09:22

## 2025-07-20 RX ADMIN — BUMETANIDE 2 MG: 0.25 INJECTION INTRAMUSCULAR; INTRAVENOUS at 17:10

## 2025-07-20 RX ADMIN — DIFLUPREDNATE OPHTHALMIC 1 DROP: 0.5 EMULSION OPHTHALMIC at 09:22

## 2025-07-20 RX ADMIN — Medication 400 UNITS: at 09:22

## 2025-07-20 RX ADMIN — DIFLUPREDNATE OPHTHALMIC 1 DROP: 0.5 EMULSION OPHTHALMIC at 17:14

## 2025-07-20 RX ADMIN — INSULIN LISPRO 1 UNITS: 100 INJECTION, SOLUTION INTRAVENOUS; SUBCUTANEOUS at 17:14

## 2025-07-20 RX ADMIN — APALUTAMIDE 240 MG: 60 TABLET, FILM COATED ORAL at 09:22

## 2025-07-20 RX ADMIN — METOPROLOL SUCCINATE 100 MG: 100 TABLET, EXTENDED RELEASE ORAL at 09:22

## 2025-07-20 RX ADMIN — OXYCODONE HYDROCHLORIDE AND ACETAMINOPHEN 1000 MG: 500 TABLET ORAL at 09:22

## 2025-07-20 RX ADMIN — ATORVASTATIN CALCIUM 40 MG: 40 TABLET, FILM COATED ORAL at 17:10

## 2025-07-20 RX ADMIN — CHLOROTHIAZIDE SODIUM 500 MG: 500 INJECTION, POWDER, LYOPHILIZED, FOR SOLUTION INTRAVENOUS at 10:55

## 2025-07-20 NOTE — ASSESSMENT & PLAN NOTE
Lab Results   Component Value Date    HGBA1C 7.3 (H) 07/16/2025       Recent Labs     07/19/25  1135 07/19/25  1549 07/19/25  2100 07/20/25  0659   POCGLU 145* 164* 159* 138       Blood Sugar Average: Last 72 hrs:  (P) [id number]  Management per primary team

## 2025-07-20 NOTE — PLAN OF CARE
Problem: PAIN - ADULT  Goal: Verbalizes/displays adequate comfort level or baseline comfort level  Description: Interventions:  - Encourage patient to monitor pain and request assistance  - Assess pain using appropriate pain scale  - Administer analgesics as ordered based on type and severity of pain and evaluate response  - Implement non-pharmacological measures as appropriate and evaluate response  - Consider cultural and social influences on pain and pain management  - Notify physician/advanced practitioner if interventions unsuccessful or patient reports new pain  - Educate patient/family on pain management process including their role and importance of  reporting pain   - Provide non-pharmacologic/complimentary pain relief interventions  Outcome: Progressing     Problem: INFECTION - ADULT  Goal: Absence or prevention of progression during hospitalization  Description: INTERVENTIONS:  - Assess and monitor for signs and symptoms of infection  - Monitor lab/diagnostic results  - Monitor all insertion sites, i.e. indwelling lines, tubes, and drains  - Monitor endotracheal if appropriate and nasal secretions for changes in amount and color  - [place] appropriate cooling/warming therapies per order  - Administer medications as ordered  - Instruct and encourage patient and family to use good hand hygiene technique  - Identify and instruct in appropriate isolation precautions for identified infection/condition  Outcome: Progressing  Goal: Absence of fever/infection during neutropenic period  Description: INTERVENTIONS:  - Monitor WBC  - Perform strict hand hygiene  - Limit to healthy visitors only  - No plants, dried, fresh or silk flowers with gifford in patient room  Outcome: Progressing     Problem: SAFETY ADULT  Goal: Patient will remain free of falls  Description: INTERVENTIONS:  - Educate patient/family on patient safety including physical limitations  - Instruct patient to call for assistance with activity   -  Consider consulting OT/PT to assist with strengthening/mobility based on AM PAC & JH-HLM score  - Consult OT/PT to assist with strengthening/mobility   - Keep Call bell within reach  - Keep bed low and locked with side rails adjusted as appropriate  - Keep care items and personal belongings within reach  - Initiate and maintain comfort rounds  - Make Fall Risk Sign visible to staff  - Offer Toileting every  Hours, in advance of need  - Initiate/Maintain alarm  - Obtain necessary fall risk management equipment:   - Apply yellow socks and bracelet for high fall risk patients  - Consider moving patient to room near nurses station  Outcome: Progressing  Goal: Maintain or return to baseline ADL function  Description: INTERVENTIONS:  -  Assess patient's ability to carry out ADLs; assess patient's baseline for ADL function and identify physical deficits which impact ability to perform ADLs (bathing, care of mouth/teeth, toileting, grooming, dressing, etc.)  - Assess/evaluate cause of self-care deficits   - Assess range of motion  - Assess patient's mobility; develop plan if impaired  - Assess patient's need for assistive devices and provide as appropriate  - Encourage maximum independence but intervene and supervise when necessary  - Involve family in performance of ADLs  - Assess for home care needs following discharge   - Consider OT consult to assist with ADL evaluation and planning for discharge  - Provide patient education as appropriate  - Monitor functional capacity and physical performance, use of AM PAC & JH-HLM   - Monitor gait, balance and fatigue with ambulation    Outcome: Progressing  Goal: Maintains/Returns to pre admission functional level  Description: INTERVENTIONS:  - Perform AM-PAC 6 Click Basic Mobility/ Daily Activity assessment daily.  - Set and communicate daily mobility goal to care team and patient/family/caregiver.   - Collaborate with rehabilitation services on mobility goals if consulted  -  Perform Range of Motion  times a day.  - Reposition patient every  hours.  - Dangle patient  times a dAY  - Stand patient  times a day  - Ambulate patient  times a day  - Out of bed to chair  times a day   - Out of bed for meals  times a day  - Out of bed for toileting  - Record patient progress and toleration of activity level   Outcome: Progressing     Problem: DISCHARGE PLANNING  Goal: Discharge to home or other facility with appropriate resources  Description: INTERVENTIONS:  - Identify barriers to discharge w/patient and caregiver  - Arrange for needed discharge resources and transportation as appropriate  - Identify discharge learning needs (meds, wound care, etc.)  - Arrange for interpretive services to assist at discharge as needed  - Refer to Case Management Department for coordinating discharge planning if the patient needs post-hospital services based on physician/advanced practitioner order or complex needs related to functional status, cognitive ability, or social support system  Outcome: Progressing     Problem: Knowledge Deficit  Goal: Patient/family/caregiver demonstrates understanding of disease process, treatment plan, medications, and discharge instructions  Description: Complete learning assessment and assess knowledge base.  Interventions:  - Provide teaching at level of understanding  - Provide teaching via preferred learning methods  Outcome: Progressing

## 2025-07-20 NOTE — ASSESSMENT & PLAN NOTE
Patient has had a sleep study in the past and has been diagnosed with sleep apnea and recommended to wear CPAP he had a CPAP at home but states that he thinks the humidification from the CPAP is what causes all of his heart failure exacerbation so he refuses to wear it  Patient becomes agitated whenever you suggest that he wear his CPAP machine  Patient is adamant that oxygen overnight is what would be best for him  Overnight pulse oximetry test completed from 7/19 through 7/20  Patient had 42 episodes of hypoxia with SpO2 less than 89%  Will discuss with case management regarding getting oxygen overnight for the patient at home  Will encourage compliance and follow-up with pulmonology

## 2025-07-20 NOTE — ASSESSMENT & PLAN NOTE
NSVT/frequent PVCs  He is pending outpatient EP evaluation  Continue Toprol 100 mg daily  Recommend keeping K>4 and Mag >2  Continue tele

## 2025-07-20 NOTE — PLAN OF CARE
Problem: PAIN - ADULT  Goal: Verbalizes/displays adequate comfort level or baseline comfort level  Description: Interventions:  - Encourage patient to monitor pain and request assistance  - Assess pain using appropriate pain scale  - Administer analgesics as ordered based on type and severity of pain and evaluate response  - Implement non-pharmacological measures as appropriate and evaluate response  - Consider cultural and social influences on pain and pain management  - Notify physician/advanced practitioner if interventions unsuccessful or patient reports new pain  - Educate patient/family on pain management process including their role and importance of  reporting pain   - Provide non-pharmacologic/complimentary pain relief interventions  Outcome: Progressing     Problem: INFECTION - ADULT  Goal: Absence or prevention of progression during hospitalization  Description: INTERVENTIONS:  - Assess and monitor for signs and symptoms of infection  - Monitor lab/diagnostic results  - Monitor all insertion sites, i.e. indwelling lines, tubes, and drains  - Monitor endotracheal if appropriate and nasal secretions for changes in amount and color  - [place] appropriate cooling/warming therapies per order  - Administer medications as ordered  - Instruct and encourage patient and family to use good hand hygiene technique  - Identify and instruct in appropriate isolation precautions for identified infection/condition  Outcome: Progressing  Goal: Absence of fever/infection during neutropenic period  Description: INTERVENTIONS:  - Monitor WBC  - Perform strict hand hygiene  - Limit to healthy visitors only  - No plants, dried, fresh or silk flowers with gifford in patient room  Outcome: Progressing     Problem: SAFETY ADULT  Goal: Patient will remain free of falls  Description: INTERVENTIONS:  - Educate patient/family on patient safety including physical limitations  - Instruct patient to call for assistance with activity   -  Consider consulting OT/PT to assist with strengthening/mobility based on AM PAC & JH-HLM score  - Consult OT/PT to assist with strengthening/mobility   - Keep Call bell within reach  - Keep bed low and locked with side rails adjusted as appropriate  - Keep care items and personal belongings within reach  - Initiate and maintain comfort rounds  - Make Fall Risk Sign visible to staff  - Offer Toileting every 2 Hours, in advance of need  - Initiate/Maintain alarm  - Obtain necessary fall risk management equipment  - Apply yellow socks and bracelet for high fall risk patients  - Consider moving patient to room near nurses station  Outcome: Progressing  Goal: Maintain or return to baseline ADL function  Description: INTERVENTIONS:  -  Assess patient's ability to carry out ADLs; assess patient's baseline for ADL function and identify physical deficits which impact ability to perform ADLs (bathing, care of mouth/teeth, toileting, grooming, dressing, etc.)  - Assess/evaluate cause of self-care deficits   - Assess range of motion  - Assess patient's mobility; develop plan if impaired  - Assess patient's need for assistive devices and provide as appropriate  - Encourage maximum independence but intervene and supervise when necessary  - Involve family in performance of ADLs  - Assess for home care needs following discharge   - Consider OT consult to assist with ADL evaluation and planning for discharge  - Provide patient education as appropriate  - Monitor functional capacity and physical performance, use of AM PAC & JH-HLM   - Monitor gait, balance and fatigue with ambulation    Outcome: Progressing  Goal: Maintains/Returns to pre admission functional level  Description: INTERVENTIONS:  - Perform AM-PAC 6 Click Basic Mobility/ Daily Activity assessment daily.  - Set and communicate daily mobility goal to care team and patient/family/caregiver.   - Collaborate with rehabilitation services on mobility goals if consulted  -  Perform Range of Motion 3 times a day.  - Reposition patient every 3 hours.  - Dangle patient 3 times a day  - Stand patient 3 times a day  - Ambulate patient 3 times a day  - Out of bed to chair 3 times a day   - Out of bed for meals 3 times a day  - Out of bed for toileting  - Record patient progress and toleration of activity level   Outcome: Progressing     Problem: DISCHARGE PLANNING  Goal: Discharge to home or other facility with appropriate resources  Description: INTERVENTIONS:  - Identify barriers to discharge w/patient and caregiver  - Arrange for needed discharge resources and transportation as appropriate  - Identify discharge learning needs (meds, wound care, etc.)  - Arrange for interpretive services to assist at discharge as needed  - Refer to Case Management Department for coordinating discharge planning if the patient needs post-hospital services based on physician/advanced practitioner order or complex needs related to functional status, cognitive ability, or social support system  Outcome: Progressing     Problem: Knowledge Deficit  Goal: Patient/family/caregiver demonstrates understanding of disease process, treatment plan, medications, and discharge instructions  Description: Complete learning assessment and assess knowledge base.  Interventions:  - Provide teaching at level of understanding  - Provide teaching via preferred learning methods  Outcome: Progressing

## 2025-07-20 NOTE — PROGRESS NOTES
Cardiology Progress Note - Galen Carson 75 y.o. male MRN: [id number]    Unit/Bed#: 2 E 264-01 Encounter: [id number]      Assessment/Plan:  Assessment & Plan  Acute on chronic HFrEF (heart failure with reduced ejection fraction) ([facility]ly [facility])  Wt Readings from Last 3 Encounters:   07/20/25 103 kg (227 lb)   07/10/25 104 kg (230 lb)   07/09/25 106 kg (233 lb)   Intake/Output: Suspect inaccurate documentation  Weight: 227 pounds (Standing Scale). Dry weight: Approximately 126 lbs per chart review.   Appears hypervolemic on exam  Diuretic: Uptitrate IV Bumex to 3 mg 3 times daily.  One-time dose of IV Diuril ordered.  GDMT: Per below   Recommend low-sodium diet, daily weights, strict I's and O's.  Recommend continuing to monitor and replete electrolytes as needed.  Continue tele   He felt that his home torsemide was not working well for him.  He has previously been maintained on p.o. Lasix.  Recommend reassessment of home diuretic regimen prior to discharge.   Advanced HF referral placed    NICM with EF 25% s/p MDT DC ICD (1/4/2024)  GDMT:   Beta Blocker: Continue Toprol 100 mg daily  ACEI/ARB/ARNI: Continue losartan 25 mg daily  MRA: Will hold off on initiating in the setting of CKD 3b and tendency for elevated K/hyperkalemia.   SGLT2i: Jardiance price check pending.  Device therapy: He is s/p ICD. Continue to follow with the device clinic.    Advanced HF referral placed    Elevated troponin  EKG without acute ischemic changes. Troponins mildly elevated with flat trend. He denies chest pain.  Suspect nonischemic myocardial injury in the setting of acute CHF exacerbation    Frequent PVCs  NSVT/frequent PVCs  He is pending outpatient EP evaluation  Continue Toprol 100 mg daily  Recommend keeping K>4 and Mag >2  Continue tele    Paroxysmal atrial fibrillation  EKG demonstrates rate-controlled atrial fibrillation.   Rate control: Continue Toprol 100 mg daily   Continue anticoagulation with warfarin. Target INR 2-3.   "[id number].  Continue tele     Nonobstructive CAD  Continue aspirin, Lipitor, Toprol     CKD (chronic kidney disease) stage 4, GFR 15-29 ml/min ([facility])  Lab Results   Component Value Date    EGFR 39 07/20/2025    EGFR 36 07/19/2025    EGFR 32 07/18/2025    CREATININE 1.66 (H) 07/20/2025    CREATININE 1.81 (H) 07/19/2025    CREATININE 1.97 (H) 07/18/2025   Continue to monitor kidney function closely. Further management per primary team    Paroxysmal SVT (supraventricular tachycardia) ([facility])  Continue Toprol    Mild pulmonary hypertension ([facility])  Per [facility] 5/2025. Continue diuretics per above.     Primary hypertension  Continue losartan 25 mg daily, Toprol 100 mg daily, diuretics per above    Dyslipidemia  Continue Lipitor 40 mg daily     Type 2 diabetes mellitus with hyperglycemia, without long-term current use of insulin ([facility])  Lab Results   Component Value Date    HGBA1C 7.3 (H) 07/16/2025       Recent Labs     07/19/25  1135 07/19/25  1549 07/19/25  2100 07/20/25  0659   POCGLU 145* 164* 159* 138       Blood Sugar Average: Last 72 hrs:  (P) [id number]  Management per primary team        Subjective:   Patient seen and examined.  He notes persistent lower extremity edema, and feels he is still retaining fluid.  He does not notice significant improvement in his volume status compared to yesterday.    Objective:     Vitals: Blood pressure 121/81, pulse 76, temperature 98.6 °F (37 °C), temperature source Oral, resp. rate 17, height 5' 10\" (1.778 m), weight 103 kg (227 lb), SpO2 91%., Body mass index is 32.57 kg/m².,   Orthostatic Blood Pressures      Flowsheet Row Most Recent Value   Blood Pressure 121/81 filed at 07/20/2025 0701   Patient Position - Orthostatic VS Lying filed at 07/20/2025 0700              Intake/Output Summary (Last 24 hours) at 7/20/2025 0904  Last data filed at 7/20/2025 0701  Gross per 24 hour   Intake 480 ml   Output --   Net 480 ml         Physical Exam:   GEN: Alert and oriented x 3, " in no acute distress.  Well appearing and well nourished.   HEENT: Sclera anicteric, conjunctivae pink, mucous membranes moist. Oropharynx clear.   NECK: Supple, no significant JVD. Trachea midline.   HEART: Regular rate, irregular rhythm, normal S1 and S2, no murmurs, clicks, gallops or rubs. PMI nondisplaced, no thrills.   LUNGS: Decreased breath sounds bilaterally; no wheezes, rales, or rhonchi. No increased work of breathing or signs of respiratory distress.   ABDOMEN: Soft, nontender, non-distended.   EXTREMITIES: 1+ pitting edema to bilateral lower extremities.  Skin warm and well perfused, no clubbing, cyanosis  NEURO: No focal findings. Normal speech. Mood and affect normal.   SKIN: Normal without suspicious lesions on exposed skin.      Medications:    Current Medications[1]     Labs & Results:    Results from last 7 days   Lab Units 07/16/25  2108 07/16/25  1917 07/16/25  1703   HS TNI 0HR ng/L  --   --  83*   HS TNI 2HR ng/L  --  90*  --    HS TNI 4HR ng/L 84*  --   --    HSTNI D4 ng/L 1  --   --      Results from last 7 days   Lab Units 07/20/25  0500 07/19/25  0552 07/18/25  0546   WBC Thousand/uL 4.72 4.59 4.49   HEMOGLOBIN g/dL 11.1* 10.9* 10.6*   HEMATOCRIT % 33.5* 33.2* 31.6*   PLATELETS Thousands/uL 170 182 174         Results from last 7 days   Lab Units 07/20/25  0500 07/19/25  0552 07/18/25  0546 07/17/25  0605 07/16/25  1703   POTASSIUM mmol/L 4.9 4.7 4.5   < > 5.0   CHLORIDE mmol/L 105 103 104   < > 102   CO2 mmol/L 27 29 30   < > 29   BUN mg/dL 73* 75* 73*   < > 65*   CREATININE mg/dL 1.66* 1.81* 1.97*   < > 2.03*   CALCIUM mg/dL 9.7 9.3 8.9   < > 9.0   ALK PHOS U/L  --   --   --   --  91   ALT U/L  --   --   --   --  9   AST U/L  --   --   --   --  18    < > = values in this interval not displayed.     Results from last 7 days   Lab Units 07/18/25  0546 07/16/25  1703 07/15/25  0712   INR  2.29* 2.12* 2.24*     Results from last 7 days   Lab Units 07/20/25  0500 07/19/25  0552 07/18/25  0546    MAGNESIUM mg/dL 2.7 2.8* 2.7       ** Please Note: Fluency DirectDictation voice to text software may have been used in the creation of this document. **           [1]   Current Facility-Administered Medications:     Apalutamide TABS 240 mg, 240 mg, Oral, Daily, Monica Valle MD, 240 mg at 07/19/25 0814    ascorbic acid (VITAMIN C) tablet 1,000 mg, 1,000 mg, Oral, Daily, Monica Valle MD, 1,000 mg at 07/19/25 0813    aspirin chewable tablet 81 mg, 81 mg, Oral, Daily, Monica Valle MD, 81 mg at 07/19/25 0813    atorvastatin (LIPITOR) tablet 40 mg, 40 mg, Oral, Daily With Dinner, Monica Valle MD, 40 mg at 07/19/25 1658    bumetanide (BUMEX) injection 2 mg, 2 mg, Intravenous, BID, Monica Valle MD, 2 mg at 07/19/25 1700    Difluprednate 0.05 % EMUL 1 drop, 1 drop, Ophthalmic, BID, DANITA Herrera, 1 drop at 07/19/25 1700    insulin lispro (HumALOG/ADMELOG) 100 units/mL subcutaneous injection 1-6 Units, 1-6 Units, Subcutaneous, TID AC, 1 Units at 07/19/25 1659 **AND** Fingerstick Glucose (POCT), , , 4x Daily AC and at bedtime, Monica Valle MD    losartan (COZAAR) tablet 25 mg, 25 mg, Oral, Daily, Monica Valle MD, 25 mg at 07/19/25 0813    metoprolol succinate (TOPROL-XL) 24 hr tablet 100 mg, 100 mg, Oral, Daily, Monica Valle MD, 100 mg at 07/19/25 0813    pantoprazole (PROTONIX) EC tablet 20 mg, 20 mg, Oral, Daily, Monica Valle MD, 20 mg at 07/19/25 0813    vitamin E (TOCOPHEROL) capsule 400 Units, 400 Units, Oral, Daily, Monica Valle MD, 400 Units at 07/19/25 0814    warfarin (COUMADIN) tablet 5 mg, 5 mg, Oral, Once per day on Sunday Tuesday Thursday, Monica Valle MD, 5 mg at 07/17/25 1241    warfarin (COUMADIN) tablet 7.5 mg, 7.5 mg, Oral, Once per day on Monday Wednesday Friday Saturday, Monica Valle MD, 7.5 mg at 07/19/25 1247

## 2025-07-20 NOTE — PROGRESS NOTES
aProgress Note - Hospitalist   Name: Galen Carson 75 y.o. male I MRN: [id number]  Unit/Bed#: 2 E 264-01 I Date of Admission: 7/16/2025   Date of Service: 7/20/2025 I Hospital Day: 4    Assessment & Plan  Acute on chronic HFrEF (heart failure with reduced ejection fraction) ([facility])  Wt Readings from Last 3 Encounters:   07/20/25 103 kg (227 lb)   07/10/25 104 kg (230 lb)   07/09/25 106 kg (233 lb)     Presented with ongoing shortness of breath, and lightheadedness.  History of heart failure with low according to last echo done in April 2025 showing EF 25% with systolic function severely reduced  Patient admits to taking GDMT medications as tolerated including torsemide 40 mg twice a day, losartan 25 mg daily, metoprolol 100 mg daily.  No significant weight gain.  Chest x-ray with no pulmonary edema.    Plan:  Strict I's/O  Daily weight  Patient confirms that he is standing for his daily weights and his rate has remained the same 2 days in a row at 227 unfortunately  Sodium restriction  reports poor urine output at home on Lasix  Cardiology consulted  Increased Bumex to 3 times daily today 7/20   added 1 dose of Diuril for 7/20  Patient does have some significant concerns regarding home oxygen however there is been no documentation of hypoxia he is supposed to wear CPAP at home which he refuses to be compliant with attempting to do a home oxygen evaluation tonight 7/19    Type 2 diabetes mellitus with hyperglycemia, without long-term current use of insulin ([facility])  Lab Results   Component Value Date    HGBA1C 7.3 (H) 07/16/2025     Recent Labs     07/19/25  1549 07/19/25  2100 07/20/25  0659 07/20/25  1137   POCGLU 164* 159* 138 126   Blood Sugar Average: Last 72 hrs:  (P) [id number]    Patient takes Amaryl 1 mg daily.  Sliding scale  Carb controlled diet    NICM with EF 25% s/p MDT DC ICD (1/4/2024)  According to last echo 4/21/25 EF of 25%.  S/p ICD  Currently on GDMT as tolerated.  Follows with cardiology  And nephrology  Paroxysmal atrial fibrillation  Patient takes warfarin 7.5 mg on Monday, Wednesday, Friday, Saturday.  The rest of the day patient takes 5 mg.  INR 2.12 on admission.  Continue current regimen     Lab Results   Component Value Date    INR 2.29 (H) 07/18/2025      CKD (chronic kidney disease) stage 4, GFR 15-29 ml/min ([facility])  Lab Results   Component Value Date    EGFR 39 07/20/2025    EGFR 36 07/19/2025    EGFR 32 07/18/2025    CREATININE 1.66 (H) 07/20/2025    CREATININE 1.81 (H) 07/19/2025    CREATININE 1.97 (H) 07/18/2025     Elevated creatinine to 2.03.  Above patient's baseline which is 1.6-1.9.  Suspected to be in the setting of volume overload.  Started on IV Bumex 2 mg twice a day  Primary hypertension  Continue losartan 25 mg daily, Toprol 100 mg daily, diuretics per above   Nonobstructive CAD    Elevated troponin    Paroxysmal SVT (supraventricular tachycardia) ([facility])    Mild pulmonary hypertension ([facility])    Dyslipidemia    Frequent PVCs    Sleep apnea  Patient has had a sleep study in the past and has been diagnosed with sleep apnea and recommended to wear CPAP he had a CPAP at home but states that he thinks the humidification from the CPAP is what causes all of his heart failure exacerbation so he refuses to wear it  Patient becomes agitated whenever you suggest that he wear his CPAP machine  Patient is adamant that oxygen overnight is what would be best for him  Overnight pulse oximetry test completed from 7/19 through 7/20  Patient had 42 episodes of hypoxia with SpO2 less than 89%  Will discuss with case management regarding getting oxygen overnight for the patient at home  Will encourage compliance and follow-up with pulmonology    VTE Pharmacologic Prophylaxis: VTE Score: 4 Moderate Risk (Score 3-4) - Pharmacological DVT Prophylaxis Ordered: warfarin (Coumadin).    Mobility:   Basic Mobility Inpatient Raw Score: 24  JH-HLM Goal: 8: Walk 250 feet or more  JH-HLM Achieved: 8: Walk 250 feet  ot more  JH-HLM Goal NOT achieved. Continue with multidisciplinary rounding and encourage appropriate mobility to improve upon JH-HLM goals.    Patient Centered Rounds: I performed bedside rounds with nursing staff today.   Discussions with Specialists or Other Care Team Provider: Discussed with cardiology reviewed notes discussed with case management    Education and Discussions with Family / Patient: 2 family members at bedside during rounding with cardiology this morning updates provided    Current Length of Stay: 4 day(s)  Current Patient Status: Inpatient   Certification Statement: The patient will continue to require additional inpatient hospital stay due to ongoing diuresing and cardiology recommendations  Discharge Plan: Anticipate discharge in 24-48 hrs to home.    Code Status: Level 1 - Full Code    Subjective   Doing well,  Pt denies any SOB, difficult breathing, chest pain or tightness. Pt denies any nausea, vomiting, diarrhea or difficulty breathing. Agreeable to stay one more day, likely qualifies for home nightly o2 he is excited about this     Objective :  Temp:  [97.5 °F (36.4 °C)-98.6 °F (37 °C)] 98.6 °F (37 °C)  HR:  [76-90] 76  BP: ()/(55-81) 121/81  Resp:  [17] 17  SpO2:  [91 %-94 %] 91 %  O2 Device: None (Room air)    Body mass index is 32.57 kg/m².     Input and Output Summary (last 24 hours):     Intake/Output Summary (Last 24 hours) at 7/20/2025 1234  Last data filed at 7/20/2025 0900  Gross per 24 hour   Intake 720 ml   Output 400 ml   Net 320 ml       Physical Exam  Vitals reviewed.   Constitutional:       General: He is not in acute distress.     Appearance: He is normal weight.     Cardiovascular:      Rate and Rhythm: Normal rate.      Heart sounds: No murmur heard.  Pulmonary:      Effort: No respiratory distress.     Musculoskeletal:         General: No tenderness.      Cervical back: Normal range of motion.      Right lower leg: Edema present.      Left lower leg: Edema  present.     Skin:     General: Skin is warm and dry.      Coloration: Skin is pale.     Neurological:      General: No focal deficit present.      Mental Status: He is alert.         Lines/Drains:      Lab Results: I have reviewed the following results:   Results from last 7 days   Lab Units 07/20/25  0500 07/18/25  0546 07/17/25  0605   WBC Thousand/uL 4.72   < > 4.86   HEMOGLOBIN g/dL 11.1*   < > 10.9*   HEMATOCRIT % 33.5*   < > 34.1*   PLATELETS Thousands/uL 170   < > 185   SEGS PCT %  --   --  61   LYMPHO PCT %  --   --  20   MONO PCT %  --   --  12   EOS PCT %  --   --  4    < > = values in this interval not displayed.     Results from last 7 days   Lab Units 07/20/25  0500 07/17/25  0605 07/16/25  1703   SODIUM mmol/L 141   < > 142   POTASSIUM mmol/L 4.9   < > 5.0   CHLORIDE mmol/L 105   < > 102   CO2 mmol/L 27   < > 29   BUN mg/dL 73*   < > 65*   CREATININE mg/dL 1.66*   < > 2.03*   ANION GAP mmol/L 9   < > 11   CALCIUM mg/dL 9.7   < > 9.0   ALBUMIN g/dL  --   --  4.6   TOTAL BILIRUBIN mg/dL  --   --  0.34   ALK PHOS U/L  --   --  91   ALT U/L  --   --  9   AST U/L  --   --  18   GLUCOSE RANDOM mg/dL 146*   < > 96    < > = values in this interval not displayed.     Results from last 7 days   Lab Units 07/18/25  0546   INR  2.29*     Results from last 7 days   Lab Units 07/20/25  1137 07/20/25  0659 07/19/25  2100 07/19/25  1549 07/19/25  1135 07/19/25  0740 07/18/25  2022 07/18/25  1648 07/18/25  1109 07/18/25  0744 07/17/25  2115 07/17/25  1556   POC GLUCOSE mg/dl 126 138 159* 164* 145* 153* 204* 114 150* 145* 163* 165*     Results from last 7 days   Lab Units 07/16/25  2233   HEMOGLOBIN A1C % 7.3*           Recent Cultures (last 7 days):               Last 24 Hours Medication List:     Current Facility-Administered Medications:     Apalutamide TABS 240 mg, Daily    ascorbic acid (VITAMIN C) tablet 1,000 mg, Daily    aspirin chewable tablet 81 mg, Daily    atorvastatin (LIPITOR) tablet 40 mg, Daily With  Dinner    bumetanide (BUMEX) injection 2 mg, TID    Difluprednate 0.05 % EMUL 1 drop, BID    insulin lispro (HumALOG/ADMELOG) 100 units/mL subcutaneous injection 1-6 Units, TID AC **AND** Fingerstick Glucose (POCT), 4x Daily AC and at bedtime    losartan (COZAAR) tablet 25 mg, Daily    metoprolol succinate (TOPROL-XL) 24 hr tablet 100 mg, Daily    pantoprazole (PROTONIX) EC tablet 20 mg, Daily    vitamin E (TOCOPHEROL) capsule 400 Units, Daily    warfarin (COUMADIN) tablet 5 mg, Once per day on Sunday Tuesday Thursday    warfarin (COUMADIN) tablet 7.5 mg, Once per day on Monday Wednesday Friday Saturday    Administrative Statements   Today, Patient Was Seen By: DANITA Herrera  I have spent a total time of 45 minutes in caring for this patient on the day of the visit/encounter including Diagnostic results, Prognosis, Instructions for management, Importance of tx compliance, Counseling / Coordination of care, Reviewing/placing orders in the medical record (including tests, medications, and/or procedures), and Communicating with other healthcare professionals .    **Please Note: This note may have been constructed using a voice recognition system.**

## 2025-07-20 NOTE — ASSESSMENT & PLAN NOTE
Lab Results   Component Value Date    EGFR 39 07/20/2025    EGFR 36 07/19/2025    EGFR 32 07/18/2025    CREATININE 1.66 (H) 07/20/2025    CREATININE 1.81 (H) 07/19/2025    CREATININE 1.97 (H) 07/18/2025     Elevated creatinine to 2.03.  Above patient's baseline which is 1.6-1.9.  Suspected to be in the setting of volume overload.  Started on IV Bumex 2 mg twice a day

## 2025-07-20 NOTE — RESPIRATORY THERAPY NOTE
07/19/25 5433   Respiratory Assessment   Resp Comments pt placed on overnight pulse ox   Oxygen Therapy/Pulse Ox   O2 Device None (Room air)   SpO2 Activity At Rest   $ Overnight Pulse Ox Started

## 2025-07-20 NOTE — ASSESSMENT & PLAN NOTE
Lab Results   Component Value Date    EGFR 39 07/20/2025    EGFR 36 07/19/2025    EGFR 32 07/18/2025    CREATININE 1.66 (H) 07/20/2025    CREATININE 1.81 (H) 07/19/2025    CREATININE 1.97 (H) 07/18/2025   Continue to monitor kidney function closely. Further management per primary team

## 2025-07-20 NOTE — ASSESSMENT & PLAN NOTE
Lab Results   Component Value Date    HGBA1C 7.3 (H) 07/16/2025     Recent Labs     07/19/25  1549 07/19/25  2100 07/20/25  0659 07/20/25  1137   POCGLU 164* 159* 138 126   Blood Sugar Average: Last 72 hrs:  (P) [id number]    Patient takes Amaryl 1 mg daily.  Sliding scale  Carb controlled diet

## 2025-07-20 NOTE — ASSESSMENT & PLAN NOTE
Wt Readings from Last 3 Encounters:   07/20/25 103 kg (227 lb)   07/10/25 104 kg (230 lb)   07/09/25 106 kg (233 lb)     Presented with ongoing shortness of breath, and lightheadedness.  History of heart failure with low according to last echo done in April 2025 showing EF 25% with systolic function severely reduced  Patient admits to taking GDMT medications as tolerated including torsemide 40 mg twice a day, losartan 25 mg daily, metoprolol 100 mg daily.  No significant weight gain.  Chest x-ray with no pulmonary edema.    Plan:  Strict I's/O  Daily weight  Patient confirms that he is standing for his daily weights and his rate has remained the same 2 days in a row at 227 unfortunately  Sodium restriction  reports poor urine output at home on Lasix  Cardiology consulted  Increased Bumex to 3 times daily today 7/20   added 1 dose of Diuril for 7/20  Patient does have some significant concerns regarding home oxygen however there is been no documentation of hypoxia he is supposed to wear CPAP at home which he refuses to be compliant with attempting to do a home oxygen evaluation tonight 7/19

## 2025-07-20 NOTE — ASSESSMENT & PLAN NOTE
Wt Readings from Last 3 Encounters:   07/20/25 103 kg (227 lb)   07/10/25 104 kg (230 lb)   07/09/25 106 kg (233 lb)   Intake/Output: Suspect inaccurate documentation  Weight: 227 pounds (Standing Scale). Dry weight: Approximately 126 lbs per chart review.   Appears hypervolemic on exam  Diuretic: Uptitrate IV Bumex to 3 mg 3 times daily.  One-time dose of IV Diuril ordered.  GDMT: Per below   Recommend low-sodium diet, daily weights, strict I's and O's.  Recommend continuing to monitor and replete electrolytes as needed.  Continue tele   He felt that his home torsemide was not working well for him.  He has previously been maintained on p.o. Lasix.  Recommend reassessment of home diuretic regimen prior to discharge.   Advanced HF referral placed

## 2025-07-20 NOTE — ASSESSMENT & PLAN NOTE
EKG demonstrates rate-controlled atrial fibrillation.   Rate control: Continue Toprol 100 mg daily   Continue anticoagulation with warfarin. Target INR 2-3.  [id number] 5.  Continue tele

## 2025-07-21 ENCOUNTER — TRANSITIONAL CARE MANAGEMENT (OUTPATIENT)
Dept: FAMILY MEDICINE CLINIC | Facility: CLINIC | Age: 75
End: 2025-07-21

## 2025-07-21 ENCOUNTER — TELEPHONE (OUTPATIENT)
Dept: CARDIOLOGY CLINIC | Facility: CLINIC | Age: 75
End: 2025-07-21

## 2025-07-21 VITALS
BODY MASS INDEX: 32.3 KG/M2 | HEART RATE: 58 BPM | OXYGEN SATURATION: 92 % | RESPIRATION RATE: 18 BRPM | DIASTOLIC BLOOD PRESSURE: 68 MMHG | TEMPERATURE: 98.2 F | HEIGHT: 70 IN | WEIGHT: 225.6 LBS | SYSTOLIC BLOOD PRESSURE: 107 MMHG

## 2025-07-21 PROBLEM — G47.30 SLEEP APNEA, UNSPECIFIED TYPE: Status: ACTIVE | Noted: 2025-07-21

## 2025-07-21 PROBLEM — G47.34 NOCTURNAL HYPOXIA: Status: ACTIVE | Noted: 2025-07-21

## 2025-07-21 LAB
ANION GAP SERPL CALCULATED.3IONS-SCNC: 9 MMOL/L (ref 4–13)
BUN SERPL-MCNC: 77 MG/DL (ref 5–25)
CALCIUM SERPL-MCNC: 10.2 MG/DL (ref 8.4–10.2)
CHLORIDE SERPL-SCNC: 101 MMOL/L (ref 96–108)
CO2 SERPL-SCNC: 32 MMOL/L (ref 21–32)
CREAT SERPL-MCNC: 1.73 MG/DL (ref 0.6–1.3)
DME PARACHUTE DELIVERY DATE REQUESTED: NORMAL
DME PARACHUTE ITEM DESCRIPTION: NORMAL
DME PARACHUTE ORDER STATUS: NORMAL
DME PARACHUTE SUPPLIER NAME: NORMAL
DME PARACHUTE SUPPLIER PHONE: NORMAL
ERYTHROCYTE [DISTWIDTH] IN BLOOD BY AUTOMATED COUNT: 13.8 % (ref 11.6–15.1)
GFR SERPL CREATININE-BSD FRML MDRD: 38 ML/MIN/1.73SQ M
GLUCOSE SERPL-MCNC: 139 MG/DL (ref 65–140)
GLUCOSE SERPL-MCNC: 141 MG/DL (ref 65–140)
GLUCOSE SERPL-MCNC: 146 MG/DL (ref 65–140)
HCT VFR BLD AUTO: 36.1 % (ref 36.5–49.3)
HGB BLD-MCNC: 11.9 G/DL (ref 12–17)
INR PPP: 1.99 (ref 0.85–1.19)
MAGNESIUM SERPL-MCNC: 2.5 MG/DL (ref 1.9–2.7)
MCH RBC QN AUTO: 30.2 PG (ref 26.8–34.3)
MCHC RBC AUTO-ENTMCNC: 33 G/DL (ref 31.4–37.4)
MCV RBC AUTO: 92 FL (ref 82–98)
PLATELET # BLD AUTO: 185 THOUSANDS/UL (ref 149–390)
PMV BLD AUTO: 9.7 FL (ref 8.9–12.7)
POTASSIUM SERPL-SCNC: 4.9 MMOL/L (ref 3.5–5.3)
PROTHROMBIN TIME: 23.3 SECONDS (ref 12.3–15)
RBC # BLD AUTO: 3.94 MILLION/UL (ref 3.88–5.62)
SODIUM SERPL-SCNC: 142 MMOL/L (ref 135–147)
WBC # BLD AUTO: 4.9 THOUSAND/UL (ref 4.31–10.16)

## 2025-07-21 PROCEDURE — 85610 PROTHROMBIN TIME: CPT | Performed by: NURSE PRACTITIONER

## 2025-07-21 PROCEDURE — 82948 REAGENT STRIP/BLOOD GLUCOSE: CPT

## 2025-07-21 PROCEDURE — 99232 SBSQ HOSP IP/OBS MODERATE 35: CPT | Performed by: INTERNAL MEDICINE

## 2025-07-21 PROCEDURE — 99239 HOSP IP/OBS DSCHRG MGMT >30: CPT | Performed by: FAMILY MEDICINE

## 2025-07-21 PROCEDURE — 85027 COMPLETE CBC AUTOMATED: CPT | Performed by: NURSE PRACTITIONER

## 2025-07-21 PROCEDURE — 83735 ASSAY OF MAGNESIUM: CPT | Performed by: NURSE PRACTITIONER

## 2025-07-21 PROCEDURE — 80048 BASIC METABOLIC PNL TOTAL CA: CPT | Performed by: NURSE PRACTITIONER

## 2025-07-21 RX ORDER — METOLAZONE 5 MG/1
2.5 TABLET ORAL DAILY PRN
Status: DISCONTINUED | OUTPATIENT
Start: 2025-07-21 | End: 2025-07-21 | Stop reason: HOSPADM

## 2025-07-21 RX ORDER — BUMETANIDE 1 MG/1
2 TABLET ORAL 3 TIMES DAILY
Status: DISCONTINUED | OUTPATIENT
Start: 2025-07-21 | End: 2025-07-21 | Stop reason: HOSPADM

## 2025-07-21 RX ORDER — BUMETANIDE 2 MG/1
2 TABLET ORAL 3 TIMES DAILY
Qty: 90 TABLET | Refills: 0 | Status: SHIPPED | OUTPATIENT
Start: 2025-07-21 | End: 2025-08-20

## 2025-07-21 RX ORDER — METOLAZONE 2.5 MG/1
2.5 TABLET ORAL DAILY PRN
Qty: 30 TABLET | Refills: 0 | Status: SHIPPED | OUTPATIENT
Start: 2025-07-21 | End: 2025-08-20

## 2025-07-21 RX ADMIN — PANTOPRAZOLE SODIUM 20 MG: 20 TABLET, DELAYED RELEASE ORAL at 08:43

## 2025-07-21 RX ADMIN — ASPIRIN 81 MG: 81 TABLET, CHEWABLE ORAL at 08:43

## 2025-07-21 RX ADMIN — DIFLUPREDNATE OPHTHALMIC 1 DROP: 0.5 EMULSION OPHTHALMIC at 08:44

## 2025-07-21 RX ADMIN — OXYCODONE HYDROCHLORIDE AND ACETAMINOPHEN 1000 MG: 500 TABLET ORAL at 08:43

## 2025-07-21 RX ADMIN — METOPROLOL SUCCINATE 100 MG: 100 TABLET, EXTENDED RELEASE ORAL at 08:43

## 2025-07-21 RX ADMIN — LOSARTAN POTASSIUM 25 MG: 25 TABLET, FILM COATED ORAL at 08:43

## 2025-07-21 RX ADMIN — BUMETANIDE 2 MG: 1 TABLET ORAL at 10:41

## 2025-07-21 RX ADMIN — Medication 400 UNITS: at 08:44

## 2025-07-21 RX ADMIN — WARFARIN SODIUM 7.5 MG: 7.5 TABLET ORAL at 12:42

## 2025-07-21 RX ADMIN — APALUTAMIDE 240 MG: 60 TABLET, FILM COATED ORAL at 08:44

## 2025-07-21 NOTE — ASSESSMENT & PLAN NOTE
EKG demonstrates rate-controlled A-fib.  Continue Toprol-XL.    [id number] 5, continue Coumadin with goal INR 2-3.

## 2025-07-21 NOTE — TELEPHONE ENCOUNTER
----- Message from Felix Suarez PA-C sent at 7/19/2025 11:06 AM EDT -----  Good afternoon,    I have placed a referral for this patient to establish with Dr. Freitas for advanced heart failure and severe cardiomyopathy.  If you would kindly be able to assist with scheduling, that would be much appreciated.    Thank you so much,  Felix

## 2025-07-21 NOTE — CASE MANAGEMENT
Case Management Assessment & Discharge Planning Note    Patient name Galen Carson  Location 2 [facility] 264/2 E 264-01 MRN [id number]  : 1950 Date 2025       Current Admission Date: 2025  Current Admission Diagnosis:Acute on chronic HFrEF (heart failure with reduced ejection fraction) ([facility])   Patient Active Problem List    Diagnosis Date Noted    Sleep apnea, unspecified type 2025    Nocturnal hypoxia 2025    Sleep apnea 2025    Frequent PVCs 2025    Acute on chronic HFrEF (heart failure with reduced ejection fraction) ([facility]) 2025    Dyspnea 2025    Elevated troponin 2025    Paroxysmal SVT (supraventricular tachycardia) ([facility]) 2025    Junctional tachycardia ([facility]) 2025    Mild pulmonary hypertension ([facility]) 2025    Dyslipidemia 2025    Abnormal EKG 2025    Nonobstructive CAD 2025    History of NSVT, PVCs 2025    CKD (chronic kidney disease) stage 4, GFR 15-29 ml/min ([facility]) 01/15/2025    Type 2 diabetes mellitus with diabetic chronic kidney disease, unspecified CKD stage, unspecified whether long term insulin use ([facility]) 01/15/2025    On continuous oral anticoagulation 2024    Iron deficiency anemia due to chronic blood loss 2024    Chronic HFrEF 2024    ICD (implantable cardioverter-defibrillator) in place 2024    Obesity 2023    Paroxysmal atrial fibrillation 10/22/2023    NICM with EF 25% s/p MDT DC ICD (2024) 10/02/2023    Primary hypertension 10/20/2022    CKD stage 3b, GFR 30-44 ml/min ([facility]) 2022    Type 2 diabetes mellitus with hyperglycemia, without long-term current use of insulin ([facility]) 2022    Encounter for monitoring androgen deprivation therapy 2019    Hot flashes 2019    Prostate cancer ([facility]) 2019    Positive colorectal cancer screening using Cologuard test 2019    Mixed hyperlipidemia 2019    Upper respiratory tract infection  03/12/2019      LOS (days): 5  Geometric Mean LOS (GMLOS) (days): 3.9  Days to GMLOS:-0.9     OBJECTIVE:    Risk of Unplanned Readmission Score: 25.15         Current admission status: Inpatient       Preferred Pharmacy:   [facility] - [place] - [address]  [address]  [place] [address]  Phone: [phone] Fax: [phone]    [id number] - [place] - [address]  [address]  [facility] [place] [place] [address]  Phone: [phone] Fax: [phone]    Primary Care Provider: DANITA Girard    Primary Insurance: MEDICARE  Secondary Insurance: COMMERCIAL MISCELLANEOUS    ASSESSMENT:  Active Health Care Proxies    There are no active Health Care Proxies on file.       Advance Directives  Does patient have a Health Care POA?: No  Was patient offered paperwork?: Yes (Declined)  Does patient currently have a Health Care decision maker?: No  Does patient have Advance Directives?: No  Was patient offered paperwork?: Yes (Declined)  Primary Contact: Mayur Lamar (Grandchild)   [phone] (M)    Readmission Root Cause  30 Day Readmission: No    Patient Information  Admitted from:: Home  Mental Status: Alert  During Assessment patient was accompanied by: Not accompanied during assessment  Assessment information provided by:: Patient  Primary Caregiver: Self  Support Systems: Self, Family members  County of Residence: [place]  What city do you live in?: [place]  Living Arrangements: Lives Alone    Activities of Daily Living Prior to Admission  Functional Status: Independent  Completes ADLs independently?: Yes  Ambulates independently?: Yes  Does patient use assisted devices?: No  Does patient currently own DME?: No  Does patient have a history of Outpatient Therapy (PT/OT)?: No  Does the patient have a history of Short-Term Rehab?: No  Does patient have a history of HHC?: No  Does patient currently have HHC?: No    Patient Information Continued  Does patient have  prescription coverage?: Yes  Can the patient afford their medications and any related supplies (such as glucometers or test strips)?: Yes  Does patient receive dialysis treatments?: No  Does patient have a history of substance abuse?: No  Does patient have a history of Mental Health Diagnosis?: No    Means of Transportation  Means of Transport to Appts:: Drives Self          DISCHARGE DETAILS:    Discharge planning discussed with:: Patient at bedside.  Freedom of Choice: Yes  Comments - Freedom of Choice: FOC maintained - CM reviewed DCP.  Nocturnal O2 order processing, patient does not want to stay to wait.  SLIM aware.  Patient aware order has not been processed yet and may not be approved.  Patient states he has already reviewed with his family and will buy a portable from [employer] if he needs to.  Patient provided with scale and pulse ox for home.  Patient states he has a cardiology appt scheduled for tomorrow.  Aware of OP CM and TCM request for followup.  CM contacted family/caregiver?: No- see comments  Were Treatment Team discharge recommendations reviewed with patient/caregiver?: Yes  Did patient/caregiver verbalize understanding of patient care needs?: Yes  Were patient/caregiver advised of the risks associated with not following Treatment Team discharge recommendations?: Yes    Requested Home Health Care         Is the patient interested in HHC at discharge?: No    DME Referral Provided  Referral made for DME?: No    Other Referral/Resources/Interventions Provided:  Interventions: DME  Referral Comments: See FOC.  Nocturnal O2 ordered via [employer].  Overnight study uploaded to referral.  Programs:: CHF    Treatment Team Recommendation: Home  Expected Discharge Disposition: Home or Self Care     Transport at Discharge : Family    IMM Given (Date):: 07/21/25  IMM Given to:: Patient (Verbal review with patient at bedside.  Understanding verbalized, patient in agreement with d/c home today.  Declined  copy.  Original to medical records.)

## 2025-07-21 NOTE — DISCHARGE SUMMARY
Discharge Summary - Hospitalist   Name: Galen Carson 75 y.o. male I MRN: [id number]  Unit/Bed#: 2 E 264-01 I Date of Admission: 7/16/2025   Date of Service: 7/21/2025 I Hospital Day: 5     Assessment & Plan  Acute on chronic HFrEF (heart failure with reduced ejection fraction) ([facility])  Wt Readings from Last 3 Encounters:   07/21/25 102 kg (225 lb 9.6 oz)   07/10/25 104 kg (230 lb)   07/09/25 106 kg (233 lb)     Presented with ongoing shortness of breath, and lightheadedness.  History of heart failure with low according to last echo done in April 2025 showing EF 25% with systolic function severely reduced  Patient admits to taking GDMT medications as tolerated including torsemide 40 mg twice a day, losartan 25 mg daily, metoprolol 100 mg daily.  Cardiology consulted  Increased Bumex to 3 times daily 7/20  + prn metolazone at DC  S/p 1 dose of Diuril for 7/20  Encourage use of CPAP HS, pt non compliant    Type 2 diabetes mellitus with hyperglycemia, without long-term current use of insulin ([facility])  Lab Results   Component Value Date    HGBA1C 7.3 (H) 07/16/2025     Recent Labs     07/20/25  1137 07/20/25  1622 07/20/25 2010 07/21/25  0824   POCGLU 126 167* 170* 139   Blood Sugar Average: Last 72 hrs:  (P) [id number]    Patient takes Amaryl 1 mg daily.  Sliding scale insulin  Carb controlled diet  Currently pt has acceptable BG    NICM with EF 25% s/p MDT DC ICD (1/4/2024)  According to last echo 4/21/25 EF of 25%.  S/p ICD  Currently on GDMT as tolerated.  Follows with cardiology And nephrology  Paroxysmal atrial fibrillation  Patient takes warfarin 7.5 mg on Monday, Wednesday, Friday, Saturday.  The rest of the day patient takes 5 mg.  INR 2.12 on admission.  Continue current regimen     Lab Results   Component Value Date    INR 1.99 (H) 07/21/2025      CKD (chronic kidney disease) stage 4, GFR 15-29 ml/min ([facility])  Lab Results   Component Value Date    EGFR 38 07/21/2025    EGFR 39 07/20/2025    EGFR 36  07/19/2025    CREATININE 1.73 (H) 07/21/2025    CREATININE 1.66 (H) 07/20/2025    CREATININE 1.81 (H) 07/19/2025     Elevated creatinine to 2.03.  Above patient's baseline which is 1.6-1.9.  Suspected to be in the setting of volume overload.  Started on IV Bumex 2 mg twice a day  Primary hypertension  Continue losartan 25 mg daily, Toprol 100 mg daily, diuretics per above   Sleep apnea  Patient has had a sleep study in the past and has been diagnosed with sleep apnea and recommended to wear CPAP he had a CPAP at home but states that he thinks the humidification from the CPAP is what causes all of his heart failure exacerbation so he refuses to wear it  Patient becomes agitated whenever you suggest that he wear his CPAP machine  Patient is adamant that oxygen overnight is what would be best for him  Overnight pulse oximetry test completed from 7/19 through 7/20  Patient had 42 episodes of hypoxia with SpO2 less than 89%  Will discuss with case management regarding getting oxygen overnight for the patient at home  Will encourage compliance and follow-up with pulmonology  Nocturnal hypoxia  Home O2 ordered HS     Medical Problems       Resolved Problems  Date Reviewed: 5/21/2025   None       Discharging Physician / Practitioner: Jani Hernandez MD  PCP: DANITA Girard  Admission Date:   Admission Orders (From admission, onward)       Ordered        07/16/25 1833  INPATIENT ADMISSION  Once                          Discharge Date: 07/21/25    Next Steps for Physician/AP Assuming Care:  Check BMP in one week  Encourage to use CPAP HS  Pt did not wait for home nocturnal O2 to be approved and left - please followup     Test Results Pending at Discharge (will require follow up):  none    Medication Changes for Discharge & Rationale:   Now on bumex 2mg TID + prn metolazone   DC torsemide  See after visit summary for reconciled discharge medications provided to patient and/or family.     Consultations During Hospital  "Stay:  IP CONSULT TO CARDIOLOGY    Procedures Performed:   none    Significant Findings / Test Results:   As abvoe    Incidental Findings:     none     Hospital Course:   Galen Carson is a 75 y.o. male patient who originally presented to the hospital on 7/16/2025 due to acute on chronic systolic heart failure exacerbation.  EF of 25%.  Patient was started on IV diuretics Bumex 2 mg twice a day over a year.  He had generous diuresis.  He is also on GDMT: Losartan and metoprolol.  Currently not on Entresto, defer to cardiology.  Patient's weight went down and his respiratory symptoms have improved.  Cardiology has cleared the patient for discharge on Bumex 2 mg 3 times daily plus as needed metolazone for weight gain.  Patient also has been encouraged to wear his CPAP at bedtime.  He is noncompliant with that which could potentially be the reason he keeps going into heart failure.  For his A-fib cardiology has reached out to EP for him to get a sooner appointment and will be followed as outpatient by heart failure team.  He is cleared for discharge and has been ordered home oxygen for nocturnal use as he had abnormal nocturnal pulse oximetry here in the hospital.      Please see above list of diagnoses and related plan for additional information.     Discharge Day Visit / Exam:   Subjective:  \" I am feeling much better, I am ready to go home, I cannot wait for oxygen delivery or approval\"  Vitals: Blood Pressure: 107/68 (07/21/25 0826)  Pulse: 58 (07/21/25 0826)  Temperature: 98.2 °F (36.8 °C) (07/20/25 1900)  Temp Source: Oral (07/20/25 1900)  Respirations: 18 (07/21/25 0826)  Height: 5' 10\" (177.8 cm) (07/17/25 1323)  Weight - Scale: 102 kg (225 lb 9.6 oz) (07/21/25 0600)  SpO2: 92 % (07/21/25 0826)  Physical Exam General- Awake, alert and oriented x 3, looks comfortable  HEENT- Normocephalic, atraumatic, oral mucosa- moist  Neck- Supple, No carotid bruit, no JVD  CVS- Normal S1/ S2, irregular, no murmur, +1 " pitting pedal edema  Respiratory system- B/L clear breath sounds, no wheezing  Abdomen- Soft, Non distended, no tenderness, Bowel sound- present 4 quads  Genitourinary- No suprapubic tenderness, No CVA tenderness  Skin- No new bruise or rash  Musculoskeletal- No gross deformity  Psych- No acute psychosis  CNS- CN II- XII grossly intact, No acute focal neurologic deficit noted      Discussion with Family: Offered to call patient's family but he has declined at this time.  I did state to him if he changes his mind I be more than happy to call his point of contact.  He verbalized understanding.     Discharge instructions/Information to patient and family:   See after visit summary for information provided to patient and family.      Provisions for Follow-Up Care:  See after visit summary for information related to follow-up care and any pertinent home health orders.      Mobility at time of Discharge:   Basic Mobility Inpatient Raw Score: 24  JH-HLM Goal: 8: Walk 250 feet or more  JH-HLM Achieved: 7: Walk 25 feet or more  HLM Goal achieved. Continue to encourage appropriate mobility.     Disposition:   Home    Planned Readmission: no    Administrative Statements   Discharge Statement:  I have spent a total time of 76 minutes in caring for this patient on the day of the visit/encounter. >30 minutes of time was spent on: Diagnostic results, Prognosis, Risks and benefits of tx options, Instructions for management, Patient and family education, Importance of tx compliance, Risk factor reductions, Impressions, Counseling / Coordination of care, Documenting in the medical record, Reviewing / ordering tests, medicine, procedures  , and Communicating with other healthcare professionals .    **Please Note: This note may have been constructed using a voice recognition system**

## 2025-07-21 NOTE — ASSESSMENT & PLAN NOTE
Patient takes warfarin 7.5 mg on Monday, Wednesday, Friday, Saturday.  The rest of the day patient takes 5 mg.  INR 2.12 on admission.  Continue current regimen     Lab Results   Component Value Date    INR 1.99 (H) 07/21/2025

## 2025-07-21 NOTE — ASSESSMENT & PLAN NOTE
Lab Results   Component Value Date    HGBA1C 7.3 (H) 07/16/2025     Recent Labs     07/20/25  1137 07/20/25  1622 07/20/25 2010 07/21/25  0824   POCGLU 126 167* 170* 139   Blood Sugar Average: Last 72 hrs:  (P) [id number]    Patient takes Amaryl 1 mg daily.  Sliding scale insulin  Carb controlled diet  Currently pt has acceptable BG

## 2025-07-21 NOTE — ASSESSMENT & PLAN NOTE
Recent RHC/LHC reviewed (5/27/2025).  Continue Toprol-XL and losartan.  Jardiance was unable to be price checked per case management.  Referral placed to establish with advanced heart failure.  Follows with the device clinic.

## 2025-07-21 NOTE — ASSESSMENT & PLAN NOTE
Wt Readings from Last 3 Encounters:   07/21/25 102 kg (225 lb 9.6 oz)   07/10/25 104 kg (230 lb)   07/09/25 106 kg (233 lb)     Presented with ongoing shortness of breath, and lightheadedness.  History of heart failure with low according to last echo done in April 2025 showing EF 25% with systolic function severely reduced  Patient admits to taking GDMT medications as tolerated including torsemide 40 mg twice a day, losartan 25 mg daily, metoprolol 100 mg daily.  Cardiology consulted  Increased Bumex to 3 times daily 7/20  + prn metolazone at DC  S/p 1 dose of Diuril for 7/20  Encourage use of CPAP HS, pt non compliant

## 2025-07-21 NOTE — ASSESSMENT & PLAN NOTE
Euvolemic on exam.  I/O's likely inaccurate.  Creatinine stable.  Most recent TTE reviewed.  Transition to Bumex 2 mg PO tid.  Start metolazone 2.5 mg PO daily PRN for weight gain of 3-4 lbs above baseline.  Can take up to three times per week.  Of note, patient reports history of poor diuretic response with PO Lasix and torsemide.  Replete electrolytes as needed.  Recommend strict I/O's, daily weights, and sodium restriction.

## 2025-07-21 NOTE — ASSESSMENT & PLAN NOTE
Denies chest pain.  Troponins with mild flat elevation, peak of 90.  EKG without acute ischemic changes.  Suspect nonischemic myocardial injury secondary to acute on chronic HFrEF.

## 2025-07-21 NOTE — DISCHARGE INSTR - AVS FIRST PAGE
Your water pills/diuretics have been switched to Bumex 2 mg 3 times a day.  If your weight increases by 2 to 3 pounds in a matter of 1 to 2 days, take metolazone as prescribed  See your PCP within 1 week of discharge  Make an appointment with the electrophysiologist  Make an appointment with heart failure team.  The electrophysiologist office and the heart failure office will reach out to you to schedule an appointment      1. What is my main problem? (i.e., why was I in the hospital?)   You are admitted to the hospital with heart failure exacerbation.     2, What do I need to do? (i.e., how do I manage at home, and what should I do if I run into problems?)   1. Take your medications as prescribed.    2. Follow up with your primary care provider or cardiologist within 1-2 weeks.   3. Weigh yourself every morning. Use the same scale, in the same spot. Do this every morning after you use the bathroom, but before you eat or drink. Wear the same type of clothing each time. Write down your weight and call your healthcare provider if you have a sudden weight gain.    4. Limit sodium (salt). Limit your sodium (salt) intake to a maximum of 2,000 milligrams (mg) a day. If you add salt to food as you cook, do not add more salt at the table.   5. Call your doctor if you have symptoms of worsening heart failure:     Shortness of breath at rest, at night, or that is getting worse in any way   Weight gain of 3 or more pounds (1.4 kg) in a day, or 5 pounds in one week  More swelling in your legs or ankles   More coughing   Loss of appetite   Feeling tired all the time     3. Why is it important for me to do this?   Heart failure is a condition that does not allow your heart to fill or pump properly. Not enough oxygen in your blood gets to your organs and tissues. Fluid may not move through your body properly. Fluid builds up and causes swelling and trouble breathing. This is also known as congestive heart failure. Heart failure is  a serious long-term (chronic) condition that tends to get worse over time. However, you can live a full, active life with the right medical treatment and attention to your lifestyle. It is important to manage your health to improve your quality of life.        Join a support group: Heart failure can be difficult to manage. It may be helpful to talk with others who have heart failure. You may learn how to better manage your condition or get emotional support. For more information:     We have also included a link to . Luke's video series about Heart Failure:    https://www.slhn.org/heart-and-vascular/education-and-resources/patient-education        [employer]   [address] [place] [id number]   Phone: [phone]   Web Address: http://www.heart.org

## 2025-07-21 NOTE — ASSESSMENT & PLAN NOTE
Lab Results   Component Value Date    EGFR 38 07/21/2025    EGFR 39 07/20/2025    EGFR 36 07/19/2025    CREATININE 1.73 (H) 07/21/2025    CREATININE 1.66 (H) 07/20/2025    CREATININE 1.81 (H) 07/19/2025     Elevated creatinine to 2.03.  Above patient's baseline which is 1.6-1.9.  Suspected to be in the setting of volume overload.  Started on IV Bumex 2 mg twice a day

## 2025-07-21 NOTE — PROGRESS NOTES
Cardiology Progress Note - Galen Carson 75 y.o. male MRN: [id number]    Unit/Bed#: 2 E 264-01 Encounter: [id number]      Assessment & Plan  Acute on chronic HFrEF  Euvolemic on exam.  I/O's likely inaccurate.  Creatinine stable.  Most recent TTE reviewed.  Transition to Bumex 2 mg PO tid.  Start metolazone 2.5 mg PO daily PRN for weight gain of 3-4 lbs above baseline.  Can take up to three times per week.  Of note, patient reports history of poor diuretic response with PO Lasix and torsemide.  Replete electrolytes as needed.  Recommend strict I/O's, daily weights, and sodium restriction.  NICM with EF 25% s/p MDT DC ICD (1/4/2024)  Recent RHC/LHC reviewed (5/27/2025).  Continue Toprol-XL and losartan.  Jardiance was unable to be price checked per case management.  Referral placed to establish with advanced heart failure.  Follows with the device clinic.  Frequent PVCs; NSVT  Continue Toprol-XL.  Recommend keeping K>4 and Mag >2.  Scheduled for upcoming appointment with EP; task sent to expedite EP appointment if possible.  Paroxysmal atrial fibrillation  EKG demonstrates rate-controlled A-fib.  Continue Toprol-XL.    [id number] 5, continue Coumadin with goal INR 2-3.  Paroxysmal SVT  Continue Toprol-XL.  Nonobstructive CAD  Continue ASA, Lipitor, Toprol-XL.  Primary hypertension  See plan above.  Dyslipidemia  Continue Lipitor.  Elevated troponin  Denies chest pain.  Troponins with mild flat elevation, peak of 90.  EKG without acute ischemic changes.  Suspect nonischemic myocardial injury secondary to acute on chronic HFrEF.   Mild pulmonary hypertension (HCC)  Recent RHC reviewed.  See plan above.  CKD (chronic kidney disease) stage 4, GFR 15-29 ml/min ([facility])  Type 2 diabetes mellitus with hyperglycemia, without long-term current use of insulin ([facility])  Sleep apnea  Management per primary service.      Cardiology will sign off at this time, please reach out as needed.  Patient to follow-up with our office as  "scheduled tomorrow.      Subjective:   Patient seen and examined.  No significant events overnight.  Patient resting bed comfortably and reportedly wants to go home today.  Denies any new complaints at this time.  All questions were answered.    Objective:     Vitals: Blood pressure 107/68, pulse 58, temperature 98.2 °F (36.8 °C), temperature source Oral, resp. rate 18, height 5' 10\" (1.778 m), weight 102 kg (225 lb 9.6 oz), SpO2 92%., Body mass index is 32.37 kg/m².,   Orthostatic Blood Pressures      Flowsheet Row Most Recent Value   Blood Pressure 107/68 filed at 07/21/2025 0826   Patient Position - Orthostatic VS Lying filed at 07/20/2025 1900              Intake/Output Summary (Last 24 hours) at 7/21/2025 1016  Last data filed at 7/21/2025 0700  Gross per 24 hour   Intake 250 ml   Output 2250 ml   Net -2000 ml         Physical Exam:  GEN: Alert and oriented x3, in no acute distress.  Well appearing, obese, and well nourished.   HEENT: Sclera anicteric, conjunctivae pink, mucous membranes moist. Oropharynx clear.   NECK: Supple, no carotid bruits, no significant JVD. Trachea midline, no thyromegaly.   HEART: Irregular rhythm, normal S1 and S2, no murmurs, clicks, gallops or rubs. PMI nondisplaced, no thrills.   LUNGS: Clear to auscultation bilaterally; no wheezes, rales, or rhonchi. No increased work of breathing or signs of respiratory distress.   ABDOMEN: Soft, nontender, nondistended, normoactive bowel sounds.   EXTREMITIES: Skin warm and well perfused, no clubbing or cyanosis, no edema.  NEURO: No focal findings. Normal speech. Mood and affect normal.   SKIN: Normal without suspicious lesions on exposed skin.        Medications:    Current Medications[1]     Labs & Results:     Results from last 7 days   Lab Units 07/16/25 2108 07/16/25  1917 07/16/25  1703   HS TNI 0HR ng/L  --   --  83*   HS TNI 2HR ng/L  --  90*  --    HSTNI D2 ng/L  --  7  --    HS TNI 4HR ng/L 84*  --   --    HSTNI D4 ng/L 1  --   --  " "    Results from last 7 days   Lab Units 25  0629 25  0500 25  0552   WBC Thousand/uL 4.90 4.72 4.59   HEMOGLOBIN g/dL 11.9* 11.1* 10.9*   HEMATOCRIT % 36.1* 33.5* 33.2*   PLATELETS Thousands/uL 185 170 182         Results from last 7 days   Lab Units 25  0629 25  0500 25  0552 25  0605 25  1703   POTASSIUM mmol/L 4.9 4.9 4.7   < > 5.0   CHLORIDE mmol/L 101 105 103   < > 102   CO2 mmol/L 32 27 29   < > 29   BUN mg/dL 77* 73* 75*   < > 65*   CREATININE mg/dL 1.73* 1.66* 1.81*   < > 2.03*   CALCIUM mg/dL 10.2 9.7 9.3   < > 9.0   ALK PHOS U/L  --   --   --   --  91   ALT U/L  --   --   --   --  9   AST U/L  --   --   --   --  18    < > = values in this interval not displayed.     Results from last 7 days   Lab Units 25  0625  0546 25  1703   INR  1.99* 2.29* 2.12*     Results from last 7 days   Lab Units 25  0629 25  0500 25  0552   MAGNESIUM mg/dL 2.5 2.7 2.8*       Vitals: Blood pressure 107/68, pulse 58, temperature 98.2 °F (36.8 °C), temperature source Oral, resp. rate 18, height 5' 10\" (1.778 m), weight 102 kg (225 lb 9.6 oz), SpO2 92%., Body mass index is 32.37 kg/m².,   Orthostatic Blood Pressures      Flowsheet Row Most Recent Value   Blood Pressure 107/68 filed at 2025 0826   Patient Position - Orthostatic VS Lying filed at 2025 1900            Systolic (24hrs), Av , Min:86 , Max:122     Diastolic (24hrs), Av, Min:56, Max:68        Intake/Output Summary (Last 24 hours) at 2025 1016  Last data filed at 2025 0700  Gross per 24 hour   Intake 250 ml   Output 2250 ml   Net -2000 ml       Invasive Devices       Peripheral Intravenous Line  Duration             Peripheral IV 25 Right Antecubital 4 days                          BP Readings from Last 3 Encounters:   25 107/68   07/10/25 120/70   25 134/86      Wt Readings from Last 3 Encounters:   25 102 kg (225 lb 9.6 oz) "   07/10/25 104 kg (230 lb)   07/09/25 106 kg (233 lb)                         [1]   Current Facility-Administered Medications:     Apalutamide TABS 240 mg, 240 mg, Oral, Daily, Moniac Valle MD, 240 mg at 07/21/25 0844    ascorbic acid (VITAMIN C) tablet 1,000 mg, 1,000 mg, Oral, Daily, Monica Valle MD, 1,000 mg at 07/21/25 0843    aspirin chewable tablet 81 mg, 81 mg, Oral, Daily, Monica Valle MD, 81 mg at 07/21/25 0843    atorvastatin (LIPITOR) tablet 40 mg, 40 mg, Oral, Daily With Dinner, Monica Valle MD, 40 mg at 07/20/25 1710    bumetanide (BUMEX) tablet 2 mg, 2 mg, Oral, TID, Partha Colon PA-C    Difluprednate 0.05 % EMUL 1 drop, 1 drop, Ophthalmic, BID, DANITA Herrera, 1 drop at 07/21/25 0844    insulin lispro (HumALOG/ADMELOG) 100 units/mL subcutaneous injection 1-6 Units, 1-6 Units, Subcutaneous, TID AC, 1 Units at 07/20/25 1714 **AND** Fingerstick Glucose (POCT), , , 4x Daily AC and at bedtime, Monica Valle MD    insulin lispro (HumALOG/ADMELOG) 100 units/mL subcutaneous injection 1-6 Units, 1-6 Units, Subcutaneous, HS, DANITA Greenberg, 1 Units at 07/20/25 2104    losartan (COZAAR) tablet 25 mg, 25 mg, Oral, Daily, Monica Valle MD, 25 mg at 07/21/25 0843    metolazone (ZAROXOLYN) tablet 2.5 mg, 2.5 mg, Oral, Daily PRN, Partha Colon PA-C    metoprolol succinate (TOPROL-XL) 24 hr tablet 100 mg, 100 mg, Oral, Daily, Monica Valle MD, 100 mg at 07/21/25 0843    pantoprazole (PROTONIX) EC tablet 20 mg, 20 mg, Oral, Daily, Monica Valle MD, 20 mg at 07/21/25 0843    vitamin E (TOCOPHEROL) capsule 400 Units, 400 Units, Oral, Daily, Monica Valle MD, 400 Units at 07/21/25 0844    warfarin (COUMADIN) tablet 5 mg, 5 mg, Oral, Once per day on Sunday Tuesday Thursday, Monica Valle MD, 5 mg at 07/20/25 1347    warfarin (COUMADIN) tablet 7.5 mg, 7.5 mg, Oral, Once per day on Monday Wednesday Friday Saturday, Monica Valle MD, 7.5 mg at 07/19/25  [id number]

## 2025-07-21 NOTE — CASE MANAGEMENT
Case Management Progress Note    Patient name Galen Carson  Location 2 [facility] 264/2 E 264-01 MRN [id number]  : 1950 Date 2025       LOS (days): 5  Geometric Mean LOS (GMLOS) (days): 3.9  Days to GMLOS:-0.9        OBJECTIVE:        Current admission status: Inpatient  Preferred Pharmacy:   [facility], [place] - [address]  [address] [facility] [address]  Phone: [phone] Fax: [phone]    [id number] [place] [place] - [address]  [address]  [place] [address]  Phone: [phone] Fax: [phone]    Primary Care Provider: DANITA Girard    Primary Insurance: MEDICARE  Secondary Insurance: COMMERCIAL MISCELLANEOUS    PROGRESS NOTE:  [facility] message received from [employer] re: nocturnal O2 order.  Due to d/x sleep apnea, patient needs to have a titration study completed.  [employer] Jose calvillo, contacting patient with update.  CM will follow up with patient as well and then cancel [facility] order.  As noted previously, patient was not willing to wait for order to be approved prior to d/c, stating he would purchase an O2 machine from [employer] himself.

## 2025-07-21 NOTE — ASSESSMENT & PLAN NOTE
Continue Toprol-XL.  Recommend keeping K>4 and Mag >2.  Scheduled for upcoming appointment with EP; task sent to expedite EP appointment if possible.

## 2025-07-22 ENCOUNTER — OFFICE VISIT (OUTPATIENT)
Dept: CARDIOLOGY CLINIC | Facility: CLINIC | Age: 75
End: 2025-07-22
Payer: MEDICARE

## 2025-07-22 VITALS
SYSTOLIC BLOOD PRESSURE: 130 MMHG | HEART RATE: 60 BPM | OXYGEN SATURATION: 96 % | BODY MASS INDEX: 32.78 KG/M2 | DIASTOLIC BLOOD PRESSURE: 76 MMHG | RESPIRATION RATE: 16 BRPM | WEIGHT: 229 LBS | HEIGHT: 70 IN

## 2025-07-22 DIAGNOSIS — I48.0 PAROXYSMAL ATRIAL FIBRILLATION (HCC): ICD-10-CM

## 2025-07-22 DIAGNOSIS — I50.22 CHRONIC HFREF (HEART FAILURE WITH REDUCED EJECTION FRACTION) (HCC): ICD-10-CM

## 2025-07-22 DIAGNOSIS — I10 PRIMARY HYPERTENSION: ICD-10-CM

## 2025-07-22 DIAGNOSIS — I42.8 NICM (NONISCHEMIC CARDIOMYOPATHY) (HCC): Primary | ICD-10-CM

## 2025-07-22 PROCEDURE — 99214 OFFICE O/P EST MOD 30 MIN: CPT | Performed by: INTERNAL MEDICINE

## 2025-07-22 NOTE — ASSESSMENT & PLAN NOTE
Wt Readings from Last 3 Encounters:   07/22/25 104 kg (229 lb)   07/21/25 102 kg (225 lb 9.6 oz)   07/10/25 104 kg (230 lb)     -Is going to continue with the diuretics.  The metolazone also has been started 2.5 mg on as needed basis depending upon the weight and the leg swelling.  The indications of  metolazone also has been discussed with the patient today at great length.  - He will not be able to continue with the Jardiance because of the cost restraints.  - The management planning with the Furocsix also has been explained to the patient

## 2025-07-22 NOTE — PROGRESS NOTES
[facility] [place]  [address]  [place] [address]  Cardiology Follow Up    Galen Carson  1950  [id number]    Assessment & Plan  Chronic HFrEF  Wt Readings from Last 3 Encounters:   07/22/25 104 kg (229 lb)   07/21/25 102 kg (225 lb 9.6 oz)   07/10/25 104 kg (230 lb)     -Is going to continue with the diuretics.  The metolazone also has been started 2.5 mg on as needed basis depending upon the weight and the leg swelling.  The indications of  metolazone also has been discussed with the patient today at great length.  - He will not be able to continue with the Jardiance because of the cost restraints.  - The management planning with the Furocsix also has been explained to the patient    NICM with EF 25% s/p MDT DC ICD (1/4/2024)  - No evidence of ICD discharge.  Paroxysmal atrial fibrillation  - He was in atrial fibrillation, rate controlled with AV alisha blocking agents.  Primary hypertension  - The blood pressure is well-controlled.  The low-salt intake has been strongly addressed.    Continue all medications. Previous studies reviewed with patient, medications reviewed and possible side effects discussed. Continue risk factor modification. Optimize weight, regular exercise and follow up with appropriate specialists and primary care physician as discussed.  All questions answered. Patient advised to report any problems prompting to medical attention. Return for follow up visit in 4 months or earlier if needed    Chief Complaint   Patient presents with   • Follow-up       Interval History: Galen Carson is a 75 y.o. male  with a history of nonobstructive coronary artery disease, nonischemic cardiomyopathy, status post AICD, paroxysmal atrial fibrillation.  Who presents today for follow-up.    He was recently admitted in the hospital with the acute on chronic congestive heart failure.  He has been on the diuretics for a very long duration of the time.  He has been  started on metolazone on as needed basis depending upon the weight and the leg swelling.  He was discharged recently.  He has been able to tolerate the adjustment of the heart failure medications without having any issues.  According to him he will not be able to continue with the Jardiance because of the cost restraints.  The hospital records have been reviewed in great details.      PMH:     Problem List[1]  Past Medical History[1]  Social History     Socioeconomic History   • Marital status:      Spouse name: Not on file   • Number of children: Not on file   • Years of education: Not on file   • Highest education level: Not on file   Occupational History   • Not on file   Tobacco Use   • Smoking status: Former     Current packs/day: 0.00     Average packs/day: 1 pack/day for 25.0 years (25.0 ttl pk-yrs)     Types: Cigarettes, Pipe, Cigars     Start date: 1980     Quit date: 2005     Years since quittin.4     Passive exposure: Past   • Smokeless tobacco: Never   Vaping Use   • Vaping status: Never Used   Substance and Sexual Activity   • Alcohol use: Not Currently   • Drug use: Not Currently   • Sexual activity: Not Currently     Partners: Female   Other Topics Concern   • Not on file   Social History Narrative   • Not on file     Social Drivers of Health     Financial Resource Strain: Low Risk  (2024)    Overall Financial Resource Strain (CARDIA)    • Difficulty of Paying Living Expenses: Not hard at all   Food Insecurity: No Food Insecurity (2025)    Nursing - Inadequate Food Risk Classification    • Worried About Running Out of Food in the Last Year: Never true    • Ran Out of Food in the Last Year: Never true    • Ran Out of Food in the Last Year: Never true   Transportation Needs: No Transportation Needs (2025)    Nursing - Transportation Risk Classification    • Lack of Transportation: Not on file    • Lack of Transportation: No   Physical Activity: Not on file   Stress:  "Not on file   Social Connections: Unknown (6/18/2024)    Received from [facility]    Social Connections    • How often do you feel lonely or isolated from those around you? (Adult - for ages 18 years and over): Not on file   Intimate Partner Violence: Unknown (7/17/2025)    Nursing IPS    • Feels Physically and Emotionally Safe: Not on file    • Physically Hurt by Someone: Not on file    • Humiliated or Emotionally Abused by Someone: Not on file    • Physically Hurt by Someone: No    • Hurt or Threatened by Someone: No   Housing Stability: Unknown (7/17/2025)    Nursing: Inadequate Housing Risk Classification    • Has Housing: Not on file    • Worried About Losing Housing: Not on file    • Unable to Get Utilities: Not on file    • Unable to Pay for Housing in the Last Year: No    • Has Housing: No      Family History[1]  Past Surgical History[1]  Current Medications[1]  Allergies   Allergen Reactions   • Penicillin G Benzathine Other (See Comments)   • Penicillin V Other (See Comments)     As a child            Review of Systems:  Review of Systems   Constitutional: Negative.  Negative for chills and fever.   Eyes:  Negative for visual disturbance.   Respiratory:  Positive for shortness of breath. Negative for cough.    Cardiovascular:  Negative for chest pain and palpitations.   Gastrointestinal:  Negative for abdominal pain and vomiting.   Skin:  Negative for color change and rash.   Neurological:  Negative for syncope.   All other systems reviewed and are negative.        /76 (BP Location: Left arm, Patient Position: Sitting, Cuff Size: Standard)   Pulse 60   Resp 16   Ht 5' 10\" (1.778 m)   Wt 104 kg (229 lb)   SpO2 96%   BMI 32.86 kg/m²     Physical Exam:  Physical Exam  Vitals reviewed.   Constitutional:       Appearance: Normal appearance.   HENT:      Head: Normocephalic.     Eyes:      Pupils: Pupils are equal, round, and reactive to light.       Cardiovascular:      Rate and Rhythm: Rhythm " irregular.      Heart sounds: Normal heart sounds.      Comments: Mild systolic murmur in the mitral area.  Pulmonary:      Effort: Pulmonary effort is normal.      Breath sounds: Normal breath sounds. No wheezing.   Abdominal:      General: Abdomen is flat.      Palpations: Abdomen is soft.     Skin:     General: Skin is warm.     Neurological:      Mental Status: He is alert.                [1]  Patient Active Problem List  Diagnosis   • Mixed hyperlipidemia   • Upper respiratory tract infection   • Positive colorectal cancer screening using Cologuard test   • Prostate cancer ([facility])   • Encounter for monitoring androgen deprivation therapy   • Hot flashes   • Type 2 diabetes mellitus with hyperglycemia, without long-term current use of insulin ([facility])   • CKD stage 3b, GFR 30-44 ml/min ([facility])   • Primary hypertension   • NICM with EF 25% s/p MDT DC ICD (1/4/2024)   • Paroxysmal atrial fibrillation   • Obesity   • ICD (implantable cardioverter-defibrillator) in place   • Chronic HFrEF   • Iron deficiency anemia due to chronic blood loss   • On continuous oral anticoagulation   • CKD (chronic kidney disease) stage 4, GFR 15-29 ml/min ([facility])   • Type 2 diabetes mellitus with diabetic chronic kidney disease, unspecified CKD stage, unspecified whether long term insulin use ([facility])   • Nonobstructive CAD   • History of NSVT, PVCs   • Dyspnea   • Elevated troponin   • Paroxysmal SVT   • Junctional tachycardia ([facility])   • Mild pulmonary hypertension ([facility])   • Dyslipidemia   • Abnormal EKG   • Acute on chronic HFrEF   • Frequent PVCs; NSVT   • Sleep apnea   • Sleep apnea, unspecified type   • Nocturnal hypoxia   [1]  Past Medical History:  Diagnosis Date   • Allergic    • Anesthesia     pt wears a life vest (11/16).  should not be scheduled at [facility] until heart condition is stabilized   • BPH (benign prostatic hypertrophy)    • Cancer (HCC)    • CHF (congestive heart failure) ([facility])    • Chronic kidney disease    • Coronary artery  disease    • Diabetes mellitus (HCC)    • Hyperlipidemia    • Hypertension    • PAF (paroxysmal atrial fibrillation) (HCC)    • Prostate cancer (HCC)    [1]  Family History  Problem Relation Name Age of Onset   • Stroke Father Father    • No Known Problems Mother     • Prostate cancer Brother     • Arthritis Brother     • No Known Problems Sister     • Diabetes Sister Brigitte    • Diabetes Sister Fátima    • No Known Problems Son     • No Known Problems Daughter     [1]  Past Surgical History:  Procedure Laterality Date   • CARDIAC CATHETERIZATION N/A 10/04/2023    Procedure: Cardiac Coronary Angiogram;  Surgeon: Chris Lovell MD;  Location: [facility];  Service: Cardiology   • CARDIAC CATHETERIZATION N/A 10/04/2023    Procedure: Cardiac RHC/LHC;  Surgeon: Chris Lovell MD;  [facility]: [facility];  Service: Cardiology   • CARDIAC CATHETERIZATION  10/04/2023    Procedure: Cardiac catheterization;  Surgeon: Chris Lovell MD;  [facility];  Service: Cardiology   • CARDIAC CATHETERIZATION Left 5/27/2025    Procedure: Cardiac Left Heart Cath;  Surgeon: Guillermo Li MD;  [facility]: [facility];  Service: Cardiology   • CARDIAC CATHETERIZATION N/A 5/27/2025    Procedure: Cardiac RHC/LHC;  Surgeon: Guillermo Li MD;  [facility];  Service: Cardiology   • CARDIAC CATHETERIZATION  5/27/2025    Procedure: Cardiac Catheterization;  Surgeon: Guillermo Li MD;  [facility];  Service: Cardiology   • CARDIAC CATHETERIZATION N/A 5/27/2025    Procedure: Cardiac Coronary Angiogram;  Surgeon: Guillermo Li MD;  [facility]: [facility];  Service: Cardiology   • CARDIAC DEFIBRILLATOR PLACEMENT  01/04/2024   • CARDIAC ELECTROPHYSIOLOGY PROCEDURE N/A 01/04/2024    Procedure: Cardiac icd implant, Dual Chambers ICD;  Surgeon: Leo Whalen MD;  [facility];  Service: Cardiology   • EAR SURGERY     • HERNIA REPAIR     • IN  COLONOSCOPY FLX DX W/COLLJ SPEC WHEN PFRMD N/A 05/06/2019    Procedure: COLONOSCOPY;  Surgeon: Winston Nielson III, MD;  Location: [facility];  Service: Gastroenterology   [1]    Current Outpatient Medications:   •  Ascorbic Acid (vitamin C) 1000 MG tablet, Take 1,000 mg by mouth in the morning., Disp: , Rfl:   •  aspirin 81 mg chewable tablet, Chew 1 tablet (81 mg total) daily, Disp: 30 tablet, Rfl: 1  •  Blood Glucose Monitoring Suppl (ONETOUCH VERIO) w/Device KIT, by Does not apply route once for 1 dose Test sugar in the morning, Disp: 1 kit, Rfl: 0  •  bumetanide (BUMEX) 2 mg tablet, Take 1 tablet (2 mg total) by mouth 3 (three) times a day, Disp: 90 tablet, Rfl: 0  •  cetirizine (ZyrTEC) 5 MG tablet, Take 5 mg by mouth in the morning., Disp: , Rfl:   •  Continuous Glucose Sensor (FreeStyle Lisa 3 Sensor) MISC, Use 1 each every 14 (fourteen) days, Disp: 6 each, Rfl: 5  •  Difluprednate 0.05 % EMUL, Apply 1 drop to eye 4 (four) times a day, Disp: , Rfl:   •  Erleada 60 MG TABS, Take 4 tablets by mouth in the morning., Disp: , Rfl:   •  EVENING PRIMROSE OIL PO, Take by mouth in the morning and in the evening and before bedtime., Disp: , Rfl:   •  glimepiride (AMARYL) 1 mg tablet, Take 1 tablet (1 mg total) by mouth daily with breakfast, Disp: 90 tablet, Rfl: 1  •  losartan (COZAAR) 25 mg tablet, Take 1 tablet (25 mg total) by mouth daily, Disp: 90 tablet, Rfl: 1  •  metolazone (ZAROXOLYN) 2.5 mg tablet, Take 1 tablet (2.5 mg total) by mouth daily as needed (Weight gain of 3-4 lbs above baseline.  Can take up to three times per week.), Disp: 30 tablet, Rfl: 0  •  metoprolol succinate (TOPROL-XL) 50 mg 24 hr tablet, Take 2 tablets (100 mg total) by mouth daily, Disp: 90 tablet, Rfl: 0  •  multivitamin (THERAGRAN) TABS, Take 1 tablet by mouth in the morning., Disp: , Rfl:   •  pantoprazole (PROTONIX) 20 mg tablet, Take 1 tablet (20 mg total) by mouth daily, Disp: 90 tablet, Rfl: 1  •  rosuvastatin (CRESTOR) 20 MG  tablet, Take 1 tablet (20 mg total) by mouth daily, Disp: 90 tablet, Rfl: 1  •  VITAMIN D, ERGOCALCIFEROL, PO, Take by mouth, Disp: , Rfl:   •  vitamin E, tocopherol, 400 units capsule, Take 400 Units by mouth in the morning., Disp: , Rfl:   •  warfarin (Coumadin) 5 mg tablet, Take one tablet daily or as directed, Disp: 90 tablet, Rfl: 3  •  Empagliflozin (JARDIANCE) 10 MG TABS tablet, Take 1 tablet (10 mg total) by mouth every morning PRICE CHECK (Patient not taking: Reported on 7/22/2025), Disp: 30 tablet, Rfl: 0  No current facility-administered medications for this visit.

## 2025-07-23 ENCOUNTER — OFFICE VISIT (OUTPATIENT)
Dept: FAMILY MEDICINE CLINIC | Facility: CLINIC | Age: 75
End: 2025-07-23
Payer: MEDICARE

## 2025-07-23 ENCOUNTER — PATIENT OUTREACH (OUTPATIENT)
Dept: CASE MANAGEMENT | Facility: OTHER | Age: 75
End: 2025-07-23

## 2025-07-23 VITALS
HEIGHT: 70 IN | WEIGHT: 229 LBS | DIASTOLIC BLOOD PRESSURE: 60 MMHG | OXYGEN SATURATION: 94 % | HEART RATE: 60 BPM | SYSTOLIC BLOOD PRESSURE: 120 MMHG | BODY MASS INDEX: 32.78 KG/M2

## 2025-07-23 DIAGNOSIS — G47.30 SLEEP APNEA, UNSPECIFIED TYPE: ICD-10-CM

## 2025-07-23 DIAGNOSIS — E78.2 MIXED HYPERLIPIDEMIA: ICD-10-CM

## 2025-07-23 DIAGNOSIS — E11.65 TYPE 2 DIABETES MELLITUS WITH HYPERGLYCEMIA, WITHOUT LONG-TERM CURRENT USE OF INSULIN (HCC): ICD-10-CM

## 2025-07-23 DIAGNOSIS — I50.22 CHRONIC HFREF (HEART FAILURE WITH REDUCED EJECTION FRACTION) (HCC): Primary | ICD-10-CM

## 2025-07-23 DIAGNOSIS — I48.0 PAROXYSMAL ATRIAL FIBRILLATION (HCC): ICD-10-CM

## 2025-07-23 DIAGNOSIS — G47.34 NOCTURNAL HYPOXIA: ICD-10-CM

## 2025-07-23 PROCEDURE — 99496 TRANSJ CARE MGMT HIGH F2F 7D: CPT | Performed by: FAMILY MEDICINE

## 2025-07-23 NOTE — ASSESSMENT & PLAN NOTE
Slightly above intended goal but acceptable without episodes of hypoglycemia will continue current plan of care  Lab Results   Component Value Date    HGBA1C 7.3 (H) 07/16/2025

## 2025-07-23 NOTE — PROGRESS NOTES
Spoke with [facility] about metolazone and bumex they will put prescriptions back and [facility] pharmacy can call to have prescriptions transferred to their pharmacy      Spoke with [facility] pharmacy provided them phone number and medication names to have transferred to their pharmacy  Galen is aware and will check with [facility] later today

## 2025-07-23 NOTE — PROGRESS NOTES
Transition of Care Visit:  Name: Galen Carson      : 1950      MRN: [id number]  Encounter Provider: DANITA Girard  Encounter Date: 2025   Encounter department: [facility] [address]    Assessment & Plan  Chronic HFrEF  Wt Readings from Last 3 Encounters:   25 104 kg (229 lb)   25 104 kg (229 lb)   25 102 kg (225 lb 9.6 oz)   Stable, asymptomatic, continue care established with cardiology Daily weights diuretic                 Type 2 diabetes mellitus with hyperglycemia, without long-term current use of insulin (HCC)  Slightly above intended goal but acceptable without episodes of hypoglycemia will continue current plan of care  Lab Results   Component Value Date    HGBA1C 7.3 (H) 2025            Mixed hyperlipidemia  Stable tolerating statin Crestor 20 mg to be continued       Paroxysmal atrial fibrillation (HCC)  Rate controlled continue on beta-blocker chronic anticoagulation Coumadin, continued follow-up cardiology            History of Present Illness     Transitional Care Management Review:   Galen Carson is a 75 y.o. male here for TCM follow up.     During the TCM phone call patient stated:  TCM Call (since 2025)       Date and time call was made  2025  2:40 PM    Hospital care reviewed  Records reviewed    Patient was hospitialized at  [facility]    Date of Admission  25    Date of discharge  25    Diagnosis  Acute on chronic HFrEF    Disposition  Home    Were the patients medications reviewed and updated  Yes    Current Symptoms  Shortness of breath          TCM Call (since 2025)       Post hospital issues  None    Scheduled for follow up?  Yes    Patients specialists  Cardiology    Did you obtain your prescribed medications  No    Why were you unable to obtain your medications  said script wasnt called in    Do you need help managing your prescriptions or medications  No    Is  transportation to your appointment needed  No    I have advised the patient to call PCP with any new or worsening symptoms  kadedra jackson-reyes, NR-CMA    Are you recieving home care services  No          Follow-up hospital   Presently well denies any shortness of breath difficulty breathing, negative swelling to extremities  Schedule to  additional meds per cardiology recommendation of yesterday   presented to the hospital on 7/16/2025 due to acute on chronic systolic heart failure exacerbation.  EF of 25%.  Patient was started on IV diuretics Bumex 2 mg twice a day over a year.   Recently seen by cardiology 7/22/2025, continues on diuretics, adjusting to weight  Home weights daily 225lbs today   AKIL, does not have nor use cpap , sent it back       Review of Systems   Constitutional:  Negative for appetite change, chills, fever and unexpected weight change.   HENT:  Negative for congestion, dental problem, ear pain, hearing loss, postnasal drip, rhinorrhea, sinus pressure, sinus pain, sneezing, sore throat, tinnitus and voice change.    Eyes:  Negative for visual disturbance.   Respiratory:  Negative for apnea, cough, chest tightness and shortness of breath.    Cardiovascular:  Negative for chest pain, palpitations and leg swelling.   Gastrointestinal:  Negative for abdominal pain, blood in stool, constipation, diarrhea, nausea and vomiting.   Endocrine: Negative for cold intolerance, heat intolerance, polydipsia, polyphagia and polyuria.   Genitourinary:  Negative for decreased urine volume, difficulty urinating, dysuria, frequency and hematuria.   Musculoskeletal:  Negative for arthralgias, back pain, gait problem, joint swelling and myalgias.   Skin:  Negative for color change, rash and wound.   Allergic/Immunologic: Negative for environmental allergies and food allergies.   Neurological:  Negative for dizziness, syncope, weakness, light-headedness, numbness and headaches.   Hematological:  Negative for  "adenopathy. Does not bruise/bleed easily.   Psychiatric/Behavioral:  Negative for sleep disturbance and suicidal ideas. The patient is not nervous/anxious.      Objective   /60   Pulse 60   Ht 5' 10\" (1.778 m)   Wt 104 kg (229 lb)   SpO2 94%   BMI 32.86 kg/m²     Physical Exam  Constitutional:       General: He is not in acute distress.     Appearance: He is well-developed. He is not ill-appearing or toxic-appearing.   HENT:      Head: Normocephalic and atraumatic.     Cardiovascular:      Rate and Rhythm: Normal rate. Rhythm irregular.      Heart sounds: Normal heart sounds.   Pulmonary:      Effort: Pulmonary effort is normal.      Breath sounds: Normal breath sounds.   Abdominal:      General: Bowel sounds are normal.     Musculoskeletal:         General: Normal range of motion.      Cervical back: Normal range of motion and neck supple.      Right lower leg: No edema.      Left lower leg: No edema.   Lymphadenopathy:      Cervical: No cervical adenopathy.     Skin:     General: Skin is warm and dry.     Neurological:      Mental Status: He is alert and oriented to person, place, and time.      Deep Tendon Reflexes: Reflexes are normal and symmetric.     Psychiatric:         Behavior: Behavior normal.         Thought Content: Thought content normal.         Judgment: Judgment normal.       Medications have been reviewed by provider in current encounter      "

## 2025-07-23 NOTE — ASSESSMENT & PLAN NOTE
Wt Readings from Last 3 Encounters:   07/23/25 104 kg (229 lb)   07/22/25 104 kg (229 lb)   07/21/25 102 kg (225 lb 9.6 oz)   Stable, asymptomatic, continue care established with cardiology Daily weights diuretic

## 2025-07-23 NOTE — PROGRESS NOTES
"Spoke with Galen. States his feet are back to normal. Weigh today 225 pounds was 226 pounds yesterday  \" I don't have my medications they were not sent to pharmacy\"  I check chart medications sent to [facility] pharmacy not [facility] pharmacy.  I will call to get that fixed.  Galen to see PCP today will talk about oxygen but he may just purchase his own unit to use.  No longer taking jardiance was told to stop the medication  I will check back in one week    "

## 2025-07-23 NOTE — ASSESSMENT & PLAN NOTE
Rate controlled continue on beta-blocker chronic anticoagulation Coumadin, continued follow-up cardiology

## 2025-07-24 ENCOUNTER — TELEPHONE (OUTPATIENT)
Dept: CARDIOLOGY CLINIC | Facility: CLINIC | Age: 75
End: 2025-07-24

## 2025-07-24 NOTE — TELEPHONE ENCOUNTER
Patient discharged from [facility] 7/21/25.  Called patient to follow up. States he feels pretty good.     Self-management/education and teach back:  Primary learner: Patient  Following low sodium diet: Yes  Following fluid restriction: Yes  Hospital discharge weight: 225 lb 9.6 oz  Weighing daily: Yes   Recording: Yes  Weights:   224.2 lb 7/24  225.0 lb 7/23  226.0 lb 7/22  Monitoring symptoms: Yes  Any current symptoms: SOB at baseline.  States he was able to sleep all night last night.  States the swelling in his feet is significantly decreased.   Knows when to call provider: Yes  Medication reviewed and taking all as prescribed: Still does not have his Bumex. Metolazone and Bumex were sent to wrong pharmacy on discharge, transferred to correct pharmacy yesterday.  Was able to  metolazone yesterday but the pharmacy did not have the Bumex. States it should be available this afternoon for .  Has been taking his torsemide until he gets the Bumex.   Knows name of diuretic: Yes  Escalation plan:   HF education reviewed/reinforced including low sodium diet, 64 oz fluid restriction, activity, symptoms of decompensation and when and who to call.     Care Coordination:  Aware of cardiology follow up appointment: Saw Dr. Li 7/22.  Is aware he has appt with  8/15 at [facility] and appt with Dr. Whalen 8/25.   Aware of PCP follow up appointment: Saw PCP yesterday  Transportation: self  Social Support: family  Insurance/financial concerns: no prescription insurance  Home health care: no  Health literacy: good  Engagement: good  Personal Goal: stay out of hospital   Additional comments:

## 2025-07-29 ENCOUNTER — APPOINTMENT (OUTPATIENT)
Dept: LAB | Facility: CLINIC | Age: 75
End: 2025-07-29
Payer: MEDICARE

## 2025-07-29 ENCOUNTER — ANTICOAG VISIT (OUTPATIENT)
Dept: CARDIOLOGY CLINIC | Facility: CLINIC | Age: 75
End: 2025-07-29

## 2025-07-29 DIAGNOSIS — E87.5 HYPERKALEMIA: ICD-10-CM

## 2025-07-29 DIAGNOSIS — I48.0 PAROXYSMAL ATRIAL FIBRILLATION (HCC): ICD-10-CM

## 2025-07-29 DIAGNOSIS — Z79.01 LONG TERM (CURRENT) USE OF ANTICOAGULANTS: ICD-10-CM

## 2025-07-29 DIAGNOSIS — E11.65 TYPE 2 DIABETES MELLITUS WITH HYPERGLYCEMIA, WITHOUT LONG-TERM CURRENT USE OF INSULIN (HCC): ICD-10-CM

## 2025-07-29 DIAGNOSIS — I50.22 CHRONIC HFREF (HEART FAILURE WITH REDUCED EJECTION FRACTION) (HCC): ICD-10-CM

## 2025-07-29 DIAGNOSIS — N18.32 STAGE 3B CHRONIC KIDNEY DISEASE (HCC): ICD-10-CM

## 2025-07-29 LAB
25(OH)D3 SERPL-MCNC: 46.3 NG/ML (ref 30–100)
ALBUMIN SERPL BCG-MCNC: 4.1 G/DL (ref 3.5–5)
ALP SERPL-CCNC: 87 U/L (ref 34–104)
ALT SERPL W P-5'-P-CCNC: 11 U/L (ref 7–52)
ANION GAP SERPL CALCULATED.3IONS-SCNC: 15 MMOL/L (ref 4–13)
AST SERPL W P-5'-P-CCNC: 18 U/L (ref 13–39)
BASOPHILS # BLD AUTO: 0.05 THOUSANDS/ÂΜL (ref 0–0.1)
BASOPHILS NFR BLD AUTO: 1 % (ref 0–1)
BILIRUB SERPL-MCNC: 0.28 MG/DL (ref 0.2–1)
BUN SERPL-MCNC: 79 MG/DL (ref 5–25)
CALCIUM SERPL-MCNC: 9.4 MG/DL (ref 8.4–10.2)
CHLORIDE SERPL-SCNC: 100 MMOL/L (ref 96–108)
CO2 SERPL-SCNC: 29 MMOL/L (ref 21–32)
CREAT SERPL-MCNC: 2.01 MG/DL (ref 0.6–1.3)
EOSINOPHIL # BLD AUTO: 0.22 THOUSAND/ÂΜL (ref 0–0.61)
EOSINOPHIL NFR BLD AUTO: 4 % (ref 0–6)
ERYTHROCYTE [DISTWIDTH] IN BLOOD BY AUTOMATED COUNT: 13.9 % (ref 11.6–15.1)
GFR SERPL CREATININE-BSD FRML MDRD: 31 ML/MIN/1.73SQ M
GLUCOSE P FAST SERPL-MCNC: 155 MG/DL (ref 65–99)
HCT VFR BLD AUTO: 36.2 % (ref 36.5–49.3)
HGB BLD-MCNC: 11.8 G/DL (ref 12–17)
IMM GRANULOCYTES # BLD AUTO: 0.09 THOUSAND/UL (ref 0–0.2)
IMM GRANULOCYTES NFR BLD AUTO: 2 % (ref 0–2)
INR PPP: 2.41 (ref 0.85–1.19)
LYMPHOCYTES # BLD AUTO: 1.17 THOUSANDS/ÂΜL (ref 0.6–4.47)
LYMPHOCYTES NFR BLD AUTO: 23 % (ref 14–44)
MAGNESIUM SERPL-MCNC: 2.7 MG/DL (ref 1.9–2.7)
MCH RBC QN AUTO: 29.5 PG (ref 26.8–34.3)
MCHC RBC AUTO-ENTMCNC: 32.6 G/DL (ref 31.4–37.4)
MCV RBC AUTO: 91 FL (ref 82–98)
MONOCYTES # BLD AUTO: 0.67 THOUSAND/ÂΜL (ref 0.17–1.22)
MONOCYTES NFR BLD AUTO: 13 % (ref 4–12)
NEUTROPHILS # BLD AUTO: 2.99 THOUSANDS/ÂΜL (ref 1.85–7.62)
NEUTS SEG NFR BLD AUTO: 57 % (ref 43–75)
NRBC BLD AUTO-RTO: 0 /100 WBCS
PHOSPHATE SERPL-MCNC: 3.4 MG/DL (ref 2.3–4.1)
PLATELET # BLD AUTO: 195 THOUSANDS/UL (ref 149–390)
PMV BLD AUTO: 11.2 FL (ref 8.9–12.7)
POTASSIUM SERPL-SCNC: 5.1 MMOL/L (ref 3.5–5.3)
PROT SERPL-MCNC: 7.6 G/DL (ref 6.4–8.4)
PROTHROMBIN TIME: 26.1 SECONDS (ref 12.3–15)
PTH-INTACT SERPL-MCNC: 143.7 PG/ML (ref 12–88)
RBC # BLD AUTO: 4 MILLION/UL (ref 3.88–5.62)
SODIUM SERPL-SCNC: 144 MMOL/L (ref 135–147)
WBC # BLD AUTO: 5.19 THOUSAND/UL (ref 4.31–10.16)

## 2025-07-29 PROCEDURE — 85610 PROTHROMBIN TIME: CPT

## 2025-07-29 PROCEDURE — 82306 VITAMIN D 25 HYDROXY: CPT

## 2025-07-29 PROCEDURE — 80053 COMPREHEN METABOLIC PANEL: CPT

## 2025-07-29 PROCEDURE — 85025 COMPLETE CBC W/AUTO DIFF WBC: CPT

## 2025-07-29 PROCEDURE — 83735 ASSAY OF MAGNESIUM: CPT

## 2025-07-29 PROCEDURE — 83970 ASSAY OF PARATHORMONE: CPT

## 2025-07-29 PROCEDURE — 84100 ASSAY OF PHOSPHORUS: CPT

## 2025-07-29 PROCEDURE — 83036 HEMOGLOBIN GLYCOSYLATED A1C: CPT

## 2025-07-29 PROCEDURE — 36415 COLL VENOUS BLD VENIPUNCTURE: CPT

## 2025-07-30 ENCOUNTER — PATIENT OUTREACH (OUTPATIENT)
Dept: CASE MANAGEMENT | Facility: OTHER | Age: 75
End: 2025-07-30

## 2025-07-30 LAB
EST. AVERAGE GLUCOSE BLD GHB EST-MCNC: 169 MG/DL
HBA1C MFR BLD: 7.5 %

## 2025-07-31 ENCOUNTER — TELEPHONE (OUTPATIENT)
Dept: NEPHROLOGY | Facility: CLINIC | Age: 75
End: 2025-07-31

## 2025-08-01 ENCOUNTER — OFFICE VISIT (OUTPATIENT)
Dept: NEPHROLOGY | Facility: CLINIC | Age: 75
End: 2025-08-01
Payer: MEDICARE

## 2025-08-01 VITALS
BODY MASS INDEX: 32.9 KG/M2 | HEART RATE: 64 BPM | HEIGHT: 70 IN | OXYGEN SATURATION: 98 % | WEIGHT: 229.8 LBS | RESPIRATION RATE: 18 BRPM | DIASTOLIC BLOOD PRESSURE: 61 MMHG | TEMPERATURE: 97.8 F | SYSTOLIC BLOOD PRESSURE: 100 MMHG

## 2025-08-01 DIAGNOSIS — N18.32 CKD STAGE 3B, GFR 30-44 ML/MIN (HCC): ICD-10-CM

## 2025-08-01 DIAGNOSIS — G47.30 SLEEP APNEA, UNSPECIFIED TYPE: ICD-10-CM

## 2025-08-01 DIAGNOSIS — N18.32 STAGE 3B CHRONIC KIDNEY DISEASE (HCC): Primary | ICD-10-CM

## 2025-08-01 DIAGNOSIS — I27.20 MILD PULMONARY HYPERTENSION (HCC): ICD-10-CM

## 2025-08-01 DIAGNOSIS — I42.8 NICM (NONISCHEMIC CARDIOMYOPATHY) (HCC): ICD-10-CM

## 2025-08-01 DIAGNOSIS — C61 PROSTATE CANCER (HCC): ICD-10-CM

## 2025-08-01 PROCEDURE — G2211 COMPLEX E/M VISIT ADD ON: HCPCS | Performed by: INTERNAL MEDICINE

## 2025-08-01 PROCEDURE — 99214 OFFICE O/P EST MOD 30 MIN: CPT | Performed by: INTERNAL MEDICINE

## 2025-08-04 ENCOUNTER — TELEPHONE (OUTPATIENT)
Dept: CARDIOLOGY CLINIC | Facility: CLINIC | Age: 75
End: 2025-08-04

## 2025-08-07 ENCOUNTER — PROCEDURE VISIT (OUTPATIENT)
Dept: UROLOGY | Facility: CLINIC | Age: 75
End: 2025-08-07
Payer: MEDICARE

## 2025-08-07 VITALS
DIASTOLIC BLOOD PRESSURE: 62 MMHG | TEMPERATURE: 97.8 F | HEART RATE: 86 BPM | BODY MASS INDEX: 31.78 KG/M2 | WEIGHT: 222 LBS | SYSTOLIC BLOOD PRESSURE: 108 MMHG | OXYGEN SATURATION: 93 % | HEIGHT: 70 IN

## 2025-08-07 DIAGNOSIS — C61 PROSTATE CANCER (HCC): Primary | ICD-10-CM

## 2025-08-07 PROCEDURE — 96402 CHEMO HORMON ANTINEOPL SQ/IM: CPT

## 2025-08-11 ENCOUNTER — HOSPITAL ENCOUNTER (EMERGENCY)
Facility: HOSPITAL | Age: 75
Discharge: HOME | End: 2025-08-11
Attending: EMERGENCY MEDICINE | Admitting: EMERGENCY MEDICINE
Payer: MEDICARE

## 2025-08-11 ENCOUNTER — APPOINTMENT (EMERGENCY)
Dept: RADIOLOGY | Facility: HOSPITAL | Age: 75
End: 2025-08-11
Payer: MEDICARE

## 2025-08-12 ENCOUNTER — VBI (OUTPATIENT)
Dept: FAMILY MEDICINE CLINIC | Facility: CLINIC | Age: 75
End: 2025-08-12

## 2025-08-15 ENCOUNTER — PATIENT OUTREACH (OUTPATIENT)
Dept: CASE MANAGEMENT | Facility: OTHER | Age: 75
End: 2025-08-15

## 2025-08-15 ENCOUNTER — OFFICE VISIT (OUTPATIENT)
Dept: CARDIOLOGY CLINIC | Facility: CLINIC | Age: 75
End: 2025-08-15
Payer: MEDICARE
